# Patient Record
Sex: MALE | Race: WHITE | NOT HISPANIC OR LATINO
[De-identification: names, ages, dates, MRNs, and addresses within clinical notes are randomized per-mention and may not be internally consistent; named-entity substitution may affect disease eponyms.]

---

## 2017-03-16 ENCOUNTER — APPOINTMENT (OUTPATIENT)
Dept: CARDIOLOGY | Facility: CLINIC | Age: 62
End: 2017-03-16

## 2017-06-08 ENCOUNTER — APPOINTMENT (OUTPATIENT)
Dept: CARDIOLOGY | Facility: CLINIC | Age: 62
End: 2017-06-08

## 2017-06-13 ENCOUNTER — APPOINTMENT (OUTPATIENT)
Dept: CARDIOLOGY | Facility: CLINIC | Age: 62
End: 2017-06-13

## 2017-06-13 VITALS
BODY MASS INDEX: 32.49 KG/M2 | HEIGHT: 67 IN | SYSTOLIC BLOOD PRESSURE: 148 MMHG | WEIGHT: 207 LBS | DIASTOLIC BLOOD PRESSURE: 101 MMHG | HEART RATE: 90 BPM | OXYGEN SATURATION: 92 %

## 2017-09-30 ENCOUNTER — OUTPATIENT (OUTPATIENT)
Dept: OUTPATIENT SERVICES | Facility: HOSPITAL | Age: 62
LOS: 1 days | Discharge: HOME | End: 2017-09-30

## 2017-09-30 DIAGNOSIS — I48.91 UNSPECIFIED ATRIAL FIBRILLATION: ICD-10-CM
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
## 2017-09-30 DIAGNOSIS — E05.90 THYROTOXICOSIS, UNSPECIFIED WITHOUT THYROTOXIC CRISIS OR STORM: ICD-10-CM

## 2017-09-30 DIAGNOSIS — R97.20 ELEVATED PROSTATE SPECIFIC ANTIGEN [PSA]: ICD-10-CM

## 2017-09-30 DIAGNOSIS — E55.9 VITAMIN D DEFICIENCY, UNSPECIFIED: ICD-10-CM

## 2017-09-30 DIAGNOSIS — I49.01 VENTRICULAR FIBRILLATION: ICD-10-CM

## 2017-09-30 DIAGNOSIS — E78.5 HYPERLIPIDEMIA, UNSPECIFIED: ICD-10-CM

## 2017-09-30 DIAGNOSIS — D64.9 ANEMIA, UNSPECIFIED: ICD-10-CM

## 2017-09-30 DIAGNOSIS — E11.9 TYPE 2 DIABETES MELLITUS WITHOUT COMPLICATIONS: ICD-10-CM

## 2017-09-30 DIAGNOSIS — R55 SYNCOPE AND COLLAPSE: ICD-10-CM

## 2017-12-09 ENCOUNTER — OUTPATIENT (OUTPATIENT)
Dept: OUTPATIENT SERVICES | Facility: HOSPITAL | Age: 62
LOS: 1 days | Discharge: HOME | End: 2017-12-09

## 2017-12-09 DIAGNOSIS — E78.5 HYPERLIPIDEMIA, UNSPECIFIED: ICD-10-CM

## 2017-12-09 DIAGNOSIS — I49.01 VENTRICULAR FIBRILLATION: ICD-10-CM

## 2017-12-09 DIAGNOSIS — R55 SYNCOPE AND COLLAPSE: ICD-10-CM

## 2017-12-09 DIAGNOSIS — I48.91 UNSPECIFIED ATRIAL FIBRILLATION: ICD-10-CM
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
## 2017-12-14 ENCOUNTER — APPOINTMENT (OUTPATIENT)
Dept: CARDIOLOGY | Facility: CLINIC | Age: 62
End: 2017-12-14

## 2017-12-14 VITALS — DIASTOLIC BLOOD PRESSURE: 100 MMHG | BODY MASS INDEX: 32.26 KG/M2 | SYSTOLIC BLOOD PRESSURE: 138 MMHG | WEIGHT: 206 LBS

## 2018-04-19 ENCOUNTER — APPOINTMENT (OUTPATIENT)
Dept: CARDIOLOGY | Facility: CLINIC | Age: 63
End: 2018-04-19

## 2018-04-19 VITALS — BODY MASS INDEX: 33.2 KG/M2 | WEIGHT: 212 LBS

## 2018-04-19 VITALS — DIASTOLIC BLOOD PRESSURE: 88 MMHG | SYSTOLIC BLOOD PRESSURE: 130 MMHG

## 2018-06-02 ENCOUNTER — OUTPATIENT (OUTPATIENT)
Dept: OUTPATIENT SERVICES | Facility: HOSPITAL | Age: 63
LOS: 1 days | Discharge: HOME | End: 2018-06-02

## 2018-06-02 DIAGNOSIS — N39.0 URINARY TRACT INFECTION, SITE NOT SPECIFIED: ICD-10-CM

## 2018-06-02 DIAGNOSIS — E11.9 TYPE 2 DIABETES MELLITUS WITHOUT COMPLICATIONS: ICD-10-CM

## 2018-06-02 DIAGNOSIS — Z00.00 ENCOUNTER FOR GENERAL ADULT MEDICAL EXAMINATION WITHOUT ABNORMAL FINDINGS: ICD-10-CM

## 2018-06-02 DIAGNOSIS — E78.5 HYPERLIPIDEMIA, UNSPECIFIED: ICD-10-CM

## 2018-06-02 DIAGNOSIS — D64.9 ANEMIA, UNSPECIFIED: ICD-10-CM

## 2018-06-02 DIAGNOSIS — E53.8 DEFICIENCY OF OTHER SPECIFIED B GROUP VITAMINS: ICD-10-CM

## 2018-06-02 DIAGNOSIS — R97.20 ELEVATED PROSTATE SPECIFIC ANTIGEN [PSA]: ICD-10-CM

## 2018-06-02 DIAGNOSIS — E55.9 VITAMIN D DEFICIENCY, UNSPECIFIED: ICD-10-CM

## 2018-06-02 DIAGNOSIS — D50.9 IRON DEFICIENCY ANEMIA, UNSPECIFIED: ICD-10-CM

## 2018-06-28 ENCOUNTER — APPOINTMENT (OUTPATIENT)
Dept: CARDIOLOGY | Facility: CLINIC | Age: 63
End: 2018-06-28

## 2018-06-28 VITALS — DIASTOLIC BLOOD PRESSURE: 80 MMHG | SYSTOLIC BLOOD PRESSURE: 130 MMHG

## 2018-07-27 ENCOUNTER — INPATIENT (INPATIENT)
Facility: HOSPITAL | Age: 63
LOS: 1 days | Discharge: HOME | End: 2018-07-29
Attending: INTERNAL MEDICINE | Admitting: INTERNAL MEDICINE

## 2018-07-27 VITALS
HEART RATE: 85 BPM | OXYGEN SATURATION: 98 % | SYSTOLIC BLOOD PRESSURE: 147 MMHG | RESPIRATION RATE: 18 BRPM | TEMPERATURE: 97 F | DIASTOLIC BLOOD PRESSURE: 101 MMHG

## 2018-07-27 LAB
ALBUMIN SERPL ELPH-MCNC: 4.1 G/DL — SIGNIFICANT CHANGE UP (ref 3.5–5.2)
ALP SERPL-CCNC: 100 U/L — SIGNIFICANT CHANGE UP (ref 30–115)
ALT FLD-CCNC: 26 U/L — SIGNIFICANT CHANGE UP (ref 0–41)
ANION GAP SERPL CALC-SCNC: 16 MMOL/L — HIGH (ref 7–14)
APTT BLD: 31.4 SEC — SIGNIFICANT CHANGE UP (ref 27–39.2)
AST SERPL-CCNC: 21 U/L — SIGNIFICANT CHANGE UP (ref 0–41)
BASOPHILS # BLD AUTO: 0.07 K/UL — SIGNIFICANT CHANGE UP (ref 0–0.2)
BASOPHILS NFR BLD AUTO: 0.9 % — SIGNIFICANT CHANGE UP (ref 0–1)
BILIRUB SERPL-MCNC: 0.3 MG/DL — SIGNIFICANT CHANGE UP (ref 0.2–1.2)
BUN SERPL-MCNC: 19 MG/DL — SIGNIFICANT CHANGE UP (ref 10–20)
CALCIUM SERPL-MCNC: 9 MG/DL — SIGNIFICANT CHANGE UP (ref 8.5–10.1)
CHLORIDE SERPL-SCNC: 103 MMOL/L — SIGNIFICANT CHANGE UP (ref 98–110)
CK MB CFR SERPL CALC: 1.4 NG/ML — SIGNIFICANT CHANGE UP (ref 0.6–6.3)
CK SERPL-CCNC: 64 U/L — SIGNIFICANT CHANGE UP (ref 0–225)
CO2 SERPL-SCNC: 23 MMOL/L — SIGNIFICANT CHANGE UP (ref 17–32)
CREAT SERPL-MCNC: 1 MG/DL — SIGNIFICANT CHANGE UP (ref 0.7–1.5)
EOSINOPHIL # BLD AUTO: 0.22 K/UL — SIGNIFICANT CHANGE UP (ref 0–0.7)
EOSINOPHIL NFR BLD AUTO: 2.7 % — SIGNIFICANT CHANGE UP (ref 0–8)
GLUCOSE SERPL-MCNC: 151 MG/DL — HIGH (ref 70–99)
HCT VFR BLD CALC: 39.7 % — LOW (ref 42–52)
HGB BLD-MCNC: 13.5 G/DL — LOW (ref 14–18)
IMM GRANULOCYTES NFR BLD AUTO: 0.6 % — HIGH (ref 0.1–0.3)
INR BLD: 1.15 RATIO — SIGNIFICANT CHANGE UP (ref 0.65–1.3)
LYMPHOCYTES # BLD AUTO: 1.63 K/UL — SIGNIFICANT CHANGE UP (ref 1.2–3.4)
LYMPHOCYTES # BLD AUTO: 20.1 % — LOW (ref 20.5–51.1)
MCHC RBC-ENTMCNC: 29 PG — SIGNIFICANT CHANGE UP (ref 27–31)
MCHC RBC-ENTMCNC: 34 G/DL — SIGNIFICANT CHANGE UP (ref 32–37)
MCV RBC AUTO: 85.2 FL — SIGNIFICANT CHANGE UP (ref 80–94)
MONOCYTES # BLD AUTO: 0.88 K/UL — HIGH (ref 0.1–0.6)
MONOCYTES NFR BLD AUTO: 10.8 % — HIGH (ref 1.7–9.3)
NEUTROPHILS # BLD AUTO: 5.27 K/UL — SIGNIFICANT CHANGE UP (ref 1.4–6.5)
NEUTROPHILS NFR BLD AUTO: 64.9 % — SIGNIFICANT CHANGE UP (ref 42.2–75.2)
NRBC # BLD: 0 /100 WBCS — SIGNIFICANT CHANGE UP (ref 0–0)
PLATELET # BLD AUTO: 188 K/UL — SIGNIFICANT CHANGE UP (ref 130–400)
POTASSIUM SERPL-MCNC: 3.9 MMOL/L — SIGNIFICANT CHANGE UP (ref 3.5–5)
POTASSIUM SERPL-SCNC: 3.9 MMOL/L — SIGNIFICANT CHANGE UP (ref 3.5–5)
PROT SERPL-MCNC: 6.9 G/DL — SIGNIFICANT CHANGE UP (ref 6–8)
PROTHROM AB SERPL-ACNC: 12.4 SEC — SIGNIFICANT CHANGE UP (ref 9.95–12.87)
RBC # BLD: 4.66 M/UL — LOW (ref 4.7–6.1)
RBC # FLD: 14.4 % — SIGNIFICANT CHANGE UP (ref 11.5–14.5)
SODIUM SERPL-SCNC: 142 MMOL/L — SIGNIFICANT CHANGE UP (ref 135–146)
TROPONIN T SERPL-MCNC: <0.01 NG/ML — SIGNIFICANT CHANGE UP
WBC # BLD: 8.12 K/UL — SIGNIFICANT CHANGE UP (ref 4.8–10.8)
WBC # FLD AUTO: 8.12 K/UL — SIGNIFICANT CHANGE UP (ref 4.8–10.8)

## 2018-07-28 DIAGNOSIS — Z95.810 PRESENCE OF AUTOMATIC (IMPLANTABLE) CARDIAC DEFIBRILLATOR: Chronic | ICD-10-CM

## 2018-07-28 DIAGNOSIS — Z98.890 OTHER SPECIFIED POSTPROCEDURAL STATES: Chronic | ICD-10-CM

## 2018-07-28 DIAGNOSIS — Z87.828 PERSONAL HISTORY OF OTHER (HEALED) PHYSICAL INJURY AND TRAUMA: Chronic | ICD-10-CM

## 2018-07-28 LAB
ANION GAP SERPL CALC-SCNC: 14 MMOL/L — SIGNIFICANT CHANGE UP (ref 7–14)
BASOPHILS # BLD AUTO: 0.05 K/UL — SIGNIFICANT CHANGE UP (ref 0–0.2)
BASOPHILS NFR BLD AUTO: 0.7 % — SIGNIFICANT CHANGE UP (ref 0–1)
BUN SERPL-MCNC: 18 MG/DL — SIGNIFICANT CHANGE UP (ref 10–20)
CALCIUM SERPL-MCNC: 9 MG/DL — SIGNIFICANT CHANGE UP (ref 8.5–10.1)
CHLORIDE SERPL-SCNC: 104 MMOL/L — SIGNIFICANT CHANGE UP (ref 98–110)
CO2 SERPL-SCNC: 25 MMOL/L — SIGNIFICANT CHANGE UP (ref 17–32)
CREAT SERPL-MCNC: 0.8 MG/DL — SIGNIFICANT CHANGE UP (ref 0.7–1.5)
EOSINOPHIL # BLD AUTO: 0.18 K/UL — SIGNIFICANT CHANGE UP (ref 0–0.7)
EOSINOPHIL NFR BLD AUTO: 2.5 % — SIGNIFICANT CHANGE UP (ref 0–8)
GLUCOSE SERPL-MCNC: 102 MG/DL — HIGH (ref 70–99)
HCT VFR BLD CALC: 40.3 % — LOW (ref 42–52)
HGB BLD-MCNC: 13.4 G/DL — LOW (ref 14–18)
IMM GRANULOCYTES NFR BLD AUTO: 0.4 % — HIGH (ref 0.1–0.3)
LYMPHOCYTES # BLD AUTO: 1.24 K/UL — SIGNIFICANT CHANGE UP (ref 1.2–3.4)
LYMPHOCYTES # BLD AUTO: 17 % — LOW (ref 20.5–51.1)
MAGNESIUM SERPL-MCNC: 2.1 MG/DL — SIGNIFICANT CHANGE UP (ref 1.8–2.4)
MCHC RBC-ENTMCNC: 28.3 PG — SIGNIFICANT CHANGE UP (ref 27–31)
MCHC RBC-ENTMCNC: 33.3 G/DL — SIGNIFICANT CHANGE UP (ref 32–37)
MCV RBC AUTO: 85 FL — SIGNIFICANT CHANGE UP (ref 80–94)
MONOCYTES # BLD AUTO: 0.66 K/UL — HIGH (ref 0.1–0.6)
MONOCYTES NFR BLD AUTO: 9 % — SIGNIFICANT CHANGE UP (ref 1.7–9.3)
NEUTROPHILS # BLD AUTO: 5.14 K/UL — SIGNIFICANT CHANGE UP (ref 1.4–6.5)
NEUTROPHILS NFR BLD AUTO: 70.4 % — SIGNIFICANT CHANGE UP (ref 42.2–75.2)
PLATELET # BLD AUTO: 183 K/UL — SIGNIFICANT CHANGE UP (ref 130–400)
POTASSIUM SERPL-MCNC: 4.4 MMOL/L — SIGNIFICANT CHANGE UP (ref 3.5–5)
POTASSIUM SERPL-SCNC: 4.4 MMOL/L — SIGNIFICANT CHANGE UP (ref 3.5–5)
RBC # BLD: 4.74 M/UL — SIGNIFICANT CHANGE UP (ref 4.7–6.1)
RBC # FLD: 14.1 % — SIGNIFICANT CHANGE UP (ref 11.5–14.5)
SODIUM SERPL-SCNC: 143 MMOL/L — SIGNIFICANT CHANGE UP (ref 135–146)
WBC # BLD: 7.3 K/UL — SIGNIFICANT CHANGE UP (ref 4.8–10.8)
WBC # FLD AUTO: 7.3 K/UL — SIGNIFICANT CHANGE UP (ref 4.8–10.8)

## 2018-07-28 RX ORDER — ZOLPIDEM TARTRATE 10 MG/1
5 TABLET ORAL AT BEDTIME
Qty: 0 | Refills: 0 | Status: DISCONTINUED | OUTPATIENT
Start: 2018-07-28 | End: 2018-07-29

## 2018-07-28 RX ORDER — SIMVASTATIN 20 MG/1
20 TABLET, FILM COATED ORAL AT BEDTIME
Qty: 0 | Refills: 0 | Status: DISCONTINUED | OUTPATIENT
Start: 2018-07-28 | End: 2018-07-29

## 2018-07-28 RX ORDER — ENOXAPARIN SODIUM 100 MG/ML
40 INJECTION SUBCUTANEOUS EVERY 24 HOURS
Qty: 0 | Refills: 0 | Status: DISCONTINUED | OUTPATIENT
Start: 2018-07-28 | End: 2018-07-29

## 2018-07-28 RX ORDER — LOSARTAN POTASSIUM 100 MG/1
50 TABLET, FILM COATED ORAL DAILY
Qty: 0 | Refills: 0 | Status: DISCONTINUED | OUTPATIENT
Start: 2018-07-28 | End: 2018-07-29

## 2018-07-28 RX ORDER — ZOLPIDEM TARTRATE 10 MG/1
5 TABLET ORAL AT BEDTIME
Qty: 0 | Refills: 0 | Status: DISCONTINUED | OUTPATIENT
Start: 2018-07-28 | End: 2018-07-28

## 2018-07-28 RX ORDER — ONDANSETRON 8 MG/1
4 TABLET, FILM COATED ORAL EVERY 6 HOURS
Qty: 0 | Refills: 0 | Status: DISCONTINUED | OUTPATIENT
Start: 2018-07-28 | End: 2018-07-29

## 2018-07-28 RX ADMIN — SIMVASTATIN 20 MILLIGRAM(S): 20 TABLET, FILM COATED ORAL at 21:21

## 2018-07-28 RX ADMIN — ZOLPIDEM TARTRATE 5 MILLIGRAM(S): 10 TABLET ORAL at 22:35

## 2018-07-28 RX ADMIN — LOSARTAN POTASSIUM 50 MILLIGRAM(S): 100 TABLET, FILM COATED ORAL at 06:40

## 2018-07-28 NOTE — ED PROVIDER NOTE - ATTENDING CONTRIBUTION TO CARE
63 year old male, pmhx of htn, dld, previous vfib, has an aicd, comes in with complaint of pacemaker beeping, his aicd never fired, no cp/sob, no n/v/d, no loc, no recent illness. Patient was concerned and came for eval. ED team discussed case with [employer], it appears that the device needs a batterychange    CONSTITUTIONAL: Well-developed; well-nourished; in no acute distress. Sitting up and providing appropriate history and physical examination  SKIN: skin exam is warm and dry, no acute rash.  HEAD: Normocephalic; atraumatic.  EYES: PERRL, 3 mm bilateral, no nystagmus, EOM intact; conjunctiva and sclera clear.  ENT: No nasal discharge; airway clear.  NECK: Supple; non tender. + full passive ROM in all directions. No JVD  CARD: S1, S2 normal; no murmurs, gallops, or rubs. Regular rate and rhythm. + Symmetric Strong Pulses  RESP: No wheezes, rales or rhonchi. Good air movement bilaterally  ABD: soft; non-distended; non-tender. No Rebound, No Guarding, No signs of peritonitis, No CVA tenderness. No pulsatile abdominal mass. + Strong and Symmetric Pulses  EXT: Normal ROM. No clubbing, cyanosis or edema. Dp and Pt Pulses intact. Cap refill less than 3 seconds  NEURO: CN 2-12 intact, normal finger to nose, normal romberg, stable gait, no sensory or motor deficits, Alert, oriented, grossly unremarkable. No Focal deficits. GCS 15. NIH 0  PSYCH: Cooperative, appropriate.

## 2018-07-28 NOTE — ED PROVIDER NOTE - MEDICAL DECISION MAKING DETAILS
I personally evaluated the patient. I reviewed the Resident’s or Physician Assistant’s note (as assigned above), and agree with the findings and plan except as documented in my note. Patient seen and evaluated by Dr. Cardona of cardiology, patient's device never fired, discussed with metronic, patient needs battery change. Will admit to telemetry

## 2018-07-28 NOTE — H&P ADULT - HISTORY OF PRESENT ILLNESS
63 Y O M with pmh of HTN , DLD , Mitral and tricuspid valve repair, and AICD presented to ER with c/o beeping noise from AICD this evening. Pt reports that he was told that AICD battery needs to be replaced and pt was scheduled for replacement on 8/29/18 . Pt was instructed from Dr Bryan to present to ER if his AICD fires before that. Pt reports  this evening he heard a loud beeping sound from AICD , that lasted for 30 seconds , no chest pain , palpitations , the device did not shock him. Pt was concerned so he presented to ER .   Pt denies any fever , chills , cough , sob or any other symptoms.
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
## 2018-07-28 NOTE — H&P ADULT - ATTENDING COMMENTS
Pt seen and examined independently of resident, agree with above history, physical exam, assessment and plan    Addendum    1- Pacemaker Malfunction  likely Battery problem  Admit to Tele  EP eval for possible Battery change     2- HTN  continue home meds    3- DVT/GI prophylaxis  s/c heparin/PPIs

## 2018-07-28 NOTE — ED ADULT NURSE NOTE - OBJECTIVE STATEMENT
Patient reports that his AICD pacemaker is beeping, due for battery replacement on August 29. Patient denies any symptoms, denies CP, SOB, dizziness, nausea, weakness. Patient reports his cardiologist advised him to come to ED.

## 2018-07-28 NOTE — H&P ADULT - NSHPLABSRESULTS_GEN_ALL_CORE
13.5   8.12  )-----------( 188      ( 27 Jul 2018 21:41 )             39.7       07-27    142  |  103  |  19  ----------------------------<  151<H>  3.9   |  23  |  1.0    Ca    9.0      27 Jul 2018 21:41    TPro  6.9  /  Alb  4.1  /  TBili  0.3  /  DBili  x   /  AST  21  /  ALT  26  /  AlkPhos  100  07-27                  PT/INR - ( 27 Jul 2018 21:41 )   PT: 12.40 sec;   INR: 1.15 ratio         PTT - ( 27 Jul 2018 21:41 )  PTT:31.4 sec    Lactate Trend      CARDIAC MARKERS ( 27 Jul 2018 21:41 )  x     / <0.01 ng/mL / 64 U/L / x     / 1.4 ng/mL        CAPILLARY BLOOD GLUCOSE

## 2018-07-28 NOTE — H&P ADULT - NSHPPHYSICALEXAM_GEN_ALL_CORE
T(F): 98.6  HR: 83  BP: 129/87  RR: 18  SpO2: 96%      PHYSICAL EXAM:  GENERAL: NAD, well-developed  HEAD:  Atraumatic, Normocephalic  EYES: EOMI, PERRLA, conjunctiva and sclera clear  NECK: Supple, No JVD  CHEST/LUNG: Clear to auscultation bilaterally; No wheeze  HEART: Regular rate and rhythm  ABDOMEN: Soft, Nontender, Nondistended; Bowel sounds present  EXTREMITIES:  2+ Peripheral Pulses, No clubbing, cyanosis, or edema  PSYCH: AAOx3  NEUROLOGY: non-focal  SKIN: No rashes or lesions

## 2018-07-28 NOTE — ED PROVIDER NOTE - NS ED ROS FT
Eyes:  No visual changes, eye pain or discharge.  ENMT:  No hearing changes,  Cardiac:  No chest pain, SOB or edema. No chest pain with exertion.  Respiratory:  No cough or respiratory distress.   GI:  No nausea, vomiting, diarrhea or abdominal pain.  :  No dysuria, frequency or burning.  MS:  No myalgia, muscle weakness, joint pain or back pain.  Neuro:  No headache or weakness.  No LOC.  Skin:  No skin rash.   Endocrine: No history of thyroid disease or diabetes.

## 2018-07-28 NOTE — H&P ADULT - ASSESSMENT
63 Y O M with pmh of HTN , DLD , Mitral and tricuspid valve repair, and AICD presented to ER with c/o beeping noise from AICD this evening.    # PACEMAKER MALFUNCTION:    - admit to tele.  - EPS evaluation for possible battery change on this admission.    # HTN :    - stable c/w htn.    # DLD:    - c/w simvastatin.    # DVT:    -PPX with lovenox.    # DISPO:  -  full code from home.

## 2018-07-29 ENCOUNTER — TRANSCRIPTION ENCOUNTER (OUTPATIENT)
Age: 63
End: 2018-07-29

## 2018-07-29 VITALS
SYSTOLIC BLOOD PRESSURE: 127 MMHG | RESPIRATION RATE: 18 BRPM | DIASTOLIC BLOOD PRESSURE: 86 MMHG | TEMPERATURE: 98 F | HEART RATE: 79 BPM

## 2018-07-29 RX ORDER — SIMVASTATIN 20 MG/1
1 TABLET, FILM COATED ORAL
Qty: 0 | Refills: 0 | DISCHARGE
Start: 2018-07-29

## 2018-07-29 RX ORDER — SIMVASTATIN 20 MG/1
1 TABLET, FILM COATED ORAL
Qty: 0 | Refills: 0 | COMMUNITY

## 2018-07-29 RX ORDER — LOSARTAN POTASSIUM 100 MG/1
1 TABLET, FILM COATED ORAL
Qty: 0 | Refills: 0 | COMMUNITY

## 2018-07-29 RX ORDER — LOSARTAN POTASSIUM 100 MG/1
1 TABLET, FILM COATED ORAL
Qty: 0 | Refills: 0 | DISCHARGE
Start: 2018-07-29

## 2018-07-29 RX ADMIN — LOSARTAN POTASSIUM 50 MILLIGRAM(S): 100 TABLET, FILM COATED ORAL at 05:52

## 2018-07-29 NOTE — PROGRESS NOTE ADULT - ASSESSMENT
63 Y O M with pmh of HTN , DLD , Mitral and tricuspid valve repair, and AICD presented to ER with c/o beeping noise from AICD this evening.    PACEMAKER MALFUNCTION  - c/w tele.  - f/u EPS evaluation for possible battery change     HTN   - stable   -c/w losartan    DLD  - c/w simvastatin.    DVT:  -c/w lovenox    -PPX with lovenox.    DISPO:  -from home    FULL CODE 63 Y O M with pmh of HTN , DLD , Mitral and tricuspid valve repair, and AICD presented to ER with c/o beeping noise from AICD this evening.    PACEMAKER MALFUNCTION  - c/w tele.  - EPS: aware and was scheduled for battery change 8/2018. Can d/c    HTN   - stable   -c/w losartan    DLD  - c/w simvastatin.    DVT:  -c/w lovenox    -PPX with lovenox.    DISPO:  -from home  -d/c home    FULL CODE

## 2018-07-29 NOTE — DISCHARGE NOTE ADULT - PATIENT PORTAL LINK FT
You can access the [facility] Patient Portal, offered by [facility], by registering with the following website: [email or web address]

## 2018-07-29 NOTE — CONSULT NOTE ADULT - ASSESSMENT
ICD - Patient's device reached CELIA, report int he chart  - I turned off the alert for batter change  - Patient is scheduled for generator change on 8/29  VT - no events on device  continue current meds

## 2018-07-29 NOTE — DISCHARGE NOTE ADULT - CARE PROVIDERS DIRECT ADDRESSES
,DirectAddress_Unknown,[email or web address]
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
## 2018-07-29 NOTE — DISCHARGE NOTE ADULT - CARE PLAN
Principal Discharge DX:	AICD battery failure  Goal:	manage and prevent recurrence and complications  Assessment and plan of treatment:	-follow up with EP  -EP scheduled battery replacement  Secondary Diagnosis:	HTN (hypertension)  Goal:	manage and prevent complications  Assessment and plan of treatment:	-take all medications as directed  Secondary Diagnosis:	High cholesterol  Goal:	manage and prevent complications  Assessment and plan of treatment:	-take all medications as directed  -adhere to a low fat, low cholesterol diet

## 2018-07-29 NOTE — PROGRESS NOTE ADULT - ASSESSMENT
A/P  1- Pacemaker Malfunction  seen by EP  no event on monitor  recommended to continue same meds   Pt scheduled for battery change on 8/29    2- HTN  continue home meds      Cleared by EP for discharge  Discussed with Pt and family

## 2018-07-29 NOTE — DISCHARGE NOTE ADULT - CARE PROVIDER_API CALL
Darline Cabrera (DO), Internal Medicine  [address] [place] [id number]  Phone: [phone]  Fax: [phone]    Celestine Bryan; MICHAEL), Cardiac Electrophysiology; Cardiovascular Disease  [address] [place] [address]  Phone: [phone]  Fax: [phone]

## 2018-07-29 NOTE — DISCHARGE NOTE ADULT - PLAN OF CARE
manage and prevent recurrence and complications -follow up with EP  -EP scheduled battery replacement manage and prevent complications -take all medications as directed -take all medications as directed  -adhere to a low fat, low cholesterol diet
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
## 2018-07-29 NOTE — DISCHARGE NOTE ADULT - HOSPITAL COURSE
62 y/o M with PMHx of HTN , DLD , Mitral and tricuspid valve repair, and AICD presented to ER with c/o beeping noise from AICD this evening. Pt reports that he was told that AICD battery needs to be replaced and pt was scheduled for replacement on 8/29/18 . Pt was instructed from Dr Bryan to present to ER if his AICD fires before that. Pt reports this evening he heard a loud beeping sound from AICD , that lasted for 30 seconds. Denied chest pain, palpitations, the device did not shock him. Pt was concerned so he presented to ER. Pt denied any fever, chills ,cough ,sob or any other symptoms. Patient was seen by EP and apparently was evaluated in office on 7/27/18 and was scheduled to have battery replacement in august 2018. Will discharge home with follow up to EP and his primary doctor.

## 2018-07-29 NOTE — DISCHARGE NOTE ADULT - MEDICATION SUMMARY - MEDICATIONS TO TAKE
I will START or STAY ON the medications listed below when I get home from the hospital:    losartan 50 mg oral tablet  -- 1 tab(s) by mouth once a day  -- Indication: For HTN (hypertension)    simvastatin 20 mg oral tablet  -- 1 tab(s) by mouth once a day (at bedtime)  -- Indication: For DLD

## 2018-08-01 PROBLEM — E78.00 PURE HYPERCHOLESTEROLEMIA, UNSPECIFIED: Chronic | Status: ACTIVE | Noted: 2018-07-28

## 2018-08-01 PROBLEM — I10 ESSENTIAL (PRIMARY) HYPERTENSION: Chronic | Status: ACTIVE | Noted: 2018-07-28

## 2018-08-01 PROBLEM — I48.91 UNSPECIFIED ATRIAL FIBRILLATION: Chronic | Status: ACTIVE | Noted: 2018-07-28

## 2018-08-02 DIAGNOSIS — E78.00 PURE HYPERCHOLESTEROLEMIA, UNSPECIFIED: ICD-10-CM

## 2018-08-02 DIAGNOSIS — Z87.828 PERSONAL HISTORY OF OTHER (HEALED) PHYSICAL INJURY AND TRAUMA: ICD-10-CM

## 2018-08-02 DIAGNOSIS — Z98.890 OTHER SPECIFIED POSTPROCEDURAL STATES: ICD-10-CM
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
## 2018-08-02 DIAGNOSIS — T82.191A OTHER MECHANICAL COMPLICATION OF CARDIAC PULSE GENERATOR (BATTERY), INITIAL ENCOUNTER: ICD-10-CM

## 2018-08-02 DIAGNOSIS — I10 ESSENTIAL (PRIMARY) HYPERTENSION: ICD-10-CM

## 2018-08-02 DIAGNOSIS — Z95.810 PRESENCE OF AUTOMATIC (IMPLANTABLE) CARDIAC DEFIBRILLATOR: ICD-10-CM

## 2018-08-02 DIAGNOSIS — I48.91 UNSPECIFIED ATRIAL FIBRILLATION: ICD-10-CM

## 2018-08-06 ENCOUNTER — OUTPATIENT (OUTPATIENT)
Dept: OUTPATIENT SERVICES | Facility: HOSPITAL | Age: 63
LOS: 1 days | Discharge: HOME | End: 2018-08-06

## 2018-08-06 VITALS
SYSTOLIC BLOOD PRESSURE: 124 MMHG | RESPIRATION RATE: 16 BRPM | WEIGHT: 205.03 LBS | HEIGHT: 67 IN | DIASTOLIC BLOOD PRESSURE: 90 MMHG | OXYGEN SATURATION: 100 % | TEMPERATURE: 97 F | HEART RATE: 80 BPM

## 2018-08-06 DIAGNOSIS — Z98.890 OTHER SPECIFIED POSTPROCEDURAL STATES: Chronic | ICD-10-CM

## 2018-08-06 DIAGNOSIS — Z01.818 ENCOUNTER FOR OTHER PREPROCEDURAL EXAMINATION: ICD-10-CM

## 2018-08-06 DIAGNOSIS — T82.111A BREAKDOWN (MECHANICAL) OF CARDIAC PULSE GENERATOR (BATTERY), INITIAL ENCOUNTER: ICD-10-CM

## 2018-08-06 DIAGNOSIS — Z87.828 PERSONAL HISTORY OF OTHER (HEALED) PHYSICAL INJURY AND TRAUMA: Chronic | ICD-10-CM

## 2018-08-06 DIAGNOSIS — Z95.810 PRESENCE OF AUTOMATIC (IMPLANTABLE) CARDIAC DEFIBRILLATOR: Chronic | ICD-10-CM

## 2018-08-06 NOTE — H&P PST ADULT - PMH
Atrial fibrillation  resolved with SAAVEDRA repair  High cholesterol    HTN (hypertension) Atrial fibrillation  resolved with SAAVEDRA repair  High cholesterol    HTN (hypertension)    Other acute pulmonary embolism without acute cor pulmonale  2014 with ivc filter

## 2018-08-07 LAB
ALBUMIN SERPL ELPH-MCNC: 4.6 G/DL — SIGNIFICANT CHANGE UP (ref 3.5–5.2)
ALP SERPL-CCNC: 82 U/L — SIGNIFICANT CHANGE UP (ref 30–115)
ALT FLD-CCNC: 20 U/L — SIGNIFICANT CHANGE UP (ref 0–41)
ANION GAP SERPL CALC-SCNC: 17 MMOL/L — HIGH (ref 7–14)
APPEARANCE UR: CLEAR — SIGNIFICANT CHANGE UP
APTT BLD: 35.5 SEC — SIGNIFICANT CHANGE UP (ref 27–39.2)
AST SERPL-CCNC: 19 U/L — SIGNIFICANT CHANGE UP (ref 0–41)
BASOPHILS # BLD AUTO: 0.04 K/UL — SIGNIFICANT CHANGE UP (ref 0–0.2)
BASOPHILS NFR BLD AUTO: 0.5 % — SIGNIFICANT CHANGE UP (ref 0–1)
BILIRUB SERPL-MCNC: 0.7 MG/DL — SIGNIFICANT CHANGE UP (ref 0.2–1.2)
BILIRUB UR-MCNC: ABNORMAL
BUN SERPL-MCNC: 21 MG/DL — HIGH (ref 10–20)
CALCIUM SERPL-MCNC: 9.3 MG/DL — SIGNIFICANT CHANGE UP (ref 8.5–10.1)
CHLORIDE SERPL-SCNC: 101 MMOL/L — SIGNIFICANT CHANGE UP (ref 98–110)
CO2 SERPL-SCNC: 25 MMOL/L — SIGNIFICANT CHANGE UP (ref 17–32)
COLOR SPEC: SIGNIFICANT CHANGE UP
CREAT SERPL-MCNC: 1.1 MG/DL — SIGNIFICANT CHANGE UP (ref 0.7–1.5)
DIFF PNL FLD: NEGATIVE — SIGNIFICANT CHANGE UP
EOSINOPHIL # BLD AUTO: 0.15 K/UL — SIGNIFICANT CHANGE UP (ref 0–0.7)
EOSINOPHIL NFR BLD AUTO: 1.7 % — SIGNIFICANT CHANGE UP (ref 0–8)
GLUCOSE SERPL-MCNC: 60 MG/DL — LOW (ref 70–99)
GLUCOSE UR QL: NEGATIVE — SIGNIFICANT CHANGE UP
HCT VFR BLD CALC: 41.7 % — LOW (ref 42–52)
HGB BLD-MCNC: 13.7 G/DL — LOW (ref 14–18)
IMM GRANULOCYTES NFR BLD AUTO: 0.3 % — SIGNIFICANT CHANGE UP (ref 0.1–0.3)
INR BLD: 1.2 RATIO — SIGNIFICANT CHANGE UP (ref 0.65–1.3)
KETONES UR-MCNC: ABNORMAL
LEUKOCYTE ESTERASE UR-ACNC: NEGATIVE — SIGNIFICANT CHANGE UP
LYMPHOCYTES # BLD AUTO: 1.65 K/UL — SIGNIFICANT CHANGE UP (ref 1.2–3.4)
LYMPHOCYTES # BLD AUTO: 19 % — LOW (ref 20.5–51.1)
MCHC RBC-ENTMCNC: 28.5 PG — SIGNIFICANT CHANGE UP (ref 27–31)
MCHC RBC-ENTMCNC: 32.9 G/DL — SIGNIFICANT CHANGE UP (ref 32–37)
MCV RBC AUTO: 86.7 FL — SIGNIFICANT CHANGE UP (ref 80–94)
MONOCYTES # BLD AUTO: 0.94 K/UL — HIGH (ref 0.1–0.6)
MONOCYTES NFR BLD AUTO: 10.8 % — HIGH (ref 1.7–9.3)
MRSA PCR RESULT.: NEGATIVE — SIGNIFICANT CHANGE UP
NEUTROPHILS # BLD AUTO: 5.88 K/UL — SIGNIFICANT CHANGE UP (ref 1.4–6.5)
NEUTROPHILS NFR BLD AUTO: 67.7 % — SIGNIFICANT CHANGE UP (ref 42.2–75.2)
NITRITE UR-MCNC: NEGATIVE — SIGNIFICANT CHANGE UP
NRBC # BLD: 0 /100 WBCS — SIGNIFICANT CHANGE UP (ref 0–0)
PH UR: 5.5 — SIGNIFICANT CHANGE UP (ref 5–8)
PLATELET # BLD AUTO: 200 K/UL — SIGNIFICANT CHANGE UP (ref 130–400)
POTASSIUM SERPL-MCNC: 4.1 MMOL/L — SIGNIFICANT CHANGE UP (ref 3.5–5)
POTASSIUM SERPL-SCNC: 4.1 MMOL/L — SIGNIFICANT CHANGE UP (ref 3.5–5)
PROT SERPL-MCNC: 7.3 G/DL — SIGNIFICANT CHANGE UP (ref 6–8)
PROT UR-MCNC: ABNORMAL
PROTHROM AB SERPL-ACNC: 12.9 SEC — HIGH (ref 9.95–12.87)
RBC # BLD: 4.81 M/UL — SIGNIFICANT CHANGE UP (ref 4.7–6.1)
RBC # FLD: 14.5 % — SIGNIFICANT CHANGE UP (ref 11.5–14.5)
SODIUM SERPL-SCNC: 143 MMOL/L — SIGNIFICANT CHANGE UP (ref 135–146)
SP GR SPEC: >=1.03 — SIGNIFICANT CHANGE UP (ref 1.01–1.03)
UROBILINOGEN FLD QL: 1 (ref 0.2–0.2)
WBC # BLD: 8.69 K/UL — SIGNIFICANT CHANGE UP (ref 4.8–10.8)
WBC # FLD AUTO: 8.69 K/UL — SIGNIFICANT CHANGE UP (ref 4.8–10.8)
WBC UR QL: SIGNIFICANT CHANGE UP /HPF

## 2018-08-15 ENCOUNTER — OUTPATIENT (OUTPATIENT)
Dept: OUTPATIENT SERVICES | Facility: HOSPITAL | Age: 63
LOS: 1 days | Discharge: HOME | End: 2018-08-15

## 2018-08-15 VITALS
DIASTOLIC BLOOD PRESSURE: 103 MMHG | HEIGHT: 66 IN | RESPIRATION RATE: 18 BRPM | WEIGHT: 205.03 LBS | SYSTOLIC BLOOD PRESSURE: 144 MMHG | HEART RATE: 80 BPM

## 2018-08-15 DIAGNOSIS — T82.111A BREAKDOWN (MECHANICAL) OF CARDIAC PULSE GENERATOR (BATTERY), INITIAL ENCOUNTER: ICD-10-CM

## 2018-08-15 DIAGNOSIS — Z98.890 OTHER SPECIFIED POSTPROCEDURAL STATES: Chronic | ICD-10-CM

## 2018-08-15 DIAGNOSIS — Z87.828 PERSONAL HISTORY OF OTHER (HEALED) PHYSICAL INJURY AND TRAUMA: Chronic | ICD-10-CM

## 2018-08-15 DIAGNOSIS — Z95.810 PRESENCE OF AUTOMATIC (IMPLANTABLE) CARDIAC DEFIBRILLATOR: Chronic | ICD-10-CM

## 2018-08-15 RX ORDER — CEPHALEXIN 500 MG
1 CAPSULE ORAL
Qty: 10 | Refills: 0
Start: 2018-08-15 | End: 2018-08-19

## 2018-08-15 NOTE — CHART NOTE - NSCHARTNOTEFT_GEN_A_CORE
Cardiac Electrophysiology Procedure Note      Procedure:   Implantable BiV- Cardioverter-Defibrillator (ICD) generator change       Indication:  62 yo with history of CHB, VT, heart failure referred for generator change as it reached [facility].    A thorough discussion was had with the patient and his family concerning all aspects of ICD therapy. We reviewed the data supporting ICD therapy and how it applies individually.  We discussed the procedures, risks and outcomes of ICD implantation an living with an ICD. We discussed management of ICD therapy throughout life, including deactivation of the ICD. After all questions were answered, it was a shared decision to proceed with ICD therapy.    :  Attending: Celestine Bryan MD    Equipment Implanted   Pulse Generator: [employer]  Model Number:  [id number]  Serial Number: [id number]    Equipment Explanted  Pulse Generator:   Model Number: [id number]  Serial Number:  [id number]         Intraprocedure Lead Parameters    Right Ventricular Lead:  Model Number:  [id number]  Serial Number: [id number]  Implanted:     11  Threshold:  0.5 V @  0.5ms  Sensing:        paced  Impedance:        456Ohms    Left Ventricular Lead:  Model Number: [id number]     Serial Number: [id number]  Implanted:       11  Threshold:   V  1.25 @ 0.5  ms  Impedance:       683 Ohms      Right Atrial Lead:  Model Number:  1999  Serial Number: [id number]  Implanted:      11  Threshold:      0.5 V @ 0.5 ms  Sensin.9 mV  Impedance:     266 Ohms    DESCRIPTION OF PROCEDURE  The patient was brought to the Procedure Room in a nonsedated and fasting state, having received preoperative antibiotics. Informed, written consent was obtained prior to the procedure. Intermittent boluses of Versed  was used to maintain comfort and analgesia throughout the procedure. Blood pressure, oxygenation and level of comfort were stable throughout. The left shoulder region was cleaned and prepped with serial applications of Chlorhexidine Scrub. Patient was then covered from head to toe with sterile drapes in the usual manner. The fluoroscopic anatomy of the left shoulder region was inspected, along with generator and lead position and orientation.    Twenty cc of lidocaine solution were infiltrated into the skin overlying the generator just over the previous incision scar. A 2.5 cm. incision was then made overlying and parallel to the previous incision scar. This incision was carried down to the pre-pectoral fascia using electrocautery and blunt dissection. The pacemaker capsule was identified and opened. The old device was freed from fibrous tissue and retrieved from the pocket. The leads were unscrewed and pulled from the device. The leads were connected to a  and tested. Optimal lead parameters were obtained for the ventricular lead and the atrial lead as shown above Next the pocket was inspected for bleeding, and electrocautery applied where appropriate. The wound was irrigated with copious amounts of vancomycin solution.    The leads were wiped clean and then connected to the new pulse generator. The   leads and pulse generator were coiled into the pocket. T    The wound margins approximated well, without any tension or overlap. The wound was closed using an interrupted layer of 2-0 absorbable Dacron suture for the deep fascial layer. This was followed by a continuous layer of 3-0 absorbable sutures. The skin was then closed using 4-0 absorbable Dacron sutures. Steri-strips were placed over the wound.  A dry, sterile dressing was placed over this.  Needle count were performed and found to be consistent at the end of the procedure        COMPLICATIONS:  The patient tolerated the procedure well. There were no immediate complications.    CONCLUSIONS:  Successful BiV- ICD generator change         _______________________________  Celestine Bryan MD  Cardiac Electrophysiology

## 2018-08-15 NOTE — PRE-ANESTHESIA EVALUATION ADULT - NSANTHOSAYNRD_GEN_A_CORE
see jayson sheet/No. JAYSON screening performed.  STOP BANG Legend: 0-2 = LOW Risk; 3-4 = INTERMEDIATE Risk; 5-8 = HIGH Risk

## 2018-08-15 NOTE — PROGRESS NOTE ADULT - SUBJECTIVE AND OBJECTIVE BOX
Electrophysiology Brief Post-Op Note    I have personally seen and examined the patient.  I agree with the history and physical which I have reviewed and noted any changes below.  08-15-18 @ 8:00    PRE-OP DIAGNOSIS: CHB, CHF    POST-OP DIAGNOSIS: CHB, CHF    PROCEDURE: generator change    Physician: Irvin  Assistant: None    ESTIMATED BLOOD LOSS:    20   mL    ANESTHESIA TYPE:  [  ]General Anesthesia  [ X ] Sedation  [  ] Local/Regional    CONDITION  [  ] Critical  [  ] Serious  [  ]Fair  [ X ]Good      SPECIMENS REMOVED (IF APPLICABLE):  generator    IMPLANTS (IF APPLICABLE)  generator     FINDINGS  PLAN OF CARE  - Keflex 500 mg q12 x 5 days  - FU 3-4 weeks

## 2018-08-21 DIAGNOSIS — Z45.02 ENCOUNTER FOR ADJUSTMENT AND MANAGEMENT OF AUTOMATIC IMPLANTABLE CARDIAC DEFIBRILLATOR: ICD-10-CM

## 2018-08-21 DIAGNOSIS — I10 ESSENTIAL (PRIMARY) HYPERTENSION: ICD-10-CM

## 2018-08-21 DIAGNOSIS — I50.9 HEART FAILURE, UNSPECIFIED: ICD-10-CM

## 2018-08-21 DIAGNOSIS — F10.10 ALCOHOL ABUSE, UNCOMPLICATED: ICD-10-CM

## 2018-08-21 DIAGNOSIS — I48.91 UNSPECIFIED ATRIAL FIBRILLATION: ICD-10-CM

## 2018-08-21 DIAGNOSIS — E78.00 PURE HYPERCHOLESTEROLEMIA, UNSPECIFIED: ICD-10-CM

## 2018-09-13 ENCOUNTER — APPOINTMENT (OUTPATIENT)
Dept: CARDIOLOGY | Facility: CLINIC | Age: 63
End: 2018-09-13

## 2018-09-13 VITALS — SYSTOLIC BLOOD PRESSURE: 120 MMHG | DIASTOLIC BLOOD PRESSURE: 80 MMHG

## 2018-12-01 ENCOUNTER — OUTPATIENT (OUTPATIENT)
Dept: OUTPATIENT SERVICES | Facility: HOSPITAL | Age: 63
LOS: 1 days | Discharge: HOME | End: 2018-12-01

## 2018-12-01 DIAGNOSIS — E11.9 TYPE 2 DIABETES MELLITUS WITHOUT COMPLICATIONS: ICD-10-CM

## 2018-12-01 DIAGNOSIS — E55.9 VITAMIN D DEFICIENCY, UNSPECIFIED: ICD-10-CM

## 2018-12-01 DIAGNOSIS — Z87.828 PERSONAL HISTORY OF OTHER (HEALED) PHYSICAL INJURY AND TRAUMA: Chronic | ICD-10-CM

## 2018-12-01 DIAGNOSIS — D64.9 ANEMIA, UNSPECIFIED: ICD-10-CM

## 2018-12-01 DIAGNOSIS — Z95.810 PRESENCE OF AUTOMATIC (IMPLANTABLE) CARDIAC DEFIBRILLATOR: Chronic | ICD-10-CM

## 2018-12-01 DIAGNOSIS — D50.9 IRON DEFICIENCY ANEMIA, UNSPECIFIED: ICD-10-CM

## 2018-12-01 DIAGNOSIS — N39.0 URINARY TRACT INFECTION, SITE NOT SPECIFIED: ICD-10-CM

## 2018-12-01 DIAGNOSIS — E53.8 DEFICIENCY OF OTHER SPECIFIED B GROUP VITAMINS: ICD-10-CM

## 2018-12-01 DIAGNOSIS — E05.90 THYROTOXICOSIS, UNSPECIFIED WITHOUT THYROTOXIC CRISIS OR STORM: ICD-10-CM

## 2018-12-01 DIAGNOSIS — E78.5 HYPERLIPIDEMIA, UNSPECIFIED: ICD-10-CM

## 2018-12-01 DIAGNOSIS — Z98.890 OTHER SPECIFIED POSTPROCEDURAL STATES: Chronic | ICD-10-CM

## 2019-05-16 ENCOUNTER — APPOINTMENT (OUTPATIENT)
Dept: CARDIOLOGY | Facility: CLINIC | Age: 64
End: 2019-05-16
Payer: COMMERCIAL

## 2019-05-16 VITALS — SYSTOLIC BLOOD PRESSURE: 128 MMHG | DIASTOLIC BLOOD PRESSURE: 88 MMHG | HEART RATE: 80 BPM

## 2019-05-16 PROCEDURE — 93284 PRGRMG EVAL IMPLANTABLE DFB: CPT

## 2019-05-16 PROCEDURE — 93297 REM INTERROG DEV EVAL ICPMS: CPT

## 2019-05-16 PROCEDURE — 99213 OFFICE O/P EST LOW 20 MIN: CPT

## 2019-05-16 NOTE — PHYSICAL EXAM
[Normal Appearance] : normal appearance [General Appearance - Well Developed] : well developed [Well Groomed] : well groomed [General Appearance - Well Nourished] : well nourished [No Deformities] : no deformities [Heart Rate And Rhythm] : heart rate and rhythm were normal [General Appearance - In No Acute Distress] : no acute distress [Murmurs] : no murmurs present [Heart Sounds] : normal S1 and S2 [Respiration, Rhythm And Depth] : normal respiratory rhythm and effort [Exaggerated Use Of Accessory Muscles For Inspiration] : no accessory muscle use [Auscultation Breath Sounds / Voice Sounds] : lungs were clear to auscultation bilaterally [Left Infraclavicular] : left infraclavicular area [Clean] : clean [Well-Healed] : well-healed [Dry] : dry [Bowel Sounds] : normal bowel sounds [Abdomen Soft] : soft [Abdomen Tenderness] : non-tender [Nail Clubbing] : no clubbing of the fingernails [Abdomen Mass (___ Cm)] : no abdominal mass palpated [Cyanosis, Localized] : no localized cyanosis [] : no ischemic changes [Petechial Hemorrhages (___cm)] : no petechial hemorrhages

## 2019-07-05 NOTE — ASSESSMENT
[FreeTextEntry1] : AICD interrogation : Battery : 4.9 years to CELIA . All parameters stable. No events . 1 episode of NSV T >200 bpm. \par 21 episodes of NSVT. No pulmonary congestion . \par \par patient reports c/w meds \par \par \par plan \par -restart Metoprolol 12.5 mg at bedtime and titrate up if tolerates \par -continue Losartan 25 mg daily \par -f/u with cardiologist as scheduled \par -continue remote monitoring \par -return in 6 months \par \par

## 2019-07-05 NOTE — HISTORY OF PRESENT ILLNESS
[Palpitations] : no palpitations [SOB] : no dyspnea [Syncope] : no syncope [Chest Pain] : no chest pain or discomfort [ICD Shocks] : no recent ICD shocks [Erythema at Site] : no erythema at device site [Swelling at Site] : no swelling at device site [de-identified] : 64  years old male with pmh of HTN, S/P MV repair , NICM , AV node ablation S/P CRT/D , VT S/P shock , Afib CHADS-VASc 1,S/P GIANFRANCO closure, recurrent AFib S/P DCCV 10/2016 , S/P CRT-D generator change 8/15/2018 . \par \par Pt presents for a routine AICD interrogation \par Denies CP/ SOB / palpitations. No dizziness, no syncope . \par Stable activity tolerance \par ECG ( 5/16/2019 ) - SR at 81 bpm, BiV paced

## 2019-07-05 NOTE — PROCEDURE
[Complete Heart Block] : complete heart block [CRT-D] : Cardiac resynchronization therapy defibrillator [Voltage: ___ volts] : Voltage was [unfilled] volts [DDDR] : DDDR [Charge Time: ___ sec] : charge time was [unfilled] seconds [Threshold Testing Performed] : Threshold testing was performed [Longevity: ___ months] : The estimated remaining battery life is [unfilled] months [Sensing Amplitude ___mv] : sensing amplitude was [unfilled] mv [Lead Imp:  ___ohms] : lead impedance was [unfilled] ohms [___V @] : [unfilled] V [Programmed for Longevity] : output reprogrammed for improved battery longevity [___ ms] : [unfilled] ms [Asense-Vsense ___ %] : Asense-Vsense [unfilled]% [Asense-Vpace ___ %] : Asense-Vpace [unfilled]% [Apace-Vsense ___ %] : Apace-Vsense [unfilled]% [Apace-Vpace ___ %] : Apace-Vpace [unfilled]% [See Scanned Paceart Report] : See scanned paceart report [See Device Printout] : See device printout [de-identified] : Janel LERNER [de-identified] : Medtronic [de-identified] : 8/15/18 [de-identified] : [id number] [de-identified] : 80 [de-identified] : 1 VT/VF episode on 1/9/19 lasting 7 seconds. No therapy . \par No pulmonary congestion.

## 2019-07-09 ENCOUNTER — APPOINTMENT (OUTPATIENT)
Dept: CARDIOLOGY | Facility: CLINIC | Age: 64
End: 2019-07-09
Payer: COMMERCIAL

## 2019-07-09 PROCEDURE — 93295 DEV INTERROG REMOTE 1/2/MLT: CPT

## 2019-07-09 PROCEDURE — 93296 REM INTERROG EVL PM/IDS: CPT

## 2019-10-10 ENCOUNTER — APPOINTMENT (OUTPATIENT)
Dept: CARDIOLOGY | Facility: CLINIC | Age: 64
End: 2019-10-10
Payer: COMMERCIAL

## 2019-10-10 PROCEDURE — 93295 DEV INTERROG REMOTE 1/2/MLT: CPT

## 2019-10-10 PROCEDURE — 93296 REM INTERROG EVL PM/IDS: CPT

## 2019-11-14 ENCOUNTER — OUTPATIENT (OUTPATIENT)
Dept: OUTPATIENT SERVICES | Facility: HOSPITAL | Age: 64
LOS: 1 days | Discharge: HOME | End: 2019-11-14

## 2019-11-14 ENCOUNTER — APPOINTMENT (OUTPATIENT)
Dept: CARDIOLOGY | Facility: CLINIC | Age: 64
End: 2019-11-14
Payer: COMMERCIAL

## 2019-11-14 VITALS
BODY MASS INDEX: 32.18 KG/M2 | SYSTOLIC BLOOD PRESSURE: 120 MMHG | HEART RATE: 82 BPM | DIASTOLIC BLOOD PRESSURE: 80 MMHG | HEIGHT: 67 IN | WEIGHT: 205 LBS

## 2019-11-14 DIAGNOSIS — Z98.890 OTHER SPECIFIED POSTPROCEDURAL STATES: Chronic | ICD-10-CM

## 2019-11-14 DIAGNOSIS — Z95.810 PRESENCE OF AUTOMATIC (IMPLANTABLE) CARDIAC DEFIBRILLATOR: Chronic | ICD-10-CM

## 2019-11-14 DIAGNOSIS — E11.9 TYPE 2 DIABETES MELLITUS WITHOUT COMPLICATIONS: ICD-10-CM

## 2019-11-14 DIAGNOSIS — N39.0 URINARY TRACT INFECTION, SITE NOT SPECIFIED: ICD-10-CM

## 2019-11-14 DIAGNOSIS — E05.90 THYROTOXICOSIS, UNSPECIFIED WITHOUT THYROTOXIC CRISIS OR STORM: ICD-10-CM

## 2019-11-14 DIAGNOSIS — D50.9 IRON DEFICIENCY ANEMIA, UNSPECIFIED: ICD-10-CM

## 2019-11-14 DIAGNOSIS — E55.9 VITAMIN D DEFICIENCY, UNSPECIFIED: ICD-10-CM

## 2019-11-14 DIAGNOSIS — D64.9 ANEMIA, UNSPECIFIED: ICD-10-CM

## 2019-11-14 DIAGNOSIS — Z87.828 PERSONAL HISTORY OF OTHER (HEALED) PHYSICAL INJURY AND TRAUMA: Chronic | ICD-10-CM

## 2019-11-14 DIAGNOSIS — R97.20 ELEVATED PROSTATE SPECIFIC ANTIGEN [PSA]: ICD-10-CM

## 2019-11-14 DIAGNOSIS — E78.5 HYPERLIPIDEMIA, UNSPECIFIED: ICD-10-CM

## 2019-11-14 PROCEDURE — 99213 OFFICE O/P EST LOW 20 MIN: CPT

## 2019-11-14 PROCEDURE — 93284 PRGRMG EVAL IMPLANTABLE DFB: CPT

## 2019-11-14 PROCEDURE — 93290 INTERROG DEV EVAL ICPMS IP: CPT

## 2019-11-14 NOTE — ASSESSMENT
[FreeTextEntry1] : \par \par plan \par contt Metoprolol 12.5 mg at bedtime and titrate up if tolerates \par -continue Losartan 25 mg daily \par -f/u with cardiologist as scheduled \par -continue remote monitoring \par -return in 6 months \par \par

## 2019-11-14 NOTE — HISTORY OF PRESENT ILLNESS
[SOB] : no dyspnea [Palpitations] : no palpitations [ICD Shocks] : no recent ICD shocks [Chest Pain] : no chest pain or discomfort [Syncope] : no syncope [Erythema at Site] : no erythema at device site [Swelling at Site] : no swelling at device site [de-identified] : 64  years old male with pmh of HTN, S/P MV repair , NICM , AV node ablation S/P CRT/D , VT S/P shock , Afib CHADS-VASc 1,S/P GIANFRANCO closure, recurrent AFib S/P DCCV 10/2016 , S/P CRT-D generator change 8/15/2018 . \par \par Pt presents for a routine AICD interrogation \par Denies CP/ SOB / palpitations. No dizziness, no syncope . \par Stable activity tolerance \par ECG ( 5/16/2019 ) - SR at 81 bpm, BiV paced

## 2019-11-14 NOTE — PROCEDURE
[Complete Heart Block] : complete heart block [CRT-D] : Cardiac resynchronization therapy defibrillator [DDDR] : DDDR [Voltage: ___ volts] : Voltage was [unfilled] volts [Longevity: ___ months] : The estimated remaining battery life is [unfilled] months [Threshold Testing Performed] : Threshold testing was performed [Atrial] : Atrial [Ventricular] : Ventricular [Sensing Amplitude ___mv] : sensing amplitude was [unfilled] mv [Lead Imp:  ___ohms] : lead impedance was [unfilled] ohms [___V @] : [unfilled] V [___ ms] : [unfilled] ms [Counters Reset] : the counters were reset [Programmed for Longevity] : output reprogrammed for improved battery longevity [Asense-Vpace ___ %] : Asense-Vpace [unfilled]% [Asense-Vsense ___ %] : Asense-Vsense [unfilled]% [Apace-Vsense ___ %] : Apace-Vsense [unfilled]% [Apace-Vpace ___ %] : Apace-Vpace [unfilled]% [de-identified] : Janel MRI CRT-D [de-identified] : Medtronic [de-identified] : 8/15/2018 [de-identified] : [id number] [de-identified] :  [de-identified] : 12 NSVT, longest episode 16 beats @

## 2019-11-14 NOTE — PHYSICAL EXAM
[General Appearance - Well Developed] : well developed [Normal Appearance] : normal appearance [No Deformities] : no deformities [General Appearance - Well Nourished] : well nourished [Well Groomed] : well groomed [General Appearance - In No Acute Distress] : no acute distress [Heart Rate And Rhythm] : heart rate and rhythm were normal [Heart Sounds] : normal S1 and S2 [Murmurs] : no murmurs present [Auscultation Breath Sounds / Voice Sounds] : lungs were clear to auscultation bilaterally [Respiration, Rhythm And Depth] : normal respiratory rhythm and effort [Exaggerated Use Of Accessory Muscles For Inspiration] : no accessory muscle use [Clean] : clean [Left Infraclavicular] : left infraclavicular area [Well-Healed] : well-healed [Bowel Sounds] : normal bowel sounds [Dry] : dry [Abdomen Soft] : soft [Abdomen Tenderness] : non-tender [Abdomen Mass (___ Cm)] : no abdominal mass palpated [Nail Clubbing] : no clubbing of the fingernails [Petechial Hemorrhages (___cm)] : no petechial hemorrhages [] : no ischemic changes [Cyanosis, Localized] : no localized cyanosis

## 2019-11-19 ENCOUNTER — APPOINTMENT (OUTPATIENT)
Dept: CARDIOLOGY | Facility: CLINIC | Age: 64
End: 2019-11-19
Payer: COMMERCIAL

## 2019-11-19 VITALS
BODY MASS INDEX: 32.49 KG/M2 | HEART RATE: 80 BPM | SYSTOLIC BLOOD PRESSURE: 122 MMHG | DIASTOLIC BLOOD PRESSURE: 84 MMHG | WEIGHT: 207 LBS | HEIGHT: 67 IN

## 2019-11-19 PROCEDURE — 99214 OFFICE O/P EST MOD 30 MIN: CPT | Mod: 25

## 2019-11-19 PROCEDURE — 93284 PRGRMG EVAL IMPLANTABLE DFB: CPT

## 2019-11-19 RX ORDER — METOPROLOL SUCCINATE 25 MG/1
25 TABLET, EXTENDED RELEASE ORAL DAILY
Qty: 90 | Refills: 3 | Status: DISCONTINUED | COMMUNITY
Start: 2019-05-16 | End: 2019-11-19

## 2019-11-19 NOTE — PHYSICAL EXAM
[General Appearance - Well Developed] : well developed [Normal Appearance] : normal appearance [Well Groomed] : well groomed [General Appearance - Well Nourished] : well nourished [No Deformities] : no deformities [General Appearance - In No Acute Distress] : no acute distress [Heart Rate And Rhythm] : heart rate and rhythm were normal [Heart Sounds] : normal S1 and S2 [Murmurs] : no murmurs present [Respiration, Rhythm And Depth] : normal respiratory rhythm and effort [Auscultation Breath Sounds / Voice Sounds] : lungs were clear to auscultation bilaterally [Exaggerated Use Of Accessory Muscles For Inspiration] : no accessory muscle use [Left Infraclavicular] : left infraclavicular area [Clean] : clean [Well-Healed] : well-healed [Dry] : dry [Abdomen Soft] : soft [Abdomen Tenderness] : non-tender [Bowel Sounds] : normal bowel sounds [Abdomen Mass (___ Cm)] : no abdominal mass palpated [Nail Clubbing] : no clubbing of the fingernails [Cyanosis, Localized] : no localized cyanosis [Petechial Hemorrhages (___cm)] : no petechial hemorrhages [] : no ischemic changes

## 2019-11-19 NOTE — HISTORY OF PRESENT ILLNESS
[Palpitations] : no palpitations [SOB] : no dyspnea [ICD Shocks] : no recent ICD shocks [Syncope] : no syncope [Chest Pain] : no chest pain or discomfort [Erythema at Site] : no erythema at device site [Swelling at Site] : no swelling at device site [de-identified] : 64  years old male with pmh of HTN, S/P MV repair , NICM , AV node ablation S/P CRT/D , VT S/P shock , Afib CHADS-VASc 1,S/P GIANFRANCO closure, recurrent AFib S/P DCCV 10/2016 , S/P CRT-D generator change 8/15/2018 . \par \par Pt came in for one epside of shock lastn night. Pt feels well. No CP, SOB, BENJAMIN, no syncope from the shock or otherwise normal\par \par \par ECG ( 5/16/2019 ) - SR at 81 bpm, BiV paced

## 2019-11-19 NOTE — PROCEDURE
[CRT-D] : Cardiac resynchronization therapy defibrillator [DDD] : DDD [Complete Heart Block] : complete heart block [Voltage: ___ volts] : Voltage was [unfilled] volts [Charge Time: ___ sec] : charge time was [unfilled] seconds [Longevity: ___ months] : The estimated remaining battery life is [unfilled] months [Atrial] : Atrial [Ventricular] : Ventricular [Threshold Testing Performed] : Threshold testing was performed [Sensing Amplitude ___mv] : sensing amplitude was [unfilled] mv [Lead Imp:  ___ohms] : lead impedance was [unfilled] ohms [___V @] : [unfilled] V [___ ms] : [unfilled] ms [None] : none [Programmed for Longevity] : output reprogrammed for improved battery longevity [Asense-Vpace ___ %] : Asense-Vpace [unfilled]% [Asense-Vsense ___ %] : Asense-Vsense [unfilled]% [Apace-Vsense ___ %] : Apace-Vsense [unfilled]% [Apace-Vpace ___ %] : Apace-Vpace [unfilled]% [de-identified] : 50 BPM [de-identified] : CATHERINE [de-identified] : [id number] [de-identified] : Janel ELLISON-D [de-identified] : 8/15/2018 [de-identified] :  [de-identified] : 1 episode of FVT  286 BPM terminated with 35 J shock\par Optivol below threshold\par No AF

## 2019-12-09 ENCOUNTER — FORM ENCOUNTER (OUTPATIENT)
Age: 64
End: 2019-12-09

## 2019-12-10 ENCOUNTER — OUTPATIENT (OUTPATIENT)
Dept: OUTPATIENT SERVICES | Facility: HOSPITAL | Age: 64
LOS: 1 days | Discharge: HOME | End: 2019-12-10
Payer: COMMERCIAL

## 2019-12-10 DIAGNOSIS — Z95.810 PRESENCE OF AUTOMATIC (IMPLANTABLE) CARDIAC DEFIBRILLATOR: Chronic | ICD-10-CM

## 2019-12-10 DIAGNOSIS — I47.2 VENTRICULAR TACHYCARDIA: ICD-10-CM

## 2019-12-10 DIAGNOSIS — Z98.890 OTHER SPECIFIED POSTPROCEDURAL STATES: Chronic | ICD-10-CM

## 2019-12-10 DIAGNOSIS — Z87.828 PERSONAL HISTORY OF OTHER (HEALED) PHYSICAL INJURY AND TRAUMA: Chronic | ICD-10-CM

## 2019-12-10 DIAGNOSIS — I50.22 CHRONIC SYSTOLIC (CONGESTIVE) HEART FAILURE: ICD-10-CM

## 2019-12-10 PROCEDURE — 78452 HT MUSCLE IMAGE SPECT MULT: CPT | Mod: 26

## 2020-01-10 ENCOUNTER — APPOINTMENT (OUTPATIENT)
Dept: CARDIOLOGY | Facility: CLINIC | Age: 65
End: 2020-01-10
Payer: COMMERCIAL

## 2020-01-10 PROCEDURE — 93296 REM INTERROG EVL PM/IDS: CPT

## 2020-01-10 PROCEDURE — 93295 DEV INTERROG REMOTE 1/2/MLT: CPT

## 2020-02-19 ENCOUNTER — APPOINTMENT (OUTPATIENT)
Dept: CARDIOLOGY | Facility: CLINIC | Age: 65
End: 2020-02-19
Payer: COMMERCIAL

## 2020-02-19 VITALS
WEIGHT: 205 LBS | DIASTOLIC BLOOD PRESSURE: 103 MMHG | HEIGHT: 67 IN | SYSTOLIC BLOOD PRESSURE: 139 MMHG | HEART RATE: 83 BPM | BODY MASS INDEX: 32.18 KG/M2

## 2020-02-19 PROCEDURE — 93290 INTERROG DEV EVAL ICPMS IP: CPT

## 2020-02-19 PROCEDURE — 93284 PRGRMG EVAL IMPLANTABLE DFB: CPT

## 2020-02-19 PROCEDURE — 99213 OFFICE O/P EST LOW 20 MIN: CPT

## 2020-02-19 RX ORDER — METOPROLOL SUCCINATE 50 MG/1
50 TABLET, EXTENDED RELEASE ORAL
Refills: 0 | Status: COMPLETED | COMMUNITY
Start: 2019-11-19 | End: 2020-02-19

## 2020-02-19 NOTE — HISTORY OF PRESENT ILLNESS
[None] : The patient complains of no symptoms [Palpitations] : no palpitations [SOB] : no dyspnea [Syncope] : no syncope [Chest Pain] : no chest pain or discomfort [ICD Shocks] : no recent ICD shocks [Erythema at Site] : no erythema at device site [Swelling at Site] : no swelling at device site [de-identified] : 64  years old male with pmh of HTN, S/P MV repair , NICM , AV node ablation S/P CRT/D , VT S/P shock , Afib CHADS-VASc 1,S/P GIANFRANCO closure, recurrent AFib S/P DCCV 10/2016 , S/P CRT-D generator change 8/15/2018 . \par \par \par \par

## 2020-02-19 NOTE — PHYSICAL EXAM
[Normal Appearance] : normal appearance [General Appearance - Well Developed] : well developed [General Appearance - Well Nourished] : well nourished [No Deformities] : no deformities [Well Groomed] : well groomed [Heart Sounds] : normal S1 and S2 [Heart Rate And Rhythm] : heart rate and rhythm were normal [General Appearance - In No Acute Distress] : no acute distress [Respiration, Rhythm And Depth] : normal respiratory rhythm and effort [Murmurs] : no murmurs present [Exaggerated Use Of Accessory Muscles For Inspiration] : no accessory muscle use [Auscultation Breath Sounds / Voice Sounds] : lungs were clear to auscultation bilaterally [Left Infraclavicular] : left infraclavicular area [Dry] : dry [Clean] : clean [Bowel Sounds] : normal bowel sounds [Well-Healed] : well-healed [Abdomen Tenderness] : non-tender [Abdomen Soft] : soft [Cyanosis, Localized] : no localized cyanosis [Abdomen Mass (___ Cm)] : no abdominal mass palpated [Nail Clubbing] : no clubbing of the fingernails [] : no ischemic changes [Petechial Hemorrhages (___cm)] : no petechial hemorrhages

## 2020-02-19 NOTE — ASSESSMENT
[FreeTextEntry1] : VT - no further epsides \par - increase BB\par - Nuc - no ischemia EF 25%\par - conslt to advance HF\par - Discussed ablation vs meds if epsides more frequent \par \par HF- evolemic\par - add entresto if insurance approved it\par - if enteresto approved then D/C ACEI\par \par

## 2020-02-19 NOTE — PROCEDURE
[CRT-D] : Cardiac resynchronization therapy defibrillator [Complete Heart Block] : complete heart block [DDD] : DDD [Voltage: ___ volts] : Voltage was [unfilled] volts [Charge Time: ___ sec] : charge time was [unfilled] seconds [Longevity: ___ months] : The estimated remaining battery life is [unfilled] months [Ventricular] : Ventricular [Threshold Testing Performed] : Threshold testing was performed [Atrial] : Atrial [Sensing Amplitude ___mv] : sensing amplitude was [unfilled] mv [___V @] : [unfilled] V [___ ms] : [unfilled] ms [Programmed for Longevity] : output reprogrammed for improved battery longevity [Lead Imp:  ___ohms] : lead impedance was [unfilled] ohms [None] : none [Asense-Vsense ___ %] : Asense-Vsense [unfilled]% [Asense-Vpace ___ %] : Asense-Vpace [unfilled]% [Apace-Vsense ___ %] : Apace-Vsense [unfilled]% [Apace-Vpace ___ %] : Apace-Vpace [unfilled]% [de-identified] : 40 BPM [de-identified] : Janel ELLISON-D [de-identified] : CATHERINE [de-identified] : [id number] [de-identified] : 8/15/2018 [de-identified] : 1 episode of FVT  286 BPM spontaneous terminated on 1/10/2020\par Optivol below threshold\par No AF [de-identified] :

## 2020-03-27 ENCOUNTER — APPOINTMENT (OUTPATIENT)
Dept: CARDIOLOGY | Facility: CLINIC | Age: 65
End: 2020-03-27

## 2020-04-10 ENCOUNTER — APPOINTMENT (OUTPATIENT)
Dept: CARDIOLOGY | Facility: CLINIC | Age: 65
End: 2020-04-10
Payer: MEDICARE

## 2020-04-10 PROCEDURE — 93296 REM INTERROG EVL PM/IDS: CPT

## 2020-04-10 PROCEDURE — 93295 DEV INTERROG REMOTE 1/2/MLT: CPT

## 2020-07-10 ENCOUNTER — APPOINTMENT (OUTPATIENT)
Dept: CARDIOLOGY | Facility: CLINIC | Age: 65
End: 2020-07-10

## 2020-07-23 ENCOUNTER — APPOINTMENT (OUTPATIENT)
Dept: CARDIOLOGY | Facility: CLINIC | Age: 65
End: 2020-07-23
Payer: MEDICARE

## 2020-07-23 PROCEDURE — 93296 REM INTERROG EVL PM/IDS: CPT

## 2020-07-23 PROCEDURE — 93295 DEV INTERROG REMOTE 1/2/MLT: CPT

## 2020-08-20 ENCOUNTER — APPOINTMENT (OUTPATIENT)
Dept: CARDIOLOGY | Facility: CLINIC | Age: 65
End: 2020-08-20
Payer: MEDICARE

## 2020-08-20 VITALS
DIASTOLIC BLOOD PRESSURE: 70 MMHG | HEIGHT: 67 IN | BODY MASS INDEX: 33.27 KG/M2 | SYSTOLIC BLOOD PRESSURE: 121 MMHG | WEIGHT: 212 LBS | TEMPERATURE: 97.5 F

## 2020-08-20 PROCEDURE — 93290 INTERROG DEV EVAL ICPMS IP: CPT

## 2020-08-20 PROCEDURE — 99213 OFFICE O/P EST LOW 20 MIN: CPT

## 2020-08-20 PROCEDURE — 93284 PRGRMG EVAL IMPLANTABLE DFB: CPT

## 2020-08-20 NOTE — PHYSICAL EXAM
[General Appearance - Well Developed] : well developed [Normal Appearance] : normal appearance [Well Groomed] : well groomed [General Appearance - Well Nourished] : well nourished [No Deformities] : no deformities [General Appearance - In No Acute Distress] : no acute distress [Heart Sounds] : normal S1 and S2 [Heart Rate And Rhythm] : heart rate and rhythm were normal [Murmurs] : no murmurs present [Respiration, Rhythm And Depth] : normal respiratory rhythm and effort [Exaggerated Use Of Accessory Muscles For Inspiration] : no accessory muscle use [Auscultation Breath Sounds / Voice Sounds] : lungs were clear to auscultation bilaterally [Left Infraclavicular] : left infraclavicular area [Clean] : clean [Dry] : dry [Well-Healed] : well-healed [Bowel Sounds] : normal bowel sounds [Abdomen Tenderness] : non-tender [Abdomen Soft] : soft [Abdomen Mass (___ Cm)] : no abdominal mass palpated [Nail Clubbing] : no clubbing of the fingernails [Petechial Hemorrhages (___cm)] : no petechial hemorrhages [Cyanosis, Localized] : no localized cyanosis [] : no ischemic changes

## 2020-09-11 ENCOUNTER — APPOINTMENT (OUTPATIENT)
Dept: CARDIOLOGY | Facility: CLINIC | Age: 65
End: 2020-09-11
Payer: MEDICARE

## 2020-09-11 VITALS
TEMPERATURE: 98 F | BODY MASS INDEX: 34.06 KG/M2 | RESPIRATION RATE: 98 BRPM | SYSTOLIC BLOOD PRESSURE: 105 MMHG | WEIGHT: 217 LBS | HEIGHT: 67 IN | DIASTOLIC BLOOD PRESSURE: 80 MMHG | HEART RATE: 83 BPM

## 2020-09-11 DIAGNOSIS — Z00.00 ENCOUNTER FOR GENERAL ADULT MEDICAL EXAMINATION W/OUT ABNORMAL FINDINGS: ICD-10-CM

## 2020-09-11 DIAGNOSIS — Z82.49 FAMILY HISTORY OF ISCHEMIC HEART DISEASE AND OTHER DISEASES OF THE CIRCULATORY SYSTEM: ICD-10-CM

## 2020-09-11 PROCEDURE — 99215 OFFICE O/P EST HI 40 MIN: CPT

## 2020-09-11 PROCEDURE — 93000 ELECTROCARDIOGRAM COMPLETE: CPT

## 2020-09-12 NOTE — PHYSICAL EXAM
[Normal Appearance] : normal appearance [General Appearance - Well Developed] : well developed [Well Groomed] : well groomed [General Appearance - Well Nourished] : well nourished [No Deformities] : no deformities [General Appearance - In No Acute Distress] : no acute distress [Normal Conjunctiva] : the conjunctiva exhibited no abnormalities [Eyelids - No Xanthelasma] : the eyelids demonstrated no xanthelasmas [Normal Oral Mucosa] : normal oral mucosa [No Oral Pallor] : no oral pallor [Normal Jugular Venous A Waves Present] : normal jugular venous A waves present [No Oral Cyanosis] : no oral cyanosis [Normal Jugular Venous V Waves Present] : normal jugular venous V waves present [No Jugular Venous Corrales A Waves] : no jugular venous corrales A waves [Heart Rate And Rhythm] : heart rate and rhythm were normal [Heart Sounds] : normal S1 and S2 [Murmurs] : no murmurs present [Respiration, Rhythm And Depth] : normal respiratory rhythm and effort [Exaggerated Use Of Accessory Muscles For Inspiration] : no accessory muscle use [Auscultation Breath Sounds / Voice Sounds] : lungs were clear to auscultation bilaterally [Abdomen Soft] : soft [Abdomen Tenderness] : non-tender [Abdomen Mass (___ Cm)] : no abdominal mass palpated [Abnormal Walk] : normal gait [Gait - Sufficient For Exercise Testing] : the gait was sufficient for exercise testing [Petechial Hemorrhages (___cm)] : no petechial hemorrhages [Cyanosis, Localized] : no localized cyanosis [Nail Clubbing] : no clubbing of the fingernails [Skin Color & Pigmentation] : normal skin color and pigmentation [] : no rash [No Venous Stasis] : no venous stasis [Skin Lesions] : no skin lesions [No Skin Ulcers] : no skin ulcer [No Xanthoma] : no  xanthoma was observed [Oriented To Time, Place, And Person] : oriented to person, place, and time [Affect] : the affect was normal [Mood] : the mood was normal [No Anxiety] : not feeling anxious

## 2020-09-16 NOTE — REVIEW OF SYSTEMS
[Shortness Of Breath] : shortness of breath [Dyspnea on exertion] : dyspnea during exertion [Negative] : Heme/Lymph [Lower Ext Edema] : no extremity edema [Cough] : no cough

## 2020-09-16 NOTE — ASSESSMENT
[FreeTextEntry1] : Mr. Huerta is a 66 y/o M. With PMH of chronic systolic heart failure (EF 20%, LVEDD 7cm, LA 6.5 cm), s/p MV and TV repair 2012, s/p CRT/D, A-Fib s/p Maze, HTN, S/P skin graft.\par \par Patient is fluid overloaded on exam, will escalate GDMT as tolerated.\par \par

## 2020-09-16 NOTE — REASON FOR VISIT
[Initial Evaluation] : an initial evaluation of [Heart Failure] : congestive heart failure [Spouse] : spouse [FreeTextEntry2] : Heart Failure

## 2020-09-16 NOTE — DISCUSSION/SUMMARY
[Chronic Systolic Heart Failure] : chronic systolic congestive heart failure [___ Week(s)] : [unfilled] week(s) [With Me] : with me [FreeTextEntry1] : \par \par Chronic Systolic Heart Failure NYHA class III\par \par - Switch Furosemide to Torsemide 20 mg daily\par - Continue Metoprolol  ER 12.5 mg daily\par - Continue Entresto 24/26 mg BID daily\par - Will obtain iron studies, cbc, cmp\par - Will review most recent echo \par - Patient instructed to monitor his daily weight and blood pressure\par \par \par \par Atif Palmer MD \par Advanced Heart Failure/ Mechanical Circulatory Support\par Pulmonary Hypertension and Cardiac Amyloidosis \par [facility]\par [facility]  \par \par \par

## 2020-09-16 NOTE — HISTORY OF PRESENT ILLNESS
[Shortness of Breath] : shortness of breath [Dyspnea on Exertion] : dyspnea on exertion [Fatigue] : fatigue [Weakness] : weakness [Beta Blockers] : beta blockers [Diuretics] : diuretics [Adding Medication ___] : adding [unfilled] [Stopping Medication ___] : stopping [unfilled] [FreeTextEntry1] : Mr. Huerta is a 64 y/o M. With PMH of chronic systolic heart failure (EF 20%, LVEDD 7cm, LA 6.5 cm), s/p MV and TV repair 2012, s/p CRT/D, A-Fib s/p Maze, HTN, S/P skin graft.  Patient referred by Dr. Bryan, here for an initial evaluation for heart failure syndrome. \par \par Patient states he was very active about a year ago but his stamina has been gradually declining. He feels tired easily now when walking about 100 feet. \par Patient denies dizziness, LOC, chest pain, palpitations, bleeding, PND, orthopnea, N/V/D [FreeTextEntry3] : CASSIDY

## 2020-09-30 LAB
ALBUMIN SERPL ELPH-MCNC: 4.8 G/DL
ALP BLD-CCNC: 78 U/L
ALT SERPL-CCNC: 25 U/L
ANION GAP SERPL CALC-SCNC: 11 MMOL/L
AST SERPL-CCNC: 18 U/L
BASOPHILS # BLD AUTO: 0.04 K/UL
BASOPHILS NFR BLD AUTO: 0.7 %
BILIRUB SERPL-MCNC: 0.6 MG/DL
BUN SERPL-MCNC: 24 MG/DL
CALCIUM SERPL-MCNC: 9.5 MG/DL
CHLORIDE SERPL-SCNC: 103 MMOL/L
CO2 SERPL-SCNC: 28 MMOL/L
CREAT SERPL-MCNC: 1.2 MG/DL
EOSINOPHIL # BLD AUTO: 0.13 K/UL
EOSINOPHIL NFR BLD AUTO: 2.2 %
FERRITIN SERPL-MCNC: 122 NG/ML
GLUCOSE SERPL-MCNC: 118 MG/DL
HCT VFR BLD CALC: 46 %
HGB BLD-MCNC: 15.2 G/DL
IMM GRANULOCYTES NFR BLD AUTO: 0.5 %
IRON SATN MFR SERPL: 28 %
IRON SERPL-MCNC: 99 UG/DL
LYMPHOCYTES # BLD AUTO: 1.33 K/UL
LYMPHOCYTES NFR BLD AUTO: 22.2 %
MAN DIFF?: NORMAL
MCHC RBC-ENTMCNC: 29.1 PG
MCHC RBC-ENTMCNC: 33 G/DL
MCV RBC AUTO: 88 FL
MONOCYTES # BLD AUTO: 0.6 K/UL
MONOCYTES NFR BLD AUTO: 10 %
NEUTROPHILS # BLD AUTO: 3.86 K/UL
NEUTROPHILS NFR BLD AUTO: 64.4 %
PLATELET # BLD AUTO: 177 K/UL
POTASSIUM SERPL-SCNC: 4.5 MMOL/L
PROT SERPL-MCNC: 7.7 G/DL
RBC # BLD: 5.23 M/UL
RBC # FLD: 14.2 %
SODIUM SERPL-SCNC: 142 MMOL/L
TIBC SERPL-MCNC: 352 UG/DL
TRANSFERRIN SERPL-MCNC: 278 MG/DL
UIBC SERPL-MCNC: 253 UG/DL
WBC # FLD AUTO: 5.99 K/UL

## 2020-10-02 ENCOUNTER — APPOINTMENT (OUTPATIENT)
Dept: CARDIOLOGY | Facility: CLINIC | Age: 65
End: 2020-10-02
Payer: MEDICARE

## 2020-10-02 VITALS
BODY MASS INDEX: 33.59 KG/M2 | TEMPERATURE: 98.5 F | SYSTOLIC BLOOD PRESSURE: 120 MMHG | DIASTOLIC BLOOD PRESSURE: 100 MMHG | WEIGHT: 214 LBS | HEART RATE: 80 BPM | HEIGHT: 67 IN | OXYGEN SATURATION: 97 %

## 2020-10-02 DIAGNOSIS — I82.409 ACUTE EMBOLISM AND THROMBOSIS OF UNSPECIFIED DEEP VEINS OF UNSPECIFIED LOWER EXTREMITY: ICD-10-CM

## 2020-10-02 PROCEDURE — 93000 ELECTROCARDIOGRAM COMPLETE: CPT

## 2020-10-02 PROCEDURE — 99214 OFFICE O/P EST MOD 30 MIN: CPT

## 2020-10-07 NOTE — REASON FOR VISIT
[Follow-Up - Clinic] : a clinic follow-up of [Heart Failure] : congestive heart failure [FreeTextEntry2] : Heart Failure [Spouse] : spouse

## 2020-10-07 NOTE — HISTORY OF PRESENT ILLNESS
[FreeTextEntry1] : 66 y/o M. With PMH of chronic systolic heart failure (EF 20%, LVEDD 7cm, LA 6.5 cm), s/p MV and TV repair 2012, s/p CRT/D, A-Fib s/p Maze, HTN, S/P skin graft coming for f/u of heart failure syndrome. He was referred by Dr. Bryan. \par \par Patient reports being able to walk ~ 1 mile at a slow pace. He gets SOB after climbing one flight of stairs. No c/o nausea, vomiting, LOC, chest pain. He is compliant with meds and low salt diet. \par \par \par
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
## 2020-10-07 NOTE — DISCUSSION/SUMMARY
[Chronic Systolic Heart Failure] : chronic systolic congestive heart failure [___ Week(s)] : [unfilled] week(s) [With Me] : with me

## 2020-10-25 ENCOUNTER — OUTPATIENT (OUTPATIENT)
Dept: OUTPATIENT SERVICES | Facility: HOSPITAL | Age: 65
LOS: 1 days | Discharge: HOME | End: 2020-10-25

## 2020-10-25 DIAGNOSIS — Z98.890 OTHER SPECIFIED POSTPROCEDURAL STATES: Chronic | ICD-10-CM

## 2020-10-25 DIAGNOSIS — Z95.810 PRESENCE OF AUTOMATIC (IMPLANTABLE) CARDIAC DEFIBRILLATOR: Chronic | ICD-10-CM

## 2020-10-25 DIAGNOSIS — Z87.828 PERSONAL HISTORY OF OTHER (HEALED) PHYSICAL INJURY AND TRAUMA: Chronic | ICD-10-CM

## 2020-10-25 DIAGNOSIS — Z11.59 ENCOUNTER FOR SCREENING FOR OTHER VIRAL DISEASES: ICD-10-CM

## 2020-10-26 ENCOUNTER — APPOINTMENT (OUTPATIENT)
Dept: CARDIOLOGY | Facility: CLINIC | Age: 65
End: 2020-10-26
Payer: MEDICARE

## 2020-10-26 PROCEDURE — 93295 DEV INTERROG REMOTE 1/2/MLT: CPT

## 2020-10-26 PROCEDURE — 93296 REM INTERROG EVL PM/IDS: CPT

## 2020-10-28 ENCOUNTER — OUTPATIENT (OUTPATIENT)
Dept: OUTPATIENT SERVICES | Facility: HOSPITAL | Age: 65
LOS: 1 days | Discharge: HOME | End: 2020-10-28

## 2020-10-28 DIAGNOSIS — Z87.828 PERSONAL HISTORY OF OTHER (HEALED) PHYSICAL INJURY AND TRAUMA: Chronic | ICD-10-CM

## 2020-10-28 DIAGNOSIS — Z98.890 OTHER SPECIFIED POSTPROCEDURAL STATES: Chronic | ICD-10-CM

## 2020-10-28 DIAGNOSIS — I25.10 ATHEROSCLEROTIC HEART DISEASE OF NATIVE CORONARY ARTERY WITHOUT ANGINA PECTORIS: ICD-10-CM

## 2020-10-28 DIAGNOSIS — Z95.810 PRESENCE OF AUTOMATIC (IMPLANTABLE) CARDIAC DEFIBRILLATOR: Chronic | ICD-10-CM

## 2020-10-28 LAB
ANION GAP SERPL CALC-SCNC: 8 MMOL/L — SIGNIFICANT CHANGE UP (ref 7–14)
BUN SERPL-MCNC: 21 MG/DL — HIGH (ref 10–20)
CALCIUM SERPL-MCNC: 9.3 MG/DL — SIGNIFICANT CHANGE UP (ref 8.5–10.1)
CHLORIDE SERPL-SCNC: 102 MMOL/L — SIGNIFICANT CHANGE UP (ref 98–110)
CO2 SERPL-SCNC: 29 MMOL/L — SIGNIFICANT CHANGE UP (ref 17–32)
CREAT SERPL-MCNC: 1.1 MG/DL — SIGNIFICANT CHANGE UP (ref 0.7–1.5)
GLUCOSE SERPL-MCNC: 109 MG/DL — HIGH (ref 70–99)
HCT VFR BLD CALC: 46.4 % — SIGNIFICANT CHANGE UP (ref 42–52)
HGB BLD-MCNC: 15.3 G/DL — SIGNIFICANT CHANGE UP (ref 14–18)
MCHC RBC-ENTMCNC: 28.9 PG — SIGNIFICANT CHANGE UP (ref 27–31)
MCHC RBC-ENTMCNC: 33 G/DL — SIGNIFICANT CHANGE UP (ref 32–37)
MCV RBC AUTO: 87.5 FL — SIGNIFICANT CHANGE UP (ref 80–94)
NRBC # BLD: 0 /100 WBCS — SIGNIFICANT CHANGE UP (ref 0–0)
PLATELET # BLD AUTO: 201 K/UL — SIGNIFICANT CHANGE UP (ref 130–400)
POTASSIUM SERPL-MCNC: 3.9 MMOL/L — SIGNIFICANT CHANGE UP (ref 3.5–5)
POTASSIUM SERPL-SCNC: 3.9 MMOL/L — SIGNIFICANT CHANGE UP (ref 3.5–5)
RBC # BLD: 5.3 M/UL — SIGNIFICANT CHANGE UP (ref 4.7–6.1)
RBC # FLD: 14 % — SIGNIFICANT CHANGE UP (ref 11.5–14.5)
SODIUM SERPL-SCNC: 139 MMOL/L — SIGNIFICANT CHANGE UP (ref 135–146)
WBC # BLD: 7.82 K/UL — SIGNIFICANT CHANGE UP (ref 4.8–10.8)
WBC # FLD AUTO: 7.82 K/UL — SIGNIFICANT CHANGE UP (ref 4.8–10.8)

## 2020-10-28 NOTE — CHART NOTE - NSCHARTNOTESELECT_GEN_ALL_CORE
Back Pain   This is a chronic problem. The current episode started more than 1 year ago. The problem occurs constantly. The problem has been gradually worsening since onset. The pain is present in the lumbar spine. The quality of the pain is described as aching. The pain radiates to the right knee, right thigh and right foot. The pain is at a severity of 8/10. The pain is moderate. The pain is the same all the time. The symptoms are aggravated by bending and position. Stiffness is present in the morning. Associated symptoms include leg pain, numbness, paresthesias, tingling and weakness. Pertinent negatives include no abdominal pain, bladder incontinence, bowel incontinence, chest pain, dysuria, fever, headaches, pelvic pain, perianal numbness or weight loss. She has tried NSAIDs, analgesics, heat and ice for the symptoms. The treatment provided mild relief.         Review of Systems   Constitutional: Negative. Negative for fever and weight loss. HENT: Negative. Eyes: Negative. Respiratory: Negative. Cardiovascular: Negative. Negative for chest pain. Gastrointestinal: Negative. Negative for abdominal pain and bowel incontinence. Endocrine: Negative. Genitourinary: Negative. Negative for bladder incontinence, dysuria and pelvic pain. Musculoskeletal: Positive for back pain. Skin: Negative. Allergic/Immunologic: Negative. Neurological: Positive for tingling, weakness, numbness and paresthesias. Negative for headaches. Hematological: Negative. Psychiatric/Behavioral: Negative.          Objective:   Physical Exam  Vitals signs reviewed. Constitutional:       General: She is not in acute distress. Appearance: Normal appearance. She is normal weight. She is not ill-appearing, toxic-appearing or diaphoretic. HENT:      Head: Normocephalic and atraumatic. Nose: Nose normal.   Eyes:      General: No visual field deficit or scleral icterus. Right eye: No discharge. Event Note/Cath Left eye: No discharge. Extraocular Movements: Extraocular movements intact. Conjunctiva/sclera: Conjunctivae normal.      Pupils: Pupils are equal, round, and reactive to light. Pulmonary:      Effort: Pulmonary effort is normal. No respiratory distress. Abdominal:      General: Abdomen is flat. Musculoskeletal:         General: No swelling, tenderness, deformity or signs of injury. Right lower leg: No edema. Left lower leg: No edema. Skin:     Capillary Refill: Capillary refill takes less than 2 seconds. Coloration: Skin is not jaundiced or pale. Findings: No bruising, erythema, lesion or rash. Neurological:      General: No focal deficit present. Mental Status: She is alert and oriented to person, place, and time. Mental status is at baseline. GCS: GCS eye subscore is 4. GCS verbal subscore is 5. GCS motor subscore is 6. Cranial Nerves: Cranial nerves are intact. No cranial nerve deficit, dysarthria or facial asymmetry. Sensory: Sensation is intact. No sensory deficit. Motor: Motor function is intact. No weakness, tremor, atrophy, abnormal muscle tone, seizure activity or pronator drift. Coordination: Coordination is intact. Romberg sign negative. Coordination normal. Finger-Nose-Finger Test and Heel to [facility] Test normal. Rapid alternating movements normal.      Gait: Gait normal.      Deep Tendon Reflexes: Reflexes normal. Babinski sign absent on the right side. Babinski sign absent on the left side. Reflex Scores:       Tricep reflexes are 2+ on the right side and 2+ on the left side. Bicep reflexes are 2+ on the right side and 2+ on the left side. Brachioradialis reflexes are 2+ on the right side and 2+ on the left side. Patellar reflexes are 2+ on the right side and 2+ on the left side. Achilles reflexes are 2+ on the right side and 2+ on the left side.   Psychiatric:         Mood and Affect: Mood normal. Behavior: Behavior normal.         Thought Content: Thought content normal.         Judgment: Judgment normal.            Assessment:      77year old lady who presents with back and right leg pain. Her MRI shows an L4-L5 spondylolisthesis and severe stenosis at L4-L5. Plan:      I will send her for PT and epidurals and if she fails this, she will need an L4-L5 posterior lumbar interbody fusion.  R/B/A of surgery have been discussed and she wishes to proceed with surgery

## 2020-10-28 NOTE — CHART NOTE - NSCHARTNOTEFT_GEN_A_CORE
PRE-OP DIAGNOSIS: Cardiomyopathy    PROCEDURE: [facility] with coronary angiography    Physician: FABIAN Olivarez  Assistant: ALINE Erwin    ANESTHESIA TYPE:  [ x ] Sedation  [ x ] Local/Regional    ESTIMATED BLOOD LOSS:    10   mL    CONDITION  [ x ]Good    SPECIMENS REMOVED (IF APPLICABLE): None    IV CONTRAST:   70   mL    FINDINGS    Left Heart Catheterization:  LVEDP: 20  Non-obstructive CAD  Right radial 6 Fr - radial D stat    There was no angiographic evidence for occlusive coronary artery disease. Left main: Normal. The vessel was medium sized. LAD: The vessel was medium sized. Proximal LAD: Normal. Mid LAD: Normal. Distal LAD: Normal. 1st diagonal: Normal. 2nd diagonal: Normal. Circumflex: The vessel was medium sized. Proximal circumflex: Angiography showed minor luminal irregularities with no flow limiting lesions. Distal circumflex: The vessel was small sized. Angiography showed minor luminal irregularities with no flow limiting lesions. 1st obtuse marginal: The vessel was small to medium sized. Angiography showed minor luminal irregularities with no flow limiting lesions. RCA: The vessel was medium to large sized. Proximal RCA: Angiography showed minor luminal irregularities with no flow limiting lesions. Mid RCA: Normal. Distal RCA: Angiography showed mild atherosclerosis with no flow limiting lesions. Right PDA: Angiography showed minor luminal irregularities with no flow limiting lesions. Right posterolateral segment: The vessel was small sized. Angiography showed minor luminal irregularities with no flow limiting lesions.     POST-OP DIAGNOSIS  Non-obstructive CAD    PLAN OF CARE  [x ] D/C Home today  C/w medical Rx  NS 50 cc/hr for 2 hrs

## 2020-10-28 NOTE — ASU PATIENT PROFILE, ADULT - PMH
Atrial fibrillation  resolved with SAAVEDRA repair  High cholesterol    HTN (hypertension)    Other acute pulmonary embolism without acute cor pulmonale  2014 with ivc filter

## 2020-10-29 ENCOUNTER — TRANSCRIPTION ENCOUNTER (OUTPATIENT)
Age: 65
End: 2020-10-29

## 2020-11-04 DIAGNOSIS — Z86.718 PERSONAL HISTORY OF OTHER VENOUS THROMBOSIS AND EMBOLISM: ICD-10-CM

## 2020-11-04 DIAGNOSIS — I48.91 UNSPECIFIED ATRIAL FIBRILLATION: ICD-10-CM

## 2020-11-04 DIAGNOSIS — E78.00 PURE HYPERCHOLESTEROLEMIA, UNSPECIFIED: ICD-10-CM

## 2020-11-04 DIAGNOSIS — I10 ESSENTIAL (PRIMARY) HYPERTENSION: ICD-10-CM

## 2020-11-04 DIAGNOSIS — I50.9 HEART FAILURE, UNSPECIFIED: ICD-10-CM

## 2020-11-04 DIAGNOSIS — Z87.891 PERSONAL HISTORY OF NICOTINE DEPENDENCE: ICD-10-CM

## 2020-11-04 DIAGNOSIS — Z95.810 PRESENCE OF AUTOMATIC (IMPLANTABLE) CARDIAC DEFIBRILLATOR: ICD-10-CM

## 2020-11-20 ENCOUNTER — APPOINTMENT (OUTPATIENT)
Dept: CARDIOLOGY | Facility: CLINIC | Age: 65
End: 2020-11-20
Payer: MEDICARE

## 2020-11-20 VITALS
SYSTOLIC BLOOD PRESSURE: 132 MMHG | HEIGHT: 67 IN | DIASTOLIC BLOOD PRESSURE: 78 MMHG | WEIGHT: 208 LBS | BODY MASS INDEX: 32.65 KG/M2 | TEMPERATURE: 97.7 F

## 2020-11-20 PROCEDURE — 93284 PRGRMG EVAL IMPLANTABLE DFB: CPT

## 2020-11-20 PROCEDURE — 99213 OFFICE O/P EST LOW 20 MIN: CPT

## 2020-11-20 RX ORDER — TORSEMIDE 20 MG/1
20 TABLET ORAL
Qty: 60 | Refills: 2 | Status: COMPLETED | COMMUNITY
Start: 2020-09-11 | End: 2020-11-20

## 2020-11-20 RX ORDER — DAPAGLIFLOZIN 10 MG/1
10 TABLET, FILM COATED ORAL DAILY
Qty: 30 | Refills: 2 | Status: COMPLETED | COMMUNITY
Start: 2020-10-02 | End: 2020-11-20
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
## 2020-11-20 NOTE — DISCUSSION/SUMMARY
[AICD Function Normal] : normal AICD function [FreeTextEntry1] : Two episodes NSVT longest episode 2 seconds

## 2020-11-20 NOTE — PROCEDURE
[See Scanned Paceart Report] : See scanned paceart report [See Device Printout] : See device printout [Complete Heart Block] : complete heart block [CRT-D] : Cardiac resynchronization therapy defibrillator [DDD] : DDD [Voltage: ___ volts] : Voltage was [unfilled] volts [Charge Time: ___ sec] : charge time was [unfilled] seconds [Longevity: ___ months] : The estimated remaining battery life is [unfilled] months [Threshold Testing Performed] : Threshold testing was performed [Atrial] : Atrial [Ventricular] : Ventricular [Sensing Amplitude ___mv] : sensing amplitude was [unfilled] mv [Lead Imp:  ___ohms] : lead impedance was [unfilled] ohms [___V @] : [unfilled] V [___ ms] : [unfilled] ms [None] : none [Programmed for Longevity] : output reprogrammed for improved battery longevity [Asense-Vsense ___ %] : Asense-Vsense [unfilled]% [Asense-Vpace ___ %] : Asense-Vpace [unfilled]% [Apace-Vsense ___ %] : Apace-Vsense [unfilled]% [Apace-Vpace ___ %] : Apace-Vpace [unfilled]% [de-identified] : 40 BPM [de-identified] : CATHERINE [de-identified] : Janel ELLISON-D [de-identified] : [id number] [de-identified] : 8/15/2018 [de-identified] :  [de-identified] : 1 episode of FVT  286 BPM spontaneous terminated on 1/10/2020\par Optivol below threshold\par No AF

## 2020-11-20 NOTE — PHYSICAL EXAM
[General Appearance - Well Developed] : well developed [Normal Appearance] : normal appearance [Well Groomed] : well groomed [General Appearance - Well Nourished] : well nourished [No Deformities] : no deformities [General Appearance - In No Acute Distress] : no acute distress [Heart Rate And Rhythm] : heart rate and rhythm were normal [Heart Sounds] : normal S1 and S2 [Murmurs] : no murmurs present [Respiration, Rhythm And Depth] : normal respiratory rhythm and effort [Exaggerated Use Of Accessory Muscles For Inspiration] : no accessory muscle use [Auscultation Breath Sounds / Voice Sounds] : lungs were clear to auscultation bilaterally [Left Infraclavicular] : left infraclavicular area [Clean] : clean [Dry] : dry [Well-Healed] : well-healed [Bowel Sounds] : normal bowel sounds [Abdomen Soft] : soft [Abdomen Tenderness] : non-tender [Nail Clubbing] : no clubbing of the fingernails [Cyanosis, Localized] : no localized cyanosis [Petechial Hemorrhages (___cm)] : no petechial hemorrhages [] : no ischemic changes

## 2020-11-24 ENCOUNTER — APPOINTMENT (OUTPATIENT)
Dept: CARDIOLOGY | Facility: CLINIC | Age: 65
End: 2020-11-24

## 2020-12-02 NOTE — PROCEDURE
[Complete Heart Block] : complete heart block [CRT-D] : Cardiac resynchronization therapy defibrillator [DDD] : DDD [Voltage: ___ volts] : Voltage was [unfilled] volts [Charge Time: ___ sec] : charge time was [unfilled] seconds [Longevity: ___ months] : The estimated remaining battery life is [unfilled] months [Threshold Testing Performed] : Threshold testing was performed [Atrial] : Atrial [Ventricular] : Ventricular [Sensing Amplitude ___mv] : sensing amplitude was [unfilled] mv [Lead Imp:  ___ohms] : lead impedance was [unfilled] ohms [___ ms] : [unfilled] ms [___V @] : [unfilled] V [None] : none [Programmed for Longevity] : output reprogrammed for improved battery longevity [Asense-Vsense ___ %] : Asense-Vsense [unfilled]% [Asense-Vpace ___ %] : Asense-Vpace [unfilled]% [Apace-Vsense ___ %] : Apace-Vsense [unfilled]% [Apace-Vpace ___ %] : Apace-Vpace [unfilled]% [de-identified] : 40 BPM [de-identified] : CATHERINE [de-identified] : Janel ELLISON-D [de-identified] : [id number] [de-identified] : 8/15/2018 [de-identified] :  [de-identified] : 1 episode of FVT  286 BPM spontaneous terminated on 1/10/2020\par Optivol below threshold\par No AF
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
## 2020-12-04 ENCOUNTER — APPOINTMENT (OUTPATIENT)
Dept: CARDIOLOGY | Facility: CLINIC | Age: 65
End: 2020-12-04

## 2021-02-25 ENCOUNTER — APPOINTMENT (OUTPATIENT)
Dept: CARDIOLOGY | Facility: CLINIC | Age: 66
End: 2021-02-25

## 2021-05-26 ENCOUNTER — APPOINTMENT (OUTPATIENT)
Dept: CARDIOLOGY | Facility: CLINIC | Age: 66
End: 2021-05-26

## 2021-06-15 ENCOUNTER — FORM ENCOUNTER (OUTPATIENT)
Age: 66
End: 2021-06-15

## 2021-07-09 ENCOUNTER — RESULT REVIEW (OUTPATIENT)
Age: 66
End: 2021-07-09

## 2021-07-09 ENCOUNTER — OUTPATIENT (OUTPATIENT)
Dept: OUTPATIENT SERVICES | Facility: HOSPITAL | Age: 66
LOS: 1 days | Discharge: HOME | End: 2021-07-09
Payer: MEDICARE

## 2021-07-09 DIAGNOSIS — Z98.890 OTHER SPECIFIED POSTPROCEDURAL STATES: Chronic | ICD-10-CM

## 2021-07-09 DIAGNOSIS — I48.91 UNSPECIFIED ATRIAL FIBRILLATION: ICD-10-CM

## 2021-07-09 DIAGNOSIS — Z95.810 PRESENCE OF AUTOMATIC (IMPLANTABLE) CARDIAC DEFIBRILLATOR: Chronic | ICD-10-CM

## 2021-07-09 DIAGNOSIS — Z87.828 PERSONAL HISTORY OF OTHER (HEALED) PHYSICAL INJURY AND TRAUMA: Chronic | ICD-10-CM

## 2021-07-09 PROCEDURE — 75574 CT ANGIO HRT W/3D IMAGE: CPT | Mod: 26,MH
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
## 2021-09-22 ENCOUNTER — APPOINTMENT (OUTPATIENT)
Dept: CARDIOLOGY | Facility: CLINIC | Age: 66
End: 2021-09-22
Payer: MEDICARE

## 2021-09-22 ENCOUNTER — NON-APPOINTMENT (OUTPATIENT)
Age: 66
End: 2021-09-22

## 2021-09-22 PROCEDURE — 93296 REM INTERROG EVL PM/IDS: CPT

## 2021-09-22 PROCEDURE — 93295 DEV INTERROG REMOTE 1/2/MLT: CPT

## 2021-10-15 ENCOUNTER — APPOINTMENT (OUTPATIENT)
Dept: CARDIOLOGY | Facility: CLINIC | Age: 66
End: 2021-10-15
Payer: MEDICARE

## 2021-10-15 VITALS
HEIGHT: 67 IN | TEMPERATURE: 97.8 F | BODY MASS INDEX: 34.21 KG/M2 | HEART RATE: 83 BPM | WEIGHT: 218 LBS | DIASTOLIC BLOOD PRESSURE: 94 MMHG | SYSTOLIC BLOOD PRESSURE: 138 MMHG

## 2021-10-15 PROCEDURE — 93284 PRGRMG EVAL IMPLANTABLE DFB: CPT

## 2021-10-15 RX ORDER — SACUBITRIL AND VALSARTAN 24; 26 MG/1; MG/1
24-26 TABLET, FILM COATED ORAL TWICE DAILY
Qty: 180 | Refills: 3 | Status: COMPLETED | COMMUNITY
Start: 2020-02-19 | End: 2021-10-15
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
## 2021-10-15 NOTE — PROCEDURE
[Complete Heart Block] : complete heart block [See Scanned Paceart Report] : See scanned paceart report [See Device Printout] : See device printout [CRT-D] : Cardiac resynchronization therapy defibrillator [DDD] : DDD [Charge Time: ___ sec] : charge time was [unfilled] seconds [Threshold Testing Performed] : Threshold testing was performed [Atrial] : Atrial [Ventricular] : Ventricular [Sensing Amplitude ___mv] : sensing amplitude was [unfilled] mv [Lead Imp:  ___ohms] : lead impedance was [unfilled] ohms [___V @] : [unfilled] V [___ ms] : [unfilled] ms [None] : none [Programmed for Longevity] : output reprogrammed for improved battery longevity [Asense-Vsense ___ %] : Asense-Vsense [unfilled]% [Asense-Vpace ___ %] : Asense-Vpace [unfilled]% [Apace-Vsense ___ %] : Apace-Vsense [unfilled]% [Apace-Vpace ___ %] : Apace-Vpace [unfilled]% [Voltage: ___ volts] : Voltage was [unfilled] volts [Longevity: ___ months] : The estimated remaining battery life is [unfilled] months [de-identified] : 40 BPM [de-identified] : CATHERINE [de-identified] : Janel ELLISON-D [de-identified] : [id number] [de-identified] : 8/15/2018 [de-identified] :  [de-identified] : 2 episodes of VT treated by 1 burst of ATP on 2/6/21 and 5/2/21 (avg. v-rate 200-214 bpm). \par Several NSVT episodes detected lasting up to 3 seconds (avg. v-rate 194-231 bpm).\par 3 AT/AF episodes detected likely consistent with frequent atrial ectopy rather than PAF.\par 2 noise episodes on 2/14/21 and 2/15/21 - noise on both RA and RV channels. 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 no other indications of lead integrity issues.

## 2021-12-22 ENCOUNTER — APPOINTMENT (OUTPATIENT)
Dept: CARDIOLOGY | Facility: CLINIC | Age: 66
End: 2021-12-22
Payer: MEDICARE

## 2021-12-22 ENCOUNTER — NON-APPOINTMENT (OUTPATIENT)
Age: 66
End: 2021-12-22

## 2021-12-22 PROCEDURE — 93296 REM INTERROG EVL PM/IDS: CPT | Mod: NC

## 2021-12-22 PROCEDURE — 93295 DEV INTERROG REMOTE 1/2/MLT: CPT | Mod: NC

## 2022-03-25 ENCOUNTER — APPOINTMENT (OUTPATIENT)
Dept: CARDIOLOGY | Facility: CLINIC | Age: 67
End: 2022-03-25
Payer: MEDICARE

## 2022-03-25 ENCOUNTER — NON-APPOINTMENT (OUTPATIENT)
Age: 67
End: 2022-03-25

## 2022-03-25 PROCEDURE — 93296 REM INTERROG EVL PM/IDS: CPT

## 2022-03-25 PROCEDURE — 93295 DEV INTERROG REMOTE 1/2/MLT: CPT

## 2022-04-29 ENCOUNTER — NON-APPOINTMENT (OUTPATIENT)
Age: 67
End: 2022-04-29

## 2022-05-02 NOTE — ASSESSMENT
This is a historical note converted from Adia. Formatting and pictures may have been removed.  Please reference Adia for original formatting and attached multimedia. Chief Complaint  f/u HIV management  History of Present Illness  38 yo BM presents for HIV f/u visit.? Reports >97% adherent to Descovy & Tivicay, pratik well. Taking vit d weekly for past several weeks now.? Never received trazodone or wellbutrin, states was too expensive at [facility] [address]. Would like to try filling through [facility].? Still having trouble sleeping & depressed mood.? Denies SI/HI.? Declines anal swabs today, requests to defer to next visit.? No new sexual partners.? Enjoying time with daughters & granddaughter who is now 2 yr old.? C/o left foot pain x 1.5 months between 4th & 5th digit, not responsive to topical otc spray, soaking with epsom salt or alcohol.? Painful to walk.? Wearing work boots daily with clean socks.  ?  6/2/17  38 yo BM presents for HIV f/u visit. Reports 100% adherent to Descovy & Tivicay, pratik well but has only a couple of days left of meds.? Insurance lapsed & applied for Medicaid 1 week ago, no coverage for meds currently.? Stopped Paxil apprx 1 month ago, states was not working well any longer.??Mood down, no SI/HI.? Ambien not effective for sleep, has tried Trazodone 50mg which worked very well. Awaiting medicaid coverage to see eye doc for new eyeglasses.? Taking Vit D sporadically, has at home.? Refuses oral & anal swabs today.? Contemplating move/visit to see brother in [place], near [place].? [facility]  at [facility]. [1]  Review of Systems  ?  ?  Constitutional: negative except as stated in HPI  Eye: negative except as stated in HPI  ENMT: negative except as stated in HPI  Respiratory: negative except as stated in HPI  Cardiovascular: negative except as stated in HPI  Gastrointestinal: negative except as stated in HPI  Genitourinary: negative except as stated in HPI  Hema/Lymph: negative  [FreeTextEntry1] : VT - appropriate shock \par - increase BB\par - nuiclear stress tesy\par - Discussed ablation vs meds if epsides more frequent \par \par  except as stated in HPI  Endocrine: negative except as stated in HPI  Immunologic: negative except as stated in HPI  Musculoskeletal: negative except as stated in HPI  Integumentary: negative except as stated in HPI  Neurologic: negative except as stated in HPI  ?   All Other ROS_ ?negative except as stated in HPI  Physical Exam  Vitals & Measurements  T:?36.9? ?C ?(Oral)? HR:?67?(Peripheral)? RR:?18? BP:?116/76?  HT:?188?cm? HT:?188?cm? WT:?93.6?kg? WT:?93.6?kg? BMI:?26.48?  ?  ?  General: AAO X 4, afebrile  Eye: no icterus  HENT: oropharynx clear  Neck: supple  Respiratory: BBS CTA, non-labored, symmetrical  Cardiovascular: S1S2, RRR  Gastrointestinal BS + 4 quadrants, NTND, soft, no organomegaly  Genitourinary: non-tender  Lymphatics: no lymphadenopathy  Musculoskeletal: MAEW, steady gait  Integumentary: left foot?interdigit 4th/5th toe hyperpigmentation with dry, cracked skin noted; no s/s secondary infection noted  Neurologic: CN II-XII intact  Assessment/Plan  1.?HIV (human immunodeficiency virus infection)(  Confirmed  )  ? adherence/sexual health counseling done  cont Descovy & Tivicay daily  labs today  rtc 3 mos w?renetta?  anal pap next visit  Ordered:  dolutegravir, 50 mg = 1 tab(s), Oral, Daily, # 30 tab(s), 3 Refill(s), Pharmacy: [facility]  emtricitabine-tenofovir, 1 tab(s), Oral, Daily, # 30 tab(s), 3 Refill(s), Pharmacy: [facility]  CBC w/ Auto Diff, Routine collect, 09/22/17 12:06:00 CDT, Blood, Order for future visit, Stop date 09/22/17 12:06:00 CDT, Lab Collect, HIV (human immunodeficiency virus infection)(  Confirmed  ), 09/22/17 12:06:00 CDT  CD4 Lymphoctye Count, Routine collect, 09/22/17 12:06:00 CDT, Blood, Order for future visit, Stop date 09/22/17 12:06:00 CDT, Lab Collect, HIV (human immunodeficiency virus infection)(  Confirmed  ), 09/22/17 12:06:00 CDT  Clinic Follow up, *Est. 12/22/17 3:00:00 CST, Order for future visit, HIV (human immunodeficiency virus  infection)(  Confirmed  ), [facility]  Comprehensive Metabolic Panel, Routine collect, 09/22/17 12:06:00 CDT, Blood, Order for future visit, Stop date 09/22/17 12:06:00 CDT, Lab Collect, HIV (human immunodeficiency virus infection)(  Confirmed  ), 09/22/17 12:06:00 CDT  Office/Outpatient Visit Level 5 Established 40691 PC, HIV (human immunodeficiency virus infection)(  Confirmed  )  Insomnia(  Confirmed  )  Depression  Tinea pedis  Vitamin D deficiency(  Confirmed  ), [facility], 09/22/17 12:06:00 CDT  RNA, PCR(NonGraph)rfx/GenoPRIme(R)-[facility] [id number], Routine collect, 09/22/17 12:06:00 CDT, Blood, Order for future visit, Stop date 09/22/17 12:06:00 CDT, Lab Collect, HIV (human immunodeficiency virus infection)(  Confirmed  ), 09/22/17 12:06:00 CDT  ?  2.?Insomnia(  Confirmed  )  Ordered:  traZODone, 50 mg = 1 tab(s), Oral, Once a day (at bedtime), # 30 tab(s), 3 Refill(s), Pharmacy: [facility]  Office/Outpatient Visit Level 5 Established 74469 PC, HIV (human immunodeficiency virus infection)(  Confirmed  )  Insomnia(  Confirmed  )  Depression  Tinea pedis  Vitamin D deficiency(  Confirmed  ), [facility], 09/22/17 12:06:00 CDT  ?  3.?Depression  Ordered:  buPROPion, 150 mg = 1 tab(s), Oral, q24hr, # 30 tab(s), 3 Refill(s), Pharmacy: [facility]  Office/Outpatient Visit Level 5 Established 11482 PC, HIV (human immunodeficiency virus infection)(  Confirmed  )  Insomnia(  Confirmed  )  Depression  Tinea pedis  Vitamin D deficiency(  Confirmed  ), [facility], 09/22/17 12:06:00 CDT  ?  4.?Tinea pedis  ? notify me if does not resolve  change socks at least once per day  keep?clean & dry  Ordered:  terbinafine, 250 mg = 1 tab(s), Oral, Daily, X 14 day(s), # 14 tab(s), 0 Refill(s), Pharmacy: [facility]  Office/Outpatient Visit Level 5 Established 96568 PC, HIV (human immunodeficiency virus infection)(  Confirmed  )  Insomnia(  Confirmed  )   Depression  Tinea pedis  Vitamin D deficiency(  Confirmed  ), [facility], 09/22/17 12:06:00 CDT  ?  5.?Vitamin D deficiency(  Confirmed  )  Ordered:  ergocalciferol, 50,000 IntUnit = 1 cap(s), Oral, qWeek, # 5 cap(s), 5 Refill(s), Pharmacy: [facility]  Office/Outpatient Visit Level 5 Established 76068 , HIV (human immunodeficiency virus infection)(  Confirmed  )  Insomnia(  Confirmed  )  Depression  Tinea pedis  Vitamin D deficiency(  Confirmed  ), [facility], 09/22/17 12:06:00 CDT  ?  Orders:  acyclovir, 400 mg = 1 tab(s), Oral, 5x/Day, PRN PRN other (see comment), as needed for outbreaks, # 35 tab(s), 2 Refill(s), Pharmacy: [facility]   Problem List/Past Medical History  Ongoing  HIV (human immunodeficiency virus infection)(  Confirmed  )  Insomnia(  Confirmed  )  Vitamin D deficiency(  Confirmed  )  Historical  Depressive disorder(  Confirmed  )  HIV  Procedure/Surgical History  Hernia Repair Ventral (None) (07/29/2015)  Other open umbilical herniorrhaphy (07/29/2015)  Repair umbilical hernia, age 5 years or older; reducible (07/29/2015)  No  Medications  acyclovir 400 mg oral tablet, 400 mg= 1 tab(s), Oral, 5x/Day, PRN, 2 refills  buPROPion 150 mg/24 hours (XL) oral tablet, extended release, 150 mg= 1 tab(s), Oral, q24hr, 3 refills  Descovy 200 mg-25 mg oral tablet, 1 tab(s), Oral, Daily, 3 refills  ergocalciferol 50,000 intUnit oral capsule, 83543 IntUnit= 1 cap(s), Oral, qWeek, 5 refills  terbinafine 250 mg oral tablet, 250 mg= 1 tab(s), Oral, Daily  Tivicay 50 mg oral tablet, 50 mg= 1 tab(s), Oral, Daily, 3 refills  traZODONE 50 mg oral tablet ( Desyrel ), 50 mg= 1 tab(s), Oral, Once a day (at bedtime), 3 refills  Allergies  No Known Medication Allergies  Social History  Alcohol - 09/22/2017  Current  Employment/School - 09/22/2017  Employed  Exercise - 09/22/2017  Home/Environment - 09/22/2017  Lives with Alone.  Nutrition/Health - 09/22/2017  Regular  Sexual -  09/22/2017  Sexually active: Yes.  Substance Abuse - 09/22/2017  Never  Tobacco - 09/22/2017  Former smoker  Former smoker  Family History  Hypertension.: Mother.  Tobacco use.: Mother.  Immunizations  UTD [2]  Health Maintenance  anal ct/gc 3/17/16 NIL  oral ct/gc 3/17/16 neg  urine ct/gc 3/17/16 neg  ophth? 7/2017  Lab Results  Test Name Test Result Date/Time Comments   Creatinine 1.01 mg/dL 06/02/2017 11:15 CDT    AST 22 unit/L 06/02/2017 11:15 CDT    ALT 26 unit/L 06/02/2017 11:15 CDT    Vit D 25 OH 12.84 ng/mL (Low) 10/18/2016 15:11 CDT Vit D 25 Hydroxy Reference Range:<br/>0 - 17 years - &nbsp; &nbsp; Deficiency: &nbsp; &nbsp; &nbsp; &nbsp; less than 20 ng/ml<br/> &nbsp; &nbsp; &nbsp; &nbsp; Optimum level: &nbsp; &gt; or = 20 ng/ml<br/>18 yrs or older: &nbsp;Deficiency: &nbsp; &nbsp; &nbsp; &nbsp; less than 20 ng/ml<br/> &nbsp; &nbsp; &nbsp; &nbsp; &nbsp; &nbsp; &nbsp; &nbsp; &nbsp; &nbsp; &nbsp; &nbsp; &nbsp;Insufficiency: &nbsp; &nbsp; 20 - -29 ng/ml<br/> &nbsp; &nbsp; &nbsp; &nbsp; &nbsp; &nbsp; &nbsp; &nbsp; &nbsp; &nbsp; &nbsp; &nbsp; &nbsp;Optimum level: &nbsp;30 - 80 ng/ml<br/> &nbsp; &nbsp; &nbsp; &nbsp; &nbsp; &nbsp; &nbsp; &nbsp; &nbsp; &nbsp; &nbsp; &nbsp; &nbsp;Possible toxicity: &gt; or = 150 ng/ml   HLA -IE Negative 10/18/2016 15:11 CDT The HLA-B*57:01 allele, associated with abacavir hyper-<br/>sensitivity, was not detected in this individual. A<br/>negative result for HLA-B*57:01 does not preclude the<br/>development of an allergic response to abacavir. The<br/>HLA-B*57:01 genotype result should not substitute for<br/>appropriate clinical vigilance and patient management in<br/>abacavir containing regimens (see Ziagen(R) or Epzicom(TM)<br/>or Trizivir(R) Prescribing Information). Administration of<br/>abacavir therapy requires close observation including<br/>immediate discontinuation of therapy should any signs or<br/>symptoms of hypersensitivity develop.<br/>References:<br/>1. Ziagen (R),  Epsicom(TM), or Trizivir(R) Prescribing<br/> &nbsp; information [facility] [place] [place], [place].<br/>2. Daina MOORE et al. HLA-B*5701 screening for hyper-<br/> &nbsp; sensitivity to abacavir. N Eng J Med 2008; 358: 568-579.<br/>3. Zachary TAYLOR et al. High sensitivity of HLA-B*5701 as a<br/> &nbsp; marker for immunologically confirmed abacavir hyper-<br/> &nbsp; sensitivity in white and black patients. Clin Infect<br/> &nbsp; Dis 2008 46:1111-8.<br/>HLA allele interpretation for all loci based on IMGT/HLA<br/>database version 3.21<br/>HLA Lab CLIA ID Number [id number]<br/> <br/>This test was performed using PCR (Polymerase Chain<br/>Reaction)/SSOP (Sequence Specific Oligonucleotide Probes)<br/>technique. &nbsp;SBT (Sequence Based Typing) and/or SSP<br/>(Sequence Specific Primers) may be used as supplemental<br/>methods when necessary. &nbsp;Please contact HLA Customer<br/>Service at [phone] if you have any questions.<br/> <br/> Director of HLA Laboratory<br/> Dr Adin Gipson, PhD<br/>Performed At: 2[facility] &nbsp;DNA<br/>[address] [place], [place] [id number]<br/>Brandan KERN PhD Ph:[phone]   Chol 153 mg/dL 10/18/2016 15:11 CDT    HDL 33 mg/dL (Low) 10/18/2016 15:11 CDT    Trig 158 mg/dL (High) 10/18/2016 15:11 CDT    LDL 88 mg/dL 10/18/2016 15:11 CDT    Chol/HDL 4.6 10/18/2016 15:11 CDT    T4 Free 0.65 ng/mL 10/18/2016 15:11 CDT    TSH 0.48 mIU/mL (Low) 10/18/2016 15:11 CDT    WBC 5.9 x10(3)/mcL 06/02/2017 11:15 CDT    Hgb 16.0 gm/dL 06/02/2017 11:15 CDT    Hct 47.3 % 06/02/2017 11:15 CDT    Platelet 179 x10(3)/mcL 06/02/2017 11:15 CDT    CD4 Pct 43.7 % 06/02/2017 11:15 CDT    CD4 Absolute 1180 unit/L 06/02/2017 11:15 CDT    Hep C Ab Nonreactive 10/18/2016 15:11 CDT    RPR Non-Reactive 10/18/2016 15:11 CDT    Chlamydia trach-LC Negative 10/18/2016 15:11 CDT    N gonorrhoeae JUNI-LC Negative 10/18/2016 15:11 CDT Performed At: [facility]<br/>[address] [place]  [place] [id number]<br/>Francis TAYLOR MD Ph:[phone]   HIV-1 RNA by PCR-LC <20 cpy/mL 06/02/2017 11:15 CDT HIV-1 RNA not detected<br/> <br/>The reportable range for this assay is 20 to 10,000,000<br/>copies HIV-1 RNA/mL.   QuantiFERON Gold-LC Negative 10/18/2016 15:11 CDT <br/>The specimen received for QuantiFERON testing was incubated<br/>by the ordering institution. &nbsp;Specific procedures outlined<br/>in our Directory of Services and in the package insert for<br/>the QuantiFERON Gold (In Tube) test must be followed to<br/>enable for proper stimulation of cells for the production<br/>of interferon gamma.      [1]?Office Visit Note; Cindy Maddox 06/02/2017 10:31 CDT  [2]?Office Visit Note; Cindy Maddox 06/02/2017 10:31 CDT  [3]?Office Visit Note; Cindy Maddox 06/02/2017 10:31 CDT

## 2022-06-24 ENCOUNTER — APPOINTMENT (OUTPATIENT)
Dept: CARDIOLOGY | Facility: CLINIC | Age: 67
End: 2022-06-24

## 2022-06-26 ENCOUNTER — FORM ENCOUNTER (OUTPATIENT)
Age: 67
End: 2022-06-26

## 2022-07-08 ENCOUNTER — APPOINTMENT (OUTPATIENT)
Dept: CARDIOLOGY | Facility: CLINIC | Age: 67
End: 2022-07-08

## 2022-07-08 VITALS
BODY MASS INDEX: 32.18 KG/M2 | HEART RATE: 70 BPM | SYSTOLIC BLOOD PRESSURE: 130 MMHG | DIASTOLIC BLOOD PRESSURE: 80 MMHG | TEMPERATURE: 98 F | WEIGHT: 205 LBS | RESPIRATION RATE: 16 BRPM | HEIGHT: 67 IN

## 2022-07-08 PROCEDURE — 99214 OFFICE O/P EST MOD 30 MIN: CPT

## 2022-07-08 PROCEDURE — 93290 INTERROG DEV EVAL ICPMS IP: CPT

## 2022-07-08 PROCEDURE — 93284 PRGRMG EVAL IMPLANTABLE DFB: CPT

## 2022-07-08 NOTE — PROCEDURE
[Complete Heart Block] : complete heart block [See Scanned Paceart Report] : See scanned paceart report [See Device Printout] : See device printout [CRT-D] : Cardiac resynchronization therapy defibrillator [DDDR] : DDDR [Longevity: ___ months] : The estimated remaining battery life is [unfilled] months [Threshold Testing Performed] : Threshold testing was performed [Lead Imp:  ___ohms] : lead impedance was [unfilled] ohms [Sensing Amplitude ___mv] : sensing amplitude was [unfilled] mv [___V @] : [unfilled] V [___ ms] : [unfilled] ms [None] : none [Asense-Vsense ___ %] : Asense-Vsense [unfilled]% [Asense-Vpace ___ %] : Asense-Vpace [unfilled]% [Apace-Vsense ___ %] : Apace-Vsense [unfilled]% [Apace-Vpace ___ %] : Apace-Vpace [unfilled]% [de-identified] : Medtronic [de-identified] : [id number] [de-identified] : [id number] [de-identified] : 80

## 2022-07-08 NOTE — PHYSICAL EXAM
[General Appearance - Well Developed] : well developed [Normal Appearance] : normal appearance [Well Groomed] : well groomed [General Appearance - Well Nourished] : well nourished [No Deformities] : no deformities [General Appearance - In No Acute Distress] : no acute distress [Heart Rate And Rhythm] : heart rate and rhythm were normal [Heart Sounds] : normal S1 and S2 [Murmurs] : no murmurs present [Respiration, Rhythm And Depth] : normal respiratory rhythm and effort [Exaggerated Use Of Accessory Muscles For Inspiration] : no accessory muscle use [Auscultation Breath Sounds / Voice Sounds] : lungs were clear to auscultation bilaterally [Clean] : clean [Dry] : dry [Well-Healed] : well-healed [Abdomen Soft] : soft [Abdomen Tenderness] : non-tender [Abdomen Mass (___ Cm)] : no abdominal mass palpated [Cyanosis, Localized] : no localized cyanosis [Nail Clubbing] : no clubbing of the fingernails [Petechial Hemorrhages (___cm)] : no petechial hemorrhages [] : no ischemic changes

## 2022-07-08 NOTE — HISTORY OF PRESENT ILLNESS
[None] : The patient complains of no symptoms [Palpitations] : no palpitations [SOB] : no dyspnea [Syncope] : no syncope [Chest Pain] : no chest pain or discomfort [ICD Shocks] : no recent ICD shocks [Erythema at Site] : no erythema at device site [Swelling at Site] : no swelling at device site [de-identified] : 67  years old male with pmh of HTN, S/P MV repair , NICM , AV node ablation S/P CRT/D , VT S/P shock , Afib CHADS-VASc 1,S/P GIANFRANCO closure, recurrent AFib S/P DCCV 10/2016 , S/P CRT-D generator change 8/15/2018 . \par \par \par Patient feels well\par \par \par

## 2022-07-08 NOTE — ASSESSMENT
[FreeTextEntry1] : AF\par - s/p GIANFRANCO closure\par \par VT - no new significant eventS\par - cont metoprolol ER 25 mg qd\par \par CHF\par - cont entresto 49/51 mg q12\par \par ·	I have spent 35 minutes of time on the encounter excluding separately reported services.\par \par

## 2022-10-07 ENCOUNTER — NON-APPOINTMENT (OUTPATIENT)
Age: 67
End: 2022-10-07

## 2022-10-07 ENCOUNTER — APPOINTMENT (OUTPATIENT)
Dept: CARDIOLOGY | Facility: CLINIC | Age: 67
End: 2022-10-07

## 2022-10-07 PROCEDURE — 93295 DEV INTERROG REMOTE 1/2/MLT: CPT

## 2022-10-07 PROCEDURE — 93296 REM INTERROG EVL PM/IDS: CPT

## 2023-01-06 ENCOUNTER — NON-APPOINTMENT (OUTPATIENT)
Age: 68
End: 2023-01-06

## 2023-01-06 ENCOUNTER — APPOINTMENT (OUTPATIENT)
Dept: CARDIOLOGY | Facility: CLINIC | Age: 68
End: 2023-01-06
Payer: MEDICARE

## 2023-01-06 PROCEDURE — 93296 REM INTERROG EVL PM/IDS: CPT

## 2023-01-06 PROCEDURE — 93295 DEV INTERROG REMOTE 1/2/MLT: CPT

## 2023-01-17 ENCOUNTER — APPOINTMENT (OUTPATIENT)
Dept: ELECTROPHYSIOLOGY | Facility: CLINIC | Age: 68
End: 2023-01-17
Payer: MEDICARE

## 2023-01-17 VITALS
SYSTOLIC BLOOD PRESSURE: 110 MMHG | WEIGHT: 190 LBS | HEIGHT: 67 IN | HEART RATE: 84 BPM | TEMPERATURE: 97.1 F | BODY MASS INDEX: 29.82 KG/M2 | RESPIRATION RATE: 16 BRPM | DIASTOLIC BLOOD PRESSURE: 82 MMHG

## 2023-01-17 DIAGNOSIS — Z98.890 OTHER SPECIFIED POSTPROCEDURAL STATES: ICD-10-CM

## 2023-01-17 PROCEDURE — 93284 PRGRMG EVAL IMPLANTABLE DFB: CPT

## 2023-01-17 PROCEDURE — 99214 OFFICE O/P EST MOD 30 MIN: CPT

## 2023-01-17 PROCEDURE — 93290 INTERROG DEV EVAL ICPMS IP: CPT

## 2023-01-17 NOTE — OBJECTIVE
No [History reviewed] : History reviewed. [Medications and Allergies reviewed] : Medications and allergies reviewed.

## 2023-01-17 NOTE — PROCEDURE
[Complete Heart Block] : complete heart block [See Scanned Paceart Report] : See scanned paceart report [See Device Printout] : See device printout [CRT-D] : Cardiac resynchronization therapy defibrillator [DDDR] : DDDR [Longevity: ___ months] : The estimated remaining battery life is [unfilled] months [Threshold Testing Performed] : Threshold testing was performed [Sensing Amplitude ___mv] : sensing amplitude was [unfilled] mv [Programmed for Longevity] : output reprogrammed for improved battery longevity [Asense-Vsense ___ %] : Asense-Vsense [unfilled]% [Asense-Vpace ___ %] : Asense-Vpace [unfilled]% [Apace-Vsense ___ %] : Apace-Vsense [unfilled]% [Apace-Vpace ___ %] : Apace-Vpace [unfilled]% [de-identified] : Medtronic [de-identified] : [id number] [de-identified] : [id number] [de-identified] : 80

## 2023-01-17 NOTE — HISTORY OF PRESENT ILLNESS
[None] : The patient complains of no symptoms [Palpitations] : no palpitations [SOB] : no dyspnea [Syncope] : no syncope [Chest Pain] : no chest pain or discomfort [ICD Shocks] : no recent ICD shocks [Erythema at Site] : no erythema at device site [Swelling at Site] : no swelling at device site [de-identified] : 67 Y.O. male with a PMHx of HTN, S/P MV repair , NICM , AV node ablation S/P CRT/D , VT S/P shock , Afib CHADS-VASc 1,S/P GIANFRANCO closure, recurrent AFib S/P DCCV 10/2016 , S/P CRT-D generator change 8/15/2018. \par \par \par Patient feels generally well today, with no acute complaints.\par \par \par

## 2023-04-19 ENCOUNTER — NON-APPOINTMENT (OUTPATIENT)
Age: 68
End: 2023-04-19

## 2023-04-19 ENCOUNTER — APPOINTMENT (OUTPATIENT)
Dept: CARDIOLOGY | Facility: CLINIC | Age: 68
End: 2023-04-19
Payer: MEDICARE

## 2023-04-19 PROCEDURE — 93296 REM INTERROG EVL PM/IDS: CPT

## 2023-04-19 PROCEDURE — 93295 DEV INTERROG REMOTE 1/2/MLT: CPT

## 2023-05-10 ENCOUNTER — OUTPATIENT (OUTPATIENT)
Dept: OUTPATIENT SERVICES | Facility: HOSPITAL | Age: 68
LOS: 1 days | End: 2023-05-10
Payer: MEDICARE

## 2023-05-10 VITALS
RESPIRATION RATE: 16 BRPM | HEIGHT: 67 IN | DIASTOLIC BLOOD PRESSURE: 83 MMHG | OXYGEN SATURATION: 99 % | HEART RATE: 86 BPM | TEMPERATURE: 98 F | SYSTOLIC BLOOD PRESSURE: 120 MMHG | WEIGHT: 182.98 LBS

## 2023-05-10 DIAGNOSIS — T82.111A BREAKDOWN (MECHANICAL) OF CARDIAC PULSE GENERATOR (BATTERY), INITIAL ENCOUNTER: ICD-10-CM

## 2023-05-10 DIAGNOSIS — Z95.810 PRESENCE OF AUTOMATIC (IMPLANTABLE) CARDIAC DEFIBRILLATOR: Chronic | ICD-10-CM

## 2023-05-10 DIAGNOSIS — T82.111D BREAKDOWN (MECHANICAL) OF CARDIAC PULSE GENERATOR (BATTERY), SUBSEQUENT ENCOUNTER: ICD-10-CM

## 2023-05-10 DIAGNOSIS — Z87.828 PERSONAL HISTORY OF OTHER (HEALED) PHYSICAL INJURY AND TRAUMA: Chronic | ICD-10-CM

## 2023-05-10 DIAGNOSIS — Z98.890 OTHER SPECIFIED POSTPROCEDURAL STATES: Chronic | ICD-10-CM

## 2023-05-10 DIAGNOSIS — Z01.818 ENCOUNTER FOR OTHER PREPROCEDURAL EXAMINATION: ICD-10-CM

## 2023-05-10 DIAGNOSIS — Z95.828 PRESENCE OF OTHER VASCULAR IMPLANTS AND GRAFTS: Chronic | ICD-10-CM

## 2023-05-10 LAB
ALBUMIN SERPL ELPH-MCNC: 4.5 G/DL — SIGNIFICANT CHANGE UP (ref 3.5–5.2)
ALP SERPL-CCNC: 84 U/L — SIGNIFICANT CHANGE UP (ref 30–115)
ALT FLD-CCNC: 24 U/L — SIGNIFICANT CHANGE UP (ref 0–41)
ANION GAP SERPL CALC-SCNC: 12 MMOL/L — SIGNIFICANT CHANGE UP (ref 7–14)
APPEARANCE UR: CLEAR — SIGNIFICANT CHANGE UP
APTT BLD: 35.3 SEC — SIGNIFICANT CHANGE UP (ref 27–39.2)
AST SERPL-CCNC: 19 U/L — SIGNIFICANT CHANGE UP (ref 0–41)
BACTERIA # UR AUTO: NEGATIVE — SIGNIFICANT CHANGE UP
BASOPHILS # BLD AUTO: 0.05 K/UL — SIGNIFICANT CHANGE UP (ref 0–0.2)
BASOPHILS NFR BLD AUTO: 0.5 % — SIGNIFICANT CHANGE UP (ref 0–1)
BILIRUB SERPL-MCNC: 1.3 MG/DL — HIGH (ref 0.2–1.2)
BILIRUB UR-MCNC: NEGATIVE — SIGNIFICANT CHANGE UP
BUN SERPL-MCNC: 14 MG/DL — SIGNIFICANT CHANGE UP (ref 10–20)
CALCIUM SERPL-MCNC: 8.8 MG/DL — SIGNIFICANT CHANGE UP (ref 8.4–10.5)
CHLORIDE SERPL-SCNC: 103 MMOL/L — SIGNIFICANT CHANGE UP (ref 98–110)
CO2 SERPL-SCNC: 22 MMOL/L — SIGNIFICANT CHANGE UP (ref 17–32)
COLOR SPEC: YELLOW — SIGNIFICANT CHANGE UP
CREAT SERPL-MCNC: 0.9 MG/DL — SIGNIFICANT CHANGE UP (ref 0.7–1.5)
DIFF PNL FLD: NEGATIVE — SIGNIFICANT CHANGE UP
EGFR: 93 ML/MIN/1.73M2 — SIGNIFICANT CHANGE UP
EOSINOPHIL # BLD AUTO: 0.1 K/UL — SIGNIFICANT CHANGE UP (ref 0–0.7)
EOSINOPHIL NFR BLD AUTO: 1.1 % — SIGNIFICANT CHANGE UP (ref 0–8)
EPI CELLS # UR: 1 /HPF — SIGNIFICANT CHANGE UP (ref 0–5)
GLUCOSE SERPL-MCNC: 135 MG/DL — HIGH (ref 70–99)
GLUCOSE UR QL: ABNORMAL
HCT VFR BLD CALC: 44.1 % — SIGNIFICANT CHANGE UP (ref 42–52)
HGB BLD-MCNC: 14.4 G/DL — SIGNIFICANT CHANGE UP (ref 14–18)
HYALINE CASTS # UR AUTO: 1 /LPF — SIGNIFICANT CHANGE UP (ref 0–7)
IMM GRANULOCYTES NFR BLD AUTO: 0.3 % — SIGNIFICANT CHANGE UP (ref 0.1–0.3)
INR BLD: 1.17 RATIO — SIGNIFICANT CHANGE UP (ref 0.65–1.3)
KETONES UR-MCNC: SIGNIFICANT CHANGE UP
LEUKOCYTE ESTERASE UR-ACNC: NEGATIVE — SIGNIFICANT CHANGE UP
LYMPHOCYTES # BLD AUTO: 0.99 K/UL — LOW (ref 1.2–3.4)
LYMPHOCYTES # BLD AUTO: 10.5 % — LOW (ref 20.5–51.1)
MCHC RBC-ENTMCNC: 29 PG — SIGNIFICANT CHANGE UP (ref 27–31)
MCHC RBC-ENTMCNC: 32.7 G/DL — SIGNIFICANT CHANGE UP (ref 32–37)
MCV RBC AUTO: 88.9 FL — SIGNIFICANT CHANGE UP (ref 80–94)
MONOCYTES # BLD AUTO: 0.65 K/UL — HIGH (ref 0.1–0.6)
MONOCYTES NFR BLD AUTO: 6.9 % — SIGNIFICANT CHANGE UP (ref 1.7–9.3)
MRSA PCR RESULT.: NEGATIVE — SIGNIFICANT CHANGE UP
NEUTROPHILS # BLD AUTO: 7.59 K/UL — HIGH (ref 1.4–6.5)
NEUTROPHILS NFR BLD AUTO: 80.7 % — HIGH (ref 42.2–75.2)
NITRITE UR-MCNC: NEGATIVE — SIGNIFICANT CHANGE UP
NRBC # BLD: 0 /100 WBCS — SIGNIFICANT CHANGE UP (ref 0–0)
PH UR: 6.5 — SIGNIFICANT CHANGE UP (ref 5–8)
PLATELET # BLD AUTO: 173 K/UL — SIGNIFICANT CHANGE UP (ref 130–400)
PMV BLD: 11.8 FL — HIGH (ref 7.4–10.4)
POTASSIUM SERPL-MCNC: 3.8 MMOL/L — SIGNIFICANT CHANGE UP (ref 3.5–5)
POTASSIUM SERPL-SCNC: 3.8 MMOL/L — SIGNIFICANT CHANGE UP (ref 3.5–5)
PROT SERPL-MCNC: 6.8 G/DL — SIGNIFICANT CHANGE UP (ref 6–8)
PROT UR-MCNC: ABNORMAL
PROTHROM AB SERPL-ACNC: 13.4 SEC — HIGH (ref 9.95–12.87)
RBC # BLD: 4.96 M/UL — SIGNIFICANT CHANGE UP (ref 4.7–6.1)
RBC # FLD: 14.4 % — SIGNIFICANT CHANGE UP (ref 11.5–14.5)
RBC CASTS # UR COMP ASSIST: 4 /HPF — SIGNIFICANT CHANGE UP (ref 0–4)
SODIUM SERPL-SCNC: 137 MMOL/L — SIGNIFICANT CHANGE UP (ref 135–146)
SP GR SPEC: 1.03 — SIGNIFICANT CHANGE UP (ref 1.01–1.03)
UROBILINOGEN FLD QL: SIGNIFICANT CHANGE UP
WBC # BLD: 9.41 K/UL — SIGNIFICANT CHANGE UP (ref 4.8–10.8)
WBC # FLD AUTO: 9.41 K/UL — SIGNIFICANT CHANGE UP (ref 4.8–10.8)
WBC UR QL: 1 /HPF — SIGNIFICANT CHANGE UP (ref 0–5)

## 2023-05-10 PROCEDURE — 87641 MR-STAPH DNA AMP PROBE: CPT

## 2023-05-10 PROCEDURE — 80053 COMPREHEN METABOLIC PANEL: CPT

## 2023-05-10 PROCEDURE — 93010 ELECTROCARDIOGRAM REPORT: CPT

## 2023-05-10 PROCEDURE — 36415 COLL VENOUS BLD VENIPUNCTURE: CPT

## 2023-05-10 PROCEDURE — 85025 COMPLETE CBC W/AUTO DIFF WBC: CPT

## 2023-05-10 PROCEDURE — 85730 THROMBOPLASTIN TIME PARTIAL: CPT

## 2023-05-10 PROCEDURE — 81001 URINALYSIS AUTO W/SCOPE: CPT

## 2023-05-10 PROCEDURE — 87086 URINE CULTURE/COLONY COUNT: CPT

## 2023-05-10 PROCEDURE — 87640 STAPH A DNA AMP PROBE: CPT

## 2023-05-10 PROCEDURE — 93005 ELECTROCARDIOGRAM TRACING: CPT

## 2023-05-10 PROCEDURE — 99214 OFFICE O/P EST MOD 30 MIN: CPT | Mod: 25

## 2023-05-10 PROCEDURE — 85610 PROTHROMBIN TIME: CPT
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
## 2023-05-10 NOTE — H&P PST ADULT - NSICDXPASTMEDICALHX_GEN_ALL_CORE_FT
PAST MEDICAL HISTORY:  Atrial fibrillation resolved with SAAVEDRA repair    High cholesterol     HTN (hypertension)     Pulmonary embolism

## 2023-05-10 NOTE — H&P PST ADULT - HISTORY OF PRESENT ILLNESS
Pt states he has had device for 5 years and due for upgrade. States "I am dependent on the pacemaker aspect of it so it needs to be changed more frequently." Denies any symptoms. Proceeding with above.    Denies any chest pain, difficulty breathing, SOB, palpitations, dysuria, URI, or any other infections in the last 2 weeks. Denies any recent travel, contact, or exposure to any persons with known or suspected COVID-19. Pt also denies COVID testing within the last 2 weeks. Pt admits to receiving all doses of COVID vaccine. Denies any suicidal or homicidal ideations. Pt advised to self quarantine until day of procedure. Exercise tolerance of 2 flights of stairs without dyspnea. EDNA reviewed with patient. Pt verbalized understanding of all pre-operative instructions.    Anesthesia Alert  NO--Difficult Airway  NO--History of neck surgery or radiation  NO--Limited ROM of neck  NO--History of Malignant hyperthermia  NO--No personal or family history of Pseudocholinesterase deficiency.  NO--Prior Anesthesia Complication  NO--Latex Allergy  NO--Loose teeth  NO--History of Rheumatoid Arthritis  NO--EDNA  NO--Bleeding Risk  NO--Other_____

## 2023-05-10 NOTE — H&P PST ADULT - REASON FOR ADMISSION
67 yo male presents for PAST in preparation for ICD battery change on 5/24/2023 under sedation by Dr. Bryan (EPS).

## 2023-05-11 DIAGNOSIS — Z01.818 ENCOUNTER FOR OTHER PREPROCEDURAL EXAMINATION: ICD-10-CM

## 2023-05-11 DIAGNOSIS — T82.111D BREAKDOWN (MECHANICAL) OF CARDIAC PULSE GENERATOR (BATTERY), SUBSEQUENT ENCOUNTER: ICD-10-CM

## 2023-05-11 LAB
CULTURE RESULTS: SIGNIFICANT CHANGE UP
SPECIMEN SOURCE: SIGNIFICANT CHANGE UP

## 2023-05-24 ENCOUNTER — OUTPATIENT (OUTPATIENT)
Dept: OUTPATIENT SERVICES | Facility: HOSPITAL | Age: 68
LOS: 1 days | Discharge: HOME | End: 2023-05-24
Payer: MEDICARE

## 2023-05-24 ENCOUNTER — APPOINTMENT (OUTPATIENT)
Dept: ELECTROPHYSIOLOGY | Facility: HOSPITAL | Age: 68
End: 2023-05-24

## 2023-05-24 DIAGNOSIS — Z98.890 OTHER SPECIFIED POSTPROCEDURAL STATES: Chronic | ICD-10-CM

## 2023-05-24 DIAGNOSIS — T82.111D BREAKDOWN (MECHANICAL) OF CARDIAC PULSE GENERATOR (BATTERY), SUBSEQUENT ENCOUNTER: ICD-10-CM

## 2023-05-24 DIAGNOSIS — Z95.828 PRESENCE OF OTHER VASCULAR IMPLANTS AND GRAFTS: Chronic | ICD-10-CM

## 2023-05-24 DIAGNOSIS — Z87.828 PERSONAL HISTORY OF OTHER (HEALED) PHYSICAL INJURY AND TRAUMA: Chronic | ICD-10-CM

## 2023-05-24 DIAGNOSIS — Z95.810 PRESENCE OF AUTOMATIC (IMPLANTABLE) CARDIAC DEFIBRILLATOR: Chronic | ICD-10-CM

## 2023-05-24 DIAGNOSIS — T82.111A BREAKDOWN (MECHANICAL) OF CARDIAC PULSE GENERATOR (BATTERY), INITIAL ENCOUNTER: ICD-10-CM
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
## 2023-05-24 PROCEDURE — C1882: CPT

## 2023-05-24 PROCEDURE — C1889: CPT

## 2023-05-24 PROCEDURE — 33264 RMVL & RPLCMT DFB GEN MLT LD: CPT

## 2023-05-24 RX ORDER — SACUBITRIL AND VALSARTAN 24; 26 MG/1; MG/1
1 TABLET, FILM COATED ORAL
Qty: 0 | Refills: 0 | DISCHARGE

## 2023-05-24 RX ORDER — CEPHALEXIN 500 MG
1 CAPSULE ORAL
Qty: 10 | Refills: 0
Start: 2023-05-24 | End: 2023-05-28

## 2023-05-24 RX ORDER — CEFAZOLIN SODIUM 1 G
2000 VIAL (EA) INJECTION ONCE
Refills: 0 | Status: DISCONTINUED | OUTPATIENT
Start: 2023-05-24 | End: 2023-05-24

## 2023-05-24 RX ORDER — CEFAZOLIN SODIUM 1 G
1000 VIAL (EA) INJECTION ONCE
Refills: 0 | Status: DISCONTINUED | OUTPATIENT
Start: 2023-05-24 | End: 2023-05-24

## 2023-05-24 NOTE — PROGRESS NOTE ADULT - SUBJECTIVE AND OBJECTIVE BOX
Electrophysiology Brief Post-Op Note    I have personally seen and examined the patient.  I agree with the history and physical which I have reviewed and noted any changes below.  05-24-23 @ 07:27    PRE-OP DIAGNOSIS: HF    POST-OP DIAGNOSIS: HF    PROCEDURE: generator change     Vendor Representative was present for clinical support.    Physician: Irvin  Assistant: None    ESTIMATED BLOOD LOSS:    5   mL    ANESTHESIA TYPE:  [  ]General Anesthesia  [ X ] Sedation  [  X] Local/Regional    CONDITION  [  ] Critical  [  ] Serious  [  ]Fair  [X  ]Good      SPECIMENS REMOVED (IF APPLICABLE):  Biv-ICD    IMPLANTS (IF APPLICABLE)      FINDINGS: none    COMPLICATIONS: none     PLAN OF CARE  - keflex  - FU 3-4 weeks  - DC all heparin produces and lovenox  - No anticoagulation unless recomended by EP attending

## 2023-05-25 DIAGNOSIS — T82.111A BREAKDOWN (MECHANICAL) OF CARDIAC PULSE GENERATOR (BATTERY), INITIAL ENCOUNTER: ICD-10-CM
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
## 2023-06-14 ENCOUNTER — NON-APPOINTMENT (OUTPATIENT)
Age: 68
End: 2023-06-14

## 2023-06-14 ENCOUNTER — INPATIENT (INPATIENT)
Facility: HOSPITAL | Age: 68
LOS: 1 days | Discharge: HOME | DRG: 287 | End: 2023-06-16
Attending: INTERNAL MEDICINE | Admitting: INTERNAL MEDICINE
Payer: MEDICARE

## 2023-06-14 VITALS
OXYGEN SATURATION: 98 % | RESPIRATION RATE: 18 BRPM | TEMPERATURE: 100 F | SYSTOLIC BLOOD PRESSURE: 149 MMHG | DIASTOLIC BLOOD PRESSURE: 96 MMHG | HEIGHT: 67 IN | HEART RATE: 83 BPM

## 2023-06-14 DIAGNOSIS — Z87.828 PERSONAL HISTORY OF OTHER (HEALED) PHYSICAL INJURY AND TRAUMA: Chronic | ICD-10-CM

## 2023-06-14 DIAGNOSIS — Z98.890 OTHER SPECIFIED POSTPROCEDURAL STATES: Chronic | ICD-10-CM

## 2023-06-14 DIAGNOSIS — Z95.828 PRESENCE OF OTHER VASCULAR IMPLANTS AND GRAFTS: Chronic | ICD-10-CM

## 2023-06-14 DIAGNOSIS — Z95.810 PRESENCE OF AUTOMATIC (IMPLANTABLE) CARDIAC DEFIBRILLATOR: Chronic | ICD-10-CM

## 2023-06-14 DIAGNOSIS — I47.20 VENTRICULAR TACHYCARDIA, UNSPECIFIED: ICD-10-CM

## 2023-06-14 LAB
ALBUMIN SERPL ELPH-MCNC: 4.7 G/DL — SIGNIFICANT CHANGE UP (ref 3.5–5.2)
ALP SERPL-CCNC: 116 U/L — HIGH (ref 30–115)
ALT FLD-CCNC: 29 U/L — SIGNIFICANT CHANGE UP (ref 0–41)
ANION GAP SERPL CALC-SCNC: 12 MMOL/L — SIGNIFICANT CHANGE UP (ref 7–14)
APTT BLD: 35.3 SEC — SIGNIFICANT CHANGE UP (ref 27–39.2)
AST SERPL-CCNC: 22 U/L — SIGNIFICANT CHANGE UP (ref 0–41)
BASOPHILS # BLD AUTO: 0.04 K/UL — SIGNIFICANT CHANGE UP (ref 0–0.2)
BASOPHILS NFR BLD AUTO: 0.6 % — SIGNIFICANT CHANGE UP (ref 0–1)
BILIRUB SERPL-MCNC: 0.5 MG/DL — SIGNIFICANT CHANGE UP (ref 0.2–1.2)
BUN SERPL-MCNC: 27 MG/DL — HIGH (ref 10–20)
CALCIUM SERPL-MCNC: 9.1 MG/DL — SIGNIFICANT CHANGE UP (ref 8.4–10.5)
CHLORIDE SERPL-SCNC: 109 MMOL/L — SIGNIFICANT CHANGE UP (ref 98–110)
CO2 SERPL-SCNC: 23 MMOL/L — SIGNIFICANT CHANGE UP (ref 17–32)
CREAT SERPL-MCNC: 0.9 MG/DL — SIGNIFICANT CHANGE UP (ref 0.7–1.5)
EGFR: 93 ML/MIN/1.73M2 — SIGNIFICANT CHANGE UP
EOSINOPHIL # BLD AUTO: 0.14 K/UL — SIGNIFICANT CHANGE UP (ref 0–0.7)
EOSINOPHIL NFR BLD AUTO: 2 % — SIGNIFICANT CHANGE UP (ref 0–8)
GLUCOSE BLDC GLUCOMTR-MCNC: 103 MG/DL — HIGH (ref 70–99)
GLUCOSE SERPL-MCNC: 104 MG/DL — HIGH (ref 70–99)
HCT VFR BLD CALC: 43.6 % — SIGNIFICANT CHANGE UP (ref 42–52)
HGB BLD-MCNC: 14.4 G/DL — SIGNIFICANT CHANGE UP (ref 14–18)
IMM GRANULOCYTES NFR BLD AUTO: 0.3 % — SIGNIFICANT CHANGE UP (ref 0.1–0.3)
INR BLD: 1.08 RATIO — SIGNIFICANT CHANGE UP (ref 0.65–1.3)
LYMPHOCYTES # BLD AUTO: 1.39 K/UL — SIGNIFICANT CHANGE UP (ref 1.2–3.4)
LYMPHOCYTES # BLD AUTO: 20.2 % — LOW (ref 20.5–51.1)
MCHC RBC-ENTMCNC: 29.5 PG — SIGNIFICANT CHANGE UP (ref 27–31)
MCHC RBC-ENTMCNC: 33 G/DL — SIGNIFICANT CHANGE UP (ref 32–37)
MCV RBC AUTO: 89.3 FL — SIGNIFICANT CHANGE UP (ref 80–94)
MONOCYTES # BLD AUTO: 0.84 K/UL — HIGH (ref 0.1–0.6)
MONOCYTES NFR BLD AUTO: 12.2 % — HIGH (ref 1.7–9.3)
NEUTROPHILS # BLD AUTO: 4.44 K/UL — SIGNIFICANT CHANGE UP (ref 1.4–6.5)
NEUTROPHILS NFR BLD AUTO: 64.7 % — SIGNIFICANT CHANGE UP (ref 42.2–75.2)
NRBC # BLD: 0 /100 WBCS — SIGNIFICANT CHANGE UP (ref 0–0)
NT-PROBNP SERPL-SCNC: 967 PG/ML — HIGH (ref 0–300)
PLATELET # BLD AUTO: 168 K/UL — SIGNIFICANT CHANGE UP (ref 130–400)
PMV BLD: 12.7 FL — HIGH (ref 7.4–10.4)
POTASSIUM SERPL-MCNC: 4.4 MMOL/L — SIGNIFICANT CHANGE UP (ref 3.5–5)
POTASSIUM SERPL-SCNC: 4.4 MMOL/L — SIGNIFICANT CHANGE UP (ref 3.5–5)
PROT SERPL-MCNC: 7.4 G/DL — SIGNIFICANT CHANGE UP (ref 6–8)
PROTHROM AB SERPL-ACNC: 12.4 SEC — SIGNIFICANT CHANGE UP (ref 9.95–12.87)
RBC # BLD: 4.88 M/UL — SIGNIFICANT CHANGE UP (ref 4.7–6.1)
RBC # FLD: 14.2 % — SIGNIFICANT CHANGE UP (ref 11.5–14.5)
SODIUM SERPL-SCNC: 144 MMOL/L — SIGNIFICANT CHANGE UP (ref 135–146)
TROPONIN T SERPL-MCNC: <0.01 NG/ML — SIGNIFICANT CHANGE UP
WBC # BLD: 6.87 K/UL — SIGNIFICANT CHANGE UP (ref 4.8–10.8)
WBC # FLD AUTO: 6.87 K/UL — SIGNIFICANT CHANGE UP (ref 4.8–10.8)

## 2023-06-14 PROCEDURE — 93306 TTE W/DOPPLER COMPLETE: CPT

## 2023-06-14 PROCEDURE — 82962 GLUCOSE BLOOD TEST: CPT

## 2023-06-14 PROCEDURE — 36415 COLL VENOUS BLD VENIPUNCTURE: CPT

## 2023-06-14 PROCEDURE — C1769: CPT

## 2023-06-14 PROCEDURE — 99285 EMERGENCY DEPT VISIT HI MDM: CPT

## 2023-06-14 PROCEDURE — 93284 PRGRMG EVAL IMPLANTABLE DFB: CPT

## 2023-06-14 PROCEDURE — 85027 COMPLETE CBC AUTOMATED: CPT

## 2023-06-14 PROCEDURE — 99291 CRITICAL CARE FIRST HOUR: CPT

## 2023-06-14 PROCEDURE — 83735 ASSAY OF MAGNESIUM: CPT

## 2023-06-14 PROCEDURE — 93005 ELECTROCARDIOGRAM TRACING: CPT

## 2023-06-14 PROCEDURE — 86803 HEPATITIS C AB TEST: CPT

## 2023-06-14 PROCEDURE — 80053 COMPREHEN METABOLIC PANEL: CPT

## 2023-06-14 PROCEDURE — 93458 L HRT ARTERY/VENTRICLE ANGIO: CPT

## 2023-06-14 PROCEDURE — 71045 X-RAY EXAM CHEST 1 VIEW: CPT

## 2023-06-14 PROCEDURE — C1894: CPT

## 2023-06-14 PROCEDURE — 83036 HEMOGLOBIN GLYCOSYLATED A1C: CPT

## 2023-06-14 PROCEDURE — 85025 COMPLETE CBC W/AUTO DIFF WBC: CPT

## 2023-06-14 PROCEDURE — 84443 ASSAY THYROID STIM HORMONE: CPT

## 2023-06-14 PROCEDURE — 84484 ASSAY OF TROPONIN QUANT: CPT

## 2023-06-14 PROCEDURE — 80061 LIPID PANEL: CPT

## 2023-06-14 PROCEDURE — C1887: CPT

## 2023-06-14 PROCEDURE — 71045 X-RAY EXAM CHEST 1 VIEW: CPT | Mod: 26

## 2023-06-14 RX ORDER — AMIODARONE HYDROCHLORIDE 400 MG/1
0.5 TABLET ORAL
Qty: 450 | Refills: 0 | Status: DISCONTINUED | OUTPATIENT
Start: 2023-06-14 | End: 2023-06-15

## 2023-06-14 RX ORDER — SACUBITRIL AND VALSARTAN 24; 26 MG/1; MG/1
1 TABLET, FILM COATED ORAL
Refills: 0 | Status: DISCONTINUED | OUTPATIENT
Start: 2023-06-14 | End: 2023-06-16

## 2023-06-14 RX ORDER — METOPROLOL TARTRATE 50 MG
12.5 TABLET ORAL AT BEDTIME
Refills: 0 | Status: DISCONTINUED | OUTPATIENT
Start: 2023-06-14 | End: 2023-06-16

## 2023-06-14 RX ORDER — SIMVASTATIN 20 MG/1
20 TABLET, FILM COATED ORAL AT BEDTIME
Refills: 0 | Status: DISCONTINUED | OUTPATIENT
Start: 2023-06-14 | End: 2023-06-16

## 2023-06-14 RX ORDER — AMIODARONE HYDROCHLORIDE 400 MG/1
1 TABLET ORAL
Qty: 450 | Refills: 0 | Status: DISCONTINUED | OUTPATIENT
Start: 2023-06-14 | End: 2023-06-15

## 2023-06-14 RX ORDER — AMIODARONE HYDROCHLORIDE 400 MG/1
150 TABLET ORAL ONCE
Refills: 0 | Status: COMPLETED | OUTPATIENT
Start: 2023-06-14 | End: 2023-06-14

## 2023-06-14 RX ORDER — CLONAZEPAM 1 MG
0.5 TABLET ORAL THREE TIMES A DAY
Refills: 0 | Status: DISCONTINUED | OUTPATIENT
Start: 2023-06-14 | End: 2023-06-16

## 2023-06-14 RX ORDER — ZOLPIDEM TARTRATE 10 MG/1
5 TABLET ORAL AT BEDTIME
Refills: 0 | Status: DISCONTINUED | OUTPATIENT
Start: 2023-06-14 | End: 2023-06-16

## 2023-06-14 RX ORDER — FUROSEMIDE 40 MG
0 TABLET ORAL
Qty: 0 | Refills: 0 | DISCHARGE

## 2023-06-14 NOTE — ED ADULT NURSE NOTE - OBJECTIVE STATEMENT
pt states last night he had a syncopal episode, also while he was unconscious his AICD fired, pt states today he felt fine, went to work, and spoke with his cardiologist who told him to come to ED, pt states he still feels fine, no active chest pain

## 2023-06-14 NOTE — H&P ADULT - ASSESSMENT
69 yo M with PMHx of NICM s/p ICD, hx of VT ablation, AFib s/p MAZE repair, Complete Heart Block, Hx of mitral valve repair, HTN presents after syncopal episode.    #Syncope in setting of VTach/VFib s/p shock x1 into NSR  #Hx of NICM s/p ICD  #Hx of AFib s/p MAZE procedure  #Hx of Complete Heart Block  #Hx of Mitral Valve Repair  #HTN  - Per EP outpatient notes, pt had episode of VTach/VFib  - s/p shock x1 into NSR night prior, no further events since syncopal episode  - Now asymptomatic  - EKG AV dual paced rhythm  - Troponin within normal limits, continue to trend  - Will give Amio push and start drip  - Add on for cath tomorrow, keep NPO  - EP eval   - C/w Entresto, Metoprolol, Simvastatin  - TTE pending    DVT ppx: Initiate after cath  Diet: NPO  Activity: IAT  FUll COde  Dispo: Admit to CCU

## 2023-06-14 NOTE — ED PROVIDER NOTE - PROGRESS NOTE DETAILS
JR: discussed with cardiac fellow; pt may have gone into VT>VF and may go to CCU. Will evaluate in the ED and dispo either CCU or cardiac tele.

## 2023-06-14 NOTE — H&P ADULT - HISTORY OF PRESENT ILLNESS
67 yo M with PMHx of NICM s/p ICD, hx of VT ablation, AFib s/p MAZE repair, Complete Heart Block, Hx of mitral valve repair, HTN presents after syncopal episode. Pt was sitting at the bar night prior, had sudden onset of dizziness and had syncopal episode, was told he was down for a few seconds. Pt then woke up, was slightly disoriented for a moment but otherwise had no symptoms. Did not have anything to drink prior to event. Afterwards pt felt well and went to work at [employer] this morning, did not have any additional events.  Was told by EP office that he had episode of VTach, then developed VFib and was shocked x1 by device into NSR and instructed to come to ED.  Prior to event, pt was feeling well, denies experiencing any chest pain, dizziness, syncopal episodes, dyspnea, LE swelling.  Of note, pt recently admitted one month ago for generator change by Dr. Bryan, procedure was uneventful.  In the ED, T 99, /96, HR 83, RR 18, SpO2 98% on RA. dual paced rhythm on EKG. Started on Amio drip.

## 2023-06-14 NOTE — H&P ADULT - NSHPPHYSICALEXAM_GEN_ALL_CORE
VITALS:   T(C): 37.6 (06-14-23 @ 20:56), Max: 37.6 (06-14-23 @ 20:56)  HR: 83 (06-14-23 @ 20:56) (83 - 83)  BP: 149/96 (06-14-23 @ 20:56) (149/96 - 149/96)  RR: 18 (06-14-23 @ 20:56) (18 - 18)  SpO2: 98% (06-14-23 @ 20:56) (98% - 98%)    GENERAL: NAD, lying in bed comfortably  HEAD:  Atraumatic, normocephalic  EYES: EOMI, PERRLA, conjunctiva and sclera clear  ENT: Moist mucous membranes  NECK: Supple, no JVD  HEART: Regular rate and rhythm, no murmurs, rubs, or gallops  LUNGS: Unlabored respirations.  Clear to auscultation bilaterally, no crackles, wheezing, or rhonchi  ABDOMEN: Soft, nontender, nondistended, +BS  EXTREMITIES: 2+ peripheral pulses bilaterally. No clubbing, cyanosis, or edema  NERVOUS SYSTEM:  A&Ox3, no focal deficits   SKIN: No rashes or lesions

## 2023-06-14 NOTE — ED ADULT TRIAGE NOTE - CHIEF COMPLAINT QUOTE
sent in by MD cha for admission and cardiac cath tomorrow. reports he was dizzy and passed out last night, AICD fired.

## 2023-06-14 NOTE — ED PROVIDER NOTE - PHYSICAL EXAMINATION
CONSTITUTIONAL: Well-developed; well-nourished; in no acute distress.   SKIN: Warm, dry  HEAD: Normocephalic; atraumatic  EYES: PERRL, EOMI, normal sclera and conjunctiva   ENT: No nasal discharge; airway clear.  NECK: Supple; non tender.  CARD:  Tachycardic . Normal S1, S2. 2+ radial pulses.  RESP: No increased WOB. CTA b/l without wheezes, crackles, rhonchi  ABD: Normoactive BS. Soft, nontender, nondistended.  EXT: Normal ROM.   NEURO: Alert, oriented, grossly unremarkable  PSYCH: Cooperative, appropriate.

## 2023-06-14 NOTE — H&P ADULT - ATTENDING COMMENTS
Admit to CCU for VT.  Amio gtt. Continue BB.  NPO after MN for LHC.  TTE to assess biventricular function.  EP to interrogate ICD.

## 2023-06-14 NOTE — H&P ADULT - NSHPLABSRESULTS_GEN_ALL_CORE
LABS:                        14.4   6.87  )-----------( 168      ( 14 Jun 2023 22:44 )             43.6     06-14    144  |  109  |  27<H>  ----------------------------<  104<H>  4.4   |  23  |  0.9    Ca    9.1      14 Jun 2023 22:44    TPro  7.4  /  Alb  4.7  /  TBili  0.5  /  DBili  x   /  AST  22  /  ALT  29  /  AlkPhos  116<H>  06-14    PT/INR - ( 14 Jun 2023 22:44 )   PT: 12.40 sec;   INR: 1.08 ratio    PTT - ( 14 Jun 2023 22:44 )  PTT:35.3 sec    Troponin T, Serum: <0.01 ng/mL (06.14.23 @ 22:44)   Pro-Brain Natriuretic Peptide: 967 pg/mL (06.14.23 @ 22:44)     12 Lead ECG (06.14.23 @ 20:57)       Diagnosis Line AV dual-paced rhythm with occasional Premature ventricular complexes  Abnormal ECG

## 2023-06-14 NOTE — ED PROVIDER NOTE - CLINICAL SUMMARY MEDICAL DECISION MAKING FREE TEXT BOX
68-year-old male with history of hypertension, hyperlipidemia, on Lasix, nonobstructive CAD, atrial fibrillation status post MA YS repair, status post 5 ventricular Medtronic pacemaker placed May 24 with Dr. Bryan who presents for syncopal event and shock fired by his device.  Was advised by Dr. Boucher to be admitted for cardiac catheterization. EKG, labs and imaging obtained and personally reviewed.  Appropriate medications for patient's presenting complaints were ordered and effects were reassessed.  Patient's records (prior hospital, ED visit) were reviewed.  Additional history was obtained from family.  Escalation to admission/observation was considered. Patient requires inpatient hospitalization - monitored setting. Patient updated at the bedside.

## 2023-06-14 NOTE — ED PROVIDER NOTE - ATTENDING CONTRIBUTION TO CARE
68-year-old male with history of hypertension, hyperlipidemia, on Lasix, nonobstructive CAD, atrial fibrillation status post MA YS repair, status post 5 ventricular Medtronic pacemaker placed May 24 with Dr. Bryan who presents for syncopal event and shock fired by his device.  Was advised by Dr. Boucher to be admitted for cardiac catheterization.

## 2023-06-14 NOTE — ED PROVIDER NOTE - OBJECTIVE STATEMENT
67 y/o M with PMH HF s/p AICD, HTN, HLD, hx mitral and tricuspid repair, hx PE s/p IVC filter sent to ED by his cardiologist (Dr. Olivarez) after AICD shock last night. Pt reports sudden dizziness last night followed by syncope (witnessed by wife); pt was unconscious very briefly; states he felt a shock and woke up. Since then has felt fine; no chest pain, SOB, palpitation, dizziness, lightheadedness. Discussed with Dr. Olivarez this morning; remote interrogation showed V tach; Sher sent in for scheduled cath tomorrow.

## 2023-06-15 PROBLEM — I26.99 OTHER PULMONARY EMBOLISM WITHOUT ACUTE COR PULMONALE: Chronic | Status: ACTIVE | Noted: 2023-05-10

## 2023-06-15 LAB
A1C WITH ESTIMATED AVERAGE GLUCOSE RESULT: 5.8 % — HIGH (ref 4–5.6)
ALBUMIN SERPL ELPH-MCNC: 3.9 G/DL — SIGNIFICANT CHANGE UP (ref 3.5–5.2)
ALP SERPL-CCNC: 80 U/L — SIGNIFICANT CHANGE UP (ref 30–115)
ALT FLD-CCNC: 23 U/L — SIGNIFICANT CHANGE UP (ref 0–41)
ANION GAP SERPL CALC-SCNC: 8 MMOL/L — SIGNIFICANT CHANGE UP (ref 7–14)
AST SERPL-CCNC: 18 U/L — SIGNIFICANT CHANGE UP (ref 0–41)
BASOPHILS # BLD AUTO: 0.04 K/UL — SIGNIFICANT CHANGE UP (ref 0–0.2)
BASOPHILS NFR BLD AUTO: 0.7 % — SIGNIFICANT CHANGE UP (ref 0–1)
BILIRUB SERPL-MCNC: 0.6 MG/DL — SIGNIFICANT CHANGE UP (ref 0.2–1.2)
BUN SERPL-MCNC: 23 MG/DL — HIGH (ref 10–20)
CALCIUM SERPL-MCNC: 8.7 MG/DL — SIGNIFICANT CHANGE UP (ref 8.4–10.4)
CHLORIDE SERPL-SCNC: 109 MMOL/L — SIGNIFICANT CHANGE UP (ref 98–110)
CHOLEST SERPL-MCNC: 135 MG/DL — SIGNIFICANT CHANGE UP
CO2 SERPL-SCNC: 23 MMOL/L — SIGNIFICANT CHANGE UP (ref 17–32)
CREAT SERPL-MCNC: 0.8 MG/DL — SIGNIFICANT CHANGE UP (ref 0.7–1.5)
EGFR: 96 ML/MIN/1.73M2 — SIGNIFICANT CHANGE UP
EOSINOPHIL # BLD AUTO: 0.15 K/UL — SIGNIFICANT CHANGE UP (ref 0–0.7)
EOSINOPHIL NFR BLD AUTO: 2.6 % — SIGNIFICANT CHANGE UP (ref 0–8)
ESTIMATED AVERAGE GLUCOSE: 120 MG/DL — HIGH (ref 68–114)
GLUCOSE SERPL-MCNC: 114 MG/DL — HIGH (ref 70–99)
HCT VFR BLD CALC: 39 % — LOW (ref 42–52)
HCV AB S/CO SERPL IA: 0.06 COI — SIGNIFICANT CHANGE UP
HCV AB SERPL-IMP: SIGNIFICANT CHANGE UP
HDLC SERPL-MCNC: 67 MG/DL — SIGNIFICANT CHANGE UP
HGB BLD-MCNC: 13 G/DL — LOW (ref 14–18)
IMM GRANULOCYTES NFR BLD AUTO: 0.3 % — SIGNIFICANT CHANGE UP (ref 0.1–0.3)
LIPID PNL WITH DIRECT LDL SERPL: 60 MG/DL — SIGNIFICANT CHANGE UP
LYMPHOCYTES # BLD AUTO: 1.04 K/UL — LOW (ref 1.2–3.4)
LYMPHOCYTES # BLD AUTO: 18.2 % — LOW (ref 20.5–51.1)
MAGNESIUM SERPL-MCNC: 2.1 MG/DL — SIGNIFICANT CHANGE UP (ref 1.8–2.4)
MCHC RBC-ENTMCNC: 29.7 PG — SIGNIFICANT CHANGE UP (ref 27–31)
MCHC RBC-ENTMCNC: 33.3 G/DL — SIGNIFICANT CHANGE UP (ref 32–37)
MCV RBC AUTO: 89.2 FL — SIGNIFICANT CHANGE UP (ref 80–94)
MONOCYTES # BLD AUTO: 0.74 K/UL — HIGH (ref 0.1–0.6)
MONOCYTES NFR BLD AUTO: 12.9 % — HIGH (ref 1.7–9.3)
NEUTROPHILS # BLD AUTO: 3.74 K/UL — SIGNIFICANT CHANGE UP (ref 1.4–6.5)
NEUTROPHILS NFR BLD AUTO: 65.3 % — SIGNIFICANT CHANGE UP (ref 42.2–75.2)
NON HDL CHOLESTEROL: 68 MG/DL — SIGNIFICANT CHANGE UP
NRBC # BLD: 0 /100 WBCS — SIGNIFICANT CHANGE UP (ref 0–0)
PLATELET # BLD AUTO: 142 K/UL — SIGNIFICANT CHANGE UP (ref 130–400)
PMV BLD: 11.8 FL — HIGH (ref 7.4–10.4)
POTASSIUM SERPL-MCNC: 3.9 MMOL/L — SIGNIFICANT CHANGE UP (ref 3.5–5)
POTASSIUM SERPL-SCNC: 3.9 MMOL/L — SIGNIFICANT CHANGE UP (ref 3.5–5)
PROT SERPL-MCNC: 6.3 G/DL — SIGNIFICANT CHANGE UP (ref 6–8)
RBC # BLD: 4.37 M/UL — LOW (ref 4.7–6.1)
RBC # FLD: 14.3 % — SIGNIFICANT CHANGE UP (ref 11.5–14.5)
SODIUM SERPL-SCNC: 140 MMOL/L — SIGNIFICANT CHANGE UP (ref 135–146)
TRIGL SERPL-MCNC: 41 MG/DL — SIGNIFICANT CHANGE UP
TROPONIN T SERPL-MCNC: <0.01 NG/ML — SIGNIFICANT CHANGE UP
TSH SERPL-MCNC: 1.79 UIU/ML — SIGNIFICANT CHANGE UP (ref 0.27–4.2)
WBC # BLD: 5.73 K/UL — SIGNIFICANT CHANGE UP (ref 4.8–10.8)
WBC # FLD AUTO: 5.73 K/UL — SIGNIFICANT CHANGE UP (ref 4.8–10.8)

## 2023-06-15 PROCEDURE — 93010 ELECTROCARDIOGRAM REPORT: CPT

## 2023-06-15 PROCEDURE — 99291 CRITICAL CARE FIRST HOUR: CPT

## 2023-06-15 PROCEDURE — 93306 TTE W/DOPPLER COMPLETE: CPT | Mod: 26

## 2023-06-15 RX ORDER — AMIODARONE HYDROCHLORIDE 400 MG/1
200 TABLET ORAL
Refills: 0 | Status: DISCONTINUED | OUTPATIENT
Start: 2023-06-15 | End: 2023-06-16

## 2023-06-15 RX ORDER — ASPIRIN/CALCIUM CARB/MAGNESIUM 324 MG
324 TABLET ORAL ONCE
Refills: 0 | Status: DISCONTINUED | OUTPATIENT
Start: 2023-06-15 | End: 2023-06-16

## 2023-06-15 RX ORDER — CHLORHEXIDINE GLUCONATE 213 G/1000ML
1 SOLUTION TOPICAL
Refills: 0 | Status: DISCONTINUED | OUTPATIENT
Start: 2023-06-15 | End: 2023-06-16

## 2023-06-15 RX ADMIN — SACUBITRIL AND VALSARTAN 1 TABLET(S): 24; 26 TABLET, FILM COATED ORAL at 17:09

## 2023-06-15 RX ADMIN — Medication 12.5 MILLIGRAM(S): at 22:10

## 2023-06-15 RX ADMIN — CHLORHEXIDINE GLUCONATE 1 APPLICATION(S): 213 SOLUTION TOPICAL at 05:47

## 2023-06-15 RX ADMIN — AMIODARONE HYDROCHLORIDE 16.7 MG/MIN: 400 TABLET ORAL at 05:24

## 2023-06-15 RX ADMIN — SACUBITRIL AND VALSARTAN 1 TABLET(S): 24; 26 TABLET, FILM COATED ORAL at 05:47

## 2023-06-15 RX ADMIN — AMIODARONE HYDROCHLORIDE 33.3 MG/MIN: 400 TABLET ORAL at 00:53

## 2023-06-15 RX ADMIN — AMIODARONE HYDROCHLORIDE 600 MILLIGRAM(S): 400 TABLET ORAL at 00:43

## 2023-06-15 RX ADMIN — SIMVASTATIN 20 MILLIGRAM(S): 20 TABLET, FILM COATED ORAL at 22:10
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
## 2023-06-15 NOTE — PATIENT PROFILE ADULT - FALL HARM RISK - HARM RISK INTERVENTIONS

## 2023-06-15 NOTE — PATIENT PROFILE ADULT - DO YOU LACK THE NECESSARY SUPPORT TO HELP YOU COPE WITH LIFE CHALLENGES?
Dr. Vernon has discussed and recommended the ordered surgical procedure(s):    left extracorporeal shock wave lithotripsy and cystoscopy      Surgery scheduling requirements include:  Facility: [facility]  Admission Type: outpatient  Time Needed: 60 min   Anesthesia: General  Surgical Assist: no  Co-Surgeon:  No  Pre-Op H&P: With PCP  Special Equipment: Yes, ESWL machine   Additional Comment:   - No ASA for 7-10 days before surgery  - No Coumadin for 5 days before surgery.  - No Plavix for 5 days before surgery.  - Magnesium citrate at 4 pm the day before surgery  - please schedule post-op appointment in 3-4 weeks with Maureen Dean NP     PREOPERATIVE   - pre-op labs and imaging to be determined by PCP  - COVID test     Day of surgery:  1. Consent  2. Knee high franco hose and scds (sequential compression devices) octor (on call to operating room)  3. Ancef 2 gm ivpb (intravenous piggyback) octor (on call to operating room) unless PCN (Penicillin) allergic, then given cipro 400 mg ivpb (intravenous piggyback) octor (on call to operating room). Ampicillin 2g - mitral valve 2004  5. KUB on arrival to day surgery if ESWL  7. check INR if on warfarin   8. Ensure UA clear/within 30 days, check for abx resistance - neg 9/22  9. Blood thinners/heart valve/joint replacement? Warfarin- ok to come off without bridging per PCP          no

## 2023-06-15 NOTE — PROGRESS NOTE ADULT - ASSESSMENT
69 yo M with PMHx of NICM s/p ICD, hx of VT ablation, AFib s/p MAZE repair, Complete Heart Block, Hx of mitral valve repair, HTN presents after syncopal episode.    #Syncope in setting of VTach/VFib s/p shock x1 into NSR  #Hx of NICM s/p ICD  #Hx of AFib s/p MAZE procedure  #Hx of Complete Heart Block  #Hx of Mitral Valve Repair  #HTN  - Per EP outpatient notes, pt had episode of VTach/VFib  - s/p shock x1 into NSR night prior, no further events since syncopal episode  - Now asymptomatic  - EKG AV dual paced rhythm  - Troponin within normal limits, continue to trend  - Will give Amio push and start drip  - Add on for cath today keep NPO until procedure  - EP eval   - C/w Entresto, Metoprolol, Simvastatin  - TTE pending    DVT ppx: Initiate after cath  Diet: NPO  Activity: IAT  FUll COde  Dispo: Admit to CCU   67 yo M with PMHx of NICM s/p ICD, hx of VT ablation, AFib s/p MAZE repair, Complete Heart Block, Hx of mitral valve repair, HTN presents after syncopal episode.    #Syncope in setting of VTach/VFib s/p shock x1 into NSR  #Hx of NICM s/p ICD  #Hx of AFib s/p MAZE procedure  #Hx of Complete Heart Block  #Hx of Mitral Valve Repair  #HTN  - Per EP outpatient notes, pt had episode of VTach/VFib  - s/p shock x1 into NSR night prior, no further events since syncopal episode  - Now asymptomatic  - EKG AV dual paced rhythm  - Troponin within normal limits, continue to trend  - Will give Amio push and start drip  - Add on for cath today keep NPO until procedure. Will need RHC if LVEDP elevated.  - EP eval   - C/w Entresto, Metoprolol, Simvastatin  - TTE pending    DVT ppx: Initiate after cath  Diet: NPO  Activity: IAT  FUll COde  Dispo: Admit to CCU   67 yo M with PMHx of NICM s/p ICD, hx of VT ablation, AFib s/p MAZE repair, Complete Heart Block, Hx of mitral valve repair, HTN presents after syncopal episode.    #Syncope in setting of VTach/VFib s/p shock x1 into NSR  #Hx of NICM s/p ICD  #Hx of AFib s/p MAZE procedure  #Hx of Complete Heart Block  #Hx of Mitral Valve Repair  #HTN  - Per EP outpatient notes, pt had episode of VTach/VFib  - s/p shock x1 into NSR night prior, no further events since syncopal episode  - Now asymptomatic  - EKG AV dual paced rhythm  - Troponin within normal limits, continue to trend  - Will give Amio push and start drip  - Add on for cath today keep NPO until procedure. Will need RHC if LVEDP elevated.  - EP eval   - C/w Entresto, Metoprolol, Simvastatin  - TTE pending    DVT ppx: Initiate after cath  Diet: NPO  Activity: IAT  FUll code  Dispo: CCU

## 2023-06-15 NOTE — CHART NOTE - NSCHARTNOTEFT_GEN_A_CORE
INTERVENTIONAL CARDIOLOGY NP:                                               PREOPERATIVE DAY OF PROCEDURE EVALUATION:  I have personally seen and examined the patient.  I agree with the history and physical which I have reviewed and noted any changes below.       69 yo M with PMHx of NICM s/p ICD, hx of VT ablation, AFib s/p MAZE repair, Complete Heart Block, Hx of mitral valve repair, HTN presents after syncopal episode. Pt was sitting at the bar night prior, had sudden onset of dizziness and had syncopal episode, was told he was down for a few seconds. Pt then woke up, was slightly disoriented for a moment but otherwise had no symptoms. Did not have anything to drink prior to event. Afterwards pt felt well and went to work at [employer] this morning, did not have any additional events.  Was told by EP office that he had episode of VTach, then developed VFib and was shocked x1 by device into NSR and instructed to come to ED.  Prior to event, pt was feeling well, denies experiencing any chest pain, dizziness, syncopal episodes, dyspnea, LE swelling.          R Filiberto Test: Positive  Bleeding Risk Score:  0.8%  No precath hydration d/t HF/depressed EF  Pt loaded with Asa 324 mg x1      INTERVENTIONAL CARDIOLOGY NP:         (Signed electronically by __________)  06-15-23 @ 14:31
PRE-OP DIAGNOSIS:    VF/VT, syncope    PROCEDURE:     [x] Coronary Angiogram     [x] LHC     [] LVG     [] RHC     [] Intervention (see below)         PHYSICIAN:  Dr Bermudez    ASSISTANT: Dr VALARIE Avila     PROCEDURE DESCRIPTION:     Consent:      [x] Patient     [] Family Member     []  Used        Anesthesia:     [] General     [x] Sedation     [x] Local        Access & Closure:     [x] 6 Fr Right Radial Artery (D stat closure)     [] Fr Femoral Artery     [] Fr Femoral Vein     [] Fr Brachial Vein       IV Contrast: 30 mL        Intervention: None      Implants:        FINDINGS:     Coronary Dominance: Right      LM: No disease    LAD:  minor luminal irregularities  D1 minor luminal irregularities    CX: minor luminal irregularities  OM1 minor luminal irregularities    RCA: minor luminal irregularities  RPDA minor luminal irregularities       LVEDP: 28 mmHg      ESTIMATED BLOOD LOSS: < 10 mL        CONDITION:     [x] Good     [] Fair     [] Critical        SPECIMEN REMOVED: N/A       POST-OP DIAGNOSIS:      [x] NICM, minor luminal irregularities noted in the coronary arteries.        PLAN OF CARE:     [x] Return to In-patient bed     [x] Medications: As per CCU, c/w antiarrhythmic meds and GDMT for HFrEF. EP f/u    [x] IV Fluids: No I/v Fluids due CHF and elevated LVEDP

## 2023-06-15 NOTE — CONSULT NOTE ADULT - ASSESSMENT
Impression   # VT / V fib with syncope s/p defibrillation   # NICM s/p CRTD  # History of CHB and A fib s/p MAZE     Battery changed in 5/2023    Recommendations   - Continue beta blocker and uptitrate as BP and HR permits   - Start amiodarone bolus then gtt then transition to PO   - Plan for cath tomorrow with cardiology   - Further recommendations following cath     Discussed with attending.   Signature: Esteban RIBEIRO, 1st year Cardiology Fellow

## 2023-06-16 ENCOUNTER — TRANSCRIPTION ENCOUNTER (OUTPATIENT)
Age: 68
End: 2023-06-16

## 2023-06-16 VITALS — RESPIRATION RATE: 17 BRPM | SYSTOLIC BLOOD PRESSURE: 95 MMHG | DIASTOLIC BLOOD PRESSURE: 64 MMHG | HEART RATE: 80 BPM

## 2023-06-16 LAB
ALBUMIN SERPL ELPH-MCNC: 3.8 G/DL — SIGNIFICANT CHANGE UP (ref 3.5–5.2)
ALP SERPL-CCNC: 78 U/L — SIGNIFICANT CHANGE UP (ref 30–115)
ALT FLD-CCNC: 20 U/L — SIGNIFICANT CHANGE UP (ref 0–41)
ANION GAP SERPL CALC-SCNC: 10 MMOL/L — SIGNIFICANT CHANGE UP (ref 7–14)
AST SERPL-CCNC: 15 U/L — SIGNIFICANT CHANGE UP (ref 0–41)
BILIRUB SERPL-MCNC: 0.9 MG/DL — SIGNIFICANT CHANGE UP (ref 0.2–1.2)
BUN SERPL-MCNC: 20 MG/DL — SIGNIFICANT CHANGE UP (ref 10–20)
CALCIUM SERPL-MCNC: 8.7 MG/DL — SIGNIFICANT CHANGE UP (ref 8.4–10.4)
CHLORIDE SERPL-SCNC: 107 MMOL/L — SIGNIFICANT CHANGE UP (ref 98–110)
CO2 SERPL-SCNC: 23 MMOL/L — SIGNIFICANT CHANGE UP (ref 17–32)
CREAT SERPL-MCNC: 0.9 MG/DL — SIGNIFICANT CHANGE UP (ref 0.7–1.5)
EGFR: 93 ML/MIN/1.73M2 — SIGNIFICANT CHANGE UP
GLUCOSE SERPL-MCNC: 111 MG/DL — HIGH (ref 70–99)
HCT VFR BLD CALC: 41.2 % — LOW (ref 42–52)
HGB BLD-MCNC: 13.5 G/DL — LOW (ref 14–18)
MCHC RBC-ENTMCNC: 29.4 PG — SIGNIFICANT CHANGE UP (ref 27–31)
MCHC RBC-ENTMCNC: 32.8 G/DL — SIGNIFICANT CHANGE UP (ref 32–37)
MCV RBC AUTO: 89.8 FL — SIGNIFICANT CHANGE UP (ref 80–94)
NRBC # BLD: 0 /100 WBCS — SIGNIFICANT CHANGE UP (ref 0–0)
PLATELET # BLD AUTO: 151 K/UL — SIGNIFICANT CHANGE UP (ref 130–400)
PMV BLD: 12.6 FL — HIGH (ref 7.4–10.4)
POTASSIUM SERPL-MCNC: 4.4 MMOL/L — SIGNIFICANT CHANGE UP (ref 3.5–5)
POTASSIUM SERPL-SCNC: 4.4 MMOL/L — SIGNIFICANT CHANGE UP (ref 3.5–5)
PROT SERPL-MCNC: 6.1 G/DL — SIGNIFICANT CHANGE UP (ref 6–8)
RBC # BLD: 4.59 M/UL — LOW (ref 4.7–6.1)
RBC # FLD: 14.2 % — SIGNIFICANT CHANGE UP (ref 11.5–14.5)
SODIUM SERPL-SCNC: 140 MMOL/L — SIGNIFICANT CHANGE UP (ref 135–146)
TROPONIN T SERPL-MCNC: <0.01 NG/ML — SIGNIFICANT CHANGE UP
WBC # BLD: 8.85 K/UL — SIGNIFICANT CHANGE UP (ref 4.8–10.8)
WBC # FLD AUTO: 8.85 K/UL — SIGNIFICANT CHANGE UP (ref 4.8–10.8)

## 2023-06-16 PROCEDURE — 93010 ELECTROCARDIOGRAM REPORT: CPT

## 2023-06-16 PROCEDURE — 99223 1ST HOSP IP/OBS HIGH 75: CPT | Mod: 24

## 2023-06-16 PROCEDURE — 99238 HOSP IP/OBS DSCHRG MGMT 30/<: CPT

## 2023-06-16 PROCEDURE — 71045 X-RAY EXAM CHEST 1 VIEW: CPT | Mod: 26

## 2023-06-16 RX ORDER — AMIODARONE HYDROCHLORIDE 400 MG/1
1 TABLET ORAL
Qty: 0 | Refills: 0 | DISCHARGE
Start: 2023-06-16

## 2023-06-16 RX ORDER — FUROSEMIDE 40 MG
1 TABLET ORAL
Qty: 30 | Refills: 2
Start: 2023-06-16 | End: 2023-09-13

## 2023-06-16 RX ORDER — AMIODARONE HYDROCHLORIDE 400 MG/1
1 TABLET ORAL
Qty: 24 | Refills: 0
Start: 2023-06-16 | End: 2023-06-27

## 2023-06-16 RX ORDER — ASPIRIN/CALCIUM CARB/MAGNESIUM 324 MG
1 TABLET ORAL
Qty: 0 | Refills: 0 | DISCHARGE
Start: 2023-06-16

## 2023-06-16 RX ADMIN — SACUBITRIL AND VALSARTAN 1 TABLET(S): 24; 26 TABLET, FILM COATED ORAL at 05:12

## 2023-06-16 RX ADMIN — CHLORHEXIDINE GLUCONATE 1 APPLICATION(S): 213 SOLUTION TOPICAL at 05:12

## 2023-06-16 RX ADMIN — AMIODARONE HYDROCHLORIDE 200 MILLIGRAM(S): 400 TABLET ORAL at 05:12

## 2023-06-16 NOTE — DISCHARGE NOTE PROVIDER - NSDCMRMEDTOKEN_GEN_ALL_CORE_FT
Ambien 10 mg oral tablet: 1 tab(s) orally once a day (at bedtime), As Needed  Entresto 49 mg-51 mg oral tablet: 1 orally 2 times a day  Farxiga 10 mg oral tablet: 1 tab(s) orally once a day (at bedtime)  KlonoPIN 0.5 mg oral tablet: 1 tab(s) orally 3 times a day, As Needed  Metoprolol Succinate ER: 12.5 milligram(s) orally once a day (at bedtime)  simvastatin 20 mg oral tablet: 1 tab(s) orally once a day (at bedtime)   Ambien 10 mg oral tablet: 1 tab(s) orally once a day (at bedtime), As Needed  amiodarone 200 mg oral tablet: 1 tab(s) orally 2 times a day  amiodarone 200 mg oral tablet: 1 tab(s) orally 2 times a day  amiodarone 200 mg oral tablet: 1 tab(s) orally once a day  aspirin 81 mg oral tablet, chewable: 1 tab(s) orally once  Entresto 49 mg-51 mg oral tablet: 1 orally 2 times a day  Farxiga 10 mg oral tablet: 1 tab(s) orally once a day (at bedtime)  KlonoPIN 0.5 mg oral tablet: 1 tab(s) orally 3 times a day, As Needed  Lasix 20 mg oral tablet: 1 tab(s) orally once a day  Metoprolol Succinate ER: 12.5 milligram(s) orally once a day (at bedtime)  simvastatin 20 mg oral tablet: 1 tab(s) orally once a day (at bedtime)

## 2023-06-16 NOTE — DISCHARGE NOTE NURSING/CASE MANAGEMENT/SOCIAL WORK - WAS YOUR LAST COVID-19 VACCINE GREATER THAN OR EQUAL TO TWO MONTHS AGO?
Take your medicines every day, as directed    Changes made today:  o CHANGE Bumex to 4 mg in the am and 2 mg in the pm  o Take one tablet of metolazone 2.5 mg today  o Start Potassium 20 mEq daily     Monitor Your Weight and Symptoms    Contact us if you:      Gain 2 pounds in one day or 5 pounds in one week    Feel more short of breath    Notice more leg swelling    Feel lightheadeded   Change your lifestyle    Limit Salt or Sodium:    2000 mg  Limit Fluids:    2000 mL or approximately 64 ounces  Eat a Heart Healthy Diet    Low in saturated fats  Stay Active:    Aim to move at least 150 minutes every  week         To Contact us    During Business Hours:  [phone], option # 1 (University)  Then option # 4 (medical questions)     After hours, weekends or holidays:   [phone], Option #4  Ask to speak to the On-Call Cardiologist. Inform them you are a CORE/heart failure patient at the [facility].     Use [employer] allows you to communicate directly with your heart team through secure messaging.    [employer] can be accessed any time on your phone, computer, or tablet.    If you need assistance, we'd be happy to help!         Keep your Heart Appointments:    Labs on Monday and CORE in 2 weeks         Yes

## 2023-06-16 NOTE — PROGRESS NOTE ADULT - ASSESSMENT
· Assessment	  67 yo M with PMHx of NICM s/p ICD, hx of VT ablation, AFib s/p MAZE repair, Complete Heart Block, Hx of mitral valve repair, HTN presents after syncopal episode.    #Syncope in setting of VTach/VFib s/p shock x1 into NSR  #Hx of NICM s/p ICD  #Hx of AFib s/p MAZE procedure  #Hx of Complete Heart Block  #Hx of Mitral Valve Repair  #HTN  - Per EP outpatient notes, pt had episode of VTach/VFib  - s/p shock x1 into NSR night prior, no further events since syncopal episode  - Now asymptomatic  - EKG AV dual paced rhythm  - Troponin x 2: Negative  - EP recommended starting on amio and transitioning to PO as well as starting metoprolol  - C/w Entresto, Metoprolol, Simvastatin  - TTE - HFrEF, EF 25-30%  - Cath (06/15)- showed non-obstructive CAD    DVT ppx: Initiate after cath  Diet: NPO  Activity: IAT  FUll code  Dispo: CCU

## 2023-06-16 NOTE — DISCHARGE NOTE PROVIDER - NSDCCPCAREPLAN_GEN_ALL_CORE_FT
PRINCIPAL DISCHARGE DIAGNOSIS  Diagnosis: CAD (coronary artery disease)  Assessment and Plan of Treatment:       SECONDARY DISCHARGE DIAGNOSES  Diagnosis: HF (heart failure)  Assessment and Plan of Treatment:     Diagnosis: Ventricular tachycardia  Assessment and Plan of Treatment:

## 2023-06-16 NOTE — DISCHARGE NOTE PROVIDER - CARE PROVIDER_API CALL
Johnathon Olivarez  Interventional Cardiology  [address]  [place] [address]  Phone: [phone]  Fax: [phone]  Established Patient  Follow Up Time: 2 weeks

## 2023-06-16 NOTE — DISCHARGE NOTE PROVIDER - NSDCFUSCHEDAPPT_GEN_ALL_CORE_FT
Celestine Bryan  [facility]  [facility] [address]  Scheduled Appointment: 06/23/2023

## 2023-06-16 NOTE — PROGRESS NOTE ADULT - ATTENDING COMMENTS
Plan as outlined above.
Plan as outlined above.  LHC showed no obstructive CAD.  Continue Amio - transition to PO. Continue Metoprolol.  Clarify with EP if any other device adjustment, ablation or AAD modification needed.

## 2023-06-16 NOTE — DISCHARGE NOTE NURSING/CASE MANAGEMENT/SOCIAL WORK - NSTRANSFERBELONGINGSDISPO_GEN_A_NUR
with patient Render In Strict Bullet Format?: No Continue Regimen: Finacea qd to face Detail Level: Zone

## 2023-06-16 NOTE — DISCHARGE NOTE NURSING/CASE MANAGEMENT/SOCIAL WORK - PATIENT PORTAL LINK FT
You can access the FollowMyHealth Patient Portal offered by [facility] by registering at the following website: [email or web address]. By joining [employer]’s FollowMyHealth portal, you will also be able to view your health information using other applications (apps) compatible with our system.

## 2023-06-16 NOTE — DISCHARGE NOTE PROVIDER - HOSPITAL COURSE
69 yo M with PMHx of NICM s/p ICD, hx of VT ablation, AFib s/p MAZE repair, Complete Heart Block, Hx of mitral valve repair, HTN presents after syncopal episode. Pt was sitting at the bar night prior, had sudden onset of dizziness and had syncopal episode, was told he was down for a few seconds. Pt then woke up, was slightly disoriented for a moment but otherwise had no symptoms. Did not have anything to drink prior to event. Afterwards pt felt well and went to work at [employer] this morning, did not have any additional events.  Was told by EP office that he had episode of VTach, then developed VFib and was shocked x1 by device into NSR and instructed to come to ED.  Prior to event, pt was feeling well, denies experiencing any chest pain, dizziness, syncopal episodes, dyspnea, LE swelling.  Of note, pt recently admitted one month ago for generator change by Dr. Bryan, procedure was uneventful.  In the ED, T 99, /96, HR 83, RR 18, SpO2 98% on RA. dual paced rhythm on EKG. Started on Amio drip. (14 Jun 2023 23:26)   69 yo M with PMHx of NICM s/p ICD, hx of VT ablation, AFib s/p MAZE repair, Complete Heart Block, Hx of mitral valve repair, HTN presents after syncopal episode. Pt was sitting at the bar night prior, had sudden onset of dizziness and had syncopal episode, was told he was down for a few seconds. Pt then woke up, was slightly disoriented for a moment but otherwise had no symptoms. Did not have anything to drink prior to event. Afterwards pt felt well and went to work at [employer] this morning, did not have any additional events.  Was told by EP office that he had episode of VTach, then developed VFib and was shocked x1 by device into NSR and instructed to come to ED.  Prior to event, pt was feeling well, denies experiencing any chest pain, dizziness, syncopal episodes, dyspnea, LE swelling.  Of note, pt recently admitted one month ago for generator change by Dr. Bryan, procedure was uneventful.  In the ED, T 99, /96, HR 83, RR 18, SpO2 98% on RA. dual paced rhythm on EKG. Started on Amio drip.    In the CCU, patient was seen by EP who interrogated the ICD and have no further recommendations. Patient went to cardiac cath 06/15/23 and found to have non-obstructive CAD, no further intervention. Patient was transitioned to PO amiodarone. He has remained asymptomatic and hemodynamically stable. Labs have been WNL. Plan for discharge home with follow-up with EP Dr. Bryan and cardiologist Dr. Olivarez.

## 2023-06-16 NOTE — PROGRESS NOTE ADULT - SUBJECTIVE AND OBJECTIVE BOX
Patient is a 68y old  Male who presents with a chief complaint of VTach/VFib (15 Cali 2023 09:20)    HPI:  67 yo M with PMHx of NICM s/p ICD, hx of VT ablation, AFib s/p MAZE repair, Complete Heart Block, Hx of mitral valve repair, HTN presents after syncopal episode. Pt was sitting at the bar night prior, had sudden onset of dizziness and had syncopal episode, was told he was down for a few seconds. Pt then woke up, was slightly disoriented for a moment but otherwise had no symptoms. Did not have anything to drink prior to event. Afterwards pt felt well and went to work at [employer] this morning, did not have any additional events.  Was told by EP office that he had episode of VTach, then developed VFib and was shocked x1 by device into NSR and instructed to come to ED.  Prior to event, pt was feeling well, denies experiencing any chest pain, dizziness, syncopal episodes, dyspnea, LE swelling.  Of note, pt recently admitted one month ago for generator change by Dr. Bryan, procedure was uneventful.  In the ED, T 99, /96, HR 83, RR 18, SpO2 98% on RA. dual paced rhythm on EKG. Started on Amio drip. (14 Jun 2023 23:26)       INTERVAL HPI/OVERNIGHT EVENTS:   No overnight events   Afebrile, hemodynamically stable     Subjective:    ICU Vital Signs Last 24 Hrs  T(C): 36.6 (16 Jun 2023 00:00), Max: 36.6 (15 Cali 2023 08:00)  T(F): 97.8 (16 Jun 2023 00:00), Max: 97.9 (15 Cali 2023 08:00)  HR: 80 (16 Jun 2023 06:10) (80 - 80)  BP: 113/79 (16 Jun 2023 06:10) (101/69 - 138/89)  BP(mean): 91 (16 Jun 2023 06:10) (80 - 119)  ABP: --  ABP(mean): --  RR: 51 (16 Jun 2023 06:10) (15 - 51)  SpO2: 94% (16 Jun 2023 06:10) (92% - 97%)    O2 Parameters below as of 16 Jun 2023 00:00  Patient On (Oxygen Delivery Method): room air          I&O's Summary    15 Cali 2023 07:01  -  16 Jun 2023 07:00  --------------------------------------------------------  IN: 623.5 mL / OUT: 203 mL / NET: 420.5 mL          Daily     Daily     Adult Advanced Hemodynamics Last 24 Hrs  CVP(mm Hg): --  CVP(cm H2O): --  CO: --  CI: --  PA: --  PA(mean): --  PCWP: --  SVR: --  SVRI: --  PVR: --  PVRI: --    EKG/Telemetry Events:    MEDICATIONS  (STANDING):  aMIOdarone    Tablet 200 milliGRAM(s) Oral two times a day  aspirin  chewable 324 milliGRAM(s) Oral once  chlorhexidine 2% Cloths 1 Application(s) Topical <User Schedule>  metoprolol succinate ER 12.5 milliGRAM(s) Oral at bedtime  sacubitril 49 mG/valsartan 51 mG 1 Tablet(s) Oral two times a day  simvastatin 20 milliGRAM(s) Oral at bedtime    MEDICATIONS  (PRN):  clonazePAM  Tablet 0.5 milliGRAM(s) Oral three times a day PRN anxiety  zolpidem 5 milliGRAM(s) Oral at bedtime PRN Insomnia  zolpidem 5 milliGRAM(s) Oral at bedtime PRN Insomnia      PHYSICAL EXAM:  GENERAL:   HEAD:  Atraumatic, Normocephalic  EYES: EOMI, PERRLA, conjunctiva and sclera clear  NECK: Supple, No JVD, Normal thyroid, no enlarged nodes  NERVOUS SYSTEM:  Alert & Awake.   CHEST/LUNG: B/L good air entry; No rales, rhonchi, or wheezing  HEART: S1S2 normal, no S3, Regular rate and rhythm; No murmurs  ABDOMEN: Soft, Nontender, Nondistended; Bowel sounds present  EXTREMITIES:  2+ Peripheral Pulses, No clubbing, cyanosis, or edema  LYMPH: No lymphadenopathy noted  SKIN: No rashes or lesions    LABS:                        13.5   8.85  )-----------( 151      ( 16 Jun 2023 05:33 )             41.2     06-15    140  |  109  |  23<H>  ----------------------------<  114<H>  3.9   |  23  |  0.8    Ca    8.7      15 Cali 2023 05:31  Mg     2.1     06-15    TPro  6.3  /  Alb  3.9  /  TBili  0.6  /  DBili  x   /  AST  18  /  ALT  23  /  AlkPhos  80  06-15    LIVER FUNCTIONS - ( 15 Cali 2023 05:31 )  Alb: 3.9 g/dL / Pro: 6.3 g/dL / ALK PHOS: 80 U/L / ALT: 23 U/L / AST: 18 U/L / GGT: x           PT/INR - ( 14 Jun 2023 22:44 )   PT: 12.40 sec;   INR: 1.08 ratio         PTT - ( 14 Jun 2023 22:44 )  PTT:35.3 sec  CAPILLARY BLOOD GLUCOSE            CARDIAC MARKERS ( 15 Clai 2023 05:31 )  x     / <0.01 ng/mL / x     / x     / x      CARDIAC MARKERS ( 14 Jun 2023 22:44 )  x     / <0.01 ng/mL / x     / x     / x                RADIOLOGY & ADDITIONAL TESTS:  CXR:        Care Discussed with Consultants/Other Providers [ x] YES  [ ] NO        
  Patient is a 68y old  Male who presents with a chief complaint of VTach/VFib (15 Cail 2023 04:26)    HPI:  67 yo M with PMHx of NICM s/p ICD, hx of VT ablation, AFib s/p MAZE repair, Complete Heart Block, Hx of mitral valve repair, HTN presents after syncopal episode. Pt was sitting at the bar night prior, had sudden onset of dizziness and had syncopal episode, was told he was down for a few seconds. Pt then woke up, was slightly disoriented for a moment but otherwise had no symptoms. Did not have anything to drink prior to event. Afterwards pt felt well and went to work at [employer] this morning, did not have any additional events.  Was told by EP office that he had episode of VTach, then developed VFib and was shocked x1 by device into NSR and instructed to come to ED.  Prior to event, pt was feeling well, denies experiencing any chest pain, dizziness, syncopal episodes, dyspnea, LE swelling.  Of note, pt recently admitted one month ago for generator change by Dr. Bryan, procedure was uneventful.  In the ED, T 99, /96, HR 83, RR 18, SpO2 98% on RA. dual paced rhythm on EKG. Started on Amio drip. (2023 23:26)       INTERVAL HPI/OVERNIGHT EVENTS:   No overnight events   Afebrile, hemodynamically stable     Subjective:    ICU Vital Signs Last 24 Hrs  T(C): 36.6 (15 Cali 2023 08:00), Max: 37.6 (2023 20:56)  T(F): 97.9 (15 Cali 2023 08:00), Max: 99.6 (2023 20:56)  HR: 80 (15 Cali 2023 09:00) (80 - 83)  BP: 126/86 (15 Cali 2023 09:00) (110/80 - 149/96)  BP(mean): 107 (15 Cali 2023 09:00) (92 - 107)  ABP: --  ABP(mean): --  RR: 15 (15 Cali 2023 09:00) (6 - 20)  SpO2: 94% (15 Cali 2023 09:00) (92% - 98%)    O2 Parameters below as of 15 Cali 2023 09:00  Patient On (Oxygen Delivery Method): room air          I&O's Summary    2023 07:01  -  15 Cali 2023 07:00  --------------------------------------------------------  IN: 331.4 mL / OUT: 800 mL / NET: -468.6 mL    15 Cali 2023 07:01  -  15 Cali 2023 09:21  --------------------------------------------------------  IN: 50.1 mL / OUT: 100 mL / NET: -49.9 mL          Daily Height in cm: 170.18 (15 Cali 2023 00:05)    Daily Weight in k (15 Cali 2023 00:05)    Adult Advanced Hemodynamics Last 24 Hrs  CVP(mm Hg): --  CVP(cm H2O): --  CO: --  CI: --  PA: --  PA(mean): --  PCWP: --  SVR: --  SVRI: --  PVR: --  PVRI: --    EKG/Telemetry Events:    MEDICATIONS  (STANDING):  aMIOdarone Infusion 1 mG/Min (33.3 mL/Hr) IV Continuous <Continuous>  aMIOdarone Infusion 0.5 mG/Min (16.7 mL/Hr) IV Continuous <Continuous>  chlorhexidine 2% Cloths 1 Application(s) Topical <User Schedule>  metoprolol succinate ER 12.5 milliGRAM(s) Oral at bedtime  sacubitril 49 mG/valsartan 51 mG 1 Tablet(s) Oral two times a day  simvastatin 20 milliGRAM(s) Oral at bedtime    MEDICATIONS  (PRN):  clonazePAM  Tablet 0.5 milliGRAM(s) Oral three times a day PRN anxiety  zolpidem 5 milliGRAM(s) Oral at bedtime PRN Insomnia  zolpidem 5 milliGRAM(s) Oral at bedtime PRN Insomnia      PHYSICAL EXAM:  GENERAL:   HEAD:  Atraumatic, Normocephalic  EYES: EOMI, PERRLA, conjunctiva and sclera clear  NECK: Supple, No JVD, Normal thyroid, no enlarged nodes  NERVOUS SYSTEM:  Alert & Awake.   CHEST/LUNG: B/L good air entry; No rales, rhonchi, or wheezing  HEART: S1S2 normal, no S3, Regular rate and rhythm; No murmurs  ABDOMEN: Soft, Nontender, Nondistended; Bowel sounds present  EXTREMITIES:  2+ Peripheral Pulses, No clubbing, cyanosis, or edema  LYMPH: No lymphadenopathy noted  SKIN: No rashes or lesions    LABS:                        13.0   5.73  )-----------( 142      ( 15 Cali 2023 05:31 )             39.0     -15    140  |  109  |  23<H>  ----------------------------<  114<H>  3.9   |  23  |  0.8    Ca    8.7      15 Cali 2023 05:31  Mg     2.1     -15    TPro  6.3  /  Alb  3.9  /  TBili  0.6  /  DBili  x   /  AST  18  /  ALT  23  /  AlkPhos  80  06-15    LIVER FUNCTIONS - ( 15 Cali 2023 05:31 )  Alb: 3.9 g/dL / Pro: 6.3 g/dL / ALK PHOS: 80 U/L / ALT: 23 U/L / AST: 18 U/L / GGT: x           PT/INR - ( 2023 22:44 )   PT: 12.40 sec;   INR: 1.08 ratio         PTT - ( 2023 22:44 )  PTT:35.3 sec  CAPILLARY BLOOD GLUCOSE      POCT Blood Glucose.: 103 mg/dL (2023 23:58)      Troponin T, Serum: <0.01 ng/mL (06-15 @ 05:31)  Troponin T, Serum: <0.01 ng/mL ( @ 22:44)    CARDIAC MARKERS ( 15 Cali 2023 05:31 )  x     / <0.01 ng/mL / x     / x     / x      CARDIAC MARKERS ( 2023 22:44 )  x     / <0.01 ng/mL / x     / x     / x                RADIOLOGY & ADDITIONAL TESTS:  CXR:        Care Discussed with Consultants/Other Providers [ x] YES  [ ] NO

## 2023-06-22 DIAGNOSIS — Z95.810 PRESENCE OF AUTOMATIC (IMPLANTABLE) CARDIAC DEFIBRILLATOR: ICD-10-CM

## 2023-06-22 DIAGNOSIS — R55 SYNCOPE AND COLLAPSE: ICD-10-CM

## 2023-06-22 DIAGNOSIS — I44.2 ATRIOVENTRICULAR BLOCK, COMPLETE: ICD-10-CM

## 2023-06-22 DIAGNOSIS — I49.01 VENTRICULAR FIBRILLATION: ICD-10-CM

## 2023-06-22 DIAGNOSIS — Z79.82 LONG TERM (CURRENT) USE OF ASPIRIN: ICD-10-CM

## 2023-06-22 DIAGNOSIS — Z82.49 FAMILY HISTORY OF ISCHEMIC HEART DISEASE AND OTHER DISEASES OF THE CIRCULATORY SYSTEM: ICD-10-CM

## 2023-06-22 DIAGNOSIS — I25.10 ATHEROSCLEROTIC HEART DISEASE OF NATIVE CORONARY ARTERY WITHOUT ANGINA PECTORIS: ICD-10-CM

## 2023-06-22 DIAGNOSIS — E78.00 PURE HYPERCHOLESTEROLEMIA, UNSPECIFIED: ICD-10-CM

## 2023-06-22 DIAGNOSIS — I50.22 CHRONIC SYSTOLIC (CONGESTIVE) HEART FAILURE: ICD-10-CM

## 2023-06-22 DIAGNOSIS — I47.20 VENTRICULAR TACHYCARDIA, UNSPECIFIED: ICD-10-CM

## 2023-06-22 DIAGNOSIS — Z86.79 PERSONAL HISTORY OF OTHER DISEASES OF THE CIRCULATORY SYSTEM: ICD-10-CM

## 2023-06-22 DIAGNOSIS — Z86.711 PERSONAL HISTORY OF PULMONARY EMBOLISM: ICD-10-CM

## 2023-06-22 DIAGNOSIS — I42.8 OTHER CARDIOMYOPATHIES: ICD-10-CM

## 2023-06-22 DIAGNOSIS — I11.0 HYPERTENSIVE HEART DISEASE WITH HEART FAILURE: ICD-10-CM

## 2023-06-23 ENCOUNTER — APPOINTMENT (OUTPATIENT)
Dept: ELECTROPHYSIOLOGY | Facility: CLINIC | Age: 68
End: 2023-06-23
Payer: MEDICARE

## 2023-06-23 VITALS
TEMPERATURE: 98 F | HEART RATE: 82 BPM | DIASTOLIC BLOOD PRESSURE: 70 MMHG | WEIGHT: 183 LBS | SYSTOLIC BLOOD PRESSURE: 121 MMHG | BODY MASS INDEX: 28.72 KG/M2 | HEIGHT: 67 IN

## 2023-06-23 PROCEDURE — 93290 INTERROG DEV EVAL ICPMS IP: CPT

## 2023-06-23 PROCEDURE — 99024 POSTOP FOLLOW-UP VISIT: CPT

## 2023-06-23 PROCEDURE — 93284 PRGRMG EVAL IMPLANTABLE DFB: CPT

## 2023-06-23 RX ORDER — TORSEMIDE 20 MG/1
20 TABLET ORAL
Refills: 0 | Status: COMPLETED | COMMUNITY
End: 2023-06-23

## 2023-06-28 RX ORDER — AMIODARONE HYDROCHLORIDE 400 MG/1
1 TABLET ORAL
Qty: 30 | Refills: 2
Start: 2023-06-28 | End: 2023-09-25

## 2023-07-02 NOTE — ADDENDUM
[FreeTextEntry1] : ITheresa assisted in documentation on 06/26/2023   acting as a scribe for Dr. Celestine Bryan.\par

## 2023-07-02 NOTE — HISTORY OF PRESENT ILLNESS
[None] : The patient complains of no symptoms [Palpitations] : no palpitations [SOB] : no dyspnea [Syncope] : no syncope [Chest Pain] : no chest pain or discomfort [ICD Shocks] : no recent ICD shocks [Erythema at Site] : no erythema at device site [Swelling at Site] : no swelling at device site [de-identified] : 68 Y.O. male with a PMHx of HTN, S/P MV repair , NICM , AV node ablation S/P CRT/D , VT S/P shock , Afib CHADS-VASc 1,S/P GIANFRANCO closure, recurrent AFib S/P DCCV 10/2016 , S/P CRT-D generator change 8/15/2018. \par \par Patient was recently hospitalized for appropriate shock for VF episode. Cardiac catheterization showed no new coronary artery disease. \par \par I reviewed the episode and episode is consistent with long-short initiation of ventricular fibrillation. Patient has approximately 9 PVCs in 1 hour. However, they do occur at a specific coupling interval. \par

## 2023-07-02 NOTE — ASSESSMENT
[FreeTextEntry1] : AF\par - s/p GIANFRANCO closure\par \par CHF\par - Continue Entresto 49/51 mg Q12\par - VF episode initiated by long-short episode.\par - Stop Amiodarone. \par - Patient has infrequent PVCs. However, they did initiate VF. Therefore, I would like to treat patient with medical therapy and not ablation due to the infrequent PVC count. \par - Start Mexiletine 150 mg Q12. \par - Will continue to monitor the device for PVC burden. \par \par \par

## 2023-07-02 NOTE — HISTORY OF PRESENT ILLNESS
[None] : The patient complains of no symptoms [Palpitations] : no palpitations [SOB] : no dyspnea [Syncope] : no syncope [Chest Pain] : no chest pain or discomfort [ICD Shocks] : no recent ICD shocks [Erythema at Site] : no erythema at device site [Swelling at Site] : no swelling at device site [de-identified] : 68 Y.O. male with a PMHx of HTN, S/P MV repair , NICM , AV node ablation S/P CRT/D , VT S/P shock , Afib CHADS-VASc 1,S/P GIANFRANCO closure, recurrent AFib S/P DCCV 10/2016 , S/P CRT-D generator change 8/15/2018. \par \par Patient was recently hospitalized for appropriate shock for VF episode. Cardiac catheterization showed no new coronary artery disease. \par \par I reviewed the episode and episode is consistent with long-short initiation of ventricular fibrillation. Patient has approximately 9 PVCs in 1 hour. However, they do occur at a specific coupling interval. \par

## 2023-07-02 NOTE — PROCEDURE
[Asense-Vsense ___ %] : Asense-Vsense [unfilled]% [Asense-Vpace ___ %] : Asense-Vpace [unfilled]% [Apace-Vsense ___ %] : Apace-Vsense [unfilled]% [Apace-Vpace ___ %] : Apace-Vpace [unfilled]% [Complete Heart Block] : complete heart block [See Scanned Paceart Report] : See scanned paceart report [See Device Printout] : See device printout [CRT-D] : Cardiac resynchronization therapy defibrillator [DDDR] : DDDR [Longevity: ___ months] : The estimated remaining battery life is [unfilled] months [Threshold Testing Performed] : Threshold testing was performed [Sensing Amplitude ___mv] : sensing amplitude was [unfilled] mv [Programmed for Longevity] : output reprogrammed for improved battery longevity [de-identified] : Medtronic [de-identified] : [id number] [de-identified] : [id number] [de-identified] : VF shock [de-identified] : 80

## 2023-07-28 NOTE — ED ADULT TRIAGE NOTE - BMI (KG/M2)
Last drink yesterday (8 drinks per day)  No hx of withdrawals  Recently started naltrexone... 50mg presumed dose (patient will update)    PLAN:  Continue Naltrexone 50mg   CIWA q4hr  2mg ativan for seizures     32.1

## 2023-09-08 ENCOUNTER — APPOINTMENT (OUTPATIENT)
Dept: ELECTROPHYSIOLOGY | Facility: CLINIC | Age: 68
End: 2023-09-08
Payer: MEDICARE

## 2023-09-08 ENCOUNTER — RESULT REVIEW (OUTPATIENT)
Age: 68
End: 2023-09-08

## 2023-09-08 VITALS
RESPIRATION RATE: 18 BRPM | DIASTOLIC BLOOD PRESSURE: 90 MMHG | BODY MASS INDEX: 27.94 KG/M2 | HEART RATE: 82 BPM | WEIGHT: 178 LBS | TEMPERATURE: 97.1 F | HEIGHT: 67 IN | SYSTOLIC BLOOD PRESSURE: 128 MMHG

## 2023-09-08 DIAGNOSIS — I10 ESSENTIAL (PRIMARY) HYPERTENSION: ICD-10-CM

## 2023-09-08 PROCEDURE — 93290 INTERROG DEV EVAL ICPMS IP: CPT

## 2023-09-08 PROCEDURE — 93281 PM DEVICE PROGR EVAL MULTI: CPT

## 2023-09-08 PROCEDURE — 99214 OFFICE O/P EST MOD 30 MIN: CPT

## 2023-09-08 NOTE — HISTORY OF PRESENT ILLNESS
[None] : The patient complains of no symptoms [Palpitations] : no palpitations [SOB] : no dyspnea [Syncope] : no syncope [Chest Pain] : no chest pain or discomfort [ICD Shocks] : no recent ICD shocks [Erythema at Site] : no erythema at device site [Swelling at Site] : no swelling at device site [de-identified] : 68 Y.O. male with a PMHx of HTN, S/P MV repair , NICM , AV node ablation S/P CRT/D , VT S/P shock , Afib CHADS-VASc 1,S/P GIANFRANCO closure, recurrent AFib S/P DCCV 10/2016 , S/P CRT-D generator change 8/15/2018.   Patient was recently hospitalized for appropriate shock for VF episode. Cardiac catheterization showed no new coronary artery disease.   I reviewed the episode and episode is consistent with long-short initiation of ventricular fibrillation. Patient has approximately 9 PVCs in 1 hour. However, they do occur at a specific coupling interval.

## 2023-09-08 NOTE — PROCEDURE
[Complete Heart Block] : complete heart block [See Scanned Paceart Report] : See scanned paceart report [See Device Printout] : See device printout [CRT-D] : Cardiac resynchronization therapy defibrillator [DDDR] : DDDR [Longevity: ___ months] : The estimated remaining battery life is [unfilled] months [Threshold Testing Performed] : Threshold testing was performed [Sensing Amplitude ___mv] : sensing amplitude was [unfilled] mv [Programmed for Longevity] : output reprogrammed for improved battery longevity [de-identified] : Medtronic [de-identified] : [id number] [de-identified] : [id number] [de-identified] : 80 [de-identified] : VF shock

## 2023-09-18 ENCOUNTER — NON-APPOINTMENT (OUTPATIENT)
Age: 68
End: 2023-09-18

## 2023-09-19 ENCOUNTER — NON-APPOINTMENT (OUTPATIENT)
Age: 68
End: 2023-09-19

## 2023-10-24 NOTE — DISCHARGE NOTE PROVIDER - ATTENDING ATTESTATION STATEMENT
.Pt arrived to floor for skilled services with admitting diagnosis of Cerebral infarction due to thrombo* via stretcher. Pt required max assistance from stretcher to bed. Pt is AAOx 4, pt. Is  continent  of B/B. Pt is on a Renal diet. Skin asessment done:skin is intact with redness to sacral area. Family informed of arrival per pt.POC reviewed with patient. Pt denies pain at this time and is educated to use call light and not get  OOB without assistance .                       I have personally seen and examined the patient. I have collaborated with and supervised the

## 2023-10-27 ENCOUNTER — APPOINTMENT (OUTPATIENT)
Dept: ELECTROPHYSIOLOGY | Facility: CLINIC | Age: 68
End: 2023-10-27

## 2023-12-03 ENCOUNTER — NON-APPOINTMENT (OUTPATIENT)
Age: 68
End: 2023-12-03

## 2023-12-05 ENCOUNTER — OUTPATIENT (OUTPATIENT)
Dept: OUTPATIENT SERVICES | Facility: HOSPITAL | Age: 68
LOS: 1 days | End: 2023-12-05
Payer: MEDICARE

## 2023-12-05 DIAGNOSIS — Z00.8 ENCOUNTER FOR OTHER GENERAL EXAMINATION: ICD-10-CM

## 2023-12-05 DIAGNOSIS — Z98.890 OTHER SPECIFIED POSTPROCEDURAL STATES: Chronic | ICD-10-CM

## 2023-12-05 DIAGNOSIS — Z95.810 PRESENCE OF AUTOMATIC (IMPLANTABLE) CARDIAC DEFIBRILLATOR: Chronic | ICD-10-CM

## 2023-12-05 DIAGNOSIS — Z95.828 PRESENCE OF OTHER VASCULAR IMPLANTS AND GRAFTS: Chronic | ICD-10-CM

## 2023-12-05 DIAGNOSIS — I47.20 VENTRICULAR TACHYCARDIA, UNSPECIFIED: ICD-10-CM

## 2023-12-05 DIAGNOSIS — Z87.828 PERSONAL HISTORY OF OTHER (HEALED) PHYSICAL INJURY AND TRAUMA: Chronic | ICD-10-CM

## 2023-12-05 PROCEDURE — 93287 PERI-PX DEVICE EVAL & PRGR: CPT | Mod: 26,59

## 2023-12-05 PROCEDURE — 93287 PERI-PX DEVICE EVAL & PRGR: CPT

## 2023-12-05 PROCEDURE — 75557 CARDIAC MRI FOR MORPH: CPT

## 2023-12-05 PROCEDURE — 93284 PRGRMG EVAL IMPLANTABLE DFB: CPT | Mod: 26

## 2023-12-06 DIAGNOSIS — I47.20 VENTRICULAR TACHYCARDIA, UNSPECIFIED: ICD-10-CM

## 2023-12-20 ENCOUNTER — NON-APPOINTMENT (OUTPATIENT)
Age: 68
End: 2023-12-20

## 2023-12-20 ENCOUNTER — APPOINTMENT (OUTPATIENT)
Dept: CARDIOLOGY | Facility: CLINIC | Age: 68
End: 2023-12-20
Payer: MEDICARE

## 2023-12-21 PROCEDURE — 93296 REM INTERROG EVL PM/IDS: CPT

## 2023-12-21 PROCEDURE — 93295 DEV INTERROG REMOTE 1/2/MLT: CPT

## 2024-01-05 ENCOUNTER — APPOINTMENT (OUTPATIENT)
Dept: ELECTROPHYSIOLOGY | Facility: CLINIC | Age: 69
End: 2024-01-05
Payer: MEDICARE

## 2024-01-05 VITALS
HEIGHT: 67 IN | TEMPERATURE: 98.2 F | OXYGEN SATURATION: 97 % | BODY MASS INDEX: 31.39 KG/M2 | WEIGHT: 200 LBS | DIASTOLIC BLOOD PRESSURE: 77 MMHG | SYSTOLIC BLOOD PRESSURE: 111 MMHG | HEART RATE: 78 BPM

## 2024-01-05 DIAGNOSIS — I44.2 ATRIOVENTRICULAR BLOCK, COMPLETE: ICD-10-CM

## 2024-01-05 PROCEDURE — 99215 OFFICE O/P EST HI 40 MIN: CPT

## 2024-01-05 PROCEDURE — 93284 PRGRMG EVAL IMPLANTABLE DFB: CPT

## 2024-01-05 PROCEDURE — 93290 INTERROG DEV EVAL ICPMS IP: CPT

## 2024-01-05 PROCEDURE — 93000 ELECTROCARDIOGRAM COMPLETE: CPT | Mod: 59

## 2024-01-05 RX ORDER — MEXILETINE HYDROCHLORIDE 150 MG/1
150 CAPSULE ORAL
Qty: 180 | Refills: 3 | Status: DISCONTINUED | COMMUNITY
Start: 2023-06-23 | End: 2024-01-05

## 2024-01-09 PROBLEM — I44.2 COMPLETE AV BLOCK: Status: ACTIVE | Noted: 2020-02-19

## 2024-01-16 ENCOUNTER — LABORATORY RESULT (OUTPATIENT)
Age: 69
End: 2024-01-16

## 2024-03-11 ENCOUNTER — TRANSCRIPTION ENCOUNTER (OUTPATIENT)
Age: 69
End: 2024-03-11

## 2024-03-11 ENCOUNTER — INPATIENT (INPATIENT)
Facility: HOSPITAL | Age: 69
LOS: 0 days | Discharge: HOME | DRG: 274 | End: 2024-03-12
Attending: STUDENT IN AN ORGANIZED HEALTH CARE EDUCATION/TRAINING PROGRAM | Admitting: STUDENT IN AN ORGANIZED HEALTH CARE EDUCATION/TRAINING PROGRAM
Payer: MEDICARE

## 2024-03-11 ENCOUNTER — APPOINTMENT (OUTPATIENT)
Dept: ELECTROPHYSIOLOGY | Facility: HOSPITAL | Age: 69
End: 2024-03-11

## 2024-03-11 VITALS
TEMPERATURE: 98 F | SYSTOLIC BLOOD PRESSURE: 116 MMHG | DIASTOLIC BLOOD PRESSURE: 84 MMHG | RESPIRATION RATE: 12 BRPM | HEART RATE: 86 BPM

## 2024-03-11 DIAGNOSIS — Z98.890 OTHER SPECIFIED POSTPROCEDURAL STATES: Chronic | ICD-10-CM

## 2024-03-11 DIAGNOSIS — Z87.828 PERSONAL HISTORY OF OTHER (HEALED) PHYSICAL INJURY AND TRAUMA: Chronic | ICD-10-CM

## 2024-03-11 DIAGNOSIS — I49.3 VENTRICULAR PREMATURE DEPOLARIZATION: ICD-10-CM

## 2024-03-11 DIAGNOSIS — Z95.810 PRESENCE OF AUTOMATIC (IMPLANTABLE) CARDIAC DEFIBRILLATOR: Chronic | ICD-10-CM

## 2024-03-11 DIAGNOSIS — Z95.828 PRESENCE OF OTHER VASCULAR IMPLANTS AND GRAFTS: Chronic | ICD-10-CM

## 2024-03-11 LAB
ALBUMIN SERPL ELPH-MCNC: 4.1 G/DL — SIGNIFICANT CHANGE UP (ref 3.5–5.2)
ALP SERPL-CCNC: 93 U/L — SIGNIFICANT CHANGE UP (ref 30–115)
ALT FLD-CCNC: 21 U/L — SIGNIFICANT CHANGE UP (ref 0–41)
ANION GAP SERPL CALC-SCNC: 10 MMOL/L — SIGNIFICANT CHANGE UP (ref 7–14)
AST SERPL-CCNC: 14 U/L — SIGNIFICANT CHANGE UP (ref 0–41)
BILIRUB SERPL-MCNC: 1.3 MG/DL — HIGH (ref 0.2–1.2)
BUN SERPL-MCNC: 18 MG/DL — SIGNIFICANT CHANGE UP (ref 10–20)
CALCIUM SERPL-MCNC: 9.2 MG/DL — SIGNIFICANT CHANGE UP (ref 8.4–10.5)
CHLORIDE SERPL-SCNC: 105 MMOL/L — SIGNIFICANT CHANGE UP (ref 98–110)
CO2 SERPL-SCNC: 25 MMOL/L — SIGNIFICANT CHANGE UP (ref 17–32)
CREAT SERPL-MCNC: 0.8 MG/DL — SIGNIFICANT CHANGE UP (ref 0.7–1.5)
EGFR: 96 ML/MIN/1.73M2 — SIGNIFICANT CHANGE UP
GLUCOSE SERPL-MCNC: 96 MG/DL — SIGNIFICANT CHANGE UP (ref 70–99)
HCT VFR BLD CALC: 43.2 % — SIGNIFICANT CHANGE UP (ref 42–52)
HGB BLD-MCNC: 14.3 G/DL — SIGNIFICANT CHANGE UP (ref 14–18)
MCHC RBC-ENTMCNC: 28.9 PG — SIGNIFICANT CHANGE UP (ref 27–31)
MCHC RBC-ENTMCNC: 33.1 G/DL — SIGNIFICANT CHANGE UP (ref 32–37)
MCV RBC AUTO: 87.3 FL — SIGNIFICANT CHANGE UP (ref 80–94)
NRBC # BLD: 0 /100 WBCS — SIGNIFICANT CHANGE UP (ref 0–0)
PLATELET # BLD AUTO: 163 K/UL — SIGNIFICANT CHANGE UP (ref 130–400)
PMV BLD: 11.9 FL — HIGH (ref 7.4–10.4)
POTASSIUM SERPL-MCNC: 3.9 MMOL/L — SIGNIFICANT CHANGE UP (ref 3.5–5)
POTASSIUM SERPL-SCNC: 3.9 MMOL/L — SIGNIFICANT CHANGE UP (ref 3.5–5)
PROT SERPL-MCNC: 7 G/DL — SIGNIFICANT CHANGE UP (ref 6–8)
RBC # BLD: 4.95 M/UL — SIGNIFICANT CHANGE UP (ref 4.7–6.1)
RBC # FLD: 14.9 % — HIGH (ref 11.5–14.5)
SODIUM SERPL-SCNC: 140 MMOL/L — SIGNIFICANT CHANGE UP (ref 135–146)
WBC # BLD: 6.23 K/UL — SIGNIFICANT CHANGE UP (ref 4.8–10.8)
WBC # FLD AUTO: 6.23 K/UL — SIGNIFICANT CHANGE UP (ref 4.8–10.8)

## 2024-03-11 PROCEDURE — 86900 BLOOD TYPING SEROLOGIC ABO: CPT

## 2024-03-11 PROCEDURE — 85027 COMPLETE CBC AUTOMATED: CPT

## 2024-03-11 PROCEDURE — C1759: CPT

## 2024-03-11 PROCEDURE — 80053 COMPREHEN METABOLIC PANEL: CPT

## 2024-03-11 PROCEDURE — C1769: CPT

## 2024-03-11 PROCEDURE — C1760: CPT

## 2024-03-11 PROCEDURE — 86850 RBC ANTIBODY SCREEN: CPT

## 2024-03-11 PROCEDURE — C1732: CPT

## 2024-03-11 PROCEDURE — 93654 COMPRE EP EVAL TX VT: CPT

## 2024-03-11 PROCEDURE — C1894: CPT

## 2024-03-11 PROCEDURE — C1887: CPT

## 2024-03-11 PROCEDURE — C1730: CPT

## 2024-03-11 PROCEDURE — 36415 COLL VENOUS BLD VENIPUNCTURE: CPT

## 2024-03-11 PROCEDURE — 93662 INTRACARDIAC ECG (ICE): CPT

## 2024-03-11 PROCEDURE — 93662 INTRACARDIAC ECG (ICE): CPT | Mod: 26

## 2024-03-11 PROCEDURE — 86901 BLOOD TYPING SEROLOGIC RH(D): CPT

## 2024-03-11 RX ORDER — SACUBITRIL AND VALSARTAN 24; 26 MG/1; MG/1
1 TABLET, FILM COATED ORAL
Refills: 0 | Status: DISCONTINUED | OUTPATIENT
Start: 2024-03-11 | End: 2024-03-12

## 2024-03-11 RX ORDER — FUROSEMIDE 40 MG
20 TABLET ORAL DAILY
Refills: 0 | Status: DISCONTINUED | OUTPATIENT
Start: 2024-03-11 | End: 2024-03-12

## 2024-03-11 RX ORDER — CLONAZEPAM 1 MG
0.5 TABLET ORAL THREE TIMES A DAY
Refills: 0 | Status: DISCONTINUED | OUTPATIENT
Start: 2024-03-11 | End: 2024-03-12

## 2024-03-11 RX ORDER — METOPROLOL TARTRATE 50 MG
12.5 TABLET ORAL DAILY
Refills: 0 | Status: DISCONTINUED | OUTPATIENT
Start: 2024-03-11 | End: 2024-03-12

## 2024-03-11 RX ORDER — APIXABAN 2.5 MG/1
2.5 TABLET, FILM COATED ORAL
Refills: 0 | Status: DISCONTINUED | OUTPATIENT
Start: 2024-03-11 | End: 2024-03-12

## 2024-03-11 RX ORDER — CEFAZOLIN SODIUM 1 G
2000 VIAL (EA) INJECTION ONCE
Refills: 0 | Status: COMPLETED | OUTPATIENT
Start: 2024-03-11 | End: 2024-03-11

## 2024-03-11 RX ORDER — ZOLPIDEM TARTRATE 10 MG/1
1 TABLET ORAL
Refills: 0 | DISCHARGE

## 2024-03-11 RX ORDER — ZOLPIDEM TARTRATE 10 MG/1
1 TABLET ORAL
Qty: 0 | Refills: 0 | DISCHARGE

## 2024-03-11 RX ORDER — METOPROLOL TARTRATE 50 MG
12.5 TABLET ORAL
Qty: 0 | Refills: 0 | DISCHARGE

## 2024-03-11 RX ORDER — AMIODARONE HYDROCHLORIDE 400 MG/1
200 TABLET ORAL DAILY
Refills: 0 | Status: DISCONTINUED | OUTPATIENT
Start: 2024-03-11 | End: 2024-03-11

## 2024-03-11 RX ORDER — SIMVASTATIN 20 MG/1
20 TABLET, FILM COATED ORAL AT BEDTIME
Refills: 0 | Status: DISCONTINUED | OUTPATIENT
Start: 2024-03-11 | End: 2024-03-12

## 2024-03-11 RX ORDER — TAMSULOSIN HYDROCHLORIDE 0.4 MG/1
1 CAPSULE ORAL
Refills: 0 | DISCHARGE

## 2024-03-11 RX ORDER — DAPAGLIFLOZIN 10 MG/1
1 TABLET, FILM COATED ORAL
Refills: 0 | DISCHARGE

## 2024-03-11 RX ORDER — ASPIRIN/CALCIUM CARB/MAGNESIUM 324 MG
81 TABLET ORAL DAILY
Refills: 0 | Status: DISCONTINUED | OUTPATIENT
Start: 2024-03-11 | End: 2024-03-12

## 2024-03-11 RX ORDER — METOPROLOL TARTRATE 50 MG
0.5 TABLET ORAL
Refills: 0 | DISCHARGE

## 2024-03-11 RX ADMIN — SIMVASTATIN 20 MILLIGRAM(S): 20 TABLET, FILM COATED ORAL at 21:24

## 2024-03-11 RX ADMIN — Medication 100 MILLIGRAM(S): at 13:45

## 2024-03-11 RX ADMIN — APIXABAN 2.5 MILLIGRAM(S): 2.5 TABLET, FILM COATED ORAL at 21:30

## 2024-03-11 RX ADMIN — SACUBITRIL AND VALSARTAN 1 TABLET(S): 24; 26 TABLET, FILM COATED ORAL at 21:24

## 2024-03-11 NOTE — DISCHARGE NOTE PROVIDER - CARE PROVIDERS DIRECT ADDRESSES
[email or web address]

## 2024-03-11 NOTE — DISCHARGE NOTE PROVIDER - NSDCMRMEDTOKEN_GEN_ALL_CORE_FT
Entresto 49 mg-51 mg oral tablet: 1 orally 2 times a day  Farxiga 10 mg oral tablet: 1 tab(s) orally once a day (at bedtime)  KlonoPIN 0.5 mg oral tablet: 1 tab(s) orally 3 times a day, As Needed  Lasix 20 mg oral tablet: 1 tab(s) orally once a day  Metoprolol Succinate ER 25 mg oral tablet, extended release: 0.5 tab(s) orally 2 times a day  simvastatin 20 mg oral tablet: 1 tab(s) orally once a day (at bedtime)  tamsulosin 0.4 mg oral capsule: 1 cap(s) orally once a day (at bedtime)  zolpidem 12.5 mg oral tablet, extended release: 1 tab(s) orally   apixaban 2.5 mg oral tablet: 1 tab(s) orally 2 times a day  aspirin 81 mg oral tablet, chewable: 1 tab(s) orally once a day  clonazePAM 0.5 mg oral tablet: 1 tab(s) orally 3 times a day As needed anxiety  Farxiga 10 mg oral tablet: 1 tab(s) orally once a day (at bedtime)  furosemide 20 mg oral tablet: 1 tab(s) orally once a day  Metoprolol Succinate ER 25 mg oral tablet, extended release: 0.5 tab(s) orally 2 times a day  sacubitril-valsartan 49 mg-51 mg oral tablet: 1 tab(s) orally 2 times a day  simvastatin 20 mg oral tablet: 1 tab(s) orally once a day (at bedtime)  tamsulosin 0.4 mg oral capsule: 1 cap(s) orally once a day (at bedtime)  zolpidem 12.5 mg oral tablet, extended release: 1 tab(s) orally

## 2024-03-11 NOTE — DISCHARGE NOTE PROVIDER - NSDCFUSCHEDAPPT_GEN_ALL_CORE_FT
[facility] Physician [facility]  CARDIOLOGY [address]  Scheduled Appointment: 04/09/2024     Luca Tamayo  [facility]  CARDIOLOGY [address]  Scheduled Appointment: 04/03/2024    [facility]  CARDIOLOGY [address]  Scheduled Appointment: 04/09/2024     Luca Tamayo  [facility]  CARDIOLOGY [address]  Scheduled Appointment: 04/03/2024    [facility]  CARDIOLOGY [address]  Scheduled Appointment: 04/09/2024    Celestine Bryan  [facility]  ELECTROPH [address]  Scheduled Appointment: 04/16/2024

## 2024-03-11 NOTE — DISCHARGE NOTE PROVIDER - NSDCCPTREATMENT_GEN_ALL_CORE_FT
PRINCIPAL PROCEDURE  Procedure: Ablation, arrhythmogenic focus, for idiopathic ventricular tachycardia or premature ventricular contractions  Findings and Treatment: - You should continue your Eliquis.  - You may shower today.  - Do not drive or operate heavy machinery for 3 days.  - Do not submerge in water (example: baths, swimming) for 1 week.  - No squatting, exertional activities, or lifting anything > 10 lbs for 1 week.  - Any sudden swelling, redness, fever, discharge, or severe pain, call the Electrophysiology Office at [phone].

## 2024-03-11 NOTE — PATIENT PROFILE ADULT - FALL HARM RISK - RISK INTERVENTIONS
Assistance OOB with selected safe patient handling equipment/Assistance with ambulation/Communicate Fall Risk and Risk Factors to all staff, patient, and family/Monitor gait and stability/Reinforce activity limits and safety measures with patient and family/Sit up slowly, dangle for a short time, stand at bedside before walking/Use of alarms - bed, chair and/or voice tab/Visual Cue: Yellow wristband/Bed in lowest position, wheels locked, appropriate side rails in place/Call bell, personal items and telephone in reach/Instruct patient to call for assistance before getting out of bed or chair/Non-slip footwear when patient is out of bed/[place] to call system/Physically safe environment - no spills, clutter or unnecessary equipment/Purposeful Proactive Rounding/Room/bathroom lighting operational, light cord in reach

## 2024-03-11 NOTE — DISCHARGE NOTE PROVIDER - CARE PROVIDER_API CALL
Celestine Bryan  Cardiac Electrophysiology  [address] [place] [address], [address]  [place] [address]  Phone: [phone]  Fax: [phone]  Follow Up Time: 1 month   Celestine Bryan  Cardiac Electrophysiology  [address] [place] [address], [address]  [place] [address]  Phone: [phone]  Fax: [phone]  Follow Up Time: 1 month    Atif May  Adv Heart Fail [facility] Cardio  [address] [place] [address]  Phone: [phone]  Fax: [phone]  Scheduled Appointment: 04/03/2024 02:00 PM

## 2024-03-11 NOTE — H&P ADULT - NSHPPHYSICALEXAM_GEN_ALL_CORE
Gen: 67 yo male in NAD  HEENT: NC AT  Lungs: CTA bilaterally  CV: RRR  Abd: soft, NT, ND +BS  Ext: no edema  Neuro: A&O x 3

## 2024-03-11 NOTE — DISCHARGE NOTE PROVIDER - HOSPITAL COURSE
Mr. David Huerta is a 68y Male with PMHx of HTN, s/p MV repair, NICM , AV node ablation S/P Medtronic CRT/D  VT S/P shock, VF s/p shocks (6/2023), Afib CHADS-VASc 1, s/p GIANFRANCO closure, recurrent AFib s/p DCCV 10/2016, s/p CRT-D generator change 8/15/2018, DVT s/p IVC filter, PVCs, who presented to [facility] for elective PVC Ablation. On 3/11/24 patient underwent successful PVC ablation. Patient was monitored overnight. On POD 1 patient remains HD stable with no complaints. Patient remains in SR with no arrhythmias noted on tele. Examination of B/L common femoral venous access sites reveal a clear and dry area with no hematoma or erythema. Patient should continue Eliquis for thromboembolic prophylaxis for 2 weeks given his history of DVT. Patient is being DC home in stable condition. Mr. David Huerta is a 68y Male with PMHx of HTN, s/p MV repair, NICM , AV node ablation S/P Medtronic CRT/D  VT S/P shock, VF s/p shocks (6/2023), Afib CHADS-VASc 1, s/p GIANFRANCO closure, recurrent AFib s/p DCCV 10/2016, s/p CRT-D generator change 8/15/2018, DVT s/p IVC filter, PVCs, who presented to [facility] for elective PVC Ablation. On 3/11/24 patient underwent successful PVC ablation. Patient was monitored overnight. On POD 1 patient remains HD stable with no complaints. Patient remains in SR with no arrhythmias noted on tele. Examination of B/L common femoral venous access sites reveal a clear and dry area with no hematoma or erythema. Patient will start Eliquis 2.5mg PO BID for thromboembolic prophylaxis for 2 weeks given his history of DVT. Patient is being DC home in stable condition.

## 2024-03-11 NOTE — DISCHARGE NOTE PROVIDER - ATTENDING DISCHARGE PHYSICAL EXAMINATION:
I saw and evaluated the patient the day of discharge.  S/p elective PVC ablation without untoward events.  Stable for discharge with close follow up with EP.

## 2024-03-11 NOTE — PATIENT PROFILE ADULT - FUNCTIONAL ASSESSMENT - DAILY ACTIVITY 1.
[address] Visit Note  Felicitas Mohs 59 y.o. female   MRN: [id number]    Assessment and Plan      Felicitas Mohs is a 59 y.o. female with a past medical history of HTN, HLD, CKD, and pancreatic insufficiency who presents to clinic today for follow-up after recent ED visit on 10/6 for left hip pain. 1. Lumbar radiculopathy  -Describes sharp pain that started 1 week prior to presentation to ED on 10/6  -Tenderness to palpation on in left paraspinal region, left hip, left buttock, and left thigh  -Left straight leg test positive on physical exam  -Recommend follow-up with comprehensive spine program, has appt tomorrow  -Suspect lumbar radiculopathy, possible herniated disc in setting of abrupt onset  -Further evaluation with MRI lumbar spine wo contrast; Future; Expected date: 10/18/2023  -Start methylPREDNISolone 4 MG tablet therapy pack; Use as directed on package  -Start gabapentin (Neurontin) 100 mg capsule; Take 1 capsule (100 mg total) by mouth daily at bedtime. If no improvement, increase to 200 mg  -Advised patient to return in 6 weeks for follow-up    2. Encounter for immunization  -     influenza vaccine, quadrivalent, 0.5 mL, preservative-free, for adult and pediatric patients 6 mos+ (PATTIE, [facility], [facility], [facility])    3. Encounter for screening mammogram for breast cancer  -     Mammo screening bilateral w 3d & cad; Future; Expected date: 10/18/2023    4. Left ventricular hypertrophy  -Ambulatory Referral to Cardiology; Future  -Start clopidogrel (Plavix) 75 mg tablet; Take 1 tablet (75 mg total) by mouth daily  -Discontinue Aspirin, start Plavix  -Patient endorses taking aspirin prior to stroke    5. Ulcer of aorta (HCC)  -Noted on CTA during hospitalization for stroke in Feb 2023. Could be possible source of stroke  -Ambulatory Referral to Cardiology;  Future      Follow up in our clinic in 6 week(s) for discuss results and evaluate your response to treatment. Subjective     *HPI completed with assistance from daughter providing Belarusian translation*  HISTORY OF PRESENT ILLNESS:  Candelario Kearney is a 59 y.o. female with a past medical history of HTN, HLD, CKD, and pancreatic insufficiency who presents to clinic today for follow-up following recent ED visit on 10/6. Patient presented to ED complaining of left flank, hip, leg pain with some numbness in her left foot. In ED, patient had hip/pelvis xray showing no acute osseous abnormality. She was discharged with Robaxin and referral to the comprehensive spine program. Patient has been taking Robaxin and Advil and still describes 6/10. Pain is worse when she is laying down with her leg straight. Patient states the pain started about 1 week prior to presenting to the ED. She states she was laying in bed and pain came on abruptly and persisted. She hasn't noted any improvement since discharge from ED and states Robaxin has not been beneficial. See location of pain below. Of note, patient was hospitalized with stroke like symptoms in February 2023. Patient states she was taking Aspirin prior to her stroke. CTA while admitted was significant for possible aortic ulceration. Concerns for possible source of stroke. No residual defects noted. Echo was significant for left ventricular hypertrophy. Patient does not follow with     Health Maintenance:  Labs: N/a  Cancer Screening: Mammogram  Vaccinations: Flu  Other: MRI lumbar spine    Review of Systems   Constitutional:  Negative for activity change, appetite change, chills, diaphoresis, fatigue and fever. HENT:  Negative for congestion, sinus pressure and sinus pain. Respiratory:  Negative for cough, chest tightness, shortness of breath and wheezing. Cardiovascular:  Negative for chest pain, palpitations and leg swelling. Gastrointestinal:  Negative for abdominal distention, abdominal pain, constipation, diarrhea, nausea and vomiting. Endocrine: Negative for polyuria. Genitourinary:  Negative for difficulty urinating. Musculoskeletal:  Positive for arthralgias, back pain and gait problem. Negative for joint swelling, neck pain and neck stiffness. Skin:  Negative for color change and rash. Neurological:  Positive for numbness. Negative for dizziness, tremors, seizures, weakness, light-headedness and headaches. Psychiatric/Behavioral:  Negative for agitation. All other systems reviewed and are negative. Objective     Vitals:    10/18/23 0817   BP: 144/74   BP Location: Right arm   Patient Position: Sitting   Cuff Size: Standard   Pulse: 76   Temp: 97.6 °F (36.4 °C)   TempSrc: Temporal   SpO2: 98%   Weight: 70.4 kg (155 lb 3.2 oz)   Height: 5' 3" (1.6 m)     Physical Exam  Vitals and nursing note reviewed. Constitutional:       General: She is not in acute distress. Appearance: Normal appearance. She is normal weight. She is not ill-appearing or diaphoretic. HENT:      Head: Normocephalic and atraumatic. Nose: No congestion. Mouth/Throat:      Pharynx: Oropharynx is clear. No oropharyngeal exudate. Eyes:      General: No scleral icterus. Conjunctiva/sclera: Conjunctivae normal.   Neck:      Vascular: No carotid bruit. Cardiovascular:      Rate and Rhythm: Normal rate and regular rhythm. Pulses: Normal pulses. Heart sounds: Normal heart sounds. No murmur heard. Pulmonary:      Effort: Pulmonary effort is normal. No respiratory distress. Breath sounds: Normal breath sounds. No wheezing. Abdominal:      General: Abdomen is flat. Bowel sounds are normal. There is no distension. Palpations: Abdomen is soft. Tenderness: There is no abdominal tenderness. Musculoskeletal:         General: Tenderness present. Cervical back: Normal range of motion. Lumbar back: Tenderness present. Positive left straight leg raise test.      Left hip: Tenderness present.  Decreased range of motion. Left upper leg: Tenderness present. Right lower leg: No edema. Left lower leg: No edema. Legs:    Skin:     General: Skin is warm. Capillary Refill: Capillary refill takes less than 2 seconds. Coloration: Skin is not jaundiced. Neurological:      General: No focal deficit present. Mental Status: She is alert and oriented to person, place, and time. Psychiatric:         Mood and Affect: Mood normal.         Behavior: Behavior normal.         Thought Content: Thought content normal.         Judgment: Judgment normal.          The following portions of the patient's history were reviewed and updated as appropriate: allergies, current medications, past family history, past medical history, past social history, past surgical history and problem list.    History     Current Outpatient Medications:     amLODIPine (NORVASC) 10 mg tablet, Take 1 tablet (10 mg total) by mouth daily, Disp: 90 tablet, Rfl: 3    atorvastatin (LIPITOR) 40 mg tablet, Take 1 tablet (40 mg total) by mouth every evening, Disp: 90 tablet, Rfl: 3    B-12 Microlozenge 500 MCG SUBL, PLACE 1 TABLET ON THE TONGUE AND ALLOW TO DISSOLVED DAILY, Disp: 90 tablet, Rfl: 2    cholestyramine (QUESTRAN) 4 GM/DOSE powder, Take 1 packet (4 g total) by mouth 3 (three) times a day with meals, Disp: 1074.62 g, Rfl: 1    clobetasol (TEMOVATE) 0.05 % ointment, Apply twice a day to rash on hands for 2 weeks. Then, apply twice a day from Monday-Friday for another 2 weeks.  Avoid the face and groin, Disp: 60 g, Rfl: 0    clopidogrel (Plavix) 75 mg tablet, Take 1 tablet (75 mg total) by mouth daily, Disp: 30 tablet, Rfl: 1    gabapentin (Neurontin) 100 mg capsule, Take 1 capsule (100 mg total) by mouth daily at bedtime If no improvement, increase to 200 mg, Disp: 30 capsule, Rfl: 1    hydrOXYzine HCL (ATARAX) 25 mg tablet, Take 25 mg by mouth daily at bedtime as needed, Disp: , Rfl: 0    lisinopril (ZESTRIL) 40 mg tablet, take 1 tablet by mouth once daily, Disp: 90 tablet, Rfl: 1    methocarbamol (ROBAXIN) 500 mg tablet, Take 1 tablet (500 mg total) by mouth every 12 (twelve) hours as needed for muscle spasms, Disp: 20 tablet, Rfl: 0    methylPREDNISolone 4 MG tablet therapy pack, Use as directed on package, Disp: 21 each, Rfl: 0    pancrelipase, Lip-Prot-Amyl, (CREON) 12,000 units capsule, Take 12,000 units of lipase by mouth 3 (three) times a day with meals, Disp: 500 capsule, Rfl: 3    pantoprazole (PROTONIX) 40 mg tablet, Take 1 tablet (40 mg total) by mouth daily in the early morning, Disp: 90 tablet, Rfl: 3    potassium chloride (K-DUR,KLOR-CON) 10 mEq tablet, take 2 tablets by mouth daily, Disp: 90 tablet, Rfl: 1    sertraline (ZOLOFT) 25 mg tablet, Take 1 tablet (25 mg total) by mouth daily, Disp: 90 tablet, Rfl: 1    thiamine 100 MG tablet, Take 1 tablet (100 mg total) by mouth daily Do not start before February 15, 2023., Disp: 30 tablet, Rfl: 0    traZODone (DESYREL) 50 mg tablet, take 1/2 tablet by mouth at bedtime if needed for insomnia, Disp: , Rfl:   No Known Allergies   Past Medical History:   Diagnosis Date    Back ache     Cancer ([address])     Hyperlipidemia     Hypertension     Pancreatic mass 4/12/2021     Past Surgical History:   Procedure Laterality Date    APPENDECTOMY      BILATERAL OOPHORECTOMY      BREAST SURGERY Right     partial mastectomy     COLONOSCOPY      HERNIA REPAIR      HYSTERECTOMY      LAPAROTOMY N/A 8/24/2021    Procedure: LAPAROTOMY EXPLORATORY;  Surgeon: Annie Reyes MD;  Location: [facility];  Service: Surgical Oncology    MASTECTOMY Right     partial    WHIPPLE PROCEDURE/PANCREATICO-DUODENECTOMY N/A 8/24/2021    Procedure: WHIPPLE PROCEDURE/PANCREATICO-DUODENECTOMY;  Surgeon: Annie Reyes MD;  Location: [facility];  Service: Surgical Oncology     Social History     Socioeconomic History    Marital status: /Civil Union     Spouse name: Daily MattaWilbur Lira    Number of children: Not on file    Years of education: Not on file    Highest education level: Not on file   Occupational History    Occupation: unemployed   Tobacco Use    Smoking status: Former     Packs/day: 0.65     Years: 30.00     Total pack years: 19.50     Types: Cigarettes    Smokeless tobacco: Never    Tobacco comments:     quit 15 years ago   Vaping Use    Vaping Use: Never used   Substance and Sexual Activity    Alcohol use: Not Currently    Drug use: No    Sexual activity: Not Currently   Other Topics Concern    Not on file   Social History Narrative    Not on file     Social Determinants of Health     Financial Resource Strain: Low Risk  (3/15/2023)    Overall Financial Resource Strain (CARDIA)     Difficulty of Paying Living Expenses: Not hard at all   Food Insecurity: No Food Insecurity (2/12/2023)    Hunger Vital Sign     Worried About Running Out of Food in the Last Year: Never true     Ran Out of Food in the Last Year: Never true   Transportation Needs: No Transportation Needs (2/12/2023)    PRAPARE - Transportation     Lack of Transportation (Medical): No     Lack of Transportation (Non-Medical): No   Physical Activity: Inactive (4/22/2021)    Exercise Vital Sign     Days of Exercise per Week: 0 days     Minutes of Exercise per Session: 0 min   Stress: Not on file   Social Connections: Not on file   Intimate Partner Violence: Not on file   Housing Stability: Low Risk  (2/12/2023)    Housing Stability Vital Sign     Unable to Pay for Housing in the Last Year: No     Number of Places Lived in the Last Year: 1     Unstable Housing in the Last Year: No     Family History   Problem Relation Age of Onset    Hypertension Mother     Heart disease Mother     Diabetes Mother     Cancer Father        Ja Osei MD, MPH  [facility] [address] [place] [address] E.  [address] [facility], [address] [facility]    PLEASE NOTE:  This encounter was completed utilizing the M-Modal/Fluency Direct Speech Voice Recognition Software. Grammatical errors, random word insertions, pronoun errors and incomplete sentences are occasional consequences of the system due to software limitations, ambient noise and hardware issues. These may be missed by proof reading prior to affixing electronic signature. Any questions or concerns about the content, text or information contained within the body of this dictation should be directly addressed to the physician for clarification. Please do not hesitate to call me directly if you have any any questions or concerns. 4 = No assist / stand by assistance

## 2024-03-11 NOTE — DISCHARGE NOTE PROVIDER - NSDCCPCAREPLAN_GEN_ALL_CORE_FT
PRINCIPAL DISCHARGE DIAGNOSIS  Diagnosis: PVC (premature ventricular contraction)  Assessment and Plan of Treatment:

## 2024-03-11 NOTE — DISCHARGE NOTE PROVIDER - PROVIDER TOKENS
PROVIDER:[TOKEN:[[id number]],FOLLOWUP:[1 month]] PROVIDER:[TOKEN:[[id number]],FOLLOWUP:[1 month]],PROVIDER:[TOKEN:[[id number]],SCHEDULEDAPPT:[04/03/2024],SCHEDULEDAPPTTIME:[02:00 PM]]

## 2024-03-12 ENCOUNTER — TRANSCRIPTION ENCOUNTER (OUTPATIENT)
Age: 69
End: 2024-03-12

## 2024-03-12 VITALS
SYSTOLIC BLOOD PRESSURE: 115 MMHG | RESPIRATION RATE: 13 BRPM | DIASTOLIC BLOOD PRESSURE: 74 MMHG | TEMPERATURE: 99 F | OXYGEN SATURATION: 93 % | HEART RATE: 80 BPM

## 2024-03-12 PROCEDURE — 99238 HOSP IP/OBS DSCHRG MGMT 30/<: CPT

## 2024-03-12 PROCEDURE — 99232 SBSQ HOSP IP/OBS MODERATE 35: CPT

## 2024-03-12 RX ORDER — SODIUM CHLORIDE 9 MG/ML
250 INJECTION INTRAMUSCULAR; INTRAVENOUS; SUBCUTANEOUS ONCE
Refills: 0 | Status: DISCONTINUED | OUTPATIENT
Start: 2024-03-12 | End: 2024-03-12

## 2024-03-12 RX ORDER — FUROSEMIDE 40 MG
1 TABLET ORAL
Qty: 0 | Refills: 0 | DISCHARGE
Start: 2024-03-12

## 2024-03-12 RX ORDER — SIMVASTATIN 20 MG/1
1 TABLET, FILM COATED ORAL
Qty: 0 | Refills: 0 | DISCHARGE
Start: 2024-03-12

## 2024-03-12 RX ORDER — CLONAZEPAM 1 MG
1 TABLET ORAL
Qty: 0 | Refills: 0 | DISCHARGE
Start: 2024-03-12

## 2024-03-12 RX ORDER — CLONAZEPAM 1 MG
1 TABLET ORAL
Qty: 0 | Refills: 0 | DISCHARGE

## 2024-03-12 RX ORDER — APIXABAN 2.5 MG/1
1 TABLET, FILM COATED ORAL
Qty: 28 | Refills: 0
Start: 2024-03-12 | End: 2024-03-25

## 2024-03-12 RX ORDER — ASPIRIN/CALCIUM CARB/MAGNESIUM 324 MG
1 TABLET ORAL
Qty: 0 | Refills: 0 | DISCHARGE
Start: 2024-03-12

## 2024-03-12 RX ORDER — SACUBITRIL AND VALSARTAN 24; 26 MG/1; MG/1
1 TABLET, FILM COATED ORAL
Qty: 0 | Refills: 0 | DISCHARGE
Start: 2024-03-12

## 2024-03-12 RX ORDER — SACUBITRIL AND VALSARTAN 24; 26 MG/1; MG/1
1 TABLET, FILM COATED ORAL
Refills: 0 | DISCHARGE

## 2024-03-12 RX ADMIN — Medication 12.5 MILLIGRAM(S): at 05:49

## 2024-03-12 RX ADMIN — Medication 20 MILLIGRAM(S): at 05:49

## 2024-03-12 RX ADMIN — APIXABAN 2.5 MILLIGRAM(S): 2.5 TABLET, FILM COATED ORAL at 05:49

## 2024-03-12 RX ADMIN — Medication 81 MILLIGRAM(S): at 12:12

## 2024-03-12 RX ADMIN — SACUBITRIL AND VALSARTAN 1 TABLET(S): 24; 26 TABLET, FILM COATED ORAL at 05:49

## 2024-03-12 NOTE — DISCHARGE NOTE NURSING/CASE MANAGEMENT/SOCIAL WORK - PATIENT PORTAL LINK FT
You can access the FollowMyHealth Patient Portal offered by [facility] by registering at the following website: [email or web address]. By joining [facility]’s FollowMyHealth portal, you will also be able to view your health information using other applications (apps) compatible with our system.

## 2024-03-12 NOTE — PROGRESS NOTE ADULT - ASSESSMENT
A/P  Patient s/p PVC ablation with Dr. Bryan on 3/11/2024.   Patient is doing well.  - continue with home medications as prescribed prior  - please remove b/l groin sutures prior d/c  - No heavy lifting or strenuous activities x 1 week  - FU in office in 3-4 weeks with Dr. Bryan (will be arranged)  - Follow up with HF team (Dr. Lay/ Dr. Tamayo) as outpatient A/P  Patient s/p PVC ablation with Dr. Bryan on 3/11/2024.   Patient is doing well.  - continue with home medications as prescribed prior  - please remove b/l groin sutures prior d/c  - No heavy lifting or strenuous activities x 1 week  - FU in office in 3-4 weeks with Dr. Bryan (will be arranged)  - Follow up with HF team (Dr. Lay/ Dr. Tamayo) on 4/3/2024 at 2:00 PM

## 2024-03-12 NOTE — PROGRESS NOTE ADULT - SUBJECTIVE AND OBJECTIVE BOX
Attending Attestation  I was physically present for the key portion of the evaluation and management service provide.  
  Electrophysiology Brief Post-Op Note      PRE-OP DIAGNOSIS: PVC    POST-OP DIAGNOSIS: PVC    PROCEDURE: ablation    Vendor Representative was present for clinical support.    Physician: Irvin  Assistant: None    ANESTHESIA TYPE:  [  ]General Anesthesia  [X  ] Sedation  [ X ] Local/Regional    ESTIMATED BLOOD LOSS:   60    mL    CONDITION  [  ] Critical  [  ] Serious  [  ]Fair  [ X ]Good      SPECIMENS REMOVED (IF APPLICABLE):  none    IMPLANTS (IF APPLICABLE)  none    FINDINGS: none    COMPLICATIONS: none     PLAN OF CARE  -	Start Eliquis 2.5 mg q12 tonight at 10 pm for 2 weeks hx of DVT  -           hold amio  -	Bed rest till am  -	Admit to telemetry                    
INTERVAL HPI/OVERNIGHT EVENTS:    Patient s/p PVC ablation with Dr. Bryan on 3/11/2024.   No events over night  Patient is in NSR.    MEDICATIONS  (STANDING):  apixaban 2.5 milliGRAM(s) Oral two times a day  aspirin  chewable 81 milliGRAM(s) Oral daily  furosemide    Tablet 20 milliGRAM(s) Oral daily  metoprolol tartrate 12.5 milliGRAM(s) Oral daily  sacubitril 49 mG/valsartan 51 mG 1 Tablet(s) Oral two times a day  simvastatin 20 milliGRAM(s) Oral at bedtime    MEDICATIONS  (PRN):  clonazePAM  Tablet 0.5 milliGRAM(s) Oral three times a day PRN anxiety      Allergies  No Known Allergies    Intolerances    Vital Signs Last 24 Hrs  T(C): 37 (12 Mar 2024 07:55), Max: 37 (11 Mar 2024 18:33)  T(F): 98.6 (12 Mar 2024 07:55), Max: 98.6 (11 Mar 2024 18:33)  HR: 80 (12 Mar 2024 07:55) (76 - 87)  BP: 115/74 (12 Mar 2024 07:55) (69/48 - 137/69)  BP(mean): 90 (12 Mar 2024 07:55) (54 - 99)  RR: 13 (12 Mar 2024 07:55) (11 - 20)  SpO2: 93% (12 Mar 2024 07:55) (91% - 97%)    Parameters below as of 12 Mar 2024 07:55  Patient On (Oxygen Delivery Method): room air      HEENT ELAINE EOMI  Lung CTAB  Heart RRR normal S1 S2  abd +BS soft  groins No hematoma, no bleeding; stiches to be removed by the primary team  Ext no C/C/E    LABS:                        14.3   6.23  )-----------( 163      ( 11 Mar 2024 11:35 )             43.2     03-11    140  |  105  |  18  ----------------------------<  96  3.9   |  25  |  0.8    Ca    9.2      11 Mar 2024 11:35    TPro  7.0  /  Alb  4.1  /  TBili  1.3<H>  /  DBili  x   /  AST  14  /  ALT  21  /  AlkPhos  93  03-11      Urinalysis Basic - ( 11 Mar 2024 11:35 )    Color: x / Appearance: x / SG: x / pH: x  Gluc: 96 mg/dL / Ketone: x  / Bili: x / Urobili: x   Blood: x / Protein: x / Nitrite: x   Leuk Esterase: x / RBC: x / WBC x   Sq Epi: x / Non Sq Epi: x / Bacteria: x

## 2024-03-13 ENCOUNTER — NON-APPOINTMENT (OUTPATIENT)
Age: 69
End: 2024-03-13

## 2024-03-14 ENCOUNTER — NON-APPOINTMENT (OUTPATIENT)
Age: 69
End: 2024-03-14

## 2024-03-14 NOTE — PATIENT PROFILE ADULT - FUNCTIONAL ASSESSMENT - DAILY ACTIVITY ASSESSMENT TYPE
I suspect this is residual viral infection.  I think his symptoms flared after stopping the Afrin because of rebound nasal congestion.  I recommended Flonase over-the-counter medications increase hydration and rest.  If no improvement in another week he is to let me know.   Admission

## 2024-03-15 DIAGNOSIS — Z95.810 PRESENCE OF AUTOMATIC (IMPLANTABLE) CARDIAC DEFIBRILLATOR: ICD-10-CM

## 2024-03-15 DIAGNOSIS — Z82.49 FAMILY HISTORY OF ISCHEMIC HEART DISEASE AND OTHER DISEASES OF THE CIRCULATORY SYSTEM: ICD-10-CM

## 2024-03-15 DIAGNOSIS — E78.00 PURE HYPERCHOLESTEROLEMIA, UNSPECIFIED: ICD-10-CM

## 2024-03-15 DIAGNOSIS — Z86.711 PERSONAL HISTORY OF PULMONARY EMBOLISM: ICD-10-CM

## 2024-03-15 DIAGNOSIS — I10 ESSENTIAL (PRIMARY) HYPERTENSION: ICD-10-CM

## 2024-03-15 DIAGNOSIS — I45.10 UNSPECIFIED RIGHT BUNDLE-BRANCH BLOCK: ICD-10-CM

## 2024-03-15 DIAGNOSIS — I49.3 VENTRICULAR PREMATURE DEPOLARIZATION: ICD-10-CM

## 2024-03-15 DIAGNOSIS — I42.8 OTHER CARDIOMYOPATHIES: ICD-10-CM

## 2024-04-01 RX ORDER — TAMSULOSIN HYDROCHLORIDE 0.4 MG/1
0.4 CAPSULE ORAL AT BEDTIME
Refills: 0 | Status: ACTIVE | COMMUNITY

## 2024-04-01 RX ORDER — ZOLPIDEM TARTRATE 12.5 MG/1
12.5 TABLET, EXTENDED RELEASE ORAL DAILY
Refills: 0 | Status: ACTIVE | COMMUNITY

## 2024-04-01 RX ORDER — CLONAZEPAM 0.5 MG
0.5 TABLET,DISINTEGRATING ORAL DAILY
Refills: 0 | Status: DISCONTINUED | COMMUNITY
End: 2024-04-01

## 2024-04-01 RX ORDER — SIMVASTATIN 20 MG/1
20 TABLET, FILM COATED ORAL
Refills: 0 | Status: ACTIVE | COMMUNITY

## 2024-04-01 RX ORDER — ZOLPIDEM TARTRATE 10 MG/1
10 TABLET, FILM COATED ORAL DAILY
Refills: 0 | Status: DISCONTINUED | COMMUNITY
End: 2024-04-01

## 2024-04-03 ENCOUNTER — APPOINTMENT (OUTPATIENT)
Dept: CARDIOLOGY | Facility: CLINIC | Age: 69
End: 2024-04-03
Payer: MEDICARE

## 2024-04-03 VITALS
DIASTOLIC BLOOD PRESSURE: 74 MMHG | OXYGEN SATURATION: 96 % | WEIGHT: 182 LBS | BODY MASS INDEX: 28.56 KG/M2 | HEIGHT: 67 IN | SYSTOLIC BLOOD PRESSURE: 104 MMHG | HEART RATE: 77 BPM

## 2024-04-03 DIAGNOSIS — Z98.890 OTHER SPECIFIED POSTPROCEDURAL STATES: ICD-10-CM

## 2024-04-03 DIAGNOSIS — Z86.79 OTHER SPECIFIED POSTPROCEDURAL STATES: ICD-10-CM

## 2024-04-03 DIAGNOSIS — Z45.02 ENCOUNTER FOR ADJUSTMENT AND MANAGEMENT OF AUTOMATIC IMPLANTABLE CARDIAC DEFIBRILLATOR: ICD-10-CM

## 2024-04-03 DIAGNOSIS — I47.20 VENTRICULAR TACHYCARDIA, UNSPECIFIED: ICD-10-CM

## 2024-04-03 DIAGNOSIS — I42.8 OTHER CARDIOMYOPATHIES: ICD-10-CM

## 2024-04-03 PROCEDURE — 93000 ELECTROCARDIOGRAM COMPLETE: CPT

## 2024-04-03 PROCEDURE — 99205 OFFICE O/P NEW HI 60 MIN: CPT

## 2024-04-03 RX ORDER — METOPROLOL SUCCINATE 25 MG/1
25 TABLET, EXTENDED RELEASE ORAL DAILY
Refills: 0 | Status: ACTIVE | COMMUNITY

## 2024-04-03 RX ORDER — CLONAZEPAM 0.5 MG/1
0.5 TABLET ORAL DAILY
Refills: 0 | Status: ACTIVE | COMMUNITY

## 2024-04-05 PROBLEM — Z45.02 ENCOUNTER FOR INTERROGATION OF CARDIAC DEFIBRILLATOR: Status: ACTIVE | Noted: 2019-05-16

## 2024-04-05 NOTE — ASSESSMENT
[FreeTextEntry1] : #NICM #HFrEF #VT He has had long standing NICM and had been doing well until recently after multiple ICD shocks for VT/VF. He has ACC heart failure stage C with stable NYHA class II symptoms. We discussed the progression of his cardiomyopathy and that the recurrent VT may be a sign that his heart is weakening. We are hopeful though that the PVC ablation will prevent further VT, but if he were to have more VT then we discussed the possibility of AT which he has heard before. We briefly discussed AT such as transplant and LVAD. I do not think an LVAD would be appropriate given his recurrent VT but transplant could be an option. He is 69 but relatively healthy and I noted that although he is doing well currently, his age is not on his side. At this time we will continue to maximize GDMT and work with EP in regards to his VT.  On exam he is warm, well perfused, and euvolemic with JVP<10cm, clear lungs, and no peripheral edema. He is on lasix 20 mg as needed. He supposedly has not tolerated an increase in BB due to dizziness but I would like to attempt increasing if possible in the future. He is not on an MRA and we sill start today and check labs in 1 week. BP is well controlled.  Plan -GDMT BB- toprol XL 12.5mg daily ACEi/ARB/ARNI- entresto 49-51 mg BID SGLT2i- farxiga 10 mg daily MRA- start spironolactone 25 mg Diuretic- lasix 20 mg PRN Device- CRT-D -Labs in 1 week -RTC in 3 months  Luca Tamayo MD Interventional Cardiology/Advanced Heart Failure Transplant [facility]

## 2024-04-05 NOTE — CARDIOLOGY SUMMARY
[de-identified] : AV paced [de-identified] : TTE 6/2023 LVEF 25-30% LVEDD 5.9cm, moderately reduced RV function, mild-mod MR, mod-severe MS, PASP 44 mmHg [de-identified] : unable to be performed due to CRT artifact [de-identified] : [facility] 6/2023 Nonobstructive CAD

## 2024-04-05 NOTE — HISTORY OF PRESENT ILLNESS
[FreeTextEntry1] : 68 y/o female PMH NICM, HFrEF LVEF 25-30% s/p CRT-D, VT/VF, AF s/p GIANFRANCO closure, MV/TV repair, PVC ablation 3/2024 who presents for evaluation of HF syndrome.  Patient of Dr. Olivarez. He has long standing NICM with history of VT/VF requiring ICD shock. Last admission June 2023 for VT s/p ICD shock. 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   Presents today to establish HF care. He reports that he was first diagnosed with his cardiomyopathy in 2011 after requiring a PPM for sick sinus? He further underwent MVR/TVR and MAZE in 2012. Overall he had been well managed for many years without any HF hospitalizations until recently. He has genetic testing that was unrevealing. In the last year he reports ~6-7 ICD shocks. He currently reports no issues since his PVC ablation. Other than his cardiomyopathy he is relatively healthy without any other significant medical issues. He remains active and exercises regularly. He is now retired. He is a never smoker. He is compliant with his medications. Denies fatigue, dizziness, lightheadedness, syncope, or presyncope. Denies chest discomfort, dyspnea, palpitations. Denies weight gain, edema, PND, orthopnea. Denies abdominal pain, n/v/d/c. Denies fevers/chills, lymph nodes, or skin changes.

## 2024-04-16 ENCOUNTER — APPOINTMENT (OUTPATIENT)
Dept: ELECTROPHYSIOLOGY | Facility: CLINIC | Age: 69
End: 2024-04-16

## 2024-04-16 VITALS
BODY MASS INDEX: 28.25 KG/M2 | DIASTOLIC BLOOD PRESSURE: 70 MMHG | WEIGHT: 180 LBS | TEMPERATURE: 97.1 F | HEIGHT: 67 IN | HEART RATE: 72 BPM | RESPIRATION RATE: 18 BRPM | SYSTOLIC BLOOD PRESSURE: 100 MMHG

## 2024-04-16 DIAGNOSIS — I48.91 UNSPECIFIED ATRIAL FIBRILLATION: ICD-10-CM

## 2024-04-16 DIAGNOSIS — I49.01 VENTRICULAR FIBRILLATION: ICD-10-CM

## 2024-04-16 DIAGNOSIS — I50.22 CHRONIC SYSTOLIC (CONGESTIVE) HEART FAILURE: ICD-10-CM

## 2024-04-16 PROCEDURE — G2211 COMPLEX E/M VISIT ADD ON: CPT

## 2024-04-16 PROCEDURE — 93290 INTERROG DEV EVAL ICPMS IP: CPT

## 2024-04-16 PROCEDURE — 99214 OFFICE O/P EST MOD 30 MIN: CPT

## 2024-04-16 PROCEDURE — 93284 PRGRMG EVAL IMPLANTABLE DFB: CPT

## 2024-04-16 RX ORDER — DAPAGLIFLOZIN 10 MG/1
10 TABLET, FILM COATED ORAL
Refills: 0 | Status: ACTIVE | COMMUNITY

## 2024-04-16 RX ORDER — FUROSEMIDE 20 MG/1
20 TABLET ORAL
Refills: 0 | Status: ACTIVE | COMMUNITY

## 2024-04-16 RX ORDER — SACUBITRIL AND VALSARTAN 49; 51 MG/1; MG/1
49-51 TABLET, FILM COATED ORAL TWICE DAILY
Refills: 0 | Status: ACTIVE | COMMUNITY

## 2024-04-16 RX ORDER — SPIRONOLACTONE 25 MG/1
25 TABLET ORAL DAILY
Qty: 30 | Refills: 4 | Status: ACTIVE | COMMUNITY
Start: 2024-04-03

## 2024-04-16 NOTE — PHYSICAL EXAM
[General Appearance - Well Developed] : well developed [Normal Appearance] : normal appearance [Well Groomed] : well groomed [General Appearance - Well Nourished] : well nourished [No Deformities] : no deformities [General Appearance - In No Acute Distress] : no acute distress [Heart Rate And Rhythm] : heart rate and rhythm were normal [Heart Sounds] : normal S1 and S2 [Murmurs] : no murmurs present [Respiration, Rhythm And Depth] : normal respiratory rhythm and effort [Exaggerated Use Of Accessory Muscles For Inspiration] : no accessory muscle use [Auscultation Breath Sounds / Voice Sounds] : lungs were clear to auscultation bilaterally [Clean] : clean [Dry] : dry [Well-Healed] : well-healed [Abdomen Soft] : soft [Abdomen Tenderness] : non-tender [Abdomen Mass (___ Cm)] : no abdominal mass palpated [Nail Clubbing] : no clubbing of the fingernails [Petechial Hemorrhages (___cm)] : no petechial hemorrhages [Cyanosis, Localized] : no localized cyanosis [] : no ischemic changes

## 2024-04-16 NOTE — HISTORY OF PRESENT ILLNESS
[None] : The patient complains of no symptoms [Palpitations] : no palpitations [SOB] : no dyspnea [Syncope] : no syncope [Chest Pain] : no chest pain or discomfort [ICD Shocks] : no recent ICD shocks [Erythema at Site] : no erythema at device site [Swelling at Site] : no swelling at device site [de-identified] : 68 Y.O. male with a PMHx of HTN, S/P MV repair , NICM , AV node ablation S/P CRT/D , VT S/P shock , Afib CHADS-VASc 1,S/P GIANFRANCO closure, recurrent AFib S/P DCCV 10/2016 , S/P CRT-D generator change 8/15/2018.   Patient was recently hospitalized for appropriate shock for VF episode. Cardiac catheterization showed no new coronary artery disease.   I reviewed the episode and episode is consistent with long-short initiation of ventricular fibrillation. Patient has approximately 9 PVCs in 1 hour. However, they do occur at a specific coupling interval.   Patient s/p VT ablation. During the ablation, patient had the same morphology PVC-initiated VF after long-short episode similar to what I found on patient's device. After ablation, patient had one long-short episode, which consists of APCs in the blanking period, not refractory, followed by V-paced by the device and PVC initiating VT episode. However, this time, VT episode was terminated by ATP. Patient did not feel the episode.    Cardiac catheterization (06/2023): No significant CAD.

## 2024-04-16 NOTE — ADDENDUM
[FreeTextEntry1] : Theresa ABDI assisted in documentation on 04/16/2024   acting as a scribe for Dr. Celestine Bryan.

## 2024-04-16 NOTE — PROCEDURE
[Complete Heart Block] : complete heart block [CRT-D] : Cardiac resynchronization therapy defibrillator [DDDR] : DDDR [Voltage: ___ volts] : Voltage was [unfilled] volts [Charge Time: ___ sec] : charge time was [unfilled] seconds [Longevity: ___ months] : The estimated remaining battery life is [unfilled] months [Normal] : The battery status is normal. [Threshold Testing Performed] : Threshold testing was performed [Sensing Amplitude ___mv] : sensing amplitude was [unfilled] mv [Lead Imp:  ___ohms] : lead impedance was [unfilled] ohms [___V @] : [unfilled] V [___ ms] : [unfilled] ms [Asense-Vsense ___ %] : Asense-Vsense [unfilled]% [Asense-Vpace ___ %] : Asense-Vpace [unfilled]% [Apace-Vsense ___ %] : Apace-Vsense [unfilled]% [Apace-Vpace ___ %] : Apace-Vpace [unfilled]% [de-identified] : Medtronic [de-identified] : [id number] [de-identified] : [id number] [de-identified] : 05/24/2023 [de-identified] : 80 bpm [de-identified] : 1 VF episode on 04/10/2024 at 10:28 pm lasting 12 seconds terminated by atp. 3 NSVT episodes 0n 04//09-04/10 longest lasting 2 seconds

## 2024-04-16 NOTE — ASSESSMENT
[FreeTextEntry1] : AF - s/p GIANFRANCO closure  CHF - Continue Entresto 49/51 mg Q12 - VF episode initiated by long-short episode. - Heart failure note appreciated.   - Increase Metoprolol to 25 mg BID.    VF  - Increase Metoprolol to 25 mg BID.  - Genetic testing results reviewed with patient. Will consult Dr. Chu for genetic counseling.   - Will continue to monitor patient at this time.     	I have spent 35  minutes of time on the encounter excluding separately reported services.

## 2024-04-16 NOTE — PROCEDURE
[Complete Heart Block] : complete heart block [CRT-D] : Cardiac resynchronization therapy defibrillator [DDDR] : DDDR [Voltage: ___ volts] : Voltage was [unfilled] volts [Charge Time: ___ sec] : charge time was [unfilled] seconds [Longevity: ___ months] : The estimated remaining battery life is [unfilled] months [Normal] : The battery status is normal. [Threshold Testing Performed] : Threshold testing was performed [Sensing Amplitude ___mv] : sensing amplitude was [unfilled] mv [Lead Imp:  ___ohms] : lead impedance was [unfilled] ohms [___V @] : [unfilled] V [___ ms] : [unfilled] ms [Asense-Vsense ___ %] : Asense-Vsense [unfilled]% [Asense-Vpace ___ %] : Asense-Vpace [unfilled]% [Apace-Vsense ___ %] : Apace-Vsense [unfilled]% [Apace-Vpace ___ %] : Apace-Vpace [unfilled]% [de-identified] : Medtronic [de-identified] : [id number] [de-identified] : [id number] [de-identified] : 05/24/2023 [de-identified] : 80 bpm [de-identified] : 1 VF episode on 04/10/2024 at 10:28 pm lasting 12 seconds terminated by atp. 3 NSVT episodes 0n 04//09-04/10 longest lasting 2 seconds

## 2024-07-15 ENCOUNTER — NON-APPOINTMENT (OUTPATIENT)
Age: 69
End: 2024-07-15

## 2024-07-15 ENCOUNTER — APPOINTMENT (OUTPATIENT)
Dept: HEART FAILURE | Facility: CLINIC | Age: 69
End: 2024-07-15
Payer: MEDICARE

## 2024-07-15 VITALS
OXYGEN SATURATION: 97 % | HEART RATE: 84 BPM | BODY MASS INDEX: 29.35 KG/M2 | DIASTOLIC BLOOD PRESSURE: 74 MMHG | HEIGHT: 67 IN | WEIGHT: 187 LBS | SYSTOLIC BLOOD PRESSURE: 98 MMHG

## 2024-07-15 DIAGNOSIS — I50.22 CHRONIC SYSTOLIC (CONGESTIVE) HEART FAILURE: ICD-10-CM

## 2024-07-15 DIAGNOSIS — I42.8 OTHER CARDIOMYOPATHIES: ICD-10-CM

## 2024-07-15 DIAGNOSIS — I48.91 UNSPECIFIED ATRIAL FIBRILLATION: ICD-10-CM

## 2024-07-15 DIAGNOSIS — Z98.890 OTHER SPECIFIED POSTPROCEDURAL STATES: ICD-10-CM

## 2024-07-15 DIAGNOSIS — I47.20 VENTRICULAR TACHYCARDIA, UNSPECIFIED: ICD-10-CM

## 2024-07-15 PROCEDURE — 99215 OFFICE O/P EST HI 40 MIN: CPT

## 2024-07-15 PROCEDURE — 93000 ELECTROCARDIOGRAM COMPLETE: CPT

## 2024-07-16 ENCOUNTER — APPOINTMENT (OUTPATIENT)
Dept: CARDIOLOGY | Facility: CLINIC | Age: 69
End: 2024-07-16
Payer: MEDICARE

## 2024-07-16 PROCEDURE — 93295 DEV INTERROG REMOTE 1/2/MLT: CPT

## 2024-07-16 PROCEDURE — 93296 REM INTERROG EVL PM/IDS: CPT

## 2024-07-16 RX ORDER — SOTALOL HYDROCHLORIDE 80 MG/1
80 TABLET ORAL
Qty: 180 | Refills: 0 | Status: ACTIVE | COMMUNITY
Start: 2024-07-16 | End: 1900-01-01

## 2024-07-19 RX ORDER — SACUBITRIL AND VALSARTAN 24; 26 MG/1; MG/1
24-26 TABLET, FILM COATED ORAL TWICE DAILY
Qty: 180 | Refills: 0 | Status: ACTIVE | COMMUNITY
Start: 2024-07-19 | End: 1900-01-01

## 2024-07-22 ENCOUNTER — RX RENEWAL (OUTPATIENT)
Age: 69
End: 2024-07-22

## 2024-07-22 RX ORDER — SOTALOL HYDROCHLORIDE 80 MG/1
80 TABLET ORAL TWICE DAILY
Qty: 90 | Refills: 0 | Status: ACTIVE | COMMUNITY
Start: 2024-07-12 | End: 1900-01-01

## 2024-08-08 ENCOUNTER — NON-APPOINTMENT (OUTPATIENT)
Age: 69
End: 2024-08-08

## 2024-08-08 ENCOUNTER — APPOINTMENT (OUTPATIENT)
Dept: HEPATOLOGY | Facility: CLINIC | Age: 69
End: 2024-08-08

## 2024-08-08 ENCOUNTER — LABORATORY RESULT (OUTPATIENT)
Age: 69
End: 2024-08-08

## 2024-08-08 PROBLEM — K76.0 FATTY LIVER: Status: ACTIVE | Noted: 2024-08-08

## 2024-08-08 PROBLEM — Z01.818 PRE-TRANSPLANT EVALUATION FOR HEART TRANSPLANT: Status: ACTIVE | Noted: 2024-08-06

## 2024-08-08 PROCEDURE — 99204 OFFICE O/P NEW MOD 45 MIN: CPT

## 2024-08-08 NOTE — HISTORY OF PRESENT ILLNESS
[Tattoo] : tattoo(s) [Needlestick Exposure] : no needlestick exposure [IV Drug Use] : no IV drug use [Body Piercing] : no body piercing [Transfusion before 1992] : no transfusion before 1992 [Transplant before 1992] : no transplant before 1992 [Alcohol Abuse] : no alcohol abuse [Autoimmune Disorder] : no autoimmune disorder [Cocaine Use] : no cocaine use [FreeTextEntry1] : Mr Huerta is a 69yr old M with history of HTN, prediabetic, long standing NICM with history of VT/VF requiring ICD shock. Last admission 2023 for VT s/p ICD shock. 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 He reports that he was first diagnosed with his cardiomyopathy in  after requiring a PPM for sick sinus? He further underwent MVR/TVR and MAZE in . Overall, he had been well managed for many years without any HF hospitalizations until in 2024, he was hospitalized for Vtach and had an ablation done. He has genetic testing that was unrevealing. In the last year (6271-8256) he reports ~6-7 ICD shocks. He currently reports no issues since his PVC ablation.   He presents to hepatology clinic for liver evaluation and risk assessment for heart transplant.  He denies abdominal or leg swelling, melena, hematemesis.  He denies history of hepatitis or travel to endemic countries. Denies personal history of autoimmune dx or family. Mom  of brain cancer. Denies family history of liver cancer or colon cancer.  He endorses social etoh use - drinks about 2 shots of Tequilla/week, sometimes 2 cans of beer.  He has never had colonoscopy done in the past but is scheduled to get one later this month.  He has never had EGD in the past.  Labs in 2024 Wbc 6.1, hb 14.3, Plt 154, Cr 1.01, Na 139, K 4.6, AST/ALT/Alk phos //88, Tbili 0.9  Home medications - Entresto, Farxiga, metoprolol, Zolpidem, clonazepam

## 2024-08-08 NOTE — PHYSICAL EXAM
[General Appearance - Alert] : alert [General Appearance - Well Nourished] : well nourished [General Appearance - Well-Appearing] : healthy appearing [Neck Appearance] : the appearance of the neck was normal [Exaggerated Use Of Accessory Muscles For Inspiration] : no accessory muscle use [Respiration, Rhythm And Depth] : normal respiratory rhythm and effort [Chest Palpation] : palpation of the chest revealed no abnormalities [Heart Rate And Rhythm] : heart rate was normal and rhythm regular [Heart Sounds] : normal S1 and S2 [Bowel Sounds] : normal bowel sounds [Abdomen Soft] : soft [Abdomen Tenderness] : non-tender [Abdomen Mass (___ Cm)] : no abdominal mass palpated [] : no hepato-splenomegaly

## 2024-08-08 NOTE — END OF VISIT
[] : Fellow [FreeTextEntry3] : 69M with cardiac eval for OHT vs LVAD though appears euvolemic at this time Has metabolic risk factors for hepatic steatosis in addition to ETOH use Counseled on natural hx of metabolic associated steatotic liver disease (MASLD) Counseled on diet - limiting carbs and increasing protein and vegetable intake 30min exercise daily Goal 7-10% weight loss Discussed concern of disease progression to MASH and more advanced fibrosis as well  Will schedule patient for fibroscan and baseline US Liver tests otherwise normal Notable thrombocytopenia but no other evidence of portal HTN discussed role of possible liver biopsy and TJ pressure measurements if there is concern for advanced fibrosis Will call with results

## 2024-08-08 NOTE — ASSESSMENT
[FreeTextEntry1] : Mr Bradley Hernandez is a 69yr old M with history of NICM and multiple episodes of VT/VF s/p multiple cardioversions and ablation, recurrent hospitalization. He presented to today to get liver evaluation in preparation for a heart transplant.  Assessment  Liver evaluation for heart transplant.   Asides from social alcohol use, history of heart dx, prediabetes/htn which can contribute to fatty liver. there is no other significant risk of liver dx in the past.   He had CT Angio heart in 2021 that reported hepatic steatosis.   Plan  -We will get abdominal ultrasound to further evaluate CT finding of fatty liver. -Get hepatitis viral panel. -Patient has borderline thrombocytopenia. Will get a fibro scan to r/o fibrosis. If fibrosis seen, may consider getting a Phillips jugular liver biopsy.  -Patient has been counselled to abstain from alcohol. -Patient counselled againts use of herbs or OTC medications that could cause harm to the liver.   Discussed with Dr. Andrews

## 2024-08-08 NOTE — REVIEW OF SYSTEMS
[Fever] : no fever [Chills] : no chills [Feeling Poorly] : not feeling poorly [Feeling Tired] : not feeling tired [Chest Pain] : no chest pain [Lower Ext Edema] : no extremity edema [Abdominal Pain] : no abdominal pain [Vomiting] : no vomiting [Constipation] : no constipation [Heartburn] : no heartburn [Easy Bleeding] : no tendency for easy bleeding

## 2024-08-08 NOTE — HISTORY OF PRESENT ILLNESS
[Tattoo] : tattoo(s) [Needlestick Exposure] : no needlestick exposure [IV Drug Use] : no IV drug use [Body Piercing] : no body piercing [Transfusion before 1992] : no transfusion before 1992 [Transplant before 1992] : no transplant before 1992 [Alcohol Abuse] : no alcohol abuse [Autoimmune Disorder] : no autoimmune disorder [Cocaine Use] : no cocaine use [FreeTextEntry1] : Mr Huerta is a 69yr old M with history of HTN, prediabetic, long standing NICM with history of VT/VF requiring ICD shock. Last admission 2023 for VT s/p ICD shock. 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 He reports that he was first diagnosed with his cardiomyopathy in  after requiring a PPM for sick sinus? He further underwent MVR/TVR and MAZE in . Overall, he had been well managed for many years without any HF hospitalizations until in 2024, he was hospitalized for Vtach and had an ablation done. He has genetic testing that was unrevealing. In the last year (1539-0179) he reports ~6-7 ICD shocks. He currently reports no issues since his PVC ablation.   He presents to hepatology clinic for liver evaluation and risk assessment for heart transplant.  He denies abdominal or leg swelling, melena, hematemesis.  He denies history of hepatitis or travel to endemic countries. Denies personal history of autoimmune dx or family. Mom  of brain cancer. Denies family history of liver cancer or colon cancer.  He endorses social etoh use - drinks about 2 shots of Tequilla/week, sometimes 2 cans of beer.  He has never had colonoscopy done in the past but is scheduled to get one later this month.  He has never had EGD in the past.  Labs in 2024 Wbc 6.1, hb 14.3, Plt 154, Cr 1.01, Na 139, K 4.6, AST/ALT/Alk phos //88, Tbili 0.9  Home medications - Entresto, Farxiga, metoprolol, Zolpidem, clonazepam

## 2024-08-12 ENCOUNTER — NON-APPOINTMENT (OUTPATIENT)
Age: 69
End: 2024-08-12

## 2024-08-14 ENCOUNTER — RESULT REVIEW (OUTPATIENT)
Age: 69
End: 2024-08-14

## 2024-08-15 ENCOUNTER — APPOINTMENT (OUTPATIENT)
Dept: CV DIAGNOSITCS | Facility: HOSPITAL | Age: 69
End: 2024-08-15

## 2024-08-20 ENCOUNTER — APPOINTMENT (OUTPATIENT)
Dept: PULMONOLOGY | Facility: CLINIC | Age: 69
End: 2024-08-20
Payer: MEDICARE

## 2024-08-20 PROCEDURE — 94726 PLETHYSMOGRAPHY LUNG VOLUMES: CPT

## 2024-08-20 PROCEDURE — 94729 DIFFUSING CAPACITY: CPT

## 2024-08-20 PROCEDURE — 94010 BREATHING CAPACITY TEST: CPT

## 2024-08-28 ENCOUNTER — RESULT REVIEW (OUTPATIENT)
Age: 69
End: 2024-08-28

## 2024-08-28 ENCOUNTER — OUTPATIENT (OUTPATIENT)
Dept: OUTPATIENT SERVICES | Facility: HOSPITAL | Age: 69
LOS: 1 days | End: 2024-08-28
Payer: COMMERCIAL

## 2024-08-28 DIAGNOSIS — Z98.890 OTHER SPECIFIED POSTPROCEDURAL STATES: Chronic | ICD-10-CM

## 2024-08-28 DIAGNOSIS — Z87.828 PERSONAL HISTORY OF OTHER (HEALED) PHYSICAL INJURY AND TRAUMA: Chronic | ICD-10-CM

## 2024-08-28 DIAGNOSIS — Z00.8 ENCOUNTER FOR OTHER GENERAL EXAMINATION: ICD-10-CM

## 2024-08-28 DIAGNOSIS — Z95.828 PRESENCE OF OTHER VASCULAR IMPLANTS AND GRAFTS: Chronic | ICD-10-CM

## 2024-08-28 DIAGNOSIS — Z95.810 PRESENCE OF AUTOMATIC (IMPLANTABLE) CARDIAC DEFIBRILLATOR: Chronic | ICD-10-CM

## 2024-08-28 DIAGNOSIS — Z01.818 ENCOUNTER FOR OTHER PREPROCEDURAL EXAMINATION: ICD-10-CM

## 2024-08-28 PROCEDURE — 71250 CT THORAX DX C-: CPT

## 2024-08-28 PROCEDURE — 74176 CT ABD & PELVIS W/O CONTRAST: CPT | Mod: 26

## 2024-08-28 PROCEDURE — 71250 CT THORAX DX C-: CPT | Mod: 26

## 2024-08-28 PROCEDURE — 70450 CT HEAD/BRAIN W/O DYE: CPT | Mod: 26

## 2024-08-28 PROCEDURE — 74176 CT ABD & PELVIS W/O CONTRAST: CPT

## 2024-08-28 PROCEDURE — 70450 CT HEAD/BRAIN W/O DYE: CPT

## 2024-08-29 ENCOUNTER — APPOINTMENT (OUTPATIENT)
Dept: PULMONOLOGY | Facility: CLINIC | Age: 69
End: 2024-08-29

## 2024-08-29 DIAGNOSIS — Z01.818 ENCOUNTER FOR OTHER PREPROCEDURAL EXAMINATION: ICD-10-CM

## 2024-09-10 ENCOUNTER — APPOINTMENT (OUTPATIENT)
Dept: ULTRASOUND IMAGING | Facility: HOSPITAL | Age: 69
End: 2024-09-10

## 2024-09-10 ENCOUNTER — APPOINTMENT (OUTPATIENT)
Dept: PSYCHIATRY | Facility: HOSPITAL | Age: 69
End: 2024-09-10

## 2024-09-10 ENCOUNTER — OUTPATIENT (OUTPATIENT)
Dept: OUTPATIENT SERVICES | Facility: HOSPITAL | Age: 69
LOS: 1 days | End: 2024-09-10
Payer: COMMERCIAL

## 2024-09-10 ENCOUNTER — OUTPATIENT (OUTPATIENT)
Dept: OUTPATIENT SERVICES | Facility: HOSPITAL | Age: 69
LOS: 1 days | End: 2024-09-10

## 2024-09-10 ENCOUNTER — RESULT REVIEW (OUTPATIENT)
Age: 69
End: 2024-09-10

## 2024-09-10 DIAGNOSIS — Z87.828 PERSONAL HISTORY OF OTHER (HEALED) PHYSICAL INJURY AND TRAUMA: Chronic | ICD-10-CM

## 2024-09-10 DIAGNOSIS — Z95.810 PRESENCE OF AUTOMATIC (IMPLANTABLE) CARDIAC DEFIBRILLATOR: Chronic | ICD-10-CM

## 2024-09-10 DIAGNOSIS — Z01.818 ENCOUNTER FOR OTHER PREPROCEDURAL EXAMINATION: ICD-10-CM

## 2024-09-10 DIAGNOSIS — Z95.828 PRESENCE OF OTHER VASCULAR IMPLANTS AND GRAFTS: Chronic | ICD-10-CM

## 2024-09-10 DIAGNOSIS — Z98.890 OTHER SPECIFIED POSTPROCEDURAL STATES: Chronic | ICD-10-CM

## 2024-09-10 DIAGNOSIS — F41.9 ANXIETY DISORDER, UNSPECIFIED: ICD-10-CM

## 2024-09-10 PROCEDURE — 93923 UPR/LXTR ART STDY 3+ LVLS: CPT | Mod: 26

## 2024-09-10 PROCEDURE — 90791 PSYCH DIAGNOSTIC EVALUATION: CPT

## 2024-09-10 PROCEDURE — 93923 UPR/LXTR ART STDY 3+ LVLS: CPT

## 2024-09-10 NOTE — PSYCHOSOCIAL ASSESSMENT
[All substances negative except as specified below] : All substances negative except as specified below [_____] : Route: [unfilled]

## 2024-09-10 NOTE — RISK ASSESSMENT
[Clinical Records] : Clinical Records [Clinical Interview] : Clinical Interview [No] : No [PTSD] : PTSD [Identifies reasons for living] : identifies reasons for living [Supportive social network of family or friends] : supportive social network of family or friends [Cultural, spiritual and/or moral attitudes against suicide] : cultural, spiritual and/or moral attitudes against suicide [Ability to cope with stress] : ability to cope with stress [Positive therapeutic relationships] : positive therapeutic relationships

## 2024-09-10 NOTE — PHYSICAL EXAM
[None] : none [Well groomed] : well groomed [Accelerated] : accelerated [Cooperative] : cooperative [Euthymic] : euthymic [Full] : full [Clear] : clear [Linear/Goal Directed] : linear/goal directed [Average] : average [WNL] : within normal limits [Not applicable] : not applicable

## 2024-09-11 ENCOUNTER — OUTPATIENT (OUTPATIENT)
Dept: OUTPATIENT SERVICES | Facility: HOSPITAL | Age: 69
LOS: 1 days | Discharge: HOME | End: 2024-09-11
Payer: MEDICARE

## 2024-09-11 ENCOUNTER — TRANSCRIPTION ENCOUNTER (OUTPATIENT)
Age: 69
End: 2024-09-11

## 2024-09-11 VITALS
RESPIRATION RATE: 16 BRPM | HEIGHT: 67 IN | WEIGHT: 186.95 LBS | HEART RATE: 80 BPM | SYSTOLIC BLOOD PRESSURE: 125 MMHG | DIASTOLIC BLOOD PRESSURE: 88 MMHG | OXYGEN SATURATION: 98 % | TEMPERATURE: 97 F

## 2024-09-11 VITALS
RESPIRATION RATE: 19 BRPM | DIASTOLIC BLOOD PRESSURE: 99 MMHG | HEART RATE: 86 BPM | SYSTOLIC BLOOD PRESSURE: 119 MMHG | OXYGEN SATURATION: 96 %

## 2024-09-11 DIAGNOSIS — Z98.890 OTHER SPECIFIED POSTPROCEDURAL STATES: Chronic | ICD-10-CM

## 2024-09-11 DIAGNOSIS — I50.22 CHRONIC SYSTOLIC (CONGESTIVE) HEART FAILURE: ICD-10-CM

## 2024-09-11 DIAGNOSIS — Z95.828 PRESENCE OF OTHER VASCULAR IMPLANTS AND GRAFTS: Chronic | ICD-10-CM

## 2024-09-11 DIAGNOSIS — Z95.810 PRESENCE OF AUTOMATIC (IMPLANTABLE) CARDIAC DEFIBRILLATOR: Chronic | ICD-10-CM

## 2024-09-11 DIAGNOSIS — Z87.828 PERSONAL HISTORY OF OTHER (HEALED) PHYSICAL INJURY AND TRAUMA: Chronic | ICD-10-CM

## 2024-09-11 LAB
ANION GAP SERPL CALC-SCNC: 10 MMOL/L — SIGNIFICANT CHANGE UP (ref 7–14)
BUN SERPL-MCNC: 21 MG/DL — HIGH (ref 10–20)
CALCIUM SERPL-MCNC: 9 MG/DL — SIGNIFICANT CHANGE UP (ref 8.4–10.4)
CHLORIDE SERPL-SCNC: 108 MMOL/L — SIGNIFICANT CHANGE UP (ref 98–110)
CO2 SERPL-SCNC: 26 MMOL/L — SIGNIFICANT CHANGE UP (ref 17–32)
CREAT SERPL-MCNC: 1 MG/DL — SIGNIFICANT CHANGE UP (ref 0.7–1.5)
EGFR: 81 ML/MIN/1.73M2 — SIGNIFICANT CHANGE UP
GLUCOSE SERPL-MCNC: 112 MG/DL — HIGH (ref 70–99)
HCT VFR BLD CALC: 45.1 % — SIGNIFICANT CHANGE UP (ref 42–52)
HGB BLD-MCNC: 14.5 G/DL — SIGNIFICANT CHANGE UP (ref 14–18)
MCHC RBC-ENTMCNC: 29.9 PG — SIGNIFICANT CHANGE UP (ref 27–31)
MCHC RBC-ENTMCNC: 32.2 G/DL — SIGNIFICANT CHANGE UP (ref 32–37)
MCV RBC AUTO: 93 FL — SIGNIFICANT CHANGE UP (ref 80–94)
NRBC # BLD: 0 /100 WBCS — SIGNIFICANT CHANGE UP (ref 0–0)
PLATELET # BLD AUTO: 154 K/UL — SIGNIFICANT CHANGE UP (ref 130–400)
PMV BLD: 12 FL — HIGH (ref 7.4–10.4)
POTASSIUM SERPL-MCNC: 4.4 MMOL/L — SIGNIFICANT CHANGE UP (ref 3.5–5)
POTASSIUM SERPL-SCNC: 4.4 MMOL/L — SIGNIFICANT CHANGE UP (ref 3.5–5)
RBC # BLD: 4.85 M/UL — SIGNIFICANT CHANGE UP (ref 4.7–6.1)
RBC # FLD: 14.3 % — SIGNIFICANT CHANGE UP (ref 11.5–14.5)
SODIUM SERPL-SCNC: 144 MMOL/L — SIGNIFICANT CHANGE UP (ref 135–146)
WBC # BLD: 6.14 K/UL — SIGNIFICANT CHANGE UP (ref 4.8–10.8)
WBC # FLD AUTO: 6.14 K/UL — SIGNIFICANT CHANGE UP (ref 4.8–10.8)

## 2024-09-11 PROCEDURE — 36415 COLL VENOUS BLD VENIPUNCTURE: CPT

## 2024-09-11 PROCEDURE — C1894: CPT

## 2024-09-11 PROCEDURE — 93451 RIGHT HEART CATH: CPT

## 2024-09-11 PROCEDURE — 80048 BASIC METABOLIC PNL TOTAL CA: CPT

## 2024-09-11 PROCEDURE — C1889: CPT

## 2024-09-11 PROCEDURE — C1887: CPT

## 2024-09-11 PROCEDURE — 85027 COMPLETE CBC AUTOMATED: CPT

## 2024-09-11 PROCEDURE — 93451 RIGHT HEART CATH: CPT | Mod: 26

## 2024-09-11 RX ORDER — TORSEMIDE 20 MG/1
1 TABLET ORAL
Qty: 30 | Refills: 3
Start: 2024-09-11 | End: 2025-01-08
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
## 2024-09-11 NOTE — SOCIAL HISTORY
[FreeTextEntry1] : Patient is domiciled with his wife, him and his wife work part time currently after retiring. 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

## 2024-09-11 NOTE — ASU DISCHARGE PLAN (ADULT/PEDIATRIC) - PROVIDER TOKENS
PROVIDER:[TOKEN:[[id number]],FOLLOWUP:[1 month]],PROVIDER:[TOKEN:[[id number]],FOLLOWUP:[2 weeks]]

## 2024-09-11 NOTE — DISCUSSION/SUMMARY
[FreeTextEntry1] : 70 y/o domiciled, employed, , , male with PPHx of PTSD, unspecified anxiety d/o, with no past psychiatric admissions, with PMH NICM, HFrEF LVEF 25-30% s/p CRT-D, VT/VF, AF s/p GIANFRANCO closure, MV/TV repair, PVC ablation 3/2024, In the last year ([id number]) he reports ~6-7 ICD shocks. Patient seen by transplant psychiatry for psychosocial clearance for advanced therapy.  Patient on exam endorsed hx of PTSD from episodes of VT/VF with symptoms of avoidance, dissociation, being easily startled, and flashbacks. 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 He noted past symptoms of low mood, that he would cope with both low mood and anxiety by doing physical activity. Noted being educated regarding the transplant, with risk of rejection, infection, dietary restrictions, as well as need for immunosuppression medications. He noted being shown the LVAD, and educated regarding about avoiding a contact sport, avoid submerging device, risk of driveline infection, and need to charge the device. Patient noted that he is not amenable to LVAD d/t feeling it would impact his quality of life, as he enjoys swimming and water activities.  Patient identified his wife as his support post-transplant.  Dx PTSD unspecified anxiety   Plan -SIPAT score of 13, patient deemed good candidate with no absolute contraindications to transplant  -C/W Klonopin 0.5mg PO PRN for acute anxiety alleviation -C/W Ambien 10mg PO HS for sleep architecture  -Discussed and educated patient regarding trial of of sertraline to address hx of anxiety and PTSD  -Discussed use of psychotherapy, if patient is amenable to enrolling in therapy  -Crisis resources reviewed on exam, patient instructed to call 911 or visit nearest ER for any safety concerns

## 2024-09-11 NOTE — ASU DISCHARGE PLAN (ADULT/PEDIATRIC) - NS MD DC FALL RISK RISK
For information on Fall & Injury Prevention, visit: [email or web address] OR  [email or web address] OR  https://www.cdc.gov/steadi/patient.html

## 2024-09-11 NOTE — REASON FOR VISIT
[Patient] : Patient [FreeTextEntry1] : Patient seen for psychosocial clearance for heart/ LVAD evaluation

## 2024-09-11 NOTE — H&P CARDIOLOGY - HISTORY OF PRESENT ILLNESS
Patient 68yo with PMH  NICM, HFrEF LVEF 25-30% s/p CRT-D, VT/VF, AF s/p GIANFRANCO closure, MV/TV repair, PVC ablation 3/2024 who presents for evaluation of HF syndrome.    Patient of Dr. Olivarez. He has long standing NICM with history of VT/VF requiring ICD shock. Last admission June 2023 for VT s/p ICD shock. 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 He reports that he was first diagnosed with his cardiomyopathy in 2011 after requiring a PPM for sick sinus? He further underwent MVR/TVR and MAZE in 2012. Overall he had been well managed for many years without any HF hospitalizations until recently. He has genetic testing that was unrevealing. In the last year ([id number]) he reports ~6-7 ICD shocks. He currently reports no issues since his PVC ablation.    Initial clinic visit 4/3/24. Other than his cardiomyopathy he is relatively healthy without any other significant medical issues. He remains active and exercises regularly. He is now retired. He is a never smoker. He is compliant with his medications. Spironolactone 25 mg started.    Clinic visit 7/15/24. VF on 7/2 with successful ATP and 7/9 w/ successful DCCV. Patient was standing and talking with his friends when the first VF episode occurred. He did not report passing out after this episode. Patient was sitting and sorting bills when VF occurred. Patient reports passing out and hitting his head after the second episode. He was instructed to start sotalol per EP but has not done so yet as he has not received medications here. He did start taking spironolactone after the last visit but reports significant dizziness/lightheadedness and stopped taking it. Since these last 2 episodes of VF he reports a decline in his energy level and functional status. He also reports that he is mentally drained from the VF episodes he has had over the past year. He is normally on Lasix as needed but lately has felt the need to be taking it daily.       Patient presents to the cardiology department for [facility] in prep for heart transplant     Bleeding Risk:           EF: < from: TTE Echo Complete w/o Contrast w/ Doppler (06.15.23 @ 12:19) >      Summary:   1. Left ventricular ejection fraction, by visual estimation, is 25 to   30%.   2. Mildly increased LV wall thickness.   3. The left ventricular diastolic function could not be assessed in this   study.   4. Moderately enlarged right ventricle.   5. Moderately reduced RV systolic function.   6. Moderately enlarged left atrium.   7. Moderately enlarged right atrium.   8. Mild to moderate mitral valve regurgitation.   9. Moderate to severe mitral stenosis.  10. Mild tricuspid regurgitation.  11. Estimated pulmonaryartery systolic pressure is 44.0 mmHg assuming a   right atrial pressure of 3 mmHg, which is consistent with mild pulmonary   hypertension.  12. Endocardial visualization was enhanced with intravenous echo contrast.        EKG:     Fluids:             ( )  ml bolus x 1 hr prior to cardiac cath followed by                           ( ) 75cc/hr until procedure                           ( ) 50cc/hr until procedure                          (x ) no fluids d/t patient is [facility] in prep for heart transplant

## 2024-09-11 NOTE — DISCUSSION/SUMMARY
[FreeTextEntry1] : 70 y/o domiciled, employed, , , male with PPHx of PTSD, unspecified anxiety d/o, with no past psychiatric admissions, with PMH NICM, HFrEF LVEF 25-30% s/p CRT-D, VT/VF, AF s/p GIANFRANCO closure, MV/TV repair, PVC ablation 3/2024, In the last year (5227-7200) he reports ~6-7 ICD shocks. Patient seen by transplant psychiatry for psychosocial clearance for advanced therapy.  Patient on exam endorsed hx of PTSD from episodes of VT/VF with symptoms of avoidance, dissociation, being easily startled, and flashbacks. 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 He noted past symptoms of low mood, that he would cope with both low mood and anxiety by doing physical activity. Noted being educated regarding the transplant, with risk of rejection, infection, dietary restrictions, as well as need for immunosuppression medications. He noted being shown the LVAD, and educated regarding about avoiding a contact sport, avoid submerging device, risk of driveline infection, and need to charge the device. Patient noted that he is not amenable to LVAD d/t feeling it would impact his quality of life, as he enjoys swimming and water activities.  Patient identified his wife as his support post-transplant.  Dx PTSD unspecified anxiety   Plan -SIPAT score of 13, patient deemed good candidate with no absolute contraindications to transplant  -C/W Klonopin 0.5mg PO PRN for acute anxiety alleviation -C/W Ambien 10mg PO HS for sleep architecture  -Discussed and educated patient regarding trial of of sertraline to address hx of anxiety and PTSD  -Discussed use of psychotherapy, if patient is amenable to enrolling in therapy  -Crisis resources reviewed on exam, patient instructed to call 911 or visit nearest ER for any safety concerns
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
## 2024-09-11 NOTE — ASU DISCHARGE PLAN (ADULT/PEDIATRIC) - CARE PROVIDER_API CALL
Celestine Bryan  Cardiac Electrophysiology  [address] [place] [address], [address]  [place] [address]  Phone: [phone]  Fax: [phone]  Follow Up Time: 1 month    Johnathon Olivarez  Interventional Cardiology  [address] [place] [address], [address]  [place] [address]  Phone: [phone]  Fax: [phone]  Follow Up Time: 2 weeks

## 2024-09-11 NOTE — FAMILY HISTORY
[FreeTextEntry1] : Patient attested to family hx of AUD, in his father and his paternal uncle, he denied any other family hx on exam

## 2024-09-11 NOTE — ASU DISCHARGE PLAN (ADULT/PEDIATRIC) - ASU DC SPECIAL INSTRUCTIONSFT
STOP TAKING FUROSEMIDE(LASIX)  You are to start taking Torsemide 20mg once a day, you can start medication tonight.     Activity:  - Do not drive or operate heavy machinery for 24 hours.    Hygiene:  - After 24 hours, you may shower and remove the dressing from the site. Do not tub bathe for one week. Do not rub or apply lotion, cream, powder to the affected site. Leave it open to air.   Diet:   - You may resume your diet. Low Sodium. Low Fat, Low Cholesterol.  If Diabetic - Carbohydrate Consistent Diet.        Special Instructions:  - Bruising or black and blue at the puncture site is possible.  - If there is bleeding from the puncture site (groin or wrist) apply direct firm pressure on the site and call 911.  - Any sudden swelling, redness, fever, discharge or severe pain, call your physician or call the cath lab.   - If you notice any scab formation in the area avoid touching the site and allow it to heal.  - Numbness or "pins and needle" sensation in the affected arm, hand, leg or if the affected site become cool to touch or pale that persist for extended period of time call your physician immediately to be checked.  - If you developed chest pain, not relieved by your usual routine medication, fainting, lethargy, weakness, report to the nearest emergency room.   - Inform your Dentist or Surgeon if you are taking Aspirin or any antiplatelet medications. Report any bleeding in your urine or stool.     Medications:  - Soreness or tenderness at the site is possible it will diminish over time. You may take Tylenol every 4-6 hours as needed. Nothing stronger is needed.  - If you are diabetic and taking medication containing Metformin, do not take them for 48 hours after the procedure.     Any questions call Cardiac Cath Lab at [phone] or [phone], Monday - Friday from 7 - 9 pm.

## 2024-09-12 DIAGNOSIS — I50.33 ACUTE ON CHRONIC DIASTOLIC (CONGESTIVE) HEART FAILURE: ICD-10-CM

## 2024-09-12 DIAGNOSIS — I42.9 CARDIOMYOPATHY, UNSPECIFIED: ICD-10-CM

## 2024-09-18 ENCOUNTER — APPOINTMENT (OUTPATIENT)
Dept: INFECTIOUS DISEASE | Facility: CLINIC | Age: 69
End: 2024-09-18
Payer: MEDICARE

## 2024-09-18 ENCOUNTER — APPOINTMENT (OUTPATIENT)
Dept: HEPATOLOGY | Facility: CLINIC | Age: 69
End: 2024-09-18

## 2024-09-18 VITALS
HEART RATE: 91 BPM | BODY MASS INDEX: 29.03 KG/M2 | OXYGEN SATURATION: 96 % | DIASTOLIC BLOOD PRESSURE: 78 MMHG | TEMPERATURE: 97.6 F | WEIGHT: 185 LBS | SYSTOLIC BLOOD PRESSURE: 109 MMHG | HEIGHT: 67 IN

## 2024-09-18 DIAGNOSIS — K76.0 FATTY (CHANGE OF) LIVER, NOT ELSEWHERE CLASSIFIED: ICD-10-CM

## 2024-09-18 DIAGNOSIS — Z23 ENCOUNTER FOR IMMUNIZATION: ICD-10-CM

## 2024-09-18 PROBLEM — B78.9 STRONGYLOIDIASIS: Status: ACTIVE | Noted: 2024-09-18

## 2024-09-18 PROBLEM — J84.10 CALCIFIED GRANULOMA OF LUNG: Status: ACTIVE | Noted: 2024-09-18

## 2024-09-18 PROCEDURE — 76981 USE PARENCHYMA: CPT

## 2024-09-18 PROCEDURE — 99205 OFFICE O/P NEW HI 60 MIN: CPT

## 2024-09-18 RX ORDER — IVERMECTIN 3 MG/1
3 TABLET ORAL DAILY
Qty: 12 | Refills: 0 | Status: ACTIVE | COMMUNITY
Start: 2024-09-18 | End: 1900-01-01

## 2024-09-18 NOTE — HISTORY OF PRESENT ILLNESS
[FreeTextEntry1] : Sep 18 2024  1:30PM   Underlying Disease(s) Requiring Transplant:  --------------------------------------------------------------------------------- Mr. XENA DENSON  is a 69 year  year-old M   undergoing evaluation for transplantation. Infectious diseases (ID) has been consulted for assessment of infection risks and to render an opinion as to whether or not they are a suitable candidate for transplantation from the infectious diseases standpoint.   68 y/o female PMH NICM, HFrEF LVEF 25-30% s/p CRT-D, VT/VF, AF s/p GIANFRANCO closure, MV/TV repair, PVC ablation 3/2024 who presents for evaluation of HF syndrome.   history of hospitalization due to infection: NO History of bacteremia, sepsis: NO History of endocarditis:  NO History of MDRO:  NO History of pneumonia: NO History of Flu/COVID 19:  2 y ago had covid 19, not treated  History of recurrent UTIs: NO History of infectious diarrhea, Cdiff:  NO History of dental infection: several none recently   History of meningitis: NO History of sinusitis: Chronic sinusitis - occasionally has been on antibiotics  History of STD:  NO History of MC herpes: NO History of Shingles:  NO History of FUO:  NO History of OM, skin soft tissue infections:  NO -- History of tonsillectomy/appendectomy/splenectomy/CCY: no History of metal hardware (e.g:PPM/defibrillator, prosthesis, Pins/needles):ICD - Healed ok, no issues  ---------------------------------------------------------------------------------------------------------------------------------- Childhood illnesses Chickenpox:   does not recall  Measle and mumps yes , Rubella, Scarlet fever, RH fever: NO  --------------------------------------------------------------------------------------------------------------------------------- Exposure/Travel History:    Current Residence (Born and raised): [place] Lived in other states no Travel within  (including [facility] or [facility]): : travelled [facility] to [facility] , [place] travels to the [place] or [place]  Foreign travel [place]:  [place], [place], [place], [place] Work: retired from working with homeless for 20 y ago  Hobbies: Gardening, Woodworking ------------------------------------------------------------------------------------------------------------------------- Tuberculosis exposure history:     History of screening, if yes, treatment: yes, 2019 last time tested with PPD prior to custodial  exposure: no history of contacts, has not lived/stayed/volunteered in any facility such as shelter, [facility], correctional,  base. Never been homeless.   work in Healthcare setting: Worked with homeless  ---------------------------------------------------------------------------------------------------------------------------- Animal Exposure history: 	Domestic : No 	Wildlife : No 	Livestock animals: No 	Birds : No 	Fishes : No 	other:  ----------------------------------------------------------------------------------------------------------------------------- Dietary Habits:  Reviewed risks of consuming and advised to avoid: 	Raw animal or dairy products -unpasteurized milk /cheese. 	Raw seafood -sushi 	Raw eggs - 	Raw or undercooked meat/poultry -   	Unpasteurized milk products  -   	Home made sausage - no      Unwashed vegetables -  Reviewed food safety as it relates to infection risks in the setting of transplantation in the immunocompromised host.   Reviewed CDC guidelines for the prevention of Listeria in the immunocompromised host.      Environment: 	Residence water supply: city/island 	Reviewed of occupational and recreational exposure risks as they relate to infection in the setting of transplantation.  --------------------------------------------------------------------- Prior or current immunosuppressive therapies:  ---------------------------------------------------------------------- Immunization History: ----- Childhood immunizations:    ----- Vaccine:                     Y/N;  Date vaccine administered   	Tdap/Td            	Prevnar		received prevnar 	Pneumovax 	 	Hepatitis A	 	Hepatitis B	 	Influenza	 	Shingles            RECEIVED 2 DOSES 	HPV                    	COVID 19 last booster was august 2023 	Other	  Vaccines: live vaccines are generally avoided in the transplant recipient following transplantation.  Live vaccines are permitted up to one month prior to transplant.    -------------------------------------------------------------------------------------------------------------------------------------------------- ROS GENERAL: denies chills, , night sweats, weight loss.  PSYCH: denies depression, anxiety, suicidal ideation, hallucination, and delusions SKIN: no rash or lesions; no color changes, no abnormal nevi,no  dryness, and no jaundice   EYES: denies visual changes, floaters, pain, inflammation, blurred vision, and discharge ENT: denies tinnitus, vertigo, epistaxis, oral lesion, and decreased acuity PULM: denies, hemoptysis, pleurisy CVS: denies angina, palpitations,+ orthopnea, no syncope, or heart murmur GI: denies constipation, diarrhea, melena, abdominal pain, nausea.  : denies dysuria, frequency, discharge, incontinence, stones or macroscopic hematuria MS: no arthralgias, no erythema or swelling, no myalgias, no edema, or lower back pain.  CNS: denies numbness, dizziness, seizure, or tremor ENDO: denies heat/cold intolerance, polyuria, polydipsia, malaise.   HEME: denies bruising, bleeding, lymphadenopathy, anemia, and calf pain --------------------------------------------------------------------------------------------------------------------------------------------------------- Physical Exam:  General: AOx3, NAD HEENT: NCAT, normal conjunctiva with no icterus or erythema, no oropharyngeal abnormalities, no abscess and good oral hygiene  Neck: ROM normal, supple, no meningismus, no lymphadenopathy  Cardiovascular: regular rhythm;  no murmurs, rubs, no S3, S4; palpable pedal pulses Respiratory:  no wheezes, rales or rhonchi,, normal airway entry bilaterally Abdominal:  non distended,  abdomen soft, non-tender,  with no fluid shift. No Hepatosplenomegaly.  Genitourinary: no CVA, bladder tenderness Neurological: AOx3, NAD, interactive and appropriate, spontaneously moves all extremities, CN grossly intact, no focal deficits appreciated Musculoskeletal/extremities: Strength symmetric,  no lower extremity edema  Skin: Warm, dry, no rashes, nodules, wounds or other lesions scar tissue over left gemellus muscle

## 2024-09-18 NOTE — ASSESSMENT
[FreeTextEntry1] : 68 y/o female PMH NICM, HFrEF LVEF 25-30% s/p CRT-D, VT/VF, AF s/p GIANFRANCO closure, MV/TV repair, PVC ablation 3/2024 who presents for evaluation for heart transplant   A. ACTIVE INFECTIOUS DISEASES ISSUES 1.Calcified granulomas needs quantiferon - worked with homeless small calcified granulomata- will add cryptococcus antigen, Coccidiodies and Histoplasma antigen testing-  2. Strongyloidiasis-  ivermectin x2 days prescribed. reviewed singificance of infection and treatment with patient  ----------------- B. PRE-TRANSPLANT EVALUTION AND PREVENTIVE CARE ------- - No current contraindication from the Infectious Diseases standpoint to proceed with listing - Prior to activation, recommend : ------- 1. Routine and additional ID screening Please send, if not done yet and/or follow: - HIV Ab/Ag NEG - HSV 1/2 IgG POS - VZV IgG POS - EBV Serology panel (or VCA IgG) POS - CMV IgGPOS - Hepatitis A IgG NEG - Hepatitis B surface Ig NEG - Hepatitis B core IgG NEG - Hepatitis B surface Antigen NEG - Hepatitis C Antibody NEG - Measles ([place]) IgG - Rubella IgG (Turkmen Measles) POS - Mumps IgG POS - Syphilis screening (antitreponemal antibody with reflex to RPR) NEG - Quantiferon Gold  not on file- ordered -Toxoplasma IgG POS - Strongyloides IgG POS - hepatitis E Ab pos  ----- 2. Immunizations  -Overall, vaccinations are recommended ideally in the pre-transplant period, at least 14 days prior to transplantation.  -If transplantation is felt imminent, then immunizations should be deferred to the post-transplant period, at least 4-6 months post-transplant with the exception of COVID-19 vaccination and the flu vaccine, which can be started as early as 1 month post-transplant. Similarly, all incomplete schedules prior to transplantation should be deferred to 4-6 months post-transplant.  - No live vaccinations should be administered unless transplantation is not anticipated or planned within the 4 weeks following vaccine administration.   Recommend administration of: - Flu shot, high dose.  - COVID [id number]  Will get these 2 with wife at local pharmacy   - Abrysvo for RSV= today - Heplislav 2 doses 1 months apart.- first dose today- can come here for second but also provided written instructions for local pharmacy or PCP - Havrix  2 doses 6 months apart as above - Tdap  (tetanus, diphteria, pertussis): once, with follow up Td every 10 years needed= today previously received prevnar 20 adn shingrix series    3. PERIOPERATIVE AND POST-TRANSPLANT PROPHYLAXIS  Perioperative ANTIBACTERIAL prophylaxis: - No history of MDRO: prophylaxis per protocol   VIRAL Prophylaxis; --- CMV PROPHYLAXIS: based on donor/recipient IgG status   CMV R+: intermediate risk of reactivation.  Valcyte for 3-6 months , ideallyat full dose of 900 mg daily to avoid resistances. Alternatively, a preemptive approach may be elected of weekly plasma PCR checks and early treatment with reactivation.  Once antivirals are discontinued, recommend serial CMV PCR weekly for 1 months, then every 2 weeks for 1 months, then monthly until 6 months have passed to detect early reactivations.Patients should be  advised to contact transplant center for evaluation of fever, myalgias, malaise, headache, diarrhea and other new complaints  --- HSV/VZV prophylaxis: Patients who do not recive valcyte during the first month post-transplant should receive acyclovir 400 mg bid to prevent early HSV reactivation or in rare instances, donor derived HSV/VZV. longer duration may be considered in patients with history of recurrent HSV/VZV.  PCP/Toxoplasmosis:  --- PCP prophylaxis:  PO TMP-SMX 1 SS or DS daily (if at high risk of toxoplasmosis) for at least 6 months per protocol Alternatives for TMP-SMX allergic/intolerant patients  	Atovaquone 1500 PO once daily (also prevents toxoplasma) if NOT at high risk for Toxoplasmosis 	Dapsone 100 mg PO once daily (after excluding G6PD deficiency), sulfone derivative - low risk of cross-reactivity with mild sulfa allergy .       pentamidine monthly: least preferred option;   ---Toxoplasma prophylaxis:  If Toxoplasma D+/R-  this is high risk of primary toxoplasma infection status post transplant (or toxoplasma reactivation in seropositive heart transplant patients), recommend PO TMP-SMX DS daily or atovaquone 750 mg BID for at 6 months to a year.  Toxoplasmosis R+ patients are at moderate risk of reactivation and thus still would recommend bactrim or atovaquone prophylaxis for 6 months.    4. REFERRALS  - Dental : for dental clearance- a yearly checkup, ideally once pre-transplant is recommended.

## 2024-09-23 ENCOUNTER — MED ADMIN CHARGE (OUTPATIENT)
Age: 69
End: 2024-09-23

## 2024-09-23 PROBLEM — I42.8 NICM (NONISCHEMIC CARDIOMYOPATHY): Status: RESOLVED | Noted: 2024-09-23 | Resolved: 2024-09-23

## 2024-09-26 ENCOUNTER — APPOINTMENT (OUTPATIENT)
Dept: CARDIOTHORACIC SURGERY | Facility: CLINIC | Age: 69
End: 2024-09-26
Payer: MEDICARE

## 2024-09-26 VITALS
WEIGHT: 185 LBS | RESPIRATION RATE: 16 BRPM | HEIGHT: 67 IN | HEART RATE: 83 BPM | BODY MASS INDEX: 29.03 KG/M2 | DIASTOLIC BLOOD PRESSURE: 84 MMHG | SYSTOLIC BLOOD PRESSURE: 119 MMHG | OXYGEN SATURATION: 96 % | TEMPERATURE: 98.2 F

## 2024-09-26 DIAGNOSIS — I10 ESSENTIAL (PRIMARY) HYPERTENSION: ICD-10-CM

## 2024-09-26 DIAGNOSIS — I42.8 OTHER CARDIOMYOPATHIES: ICD-10-CM

## 2024-09-26 DIAGNOSIS — I49.01 VENTRICULAR FIBRILLATION: ICD-10-CM

## 2024-09-26 DIAGNOSIS — Z01.818 ENCOUNTER FOR OTHER PREPROCEDURAL EXAMINATION: ICD-10-CM

## 2024-09-26 DIAGNOSIS — B78.9 STRONGYLOIDIASIS, UNSPECIFIED: ICD-10-CM

## 2024-09-26 DIAGNOSIS — Z78.9 OTHER SPECIFIED HEALTH STATUS: ICD-10-CM

## 2024-09-26 DIAGNOSIS — Z98.890 OTHER SPECIFIED POSTPROCEDURAL STATES: ICD-10-CM

## 2024-09-26 DIAGNOSIS — J84.10 PULMONARY FIBROSIS, UNSPECIFIED: ICD-10-CM

## 2024-09-26 DIAGNOSIS — Z86.79 OTHER SPECIFIED POSTPROCEDURAL STATES: ICD-10-CM

## 2024-09-26 LAB — CRYPTOC AG SER QL: NEGATIVE

## 2024-09-26 PROCEDURE — 99204 OFFICE O/P NEW MOD 45 MIN: CPT

## 2024-09-26 RX ORDER — ZOLPIDEM TARTRATE 10 MG/1
10 TABLET ORAL
Qty: 30 | Refills: 2 | Status: ACTIVE | COMMUNITY
Start: 2024-09-26

## 2024-09-26 NOTE — HISTORY OF PRESENT ILLNESS
[FreeTextEntry1] :  68 y/o female PMH NICM, HFrEF LVEF 25-30% s/p CRT-D, VT/VF, AF s/p GIANFRANCO closure, MV/TV repair with Dr. Perez in 5/20212 in [place], PVC ablation 3/2024, S/P PPM in 5/2011 who presents for LVAD/ Heart transplant evaluation.    He has long standing NICM with history of VT/VF requiring ICD shock. Last admission June 2023 for VT s/p ICD shock. 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 He reports that he was first diagnosed with his cardiomyopathy in 2011 after requiring a PPM for sick sinus? He further underwent MVR/TVR and MAZE in 2012. Overall he had been well managed for many years without any HF hospitalizations until recently. He has genetic testing that was unrevealing. In the last year (3558-8403) he reports ~6-7 ICD shocks. He currently reports no issues since his PVC ablation.   VF on 7/2 with successful ATP and 7/9 w/ successful DCCV. Patient was standing and talking with his friends when the first VF episode occurred. He did not report passing out after this episode. Patient was sitting and sorting bills when VF occurred. Patient reports passing out and hitting his head after the second episode. He was instructed to start sotalol per EP but had to stop due to dizziness. He did start taking spironolactone but reports significant dizziness/lightheadedness and stopped taking it. Since these last 2 episodes of VF he reports a decline in his energy level and functional status. He also reports that he is mentally drained from the VF episodes he has had over the past year. He is normally on Lasix as needed but lately has felt the need to be taking it daily.   Today he reports occasional dyspnea on exertion,  dizziness. He can walk up to 1 block slowly, 1 FOS without shortness of breath. He uses 2 pillows to sleep. 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 Denies any pedal edema or PND.   ABO : O positive CR: 0.97 mg/dl CPRA : 3 % A1C: 6.1 BMI: 28.98 Kg/sq m Heart failure cardiologist: Dr. Luca Tamayo  Transplant work up  For LVAD/ Transplant work up  "Bone Density-reschedule Hepatology - completed Fibroscan  09/18 Colonoscopy-reschedule to october  Dental- completed PVR- 2.98 Transplant committee Presentation 10/3"

## 2024-09-26 NOTE — DATA REVIEWED
[FreeTextEntry1] :  8/28/24 Non cont CT Chest revealed No suspicious mediastinal lymph nodes. Multiple subcentimeter likely reactive lymph nodes. Dilated main pulmonary artery segment 3.9 cm (301/101) consistent with pulmonary hypertension. Bilateral basilar reticular opacities, most pronounced left lower lobe without significant change.  8/28/24 Non cont CT ABD/PELVIS revealed No acute abnormality in the abdomen and pelvis. Scattered atherosclerotic calcifications are seen in the infrarenal abdominal aorta and bilateral iliac arteries. No aneurysm. 1.7 cm left adrenal adenoma. Enlarged prostate gland. Trabeculation of the bladder wall with a few bladder dome diverticula identified. No suspicious pulmonary masses or nodules. Multiple small calcified granulomata.  6/2023 LVEF 25-30% LVEDD 5.9cm, moderately reduced RV function, mild MR/TR   Cardiac Cath/PCI: [facility] 6/2023 Nonobstructive CAD

## 2024-09-26 NOTE — ASSESSMENT
[FreeTextEntry1] :  70 y/o female PMH NICM, HFrEF LVEF 25-30% s/p CRT-D, VT/VF, AF s/p GIANFRANCO closure, MV/TV repair with Dr. Perez in 5/20212 in [place], PVC ablation 3/2024, S/P PPM in 5/2011 who presents for LVAD/ Heart transplant evaluation.    He has long standing NICM with history of VT/VF requiring ICD shock. Last admission June 2023 for VT s/p ICD shock. 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 He reports that he was first diagnosed with his cardiomyopathy in 2011 after requiring a PPM for sick sinus? He further underwent MVR/TVR and MAZE in 2012. Overall he had been well managed for many years without any HF hospitalizations until recently. He has genetic testing that was unrevealing. In the last year (4246-7679) he reports ~6-7 ICD shocks. He currently reports no issues since his PVC ablation.  VF on 7/2 with successful ATP and 7/9 w/ successful DCCV. Patient was standing and talking with his friends when the first VF episode occurred. He did not report passing out after this episode. Patient was sitting and sorting bills when VF occurred. Patient reports passing out and hitting his head after the second episode. He was instructed to start sotalol per EP but had to stop due to dizziness. He did start taking spironolactone but reports significant dizziness/lightheadedness and stopped taking it. Since these last 2 episodes of VF he reports a decline in his energy level and functional status. He also reports that he is mentally drained from the VF episodes he has had over the past year. He is normally on Lasix as needed but lately has felt the need to be taking it daily.   Today he reports occasional dyspnea on exertion, dizziness. He can walk up to 1 block slowly, 1 FOS without shortness of breath. He uses 2 pillows to sleep. 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    Dr. Good Barrientos reviewed the patients imaging, medical records, reports and discussed the case.  Dr. Good Barrientos discussed the risks, benefits and alternatives to both LVAD and transplant surgery. Pros, cons and risks associated with both LVAD and transplant also discussed in detail. Discussed medications and specifics associated with both LVAD and transplant as well as transplant eligibility/listing. Hepatitis C, NRP-DCD and high risk donors discussed in detail with patient as well.   Dr. Good Barrientos discussed the risks, benefits and alternatives to transplant surgery. Risks included but not limited to bleeding, stroke, re-operation, arrhythmia, myocardial Infarction, primary graft dysfunction, kidney problems, liver problems, lungs problems, blood transfusion, permanent  pacemaker implantation, infections, mechanical support and death. Dr. Good Barrientos also discussed the various approaches in detail. Dr. Good Barrientos feel that the patient will benefit and is a candidate for heart transplant. All questions and concerns were addressed and patient wishes to proceed with LVAD/ Heart transplant evaluation at this time.

## 2024-09-26 NOTE — DATA REVIEWED
[FreeTextEntry1] :  8/28/24 Non cont CT Chest revealed No suspicious mediastinal lymph nodes. Multiple subcentimeter likely reactive lymph nodes. Dilated main pulmonary artery segment 3.9 cm (301/101) consistent with pulmonary hypertension. Bilateral basilar reticular opacities, most pronounced left lower lobe without significant change.  8/28/24 Non cont CT ABD/PELVIS revealed No acute abnormality in the abdomen and pelvis. Scattered atherosclerotic calcifications are seen in the infrarenal abdominal aorta and bilateral iliac arteries. No aneurysm. 1.7 cm left adrenal adenoma. Enlarged prostate gland. Trabeculation of the bladder wall with a few bladder dome diverticula identified. No suspicious pulmonary masses or nodules. Multiple small calcified granulomata.  6/2023 LVEF 25-30% LVEDD 5.9cm, moderately reduced RV function, mild MR/TR   Cardiac Cath/PCI: [facility] 6/2023 Nonobstructive CAD

## 2024-09-26 NOTE — HISTORY OF PRESENT ILLNESS
[FreeTextEntry1] :  68 y/o female PMH NICM, HFrEF LVEF 25-30% s/p CRT-D, VT/VF, AF s/p GIANFRANCO closure, MV/TV repair with Dr. Perez in 5/20212 in [place], PVC ablation 3/2024, S/P PPM in 5/2011 who presents for LVAD/ Heart transplant evaluation.    He has long standing NICM with history of VT/VF requiring ICD shock. Last admission June 2023 for VT s/p ICD shock. 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 He reports that he was first diagnosed with his cardiomyopathy in 2011 after requiring a PPM for sick sinus? He further underwent MVR/TVR and MAZE in 2012. Overall he had been well managed for many years without any HF hospitalizations until recently. He has genetic testing that was unrevealing. In the last year (6951-3799) he reports ~6-7 ICD shocks. He currently reports no issues since his PVC ablation.   VF on 7/2 with successful ATP and 7/9 w/ successful DCCV. Patient was standing and talking with his friends when the first VF episode occurred. He did not report passing out after this episode. Patient was sitting and sorting bills when VF occurred. Patient reports passing out and hitting his head after the second episode. He was instructed to start sotalol per EP but had to stop due to dizziness. He did start taking spironolactone but reports significant dizziness/lightheadedness and stopped taking it. Since these last 2 episodes of VF he reports a decline in his energy level and functional status. He also reports that he is mentally drained from the VF episodes he has had over the past year. He is normally on Lasix as needed but lately has felt the need to be taking it daily.   Today he reports occasional dyspnea on exertion,  dizziness. He can walk up to 1 block slowly, 1 FOS without shortness of breath. He uses 2 pillows to sleep. 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 Denies any pedal edema or PND.   ABO : O positive CR: 0.97 mg/dl CPRA : 3 % A1C: 6.1 BMI: 28.98 Kg/sq m Heart failure cardiologist: Dr. Luca Tamayo  Transplant work up  For LVAD/ Transplant work up  "Bone Density-reschedule Hepatology - completed Fibroscan  09/18 Colonoscopy-reschedule to october  Dental- completed PVR- 2.98 Transplant committee Presentation 10/3"

## 2024-09-26 NOTE — PHYSICAL EXAM
[General Appearance - Alert] : alert [General Appearance - In No Acute Distress] : in no acute distress [General Appearance - Well Nourished] : well nourished [General Appearance - Well Developed] : well developed [Sclera] : the sclera and conjunctiva were normal [PERRL With Normal Accommodation] : pupils were equal in size, round, and reactive to light [Outer Ear] : the ears and nose were normal in appearance [Hearing Threshold Finger Rub Not [place]] : hearing was normal [Both Tympanic Membranes Were Examined] : both tympanic membranes were normal [Neck Appearance] : the appearance of the neck was normal [] : no respiratory distress [Respiration, Rhythm And Depth] : normal respiratory rhythm and effort [Auscultation Breath Sounds / Voice Sounds] : lungs were clear to auscultation bilaterally [Apical Impulse] : the apical impulse was normal [Heart Rate And Rhythm] : heart rate was normal and rhythm regular [Heart Sounds] : normal S1 and S2 [Murmurs] : no murmurs [Examination Of The Chest] : the chest was normal in appearance [2+] : left 2+ [Breast Appearance] : normal in appearance [Bowel Sounds] : normal bowel sounds [Abdomen Soft] : soft [No CVA Tenderness] : no ~M costovertebral angle tenderness [Abnormal Walk] : normal gait [Involuntary Movements] : no involuntary movements were seen [Skin Color & Pigmentation] : normal skin color and pigmentation [Skin Turgor] : normal skin turgor [No Focal Deficits] : no focal deficits [Oriented To Time, Place, And Person] : oriented to person, place, and time [Impaired Insight] : insight and judgment were intact [Affect] : the affect was normal [Mood] : the mood was normal [Memory Recent] : recent memory was not impaired [Memory Remote] : remote memory was not impaired [FreeTextEntry1] : Deferred

## 2024-09-26 NOTE — END OF VISIT
[FreeTextEntry3] :  I, Dr. SHAH, JOHANNA BARRON , personally performed the evaluation and management (E/M) services for this new patient. That E/M includes conducting the initial examination, assessing all conditions, and establishing the plan of care. Today, LAVINIA DEMARCO  was here to observe my evaluation and management services for this patient.

## 2024-09-26 NOTE — HISTORY OF PRESENT ILLNESS
[FreeTextEntry1] :  68 y/o female PMH NICM, HFrEF LVEF 25-30% s/p CRT-D, VT/VF, AF s/p GIANFRANCO closure, MV/TV repair with Dr. Perez in 5/20212 in [place], PVC ablation 3/2024, S/P PPM in 5/2011 who presents for LVAD/ Heart transplant evaluation.    He has long standing NICM with history of VT/VF requiring ICD shock. Last admission June 2023 for VT s/p ICD shock. 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 He reports that he was first diagnosed with his cardiomyopathy in 2011 after requiring a PPM for sick sinus? He further underwent MVR/TVR and MAZE in 2012. Overall he had been well managed for many years without any HF hospitalizations until recently. He has genetic testing that was unrevealing. In the last year (0724-5422) he reports ~6-7 ICD shocks. He currently reports no issues since his PVC ablation.   VF on 7/2 with successful ATP and 7/9 w/ successful DCCV. Patient was standing and talking with his friends when the first VF episode occurred. He did not report passing out after this episode. Patient was sitting and sorting bills when VF occurred. Patient reports passing out and hitting his head after the second episode. He was instructed to start sotalol per EP but had to stop due to dizziness. He did start taking spironolactone but reports significant dizziness/lightheadedness and stopped taking it. Since these last 2 episodes of VF he reports a decline in his energy level and functional status. He also reports that he is mentally drained from the VF episodes he has had over the past year. He is normally on Lasix as needed but lately has felt the need to be taking it daily.   Today he reports occasional dyspnea on exertion,  dizziness. He can walk up to 1 block slowly, 1 FOS without shortness of breath. He uses 2 pillows to sleep. 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 Denies any pedal edema or PND.   ABO : O positive CR: 0.97 mg/dl CPRA : 3 % A1C: 6.1 BMI: 28.98 Kg/sq m Heart failure cardiologist: Dr. Luca Tamayo  Transplant work up  For LVAD/ Transplant work up  "Bone Density-reschedule Hepatology - completed Fibroscan  09/18 Colonoscopy-reschedule to october  Dental- completed PVR- 2.98 Transplant committee Presentation 10/3"

## 2024-09-26 NOTE — DATA REVIEWED
[FreeTextEntry1] :  8/28/24 Non cont CT Chest revealed No suspicious mediastinal lymph nodes. Multiple subcentimeter likely reactive lymph nodes. Dilated main pulmonary artery segment 3.9 cm (301/101) consistent with pulmonary hypertension. Bilateral basilar reticular opacities, most pronounced left lower lobe without significant change.  8/28/24 Non cont CT ABD/PELVIS revealed No acute abnormality in the abdomen and pelvis. Scattered atherosclerotic calcifications are seen in the infrarenal abdominal aorta and bilateral iliac arteries. No aneurysm. 1.7 cm left adrenal adenoma. Enlarged prostate gland. Trabeculation of the bladder wall with a few bladder dome diverticula identified. No suspicious pulmonary masses or nodules. Multiple small calcified granulomata.  6/2023 LVEF 25-30% LVEDD 5.9cm, moderately reduced RV function, mild MR/TR   Cardiac Cath/PCI: [facility] 6/2023 Nonobstructive CAD

## 2024-09-26 NOTE — ASSESSMENT
[FreeTextEntry1] :  70 y/o female PMH NICM, HFrEF LVEF 25-30% s/p CRT-D, VT/VF, AF s/p GIANFRANCO closure, MV/TV repair with Dr. Perez in 5/20212 in [place], PVC ablation 3/2024, S/P PPM in 5/2011 who presents for LVAD/ Heart transplant evaluation.    He has long standing NICM with history of VT/VF requiring ICD shock. Last admission June 2023 for VT s/p ICD shock. 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 He reports that he was first diagnosed with his cardiomyopathy in 2011 after requiring a PPM for sick sinus? He further underwent MVR/TVR and MAZE in 2012. Overall he had been well managed for many years without any HF hospitalizations until recently. He has genetic testing that was unrevealing. In the last year (1074-1167) he reports ~6-7 ICD shocks. He currently reports no issues since his PVC ablation.  VF on 7/2 with successful ATP and 7/9 w/ successful DCCV. Patient was standing and talking with his friends when the first VF episode occurred. He did not report passing out after this episode. Patient was sitting and sorting bills when VF occurred. Patient reports passing out and hitting his head after the second episode. He was instructed to start sotalol per EP but had to stop due to dizziness. He did start taking spironolactone but reports significant dizziness/lightheadedness and stopped taking it. Since these last 2 episodes of VF he reports a decline in his energy level and functional status. He also reports that he is mentally drained from the VF episodes he has had over the past year. He is normally on Lasix as needed but lately has felt the need to be taking it daily.   Today he reports occasional dyspnea on exertion, dizziness. He can walk up to 1 block slowly, 1 FOS without shortness of breath. He uses 2 pillows to sleep. 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    Dr. Good Barrientos reviewed the patients imaging, medical records, reports and discussed the case.  Dr. Good Barrientos discussed the risks, benefits and alternatives to both LVAD and transplant surgery. Pros, cons and risks associated with both LVAD and transplant also discussed in detail. Discussed medications and specifics associated with both LVAD and transplant as well as transplant eligibility/listing. Hepatitis C, NRP-DCD and high risk donors discussed in detail with patient as well.   Dr. Good Barrientos discussed the risks, benefits and alternatives to transplant surgery. Risks included but not limited to bleeding, stroke, re-operation, arrhythmia, myocardial Infarction, primary graft dysfunction, kidney problems, liver problems, lungs problems, blood transfusion, permanent  pacemaker implantation, infections, mechanical support and death. Dr. Good Barrientos also discussed the various approaches in detail. Dr. Good Barrientos feel that the patient will benefit and is a candidate for heart transplant. All questions and concerns were addressed and patient wishes to proceed with LVAD/ Heart transplant evaluation at this time.

## 2024-09-26 NOTE — ASSESSMENT
[FreeTextEntry1] :  70 y/o female PMH NICM, HFrEF LVEF 25-30% s/p CRT-D, VT/VF, AF s/p GIANFRANCO closure, MV/TV repair with Dr. Perez in 5/20212 in [place], PVC ablation 3/2024, S/P PPM in 5/2011 who presents for LVAD/ Heart transplant evaluation.    He has long standing NICM with history of VT/VF requiring ICD shock. Last admission June 2023 for VT s/p ICD shock. 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 He reports that he was first diagnosed with his cardiomyopathy in 2011 after requiring a PPM for sick sinus? He further underwent MVR/TVR and MAZE in 2012. Overall he had been well managed for many years without any HF hospitalizations until recently. He has genetic testing that was unrevealing. In the last year (2207-8907) he reports ~6-7 ICD shocks. He currently reports no issues since his PVC ablation.  VF on 7/2 with successful ATP and 7/9 w/ successful DCCV. Patient was standing and talking with his friends when the first VF episode occurred. He did not report passing out after this episode. Patient was sitting and sorting bills when VF occurred. Patient reports passing out and hitting his head after the second episode. He was instructed to start sotalol per EP but had to stop due to dizziness. He did start taking spironolactone but reports significant dizziness/lightheadedness and stopped taking it. Since these last 2 episodes of VF he reports a decline in his energy level and functional status. He also reports that he is mentally drained from the VF episodes he has had over the past year. He is normally on Lasix as needed but lately has felt the need to be taking it daily.   Today he reports occasional dyspnea on exertion, dizziness. He can walk up to 1 block slowly, 1 FOS without shortness of breath. He uses 2 pillows to sleep. 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    Dr. Good Barrientos reviewed the patients imaging, medical records, reports and discussed the case.  Dr. Good Barrientos discussed the risks, benefits and alternatives to both LVAD and transplant surgery. Pros, cons and risks associated with both LVAD and transplant also discussed in detail. Discussed medications and specifics associated with both LVAD and transplant as well as transplant eligibility/listing. Hepatitis C, NRP-DCD and high risk donors discussed in detail with patient as well.   Dr. Good Barrientos discussed the risks, benefits and alternatives to transplant surgery. Risks included but not limited to bleeding, stroke, re-operation, arrhythmia, myocardial Infarction, primary graft dysfunction, kidney problems, liver problems, lungs problems, blood transfusion, permanent  pacemaker implantation, infections, mechanical support and death. Dr. Good Barrientos also discussed the various approaches in detail. Dr. Good Barrientos feel that the patient will benefit and is a candidate for heart transplant. All questions and concerns were addressed and patient wishes to proceed with LVAD/ Heart transplant evaluation at this time.

## 2024-09-26 NOTE — PHYSICAL EXAM
[General Appearance - Alert] : alert [General Appearance - In No Acute Distress] : in no acute distress [General Appearance - Well Nourished] : well nourished [General Appearance - Well Developed] : well developed [Sclera] : the sclera and conjunctiva were normal [PERRL With Normal Accommodation] : pupils were equal in size, round, and reactive to light [Outer Ear] : the ears and nose were normal in appearance [Hearing Threshold Finger Rub Not [place]] : hearing was normal [Both Tympanic Membranes Were Examined] : both tympanic membranes were normal [Neck Appearance] : the appearance of the neck was normal [] : no respiratory distress [Respiration, Rhythm And Depth] : normal respiratory rhythm and effort [Auscultation Breath Sounds / Voice Sounds] : lungs were clear to auscultation bilaterally [Apical Impulse] : the apical impulse was normal [Heart Rate And Rhythm] : heart rate was normal and rhythm regular [Heart Sounds] : normal S1 and S2 [Murmurs] : no murmurs [Examination Of The Chest] : the chest was normal in appearance [2+] : left 2+ [Breast Appearance] : normal in appearance [Bowel Sounds] : normal bowel sounds [Abdomen Soft] : soft [No CVA Tenderness] : no ~M costovertebral angle tenderness [Abnormal Walk] : normal gait [Involuntary Movements] : no involuntary movements were seen [Skin Color & Pigmentation] : normal skin color and pigmentation [Skin Turgor] : normal skin turgor [No Focal Deficits] : no focal deficits [Oriented To Time, Place, And Person] : oriented to person, place, and time [Impaired Insight] : insight and judgment were intact [Affect] : the affect was normal [Mood] : the mood was normal [Memory Recent] : recent memory was not impaired [Memory Remote] : remote memory was not impaired [FreeTextEntry1] : Deferred

## 2024-09-26 NOTE — PHYSICAL EXAM
[General Appearance - Alert] : alert [General Appearance - In No Acute Distress] : in no acute distress [General Appearance - Well Nourished] : well nourished [General Appearance - Well Developed] : well developed [Sclera] : the sclera and conjunctiva were normal [PERRL With Normal Accommodation] : pupils were equal in size, round, and reactive to light [Outer Ear] : the ears and nose were normal in appearance [Hearing Threshold Finger Rub Not [place]] : hearing was normal [Both Tympanic Membranes Were Examined] : both tympanic membranes were normal [Neck Appearance] : the appearance of the neck was normal [] : no respiratory distress [Respiration, Rhythm And Depth] : normal respiratory rhythm and effort [Auscultation Breath Sounds / Voice Sounds] : lungs were clear to auscultation bilaterally [Apical Impulse] : the apical impulse was normal [Heart Rate And Rhythm] : heart rate was normal and rhythm regular [Heart Sounds] : normal S1 and S2 [Murmurs] : no murmurs [Examination Of The Chest] : the chest was normal in appearance [2+] : left 2+ [Breast Appearance] : normal in appearance [Bowel Sounds] : normal bowel sounds [Abdomen Soft] : soft [No CVA Tenderness] : no ~M costovertebral angle tenderness [Abnormal Walk] : normal gait [Involuntary Movements] : no involuntary movements were seen [Skin Color & Pigmentation] : normal skin color and pigmentation [Skin Turgor] : normal skin turgor [No Focal Deficits] : no focal deficits [Oriented To Time, Place, And Person] : oriented to person, place, and time [Impaired Insight] : insight and judgment were intact [Affect] : the affect was normal [Mood] : the mood was normal [Memory Recent] : recent memory was not impaired [Memory Remote] : remote memory was not impaired [FreeTextEntry1] : Deferred

## 2024-10-01 ENCOUNTER — APPOINTMENT (OUTPATIENT)
Dept: GASTROENTEROLOGY | Facility: CLINIC | Age: 69
End: 2024-10-01
Payer: MEDICARE

## 2024-10-01 ENCOUNTER — APPOINTMENT (OUTPATIENT)
Dept: RADIOLOGY | Facility: CLINIC | Age: 69
End: 2024-10-01
Payer: COMMERCIAL

## 2024-10-01 VITALS
TEMPERATURE: 97.8 F | HEIGHT: 67 IN | WEIGHT: 188 LBS | SYSTOLIC BLOOD PRESSURE: 100 MMHG | OXYGEN SATURATION: 97 % | DIASTOLIC BLOOD PRESSURE: 60 MMHG | BODY MASS INDEX: 29.51 KG/M2 | HEART RATE: 89 BPM

## 2024-10-01 DIAGNOSIS — Z12.11 ENCOUNTER FOR SCREENING FOR MALIGNANT NEOPLASM OF COLON: ICD-10-CM

## 2024-10-01 PROCEDURE — 99213 OFFICE O/P EST LOW 20 MIN: CPT

## 2024-10-01 PROCEDURE — 77080 DXA BONE DENSITY AXIAL: CPT

## 2024-10-01 RX ORDER — POLYETHYLENE GLYCOL 3350 AND ELECTROLYTES WITH LEMON FLAVOR 236; 22.74; 6.74; 5.86; 2.97 G/4L; G/4L; G/4L; G/4L; G/4L
236 POWDER, FOR SOLUTION ORAL
Qty: 1 | Refills: 0 | Status: ACTIVE | COMMUNITY
Start: 2024-10-01 | End: 1900-01-01

## 2024-10-01 NOTE — ASSESSMENT
[FreeTextEntry1] : Patient is a 69-year-old male seen in the gastroenterology office today prior to scheduling screening colonoscopy.  Patient denied having a prior colonoscopy.  Patient denied family history of colon cancer or rectal cancer.  Will schedule patient for colonoscopy for colon cancer screening.  Discussed indication, benefits/risks and alternatives to colonoscopy with the patient.  He reported understanding and is in agreement with proceeding with colonoscopy.  All of his questions were answered.  The colonoscopy will be scheduled at the hospital.  Patient will need cardiac clearance from his cardiologist prior to proceeding with colonoscopy.      -Schedule colonoscopy.

## 2024-10-01 NOTE — PHYSICAL EXAM
[Alert] : alert [No Acute Distress] : no acute distress [Sclera] : the sclera and conjunctiva were normal [Oropharynx] : the oropharynx was normal [Normal Appearance] : the appearance of the neck was normal [No Respiratory Distress] : no respiratory distress [Auscultation Breath Sounds / Voice Sounds] : lungs were clear to auscultation bilaterally [Normal S1, S2] : normal S1 and S2 [None] : no edema [Bowel Sounds] : normal bowel sounds [Abdomen Tenderness] : non-tender [Abdomen Soft] : soft [Rebound Tenderness] : no rebound tenderness [No CVA Tenderness] : no CVA  tenderness [Abnormal Walk] : normal gait [Normal Color / Pigmentation] : normal skin color and pigmentation [Oriented To Time, Place, And Person] : oriented to person, place, and time [Normal Affect] : the affect was normal [Normal Mood] : the mood was normal

## 2024-10-01 NOTE — HISTORY OF PRESENT ILLNESS
[FreeTextEntry1] : Patient is a 69-year-old male who presents to the gastroenterology office for evaluation prior to scheduling screening colonoscopy.  Patient reports his cardiologist Dr. Luca Tamayo recommended a colonoscopy for colon cancer screening as part of heart transplant evaluation/work-up.  Patient denies having a prior colonoscopy.  Patient denies experiencing abdominal pain, chest pain, nausea or vomiting.  Patient denies any acute changes in bowel habits.  Patient denies diarrhea or constipation.  Patient denies red blood in stools and denies black stools.  Patient denies unintentional weight loss.  Patient denies family history of colon cancer or rectal cancer.

## 2024-10-02 ENCOUNTER — NON-APPOINTMENT (OUTPATIENT)
Age: 69
End: 2024-10-02

## 2024-10-04 ENCOUNTER — APPOINTMENT (OUTPATIENT)
Dept: CARDIOLOGY | Facility: CLINIC | Age: 69
End: 2024-10-04
Payer: MEDICARE

## 2024-10-04 ENCOUNTER — APPOINTMENT (OUTPATIENT)
Dept: ELECTROPHYSIOLOGY | Facility: CLINIC | Age: 69
End: 2024-10-04

## 2024-10-04 VITALS
SYSTOLIC BLOOD PRESSURE: 110 MMHG | HEIGHT: 67 IN | HEART RATE: 84 BPM | BODY MASS INDEX: 29.51 KG/M2 | OXYGEN SATURATION: 98 % | WEIGHT: 188 LBS | DIASTOLIC BLOOD PRESSURE: 80 MMHG

## 2024-10-04 DIAGNOSIS — I47.20 VENTRICULAR TACHYCARDIA, UNSPECIFIED: ICD-10-CM

## 2024-10-04 DIAGNOSIS — I48.91 UNSPECIFIED ATRIAL FIBRILLATION: ICD-10-CM

## 2024-10-04 DIAGNOSIS — I50.22 CHRONIC SYSTOLIC (CONGESTIVE) HEART FAILURE: ICD-10-CM

## 2024-10-04 DIAGNOSIS — I42.8 OTHER CARDIOMYOPATHIES: ICD-10-CM

## 2024-10-04 DIAGNOSIS — I44.2 ATRIOVENTRICULAR BLOCK, COMPLETE: ICD-10-CM

## 2024-10-04 PROCEDURE — 93284 PRGRMG EVAL IMPLANTABLE DFB: CPT

## 2024-10-04 PROCEDURE — G2211 COMPLEX E/M VISIT ADD ON: CPT

## 2024-10-04 PROCEDURE — 99214 OFFICE O/P EST MOD 30 MIN: CPT

## 2024-10-04 PROCEDURE — 93306 TTE W/DOPPLER COMPLETE: CPT

## 2024-10-04 PROCEDURE — 93290 INTERROG DEV EVAL ICPMS IP: CPT

## 2024-10-04 NOTE — PROCEDURE
[Complete Heart Block] : complete heart block [CRT-D] : Cardiac resynchronization therapy defibrillator [DDDR] : DDDR [Voltage: ___ volts] : Voltage was [unfilled] volts [Charge Time: ___ sec] : charge time was [unfilled] seconds [Longevity: ___ months] : The estimated remaining battery life is [unfilled] months [Normal] : The battery status is normal. [Threshold Testing Performed] : Threshold testing was performed [Sensing Amplitude ___mv] : sensing amplitude was [unfilled] mv [Lead Imp:  ___ohms] : lead impedance was [unfilled] ohms [___V @] : [unfilled] V [___ ms] : [unfilled] ms [Asense-Vsense ___ %] : Asense-Vsense [unfilled]% [Asense-Vpace ___ %] : Asense-Vpace [unfilled]% [Apace-Vsense ___ %] : Apace-Vsense [unfilled]% [Apace-Vpace ___ %] : Apace-Vpace [unfilled]% [de-identified] : Medtronic [de-identified] : [id number] [de-identified] : [id number] [de-identified] : 05/24/2023 [de-identified] : 80 bpm [de-identified] : 22 monitored NSVT episodes longest lasting 4 seconds  1 episode of VF successfully terminated with ATP on 07/02/24 1 episode of VF successfully terminated with 40J shock on 07/09/24

## 2024-10-04 NOTE — ADDENDUM
[FreeTextEntry1] : ITheresa assisted in documentation on 04/16/2024   acting as a scribe for Dr. Celestine Bryan.   IShelley (scribe) assisted in filling out this chart under the dictation of Celestine Bryan on 10/04/2024

## 2024-10-04 NOTE — PHYSICAL EXAM
[Normal Appearance] : normal appearance [General Appearance - Well Developed] : well developed [Well Groomed] : well groomed [No Deformities] : no deformities [General Appearance - Well Nourished] : well nourished [Heart Rate And Rhythm] : heart rate and rhythm were normal [General Appearance - In No Acute Distress] : no acute distress [Heart Sounds] : normal S1 and S2 [Murmurs] : no murmurs present [Respiration, Rhythm And Depth] : normal respiratory rhythm and effort [Exaggerated Use Of Accessory Muscles For Inspiration] : no accessory muscle use [Auscultation Breath Sounds / Voice Sounds] : lungs were clear to auscultation bilaterally [Clean] : clean [Dry] : dry [Well-Healed] : well-healed [Abdomen Soft] : soft [Abdomen Tenderness] : non-tender [Abdomen Mass (___ Cm)] : no abdominal mass palpated [Nail Clubbing] : no clubbing of the fingernails [Cyanosis, Localized] : no localized cyanosis [Petechial Hemorrhages (___cm)] : no petechial hemorrhages [] : no ischemic changes

## 2024-10-04 NOTE — ASSESSMENT
[FreeTextEntry1] : AF - s/p GIANFRANCO closure  CHF - Continue Entresto 49/51 mg Q12 - VF episode initiated by long-short episode. - Heart failure note appreciated.   - Patients being evaluated for transplant. - Continue to follow up.  - Continue Metoprolol 25 mg BID   VF  - continue Metoprolol to 25 mg BID.  - Genetic testing results reviewed with patient. Will consult Dr. Chu for genetic counseling.  Patient will follow  - Will continue to monitor patient at this time.     	I have spent 35  minutes of time on the encounter excluding separately reported services.

## 2024-10-04 NOTE — HISTORY OF PRESENT ILLNESS
[None] : The patient complains of no symptoms [Palpitations] : no palpitations [SOB] : no dyspnea [Syncope] : no syncope [Chest Pain] : no chest pain or discomfort [ICD Shocks] : no recent ICD shocks [Erythema at Site] : no erythema at device site [Swelling at Site] : no swelling at device site [de-identified] : 68 Y.O. male with a PMHx of HTN, S/P MV repair , NICM , AV node ablation S/P CRT/D , VT S/P shock , Afib CHADS-VASc 1,S/P GIANFRANCO closure, recurrent AFib S/P DCCV 10/2016 , S/P CRT-D generator change 8/15/2018.   Patient was recently hospitalized for appropriate shock for VF episode. Cardiac catheterization showed no new coronary artery disease.   I reviewed the episode and episode is consistent with long-short initiation of ventricular fibrillation. Patient has approximately 9 PVCs in 1 hour. However, they do occur at a specific coupling interval.   Patient s/p VT ablation. During the ablation, patient had the same morphology PVC-initiated VF after long-short episode similar to what I found on patient's device. After ablation, patient had one long-short episode, which consists of APCs in the blanking period, not refractory, followed by V-paced by the device and PVC initiating VT episode. However, this time, VT episode was terminated by ATP. Patient did not feel the episode.   (10/04/2024): Patient comes to my office for a routine follow-up. He has followed up with our advanced heart failure team for a possible transplant and going through all the testing required. No further shocks since the last visit. However, patient had some short, not sustained VTs recorded by the device.    Cardiac catheterization (06/2023): No significant CAD.

## 2024-10-09 RX ORDER — CLONAZEPAM 0.5 MG/1
0.5 TABLET ORAL 3 TIMES DAILY
Refills: 0 | Status: ACTIVE | COMMUNITY

## 2024-10-09 RX ORDER — ZOLPIDEM TARTRATE 10 MG/1
10 TABLET, FILM COATED ORAL
Refills: 0 | Status: ACTIVE | COMMUNITY

## 2024-10-16 ENCOUNTER — APPOINTMENT (OUTPATIENT)
Dept: CARDIOLOGY | Facility: CLINIC | Age: 69
End: 2024-10-16
Payer: MEDICARE

## 2024-10-16 ENCOUNTER — APPOINTMENT (OUTPATIENT)
Dept: HEART FAILURE | Facility: CLINIC | Age: 69
End: 2024-10-16
Payer: MEDICARE

## 2024-10-16 VITALS
WEIGHT: 187 LBS | HEIGHT: 67 IN | SYSTOLIC BLOOD PRESSURE: 96 MMHG | OXYGEN SATURATION: 97 % | BODY MASS INDEX: 29.35 KG/M2 | HEART RATE: 85 BPM | DIASTOLIC BLOOD PRESSURE: 60 MMHG

## 2024-10-16 DIAGNOSIS — I50.22 CHRONIC SYSTOLIC (CONGESTIVE) HEART FAILURE: ICD-10-CM

## 2024-10-16 DIAGNOSIS — I48.91 UNSPECIFIED ATRIAL FIBRILLATION: ICD-10-CM

## 2024-10-16 DIAGNOSIS — I42.8 OTHER CARDIOMYOPATHIES: ICD-10-CM

## 2024-10-16 DIAGNOSIS — I44.2 ATRIOVENTRICULAR BLOCK, COMPLETE: ICD-10-CM

## 2024-10-16 DIAGNOSIS — I49.01 VENTRICULAR FIBRILLATION: ICD-10-CM

## 2024-10-16 DIAGNOSIS — I47.20 VENTRICULAR TACHYCARDIA, UNSPECIFIED: ICD-10-CM

## 2024-10-16 PROCEDURE — 94200 LUNG FUNCTION TEST (MBC/MVV): CPT | Mod: 59

## 2024-10-16 PROCEDURE — 93000 ELECTROCARDIOGRAM COMPLETE: CPT

## 2024-10-16 PROCEDURE — 94375 RESPIRATORY FLOW VOLUME LOOP: CPT

## 2024-10-16 PROCEDURE — 93000 ELECTROCARDIOGRAM COMPLETE: CPT | Mod: 59

## 2024-10-16 PROCEDURE — 99215 OFFICE O/P EST HI 40 MIN: CPT

## 2024-10-16 PROCEDURE — 94621 CARDIOPULM EXERCISE TESTING: CPT

## 2024-10-16 PROCEDURE — 93018 CV STRESS TEST I&R ONLY: CPT | Mod: 59

## 2024-10-16 RX ORDER — TORSEMIDE 20 MG/1
20 TABLET ORAL DAILY
Refills: 0 | Status: ACTIVE | COMMUNITY

## 2024-10-29 ENCOUNTER — APPOINTMENT (OUTPATIENT)
Dept: GASTROENTEROLOGY | Facility: CLINIC | Age: 69
End: 2024-10-29

## 2024-11-05 ENCOUNTER — APPOINTMENT (OUTPATIENT)
Dept: GASTROENTEROLOGY | Facility: CLINIC | Age: 69
End: 2024-11-05
Payer: MEDICARE

## 2024-11-05 VITALS
OXYGEN SATURATION: 97 % | BODY MASS INDEX: 30.2 KG/M2 | HEIGHT: 67 IN | DIASTOLIC BLOOD PRESSURE: 73 MMHG | SYSTOLIC BLOOD PRESSURE: 95 MMHG | HEART RATE: 82 BPM | WEIGHT: 192.4 LBS

## 2024-11-05 DIAGNOSIS — Z78.9 OTHER SPECIFIED HEALTH STATUS: ICD-10-CM

## 2024-11-05 DIAGNOSIS — Z86.59 PERSONAL HISTORY OF OTHER MENTAL AND BEHAVIORAL DISORDERS: ICD-10-CM

## 2024-11-05 DIAGNOSIS — Z12.11 ENCOUNTER FOR SCREENING FOR MALIGNANT NEOPLASM OF COLON: ICD-10-CM

## 2024-11-05 DIAGNOSIS — Z86.79 PERSONAL HISTORY OF OTHER DISEASES OF THE CIRCULATORY SYSTEM: ICD-10-CM

## 2024-11-05 DIAGNOSIS — Z86.39 PERSONAL HISTORY OF OTHER ENDOCRINE, NUTRITIONAL AND METABOLIC DISEASE: ICD-10-CM

## 2024-11-05 DIAGNOSIS — Z87.898 PERSONAL HISTORY OF OTHER SPECIFIED CONDITIONS: ICD-10-CM

## 2024-11-05 DIAGNOSIS — Z86.711 PERSONAL HISTORY OF PULMONARY EMBOLISM: ICD-10-CM

## 2024-11-05 PROCEDURE — 99203 OFFICE O/P NEW LOW 30 MIN: CPT

## 2024-11-05 RX ORDER — FUROSEMIDE 20 MG/1
20 TABLET ORAL
Refills: 0 | Status: ACTIVE | COMMUNITY

## 2024-11-08 ENCOUNTER — TRANSCRIPTION ENCOUNTER (OUTPATIENT)
Age: 69
End: 2024-11-08

## 2024-11-08 ENCOUNTER — OUTPATIENT (OUTPATIENT)
Dept: OUTPATIENT SERVICES | Facility: HOSPITAL | Age: 69
LOS: 1 days | Discharge: HOME | End: 2024-11-08
Payer: MEDICARE

## 2024-11-08 ENCOUNTER — RESULT REVIEW (OUTPATIENT)
Age: 69
End: 2024-11-08

## 2024-11-08 VITALS
RESPIRATION RATE: 18 BRPM | SYSTOLIC BLOOD PRESSURE: 92 MMHG | HEART RATE: 74 BPM | DIASTOLIC BLOOD PRESSURE: 56 MMHG | OXYGEN SATURATION: 95 %

## 2024-11-08 VITALS
RESPIRATION RATE: 18 BRPM | TEMPERATURE: 98 F | OXYGEN SATURATION: 98 % | HEIGHT: 67 IN | SYSTOLIC BLOOD PRESSURE: 126 MMHG | WEIGHT: 190.04 LBS | HEART RATE: 82 BPM | DIASTOLIC BLOOD PRESSURE: 89 MMHG

## 2024-11-08 DIAGNOSIS — Z98.890 OTHER SPECIFIED POSTPROCEDURAL STATES: Chronic | ICD-10-CM

## 2024-11-08 DIAGNOSIS — Z12.11 ENCOUNTER FOR SCREENING FOR MALIGNANT NEOPLASM OF COLON: ICD-10-CM

## 2024-11-08 DIAGNOSIS — Z87.828 PERSONAL HISTORY OF OTHER (HEALED) PHYSICAL INJURY AND TRAUMA: Chronic | ICD-10-CM

## 2024-11-08 DIAGNOSIS — Z95.828 PRESENCE OF OTHER VASCULAR IMPLANTS AND GRAFTS: Chronic | ICD-10-CM

## 2024-11-08 DIAGNOSIS — Z95.810 PRESENCE OF AUTOMATIC (IMPLANTABLE) CARDIAC DEFIBRILLATOR: Chronic | ICD-10-CM

## 2024-11-08 PROCEDURE — 45385 COLONOSCOPY W/LESION REMOVAL: CPT | Mod: XU

## 2024-11-08 PROCEDURE — 88305 TISSUE EXAM BY PATHOLOGIST: CPT | Mod: 26

## 2024-11-08 PROCEDURE — 88305 TISSUE EXAM BY PATHOLOGIST: CPT

## 2024-11-08 PROCEDURE — 45380 COLONOSCOPY AND BIOPSY: CPT | Mod: XU

## 2024-11-08 NOTE — ASU DISCHARGE PLAN (ADULT/PEDIATRIC) - NS MD DC FALL RISK RISK
For information on Fall & Injury Prevention, visit: [email or web address] OR  [email or web address] OR  https://www.cdc.gov/steadi/patient.html

## 2024-11-08 NOTE — H&P PST ADULT - ASSESSMENT
68 y/o patient PMH NICM, HFrEF LVEF 25-30% s/p CRT-D, VT/VF, AF s/p GIANFRANCO closure, MV/TV repair, PVC ablation   3/2024 is referred for screening colonoscopy prior to heart transplant. Patient was seen by cardiology and noted to be  well optimized from a heart failure standpoint and is a moderate risk patient for a low risk procedure. No other testing   from a cardiac standpoint required prior to colonoscopy. Patient denies any GI complaints  Scheduled for colonoscopy

## 2024-11-08 NOTE — ASU PATIENT PROFILE, ADULT - PAIN SCALE PREFERRED, PROFILE
Physical Therapy  Visit Type: treatment  Precautions:  Medical precautions: ; standard precautions. S/p L4 decompressive laminectomy, L4-5 fusion, and L5-S1 fusion on 6/23 hx CVA in 2020 with residual R side impairments  MRI 6/26: Acute versus early subacute ischemic foci involving the left superior  striatocapsular region and additional linear focus within the left parietal  centrum semiovale    Lines:     Basic: capped IV and telemetry      Lines in chart and on patient reviewed, cautions maintained throughout session.  Spine:     Lumbar: lumbar brace on when out of bed, lumbar brace optional for bathroom, no hip flexion past 90 degree, no lifting greater than 10 #, no twisting and no bending    SUBJECTIVE  Patient agreed to participate in therapy this date.  \"What if I don't get my walker before I leave?\"  Patient / Family Goal: return home and return to previous functional status     OBJECTIVE     Oriented to person, place, time and situation     Arousal alertness: appropriate responses to stimuli    Affect/Behavior: alert, pleasant, appropriate and cooperative  Patient activity tolerance: 2 to 1 activity to rest  Functional Communication/Cognition    Overall status:  Within functional limits    Bed Mobility:        Supine to sit: modified independent  Training completed:    Tasks: supine to sit    Education details: body mechanics and patient demonstrates understanding    Cueing for log roll; increased time, however able to perform independently   Transfers:    Assistive devices: 2-wheeled walker    Sit to stand: modified independent    Stand to sit: modified independent    Stand pivot: modified independent  Training completed:    Tasks: stand to sit, stand pivot and sit to stand    Education details: patient safety and patient demonstrates understanding    No instability or safety concerns; cueing for hand placement      Gait/Ambulation:     Assistance: supervision and modified independent   Assistive device:  2-wheeled walker    Distance (ft): 350; 150    Type: decreased luisito    Surface: even  Gait/Ambulation, Trial 2:    Assistance: supervision    Assistive device: none    Distance (ft): 20;   Training Completed:    Tasks: gait training on level surfaces    Education details: patient safety and patient requires additional training    Supervision-modified independence with 2ww; cueing to increase foot clearance and for proximity to walker; supervision for safety when ambulating without assistive device   Stair Mobility:    Number of steps: 10;     Assistance: supervision    Rails: left rail only    Pattern: step to and forward    Devices used: gait belt  Training completed:    Tasks: stair training    Education details: patient requires additional training and patient safety    1 step x 10 reps with railing; no instability or loss of balance; supervision for safety and lines      Interventions     Training provided: bed mobility training, functional ambulation, transfer training, gait training, positioning, safety training and body mechanics    Skilled input: Verbal instruction/cues  Verbal Consent: Writer verbally educated and received verbal consent for hand placement, positioning of patient, and techniques to be performed today from patient        ASSESSMENT    Impairments: pain and strength  Functional Limitations: ambulation, stair climbing and bed mobility  Patient seen on 3rd floor nursing unit. Patient presents below baseline  which was independent  With ADLs and mobility without assistive device. Lives with son in multi-level house with 10 steps to enter; bedroom on main floor. For safe return to prior living situation the patient needs to be modified independent  for overall mobility and modified independent  for ADL.   14 day re-assessment date : 7/7/21.   14 day re-assessment required this date: No     POD: 6  PROCEDURE: L4 decompressive laminectomy, L4-5 fusion, and L5-S1 fusion on 6/23  PRECAUTIONS:  lumbar; back brace when out of bed; optional for bathroom     MRI 6/26: Acute versus early subacute ischemic foci involving the left superior  striatocapsular region and additional linear focus within the left parietal  centrum semiovale    PT treatment session today focused on progression of bed mobility, transfer reps, gait training, and stair training. Performed all mobility with supervision to modified independence. Ambulated 350' and 150' with 2ww and 20' without assistive device; better gait mechanics when ambulating with walker. Rehab aide made aware to issue walker prior to discharge home today. Recommend pt use 2ww for longer distances for safety and pain relief. Negotiated 1 step x 10 reps with railing; no significant instability or loss of balance.       Anticipate safe discharge home with home therapy.    Discharge Recommendations  Recommendation for Discharge: PT WI: Home, Home therapy             PT/OT Mobility Equipment for Discharge: rehab aide made aware to issue 2ww today   PT/OT ADL Equipment for Discharge: order placed for toilet riser with arms 6/25  PT Identified Barriers to Discharge: limited mobility post op     Skilled therapy is required to address these limitations in attempt to maximize the patient's independence.  Progress: improving as expected and progressing toward goals    End of Session:   Location: in chair  Safety measures: call light within reach  Handoff to: nurse    PLAN    Suggestions for next session as indicated: Progress gait with 2ww and without; continue stair training        Frequency Comments: 1x, ORTHO, (H), AE SF     Agreement to plan and goals: patient agrees with goals and treatment plan        GOALS  Review Date: 7/7/2021  Long Term Goals: (to be met by time of discharge from hospital)  State precautions: Patient able to state precautions independent.  Maintain precautions: Patient able to maintain precautions modified independent.  Sit to supine: Patient will  complete sit to supine modified independent.  Status: met   Supine to sit: Patient will complete supine to sit modified independent.  Sit to stand: Patient will complete sit to stand transfer with 2-wheeled walker, modified independent.   Status: met   Stand to sit: Patient will complete stand to sit transfer with 2-wheeled walker, modified independent.   Status: met   Stand pivot: Patient will complete stand pivot transfer with 2-wheeled walker, modified independent.   Status: met   Ambulation (even): Patient will ambulate on even surface for 150 feet with 2-wheeled walker, modified independent.   Stairs: Patient will ambulate 10 steps with using one rail, modified independent.   Home program: patient independent with home program as instructed.     Documented in the chart in the following areas: Pain. Assessment.      Therapy procedure time and total treatment time can be found documented on the Time Entry flowsheet   none

## 2024-11-08 NOTE — H&P PST ADULT - HISTORY OF PRESENT ILLNESS
70 y/o patient PMH NICM, HFrEF LVEF 25-30% s/p CRT-D, VT/VF, AF s/p GIANFRANCO closure, MV/TV repair, PVC ablation   3/2024 is referred for screening colonoscopy prior to heart transplant. Patient was seen by cardiology and noted to be  well optimized from a heart failure standpoint and is a moderate risk patient for a low risk procedure. No other testing   from a cardiac standpoint required prior to colonoscopy. Patient denies any GI complaints  Scheduled for colonoscopy

## 2024-11-08 NOTE — ASU DISCHARGE PLAN (ADULT/PEDIATRIC) - FINANCIAL ASSISTANCE
[facility] provides services at a reduced cost to those who are determined to be eligible through [facility]’s financial assistance program. Information regarding [facility]’s financial assistance program can be found by going to [email or web address] or by calling [phone].

## 2024-11-08 NOTE — ASU DISCHARGE PLAN (ADULT/PEDIATRIC) - CARE PROVIDER_API CALL
Deisi Mojica  Gastroenterology  [address] [facility] [address] [place] [address]  Phone: [phone]  Fax: [phone]  Follow Up Time: 1 month

## 2024-11-08 NOTE — ASU PATIENT PROFILE, ADULT - FALL HARM RISK - HARM RISK INTERVENTIONS

## 2024-11-12 ENCOUNTER — NON-APPOINTMENT (OUTPATIENT)
Age: 69
End: 2024-11-12

## 2024-11-14 ENCOUNTER — NON-APPOINTMENT (OUTPATIENT)
Age: 69
End: 2024-11-14

## 2024-11-15 DIAGNOSIS — Z79.84 LONG TERM (CURRENT) USE OF ORAL HYPOGLYCEMIC DRUGS: ICD-10-CM

## 2024-11-15 DIAGNOSIS — I50.20 UNSPECIFIED SYSTOLIC (CONGESTIVE) HEART FAILURE: ICD-10-CM

## 2024-11-15 DIAGNOSIS — I11.0 HYPERTENSIVE HEART DISEASE WITH HEART FAILURE: ICD-10-CM

## 2024-11-15 DIAGNOSIS — E78.00 PURE HYPERCHOLESTEROLEMIA, UNSPECIFIED: ICD-10-CM

## 2024-11-15 DIAGNOSIS — K64.4 RESIDUAL HEMORRHOIDAL SKIN TAGS: ICD-10-CM

## 2024-11-15 DIAGNOSIS — Z12.11 ENCOUNTER FOR SCREENING FOR MALIGNANT NEOPLASM OF COLON: ICD-10-CM

## 2024-11-15 DIAGNOSIS — D12.2 BENIGN NEOPLASM OF ASCENDING COLON: ICD-10-CM

## 2024-11-15 DIAGNOSIS — I48.91 UNSPECIFIED ATRIAL FIBRILLATION: ICD-10-CM

## 2024-11-15 DIAGNOSIS — Z95.810 PRESENCE OF AUTOMATIC (IMPLANTABLE) CARDIAC DEFIBRILLATOR: ICD-10-CM

## 2024-11-15 DIAGNOSIS — K64.8 OTHER HEMORRHOIDS: ICD-10-CM

## 2024-11-18 DIAGNOSIS — Z01.818 ENCOUNTER FOR OTHER PREPROCEDURAL EXAMINATION: ICD-10-CM

## 2024-11-19 LAB
ALBUMIN SERPL ELPH-MCNC: 4.6 G/DL
ALP BLD-CCNC: 84 U/L
ALT SERPL-CCNC: 19 U/L
ANION GAP SERPL CALC-SCNC: 12 MMOL/L
AST SERPL-CCNC: 17 U/L
BASOPHILS # BLD AUTO: 0.05 K/UL
BASOPHILS NFR BLD AUTO: 0.7 %
BILIRUB SERPL-MCNC: 1 MG/DL
BUN SERPL-MCNC: 19 MG/DL
CALCIUM SERPL-MCNC: 9.4 MG/DL
CHLORIDE SERPL-SCNC: 105 MMOL/L
CO2 SERPL-SCNC: 25 MMOL/L
CREAT SERPL-MCNC: 0.92 MG/DL
EGFR: 90 ML/MIN/1.73M2
EOSINOPHIL # BLD AUTO: 0.08 K/UL
EOSINOPHIL NFR BLD AUTO: 1.1 %
GLUCOSE SERPL-MCNC: 102 MG/DL
HCT VFR BLD CALC: 47.4 %
HGB BLD-MCNC: 15.6 G/DL
IMM GRANULOCYTES NFR BLD AUTO: 0.3 %
INR PPP: 1.02 RATIO
LYMPHOCYTES # BLD AUTO: 1.36 K/UL
LYMPHOCYTES NFR BLD AUTO: 19 %
MAN DIFF?: NORMAL
MCHC RBC-ENTMCNC: 29.3 PG
MCHC RBC-ENTMCNC: 32.9 G/DL
MCV RBC AUTO: 88.9 FL
MONOCYTES # BLD AUTO: 0.57 K/UL
MONOCYTES NFR BLD AUTO: 8 %
NEUTROPHILS # BLD AUTO: 5.06 K/UL
NEUTROPHILS NFR BLD AUTO: 70.9 %
NT-PROBNP SERPL-MCNC: 1108 PG/ML
PLATELET # BLD AUTO: 202 K/UL
POTASSIUM SERPL-SCNC: 4.8 MMOL/L
PROT SERPL-MCNC: 7.2 G/DL
PT BLD: 12 SEC
RBC # BLD: 5.33 M/UL
RBC # FLD: 14.4 %
SODIUM SERPL-SCNC: 141 MMOL/L
WBC # FLD AUTO: 7.14 K/UL

## 2024-12-17 ENCOUNTER — APPOINTMENT (OUTPATIENT)
Dept: GASTROENTEROLOGY | Facility: CLINIC | Age: 69
End: 2024-12-17

## 2024-12-18 NOTE — H&P CARDIOLOGY - NSICDXPASTMEDICALHX_GEN_ALL_CORE_FT
PAST MEDICAL HISTORY:  Atrial fibrillation resolved with SAAVEDRA repair    High cholesterol     HTN (hypertension)     Pulmonary embolism     
no

## 2025-01-03 ENCOUNTER — APPOINTMENT (OUTPATIENT)
Dept: CARDIOLOGY | Facility: CLINIC | Age: 70
End: 2025-01-03

## 2025-01-03 ENCOUNTER — NON-APPOINTMENT (OUTPATIENT)
Age: 70
End: 2025-01-03

## 2025-01-03 PROCEDURE — 93296 REM INTERROG EVL PM/IDS: CPT

## 2025-01-03 PROCEDURE — 93295 DEV INTERROG REMOTE 1/2/MLT: CPT

## 2025-01-06 ENCOUNTER — APPOINTMENT (OUTPATIENT)
Dept: GASTROENTEROLOGY | Facility: HOSPITAL | Age: 70
End: 2025-01-06

## 2025-01-15 ENCOUNTER — NON-APPOINTMENT (OUTPATIENT)
Age: 70
End: 2025-01-15

## 2025-01-15 ENCOUNTER — APPOINTMENT (OUTPATIENT)
Dept: HEART FAILURE | Facility: CLINIC | Age: 70
End: 2025-01-15
Payer: MEDICARE

## 2025-01-15 VITALS
HEIGHT: 67 IN | HEART RATE: 83 BPM | BODY MASS INDEX: 29.82 KG/M2 | WEIGHT: 190 LBS | DIASTOLIC BLOOD PRESSURE: 68 MMHG | OXYGEN SATURATION: 94 % | SYSTOLIC BLOOD PRESSURE: 94 MMHG

## 2025-01-15 DIAGNOSIS — I42.8 OTHER CARDIOMYOPATHIES: ICD-10-CM

## 2025-01-15 DIAGNOSIS — I47.20 VENTRICULAR TACHYCARDIA, UNSPECIFIED: ICD-10-CM

## 2025-01-15 PROCEDURE — 99215 OFFICE O/P EST HI 40 MIN: CPT

## 2025-01-15 RX ORDER — LOSARTAN POTASSIUM 25 MG/1
25 TABLET, FILM COATED ORAL
Qty: 90 | Refills: 3 | Status: ACTIVE | COMMUNITY
Start: 2025-01-15 | End: 1900-01-01

## 2025-01-15 RX ORDER — TRAZODONE HYDROCHLORIDE 100 MG/1
100 TABLET ORAL DAILY
Refills: 0 | Status: ACTIVE | COMMUNITY

## 2025-01-15 RX ORDER — TORSEMIDE 20 MG/1
20 TABLET ORAL DAILY
Refills: 0 | Status: ACTIVE | COMMUNITY

## 2025-02-20 ENCOUNTER — APPOINTMENT (OUTPATIENT)
Dept: ELECTROPHYSIOLOGY | Facility: CLINIC | Age: 70
End: 2025-02-20

## 2025-02-20 ENCOUNTER — NON-APPOINTMENT (OUTPATIENT)
Age: 70
End: 2025-02-20

## 2025-02-20 VITALS
BODY MASS INDEX: 29.82 KG/M2 | HEIGHT: 67 IN | DIASTOLIC BLOOD PRESSURE: 76 MMHG | HEART RATE: 84 BPM | WEIGHT: 190 LBS | SYSTOLIC BLOOD PRESSURE: 120 MMHG

## 2025-02-20 DIAGNOSIS — I50.22 CHRONIC SYSTOLIC (CONGESTIVE) HEART FAILURE: ICD-10-CM

## 2025-02-20 DIAGNOSIS — I48.91 UNSPECIFIED ATRIAL FIBRILLATION: ICD-10-CM

## 2025-02-20 DIAGNOSIS — I49.01 VENTRICULAR FIBRILLATION: ICD-10-CM

## 2025-02-20 PROCEDURE — 93290 INTERROG DEV EVAL ICPMS IP: CPT

## 2025-02-20 PROCEDURE — 93284 PRGRMG EVAL IMPLANTABLE DFB: CPT

## 2025-02-20 PROCEDURE — 99213 OFFICE O/P EST LOW 20 MIN: CPT

## 2025-02-20 PROCEDURE — G2211 COMPLEX E/M VISIT ADD ON: CPT

## 2025-03-19 ENCOUNTER — INPATIENT (INPATIENT)
Facility: HOSPITAL | Age: 70
LOS: 0 days | Discharge: HOME | DRG: 316 | End: 2025-03-20
Attending: INTERNAL MEDICINE | Admitting: INTERNAL MEDICINE
Payer: MEDICARE

## 2025-03-19 VITALS
WEIGHT: 184.97 LBS | OXYGEN SATURATION: 99 % | DIASTOLIC BLOOD PRESSURE: 81 MMHG | HEIGHT: 67 IN | TEMPERATURE: 98 F | HEART RATE: 84 BPM | RESPIRATION RATE: 20 BRPM | SYSTOLIC BLOOD PRESSURE: 111 MMHG

## 2025-03-19 DIAGNOSIS — Z98.890 OTHER SPECIFIED POSTPROCEDURAL STATES: Chronic | ICD-10-CM

## 2025-03-19 DIAGNOSIS — Z95.828 PRESENCE OF OTHER VASCULAR IMPLANTS AND GRAFTS: Chronic | ICD-10-CM

## 2025-03-19 DIAGNOSIS — Z86.79 PERSONAL HISTORY OF OTHER DISEASES OF THE CIRCULATORY SYSTEM: ICD-10-CM

## 2025-03-19 DIAGNOSIS — Z45.02 ENCOUNTER FOR ADJUSTMENT AND MANAGEMENT OF AUTOMATIC IMPLANTABLE CARDIAC DEFIBRILLATOR: ICD-10-CM

## 2025-03-19 DIAGNOSIS — Z95.810 PRESENCE OF AUTOMATIC (IMPLANTABLE) CARDIAC DEFIBRILLATOR: Chronic | ICD-10-CM

## 2025-03-19 DIAGNOSIS — Z87.828 PERSONAL HISTORY OF OTHER (HEALED) PHYSICAL INJURY AND TRAUMA: Chronic | ICD-10-CM

## 2025-03-19 DIAGNOSIS — I50.23 ACUTE ON CHRONIC SYSTOLIC (CONGESTIVE) HEART FAILURE: ICD-10-CM

## 2025-03-19 DIAGNOSIS — I47.20 VENTRICULAR TACHYCARDIA, UNSPECIFIED: ICD-10-CM

## 2025-03-19 LAB
ADD ON TEST-SPECIMEN IN LAB: SIGNIFICANT CHANGE UP
ALBUMIN SERPL ELPH-MCNC: 4.3 G/DL — SIGNIFICANT CHANGE UP (ref 3.3–5)
ALP SERPL-CCNC: 92 U/L — SIGNIFICANT CHANGE UP (ref 40–120)
ALT FLD-CCNC: 24 U/L — SIGNIFICANT CHANGE UP (ref 10–45)
ANION GAP SERPL CALC-SCNC: 11 MMOL/L — SIGNIFICANT CHANGE UP (ref 5–17)
APTT BLD: 36.8 SEC — HIGH (ref 24.5–35.6)
AST SERPL-CCNC: 20 U/L — SIGNIFICANT CHANGE UP (ref 10–40)
BASOPHILS # BLD AUTO: 0.03 K/UL — SIGNIFICANT CHANGE UP (ref 0–0.2)
BASOPHILS NFR BLD AUTO: 0.4 % — SIGNIFICANT CHANGE UP (ref 0–2)
BILIRUB SERPL-MCNC: 0.6 MG/DL — SIGNIFICANT CHANGE UP (ref 0.2–1.2)
BUN SERPL-MCNC: 18 MG/DL — SIGNIFICANT CHANGE UP (ref 7–23)
CHLORIDE SERPL-SCNC: 106 MMOL/L — SIGNIFICANT CHANGE UP (ref 96–108)
CO2 SERPL-SCNC: 25 MMOL/L — SIGNIFICANT CHANGE UP (ref 22–31)
CREAT SERPL-MCNC: 1.04 MG/DL — SIGNIFICANT CHANGE UP (ref 0.5–1.3)
EGFR: 78 ML/MIN/1.73M2 — SIGNIFICANT CHANGE UP
EGFR: 78 ML/MIN/1.73M2 — SIGNIFICANT CHANGE UP
EOSINOPHIL # BLD AUTO: 0.11 K/UL — SIGNIFICANT CHANGE UP (ref 0–0.5)
GLUCOSE SERPL-MCNC: 111 MG/DL — HIGH (ref 70–99)
HCT VFR BLD CALC: 42.9 % — SIGNIFICANT CHANGE UP (ref 39–50)
HGB BLD-MCNC: 14 G/DL — SIGNIFICANT CHANGE UP (ref 13–17)
HGB FLD-MCNC: 13.1 G/DL — SIGNIFICANT CHANGE UP (ref 12.6–17.4)
IMM GRANULOCYTES NFR BLD AUTO: 0.3 % — SIGNIFICANT CHANGE UP (ref 0–0.9)
INR BLD: 1.12 RATIO — SIGNIFICANT CHANGE UP (ref 0.85–1.16)
LYMPHOCYTES # BLD AUTO: 1.28 K/UL — SIGNIFICANT CHANGE UP (ref 1–3.3)
LYMPHOCYTES # BLD AUTO: 17.8 % — SIGNIFICANT CHANGE UP (ref 13–44)
MCHC RBC-ENTMCNC: 29.4 PG — SIGNIFICANT CHANGE UP (ref 27–34)
MCHC RBC-ENTMCNC: 32.6 G/DL — SIGNIFICANT CHANGE UP (ref 32–36)
MCV RBC AUTO: 90.1 FL — SIGNIFICANT CHANGE UP (ref 80–100)
MONOCYTES NFR BLD AUTO: 9 % — SIGNIFICANT CHANGE UP (ref 2–14)
NEUTROPHILS # BLD AUTO: 5.11 K/UL — SIGNIFICANT CHANGE UP (ref 1.8–7.4)
NEUTROPHILS NFR BLD AUTO: 71 % — SIGNIFICANT CHANGE UP (ref 43–77)
NRBC BLD AUTO-RTO: 0 /100 WBCS — SIGNIFICANT CHANGE UP (ref 0–0)
NT-PROBNP SERPL-SCNC: 1232 PG/ML — HIGH (ref 0–300)
OXYHGB MFR BLDMV: 63.4 % — SIGNIFICANT CHANGE UP
PLATELET # BLD AUTO: 191 K/UL — SIGNIFICANT CHANGE UP (ref 150–400)
POTASSIUM SERPL-MCNC: 3.9 MMOL/L — SIGNIFICANT CHANGE UP (ref 3.5–5.3)
POTASSIUM SERPL-SCNC: 3.9 MMOL/L — SIGNIFICANT CHANGE UP (ref 3.5–5.3)
PROT SERPL-MCNC: 7.4 G/DL — SIGNIFICANT CHANGE UP (ref 6–8.3)
PROTHROM AB SERPL-ACNC: 12.9 SEC — SIGNIFICANT CHANGE UP (ref 9.9–13.4)
RBC # BLD: 4.76 M/UL — SIGNIFICANT CHANGE UP (ref 4.2–5.8)
RBC # FLD: 14.6 % — HIGH (ref 10.3–14.5)
SAO2 % BLD: 65 % — SIGNIFICANT CHANGE UP (ref 60–90)
SODIUM SERPL-SCNC: 142 MMOL/L — SIGNIFICANT CHANGE UP (ref 135–145)
TROPONIN T, HIGH SENSITIVITY RESULT: 20 NG/L — SIGNIFICANT CHANGE UP (ref 0–51)
WBC # BLD: 7.2 K/UL — SIGNIFICANT CHANGE UP (ref 3.8–10.5)
WBC # FLD AUTO: 7.2 K/UL — SIGNIFICANT CHANGE UP (ref 3.8–10.5)

## 2025-03-19 PROCEDURE — 99222 1ST HOSP IP/OBS MODERATE 55: CPT

## 2025-03-19 PROCEDURE — 99152 MOD SED SAME PHYS/QHP 5/>YRS: CPT

## 2025-03-19 PROCEDURE — 99223 1ST HOSP IP/OBS HIGH 75: CPT | Mod: GC

## 2025-03-19 PROCEDURE — 93010 ELECTROCARDIOGRAM REPORT: CPT

## 2025-03-19 PROCEDURE — 71045 X-RAY EXAM CHEST 1 VIEW: CPT | Mod: 26

## 2025-03-19 PROCEDURE — 99285 EMERGENCY DEPT VISIT HI MDM: CPT | Mod: GC

## 2025-03-19 PROCEDURE — 93284 PRGRMG EVAL IMPLANTABLE DFB: CPT | Mod: 26

## 2025-03-19 PROCEDURE — 93451 RIGHT HEART CATH: CPT | Mod: 26

## 2025-03-19 RX ORDER — DAPAGLIFLOZIN 5 MG/1
10 TABLET, FILM COATED ORAL DAILY
Refills: 0 | Status: DISCONTINUED | OUTPATIENT
Start: 2025-03-19 | End: 2025-03-20

## 2025-03-19 RX ORDER — METOPROLOL SUCCINATE 50 MG/1
25 TABLET, EXTENDED RELEASE ORAL DAILY
Refills: 0 | Status: DISCONTINUED | OUTPATIENT
Start: 2025-03-19 | End: 2025-03-20

## 2025-03-19 RX ORDER — ENOXAPARIN SODIUM 100 MG/ML
40 INJECTION SUBCUTANEOUS EVERY 24 HOURS
Refills: 0 | Status: DISCONTINUED | OUTPATIENT
Start: 2025-03-19 | End: 2025-03-20

## 2025-03-19 RX ORDER — TRAZODONE HCL 100 MG
100 TABLET ORAL AT BEDTIME
Refills: 0 | Status: DISCONTINUED | OUTPATIENT
Start: 2025-03-19 | End: 2025-03-20

## 2025-03-19 RX ORDER — CLONAZEPAM 0.5 MG/1
0.5 TABLET ORAL DAILY
Refills: 0 | Status: DISCONTINUED | OUTPATIENT
Start: 2025-03-19 | End: 2025-03-20

## 2025-03-19 RX ORDER — TAMSULOSIN HYDROCHLORIDE 0.4 MG/1
0.4 CAPSULE ORAL AT BEDTIME
Refills: 0 | Status: DISCONTINUED | OUTPATIENT
Start: 2025-03-19 | End: 2025-03-20

## 2025-03-19 RX ORDER — FUROSEMIDE 10 MG/ML
40 INJECTION INTRAMUSCULAR; INTRAVENOUS
Refills: 0 | Status: COMPLETED | OUTPATIENT
Start: 2025-03-19 | End: 2025-03-20

## 2025-03-19 RX ORDER — LOSARTAN POTASSIUM 100 MG/1
25 TABLET, FILM COATED ORAL DAILY
Refills: 0 | Status: DISCONTINUED | OUTPATIENT
Start: 2025-03-19 | End: 2025-03-20

## 2025-03-19 RX ORDER — ATORVASTATIN CALCIUM 80 MG/1
20 TABLET, FILM COATED ORAL AT BEDTIME
Refills: 0 | Status: DISCONTINUED | OUTPATIENT
Start: 2025-03-19 | End: 2025-03-20

## 2025-03-19 RX ADMIN — FUROSEMIDE 40 MILLIGRAM(S): 10 INJECTION INTRAMUSCULAR; INTRAVENOUS at 17:44

## 2025-03-19 RX ADMIN — METOPROLOL SUCCINATE 25 MILLIGRAM(S): 50 TABLET, EXTENDED RELEASE ORAL at 22:17

## 2025-03-19 RX ADMIN — Medication 100 MILLIGRAM(S): at 22:16

## 2025-03-19 RX ADMIN — ATORVASTATIN CALCIUM 20 MILLIGRAM(S): 80 TABLET, FILM COATED ORAL at 22:16

## 2025-03-19 RX ADMIN — TAMSULOSIN HYDROCHLORIDE 0.4 MILLIGRAM(S): 0.4 CAPSULE ORAL at 22:17

## 2025-03-19 NOTE — CONSULT NOTE ADULT - ATTENDING COMMENTS
Mr. Huerta is a 69 year old  male with history of long standing historyo of NICM since 2011 with severe LV dysfunction s/p ICD, history of VT/VF receiving shock (last one in Feb 2025), PVC ablation in 3/2024, s/p CRT-D, CHB, VT/VF, AF s/p GIANFRANCO closure, MV/TV repair who is currently listed as status 6 for cardiac transplant presents after 2 ICD shocks on the evening of 3/17/2025. He did not lose consciousness and did not feel that it was painful like the previous time. He presents today to [facility] ER for further evaluation. RHC performed on 3/19/2025 showed elevated filling pressures.     Cardiac studies:     TTE 10/2024: LVEF 25-30%, LVEDD 5.9cm, moderately reduced RV function, annuloplasty ring of mitral and tricuspid position, PASP 47 mmHg    RHC 9/2024: RA 6, PA 60/25/40, PAWP 24/35/25, CO/CI 5.12/2.61  RHC 3/2025: RA 11, PA 58/26, PCWP 32, PA sat - 65%, KEVIN/skilled nursing - 5.43/2.78?    C 6/2023 Nonobstructive CAD  ?    Assessment:  ICD discharge likely for ventricular arrhythmias  Non-ischemic cardiomyopathy  Chronic severe LV systolic dysfunction s/p ICD  CHB - AV pacing  Status post mitral and tricuspid ring repair   s/p GIANFRANCO closure  Status 6 for heart transplant      Plan:   RHC showed elevated filling pressures and preserved index.   Start lasix 40 mg IV BID.   Continue home dose metoprolol and losartan  Continue dapagliflozin  EP consult for device interrogation and VT management.   Intolerant to anti-arrhythmics in the past.  Not on anticoagulation.

## 2025-03-19 NOTE — ED PROVIDER NOTE - PHYSICAL EXAMINATION
GENERAL: Awake, alert, NAD  HEENT: NC/AT, moist mucous membranes, EOMI  LUNGS: CTAB, no wheezes or crackles   CARDIAC: RRR, no m/r/g  ABDOMEN: Soft non tender, non distended, no rebound, no guarding  EXT: No edema, no calf tenderness, 2+ radial pulses bilaterally, no deformities.  NEURO: A&Ox3. Moving all extremities.  SKIN: Warm and dry. No rash.  PSYCH: Normal affect.

## 2025-03-19 NOTE — CONSULT NOTE ADULT - PROBLEM SELECTOR RECOMMENDATION 2
Hx VT s/p MDT CRT-D, s/p PVC ablation 3/2024. Intolerant to mexiletine, sotalol, and amiodarone in the past.  - EP evaluation for medication recommendation vs ablation evaluation

## 2025-03-19 NOTE — ED ADULT NURSE NOTE - NSFALLHARMRISKINTERV_ED_ALL_ED
Assistance OOB with selected safe patient handling equipment if applicable/Assistance with ambulation/Communicate risk of Fall with Harm to all staff, patient, and family/Encourage patient to sit up slowly, dangle for a short time, stand at bedside before walking/Monitor gait and stability/Orthostatic vital signs/Provide visual cue: red socks, yellow wristband, yellow gown, etc/Reinforce activity limits and safety measures with patient and family/Bed in lowest position, wheels locked, appropriate side rails in place/Call bell, personal items and telephone in reach/Instruct patient to call for assistance before getting out of bed/chair/stretcher/Non-slip footwear applied when patient is off stretcher/[place] to call system/Physically safe environment - no spills, clutter or unnecessary equipment/Purposeful Proactive Rounding/Room/bathroom lighting operational, light cord in reach

## 2025-03-19 NOTE — ED ADULT NURSE NOTE - OBJECTIVE STATEMENT
Pt is a 69y M PMH HTN, HLD, vtach/vfib w/ AICD c/o AICD firing x2 on Monday night. Pt needs heart transplant,  follows transplant team was told by team to come to ED for potential cath. Pt states AICD firing has been an ongoing issue, prior to Monday, last firing was 2/14. Pt states on Monday he was doing regular activity, sitting down, when AICD fired twice, 30 minutes apart. Pt endorses extreme dizziness during/prior to AICD firing. Pt denies fever, chills, cough, sob, dizziness, lightheadedness, cp, n/v/d, abd pain. Pt is A&Ox4, breathing unlabored and spontaneous, moves all extremities. Pt undressed, placed in gown, placed on cardiac monitor, IV access obtained. Pt resting in stretcher, bed in lowest position, call bell in reach, family member at bedside, aware of plan of care. Comfort and safety measures maintained.
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
## 2025-03-19 NOTE — CONSULT NOTE ADULT - PROBLEM SELECTOR RECOMMENDATION 9
Moderately overloaded based on RHC  - diuretics: give lasix 40mg IV x1  -- BID Lytes  -- goal K > 4, Mg > 2  -- STRICT I/Os  -- goal neg 1-2L  - GDMT  -- cont home losartan  -- cont home metoprolol  -- cont home dapagliflozin Moderately overloaded based on RHC  - diuretics: give lasix 40mg IV BID  -- BID Lytes  -- goal K > 4, Mg > 2  -- STRICT I/Os  -- goal neg 1-2L  - GDMT  -- cont home losartan  -- cont home metoprolol  -- cont home dapagliflozin Moderately overloaded based on RHC  - please obtain repeat TTE  - diuretics: give lasix 40mg IV BID  -- BID Lytes  -- goal K > 4, Mg > 2  -- STRICT I/Os  -- goal neg 1-2L  - GDMT  -- cont home losartan  -- cont home metoprolol  -- cont home dapagliflozin
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
## 2025-03-19 NOTE — PATIENT PROFILE ADULT - FALL HARM RISK - RISK INTERVENTIONS
Assistance OOB with selected safe patient handling equipment/Assistance with ambulation/Communicate Fall Risk and Risk Factors to all staff, patient, and family/Monitor gait and stability/Reinforce activity limits and safety measures with patient and family/Sit up slowly, dangle for a short time, stand at bedside before walking/Use of alarms - bed, chair and/or voice tab/Visual Cue: Yellow wristband/Bed in lowest position, wheels locked, appropriate side rails in place/Call bell, personal items and telephone in reach/Instruct patient to call for assistance before getting out of bed or chair/Non-slip footwear when patient is out of bed/[place] to call system/Physically safe environment - no spills, clutter or unnecessary equipment/Purposeful Proactive Rounding/Room/bathroom lighting operational, light cord in reach

## 2025-03-19 NOTE — H&P ADULT - ASSESSMENT
69-year-old male patient past medical history of NICM, HFrEF LVEF 25-30% s/p CRT-D, VT/VF, AF s/p GIANFRANCO closure, MV/TV repair, PVC ablation 3/2024 who presents to the emergency department by endorsement of the heart transplant team after AICD firing 2 x 2 days ago.  Patient was at rest when he felt dizziness followed by AICD shock.  Since, he has not experienced any dizziness, chest pain, shortness of breath, leg edema, nausea or any other concerns such as syncope.  He endorses he has been feeling the AICD firing more often since beginning of this year.  And has been waiting for a heart transplant for around 2 years.  On exam, found AAOx3 and hemodynamically stable.  EKG found AV dual paced rhythm at a rate of 81.  Plan to consult the heart failure team in regards to a possible intervention.  The patient was encouraged to visit the emergency department after increased frequency of AICD firing.    Acute on chronic systolic CHF exacerbation  - cards fu  - telemonitor  - diuresis as per cards   - monitor electrolytes  - cath pending     VT, AICD firing  - telemonitor  - EP fu   - management as per EP   - RHC    BPH  - cw flomax    HLD  - cw statin

## 2025-03-19 NOTE — ED PROVIDER NOTE - CLINICAL SUMMARY MEDICAL DECISION MAKING FREE TEXT BOX
69-year-old male patient past medical history of NICM, HFrEF LVEF 25-30% s/p CRT-D, VT/VF, AF s/p GIANFRANCO closure, MV/TV repair, PVC ablation 3/2024 who presents to the emergency department by endorsement of the heart transplant team after AICD firing 2 x 2 days ago.  Patient was at rest when he felt dizziness followed by AICD shock.  Since, he has not experienced any dizziness, chest pain, shortness of breath, leg edema, nausea or any other concerns such as syncope.  He endorses he has been feeling the AICD firing more often since beginning of this year.  And has been waiting for a heart transplant for around 2 years.  On exam, found AAOx3 and hemodynamically stable.  EKG found AV dual paced rhythm at a rate of 81.  Plan to consult the heart failure team in regards to a possible intervention.  The patient was encouraged to visit the emergency department after increased frequency of AICD firing.

## 2025-03-19 NOTE — CONSULT NOTE ADULT - NS ATTEND AMEND GEN_ALL_CORE FT
Pt seen at bedside. NICM, fast VT treated appropriately. This occurred in the setting of abnormal filling pressures. The patient has not been able to tolerate Amio, Sotalol and Dorita (even with a LRL on his Medtronic CRT-D at 80). Options are limited. Treat HF. If no plan for advanced therapies in the near future may need to consider another trial of an AAD. EP will follow along.

## 2025-03-19 NOTE — PATIENT PROFILE ADULT - SURGICAL SITE INCISION
Patient calling to schedule new patient appointment for visual changes  No testing done  Triage intake sent  no

## 2025-03-19 NOTE — ED PROVIDER NOTE - ATTENDING CONTRIBUTION TO CARE
Pt here for eval s/p AICD fire twice within 30 minutes, preceded by dizziness for few seconds, resolved after shock, last one 36 hours ago.  Called transplant (heart) coordinator who told him to come to [place] for eval (pt lives in [facility]).    No symptoms since 36hrs ago.  Compliant with meds -- metoprolol 25mg nightly, no changes.    Has had trouble tolerating other meds in past including Entresto, antiarrythmics.  ROS neg.    PE: well appearing, nontoxic, no respiratory distress.  Neuro nonfocal.  Skin intact. Psych normal mood.    MDM: AICD fire, check cbc r/o anemia or leukocytosis, check bmp to r/o metabolic derangement and lyte imbalance, call EP for eval and optimization of medical therapy    I independently reviewed the ekg which showed normal paced rhythm, NSSTWC

## 2025-03-19 NOTE — ED PROVIDER NOTE - CARE PLAN
Principal Discharge DX:	AICD discharge  Secondary Diagnosis:	Congestive heart failure due to cardiomyopathy   1

## 2025-03-19 NOTE — PROCEDURE NOTE - ADDITIONAL PROCEDURE DETAILS
Indication for interrogation: Recurrent VT with ICD shock   Presenting rhythm: AP/BiV pace at 80bpm  Measured data WNL, normal ICD function, Pt is pacemaker dependent  Since 2/20/2025: AP 87.7%, BiV pace 100%, AT/AF burden 0.4%  Stored data revealed two episodes of ventricular tachycardia with CL (260ms and 210ms) on 3/17/25, one occurred at 6:52pm successfully treated and terminated with one round of ATP, the 2nd episode occurred at 7:30pm with one round of ATP and ICD shock x 1 at 40J. Also noted PAF with controlled ventricular rate on 2/24/25 lasting 2 hours and atrial lead noise noted on 2/28/25.   See EP consult note    S. Laz, NP-C  [id number]

## 2025-03-19 NOTE — H&P ADULT - NSHPLABSRESULTS_GEN_ALL_CORE
Lab Results:  CBC  CBC Full  -  ( 19 Mar 2025 10:32 )  WBC Count : 7.20 K/uL  RBC Count : 4.76 M/uL  Hemoglobin : 14.0 g/dL  Hematocrit : 42.9 %  Platelet Count - Automated : 191 K/uL  Mean Cell Volume : 90.1 fl  Mean Cell Hemoglobin : 29.4 pg  Mean Cell Hemoglobin Concentration : 32.6 g/dL  Auto Neutrophil # : x  Auto Lymphocyte # : x  Auto Monocyte # : x  Auto Eosinophil # : x  Auto Basophil # : x  Auto Neutrophil % : x  Auto Lymphocyte % : x  Auto Monocyte % : x  Auto Eosinophil % : x  Auto Basophil % : x    .		Differential:	[] Automated		[] Manual  Chemistry                        14.0   7.20  )-----------( 191      ( 19 Mar 2025 10:32 )             42.9     03-19    142  |  106  |  18  ----------------------------<  111[H]  3.9   |  25  |  1.04    Ca    9.4      19 Mar 2025 10:32  Phos  3.3     03-19  Mg     2.3     03-19    TPro  7.4  /  Alb  4.3  /  TBili  0.6  /  DBili  x   /  AST  20  /  ALT  24  /  AlkPhos  92  03-19    LIVER FUNCTIONS - ( 19 Mar 2025 10:32 )  Alb: 4.3 g/dL / Pro: 7.4 g/dL / ALK PHOS: 92 U/L / ALT: 24 U/L / AST: 20 U/L / GGT: x           PT/INR - ( 19 Mar 2025 10:32 )   PT: 12.9 sec;   INR: 1.12 ratio         PTT - ( 19 Mar 2025 10:32 )  PTT:36.8 sec  Urinalysis Basic - ( 19 Mar 2025 10:32 )    Color: x / Appearance: x / SG: x / pH: x  Gluc: 111 mg/dL / Ketone: x  / Bili: x / Urobili: x   Blood: x / Protein: x / Nitrite: x   Leuk Esterase: x / RBC: x / WBC x   Sq Epi: x / Non Sq Epi: x / Bacteria: x            MICROBIOLOGY/CULTURES:      RADIOLOGY RESULTS: reviewed

## 2025-03-20 ENCOUNTER — RESULT REVIEW (OUTPATIENT)
Age: 70
End: 2025-03-20

## 2025-03-20 ENCOUNTER — TRANSCRIPTION ENCOUNTER (OUTPATIENT)
Age: 70
End: 2025-03-20

## 2025-03-20 VITALS
RESPIRATION RATE: 18 BRPM | TEMPERATURE: 98 F | OXYGEN SATURATION: 95 % | DIASTOLIC BLOOD PRESSURE: 64 MMHG | SYSTOLIC BLOOD PRESSURE: 94 MMHG | HEART RATE: 78 BPM

## 2025-03-20 LAB
ANION GAP SERPL CALC-SCNC: 12 MMOL/L — SIGNIFICANT CHANGE UP (ref 5–17)
ANION GAP SERPL CALC-SCNC: 13 MMOL/L — SIGNIFICANT CHANGE UP (ref 5–17)
BUN SERPL-MCNC: 23 MG/DL — SIGNIFICANT CHANGE UP (ref 7–23)
BUN SERPL-MCNC: 25 MG/DL — HIGH (ref 7–23)
CALCIUM SERPL-MCNC: 8.9 MG/DL — SIGNIFICANT CHANGE UP (ref 8.4–10.5)
CALCIUM SERPL-MCNC: 9.2 MG/DL — SIGNIFICANT CHANGE UP (ref 8.4–10.5)
CHLORIDE SERPL-SCNC: 105 MMOL/L — SIGNIFICANT CHANGE UP (ref 96–108)
CHLORIDE SERPL-SCNC: 106 MMOL/L — SIGNIFICANT CHANGE UP (ref 96–108)
CO2 SERPL-SCNC: 22 MMOL/L — SIGNIFICANT CHANGE UP (ref 22–31)
CO2 SERPL-SCNC: 23 MMOL/L — SIGNIFICANT CHANGE UP (ref 22–31)
CREAT SERPL-MCNC: 1.06 MG/DL — SIGNIFICANT CHANGE UP (ref 0.5–1.3)
CREAT SERPL-MCNC: 1.17 MG/DL — SIGNIFICANT CHANGE UP (ref 0.5–1.3)
EGFR: 67 ML/MIN/1.73M2 — SIGNIFICANT CHANGE UP
EGFR: 67 ML/MIN/1.73M2 — SIGNIFICANT CHANGE UP
EGFR: 76 ML/MIN/1.73M2 — SIGNIFICANT CHANGE UP
EGFR: 76 ML/MIN/1.73M2 — SIGNIFICANT CHANGE UP
GLUCOSE SERPL-MCNC: 104 MG/DL — HIGH (ref 70–99)
GLUCOSE SERPL-MCNC: 111 MG/DL — HIGH (ref 70–99)
HCT VFR BLD CALC: 39.7 % — SIGNIFICANT CHANGE UP (ref 39–50)
MCHC RBC-ENTMCNC: 28.9 PG — SIGNIFICANT CHANGE UP (ref 27–34)
MCHC RBC-ENTMCNC: 33 G/DL — SIGNIFICANT CHANGE UP (ref 32–36)
MCV RBC AUTO: 87.6 FL — SIGNIFICANT CHANGE UP (ref 80–100)
NRBC BLD AUTO-RTO: 0 /100 WBCS — SIGNIFICANT CHANGE UP (ref 0–0)
PLATELET # BLD AUTO: 176 K/UL — SIGNIFICANT CHANGE UP (ref 150–400)
POTASSIUM SERPL-MCNC: 3.1 MMOL/L — LOW (ref 3.5–5.3)
POTASSIUM SERPL-MCNC: 3.6 MMOL/L — SIGNIFICANT CHANGE UP (ref 3.5–5.3)
POTASSIUM SERPL-SCNC: 3.1 MMOL/L — LOW (ref 3.5–5.3)
POTASSIUM SERPL-SCNC: 3.6 MMOL/L — SIGNIFICANT CHANGE UP (ref 3.5–5.3)
RBC # BLD: 4.53 M/UL — SIGNIFICANT CHANGE UP (ref 4.2–5.8)
RBC # FLD: 14.7 % — HIGH (ref 10.3–14.5)
SODIUM SERPL-SCNC: 139 MMOL/L — SIGNIFICANT CHANGE UP (ref 135–145)
SODIUM SERPL-SCNC: 142 MMOL/L — SIGNIFICANT CHANGE UP (ref 135–145)
WBC # BLD: 7.27 K/UL — SIGNIFICANT CHANGE UP (ref 3.8–10.5)
WBC # FLD AUTO: 7.27 K/UL — SIGNIFICANT CHANGE UP (ref 3.8–10.5)

## 2025-03-20 PROCEDURE — C1769: CPT

## 2025-03-20 PROCEDURE — 85610 PROTHROMBIN TIME: CPT

## 2025-03-20 PROCEDURE — 99233 SBSQ HOSP IP/OBS HIGH 50: CPT | Mod: GC

## 2025-03-20 PROCEDURE — 80053 COMPREHEN METABOLIC PANEL: CPT

## 2025-03-20 PROCEDURE — 82803 BLOOD GASES ANY COMBINATION: CPT

## 2025-03-20 PROCEDURE — 99285 EMERGENCY DEPT VISIT HI MDM: CPT

## 2025-03-20 PROCEDURE — 93456 R HRT CORONARY ARTERY ANGIO: CPT

## 2025-03-20 PROCEDURE — 80048 BASIC METABOLIC PNL TOTAL CA: CPT

## 2025-03-20 PROCEDURE — 85027 COMPLETE CBC AUTOMATED: CPT

## 2025-03-20 PROCEDURE — C1889: CPT

## 2025-03-20 PROCEDURE — C1894: CPT

## 2025-03-20 PROCEDURE — 85025 COMPLETE CBC W/AUTO DIFF WBC: CPT

## 2025-03-20 PROCEDURE — 93306 TTE W/DOPPLER COMPLETE: CPT | Mod: 26

## 2025-03-20 PROCEDURE — 99233 SBSQ HOSP IP/OBS HIGH 50: CPT

## 2025-03-20 PROCEDURE — 84484 ASSAY OF TROPONIN QUANT: CPT

## 2025-03-20 PROCEDURE — 71045 X-RAY EXAM CHEST 1 VIEW: CPT

## 2025-03-20 PROCEDURE — 85730 THROMBOPLASTIN TIME PARTIAL: CPT

## 2025-03-20 PROCEDURE — 83735 ASSAY OF MAGNESIUM: CPT

## 2025-03-20 PROCEDURE — C8929: CPT

## 2025-03-20 PROCEDURE — 83880 ASSAY OF NATRIURETIC PEPTIDE: CPT

## 2025-03-20 PROCEDURE — 93289 INTERROG DEVICE EVAL HEART: CPT

## 2025-03-20 PROCEDURE — 84100 ASSAY OF PHOSPHORUS: CPT

## 2025-03-20 PROCEDURE — 93005 ELECTROCARDIOGRAM TRACING: CPT

## 2025-03-20 PROCEDURE — 93451 RIGHT HEART CATH: CPT

## 2025-03-20 RX ORDER — FUROSEMIDE 10 MG/ML
20 INJECTION INTRAMUSCULAR; INTRAVENOUS
Refills: 0 | Status: DISCONTINUED | OUTPATIENT
Start: 2025-03-20 | End: 2025-03-20

## 2025-03-20 RX ORDER — FUROSEMIDE 10 MG/ML
40 INJECTION INTRAMUSCULAR; INTRAVENOUS
Refills: 0 | Status: DISCONTINUED | OUTPATIENT
Start: 2025-03-20 | End: 2025-03-20

## 2025-03-20 RX ORDER — METOPROLOL SUCCINATE 50 MG/1
1 TABLET, EXTENDED RELEASE ORAL
Qty: 30 | Refills: 0
Start: 2025-03-20 | End: 2025-04-18

## 2025-03-20 RX ORDER — METOPROLOL SUCCINATE 50 MG/1
50 TABLET, EXTENDED RELEASE ORAL DAILY
Refills: 0 | Status: DISCONTINUED | OUTPATIENT
Start: 2025-03-20 | End: 2025-03-20

## 2025-03-20 RX ORDER — TORSEMIDE 10 MG
1 TABLET ORAL
Qty: 60 | Refills: 3
Start: 2025-03-20 | End: 2025-07-17

## 2025-03-20 RX ORDER — FUROSEMIDE 10 MG/ML
40 INJECTION INTRAMUSCULAR; INTRAVENOUS ONCE
Refills: 0 | Status: COMPLETED | OUTPATIENT
Start: 2025-03-20 | End: 2025-03-20

## 2025-03-20 RX ADMIN — FUROSEMIDE 40 MILLIGRAM(S): 10 INJECTION INTRAMUSCULAR; INTRAVENOUS at 06:52

## 2025-03-20 RX ADMIN — Medication 40 MILLIEQUIVALENT(S): at 15:31

## 2025-03-20 RX ADMIN — DAPAGLIFLOZIN 10 MILLIGRAM(S): 5 TABLET, FILM COATED ORAL at 12:49

## 2025-03-20 RX ADMIN — FUROSEMIDE 40 MILLIGRAM(S): 10 INJECTION INTRAMUSCULAR; INTRAVENOUS at 15:31

## 2025-03-20 RX ADMIN — LOSARTAN POTASSIUM 25 MILLIGRAM(S): 100 TABLET, FILM COATED ORAL at 07:13

## 2025-03-20 NOTE — DISCHARGE NOTE PROVIDER - HOSPITAL COURSE
HPI:  69-year-old male patient past medical history of NICM, HFrEF LVEF 25-30% s/p CRT-D, VT/VF, AF s/p GIANFRANCO closure, MV/TV repair, PVC ablation 3/2024 who presents to the emergency department by endorsement of the heart transplant team after AICD firing 2 x 2 days ago.  Patient was at rest when he felt dizziness followed by AICD shock.  Since, he has not experienced any dizziness, chest pain, shortness of breath, leg edema, nausea or any other concerns such as syncope.  He endorses he has been feeling the AICD firing more often since beginning of this year.  And has been waiting for a heart transplant for around 2 years.  On exam, found AAOx3 and hemodynamically stable.  EKG found AV dual paced rhythm at a rate of 81.  Plan to consult the heart failure team in regards to a possible intervention.  The patient was encouraged to visit the emergency department after increased frequency of AICD firing. (19 Mar 2025 18:20)      Hospital Course:    The patient was admitted for management of his CHF exacerbation and recurrent VT. ICD interrogation revealed two episodes of VT successfully treated, the second requiring a single 40J shock. The RHC confirmed elevated filling pressures. He was treated with increased diuresis (IV lasix), potassium supplementation, and an increased dose of metoprolol. His heart rhythm was monitored via telemetry, showing a high PVC burden but no further sustained VT episodes. He remained hemodynamically stable.  medically cleared for discharge Home  Weight - Discharge/Trend:  Weight in k.6 (25 @ 05:07)      Documented EF: 25-30%    Guideline Directed Medical Therapy Prescribed/Reason why not prescribed:  B-Blocker: yes  ARNI/ACE-I/ARB: yes  MRA:  SGLT2 inhibitor: yes    Appointment scheduled within 7 days?  [x] YES [ ] NO    Active or Pending Issues Requiring Follow-up:  Dr. Luca Tamayo: heart failure follow up   Advanced Directives:   [ ] Full code  [ ] DNR  [ ] Hospice    Discharge Diagnoses:  (Please specify acuity, type of heart failure, and if there was renal involvement)  Non-ischemic cardiomyopathy (NICM) with severe LV systolic dysfunction (EF 25-30%)  Congestive heart failure (CHF) exacerbation; Acute on chronic systolic  Recurrent ventricular tachycardia (VT) with appropriate ICD shocks  Chronic heart block (CHB)  Status post-mitral and tricuspid valve repair  Status post-left atrial appendage (GIANFRANCO) closure  Status 6 for cardiac transplant

## 2025-03-20 NOTE — DISCHARGE NOTE PROVIDER - CARE PROVIDERS DIRECT ADDRESSES
[email or web address]

## 2025-03-20 NOTE — PROGRESS NOTE ADULT - SUBJECTIVE AND OBJECTIVE BOX
Overnight Events:     Review Of Systems: No chest pain, shortness of breath, or palpitations            Current Meds:  atorvastatin 20 milliGRAM(s) Oral at bedtime  clonazePAM  Tablet 0.5 milliGRAM(s) Oral daily PRN  dapagliflozin 10 milliGRAM(s) Oral daily  enoxaparin Injectable 40 milliGRAM(s) SubCutaneous every 24 hours  furosemide   Injectable 40 milliGRAM(s) IV Push two times a day  losartan 25 milliGRAM(s) Oral daily  metoprolol succinate ER 25 milliGRAM(s) Oral daily  tamsulosin 0.4 milliGRAM(s) Oral at bedtime  traZODone 100 milliGRAM(s) Oral at bedtime      Vitals:  T(F): 98.3 (03-20), Max: 98.3 (03-20)  HR: 77 (03-20) (66 - 85)  BP: 100/66 (03-20) (100/66 - 127/87)  RR: 18 (03-20)  SpO2: 92% (03-20)  I&O's Summary    19 Mar 2025 07:01  -  20 Mar 2025 07:00  --------------------------------------------------------  IN: 220 mL / OUT: 0 mL / NET: 220 mL        Physical Exam:  GEN: comfortable appearing, lying in bed in NAD  HEENT: NCAT, MMM  CV: Regular S1, S2, no m/r/g  RESP: CTAB  ABD: Soft, NTND, +BS  EXT: No LE edema, WWP, pulses palpable throughout  NEURO: No focal deficits, AOx3  SKIN:  No rashes                          13.1   7.27  )-----------( 176      ( 20 Mar 2025 07:16 )             39.7     03-20    139  |  105  |  25[H]  ----------------------------<  111[H]  3.6   |  22  |  1.06    Ca    9.2      20 Mar 2025 07:16  Phos  3.3     03-19  Mg     2.3     03-19    TPro  7.4  /  Alb  4.3  /  TBili  0.6  /  DBili  x   /  AST  20  /  ALT  24  /  AlkPhos  92  03-19    PT/INR - ( 19 Mar 2025 10:32 )   PT: 12.9 sec;   INR: 1.12 ratio         PTT - ( 19 Mar 2025 10:32 )  PTT:36.8 sec  CARDIAC MARKERS ( 19 Mar 2025 10:32 )  20 ng/L / x     / x     / x     / x     / x                   Overnight Events: NAEO    Review Of Systems: No chest pain, shortness of breath, or palpitations            Current Meds:  atorvastatin 20 milliGRAM(s) Oral at bedtime  clonazePAM  Tablet 0.5 milliGRAM(s) Oral daily PRN  dapagliflozin 10 milliGRAM(s) Oral daily  enoxaparin Injectable 40 milliGRAM(s) SubCutaneous every 24 hours  furosemide   Injectable 40 milliGRAM(s) IV Push two times a day  losartan 25 milliGRAM(s) Oral daily  metoprolol succinate ER 25 milliGRAM(s) Oral daily  tamsulosin 0.4 milliGRAM(s) Oral at bedtime  traZODone 100 milliGRAM(s) Oral at bedtime      Vitals:  T(F): 98.3 (03-20), Max: 98.3 (03-20)  HR: 77 (03-20) (66 - 85)  BP: 100/66 (03-20) (100/66 - 127/87)  RR: 18 (03-20)  SpO2: 92% (03-20)  I&O's Summary    19 Mar 2025 07:01  -  20 Mar 2025 07:00  --------------------------------------------------------  IN: 220 mL / OUT: 0 mL / NET: 220 mL        Physical Exam:  GEN: comfortable appearing, lying in bed in NAD  HEENT: NCAT, MMM  CV: Regular S1, S2, no m/r/g  RESP: CTAB  ABD: Soft, NTND, +BS  EXT: No LE edema, WWP, pulses palpable throughout  NEURO: No focal deficits, AOx3  SKIN:  No rashes                          13.1   7.27  )-----------( 176      ( 20 Mar 2025 07:16 )             39.7     03-20    139  |  105  |  25[H]  ----------------------------<  111[H]  3.6   |  22  |  1.06    Ca    9.2      20 Mar 2025 07:16  Phos  3.3     03-19  Mg     2.3     03-19    TPro  7.4  /  Alb  4.3  /  TBili  0.6  /  DBili  x   /  AST  20  /  ALT  24  /  AlkPhos  92  03-19    PT/INR - ( 19 Mar 2025 10:32 )   PT: 12.9 sec;   INR: 1.12 ratio         PTT - ( 19 Mar 2025 10:32 )  PTT:36.8 sec  CARDIAC MARKERS ( 19 Mar 2025 10:32 )  20 ng/L / x     / x     / x     / x     / x

## 2025-03-20 NOTE — DISCHARGE NOTE PROVIDER - NSDCCPCAREPLAN_GEN_ALL_CORE_FT
PRINCIPAL DISCHARGE DIAGNOSIS  Diagnosis: AICD discharge  Assessment and Plan of Treatment: you had Vtach in the setting of heart failure  you were evaluated by EPS      SECONDARY DISCHARGE DIAGNOSES  Diagnosis: Acute on chronic systolic congestive heart failure  Assessment and Plan of Treatment: Weigh yourself daily.  If you gain 3lbs in 3 days, or 5lbs in a week call your Health Care Provider.  Do not eat or drink foods containing more than 2000mg of salt (sodium) in your diet every day.  Call your Health Care Provider if you have any swelling or increased swelling in your feet, ankles, and/or stomach.  Take all of your medication as directed.  If you become dizzy call your Health Care Provider.  follow up with Dr. Luca Tamayo next week. He is instructed to check BMP and magnesium levels on Monday   return to the ED for any recurrence of symptoms, such as palpitations, dizziness, shortness of breath, or chest pain. CardioMEMS placement will be arranged as an outpatient

## 2025-03-20 NOTE — PROGRESS NOTE ADULT - PROBLEM SELECTOR PLAN 2
Hx VT s/p MDT CRT-D, s/p PVC ablation 3/2024. Intolerant to mexiletine, sotalol, and amiodarone in the past.  - EP recommendations appreciated  -- VT likely exacerbated in setting of volume overload, intolerant to AADs in the past, agree w/ diuresis

## 2025-03-20 NOTE — DISCHARGE NOTE PROVIDER - NSRESEARCHGRANT_PROPHYLAXISRECOMFT_GEN_A_CORE
MOTHER IS ON HER WAY TO   SPECIMEN CUP,  ASKING IF SHE CAN BE SEEN BY PROVIDER AS SHE IS NOT ACTING HER SELF AT ALL?   NO OPENINGS  PLEASE ADVISE
Mom here- Dr Alpa Pimentel will see child now
IMPROVE-DD Application Not Available

## 2025-03-20 NOTE — DISCHARGE NOTE PROVIDER - CARE PROVIDER_API CALL
Luca Tamayo  Interventional Cardiology  [address], [address]  [place] [address]  Phone: [phone]  Fax: [phone]  Follow Up Time: 1 week    Celestine Bryan  Cardiac Electrophysiology  [address] [place] [address], [address]  [place] [address]  Phone: [phone]  Fax: [phone]  Follow Up Time: 2 weeks    Darline Cabrera  Internal Medicine  [address] [place] [id number]  Phone: [phone]  Fax: [phone]  Follow Up Time: 1 week

## 2025-03-20 NOTE — DISCHARGE NOTE NURSING/CASE MANAGEMENT/SOCIAL WORK - PATIENT PORTAL LINK FT
You can access the FollowMyHealth Patient Portal offered by [facility] by registering at the following website: [email or web address]. By joining [email or web address]’s FollowMyHealth portal, you will also be able to view your health information using other applications (apps) compatible with our system.

## 2025-03-20 NOTE — PROGRESS NOTE ADULT - SUBJECTIVE AND OBJECTIVE BOX
Cardiology Progress Note  ------------------------------------------------------------------------------------------  SUBJECTIVE:   - Tele: A-V paced with PVCs  - No events overnight. Denies CP, SOB or Palpitations.     -------------------------------------------------------------------------------------------  VS:  T(F): 98.3 (03-20), Max: 98.3 (03-20)  HR: 77 (03-20) (66 - 85)  BP: 100/66 (03-20) (100/66 - 127/87)  RR: 18 (03-20)  SpO2: 92% (03-20)  I&O's Summary    19 Mar 2025 07:01  -  20 Mar 2025 07:00  --------------------------------------------------------  IN: 220 mL / OUT: 0 mL / NET: 220 mL      PHYSICAL EXAM:  GENERAL: NAD  HEAD: Atraumatic, Normocephalic.  ENT: Moist mucous membranes.  NECK: Supple, No JVD.  CHEST/LUNG: Clear to auscultation bilaterally; No rales, rhonchi, wheezing, or rubs. Unlabored respirations.  HEART: Regular rate and rhythm; No murmurs, rubs, or gallops.  ABDOMEN: Bowel sounds present; Soft, Nontender, Nondistended.   EXTREMITIES: No edema. 2+ Peripheral Pulses, brisk capillary refill. No clubbing or cyanosis.     -------------------------------------------------------------------------------------------  LABS:                          13.1   7.27  )-----------( 176      ( 20 Mar 2025 07:16 )             39.7     03-20    142  |  106  |  23  ----------------------------<  104[H]  3.1[L]   |  23  |  1.17    Ca    8.9      20 Mar 2025 00:33  Phos  3.3     03-19  Mg     2.3     03-19    TPro  7.4  /  Alb  4.3  /  TBili  0.6  /  DBili  x   /  AST  20  /  ALT  24  /  AlkPhos  92  03-19    PT/INR - ( 19 Mar 2025 10:32 )   PT: 12.9 sec;   INR: 1.12 ratio         PTT - ( 19 Mar 2025 10:32 )  PTT:36.8 sec  CARDIAC MARKERS ( 19 Mar 2025 10:32 )  20 ng/L / x     / x     / x     / x     / x                -------------------------------------------------------------------------------------------  Meds:  atorvastatin 20 milliGRAM(s) Oral at bedtime  clonazePAM  Tablet 0.5 milliGRAM(s) Oral daily PRN  dapagliflozin 10 milliGRAM(s) Oral daily  enoxaparin Injectable 40 milliGRAM(s) SubCutaneous every 24 hours  losartan 25 milliGRAM(s) Oral daily  metoprolol succinate ER 25 milliGRAM(s) Oral daily  tamsulosin 0.4 milliGRAM(s) Oral at bedtime  traZODone 100 milliGRAM(s) Oral at bedtime    -------------------------------------------------------------------------------------------  Cardiovascular Diagnostic Testing:    ECG:     Echo:     Stress Testing:    Cath:    -------------------------------------------------------------------------------------------   Cardiology Progress Note  ------------------------------------------------------------------------------------------  SUBJECTIVE:   - Tele: A-V paced with PVCs  - No events overnight. Denies CP, SOB or Palpitations. Overall feeling at baseline currently.     -------------------------------------------------------------------------------------------  VS:  T(F): 98.3 (03-20), Max: 98.3 (03-20)  HR: 77 (03-20) (66 - 85)  BP: 100/66 (03-20) (100/66 - 127/87)  RR: 18 (03-20)  SpO2: 92% (03-20)  I&O's Summary    19 Mar 2025 07:01  -  20 Mar 2025 07:00  --------------------------------------------------------  IN: 220 mL / OUT: 0 mL / NET: 220 mL      PHYSICAL EXAM:  GENERAL: NAD  HEAD: Atraumatic, Normocephalic.  ENT: Moist mucous membranes.  NECK: Supple, No JVD.  CHEST/LUNG: Clear to auscultation bilaterally; No rales, rhonchi, wheezing, or rubs. Unlabored respirations.  HEART: Regular rate and rhythm; No murmurs, rubs, or gallops.  ABDOMEN: Bowel sounds present; Soft, Nontender, Nondistended.   EXTREMITIES: No edema. 2+ Peripheral Pulses, brisk capillary refill. No clubbing or cyanosis.     -------------------------------------------------------------------------------------------  LABS:                          13.1   7.27  )-----------( 176      ( 20 Mar 2025 07:16 )             39.7     03-20    142  |  106  |  23  ----------------------------<  104[H]  3.1[L]   |  23  |  1.17    Ca    8.9      20 Mar 2025 00:33  Phos  3.3     03-19  Mg     2.3     03-19    TPro  7.4  /  Alb  4.3  /  TBili  0.6  /  DBili  x   /  AST  20  /  ALT  24  /  AlkPhos  92  03-19    PT/INR - ( 19 Mar 2025 10:32 )   PT: 12.9 sec;   INR: 1.12 ratio         PTT - ( 19 Mar 2025 10:32 )  PTT:36.8 sec  CARDIAC MARKERS ( 19 Mar 2025 10:32 )  20 ng/L / x     / x     / x     / x     / x                -------------------------------------------------------------------------------------------  Meds:  atorvastatin 20 milliGRAM(s) Oral at bedtime  clonazePAM  Tablet 0.5 milliGRAM(s) Oral daily PRN  dapagliflozin 10 milliGRAM(s) Oral daily  enoxaparin Injectable 40 milliGRAM(s) SubCutaneous every 24 hours  losartan 25 milliGRAM(s) Oral daily  metoprolol succinate ER 25 milliGRAM(s) Oral daily  tamsulosin 0.4 milliGRAM(s) Oral at bedtime  traZODone 100 milliGRAM(s) Oral at bedtime    -------------------------------------------------------------------------------------------  Cardiovascular Diagnostic Testing:    ECG:     Echo:     Stress Testing:    Cath:    -------------------------------------------------------------------------------------------

## 2025-03-20 NOTE — DISCHARGE NOTE PROVIDER - PROVIDER TOKENS
PROVIDER:[TOKEN:[[id number]],FOLLOWUP:[1 week]],PROVIDER:[TOKEN:[[id number]],FOLLOWUP:[2 weeks]],PROVIDER:[TOKEN:[[id number]],FOLLOWUP:[1 week]]

## 2025-03-20 NOTE — PROGRESS NOTE ADULT - PROBLEM SELECTOR PLAN 1
Moderately overloaded based on RHC  - diuretics: transition to torsemide 20mg PO BID  - GDMT  -- cont home losartan  -- increase home metoprolol to XL 50mg PO QDay   -- cont home dapagliflozin.  - will follow up with Dr. Luca Tamayo, will plan for outpatient MEMs placement

## 2025-03-20 NOTE — DISCHARGE NOTE PROVIDER - NSDCMRMEDTOKEN_GEN_ALL_CORE_FT
clonazePAM 0.5 mg oral tablet: 1 tab(s) orally once a day as needed for anxiety  Farxiga 10 mg oral tablet: 1 tab(s) orally once a day  K-Tab 20 mEq oral tablet, extended release: 2 tab(s) orally once a day  losartan 25 mg oral tablet: 1 tab(s) orally once a day  metoprolol succinate 50 mg oral tablet, extended release: 1 tab(s) orally once a day  simvastatin 20 mg oral tablet: 1 tab(s) orally once a day  Supplements/Vitamins: Vitamin D3 oral tablet; Potassium oral tablet: 1 tablet orally once a day  tamsulosin 0.4 mg oral capsule: 1 cap(s) orally once a day (at bedtime)  torsemide 20 mg oral tablet: 1 tab(s) orally 2 times a day MDD: 1  traZODone 100 mg oral tablet: 1 tab(s) orally once a day (at bedtime)

## 2025-03-20 NOTE — PROGRESS NOTE ADULT - ATTENDING COMMENTS
Mr. Huerta is a 69 year old  male with history of long standing historyo of NICM since 2011 with severe LV dysfunction s/p ICD, history of VT/VF receiving shock (last one in Feb 2025), PVC ablation in 3/2024, s/p CRT-D, CHB, VT/VF, AF s/p GIANFRANCO closure, MV/TV repair who is currently listed as status 6 for cardiac transplant presents after 2 ICD shocks on the evening of 3/17/2025. He did not lose consciousness and did not feel that it was painful like the previous time. He presents today to [facility] ER for further evaluation. RHC performed on 3/19/2025 showed elevated filling pressures.     Cardiac studies:     TTE 10/2024: LVEF 25-30%, LVEDD 5.9cm, moderately reduced RV function, annuloplasty ring of mitral and tricuspid position, PASP 47 mmHg    RHC 9/2024: RA 6, PA 60/25/40, PAWP 24/35/25, CO/CI 5.12/2.61  C 3/2025: RA 11, PA 58/26, PCWP 32, PA sat - 65%, KEVIN/[facility] - 5.43/2.78?    C 6/2023 Nonobstructive CAD  ?  ICD interrogation 3/19/2025: AP 87.7%, BiV pace 100%, AT/AF burden 0.4%  Stored data revealed two episodes of ventricular tachycardia with CL (260ms and 210ms) on 3/17/25, one occurred at 6:52pm successfully treated and terminated with one round of ATP, the 2nd episode occurred at 7:30pm with one round of ATP and ICD shock x 1 at 40J. Also noted PAF with controlled ventricular rate on 2/24/25 lasting 2 hours and atrial lead noise noted on 2/28/25.       Assessment:  ICD discharge likely for ventricular arrhythmias  Non-ischemic cardiomyopathy  Chronic severe LV systolic dysfunction s/p ICD  CHB - AV pacing  Status post mitral and tricuspid ring repair   s/p GIANFRANCO closure  Status 6 for heart transplant      Plan:   RHC showed elevated filling pressures and preserved index.   Switch to oral torsemide 20 mg BID. Start potassium supplements 40 mEQ daily.   Increase metoprolol to 50 mg daily.   Continue losartan 25 mg daily.   Continue dapagliflozin.  Lightheadedness with aldactone in the past even with 12.5 mg.   Appreciate EP recommendations.   Intolerant to anti-arrhythmics in the past.  We will plan for cardiomems as outpatient since VT was thought to be secondary to high filling pressures.    If continues to have VT in the setting of normal filling pressures, we will trial AAD again.     Patient would like to go home today. He can be discharged with outpatient follow up with Dr. Luca Tamayo next week.   Check BMP and magnesium on Monday.
No further ventricular arrhythmias. Treat HF. If recurrent arrhythmias can consider Quinidine (the patient has previously been intolerant to Amiodarone, Dorita and Sotalol). Per my conversation with Dr. Bryan his prior episodes were PVC-induced. No further workup from an EP perspective as an inpatient. No driving for at least 6 months.

## 2025-03-20 NOTE — DISCHARGE NOTE PROVIDER - NSDCFUSCHEDAPPT_GEN_ALL_CORE_FT
Luca Tamayo  [facility]  HEARTFAIL [address]  Scheduled Appointment: 04/30/2025    [facility]  CARDIOLOGY [address]  Scheduled Appointment: 06/06/2025

## 2025-03-20 NOTE — DISCHARGE NOTE NURSING/CASE MANAGEMENT/SOCIAL WORK - FINANCIAL ASSISTANCE
[facility] provides services at a reduced cost to those who are determined to be eligible through [facility]’s financial assistance program. Information regarding [facility]’s financial assistance program can be found by going to [email or web address] or by calling [phone].

## 2025-03-20 NOTE — PROGRESS NOTE ADULT - ASSESSMENT
69 year old male with FMH of CM (brother), PMH of NICM, ACC heart failure stage D, HFrEF LVEF 25-30%, HTN, S/P MV repair/TV ring/GIANFRANCO closure/MAZE procedure 2012 ([facility]), AV node ablation, s/p [employer] CRT-D 2011 (subsequent generator change 2018, 2023), hx of ICD shock for VT/VF, s/p ablation of two different PVC morphology originating from mitral annular area on 3/11/2024 (Dr. Bryan), atrial fibrillation with prior DCCV 10/2016 (not on AC 2/2 hx GIANFRANCO closure/MAZE procedure), Listed UNOS status 6 11/2024 who presented to the hospital for RHC due to recurrent VT with ICD shock occurred on 3/17/25. This was in the setting of CHF exacerbation with elevated filling pressures as seen on RHC 3/19/25. No further events here although pt with high pvc burden on tele.     Dx: Recurrent ICD shock for VF i/s/o NICM (EF 25-30%)  -Interrogation of his CRT-D revealed two episodes of ventricular tachycardia with CL (260ms and 210ms) on 3/17/25, one occurred at 6:52pm successfully treated and terminated with one round of ATP, the 2nd episode occurred at 7:30pm with one round of ATP and ICD shock x 1 at 40J. Also noted PAF with controlled ventricular rate on 2/24/25 lasting 2 hours and atrial lead noise noted on 2/28/25.   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  -Peviously intolerance to amiodarone, sotalol, or mexiletine due to severe dizziness.   -RHC 3/19 confirming elevated filling pressures with preserved CO/CI (RA 11, PA 58/26, PCWP 32, PA sat - 65%, KEVIN/detention - 5.43/2.78)  -Continue telemetry monitor  -Supplement to maintain K>4.0, Mg >2.0  -Diuresis as per heart failure team  -Further EP rec pending clinical course. May need to consider repeat trial of AAD    Jose Lovett, PGY-5  Cardiology Fellow    ***Note not finalized until signed by attending***  
69M PMH NICM, ACC heart failure stage D, HFrEF LVEF 25-30% s/p CRT-D, CHB, VT/VF, AF s/p GIANFRANCO closure, MV/TV repair, PVC ablation 3/2024. Listed UNOS status 6 11/2024. Admitted following VT requiring shock x2 as well as progressive CHF symptoms.     EKG:  AV paced, HR 81  Echo:  10/2024: LVEF 25-30%, LVEDD 5.9cm, moderately reduced RV function, annuloplasty ring of mitral and tricuspid position, PASP 47 mmHg  Cath:    RHC 3/19/25- RA 11 PA 58/26 PCWP 32 CO/CI 5.43/2.77  C 6/2023 Nonobstructive CAD  Imaging: Personally Reviewed

## 2025-03-21 ENCOUNTER — TRANSCRIPTION ENCOUNTER (OUTPATIENT)
Age: 70
End: 2025-03-21

## 2025-03-21 ENCOUNTER — INPATIENT (INPATIENT)
Facility: HOSPITAL | Age: 70
LOS: 53 days | Discharge: HOME HEALTH | DRG: 293 | End: 2025-05-14
Attending: THORACIC SURGERY (CARDIOTHORACIC VASCULAR SURGERY) | Admitting: INTERNAL MEDICINE
Payer: MEDICARE

## 2025-03-21 ENCOUNTER — INPATIENT (INPATIENT)
Facility: HOSPITAL | Age: 70
LOS: 0 days | Discharge: HOME | DRG: 316 | End: 2025-03-21
Attending: STUDENT IN AN ORGANIZED HEALTH CARE EDUCATION/TRAINING PROGRAM | Admitting: STUDENT IN AN ORGANIZED HEALTH CARE EDUCATION/TRAINING PROGRAM
Payer: MEDICARE

## 2025-03-21 VITALS
HEART RATE: 69 BPM | RESPIRATION RATE: 18 BRPM | OXYGEN SATURATION: 97 % | SYSTOLIC BLOOD PRESSURE: 111 MMHG | DIASTOLIC BLOOD PRESSURE: 79 MMHG | WEIGHT: 184.97 LBS | TEMPERATURE: 97 F | HEIGHT: 67 IN

## 2025-03-21 VITALS
TEMPERATURE: 98 F | HEART RATE: 80 BPM | DIASTOLIC BLOOD PRESSURE: 82 MMHG | HEIGHT: 67 IN | SYSTOLIC BLOOD PRESSURE: 107 MMHG | WEIGHT: 180.12 LBS

## 2025-03-21 VITALS
RESPIRATION RATE: 18 BRPM | OXYGEN SATURATION: 95 % | SYSTOLIC BLOOD PRESSURE: 104 MMHG | DIASTOLIC BLOOD PRESSURE: 76 MMHG | HEART RATE: 80 BPM

## 2025-03-21 DIAGNOSIS — I42.8 OTHER CARDIOMYOPATHIES: ICD-10-CM

## 2025-03-21 DIAGNOSIS — Z95.828 PRESENCE OF OTHER VASCULAR IMPLANTS AND GRAFTS: Chronic | ICD-10-CM

## 2025-03-21 DIAGNOSIS — I50.22 CHRONIC SYSTOLIC (CONGESTIVE) HEART FAILURE: ICD-10-CM

## 2025-03-21 DIAGNOSIS — Z98.890 OTHER SPECIFIED POSTPROCEDURAL STATES: Chronic | ICD-10-CM

## 2025-03-21 DIAGNOSIS — Z95.0 PRESENCE OF CARDIAC PACEMAKER: ICD-10-CM

## 2025-03-21 DIAGNOSIS — Z87.828 PERSONAL HISTORY OF OTHER (HEALED) PHYSICAL INJURY AND TRAUMA: Chronic | ICD-10-CM

## 2025-03-21 DIAGNOSIS — I11.0 HYPERTENSIVE HEART DISEASE WITH HEART FAILURE: ICD-10-CM

## 2025-03-21 DIAGNOSIS — E78.00 PURE HYPERCHOLESTEROLEMIA, UNSPECIFIED: ICD-10-CM

## 2025-03-21 DIAGNOSIS — T82.198A OTHER MECHANICAL COMPLICATION OF OTHER CARDIAC ELECTRONIC DEVICE, INITIAL ENCOUNTER: ICD-10-CM

## 2025-03-21 DIAGNOSIS — Z95.811 PRESENCE OF HEART ASSIST DEVICE: ICD-10-CM

## 2025-03-21 DIAGNOSIS — Z95.810 PRESENCE OF AUTOMATIC (IMPLANTABLE) CARDIAC DEFIBRILLATOR: Chronic | ICD-10-CM

## 2025-03-21 DIAGNOSIS — I50.84 END STAGE HEART FAILURE: ICD-10-CM
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
## 2025-03-21 DIAGNOSIS — I25.10 ATHEROSCLEROTIC HEART DISEASE OF NATIVE CORONARY ARTERY WITHOUT ANGINA PECTORIS: ICD-10-CM

## 2025-03-21 DIAGNOSIS — R57.0 CARDIOGENIC SHOCK: ICD-10-CM

## 2025-03-21 DIAGNOSIS — T82.897A OTHER SPECIFIED COMPLICATION OF CARDIAC PROSTHETIC DEVICES, IMPLANTS AND GRAFTS, INITIAL ENCOUNTER: ICD-10-CM

## 2025-03-21 DIAGNOSIS — I49.01 VENTRICULAR FIBRILLATION: ICD-10-CM

## 2025-03-21 DIAGNOSIS — I47.20 VENTRICULAR TACHYCARDIA, UNSPECIFIED: ICD-10-CM

## 2025-03-21 DIAGNOSIS — T82.9XXA UNSPECIFIED COMPLICATION OF CARDIAC AND VASCULAR PROSTHETIC DEVICE, IMPLANT AND GRAFT, INITIAL ENCOUNTER: ICD-10-CM
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
## 2025-03-21 LAB
ALBUMIN SERPL ELPH-MCNC: 4.4 G/DL — SIGNIFICANT CHANGE UP (ref 3.5–5.2)
ALP SERPL-CCNC: 82 U/L — SIGNIFICANT CHANGE UP (ref 30–115)
ALT FLD-CCNC: 20 U/L — SIGNIFICANT CHANGE UP (ref 0–41)
ANION GAP SERPL CALC-SCNC: 11 MMOL/L — SIGNIFICANT CHANGE UP (ref 7–14)
AST SERPL-CCNC: 30 U/L — SIGNIFICANT CHANGE UP (ref 0–41)
BASOPHILS # BLD AUTO: 0.03 K/UL — SIGNIFICANT CHANGE UP (ref 0–0.2)
BILIRUB SERPL-MCNC: 1 MG/DL — SIGNIFICANT CHANGE UP (ref 0.2–1.2)
BUN SERPL-MCNC: 21 MG/DL — HIGH (ref 10–20)
CALCIUM SERPL-MCNC: 9.2 MG/DL — SIGNIFICANT CHANGE UP (ref 8.4–10.5)
CHLORIDE SERPL-SCNC: 103 MMOL/L — SIGNIFICANT CHANGE UP (ref 98–110)
CO2 SERPL-SCNC: 27 MMOL/L — SIGNIFICANT CHANGE UP (ref 17–32)
EGFR: 81 ML/MIN/1.73M2 — SIGNIFICANT CHANGE UP
EGFR: 81 ML/MIN/1.73M2 — SIGNIFICANT CHANGE UP
EOSINOPHIL # BLD AUTO: 0.06 K/UL — SIGNIFICANT CHANGE UP (ref 0–0.7)
EOSINOPHIL NFR BLD AUTO: 0.7 % — SIGNIFICANT CHANGE UP (ref 0–8)
GAS PNL BLDA: SIGNIFICANT CHANGE UP
GLUCOSE SERPL-MCNC: 101 MG/DL — HIGH (ref 70–99)
HCT VFR BLD CALC: 45 % — SIGNIFICANT CHANGE UP (ref 42–52)
HGB BLD-MCNC: 14.7 G/DL — SIGNIFICANT CHANGE UP (ref 14–18)
IMM GRANULOCYTES NFR BLD AUTO: 0.3 % — SIGNIFICANT CHANGE UP (ref 0.1–0.3)
LACTATE SERPL-SCNC: 1.1 MMOL/L — SIGNIFICANT CHANGE UP (ref 0.7–2)
LYMPHOCYTES # BLD AUTO: 1.14 K/UL — LOW (ref 1.2–3.4)
LYMPHOCYTES # BLD AUTO: 13.2 % — LOW (ref 20.5–51.1)
MAGNESIUM SERPL-MCNC: 2.1 MG/DL — SIGNIFICANT CHANGE UP (ref 1.8–2.4)
MCHC RBC-ENTMCNC: 29.4 PG — SIGNIFICANT CHANGE UP (ref 27–31)
MCHC RBC-ENTMCNC: 32.7 G/DL — SIGNIFICANT CHANGE UP (ref 32–37)
MCV RBC AUTO: 90 FL — SIGNIFICANT CHANGE UP (ref 80–94)
MONOCYTES # BLD AUTO: 0.79 K/UL — HIGH (ref 0.1–0.6)
MONOCYTES NFR BLD AUTO: 9.2 % — SIGNIFICANT CHANGE UP (ref 1.7–9.3)
NEUTROPHILS NFR BLD AUTO: 76.3 % — HIGH (ref 42.2–75.2)
NRBC BLD AUTO-RTO: 0 /100 WBCS — SIGNIFICANT CHANGE UP (ref 0–0)
PMV BLD: 11.4 FL — HIGH (ref 7.4–10.4)
POTASSIUM SERPL-MCNC: 4.8 MMOL/L — SIGNIFICANT CHANGE UP (ref 3.5–5)
POTASSIUM SERPL-SCNC: 4.8 MMOL/L — SIGNIFICANT CHANGE UP (ref 3.5–5)
PROT SERPL-MCNC: 7.7 G/DL — SIGNIFICANT CHANGE UP (ref 6–8)
RBC # BLD: 5 M/UL — SIGNIFICANT CHANGE UP (ref 4.7–6.1)
RBC # FLD: 14.6 % — HIGH (ref 11.5–14.5)
SODIUM SERPL-SCNC: 141 MMOL/L — SIGNIFICANT CHANGE UP (ref 135–146)
TROPONIN T, HIGH SENSITIVITY RESULT: 19 NG/L — SIGNIFICANT CHANGE UP (ref 6–21)
WBC # BLD: 8.62 K/UL — SIGNIFICANT CHANGE UP (ref 4.8–10.8)
WBC # FLD AUTO: 8.62 K/UL — SIGNIFICANT CHANGE UP (ref 4.8–10.8)

## 2025-03-21 PROCEDURE — 99285 EMERGENCY DEPT VISIT HI MDM: CPT | Mod: GC

## 2025-03-21 PROCEDURE — 71045 X-RAY EXAM CHEST 1 VIEW: CPT | Mod: 26,77

## 2025-03-21 PROCEDURE — 36556 INSERT NON-TUNNEL CV CATH: CPT

## 2025-03-21 PROCEDURE — 93284 PRGRMG EVAL IMPLANTABLE DFB: CPT | Mod: 26

## 2025-03-21 PROCEDURE — 71045 X-RAY EXAM CHEST 1 VIEW: CPT | Mod: 26

## 2025-03-21 PROCEDURE — 99291 CRITICAL CARE FIRST HOUR: CPT | Mod: FS

## 2025-03-21 PROCEDURE — 36415 COLL VENOUS BLD VENIPUNCTURE: CPT

## 2025-03-21 PROCEDURE — 99291 CRITICAL CARE FIRST HOUR: CPT | Mod: 25

## 2025-03-21 PROCEDURE — 71045 X-RAY EXAM CHEST 1 VIEW: CPT

## 2025-03-21 PROCEDURE — 99291 CRITICAL CARE FIRST HOUR: CPT

## 2025-03-21 PROCEDURE — 93010 ELECTROCARDIOGRAM REPORT: CPT

## 2025-03-21 PROCEDURE — 93010 ELECTROCARDIOGRAM REPORT: CPT | Mod: 77

## 2025-03-21 PROCEDURE — 93503 INSERT/PLACE HEART CATHETER: CPT

## 2025-03-21 PROCEDURE — 83605 ASSAY OF LACTIC ACID: CPT

## 2025-03-21 RX ORDER — LIDOCAINE HYDROCHLORIDE 20 MG/ML
1 JELLY TOPICAL
Qty: 0 | Refills: 0 | DISCHARGE
Start: 2025-03-21

## 2025-03-21 RX ORDER — FUROSEMIDE 10 MG/ML
80 INJECTION INTRAMUSCULAR; INTRAVENOUS
Qty: 0 | Refills: 0 | DISCHARGE
Start: 2025-03-21

## 2025-03-21 RX ORDER — LIDOCAINE HCL/PF 10 MG/ML
1 VIAL (ML) INJECTION
Qty: 2 | Refills: 0 | Status: DISCONTINUED | OUTPATIENT
Start: 2025-03-21 | End: 2025-03-21

## 2025-03-21 RX ORDER — TRAZODONE HCL 100 MG
1 TABLET ORAL
Refills: 0 | DISCHARGE

## 2025-03-21 RX ORDER — FUROSEMIDE 10 MG/ML
80 INJECTION INTRAMUSCULAR; INTRAVENOUS
Refills: 0 | Status: DISCONTINUED | OUTPATIENT
Start: 2025-03-21 | End: 2025-03-21

## 2025-03-21 RX ORDER — LOSARTAN POTASSIUM 100 MG/1
25 TABLET, FILM COATED ORAL DAILY
Refills: 0 | Status: DISCONTINUED | OUTPATIENT
Start: 2025-03-21 | End: 2025-03-21

## 2025-03-21 RX ORDER — DAPAGLIFLOZIN 5 MG/1
1 TABLET, FILM COATED ORAL
Qty: 0 | Refills: 0 | DISCHARGE
Start: 2025-03-21

## 2025-03-21 RX ORDER — LOSARTAN POTASSIUM 100 MG/1
1 TABLET, FILM COATED ORAL
Qty: 0 | Refills: 0 | DISCHARGE
Start: 2025-03-21

## 2025-03-21 RX ORDER — TAMSULOSIN HYDROCHLORIDE 0.4 MG/1
1 CAPSULE ORAL
Qty: 0 | Refills: 0 | DISCHARGE
Start: 2025-03-21

## 2025-03-21 RX ORDER — LIDOCAINE HCL/PF 10 MG/ML
100 VIAL (ML) INJECTION ONCE
Refills: 0 | Status: COMPLETED | OUTPATIENT
Start: 2025-03-21 | End: 2025-03-21

## 2025-03-21 RX ORDER — TAMSULOSIN HYDROCHLORIDE 0.4 MG/1
0.4 CAPSULE ORAL AT BEDTIME
Refills: 0 | Status: DISCONTINUED | OUTPATIENT
Start: 2025-03-22 | End: 2025-04-29

## 2025-03-21 RX ORDER — ATORVASTATIN CALCIUM 80 MG/1
20 TABLET, FILM COATED ORAL AT BEDTIME
Refills: 0 | Status: DISCONTINUED | OUTPATIENT
Start: 2025-03-21 | End: 2025-03-21

## 2025-03-21 RX ORDER — LIDOCAINE HCL/PF 10 MG/ML
0.5 VIAL (ML) INJECTION
Qty: 2 | Refills: 0 | Status: DISCONTINUED | OUTPATIENT
Start: 2025-03-21 | End: 2025-03-25

## 2025-03-21 RX ORDER — LOSARTAN POTASSIUM 100 MG/1
25 TABLET, FILM COATED ORAL DAILY
Refills: 0 | Status: DISCONTINUED | OUTPATIENT
Start: 2025-03-22 | End: 2025-03-22

## 2025-03-21 RX ORDER — TRAZODONE HCL 100 MG
100 TABLET ORAL AT BEDTIME
Refills: 0 | Status: DISCONTINUED | OUTPATIENT
Start: 2025-03-21 | End: 2025-03-21

## 2025-03-21 RX ORDER — TAMSULOSIN HYDROCHLORIDE 0.4 MG/1
0.4 CAPSULE ORAL AT BEDTIME
Refills: 0 | Status: DISCONTINUED | OUTPATIENT
Start: 2025-03-21 | End: 2025-03-21

## 2025-03-21 RX ORDER — ATORVASTATIN CALCIUM 80 MG/1
10 TABLET, FILM COATED ORAL AT BEDTIME
Refills: 0 | Status: DISCONTINUED | OUTPATIENT
Start: 2025-03-22 | End: 2025-04-29

## 2025-03-21 RX ORDER — CLONAZEPAM 0.5 MG/1
0.5 TABLET ORAL
Refills: 0 | Status: DISCONTINUED | OUTPATIENT
Start: 2025-03-21 | End: 2025-03-21

## 2025-03-21 RX ORDER — METOPROLOL SUCCINATE 50 MG/1
50 TABLET, EXTENDED RELEASE ORAL DAILY
Refills: 0 | Status: DISCONTINUED | OUTPATIENT
Start: 2025-03-22 | End: 2025-03-22

## 2025-03-21 RX ORDER — TRAZODONE HCL 100 MG
100 TABLET ORAL AT BEDTIME
Refills: 0 | Status: DISCONTINUED | OUTPATIENT
Start: 2025-03-22 | End: 2025-04-29

## 2025-03-21 RX ORDER — TORSEMIDE 10 MG
1 TABLET ORAL
Qty: 0 | Refills: 0 | DISCHARGE
Start: 2025-03-21

## 2025-03-21 RX ORDER — CLONAZEPAM 0.5 MG/1
0.5 TABLET ORAL DAILY
Refills: 0 | Status: DISCONTINUED | OUTPATIENT
Start: 2025-03-21 | End: 2025-03-21

## 2025-03-21 RX ORDER — CLONAZEPAM 0.5 MG/1
1 TABLET ORAL
Qty: 0 | Refills: 0 | DISCHARGE
Start: 2025-03-21

## 2025-03-21 RX ORDER — TRAZODONE HCL 100 MG
0 TABLET ORAL
Refills: 0 | DISCHARGE

## 2025-03-21 RX ORDER — TRAZODONE HCL 100 MG
1 TABLET ORAL
Qty: 0 | Refills: 0 | DISCHARGE
Start: 2025-03-21

## 2025-03-21 RX ORDER — TORSEMIDE 10 MG
20 TABLET ORAL
Refills: 0 | Status: DISCONTINUED | OUTPATIENT
Start: 2025-03-21 | End: 2025-03-21

## 2025-03-21 RX ORDER — METOPROLOL SUCCINATE 50 MG/1
50 TABLET, EXTENDED RELEASE ORAL DAILY
Refills: 0 | Status: DISCONTINUED | OUTPATIENT
Start: 2025-03-21 | End: 2025-03-21

## 2025-03-21 RX ORDER — ATORVASTATIN CALCIUM 80 MG/1
1 TABLET, FILM COATED ORAL
Qty: 0 | Refills: 0 | DISCHARGE
Start: 2025-03-21

## 2025-03-21 RX ORDER — METOPROLOL SUCCINATE 50 MG/1
1 TABLET, EXTENDED RELEASE ORAL
Qty: 0 | Refills: 0 | DISCHARGE
Start: 2025-03-21

## 2025-03-21 RX ORDER — DAPAGLIFLOZIN 5 MG/1
10 TABLET, FILM COATED ORAL DAILY
Refills: 0 | Status: DISCONTINUED | OUTPATIENT
Start: 2025-03-21 | End: 2025-03-21

## 2025-03-21 RX ORDER — LOSARTAN POTASSIUM 100 MG/1
1 TABLET, FILM COATED ORAL
Refills: 0 | DISCHARGE

## 2025-03-21 RX ADMIN — TAMSULOSIN HYDROCHLORIDE 0.4 MILLIGRAM(S): 0.4 CAPSULE ORAL at 21:31

## 2025-03-21 RX ADMIN — Medication 20 MILLIGRAM(S): at 13:09

## 2025-03-21 RX ADMIN — METOPROLOL SUCCINATE 50 MILLIGRAM(S): 50 TABLET, EXTENDED RELEASE ORAL at 16:14

## 2025-03-21 RX ADMIN — Medication 7.5 MG/MIN: at 18:46

## 2025-03-21 RX ADMIN — Medication 7.5 MG/MIN: at 13:07

## 2025-03-21 RX ADMIN — Medication 100 MILLIGRAM(S): at 21:31

## 2025-03-21 RX ADMIN — FUROSEMIDE 80 MILLIGRAM(S): 10 INJECTION INTRAMUSCULAR; INTRAVENOUS at 16:14

## 2025-03-21 RX ADMIN — Medication 100 MILLIGRAM(S): at 18:46

## 2025-03-21 RX ADMIN — DAPAGLIFLOZIN 10 MILLIGRAM(S): 5 TABLET, FILM COATED ORAL at 13:09

## 2025-03-21 RX ADMIN — ATORVASTATIN CALCIUM 20 MILLIGRAM(S): 80 TABLET, FILM COATED ORAL at 21:31

## 2025-03-21 RX ADMIN — LOSARTAN POTASSIUM 25 MILLIGRAM(S): 100 TABLET, FILM COATED ORAL at 16:14

## 2025-03-21 NOTE — H&P ADULT - HISTORY OF PRESENT ILLNESS
[address] [facility]  [phone]     Discharge Summary     Gema Back  MRN: [id number]     : 1969     Age: 52 y.o. Admit date: 2017     Discharge date:  17  Attending Physician: Dr. Taylor Chen MD, [facility]  Primary Discharge Diagnosis:   Principal Problem:    Incarcerated incisional hernia (2017)    Active Problems:    Acute respiratory failure with hypoxia ([address] (2010)      Pain, chronic (2010)      Seizure disorder ([address] (2010)      Depression (2010)      Tobacco use disorder- 2-3 ppd (2010)      COPD (chronic obstructive pulmonary disease) [address] (2010)      Bipolar disorder ([facility] (10/23/2010)      CAP (community acquired pneumonia) (2017)      Primary Operations or Procedures:  Procedure(s):  REPAIR WITH MESH INCARCERATED INCISIONAL HERNIA/ BILATERAL MYOFASCIAL ADVANCEMENT FLAPS OF ABDOMINAL WALLL/INSERTION OF ON Q PAINBUSTER       Brief History: Gema Back was admitted with the following history of present illness. History of Present Illness: 53 yo female with history significant for hysterectomy and colon repair 3911 as a complication from [facility]  2 months prior who presented to ER last night with worsening of ongoing upper abdominal pain. Yesterday, this was accompanied by nausea and vomiting. She reports having an intermittently painful mass at the upper aspect of the midline incision, as well as pain along the incision, for quite some time but this seemed to worsen recently. She has not noted fever, chills, hematemesis, melena, hematochezia. By her report, the colonic injury from [facility]  was identified 2 months later and was primarily repaired at the time of hysterectomy- she did not have colectomy.       Hospital Course:  She was taken to the OR by Dr. Jacquelyn Galvan on 17 for REPAIR WITH MESH INCARCERATED INCISIONAL HERNIA/ BILATERAL MYOFASCIAL ADVANCEMENT FLAPS OF ABDOMINAL WALLL/INSERTION OF ON Q PAINBUSTER. She was then admitted to the floor for routine post-operative management. She has progressed well. Pain has been controlled with PO medications. Bowel function has returned and she has tolerated diet advancement. She will be discharged with a AMINA drain and has been taught how to manage this at home. She is felt stable to discharge home today with office follow-up. Discharge Medications:   Current Discharge Medication List      START taking these medications    Details   albuterol-ipratropium (DUO-NEB) 2.5 mg-0.5 mg/3 ml nebu 3 mL by Nebulization route every four (4) hours as needed. Qty: 180 Nebule, Refills: 1      nicotine (NICODERM CQ) 21 mg/24 hr 1 Patch by TransDERmal route daily for 7 days. Qty: 7 Patch, Refills: 0      oxyCODONE-acetaminophen (PERCOCET 10)  mg per tablet Take 1 Tab by mouth every four (4) hours as needed. Max Daily Amount: 6 Tabs. Qty: 30 Tab, Refills: 0      levoFLOXacin (LEVAQUIN) 750 mg tablet Take 1 Tab by mouth daily for 3 days. Qty: 3 Tab, Refills: 0         CONTINUE these medications which have NOT CHANGED    Details   carvedilol (COREG) 12.5 mg tablet Take 12.5 mg by mouth daily. divalproex ER (DEPAKOTE ER) 250 mg ER tablet Take 250 mg by mouth nightly. baclofen (LIORESAL) 10 mg tablet Take 10 mg by mouth three (3) times daily. zonisamide (ZONEGRAN) 100 mg capsule Take 100 mg by mouth daily. levothyroxine (SYNTHROID) 50 mcg tablet Take 50 mcg by mouth Daily (before breakfast). escitalopram oxalate (LEXAPRO) 10 mg tablet Take 10 mg by mouth daily. amlodipine (NORVASC) 5 mg tablet Take 5 mg by mouth daily. traZODone (DESYREL) 100 mg tablet Take 100 mg by mouth nightly. clonazepam (KLONOPIN) 1 mg tablet Take  by mouth every four (4) hours. phenytoin ER (DILANTIN EXTENDED) 100 mg ER capsule Take 300 mg by mouth two (2) times a day.          STOP taking these medications HYDROcodone-acetaminophen (NORCO) 5-325 mg per tablet Comments:   Reason for Stopping:                 Discharge Instructions/Follow-up Plans:      MD Instructions: Follow-up with Dr. Juliette Arvizu in 1 week. Keep incision/staples clean and dry, keep covered to prevent binder from rubbing. Do not apply lotions, creams or ointments to incisions/yonny. Annice Mortimer will be removed at follow-up appointment. Must wear abdominal binder at all times. AMINA management: Empty drain at least daily or if full. Record amount. Bring this information to your appointment. Diet - as tolerated - GI soft diet. Activity - ambulate - as tolerated - no heavy lifting >10lb. May shower - no tub baths or soaking. No driving while taking narcotics. Do not drink alcohol while taking narcotics. Resume other home medications. Complete antibiotics as directed. If problems or questions arise, please call our office at [phone].     Greater than 30 minutes were spent discharging the patient      Signed:  NICOLÁS Quezada   2/13/2017  12:24 PM 69-year-old male patient past medical history of NICM since 2011, HFrEF LVEF 25-30% s/p MDT CRT-D with baseline CHB, VT/VF, a.fib s/p GIANFRANCO ligation/MAZE, MV/TV repair, PVC ablation 3/2024 intolerant to AADs in the past who initially presented to the [facility] emergency department on 3/24/2025, by sent by HF specialist for another ACID VT/VF shock. Device reported successful ATP for VT 3/17 at 6:52pm followed by one ATP and 40J shock. He reported feeling dizzy and SOB during the events. He follows with Dr paula abreu for HF, currently undergoing transplant workup, Dr John for CRTD and VT/VF and  for genetic counseling. While admitted to [facility], he was started on a lidocaine gtt. On 3/22 when lidocaine weaned off patient had another two episodes of VT requiring AICD shocks. He was transferred to [facility] for further management.     On admission to CICU, he is A&O x3, saturating well on room air, v paced @ 80 with /87 on lidocaine gtt @ 1mg/min. 69-year-old male patient past medical history of NICM since 2011, HFrEF LVEF 25-30% s/p MDT CRT-D with baseline CHB, VT/VF, a.fib s/p GIANFRANCO ligation/MAZE, MV/TV repair, PVC ablation 3/2024 intolerant to AADs in the past, recent admission 3/19 - 3/20/25 for AICD shocks initially presented to the [facility] emergency department on 3/24/2025, sent by heart failure specialist for ACID VT/VF shock. Device reported successful ATP for VT 3/17 at 6:52pm followed by one ATP and 40J shock. He reported feeling dizzy and SOB during the events. He follows with Dr paula abreu for HF, currently undergoing transplant workup, Dr John for CRTD and VT/VF and  for genetic counseling. While admitted to [facility], he was started on a lidocaine gtt. On 3/22 when lidocaine weaned off patient had another two episodes of VT requiring AICD shocks. He was transferred to [facility] for further management.     On admission to CICU, he is A&O x3, saturating well on room air, av paced @ 80 with /87 on lidocaine gtt @ 1mg/min. 69-year-old male patient past medical history of NICM since 2011, HFrEF LVEF 25-30% s/p MDT CRT-D with baseline CHB, VT/VF, a.fib s/p GIANFRANCO ligation/MAZE, MV/TV repair, PVC ablation 3/2024 intolerant to AADs in the past, recent admission 3/19 - 3/20/25 for AICD shocks initially presented to the [facility] emergency department on 3/21/2025, sent by heart failure specialist for ACID shock. Device reported successful ATP for VT 3/17 at 6:52pm followed by one ATP and 40J shock. He reported feeling dizzy and SOB during the events. He follows with Dr paula abreu for HF, currently undergoing transplant workup, Dr John for CRTD and VT/VF and  for genetic counseling. While admitted to [facility], he was started on a lidocaine gtt. On 3/21 when lidocaine weaned off patient had another two episodes of VT requiring AICD shocks. He was transferred to [facility] for further management.     On admission to CICU, he is A&O x3, saturating well on room air, av paced @ 80 with /87 on lidocaine gtt @ 1mg/min.

## 2025-03-21 NOTE — DISCHARGE NOTE PROVIDER - NSDCFUSCHEDAPPT_GEN_ALL_CORE_FT
Luca Tamayo  [facility]  HEARTFAIL [address]  Scheduled Appointment: 04/30/2025    [facility]  CARDIOLOGY [address]  Scheduled Appointment: 06/06/2025

## 2025-03-21 NOTE — ED PROVIDER NOTE - OBJECTIVE STATEMENT
69-year-old male PMH NICM, HFrEF (EF 25 to 30%) status post CRT-D, VT/VF, AF status post GIANFRANCO closure, MV/TV repair, PVC ablation 3/2024 presents to the ED for evaluation of AICD discharge.  Patient states around 10 PM last night his AICD discharge, endorses severe dizziness just prior to discharge.  Of note patient was recently admitted at [facility] for similar complaint.  Patient states he was recommended to come in for evaluation by his EP Dr. Bryan.  Presently patient denies dizziness, syncope/near syncope, chest pain, shortness of breath, nausea, diaphoresis or any numbness/tingling/weakness in the extremities.

## 2025-03-21 NOTE — ED ADULT TRIAGE NOTE - CHIEF COMPLAINT QUOTE
Pt sent in by cardiologist for multiple episodes of Vtach. pt states he has one last night and 2 on monday. pt has defibrillator and states he felt the shocks. denies cp and sob. Pt sent in by cardiologist for multiple episodes of Vtach. pt states he had one last night and 2 on monday. pt has defibrillator and states he felt the shocks. denies cp and sob. Pt sent in by cardiologist for multiple episodes of Vtach. pt states he had one last night and 2 on monday. pt has defibrillator/pacemaker and states he felt the shocks. denies cp and sob.

## 2025-03-21 NOTE — H&P ADULT - NSHPPHYSICALEXAM_GEN_ALL_CORE
Physical Exam:   Constitutional: NAD, well-groomed, well-developed  HEENT: PERRLA, EOMI, no drainage or redness  Respiratory: Breath Sounds equal & clear bilaterally to auscultation, no rales/rhonchi/wheezing, no accessory muscle use noted  Cardiovascular: Regular rate, regular rhythm, normal S1, S2  Gastrointestinal: Soft, non-tender, non distended, + bowel sounds  Extremities: [place] x 4, no peripheral edema, no cyanosis, no clubbing. +peripheral pulses.   Neurological: A+O x 3; speech clear and intact; no sensory, motor  deficits, normal reflexes. Nonfocal.   Skin: warm, dry, well perfused  Lines: R IJ pac c/d/i
No

## 2025-03-21 NOTE — ED PROVIDER NOTE - ATTENDING APP SHARED VISIT CONTRIBUTION OF CARE
68 y/o M PMhx NICM since 2011, HFrEF s/p MDT CRT-D, AF, MV/TV repair, presents to ED for eval of AICD firing. Pt reports he was recently admitted in [place] discharged yesterday for firing of AICD in VT/VF, pt reports he went home and it happened again at 10pm last night. He felt dizzy and SOB during event. Currently denies any sx.     CONSTITUTIONAL: NAD.   SKIN: warm, dry  HEAD: Normocephalic; atraumatic.  ENT: MMM.   NECK: Supple.  CARD: RRR.   RESP: No wheezes, rales or rhonchi.  ABD: soft ntnd.   EXT: Normal ROM.  No lower extremity edema.   NEURO: Alert, oriented, grossly unremarkable.

## 2025-03-21 NOTE — PATIENT PROFILE ADULT - FALL HARM RISK - HARM RISK INTERVENTIONS

## 2025-03-21 NOTE — DISCHARGE NOTE PROVIDER - CARE PROVIDER_API CALL
Darline Cabrera  Internal Medicine  [address] [place] [id number]  Phone: [phone]  Fax: [phone]  Follow Up Time: Routine

## 2025-03-21 NOTE — H&P ADULT - NSHPPHYSICALEXAM_GEN_ALL_CORE
T(C): 36.3 (03-21-25 @ 09:13), Max: 36.3 (03-21-25 @ 09:13)  HR: 69 (03-21-25 @ 09:13) (69 - 69)  BP: 111/79 (03-21-25 @ 09:13) (111/79 - 111/79)  RR: 18 (03-21-25 @ 09:13) (18 - 18)  SpO2: 97% (03-21-25 @ 09:13) (97% - 97%)    CONSTITUTIONAL: Well groomed, no apparent distress  EYES: PERRLA and symmetric, EOMI, No conjunctival or scleral injection, non-icteric  ENMT: Oral mucosa with moist membranes. Normal dentition; no pharyngeal injection or exudates             NECK: Supple, symmetric and without tracheal deviation   RESP: No respiratory distress, no use of accessory muscles; CTA b/l, no WRR  CV: RRR, +S1S2, no MRG; no JVD; no peripheral edema  GI: Soft, NT, ND, no rebound, no guarding; no palpable masses; no hepatosplenomegaly; no hernia palpated  LYMPH: No cervical LAD or tenderness; no axillary LAD or tenderness; no inguinal LAD or tenderness  MSK: Normal gait; No digital clubbing or cyanosis; examination of the (head/neck/spine/ribs/pelvis, RUE, LUE, RLE, LLE) without misalignment,            Normal ROM without pain, no spinal tenderness, normal muscle strength/tone  SKIN: No rashes or ulcers noted; no subcutaneous nodules or induration palpable  NEURO: CN II-XII intact; normal reflexes in upper and lower extremities, sensation intact in upper and lower extremities b/l to light touch   PSYCH: Appropriate insight/judgment; A+O x 3, mood and affect appropriate, recent/remote memory intact T(C): 36.3 (03-21-25 @ 09:13), Max: 36.3 (03-21-25 @ 09:13)  HR: 69 (03-21-25 @ 09:13) (69 - 69)  BP: 111/79 (03-21-25 @ 09:13) (111/79 - 111/79)  RR: 18 (03-21-25 @ 09:13) (18 - 18)  SpO2: 97% (03-21-25 @ 09:13) (97% - 97%)    CONSTITUTIONAL: Well groomed, no apparent distress   RESP: No respiratory distress, no use of accessory muscles; CTA b/l, no WRR  CV: RRR, +S1S2, no MRG; no JVD; no peripheral edema  GI: Soft, NT, ND, no rebound, no guarding; no palpable masses; no hepatosplenomegaly; no hernia palpated  NEURO: CN II-XII intact; normal reflexes in upper and lower extremities, sensation intact in upper and lower extremities b/l to light touch   PSYCH: Appropriate insight/judgment; A+O x 3, mood and affect appropriate, recent/remote memory intact

## 2025-03-21 NOTE — ED PROVIDER NOTE - CLINICAL SUMMARY MEDICAL DECISION MAKING FREE TEXT BOX
70 yo M presented to ED with firing of AICD. Labs and EKG were ordered and reviewed.  EKG paced with PVCs. Imaging was ordered and reviewed by me.  XR chest showing stable cardiomegaly. Appropriate medications for patient's presenting complaints were ordered and effects were reassessed.  Patient's records (prior hospital, ED visit, and/or nursing home notes if available) were reviewed.  Additional history was obtained from EMS, family, and/or PCP (where available).  Escalation to admission/observation was considered.  Patient requires inpatient hospitalization - monitored setting to CCU for further care.

## 2025-03-21 NOTE — CONSULT NOTE ADULT - ATTENDING COMMENTS
Agree with plan as above    Rec  - Monitor on tele  - Lidocaine  - [place]  - HF consult for transplant eval

## 2025-03-21 NOTE — H&P ADULT - NSHPLABSRESULTS_GEN_ALL_CORE
14.7   8.62  )-----------( 195      ( 21 Mar 2025 10:25 )             45.0   03-21    141  |  103  |  21[H]  ----------------------------<  101[H]  4.8   |  27  |  1.0    Ca    9.2      21 Mar 2025 10:25  Mg     2.1     03-21    TPro  7.7  /  Alb  4.4  /  TBili  1.0  /  DBili  x   /  AST  30  /  ALT  20  /  AlkPhos  82  03-21  < from: TTE W or WO Ultrasound Enhancing Agent (03.20.25 @ 09:16) >    CONCLUSIONS:      1. Left ventricular cavity is normal in size. Left ventricular wall thickness is normal. Left ventricular systolic function is severely decreased with an ejection fraction of 30 % by Mendoza's method of disks. Global left ventricular hypokinesis.   2. Multiple segmental abnormalities exist. See findings.   3. Elevated left ventricular filling pressure. Analysis of left ventricular diastolic function and filling pressure is made challenging by the presence of mitral annuloplasty ring.   4. Moderately enlarged right ventricular cavity size and mildly reduced right ventricular systolic function.   5. The right atrium is severely dilated.   6. Device lead is visualized in the right heart.   7. No pericardial effusion seen.   8. STACEY could be considered to further evaluated mitral annuloplasty.   9. An annuloplasty ring is noted in the mitral position. Transvalvular mitral gradients are elevated. Severe prosthetic mitral stenosis. There is trace intravalvular mitral regurgitation.  10. An annuloplasty ring is noted in the tricuspid position.  11. Estimated pulmonary artery systolic pressure is 36 mmHg.  12. Technically difficult image quality.  13. There is no evidence of a left ventricular thrombus.    < end of copied text >

## 2025-03-21 NOTE — H&P ADULT - ASSESSMENT
69-year-old male patient past medical history of NICM since 2011, HFrEF LVEF 25-30% s/p MDT CRT-D with baseline CHB, VT/VF, a.fib s/p GIANFRANCO ligation/MAZE, MV/TV repair, PVC ablation 3/2024 intolerant to AADs in the past who initially presented to the [facility] for AICD shocks/VT, transferred for further management.    Plan:    Neuro  #hx anxiety  - A&O x3  - clonazepam PRN  - continue to monitor mental status per protocol    Respiratory  - saturating well on room air  - continue to monitor sp02    Cardiovascular  #hx NICM, HFrEF s/p MDT CRT-D  - TTE 3/20 EF 30%, RVe and reduced RVsf, severe prosthetic MS  - appears euvolemic  - requiring IV lasix 80 BID at outside hospital  - f/u CVP, mv02, perfusion indices and lactate  - GDMT: transferred on losartan 25 daily  - holding farxiga while inpatient    #hx VT/VF   - s/p AICD shocks 3/21 off lido  - continue lidocaine gtt at 1mg/min  - continue toprol 50  - continue to monitor tele and electrolytes    #hx a.fib     - s/p GIANFRANCO ligation/MAZE  - hx ani mitral PVC ablation 3/11/2024  - rate control as above      Renal  - sCr within normal limits  - strict I&O, trend renal function daily    GI  - DASH diet  - monitor for BM    Endo  - glucose controlled on cmp, continue to trend daily  - f/u a1c, tsh, lipid panel    Heme  - H/H and platelets stable  - trend daily  #DVT ppx: AC at home?    ID  - afebrile, no leukocytosis  - continue to trend wbc and fever curve    #full code  Lines: R IJ PAC 3/21 -     ======================= DISPOSITION  =====================  [ x] Critical   [ ] Guarded    [ ] Stable    [x ] Maintain in CICU  [ ] Downgrade to Telemetry  [ ] Discharge Home    Patient requires continuous monitoring with bedside rhythm monitoring, pulse ox monitoring, and intermittent blood gas analysis. Care plan discussed with ICU care team. Patient remained critical and at risk for life threatening decompensation.  Patient seen, examined and plan discussed with CCU team during rounds.     I have personally provided 75 minutes of critical care time excluding time spent on separate procedures, in addition to initial critical care time provided by the CICU Attending, Dr. Weber.    Krissy Clark [facility]-BC   69-year-old male patient past medical history of NICM since 2011, HFrEF LVEF 25-30% s/p MDT CRT-D with baseline CHB, VT/VF, a.fib s/p GIANFRANCO ligation/MAZE, MV/TV repair, PVC ablation 3/2024 intolerant to AADs in the past who initially presented to the [facility] for AICD shocks/VT, transferred for further management.    Plan:    Neuro  #hx anxiety  - A&O x3  - clonazepam PRN  - continue to monitor mental status per protocol    Respiratory  - saturating well on room air  - continue to monitor sp02    Cardiovascular  #hx NICM, HFrEF s/p MDT CRT-D  - TTE 3/20 EF 30%, RVe and reduced RVsf, severe prosthetic MS  - recent admit 3/19/2025 w/ [facility] found to have elevated filling pressures treated with IV   - appears euvolemic  - requiring IV lasix 80 BID at outside hospital  - f/u CVP, mv02, perfusion indices and lactate  - GDMT: transferred on losartan 25 daily  - holding farxiga while inpatient    #hx VT/VF   - hx PVC ablation 3/2024, intolerant to mexiletine, sotalol and amio in the past as per HF documentation  - s/p AICD shocks 3/21 off lido  - continue lidocaine gtt at 1mg/min  - continue toprol 50  - continue to monitor tele and electrolytes    #hx a.fib     - s/p GIANFRANCO ligation/MAZE  - rate control as above  - not currently on AC    Renal  - sCr within normal limits  - strict I&O, trend renal function daily    GI  - DASH diet  - monitor for BM    Endo  - glucose controlled on cmp, continue to trend daily  - f/u a1c, tsh, lipid panel    Heme  - H/H and platelets stable  - trend daily  #DVT ppx: heparin subq    ID  - afebrile, no leukocytosis  - continue to trend wbc and fever curve    #full code  Lines: R IJ PAC 3/21 -     ======================= DISPOSITION  =====================  [ x] Critical   [ ] Guarded    [ ] Stable    [x ] Maintain in CICU  [ ] Downgrade to Telemetry  [ ] Discharge Home    Patient requires continuous monitoring with bedside rhythm monitoring, pulse ox monitoring, and intermittent blood gas analysis. Care plan discussed with ICU care team. Patient remained critical and at risk for life threatening decompensation.  Patient seen, examined and plan discussed with CCU team during rounds.     I have personally provided 75 minutes of critical care time excluding time spent on separate procedures, in addition to initial critical care time provided by the CICU Attending, Dr. Weber.    Krissy Clark, [facility]-BC   69-year-old male patient past medical history of NICM since 2011, HFrEF LVEF 25-30% s/p MDT CRT-D with baseline CHB, VT/VF, a.fib s/p GIANFRANCO ligation/MAZE, MV/TV repair, PVC ablation 3/2024 intolerant to AADs in the past who initially presented to the [facility] for AICD shocks/VT, transferred for further management.    Plan:    Neuro  #hx anxiety  - A&O x3  - home dose clonazepam 0.5 PRN  - continue to monitor mental status per protocol    Respiratory  - saturating well on room air  - continue to monitor sp02    Cardiovascular  #hx NICM, HFrEF s/p MDT CRT-D  - TTE 3/20 EF 30%, RVe and reduced RVsf, severe prosthetic MS  - recent admit 3/19/2025 w/ [facility] found to have elevated filling pressures treated with IV   - appears euvolemic  - requiring IV lasix 80 BID at outside hospital, on standing torsemide 20 BID  - f/u CVP, mv02, perfusion indices and lactate  - GDMT: transferred on losartan 25 daily  - holding farxiga while inpatient    #hx VT/VF   - hx PVC ablation 3/2024, intolerant to mexiletine, sotalol and amio in the past as per HF documentation  - s/p AICD shocks 3/21 off lido  - continue lidocaine gtt at 1mg/min  - continue toprol 50  - f/u EP recommendations in am  - continue to monitor tele and electrolytes    #hx a.fib     - s/p GIANFRANCO ligation/MAZE  - rate control as above  - not currently on AC    Renal  - sCr within normal limits  - strict I&O, trend renal function daily    GI  - DASH diet  - monitor for BM    Endo  - glucose controlled on cmp, continue to trend daily  - f/u a1c, tsh, lipid panel    Heme  - H/H and platelets stable  - trend daily  #DVT ppx: heparin subq    ID  - afebrile, no leukocytosis  - continue to trend wbc and fever curve    #full code  Lines: R IJ PAC 3/21 -     ======================= DISPOSITION  =====================  [ x] Critical   [ ] Guarded    [ ] Stable    [x ] Maintain in CICU  [ ] Downgrade to Telemetry  [ ] Discharge Home    Patient requires continuous monitoring with bedside rhythm monitoring, pulse ox monitoring, and intermittent blood gas analysis. Care plan discussed with ICU care team. Patient remained critical and at risk for life threatening decompensation.  Patient seen, examined and plan discussed with CCU team during rounds.     I have personally provided 75 minutes of critical care time excluding time spent on separate procedures, in addition to initial critical care time provided by the CICU Attending, Dr. Weber.    Krissy Clark, ALIECNP-BC   69-year-old male patient past medical history of NICM since 2011, HFrEF LVEF 25-30% s/p MDT CRT-D with baseline CHB, VT/VF, a.fib s/p GIANFRANCO ligation/MAZE, MV/TV repair, PVC ablation 3/2024 intolerant to AADs in the past who initially presented to the [facility] for AICD shocks/VT, transferred for further management.    Plan:    Neuro  #hx anxiety  - A&O x3  - home dose clonazepam 0.5 PRN  - continue to monitor mental status per protocol    Respiratory  - saturating well on room air  - continue to monitor sp02    Cardiovascular  #hx NICM, HFrEF s/p MDT CRT-D  - TTE 3/20 EF 30%, RVe and reduced RVsf, severe prosthetic MS  - recent admit 3/19/2025 w/ [facility] found to have elevated filling pressures treated with IV diuretics  - appears euvolemic  - requiring IV lasix 80 BID at outside hospital, on standing torsemide 20 BID  - f/u CVP, mv02, perfusion indices and lactate  - GDMT: transferred on losartan 25 daily  - holding farxiga while inpatient    #hx VT/VF   - hx PVC ablation 3/2024, intolerant to mexiletine, sotalol and amio in the past as per HF documentation  - s/p AICD shocks 3/21 off lido  - continue lidocaine gtt at 1mg/min  - continue toprol 50  - f/u EP recommendations in am  - continue to monitor tele and electrolytes    #hx a.fib     - s/p GIANFRANCO ligation/MAZE  - rate control as above  - not currently on AC    Renal  - sCr within normal limits  - strict I&O, trend renal function daily    GI  - DASH diet  - monitor for BM    Endo  - glucose controlled on cmp, continue to trend daily  - f/u a1c, tsh, lipid panel    Heme  - H/H and platelets stable  - trend daily  #DVT ppx: heparin subq    ID  - afebrile, no leukocytosis  - continue to trend wbc and fever curve    #full code  Lines: R IJ PAC 3/21 -     ======================= DISPOSITION  =====================  [ x] Critical   [ ] Guarded    [ ] Stable    [x ] Maintain in CICU  [ ] Downgrade to Telemetry  [ ] Discharge Home    Patient requires continuous monitoring with bedside rhythm monitoring, pulse ox monitoring, and intermittent blood gas analysis. Care plan discussed with ICU care team. Patient remained critical and at risk for life threatening decompensation.  Patient seen, examined and plan discussed with CCU team during rounds.     I have personally provided 75 minutes of critical care time excluding time spent on separate procedures, in addition to initial critical care time provided by the CICU Attending, Dr. Weber.    Krissy Clark, ALIECNP-BC

## 2025-03-21 NOTE — ED PROVIDER NOTE - CARE PLAN
Principal Discharge DX:	AICD problem   1 Principal Discharge DX:	AICD problem  Assessment and plan of treatment:	Plan- ekg, labs, xr, EP consult

## 2025-03-21 NOTE — DISCHARGE NOTE PROVIDER - HOSPITAL COURSE
69-year-old male patient past medical history of NICM since 2011, HFrEF LVEF 25-30% s/p MDT CRT-D with baseline CHB, VT/VF, AF S/p GIANFRANCO ligation/MAZE, MV/TV repair, PVC ablation 3/2024  intolerant to AADs in the past. He presents to the emergency department by sent by HF specialist for another VT/VF shock: Successful ATP for VT 3/17 at 6:52pm followed by one ATP and 40J shock at 7:30pm, patient who reports feeling dizzy and SOB during events. He was getting up to go to the couch right before. Pt is c/w all medications. Pt states he feels ok at this time just tired.  Follows with Dr paula abreu for HF, Dr John for CRTD and VT/VF,  for genetic counseling.  Was admitted to [facility] for similar x2 symptomatic ICD firing, he also has a high PVC burden contrubiting to his worsening HFrEF     Status 6 for cardiac transplant  was planned for OP MEMs placement.    GDMT: Losartan 25mg, metoprolol XL 50mg and Farxiga 10mg    TTE: EF 30% global left ventricular hypokinesis. severe prosthetic mitral stenosis mild RV failure (TAPSE) is 1.7 cm biatrial severe enlargement  Estimated pulmonary artery systolic pressure is 36 mmHg.  Mitral Valve:An annuloplasty ring is noted in the mitral position. Transvalvular mitral gradients are elevated. Trace intravalvular mitral regurgitation. The transmitral peak gradient is 15.7 mmHg and mean gradient is 8.00 mmHg at a heart rate of 86 bpm.    CO: 5.43 l/min, CI: 2.78 l/min/m2    PVR: 147.41 dyn*s/cm5      .10 dyn*s/cm5  PVRI: 288.11 dyn*s*m2/cm5      TPRI: 1210.05 dyn*s*m2/cm5  PVR: 1.84 STEPHENSON    PVRI: 3.60 STEPHENSON*m2                69-year-old male patient past medical history of NICM since 2011, HFrEF LVEF 25-30% s/p MDT CRT-D with baseline CHB, VT/VF, AF S/p GIANFRANCO ligation/MAZE, MV/TV repair, PVC ablation 3/2024  intolerant to AADs in the past. He presents to the emergency department by sent by HF specialist for another VT/VF shock.    Pt was started on a lidocaine infusion at 1PM today. It was held at 4 PM. After that the pt had two episodes of VT, got shocked back into normal sinus rhythm each time by his device.   Device interrogated at bedside, 2 episodes of VT in fib zone, received ATP x1 each time and then shock. He feels anxious now. Hemodynamically stable. Will resume lidocaine. Discussed with EP and CCU attending on call. Plan for transfer to [facility].      IMPRESSION:    #NICM, HFrEF 25-30% Stage D NYHA II-III currently stable  # Recurrent VTACHs x3 shocks last 30 days s/p MDT CRT-D, high PVC burden, VC ablation 3/2024  intolerant to AADs   #CHB with CRT-D  #AF S/p GIANFRANCO ligation/MAZE, MV/TV repair      PLAN:    CNS: calm    HEENT: Oral care    PULMONARY:  HOB @ 45 degrees.  Aspiration precautions     CARDIOVASCULAR:  EP: CRT-D, recurrent shock, VT induced cardiomyopathy, need Cardiac transplant, intolerant to AAD, prolonged QTc more than 550  preload: on home torsemide 20mg daily, pending MEMs, Dapagliflozin 10mg  afterload: On losartan 25mg for GDMT, Entresto?  Contractility: 3/4 GDMT: Losartan 25mg metoprolol succ 50mg and dapagliflozin 10mg. LVEF 25% TAPSE 1.7 global hypokinesa  Valves: SP MV and TV annuloplasty, MS: mitral stenosis. Trace intravalvular mitral regurgitation. The transmitral peak gradient is 15.7 mmHg and mean gradient is 8.00 mmHg    Coronaries: [facility] 6/2023 Nonobstructive CAD  RH 3/19/25- RA 11 PA 58/26 PCWP 32 CO/CI 5.43/2.77    Plan: PA cath bedside insertion, STACEY?, lidocaine drip per EP    GI: GI prophylaxis.  Feeding.  Bowel regimen     RENAL:  Follow up lytes.  Correct as needed Cr 1    INFECTIOUS DISEASE: NAD    HEMATOLOGICAL:  DVT prophylaxis.    ENDOCRINE:  Follow up FS.  Insulin protocol if needed.    MUSCULOSKELETAL: Bed rest

## 2025-03-21 NOTE — CHART NOTE - NSCHARTNOTEFT_GEN_A_CORE
Pt had two episodes of VT, got shocked back into normal sinus rhythm each time by his device.   Device interrogated at bedside, 2 episodes of VT in fib zone, received ATP x1 each time and then shock. He feels anxious now. Hemodynamically stable. Will resume lidocaine. Discussed with EP and CCU attending on call. Plan for transfer to [facility]. Pt was started on a lidocaine infusion at 1PM today. It was held at 4 PM. After that the pt had two episodes of VT, got shocked back into normal sinus rhythm each time by his device.   Device interrogated at bedside, 2 episodes of VT in fib zone, received ATP x1 each time and then shock. He feels anxious now. Hemodynamically stable. Will resume lidocaine. Discussed with EP and CCU attending on call. Plan for transfer to [facility].

## 2025-03-21 NOTE — ED PROVIDER NOTE - ATTENDING CONTRIBUTION TO CARE
70 y/o M PMhx NICM since 2011, HFrEF s/p MDT CRT-D, AF, MV/TV repair, presents to ED for eval of AICD firing. Pt reports he was recently admitted in [place] discharged yesterday for firing of AICD in VT/VF, pt reports he went home and it happened again at 10pm last night. He felt dizzy and SOB during event. Currently denies any sx.     CONSTITUTIONAL: NAD.   SKIN: warm, dry  HEAD: Normocephalic; atraumatic.  ENT: MMM.   NECK: Supple.  CARD: RRR.   RESP: No wheezes, rales or rhonchi.  ABD: soft ntnd.   EXT: Normal ROM.  No lower extremity edema.   NEURO: Alert, oriented, grossly unremarkable.

## 2025-03-21 NOTE — CONSULT NOTE ADULT - ASSESSMENT
69-year-old male PMH NICM, HFrEF (EF 25 to 30%) status post CRT-D, VT/VF, AF status post GIANFRANCO closure, MV/TV repair, PVC ablation 3/2024 presents to the ED for evaluation of AICD discharge.  Patient states around 10 PM last night his AICD discharge, endorses severe dizziness just prior to discharge.  Of note patient was recently admitted at [facility] for similar complaint. he got 2 shocks on March 17,  30 min apart. then another shock on April 20 at 10 Pm. was evaluated by NS team. RHC done there and showed normal filling pressure and normal CI / CO    Presently patient denies dizziness, syncope/near syncope, chest pain, shortness of breath, nausea, diaphoresis or any numbness/tingling/weakness in the extremities.    EP consulted for Vtach and CRTD shock. patient is well known to Dr Bryan, did not tolerate amio and sotalol in the past. currently on GDMT therapy   s/p perimitral PVC ablation 3/11/2024. CMR ordered but was not performed due to motion artifact from leads   is currently being evaluated for heart transplant at [facility]     # Impression:  - hx of ani mitral PVC ablation 3/11/2024 ( 2 PVC morphology)  - Stage D heart failure, NYHA class 2  - CRTD discharge due to Vtach  - Non-ischemic cardiomyopathy (EF 25%, LVEDD 5.9 cm + decrease RV function)   - Status post mitral and tricuspid ring repair   - Afib s/p GIANFRANCO closure    # Recs:  - admit to CCU  - start lidocaine drip at 1 mg / min   - did not tolerate sotalol and amiodarone in the past due to dizziness  - cw toprol 50 mg po od, was recently titrated up  - cw GDMT as per CCU team   - consider repeating CMR   - will follow  69-year-old male PMH NICM, HFrEF (EF 25 to 30%) status post CRT-D, VT/VF, AF status post GIANFRANCO closure, MV/TV repair, PVC ablation 3/2024 presents to the ED for evaluation of AICD discharge.  Patient states around 10 PM last night his AICD discharge, endorses severe dizziness just prior to discharge.  Of note patient was recently admitted at [facility] for similar complaint. he got 2 shocks on March 17,  30 min apart. then another shock on April 20 at 10 Pm. was evaluated by NS team. RHC done there and showed normal filling pressure and normal CI / CO    Presently patient denies dizziness, syncope/near syncope, chest pain, shortness of breath, nausea, diaphoresis or any numbness/tingling/weakness in the extremities.    EP consulted for Vtach and CRTD shock. patient is well known to Dr Bryan, did not tolerate amio and sotalol in the past. currently on GDMT therapy   s/p perimitral PVC ablation 3/11/2024. CMR ordered but was not performed due to motion artifact from leads   is currently being evaluated for heart transplant at [facility]     # Impression:  - hx of ani mitral PVC ablation 3/11/2024 ( 2 PVC morphology)  - Stage D heart failure, NYHA class 2  - CRTD discharge due to Vtach  - Non-ischemic cardiomyopathy (EF 25%, LVEDD 5.9 cm + decrease RV function)   - Status post mitral and tricuspid ring repair   - Afib s/p GIANFRANCO closure    # Recs:  - admit to CCU  - start lidocaine drip at 1 mg / min   - did not tolerate sotalol and amiodarone in the past due to dizziness  - cw toprol 50 mg po od, was recently titrated up  - cw GDMT as per CCU team. did not tolerate spironolactone and entresto due to hypotension  - consider repeating CMR   - will follow

## 2025-03-21 NOTE — ED PROVIDER NOTE - PHYSICAL EXAMINATION
VITAL SIGNS: I have reviewed nursing notes and confirm.  CONSTITUTIONAL: well-appearing, non-toxic, NAD  SKIN: Warm dry  HEAD: NCAT  EYES: EOMI, PERRL  ENT: Moist mucous membranes, normal pharynx with no erythema or exudates  NECK: Supple  CARD: RRR  RESP: clear to ausculation b/l.  No rales, rhonchi, or wheezing.  ABD: soft, non-tender, non-distended  EXT: Full ROM, no pedal edema  NEURO: A&O x4. No FND.   PSYCH: Cooperative, appropriate.

## 2025-03-21 NOTE — ED ADULT NURSE NOTE - CHIEF COMPLAINT QUOTE
Pt sent in by cardiologist for multiple episodes of Vtach. pt states he had one last night and 2 on monday. pt has defibrillator/pacemaker and states he felt the shocks. denies cp and sob.

## 2025-03-21 NOTE — H&P ADULT - ASSESSMENT
69-year-old male patient past medical history of NICM since 2011, HFrEF LVEF 25-30% s/p MDT CRT-D with baseline CHB, VT/VF, AF S/p GIANFRANCO ligation/MAZE, MV/TV repair, PVC ablation 3/2024  intolerant to AADs in the past. 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  Aspiration precautions     CARDIOVASCULAR:  EP: CRT-D, recurrent shock, VT induced cardiomyopathy, need Cardiac transplant, intolerant to AAD, prolonged QTc more than 550  preload: on home torsemide 20mg daily, pending MEMs, Dapagliflozin 10mg  afterload: On losartan 25mg for GDMT, Entresto?  Contractility: 3/4 GDMT: Losartan 25mg metoprolol succ 50mg and dapagliflozin 10mg. LVEF 25% TAPSE 1.7 global hypokinesa  Valves: SP MV and TV annuloplasty, MS: mitral stenosis. Trace intravalvular mitral regurgitation. The transmitral peak gradient is 15.7 mmHg and mean gradient is 8.00 mmHg    Coronaries: [facility] 6/2023 Nonobstructive CAD  [facility] 3/19/25- RA 11 PA 58/26 PCWP 32 CO/CI 5.43/2.77    Plan: Get PA cath, STACEY?    GI: GI prophylaxis.  Feeding.  Bowel regimen     RENAL:  Follow up lytes.  Correct as needed Cr 1    INFECTIOUS DISEASE: NAD    HEMATOLOGICAL:  DVT prophylaxis.    ENDOCRINE:  Follow up FS.  Insulin protocol if needed.    MUSCULOSKELETAL: Bed rest         69-year-old male patient past medical history of NICM since 2011, HFrEF LVEF 25-30% s/p MDT CRT-D with baseline CHB, VT/VF, AF S/p GIANFRANCO ligation/MAZE, MV/TV repair, PVC ablation 3/2024  intolerant to AADs in the past. He presents to the emergency department by sent by HF specialist for another VT/VF shock.    IMPRESSION:    #NICM, HFrEF 25-30% Stage D NYHA II-III currently stable  # Recurrent VTACHs x3 shocks last 30 days s/p MDT CRT-D, high PVC burden, VC ablation 3/2024  intolerant to AADs   #CHB with CRT-D  #AF S/p GIANFRANCO ligation/MAZE, MV/TV repair      PLAN:    CNS: calm    HEENT: Oral care    PULMONARY:  HOB @ 45 degrees.  Aspiration precautions     CARDIOVASCULAR:  EP: CRT-D, recurrent shock, VT induced cardiomyopathy, need Cardiac transplant, intolerant to AAD, prolonged QTc more than 550  preload: on home torsemide 20mg daily, pending MEMs, Dapagliflozin 10mg  afterload: On losartan 25mg for GDMT, Entresto?  Contractility: 3/4 GDMT: Losartan 25mg metoprolol succ 50mg and dapagliflozin 10mg. LVEF 25% TAPSE 1.7 global hypokinesa  Valves: SP MV and TV annuloplasty, MS: mitral stenosis. Trace intravalvular mitral regurgitation. The transmitral peak gradient is 15.7 mmHg and mean gradient is 8.00 mmHg    Coronaries: [facility] 6/2023 Nonobstructive CAD  [facility] 3/19/25- RA 11 PA 58/26 PCWP 32 CO/CI 5.43/2.77    Plan: PA cath bedside insertion, STACEY?, lidocaine drip per EP    GI: GI prophylaxis.  Feeding.  Bowel regimen     RENAL:  Follow up lytes.  Correct as needed Cr 1    INFECTIOUS DISEASE: NAD    HEMATOLOGICAL:  DVT prophylaxis.    ENDOCRINE:  Follow up FS.  Insulin protocol if needed.    MUSCULOSKELETAL: Bed rest

## 2025-03-21 NOTE — DISCHARGE NOTE PROVIDER - NSDCCPCAREPLAN_GEN_ALL_CORE_FT
PRINCIPAL DISCHARGE DIAGNOSIS  Diagnosis: AICD problem  Assessment and Plan of Treatment: You were admitted to the hospital after experiencing another episode of ventricular tachycardia/ventricular fibrillation (VT/VF) which required intervention from your implantable cardioverter-defibrillator (ICD). Upon presentation at the emergency department, as advised by your heart failure (HF) specialist, successful anti-tachycardia pacing (ATP) was performed followed by a necessary shock to manage the VT/VF. These events occurred while you were feeling dizzy and short of breath, adding to the complexity of your long-standing heart conditions including non-ischemic cardiomyopathy (NICM) and heart failure with reduced ejection fraction (HFrEF) with a left ventricular ejection fraction (LVEF) of 25-30%. Throughout your stay, you were compliant with all the prescribed medications including Losartan, Metoprolol XL, and Farxiga to manage your HF symptoms.  Your treatment team, including your HF doctor Dr. Luca Tamayo, your cardiologist Dr. John who oversees your CRT-D (Cardiac Resynchronization Therapy Defibrillator) and management of VT/VF, and Dr. Chu for genetic counseling, remain actively involved in your care. During your hospital stay, you underwent a thorough examination and management plan for the recurrent VT/VF episodes. This included the use of a Lidocaine infusion and continuous monitoring and adjustments by the electrophysiology (EP) team. Due to continued VT episodes, a decision was made for you to transfer to [facility] ([facility]) for further advanced care and management, taking into account your current status 6 for cardiac transplant listing and plans for outpatient MEMs device placement. It is crucial to continue close follow-up with your medical team to manage your complex medical needs effectively.

## 2025-03-21 NOTE — DISCHARGE NOTE PROVIDER - NSDCMRMEDTOKEN_GEN_ALL_CORE_FT
atorvastatin 20 mg oral tablet: 1 tab(s) orally once a day (at bedtime)  clonazePAM 0.5 mg oral tablet: 1 tab(s) orally once a day As needed anxiety  dapagliflozin 10 mg oral tablet: 1 tab(s) orally once a day  furosemide 100 mg/100 mL-0.9% intravenous solution: 80 milliliter(s) intravenous 2 times a day  lidocaine: 1 milligram(s) by continuous intravenous infusion every minute 2 grams in dextrose 5% 250mL, infuse at rate of 7.5mL/hr for dose rate of 1mg/min  losartan 25 mg oral tablet: 1 tab(s) orally once a day  metoprolol succinate 50 mg oral tablet, extended release: 1 tab(s) orally once a day  tamsulosin 0.4 mg oral capsule: 1 cap(s) orally once a day (at bedtime)  torsemide 20 mg oral tablet: 1 tab(s) orally 2 times a day  traZODone 100 mg oral tablet: 1 tab(s) orally once a day (at bedtime)

## 2025-03-21 NOTE — H&P ADULT - NSHPLABSRESULTS_GEN_ALL_CORE
CBC Full  -  ( 21 Mar 2025 10:25 )  WBC Count : 8.62 K/uL  RBC Count : 5.00 M/uL  Hemoglobin : 14.7 g/dL  Hematocrit : 45.0 %  Platelet Count - Automated : 195 K/uL  Mean Cell Volume : 90.0 fL  Mean Cell Hemoglobin : 29.4 pg  Mean Cell Hemoglobin Concentration : 32.7 g/dL  Auto Neutrophil # : x  Auto Lymphocyte # : x  Auto Monocyte # : x  Auto Eosinophil # : x  Auto Basophil # : x  Auto Neutrophil % : x  Auto Lymphocyte % : x  Auto Monocyte % : x  Auto Eosinophil % : x  Auto Basophil % : x    03-21    141  |  103  |  21[H]  ----------------------------<  101[H]  4.8   |  27  |  1.0    Ca    9.2      21 Mar 2025 10:25  Mg     2.1     03-21    TPro  7.7  /  Alb  4.4  /  TBili  1.0  /  DBili  x   /  AST  30  /  ALT  20  /  AlkPhos  82  03-21

## 2025-03-21 NOTE — H&P ADULT - NSHPPOAPULMEMBOLUS_GEN_A_CORE
Patient back from MRI, family at bedside. Aware we are awaiting inpatient room assignment. Denies any needs at this time. Patient attached back to monitor.    no
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
## 2025-03-21 NOTE — ED ADULT TRIAGE NOTE - GLASGOW COMA SCALE: SCORE, MLM
----- Message from Sosa Elvin sent at 3/1/2023 11:20 AM CST -----  Regarding: Sooner Appt Request  .Type:  Sooner Appointment Request    Patient is requesting a sooner appointment.  Patient declined first available appointment listed as well as another facility and provider .  Patient will not accept being placed on the waitlist and is requesting a message be sent to doctor.    Name of Caller:  self     When is the first available appointment?  3/10     Symptoms: feet pain/ surg consult     Would the patient rather a call back or a response via [facility]? Call     Best Call Back Number:  [phone]      Additional Information:  requesting today or tomorrow     
Staff contacted and spoke with patient informing him that he is scheduled for March 10, 2023 at 9:15 am., with Dr. Mina at the [facility] location.      Patient verbalized understanding.  
15

## 2025-03-21 NOTE — ED ADULT NURSE NOTE - OBJECTIVE STATEMENT
Brought to Ed after having multiple episodes of Vtach starting Monday. Pt states he had 3 episodes total and his defibrillator/pacemaker went off for each event. Denies chest pain/SOB, fever, abd pain, n/v/d

## 2025-03-21 NOTE — ED PROVIDER NOTE - PROGRESS NOTE DETAILS
EP consulted, will evaluate patient, also recommend heart failure consult.  Patient evaluated by cardiology fellow, recommend admission to CCU under Dr. Tamayo for further management.

## 2025-03-21 NOTE — ED PROVIDER NOTE - DIFFERENTIAL DIAGNOSIS
Differential Diagnosis The differential diagnosis for patients clinical presentation includes but is not limited to: arrythmia, acs, chf

## 2025-03-22 DIAGNOSIS — I47.20 VENTRICULAR TACHYCARDIA, UNSPECIFIED: ICD-10-CM

## 2025-03-22 DIAGNOSIS — I50.20 UNSPECIFIED SYSTOLIC (CONGESTIVE) HEART FAILURE: ICD-10-CM

## 2025-03-22 DIAGNOSIS — T82.857A STENOSIS OF OTHER CARDIAC PROSTHETIC DEVICES, IMPLANTS AND GRAFTS, INITIAL ENCOUNTER: ICD-10-CM

## 2025-03-22 DIAGNOSIS — I05.0 RHEUMATIC MITRAL STENOSIS: ICD-10-CM

## 2025-03-22 LAB
A1C WITH ESTIMATED AVERAGE GLUCOSE RESULT: 6.1 % — HIGH (ref 4–5.6)
ALBUMIN SERPL ELPH-MCNC: 4 G/DL — SIGNIFICANT CHANGE UP (ref 3.3–5)
ALBUMIN SERPL ELPH-MCNC: 4.1 G/DL — SIGNIFICANT CHANGE UP (ref 3.3–5)
ALBUMIN SERPL ELPH-MCNC: 4.5 G/DL — SIGNIFICANT CHANGE UP (ref 3.3–5)
ALP SERPL-CCNC: 89 U/L — SIGNIFICANT CHANGE UP (ref 40–120)
ALP SERPL-CCNC: 94 U/L — SIGNIFICANT CHANGE UP (ref 40–120)
ALP SERPL-CCNC: 94 U/L — SIGNIFICANT CHANGE UP (ref 40–120)
ALT FLD-CCNC: 17 U/L — SIGNIFICANT CHANGE UP (ref 10–45)
ALT FLD-CCNC: 20 U/L — SIGNIFICANT CHANGE UP (ref 10–45)
ALT FLD-CCNC: 20 U/L — SIGNIFICANT CHANGE UP (ref 10–45)
ANION GAP SERPL CALC-SCNC: 13 MMOL/L — SIGNIFICANT CHANGE UP (ref 5–17)
ANION GAP SERPL CALC-SCNC: 14 MMOL/L — SIGNIFICANT CHANGE UP (ref 5–17)
ANION GAP SERPL CALC-SCNC: 15 MMOL/L — SIGNIFICANT CHANGE UP (ref 5–17)
AST SERPL-CCNC: 17 U/L — SIGNIFICANT CHANGE UP (ref 10–40)
AST SERPL-CCNC: 17 U/L — SIGNIFICANT CHANGE UP (ref 10–40)
AST SERPL-CCNC: 19 U/L — SIGNIFICANT CHANGE UP (ref 10–40)
BASE EXCESS BLDMV CALC-SCNC: 6.9 MMOL/L — HIGH (ref -3–3)
BASOPHILS # BLD AUTO: 0.03 K/UL — SIGNIFICANT CHANGE UP (ref 0–0.2)
BASOPHILS NFR BLD AUTO: 0.4 % — SIGNIFICANT CHANGE UP (ref 0–2)
BILIRUB SERPL-MCNC: 0.7 MG/DL — SIGNIFICANT CHANGE UP (ref 0.2–1.2)
BILIRUB SERPL-MCNC: 0.7 MG/DL — SIGNIFICANT CHANGE UP (ref 0.2–1.2)
BILIRUB SERPL-MCNC: 0.9 MG/DL — SIGNIFICANT CHANGE UP (ref 0.2–1.2)
BLD GP AB SCN SERPL QL: NEGATIVE — SIGNIFICANT CHANGE UP
BUN SERPL-MCNC: 23 MG/DL — SIGNIFICANT CHANGE UP (ref 7–23)
BUN SERPL-MCNC: 23 MG/DL — SIGNIFICANT CHANGE UP (ref 7–23)
BUN SERPL-MCNC: 25 MG/DL — HIGH (ref 7–23)
CALCIUM SERPL-MCNC: 8.6 MG/DL — SIGNIFICANT CHANGE UP (ref 8.4–10.5)
CALCIUM SERPL-MCNC: 8.7 MG/DL — SIGNIFICANT CHANGE UP (ref 8.4–10.5)
CALCIUM SERPL-MCNC: 9.2 MG/DL — SIGNIFICANT CHANGE UP (ref 8.4–10.5)
CHLORIDE SERPL-SCNC: 100 MMOL/L — SIGNIFICANT CHANGE UP (ref 96–108)
CHLORIDE SERPL-SCNC: 100 MMOL/L — SIGNIFICANT CHANGE UP (ref 96–108)
CHLORIDE SERPL-SCNC: 102 MMOL/L — SIGNIFICANT CHANGE UP (ref 96–108)
CHOLEST SERPL-MCNC: 147 MG/DL — SIGNIFICANT CHANGE UP
CO2 BLDMV-SCNC: 35 MMOL/L — HIGH (ref 21–29)
CO2 SERPL-SCNC: 23 MMOL/L — SIGNIFICANT CHANGE UP (ref 22–31)
CO2 SERPL-SCNC: 24 MMOL/L — SIGNIFICANT CHANGE UP (ref 22–31)
CO2 SERPL-SCNC: 26 MMOL/L — SIGNIFICANT CHANGE UP (ref 22–31)
CREAT SERPL-MCNC: 1.11 MG/DL — SIGNIFICANT CHANGE UP (ref 0.5–1.3)
CREAT SERPL-MCNC: 1.16 MG/DL — SIGNIFICANT CHANGE UP (ref 0.5–1.3)
CREAT SERPL-MCNC: 1.22 MG/DL — SIGNIFICANT CHANGE UP (ref 0.5–1.3)
EGFR: 64 ML/MIN/1.73M2 — SIGNIFICANT CHANGE UP
EGFR: 64 ML/MIN/1.73M2 — SIGNIFICANT CHANGE UP
EGFR: 68 ML/MIN/1.73M2 — SIGNIFICANT CHANGE UP
EGFR: 68 ML/MIN/1.73M2 — SIGNIFICANT CHANGE UP
EGFR: 72 ML/MIN/1.73M2 — SIGNIFICANT CHANGE UP
EGFR: 72 ML/MIN/1.73M2 — SIGNIFICANT CHANGE UP
EOSINOPHIL # BLD AUTO: 0.09 K/UL — SIGNIFICANT CHANGE UP (ref 0–0.5)
EOSINOPHIL NFR BLD AUTO: 1.1 % — SIGNIFICANT CHANGE UP (ref 0–6)
ESTIMATED AVERAGE GLUCOSE: 128 MG/DL — HIGH (ref 68–114)
GAS PNL BLDMV: SIGNIFICANT CHANGE UP
GLUCOSE SERPL-MCNC: 117 MG/DL — HIGH (ref 70–99)
GLUCOSE SERPL-MCNC: 162 MG/DL — HIGH (ref 70–99)
GLUCOSE SERPL-MCNC: 208 MG/DL — HIGH (ref 70–99)
HCO3 BLDMV-SCNC: 33 MMOL/L — HIGH (ref 20–28)
HCT VFR BLD CALC: 41.9 % — SIGNIFICANT CHANGE UP (ref 39–50)
HDLC SERPL-MCNC: 62 MG/DL — SIGNIFICANT CHANGE UP
HGB BLD-MCNC: 13.8 G/DL — SIGNIFICANT CHANGE UP (ref 13–17)
IMM GRANULOCYTES NFR BLD AUTO: 0.5 % — SIGNIFICANT CHANGE UP (ref 0–0.9)
LACTATE BLDV-MCNC: 0.7 MMOL/L — SIGNIFICANT CHANGE UP (ref 0.5–2)
LDLC SERPL-MCNC: 72 MG/DL — SIGNIFICANT CHANGE UP
LIPID PNL WITH DIRECT LDL SERPL: 72 MG/DL — SIGNIFICANT CHANGE UP
LYMPHOCYTES # BLD AUTO: 1.17 K/UL — SIGNIFICANT CHANGE UP (ref 1–3.3)
LYMPHOCYTES # BLD AUTO: 13.7 % — SIGNIFICANT CHANGE UP (ref 13–44)
MAGNESIUM SERPL-MCNC: 1.9 MG/DL — SIGNIFICANT CHANGE UP (ref 1.6–2.6)
MAGNESIUM SERPL-MCNC: 2 MG/DL — SIGNIFICANT CHANGE UP (ref 1.6–2.6)
MAGNESIUM SERPL-MCNC: 2.1 MG/DL — SIGNIFICANT CHANGE UP (ref 1.6–2.6)
MCHC RBC-ENTMCNC: 29.2 PG — SIGNIFICANT CHANGE UP (ref 27–34)
MCHC RBC-ENTMCNC: 32.9 G/DL — SIGNIFICANT CHANGE UP (ref 32–36)
MCV RBC AUTO: 88.8 FL — SIGNIFICANT CHANGE UP (ref 80–100)
MONOCYTES # BLD AUTO: 0.95 K/UL — HIGH (ref 0–0.9)
MONOCYTES NFR BLD AUTO: 11.2 % — SIGNIFICANT CHANGE UP (ref 2–14)
NEUTROPHILS # BLD AUTO: 6.24 K/UL — SIGNIFICANT CHANGE UP (ref 1.8–7.4)
NEUTROPHILS NFR BLD AUTO: 73.1 % — SIGNIFICANT CHANGE UP (ref 43–77)
NONHDLC SERPL-MCNC: 86 MG/DL — SIGNIFICANT CHANGE UP
NRBC BLD AUTO-RTO: 0 /100 WBCS — SIGNIFICANT CHANGE UP (ref 0–0)
O2 CT VFR BLD CALC: 39 MMHG — SIGNIFICANT CHANGE UP (ref 30–65)
PCO2 BLDMV: 52 MMHG — SIGNIFICANT CHANGE UP (ref 30–65)
PH BLDMV: 7.41 — SIGNIFICANT CHANGE UP (ref 7.32–7.45)
PHOSPHATE SERPL-MCNC: 2.5 MG/DL — SIGNIFICANT CHANGE UP (ref 2.5–4.5)
PHOSPHATE SERPL-MCNC: 3.3 MG/DL — SIGNIFICANT CHANGE UP (ref 2.5–4.5)
PHOSPHATE SERPL-MCNC: 4.2 MG/DL — SIGNIFICANT CHANGE UP (ref 2.5–4.5)
PLATELET # BLD AUTO: 183 K/UL — SIGNIFICANT CHANGE UP (ref 150–400)
POTASSIUM SERPL-MCNC: 3.3 MMOL/L — LOW (ref 3.5–5.3)
POTASSIUM SERPL-MCNC: 3.7 MMOL/L — SIGNIFICANT CHANGE UP (ref 3.5–5.3)
POTASSIUM SERPL-MCNC: 4 MMOL/L — SIGNIFICANT CHANGE UP (ref 3.5–5.3)
POTASSIUM SERPL-SCNC: 3.3 MMOL/L — LOW (ref 3.5–5.3)
POTASSIUM SERPL-SCNC: 3.7 MMOL/L — SIGNIFICANT CHANGE UP (ref 3.5–5.3)
POTASSIUM SERPL-SCNC: 4 MMOL/L — SIGNIFICANT CHANGE UP (ref 3.5–5.3)
PROT SERPL-MCNC: 6.9 G/DL — SIGNIFICANT CHANGE UP (ref 6–8.3)
PROT SERPL-MCNC: 7.2 G/DL — SIGNIFICANT CHANGE UP (ref 6–8.3)
PROT SERPL-MCNC: 7.9 G/DL — SIGNIFICANT CHANGE UP (ref 6–8.3)
RBC # BLD: 4.72 M/UL — SIGNIFICANT CHANGE UP (ref 4.2–5.8)
RBC # FLD: 14.3 % — SIGNIFICANT CHANGE UP (ref 10.3–14.5)
RH IG SCN BLD-IMP: POSITIVE — SIGNIFICANT CHANGE UP
SAO2 % BLDMV: 65.1 — SIGNIFICANT CHANGE UP (ref 60–90)
SODIUM SERPL-SCNC: 138 MMOL/L — SIGNIFICANT CHANGE UP (ref 135–145)
SODIUM SERPL-SCNC: 139 MMOL/L — SIGNIFICANT CHANGE UP (ref 135–145)
SODIUM SERPL-SCNC: 140 MMOL/L — SIGNIFICANT CHANGE UP (ref 135–145)
TRIGL SERPL-MCNC: 71 MG/DL — SIGNIFICANT CHANGE UP
TSH SERPL-MCNC: 1.26 UIU/ML — SIGNIFICANT CHANGE UP (ref 0.27–4.2)
WBC # BLD: 8.52 K/UL — SIGNIFICANT CHANGE UP (ref 3.8–10.5)
WBC # FLD AUTO: 8.52 K/UL — SIGNIFICANT CHANGE UP (ref 3.8–10.5)

## 2025-03-22 PROCEDURE — 99233 SBSQ HOSP IP/OBS HIGH 50: CPT | Mod: FS

## 2025-03-22 PROCEDURE — 93010 ELECTROCARDIOGRAM REPORT: CPT

## 2025-03-22 PROCEDURE — 71045 X-RAY EXAM CHEST 1 VIEW: CPT | Mod: 26

## 2025-03-22 PROCEDURE — 99291 CRITICAL CARE FIRST HOUR: CPT | Mod: FS

## 2025-03-22 PROCEDURE — 99233 SBSQ HOSP IP/OBS HIGH 50: CPT

## 2025-03-22 RX ORDER — METOPROLOL SUCCINATE 50 MG/1
50 TABLET, EXTENDED RELEASE ORAL DAILY
Refills: 0 | Status: DISCONTINUED | OUTPATIENT
Start: 2025-03-23 | End: 2025-04-29

## 2025-03-22 RX ORDER — METOPROLOL SUCCINATE 50 MG/1
50 TABLET, EXTENDED RELEASE ORAL ONCE
Refills: 0 | Status: COMPLETED | OUTPATIENT
Start: 2025-03-22 | End: 2025-03-22

## 2025-03-22 RX ORDER — TORSEMIDE 10 MG
20 TABLET ORAL
Refills: 0 | Status: DISCONTINUED | OUTPATIENT
Start: 2025-03-22 | End: 2025-03-26

## 2025-03-22 RX ORDER — LOSARTAN POTASSIUM 100 MG/1
25 TABLET, FILM COATED ORAL DAILY
Refills: 0 | Status: DISCONTINUED | OUTPATIENT
Start: 2025-03-23 | End: 2025-03-24

## 2025-03-22 RX ORDER — LIDOCAINE HYDROCHLORIDE 20 MG/ML
1 JELLY TOPICAL ONCE
Refills: 0 | Status: COMPLETED | OUTPATIENT
Start: 2025-03-22 | End: 2025-03-22

## 2025-03-22 RX ORDER — MAGNESIUM SULFATE 500 MG/ML
2 SYRINGE (ML) INJECTION ONCE
Refills: 0 | Status: COMPLETED | OUTPATIENT
Start: 2025-03-22 | End: 2025-03-22

## 2025-03-22 RX ORDER — HEPARIN SODIUM 1000 [USP'U]/ML
5000 INJECTION INTRAVENOUS; SUBCUTANEOUS EVERY 8 HOURS
Refills: 0 | Status: DISCONTINUED | OUTPATIENT
Start: 2025-03-22 | End: 2025-04-17

## 2025-03-22 RX ORDER — ACETAMINOPHEN 500 MG/5ML
1000 LIQUID (ML) ORAL ONCE
Refills: 0 | Status: COMPLETED | OUTPATIENT
Start: 2025-03-22 | End: 2025-03-22

## 2025-03-22 RX ADMIN — METOPROLOL SUCCINATE 50 MILLIGRAM(S): 50 TABLET, EXTENDED RELEASE ORAL at 21:26

## 2025-03-22 RX ADMIN — Medication 1000 MILLIGRAM(S): at 21:45

## 2025-03-22 RX ADMIN — Medication 25 GRAM(S): at 17:35

## 2025-03-22 RX ADMIN — Medication 40 MILLIEQUIVALENT(S): at 10:26

## 2025-03-22 RX ADMIN — Medication 20 MILLIGRAM(S): at 05:52

## 2025-03-22 RX ADMIN — TAMSULOSIN HYDROCHLORIDE 0.4 MILLIGRAM(S): 0.4 CAPSULE ORAL at 21:26

## 2025-03-22 RX ADMIN — Medication 100 MILLIEQUIVALENT(S): at 03:02

## 2025-03-22 RX ADMIN — Medication 7.5 MG/MIN: at 05:41

## 2025-03-22 RX ADMIN — Medication 7.5 MG/MIN: at 08:05

## 2025-03-22 RX ADMIN — Medication 1000 MILLIGRAM(S): at 09:40

## 2025-03-22 RX ADMIN — Medication 11.3 MG/MIN: at 17:35

## 2025-03-22 RX ADMIN — Medication 1 APPLICATION(S): at 05:42

## 2025-03-22 RX ADMIN — LIDOCAINE HYDROCHLORIDE 1 PATCH: 20 JELLY TOPICAL at 09:23

## 2025-03-22 RX ADMIN — Medication 400 MILLIGRAM(S): at 21:25

## 2025-03-22 RX ADMIN — Medication 40 MILLIEQUIVALENT(S): at 05:52

## 2025-03-22 RX ADMIN — Medication 1 APPLICATION(S): at 05:41

## 2025-03-22 RX ADMIN — LIDOCAINE HYDROCHLORIDE 1 PATCH: 20 JELLY TOPICAL at 21:00

## 2025-03-22 RX ADMIN — HEPARIN SODIUM 5000 UNIT(S): 1000 INJECTION INTRAVENOUS; SUBCUTANEOUS at 21:26

## 2025-03-22 RX ADMIN — HEPARIN SODIUM 5000 UNIT(S): 1000 INJECTION INTRAVENOUS; SUBCUTANEOUS at 14:48

## 2025-03-22 RX ADMIN — Medication 100 MILLIGRAM(S): at 21:26

## 2025-03-22 RX ADMIN — Medication 400 MILLIGRAM(S): at 09:23

## 2025-03-22 RX ADMIN — LIDOCAINE HYDROCHLORIDE 1 PATCH: 20 JELLY TOPICAL at 19:00

## 2025-03-22 RX ADMIN — Medication 20 MILLIGRAM(S): at 14:48

## 2025-03-22 RX ADMIN — Medication 11.3 MG/MIN: at 19:51

## 2025-03-22 RX ADMIN — ATORVASTATIN CALCIUM 10 MILLIGRAM(S): 80 TABLET, FILM COATED ORAL at 21:26

## 2025-03-22 NOTE — CONSULT NOTE ADULT - PROBLEM SELECTOR RECOMMENDATION 2
- continue lidocaine gtt  - electrolyte repletion to maintain K > 4.5 and Mg > 2  - appreciate EP input  - s/p CRT-D

## 2025-03-22 NOTE — PROGRESS NOTE ADULT - ASSESSMENT
69-year-old male patient past medical history of NICM since 2011, HFrEF LVEF 25-30% s/p MDT CRT-D with baseline CHB, VT/VF, a.fib s/p GIANFRANCO ligation/MAZE, MV/TV repair, PVC ablation 3/2024 intolerant to AADs in the past who initially presented to the [facility] for AICD shocks/VT, transferred for further management.    Plan:  ====NEURO====  #hx anxiety  - A&O x3  - home dose clonazepam 0.5 PRN  - continue to monitor mental status per protocol    ====RESPIRATORY====  - saturating well on room air  - continue to monitor sp02    ====CARDIOVASCULAR====  #hx NICM, HFrEF s/p MDT CRT-D  - TTE 3/20 EF 30%, RVe and reduced RVsf, severe prosthetic MS  - recent admit 3/19/2025 w/ RHC found to have elevated filling pressures treated with IV   - appears euvolemic  - requiring IV lasix 80 BID at outside hospital, on standing torsemide 20 BID  - f/u CVP, mv02, perfusion indices and lactate  - GDMT: transferred on losartan 25 daily  - holding farxiga while inpatient    #hx VT/VF   - hx PVC ablation 3/2024, intolerant to mexiletine, sotalol and amio in the past as per HF documentation  - s/p AICD shocks 3/21 off lido  - continue lidocaine gtt at 1mg/min  - continue toprol 50  - f/u EP recommendations in am  - continue to monitor tele and electrolytes    #hx AFib  - s/p GIANFRANCO ligation/MAZE  - rate control as above  - not currently on AC    ====RENAL====  - sCr within normal limits  - strict I&O, trend renal function daily    ====GI====  - DASH diet  - monitor for BM    ====ENDO====  - glucose controlled on cmp, continue to trend daily  - f/u a1c, tsh, lipid panel    ====HEME-ONC====  - H/H and platelets stable  - trend daily  #DVT ppx: heparin subq    ====ID====  - afebrile, no leukocytosis  - continue to trend wbc and fever curve    #full code  Lines: R IJ PAC 3/21 -     ======================= DISPOSITION  =====================  [x] Critical   [ ] Guarded    [ ] Stable    [x] Maintain in CICU  [ ] Downgrade to Telemetry  [ ] Discharge Home    Patient requires continuous monitoring with bedside rhythm monitoring, pulse ox monitoring, and intermittent blood gas analysis. Care plan discussed with ICU care team. Patient remained critical and at risk for life threatening decompensation.  Patient seen, examined and plan discussed with CCU team during rounds.     Ritu Caro PA-C 69-year-old male patient past medical history of NICM since 2011, HFrEF LVEF 25-30% s/p MDT CRT-D with baseline CHB, VT/VF, a.fib s/p GIANFRANCO ligation/MAZE, MV/TV repair, PVC ablation 3/2024 intolerant to AADs in the past who initially presented to the [facility] for AICD shocks/VT, transferred for further management.    Plan:  ====NEURO====  #hx anxiety  - A&O x3  - home dose clonazepam 0.5 PRN  - continue to monitor mental status per protocol    ====RESPIRATORY====  - saturating well on room air  - continue to monitor sp02    ====CARDIOVASCULAR====  #hx NICM, HFrEF s/p MDT CRT-D  - TTE 3/20 EF 30%, RVe and reduced RVsf, severe prosthetic MS  - recent admit 3/19/2025 w/ RHC found to have elevated filling pressures treated with IV   - appears euvolemic  - requiring IV lasix 80 BID at outside hospital, on standing torsemide 20 BID  - f/u CVP, mv02, perfusion indices and lactate  - GDMT: transferred on losartan 25 daily  - holding farxiga while inpatient  - 3/22 RHC: CVP 2, PA 51/19/32, PCWP 16  - No indication for inotropic support at this time    #hx VT/VF   - hx PVC ablation 3/2024, intolerant to mexiletine, sotalol and amio in the past as per HF documentation  - s/p AICD shocks 3/21 off lido  - continue lidocaine gtt at 1mg/min  - continue toprol 50  - f/u EP recommendations in am  - continue to monitor tele and electrolytes    #hx AFib  - s/p GIANFRANCO ligation/MAZE  - rate control as above  - not currently on AC    ====RENAL====  - sCr within normal limits  - strict I&O, trend renal function daily    ====GI====  - DASH diet  - monitor for BM    ====ENDO====  - glucose controlled on cmp, continue to trend daily  - f/u a1c, tsh, lipid panel    ====HEME-ONC====  - H/H and platelets stable  - trend daily  #DVT ppx: heparin subq    ====ID====  - afebrile, no leukocytosis  - continue to trend wbc and fever curve    #full code  Lines: R IJ PAC 3/21 -     ======================= DISPOSITION  =====================  [x] Critical   [ ] Guarded    [ ] Stable    [x] Maintain in CICU  [ ] Downgrade to Telemetry  [ ] Discharge Home    Patient requires continuous monitoring with bedside rhythm monitoring, pulse ox monitoring, and intermittent blood gas analysis. Care plan discussed with ICU care team. Patient remained critical and at risk for life threatening decompensation.  Patient seen, examined and plan discussed with CCU team during rounds.     Ritu Caro PA-C

## 2025-03-22 NOTE — PROGRESS NOTE ADULT - ASSESSMENT
69-year-old male patient past medical history of NICM since 2011, HFrEF LVEF 25-30% s/p MDT CRT-D with baseline CHB, VT/VF, a.fib s/p GIANFRANCO ligation/MAZE, MV/TV repair, PVC ablation 3/2024 intolerant to AADs in the past who initially presented to the [facility] for AICD shocks/VT, transferred for further management.    Plan:    Neuro  #hx anxiety  - A&O x3  - home dose clonazepam 0.5 PRN  - continue to monitor mental status per protocol    Respiratory  - saturating well on room air  - continue to monitor sp02    Cardiovascular  #hx NICM, HFrEF s/p MDT CRT-D  - TTE 3/20 EF 30%, RVe and reduced RVsf, severe prosthetic MS  - recent admit 3/19/2025 w/ [facility] found to have elevated filling pressures treated with IV diuretics  - on standing torsemide  - f/u CVP, mv02, perfusion indices and lactate  - GDMT: losartan 25 daily, toprol 50  - holding home farxiga while inpatient    #hx VT/VF   - hx PVC ablation 3/2024, intolerant to mexiletine, sotalol and amio in the past as per HF documentation  - s/p AICD shocks 3/21 off lido  - lidocaine gtt increased to 1.5 mg/min for ectopy  - continue toprol 50  - f/u EP recommendations, plan for possible mexiletine 3/23  - continue to monitor tele and electrolytes    #hx a.fib     - s/p GIANFRANCO ligation/MAZE  - rate control as above  - not currently on AC    Renal  - sCr within normal limits  - strict I&O, trend renal function daily    GI  - DASH diet  - monitor for BM    Endo  - glucose controlled on cmp, continue to trend daily  - a1c, tsh, lipid panel within normal limits    Heme  - H/H and platelets stable  - trend daily  #DVT ppx: heparin subq    ID  - afebrile, no leukocytosis  - continue to trend wbc and fever curve    #full code  Lines: R IJ PAC 3/21 -     ======================= DISPOSITION  =====================  [ x] Critical   [ ] Guarded    [ ] Stable    [x ] Maintain in CICU  [ ] Downgrade to Telemetry  [ ] Discharge Home    Patient requires continuous monitoring with bedside rhythm monitoring, pulse ox monitoring, and intermittent blood gas analysis. Care plan discussed with ICU care team. Patient remained critical and at risk for life threatening decompensation.  Patient seen, examined and plan discussed with CCU team during rounds.     I have personally provided 35 minutes of critical care time excluding time spent on separate procedures, in addition to initial critical care time provided by the CICU Attending, Dr. Weber.    Krissy Clark, ALIECNP-BC

## 2025-03-22 NOTE — PROGRESS NOTE ADULT - CRITICAL CARE ATTENDING COMMENT
69 yo man with NICM, transferred from OSH with VT     A+Ox3  Hemodynamics acceptable, no pressors or inotropes, CI 2.6 this am   cont lido for VT, as d/w EP likely mexiletine tmr   Cont BB   O2 sats mid to high 90s on nasal cannula - wean to room air  DASH diet  Normal renal function, CVP 4 this am on rounds, decrease torsemide   H/H low but acceptable  HSQ for DVT prophylaxis  Afebrile, no antibiotics  Sugars controlled  RICLARK lopez 3/21 from OSH     d/w pt and family at the bedside

## 2025-03-22 NOTE — CONSULT NOTE ADULT - ASSESSMENT
69-year-old male PMH NICM, HFrEF (EF 25 to 30%) status post CRT-D, VT/VF, AF status post GIANFRANCO closure, MV/TV repair, PVC ablation 3/2024 presents to the ED for evaluation of AICD discharge.  Patient states around 10 PM last night his AICD discharge, endorses severe dizziness just prior to discharge.  Of note patient was recently admitted at [facility] for similar complaint. he got 2 shocks on March 17,  30 min apart.  was evaluated by NS team. RHC done there and showed elevated filling pressure and normal CI / CO. Discharged after diuresis    Presently patient denies dizziness, syncope/near syncope, chest pain, shortness of breath, nausea, diaphoresis or any numbness/tingling/weakness in the extremities.    EP consulted for Vtach and CRTD shock. Did not tolerate amio, mexilitine and sotalol in the past. currently on GDMT therapy   s/p perimitral PVC ablation 3/11/2024. CMR ordered but was not performed due to motion artifact from leads   is currently being evaluated for heart transplant at [facility]     # Impression:  - hx of ani mitral PVC ablation 3/11/2024 ( 2 PVC morphology)  - Stage D heart failure, NYHA class 2  - CRTD discharge due to Vtach  - Non-ischemic cardiomyopathy (EF 25%, LVEDD 5.9 cm + decrease RV function)   - Status post mitral and tricuspid ring repair   - Afib s/p GIANFRANCO closure    # Recs:  - admit to CCU and CTM on tele  - C/Wt lidocaine drip at 1 mg / min   - did not tolerate sotalol, mexiletine and amiodarone in the past due to dizziness  - cw toprol 50 mg po od, was recently titrated up  - cw GDMT as per CCU/HF team. did not tolerate spironolactone and entresto in the past due to hypotension  - consider repeating CMR   - Can consider starting Quinidine if pt with continued ventricular arrhythmia here.    Jose Lovett, PGY-5   Cardiology Fellow    ***Note not finalized until signed by attending*** 69-year-old male PMH NICM, HFrEF (EF 25 to 30%) status post CRT-D, VT/VF, AF status post GIANFRANCO closure, MV/TV repair, PVC ablation 3/2024 presents to the ED for evaluation of AICD discharge.  Patient states around 10 PM last night his AICD discharged, endorses severe dizziness just prior to discharge.  Of note patient was recently admitted at [facility] for similar complaint. he got 2 shocks on March 17,  30 min apart.  was evaluated by NS team. RHC done there and showed elevated filling pressure and normal CI / CO. Discharged after diuresis    Presently patient denies dizziness, syncope/near syncope, chest pain, shortness of breath, nausea, diaphoresis or any numbness/tingling/weakness in the extremities.    EP consulted for Vtach and CRTD shock. Did not tolerate amio, mexilitine and sotalol in the past. currently on GDMT therapy   s/p perimitral PVC ablation 3/11/2024. CMR ordered but was not performed due to motion artifact from leads   is currently being evaluated for heart transplant at [facility]     # Impression:  - hx of ani mitral PVC ablation 3/11/2024 ( 2 PVC morphology)  - Stage D heart failure, NYHA class 2  - CRTD discharge due to Vtach  - Non-ischemic cardiomyopathy (EF 25%, LVEDD 5.9 cm + decrease RV function)   - Status post mitral and tricuspid ring repair   - Afib s/p GIANFRANCO closure    # Recs:  - admit to CCU and CTM on tele  - C/Wt lidocaine drip at 1 mg / min   - did not tolerate sotalol, mexiletine and amiodarone in the past due to dizziness  - cw toprol 50 mg po od, was recently titrated up  - cw GDMT as per CCU/HF team. did not tolerate spironolactone and entresto in the past due to hypotension  - consider repeating CMR   - Can consider starting Quinidine if pt with continued ventricular arrhythmia here.    Jose Lovett, PGY-5   Cardiology Fellow    ***Note not finalized until signed by attending***

## 2025-03-22 NOTE — CONSULT NOTE ADULT - PROBLEM SELECTOR RECOMMENDATION 9
- listed UNOS status 6 for heart transplant, ABO O  - continue hemodynamic monitoring with swan in place, trend perfusion markers  - decrease torsemide to 20mg daily, goal CVP 8-10  - continue losartan 25mg daily, goal PCWP 18-20  - continue Toprol XL 50mg daily

## 2025-03-22 NOTE — PROGRESS NOTE ADULT - SUBJECTIVE AND OBJECTIVE BOX
XENA DENSON  MRN-[id number]  Patient is a 69y old  Male who presents with a chief complaint of VT (22 Mar 2025 02:41)    HPI:  69-year-old male patient past medical history of NICM since 2011, HFrEF LVEF 25-30% s/p MDT CRT-D with baseline CHB, VT/VF, a.fib s/p GIANFRANCO ligation/MAZE, MV/TV repair, PVC ablation 3/2024 intolerant to AADs in the past, recent admission 3/19 - 3/20/25 for AICD shocks initially presented to the [facility] emergency department on 3/21/2025, sent by heart failure specialist for ACID shock. Device reported successful ATP for VT 3/17 at 6:52pm followed by one ATP and 40J shock. He reported feeling dizzy and SOB during the events. He follows with Dr paula abreu for HF, currently undergoing transplant workup, Dr John for CRTD and VT/VF and  for genetic counseling. While admitted to [facility], he was started on a lidocaine gtt. On 3/21 when lidocaine weaned off patient had another two episodes of VT requiring AICD shocks. He was transferred to [facility] for further management.     On admission to CICU, he is A&O x3, saturating well on room air, av paced @ 80 with /87 on lidocaine gtt @ 1mg/min. (21 Mar 2025 22:55)      24 HOUR EVENTS:  - Transferred to [facility] for AT eval iso incessant VT    REVIEW OF SYSTEMS:  CONSTITUTIONAL: No weakness, fevers or chills  EYES/ENT: No visual changes;  No vertigo or throat pain   NECK: No pain or stiffness  RESPIRATORY: No cough, wheezing, hemoptysis; No shortness of breath  CARDIOVASCULAR: No chest pain or palpitations  GASTROINTESTINAL: No abdominal or epigastric pain. No nausea, vomiting, or hematemesis; No diarrhea or constipation. No melena or hematochezia.  GENITOURINARY: No dysuria, frequency or hematuria  NEUROLOGICAL: No numbness or weakness  SKIN: No itching, rashes      ICU Vital Signs Last 24 Hrs  T(C): 36.6 (22 Mar 2025 03:00), Max: 37.3 (21 Mar 2025 20:00)  T(F): 97.9 (22 Mar 2025 03:00), Max: 99.2 (21 Mar 2025 20:00)  HR: 80 (22 Mar 2025 06:00) (69 - 167)  BP: 97/60 (22 Mar 2025 06:00) (91/62 - 135/68)  BP(mean): 73 (22 Mar 2025 06:00) (71 - 96)  RR: 23 (22 Mar 2025 06:00) (15 - 37)  SpO2: 92% (22 Mar 2025 06:00) (91% - 99%)    O2 Parameters below as of 22 Mar 2025 06:00  Patient On (Oxygen Delivery Method): room air      CVP(mm Hg): 7 (03-22-25 @ 06:00) (-5 - 23)  CO: 5.4 (03-21-25 @ 16:00) (5.4 - 5.4)  CI: 2.7 (03-21-25 @ 16:00) (2.7 - 2.7)  PA: 50/24 (03-22-25 @ 06:00) (14/9 - 75/47)  PA(mean): 35 (03-22-25 @ 06:00) (11 - 61)  SVR: 1203 (03-21-25 @ 16:00) (1203 - 1203)  SVRI: 2342 (03-21-25 @ 16:00) (2342 - 2342)    I&O's Summary    21 Mar 2025 07:01  -  22 Mar 2025 06:46  --------------------------------------------------------  IN: 287.5 mL / OUT: 500 mL / NET: -212.5 mL    PHYSICAL EXAM:  GENERAL: No acute distress, well-developed  HEAD:  Atraumatic, Normocephalic  EYES: EOMI, PERRLA, conjunctiva and sclera clear  NECK: Supple, no lymphadenopathy, no JVD  CHEST/LUNG: CTAB; No wheezes, rales, or rhonchi  HEART: Regular rate and rhythm. Normal S1/S2. No murmurs, rubs, or gallops  ABDOMEN: Soft, non-tender, non-distended; normal bowel sounds, no organomegaly  EXTREMITIES:  2+ peripheral pulses b/l, No clubbing, cyanosis, or edema  NEUROLOGY: A&O x 3, no focal deficits  SKIN: No rashes or lesions    ============================I/O===========================   I&O's Detail    21 Mar 2025 07:01  -  22 Mar 2025 06:46  --------------------------------------------------------  IN:    IV PiggyBack: 100 mL    Lidocaine: 67.5 mL    Oral Fluid: 120 mL  Total IN: 287.5 mL    OUT:    Voided (mL): 500 mL  Total OUT: 500 mL    Total NET: -212.5 mL      ============================ LABS =========================                        13.8   8.52  )-----------( 183      ( 22 Mar 2025 00:58 )             41.9     03-22    140  |  100  |  25[H]  ----------------------------<  117[H]  3.3[L]   |  26  |  1.22    Ca    8.7      22 Mar 2025 00:58  Phos  4.2     03-22  Mg     2.1     03-22    TPro  7.2  /  Alb  4.1  /  TBili  0.7  /  DBili  x   /  AST  17  /  ALT  20  /  AlkPhos  89  03-22    Troponin T, High Sensitivity Result: 19 ng/L (03-21-25 @ 10:25)  Troponin T, High Sensitivity Result: 20 ng/L (03-19-25 @ 10:32)    LIVER FUNCTIONS - ( 22 Mar 2025 00:58 )  Alb: 4.1 g/dL / Pro: 7.2 g/dL / ALK PHOS: 89 U/L / ALT: 20 U/L / AST: 17 U/L / GGT: x             Blood Gas Venous - Lactate: 0.7 mmol/L (03-22-25 @ 00:28)  Lactate, Blood: 1.1 mmol/L (03-21-25 @ 11:13)    Urinalysis Basic - ( 22 Mar 2025 00:58 )    Color: x / Appearance: x / SG: x / pH: x  Gluc: 117 mg/dL / Ketone: x  / Bili: x / Urobili: x   Blood: x / Protein: x / Nitrite: x   Leuk Esterase: x / RBC: x / WBC x   Sq Epi: x / Non Sq Epi: x / Bacteria: x XENA DENSON  MRN-[id number]  Patient is a 69y old  Male who presents with a chief complaint of VT (22 Mar 2025 02:41)    HPI:  69-year-old male patient past medical history of NICM since 2011, HFrEF LVEF 25-30% s/p MDT CRT-D with baseline CHB, VT/VF, a.fib s/p GIANFRANCO ligation/MAZE, MV/TV repair, PVC ablation 3/2024 intolerant to AADs in the past, recent admission 3/19 - 3/20/25 for AICD shocks initially presented to the [facility] emergency department on 3/21/2025, sent by heart failure specialist for ACID shock. Device reported successful ATP for VT 3/17 at 6:52pm followed by one ATP and 40J shock. He reported feeling dizzy and SOB during the events. He follows with Dr paula abreu for HF, currently undergoing transplant workup, Dr John for CRTD and VT/VF and  for genetic counseling. While admitted to [facility], he was started on a lidocaine gtt. On 3/21 when lidocaine weaned off patient had another two episodes of VT requiring AICD shocks. He was transferred to [facility] for further management.     On admission to CICU, he is A&O x3, saturating well on room air, av paced @ 80 with /87 on lidocaine gtt @ 1mg/min. (21 Mar 2025 22:55)      24 HOUR EVENTS:  - Transferred to [facility] for AT eval iso incessant VT  - L[facility] 1 for VT, quiet on tele  - Diuresed overnight    REVIEW OF SYSTEMS:  CONSTITUTIONAL: No weakness, fevers or chills  EYES/ENT: No visual changes;  No vertigo or throat pain   NECK: No pain or stiffness  RESPIRATORY: No cough, wheezing, hemoptysis; No shortness of breath  CARDIOVASCULAR: No chest pain or palpitations  GASTROINTESTINAL: No abdominal or epigastric pain. No nausea, vomiting, or hematemesis; No diarrhea or constipation. No melena or hematochezia.  GENITOURINARY: No dysuria, frequency or hematuria  NEUROLOGICAL: No numbness or weakness  SKIN: No itching, rashes      ICU Vital Signs Last 24 Hrs  T(C): 36.6 (22 Mar 2025 03:00), Max: 37.3 (21 Mar 2025 20:00)  T(F): 97.9 (22 Mar 2025 03:00), Max: 99.2 (21 Mar 2025 20:00)  HR: 80 (22 Mar 2025 06:00) (69 - 167)  BP: 97/60 (22 Mar 2025 06:00) (91/62 - 135/68)  BP(mean): 73 (22 Mar 2025 06:00) (71 - 96)  RR: 23 (22 Mar 2025 06:00) (15 - 37)  SpO2: 92% (22 Mar 2025 06:00) (91% - 99%)    O2 Parameters below as of 22 Mar 2025 06:00  Patient On (Oxygen Delivery Method): room air      CVP(mm Hg): 7 (03-22-25 @ 06:00) (-5 - 23)  CO: 5.4 (03-21-25 @ 16:00) (5.4 - 5.4)  CI: 2.7 (03-21-25 @ 16:00) (2.7 - 2.7)  PA: 50/24 (03-22-25 @ 06:00) (14/9 - 75/47)  PA(mean): 35 (03-22-25 @ 06:00) (11 - 61)  SVR: 1203 (03-21-25 @ 16:00) (1203 - 1203)  SVRI: 2342 (03-21-25 @ 16:00) (2342 - 2342)    I&O's Summary    21 Mar 2025 07:01  -  22 Mar 2025 06:46  --------------------------------------------------------  IN: 287.5 mL / OUT: 500 mL / NET: -212.5 mL    PHYSICAL EXAM:  GENERAL: No acute distress, well-developed  HEAD:  Atraumatic, Normocephalic  EYES: EOMI, PERRLA, conjunctiva and sclera clear  NECK: Supple, no lymphadenopathy, no JVD  CHEST/LUNG: CTAB; No wheezes, rales, or rhonchi  HEART: Regular rate and rhythm. Normal S1/S2. No murmurs, rubs, or gallops  ABDOMEN: Soft, non-tender, non-distended; normal bowel sounds, no organomegaly  EXTREMITIES:  2+ peripheral pulses b/l, No clubbing, cyanosis, or edema  NEUROLOGY: A&O x 3, no focal deficits  SKIN: No rashes or lesions    ============================I/O===========================   I&O's Detail    21 Mar 2025 07:01  -  22 Mar 2025 06:46  --------------------------------------------------------  IN:    IV PiggyBack: 100 mL    Lidocaine: 67.5 mL    Oral Fluid: 120 mL  Total IN: 287.5 mL    OUT:    Voided (mL): 500 mL  Total OUT: 500 mL    Total NET: -212.5 mL      ============================ LABS =========================                        13.8   8.52  )-----------( 183      ( 22 Mar 2025 00:58 )             41.9     03-22    140  |  100  |  25[H]  ----------------------------<  117[H]  3.3[L]   |  26  |  1.22    Ca    8.7      22 Mar 2025 00:58  Phos  4.2     03-22  Mg     2.1     03-22    TPro  7.2  /  Alb  4.1  /  TBili  0.7  /  DBili  x   /  AST  17  /  ALT  20  /  AlkPhos  89  03-22    Troponin T, High Sensitivity Result: 19 ng/L (03-21-25 @ 10:25)  Troponin T, High Sensitivity Result: 20 ng/L (03-19-25 @ 10:32)    LIVER FUNCTIONS - ( 22 Mar 2025 00:58 )  Alb: 4.1 g/dL / Pro: 7.2 g/dL / ALK PHOS: 89 U/L / ALT: 20 U/L / AST: 17 U/L / GGT: x             Blood Gas Venous - Lactate: 0.7 mmol/L (03-22-25 @ 00:28)  Lactate, Blood: 1.1 mmol/L (03-21-25 @ 11:13)    Urinalysis Basic - ( 22 Mar 2025 00:58 )    Color: x / Appearance: x / SG: x / pH: x  Gluc: 117 mg/dL / Ketone: x  / Bili: x / Urobili: x   Blood: x / Protein: x / Nitrite: x   Leuk Esterase: x / RBC: x / WBC x   Sq Epi: x / Non Sq Epi: x / Bacteria: x XENA DENSON  MRN-[id number]  Patient is a 69y old  Male who presents with a chief complaint of VT (22 Mar 2025 02:41)    HPI:  69-year-old male patient past medical history of NICM since 2011, HFrEF LVEF 25-30% s/p MDT CRT-D with baseline CHB, VT/VF, a.fib s/p GIANFRANCO ligation/MAZE, MV/TV repair, PVC ablation 3/2024 intolerant to AADs in the past, recent admission 3/19 - 3/20/25 for AICD shocks initially presented to the [facility] emergency department on 3/21/2025, sent by heart failure specialist for ACID shock. Device reported successful ATP for VT 3/17 at 6:52pm followed by one ATP and 40J shock. He reported feeling dizzy and SOB during the events. He follows with Dr paula abreu for HF, currently undergoing transplant workup, Dr John for CRTD and VT/VF and  for genetic counseling. While admitted to [facility], he was started on a lidocaine gtt. On 3/21 when lidocaine weaned off patient had another two episodes of VT requiring AICD shocks. He was transferred to [facility] for further management.     On admission to CICU, he is A&O x3, saturating well on room air, av paced @ 80 with /87 on lidocaine gtt @ 1mg/min. (21 Mar 2025 22:55)      24 HOUR EVENTS:  - Transferred to [facility] for AT eval iso incessant VT  - L[facility] 1 for VT, quiet on tele  - Diuresed overnight    REVIEW OF SYSTEMS:  CONSTITUTIONAL: No weakness, fevers or chills  EYES/ENT: No visual changes;  No vertigo or throat pain   NECK: No pain or stiffness  RESPIRATORY: No cough, wheezing, hemoptysis; No shortness of breath  CARDIOVASCULAR: No chest pain or palpitations  GASTROINTESTINAL: No abdominal or epigastric pain. No nausea, vomiting, or hematemesis; No diarrhea or constipation. No melena or hematochezia.  GENITOURINARY: No dysuria, frequency or hematuria  NEUROLOGICAL: No numbness or weakness  SKIN: No itching, rashes      ICU Vital Signs Last 24 Hrs  T(C): 36.6 (22 Mar 2025 03:00), Max: 37.3 (21 Mar 2025 20:00)  T(F): 97.9 (22 Mar 2025 03:00), Max: 99.2 (21 Mar 2025 20:00)  HR: 80 (22 Mar 2025 06:00) (69 - 167)  BP: 97/60 (22 Mar 2025 06:00) (91/62 - 135/68)  BP(mean): 73 (22 Mar 2025 06:00) (71 - 96)  RR: 23 (22 Mar 2025 06:00) (15 - 37)  SpO2: 92% (22 Mar 2025 06:00) (91% - 99%)    O2 Parameters below as of 22 Mar 2025 06:00  Patient On (Oxygen Delivery Method): room air      CVP(mm Hg): 7 (03-22-25 @ 06:00) (-5 - 23)  CO: 5.4 (03-21-25 @ 16:00) (5.4 - 5.4)  CI: 2.7 (03-21-25 @ 16:00) (2.7 - 2.7)  PA: 50/24 (03-22-25 @ 06:00) (14/9 - 75/47)  PA(mean): 35 (03-22-25 @ 06:00) (11 - 61)  SVR: 1203 (03-21-25 @ 16:00) (1203 - 1203)  SVRI: 2342 (03-21-25 @ 16:00) (2342 - 2342)    I&O's Summary    21 Mar 2025 07:01  -  22 Mar 2025 06:46  --------------------------------------------------------  IN: 287.5 mL / OUT: 500 mL / NET: -212.5 mL    PHYSICAL EXAM:  GENERAL: No acute distress, well-developed  HEAD:  Atraumatic, Normocephalic  EYES: EOMI, PERRLA  NECK: Supple  CHEST/LUNG: CTAB; No wheezes, rales, or rhonchi  HEART: Regular rate and rhythm. Normal S1/S2. No murmurs, rubs, or gallops  ABDOMEN: Soft, non-tender, non-distended; normal bowel sounds  EXTREMITIES:  2+ peripheral pulses b/l, No clubbing, cyanosis, or edema  NEUROLOGY: A&O x 3, no focal deficits    ============================I/O===========================   I&O's Detail    21 Mar 2025 07:01  -  22 Mar 2025 06:46  --------------------------------------------------------  IN:    IV PiggyBack: 100 mL    Lidocaine: 67.5 mL    Oral Fluid: 120 mL  Total IN: 287.5 mL    OUT:    Voided (mL): 500 mL  Total OUT: 500 mL    Total NET: -212.5 mL      ============================ LABS =========================                        13.8   8.52  )-----------( 183      ( 22 Mar 2025 00:58 )             41.9     03-22    140  |  100  |  25[H]  ----------------------------<  117[H]  3.3[L]   |  26  |  1.22    Ca    8.7      22 Mar 2025 00:58  Phos  4.2     03-22  Mg     2.1     03-22    TPro  7.2  /  Alb  4.1  /  TBili  0.7  /  DBili  x   /  AST  17  /  ALT  20  /  AlkPhos  89  03-22    Troponin T, High Sensitivity Result: 19 ng/L (03-21-25 @ 10:25)  Troponin T, High Sensitivity Result: 20 ng/L (03-19-25 @ 10:32)    LIVER FUNCTIONS - ( 22 Mar 2025 00:58 )  Alb: 4.1 g/dL / Pro: 7.2 g/dL / ALK PHOS: 89 U/L / ALT: 20 U/L / AST: 17 U/L / GGT: x             Blood Gas Venous - Lactate: 0.7 mmol/L (03-22-25 @ 00:28)  Lactate, Blood: 1.1 mmol/L (03-21-25 @ 11:13)    Urinalysis Basic - ( 22 Mar 2025 00:58 )    Color: x / Appearance: x / SG: x / pH: x  Gluc: 117 mg/dL / Ketone: x  / Bili: x / Urobili: x   Blood: x / Protein: x / Nitrite: x   Leuk Esterase: x / RBC: x / WBC x   Sq Epi: x / Non Sq Epi: x / Bacteria: x

## 2025-03-22 NOTE — CONSULT NOTE ADULT - ASSESSMENT
69-year-old male ABO O past medical history of NICM since 2011, HFrEF LVEF 25-30% s/p MDT CRT-D with baseline CHB, VT/VF, a.fib s/p GIANFRANCO ligation/MAZE, MV/TV repair, PVC ablation 3/2024 intolerant to AADs in the past, recent admission 3/19 - 3/20/25 for AICD shocks initially presented to the [facility] emergency department on 3/21/2025, sent by heart failure specialist for ACID shock. Device reported successful ATP for VT 3/17 at 6:52pm followed by one ATP and 40J shock. He reported feeling dizzy and SOB during the events. He follows with Dr Luca Tamayo for HF, currently undergoing transplant workup, Dr John for CRTD and VT/VF and  for genetic counseling. While admitted to [facility], he was started on a lidocaine gtt. On 3/21 when lidocaine weaned off patient had another two episodes of VT requiring AICD shocks. He was transferred to [facility] for further management.     He's currently euvolemic with CVP 6 with elevated, but marginally acceptable left sided filling pressures, as noted below. He's not had recurrent VT since being resumed on lidocaine gtt thus far. He has normal end organ function with borderline BP on low dose GDMT. He's currently listed UNOS status 6 for heart transplant.     Bedside hemodynamics  3/22/25 BP 97/76/83, HR 80, CVP 6, PA 58/23/38, PCWP 20, SVR 1100, [employer] CO/CI 5.1/2.6, TD 4/2.08    Echo:    3/20/25 TTE: LVEF 30%, segmental, LVIDd 5.3, walls normal, elevated LV filling pressures, mod RVE with mildly reduced RV function, TAPSE 1.7, severely dilated RA, annuloplasty in MV position, transvalvular gradients are elevated with severe prosthetic MS (peak gradient 15.7, mean gradient 8), trace intravalvular MR, TV annuloplasty ring noted, mild TR,  est PASP 36, no evidence of LV thrombus    10/2024: LVEF 25-30%, LVEDD 5.9cm, moderately reduced RV function, annuloplasty ring of mitral and tricuspid position, PASP 47 mmHg  Cath:    RHC 3/19/25- RA 11 PA 58/26 PCWP 32 CO/CI 5.43/2.77  [facility] 6/2023 Nonobstructive CAD

## 2025-03-22 NOTE — PROGRESS NOTE ADULT - SUBJECTIVE AND OBJECTIVE BOX
PATIENT:  XENA DENSON  [id number]    CHIEF COMPLAINT:  Patient is a 69y old  Male who presents with a chief complaint of VT (22 Mar 2025 16:58)      INTERVAL HISTORY: lidocaine increased to 1.5 for PVC burden and NSVT    REVIEW OF SYSTEMS:  Constitutional:     [x] negative [ ] fevers [ ] chills [ ] weight loss [ ] weight gain  HEENT:                  [x ] negative [ ] dry eyes [ ] eye irritation [ ] postnasal drip [ ] nasal congestion  CV:                         [ x] negative  [ ] chest pain [ ] orthopnea [ ] palpitations [ ] murmur  Resp:                     [x ] negative [ ] cough [ ] shortness of breath [ ] dyspnea [ ] wheezing [ ] sputum [ ] hemoptysis  GI:                          [x ] negative [ ] nausea [ ] vomiting [ ] diarrhea [ ] constipation [ ] abd pain [ ] dysphagia   :                        [x ] negative [ ] dysuria [ ] nocturia [ ] hematuria [ ] increased urinary frequency  Musculoskeletal: [x ] negative [ ] back pain [ ] myalgias [ ] arthralgias [ ] fracture  Skin:                       [ x] negative [ ] rash [ ] itch  Neurological:        [x ] negative [ ] headache [ ] dizziness [ ] syncope [ ] weakness [ ] numbness    MEDICATIONS:  MEDICATIONS  (STANDING):  atorvastatin 10 milliGRAM(s) Oral at bedtime  chlorhexidine 2% Cloths 1 Application(s) Topical <User Schedule>  chlorhexidine 4% Liquid 1 Application(s) Topical <User Schedule>  heparin   Injectable 5000 Unit(s) SubCutaneous every 8 hours  lidocaine   Infusion 1.5 mG/Min (11.3 mL/Hr) IV Continuous <Continuous>  losartan 25 milliGRAM(s) Oral daily  metoprolol succinate ER 50 milliGRAM(s) Oral daily  tamsulosin 0.4 milliGRAM(s) Oral at bedtime  torsemide 20 milliGRAM(s) Oral two times a day  traZODone 100 milliGRAM(s) Oral at bedtime    MEDICATIONS  (PRN):      ALLERGIES:  Allergies    No Known Allergies    Intolerances        OBJECTIVE:  ICU Vital Signs Last 24 Hrs  T(C): 37.2 (22 Mar 2025 19:00), Max: 37.2 (22 Mar 2025 19:00)  T(F): 99 (22 Mar 2025 19:00), Max: 99 (22 Mar 2025 19:00)  HR: 80 (22 Mar 2025 19:00) (80 - 86)  BP: 110/79 (22 Mar 2025 19:00) (91/62 - 121/79)  BP(mean): 90 (22 Mar 2025 19:00) (71 - 96)  ABP: --  ABP(mean): --  RR: 19 (22 Mar 2025 19:00) (15 - 30)  SpO2: 93% (22 Mar 2025 19:00) (91% - 96%)    O2 Parameters below as of 22 Mar 2025 19:00  Patient On (Oxygen Delivery Method): room air            Adult Advanced Hemodynamics Last 24 Hrs  CVP(mm Hg): 10 (22 Mar 2025 19:00) (-5 - 14)  CVP(cm H2O): --  CO: --  CI: --  PA: 45/21 (22 Mar 2025 19:00) (34/15 - 62/32)  PA(mean): 30 (22 Mar 2025 19:00) (22 - 46)  PCWP: --  SVR: --  SVRI: --  PVR: --  PVRI: --  CAPILLARY BLOOD GLUCOSE        CAPILLARY BLOOD GLUCOSE        I&O's Summary    21 Mar 2025 07:01  -  22 Mar 2025 07:00  --------------------------------------------------------  IN: 287.5 mL / OUT: 880 mL / NET: -592.5 mL    22 Mar 2025 07:01  -  22 Mar 2025 20:08  --------------------------------------------------------  IN: 716.3 mL / OUT: 950 mL / NET: -233.7 mL      Daily Height in cm: 170.18 (21 Mar 2025 22:24)    Daily     PHYSICAL EXAMINATION:  General: WN/WD NAD  HEENT: PERRLA, EOMI, moist mucous membranes  Neurology: A&Ox3, nonfocal, [place] x 4  Respiratory: CTA B/L, normal respiratory effort, no wheezes, crackles, rales  CV: RRR, S1S2, no murmurs, rubs or gallops  Abdominal: Soft, NT, ND +BS, Last BM  Extremities: No edema, + peripheral pulses  Incisions:   Tubes:    LABS:                          13.8   8.52  )-----------( 183      ( 22 Mar 2025 00:58 )             41.9     03-22    138  |  102  |  23  ----------------------------<  162[H]  4.0   |  23  |  1.16    Ca    8.6      22 Mar 2025 17:00  Phos  3.3     03-22  Mg     1.9     03-22    TPro  6.9  /  Alb  4.0  /  TBili  0.7  /  DBili  x   /  AST  17  /  ALT  17  /  AlkPhos  94  03-22    LIVER FUNCTIONS - ( 22 Mar 2025 17:00 )  Alb: 4.0 g/dL / Pro: 6.9 g/dL / ALK PHOS: 94 U/L / ALT: 17 U/L / AST: 17 U/L / GGT: x                   Urinalysis Basic - ( 22 Mar 2025 17:00 )    Color: x / Appearance: x / SG: x / pH: x  Gluc: 162 mg/dL / Ketone: x  / Bili: x / Urobili: x   Blood: x / Protein: x / Nitrite: x   Leuk Esterase: x / RBC: x / WBC x   Sq Epi: x / Non Sq Epi: x / Bacteria: x        TELEMETRY:     EKG:     IMAGING:       PATIENT:  XENA DENSON  [id number]    CHIEF COMPLAINT:  Patient is a 69y old  Male who presents with a chief complaint of VT (22 Mar 2025 16:58)    INTERVAL HISTORY: lidocaine increased to 1.5 for PVC burden and NSVT    REVIEW OF SYSTEMS:  Constitutional:     [x] negative [ ] fevers [ ] chills [ ] weight loss [ ] weight gain  HEENT:                  [x ] negative [ ] dry eyes [ ] eye irritation [ ] postnasal drip [ ] nasal congestion  CV:                         [ x] negative  [ ] chest pain [ ] orthopnea [ ] palpitations [ ] murmur  Resp:                     [x ] negative [ ] cough [ ] shortness of breath [ ] dyspnea [ ] wheezing [ ] sputum [ ] hemoptysis  GI:                          [x ] negative [ ] nausea [ ] vomiting [ ] diarrhea [ ] constipation [ ] abd pain [ ] dysphagia   :                        [x ] negative [ ] dysuria [ ] nocturia [ ] hematuria [ ] increased urinary frequency  Musculoskeletal: [x ] negative [ ] back pain [ ] myalgias [ ] arthralgias [ ] fracture  Skin:                       [ x] negative [ ] rash [ ] itch  Neurological:        [x ] negative [ ] headache [ ] dizziness [ ] syncope [ ] weakness [ ] numbness    MEDICATIONS:  MEDICATIONS  (STANDING):  atorvastatin 10 milliGRAM(s) Oral at bedtime  chlorhexidine 2% Cloths 1 Application(s) Topical <User Schedule>  chlorhexidine 4% Liquid 1 Application(s) Topical <User Schedule>  heparin   Injectable 5000 Unit(s) SubCutaneous every 8 hours  lidocaine   Infusion 1.5 mG/Min (11.3 mL/Hr) IV Continuous <Continuous>  losartan 25 milliGRAM(s) Oral daily  metoprolol succinate ER 50 milliGRAM(s) Oral daily  tamsulosin 0.4 milliGRAM(s) Oral at bedtime  torsemide 20 milliGRAM(s) Oral two times a day  traZODone 100 milliGRAM(s) Oral at bedtime    MEDICATIONS  (PRN):    ALLERGIES:  Allergies    No Known Allergies    Intolerances    OBJECTIVE:  ICU Vital Signs Last 24 Hrs  T(C): 37.2 (22 Mar 2025 19:00), Max: 37.2 (22 Mar 2025 19:00)  T(F): 99 (22 Mar 2025 19:00), Max: 99 (22 Mar 2025 19:00)  HR: 80 (22 Mar 2025 19:00) (80 - 86)  BP: 110/79 (22 Mar 2025 19:00) (91/62 - 121/79)  BP(mean): 90 (22 Mar 2025 19:00) (71 - 96)  ABP: --  ABP(mean): --  RR: 19 (22 Mar 2025 19:00) (15 - 30)  SpO2: 93% (22 Mar 2025 19:00) (91% - 96%)    O2 Parameters below as of 22 Mar 2025 19:00  Patient On (Oxygen Delivery Method): room air    Adult Advanced Hemodynamics Last 24 Hrs  CVP(mm Hg): 10 (22 Mar 2025 19:00) (-5 - 14)  CVP(cm H2O): --  CO: --  CI: --  PA: 45/21 (22 Mar 2025 19:00) (34/15 - 62/32)  PA(mean): 30 (22 Mar 2025 19:00) (22 - 46)  PCWP: --  SVR: --  SVRI: --  PVR: --  PVRI: --  CAPILLARY BLOOD GLUCOSE    CAPILLARY BLOOD GLUCOSE    I&O's Summary    21 Mar 2025 07:01  -  22 Mar 2025 07:00  --------------------------------------------------------  IN: 287.5 mL / OUT: 880 mL / NET: -592.5 mL    22 Mar 2025 07:01  -  22 Mar 2025 20:08  --------------------------------------------------------  IN: 716.3 mL / OUT: 950 mL / NET: -233.7 mL    Daily Height in cm: 170.18 (21 Mar 2025 22:24)      PHYSICAL EXAMINATION:  General: WN/WD NAD  HEENT: PERRLA, EOMI, moist mucous membranes  Neurology: A&Ox3, nonfocal, [place] x 4  Respiratory: CTA B/L, normal respiratory effort, no wheezes, crackles, rales  CV: RRR, S1S2  Abdominal: Soft, NT, ND +BS  Extremities: No edema, + peripheral pulses  skin: warm, dry. R IJ PAC c/d/i    LABS:                    13.8   8.52  )-----------( 183      ( 22 Mar 2025 00:58 )             41.9     03-22    138  |  102  |  23  ----------------------------<  162[H]  4.0   |  23  |  1.16    Ca    8.6      22 Mar 2025 17:00  Phos  3.3     03-22  Mg     1.9     03-22    TPro  6.9  /  Alb  4.0  /  TBili  0.7  /  DBili  x   /  AST  17  /  ALT  17  /  AlkPhos  94  03-22    LIVER FUNCTIONS - ( 22 Mar 2025 17:00 )  Alb: 4.0 g/dL / Pro: 6.9 g/dL / ALK PHOS: 94 U/L / ALT: 17 U/L / AST: 17 U/L / GGT: x           Urinalysis Basic - ( 22 Mar 2025 17:00 )    Color: x / Appearance: x / SG: x / pH: x  Gluc: 162 mg/dL / Ketone: x  / Bili: x / Urobili: x   Blood: x / Protein: x / Nitrite: x   Leuk Esterase: x / RBC: x / WBC x   Sq Epi: x / Non Sq Epi: x / Bacteria: x    TELEMETRY:     EKG:     IMAGING:

## 2025-03-23 LAB
ALBUMIN SERPL ELPH-MCNC: 4 G/DL — SIGNIFICANT CHANGE UP (ref 3.3–5)
ALP SERPL-CCNC: 87 U/L — SIGNIFICANT CHANGE UP (ref 40–120)
ALT FLD-CCNC: 15 U/L — SIGNIFICANT CHANGE UP (ref 10–45)
ANION GAP SERPL CALC-SCNC: 13 MMOL/L — SIGNIFICANT CHANGE UP (ref 5–17)
AST SERPL-CCNC: 14 U/L — SIGNIFICANT CHANGE UP (ref 10–40)
BASE EXCESS BLDMV CALC-SCNC: 4 MMOL/L — HIGH (ref -3–3)
BASOPHILS # BLD AUTO: 0.05 K/UL — SIGNIFICANT CHANGE UP (ref 0–0.2)
BASOPHILS NFR BLD AUTO: 0.5 % — SIGNIFICANT CHANGE UP (ref 0–2)
BILIRUB SERPL-MCNC: 0.9 MG/DL — SIGNIFICANT CHANGE UP (ref 0.2–1.2)
BUN SERPL-MCNC: 20 MG/DL — SIGNIFICANT CHANGE UP (ref 7–23)
CALCIUM SERPL-MCNC: 8.6 MG/DL — SIGNIFICANT CHANGE UP (ref 8.4–10.5)
CHLORIDE SERPL-SCNC: 102 MMOL/L — SIGNIFICANT CHANGE UP (ref 96–108)
CO2 BLDMV-SCNC: 32 MMOL/L — HIGH (ref 21–29)
CO2 SERPL-SCNC: 25 MMOL/L — SIGNIFICANT CHANGE UP (ref 22–31)
CREAT SERPL-MCNC: 1.24 MG/DL — SIGNIFICANT CHANGE UP (ref 0.5–1.3)
EGFR: 63 ML/MIN/1.73M2 — SIGNIFICANT CHANGE UP
EGFR: 63 ML/MIN/1.73M2 — SIGNIFICANT CHANGE UP
EOSINOPHIL # BLD AUTO: 0.15 K/UL — SIGNIFICANT CHANGE UP (ref 0–0.5)
EOSINOPHIL NFR BLD AUTO: 1.6 % — SIGNIFICANT CHANGE UP (ref 0–6)
GAS PNL BLDMV: SIGNIFICANT CHANGE UP
GLUCOSE SERPL-MCNC: 128 MG/DL — HIGH (ref 70–99)
HCO3 BLDMV-SCNC: 31 MMOL/L — HIGH (ref 20–28)
HCT VFR BLD CALC: 42.6 % — SIGNIFICANT CHANGE UP (ref 39–50)
HGB BLD-MCNC: 14 G/DL — SIGNIFICANT CHANGE UP (ref 13–17)
HOROWITZ INDEX BLDMV+IHG-RTO: 21 — SIGNIFICANT CHANGE UP
IMM GRANULOCYTES NFR BLD AUTO: 0.4 % — SIGNIFICANT CHANGE UP (ref 0–0.9)
LACTATE BLDV-MCNC: 0.8 MMOL/L — SIGNIFICANT CHANGE UP (ref 0.5–2)
LYMPHOCYTES # BLD AUTO: 1.39 K/UL — SIGNIFICANT CHANGE UP (ref 1–3.3)
LYMPHOCYTES # BLD AUTO: 15 % — SIGNIFICANT CHANGE UP (ref 13–44)
MAGNESIUM SERPL-MCNC: 2.5 MG/DL — SIGNIFICANT CHANGE UP (ref 1.6–2.6)
MCHC RBC-ENTMCNC: 29.4 PG — SIGNIFICANT CHANGE UP (ref 27–34)
MCHC RBC-ENTMCNC: 32.9 G/DL — SIGNIFICANT CHANGE UP (ref 32–36)
MCV RBC AUTO: 89.5 FL — SIGNIFICANT CHANGE UP (ref 80–100)
MONOCYTES # BLD AUTO: 1.12 K/UL — HIGH (ref 0–0.9)
MONOCYTES NFR BLD AUTO: 12.1 % — SIGNIFICANT CHANGE UP (ref 2–14)
NEUTROPHILS # BLD AUTO: 6.53 K/UL — SIGNIFICANT CHANGE UP (ref 1.8–7.4)
NEUTROPHILS NFR BLD AUTO: 70.4 % — SIGNIFICANT CHANGE UP (ref 43–77)
NRBC BLD AUTO-RTO: 0 /100 WBCS — SIGNIFICANT CHANGE UP (ref 0–0)
O2 CT VFR BLD CALC: 41 MMHG — SIGNIFICANT CHANGE UP (ref 30–65)
PCO2 BLDMV: 53 MMHG — SIGNIFICANT CHANGE UP (ref 30–65)
PH BLDMV: 7.37 — SIGNIFICANT CHANGE UP (ref 7.32–7.45)
PHOSPHATE SERPL-MCNC: 3.3 MG/DL — SIGNIFICANT CHANGE UP (ref 2.5–4.5)
PLATELET # BLD AUTO: 161 K/UL — SIGNIFICANT CHANGE UP (ref 150–400)
POTASSIUM SERPL-MCNC: 3.6 MMOL/L — SIGNIFICANT CHANGE UP (ref 3.5–5.3)
POTASSIUM SERPL-SCNC: 3.6 MMOL/L — SIGNIFICANT CHANGE UP (ref 3.5–5.3)
PROT SERPL-MCNC: 6.9 G/DL — SIGNIFICANT CHANGE UP (ref 6–8.3)
RBC # BLD: 4.76 M/UL — SIGNIFICANT CHANGE UP (ref 4.2–5.8)
RBC # FLD: 14.3 % — SIGNIFICANT CHANGE UP (ref 10.3–14.5)
SAO2 % BLDMV: 70.5 — SIGNIFICANT CHANGE UP (ref 60–90)
SODIUM SERPL-SCNC: 140 MMOL/L — SIGNIFICANT CHANGE UP (ref 135–145)
WBC # BLD: 9.28 K/UL — SIGNIFICANT CHANGE UP (ref 3.8–10.5)
WBC # FLD AUTO: 9.28 K/UL — SIGNIFICANT CHANGE UP (ref 3.8–10.5)

## 2025-03-23 PROCEDURE — 71045 X-RAY EXAM CHEST 1 VIEW: CPT | Mod: 26

## 2025-03-23 PROCEDURE — 71045 X-RAY EXAM CHEST 1 VIEW: CPT | Mod: 26,77

## 2025-03-23 PROCEDURE — 93010 ELECTROCARDIOGRAM REPORT: CPT

## 2025-03-23 PROCEDURE — 99231 SBSQ HOSP IP/OBS SF/LOW 25: CPT

## 2025-03-23 PROCEDURE — 99233 SBSQ HOSP IP/OBS HIGH 50: CPT | Mod: FS

## 2025-03-23 PROCEDURE — 99291 CRITICAL CARE FIRST HOUR: CPT | Mod: FS

## 2025-03-23 RX ORDER — POLYETHYLENE GLYCOL 3350 17 G/17G
17 POWDER, FOR SOLUTION ORAL DAILY
Refills: 0 | Status: DISCONTINUED | OUTPATIENT
Start: 2025-03-23 | End: 2025-03-25

## 2025-03-23 RX ORDER — MEXILETINE HYDROCHLORIDE 250 MG/1
150 CAPSULE ORAL EVERY 8 HOURS
Refills: 0 | Status: DISCONTINUED | OUTPATIENT
Start: 2025-03-23 | End: 2025-03-24

## 2025-03-23 RX ORDER — SPIRONOLACTONE 25 MG
25 TABLET ORAL DAILY
Refills: 0 | Status: DISCONTINUED | OUTPATIENT
Start: 2025-03-23 | End: 2025-03-29

## 2025-03-23 RX ORDER — ACETAMINOPHEN 500 MG/5ML
650 LIQUID (ML) ORAL ONCE
Refills: 0 | Status: COMPLETED | OUTPATIENT
Start: 2025-03-23 | End: 2025-03-23

## 2025-03-23 RX ADMIN — Medication 11.3 MG/MIN: at 05:23

## 2025-03-23 RX ADMIN — HEPARIN SODIUM 5000 UNIT(S): 1000 INJECTION INTRAVENOUS; SUBCUTANEOUS at 05:25

## 2025-03-23 RX ADMIN — LOSARTAN POTASSIUM 25 MILLIGRAM(S): 100 TABLET, FILM COATED ORAL at 05:25

## 2025-03-23 RX ADMIN — POLYETHYLENE GLYCOL 3350 17 GRAM(S): 17 POWDER, FOR SOLUTION ORAL at 12:41

## 2025-03-23 RX ADMIN — HEPARIN SODIUM 5000 UNIT(S): 1000 INJECTION INTRAVENOUS; SUBCUTANEOUS at 21:18

## 2025-03-23 RX ADMIN — TAMSULOSIN HYDROCHLORIDE 0.4 MILLIGRAM(S): 0.4 CAPSULE ORAL at 21:18

## 2025-03-23 RX ADMIN — METOPROLOL SUCCINATE 50 MILLIGRAM(S): 50 TABLET, EXTENDED RELEASE ORAL at 05:24

## 2025-03-23 RX ADMIN — Medication 20 MILLIGRAM(S): at 14:22

## 2025-03-23 RX ADMIN — Medication 650 MILLIGRAM(S): at 22:30

## 2025-03-23 RX ADMIN — Medication 25 MILLIGRAM(S): at 12:41

## 2025-03-23 RX ADMIN — MEXILETINE HYDROCHLORIDE 150 MILLIGRAM(S): 250 CAPSULE ORAL at 14:22

## 2025-03-23 RX ADMIN — ATORVASTATIN CALCIUM 10 MILLIGRAM(S): 80 TABLET, FILM COATED ORAL at 21:19

## 2025-03-23 RX ADMIN — Medication 650 MILLIGRAM(S): at 21:41

## 2025-03-23 RX ADMIN — Medication 1 APPLICATION(S): at 05:25

## 2025-03-23 RX ADMIN — Medication 11.3 MG/MIN: at 21:30

## 2025-03-23 RX ADMIN — Medication 100 MILLIEQUIVALENT(S): at 04:32

## 2025-03-23 RX ADMIN — Medication 100 MILLIEQUIVALENT(S): at 01:42

## 2025-03-23 RX ADMIN — Medication 20 MILLIGRAM(S): at 05:25

## 2025-03-23 RX ADMIN — HEPARIN SODIUM 5000 UNIT(S): 1000 INJECTION INTRAVENOUS; SUBCUTANEOUS at 14:22

## 2025-03-23 RX ADMIN — Medication 1 APPLICATION(S): at 05:24

## 2025-03-23 RX ADMIN — Medication 11.3 MG/MIN: at 14:22

## 2025-03-23 RX ADMIN — Medication 100 MILLIGRAM(S): at 21:18

## 2025-03-23 NOTE — PROGRESS NOTE ADULT - SUBJECTIVE AND OBJECTIVE BOX
Subjective:  - Currently on Lidocaine 1.5mg; increased from 1mg for VT on 3/22. No further VT since  - States he feels ok today    Medications:  atorvastatin 10 milliGRAM(s) Oral at bedtime  chlorhexidine 2% Cloths 1 Application(s) Topical <User Schedule>  chlorhexidine 4% Liquid 1 Application(s) Topical <User Schedule>  heparin   Injectable 5000 Unit(s) SubCutaneous every 8 hours  lidocaine   Infusion 1.5 mG/Min IV Continuous <Continuous>  losartan 25 milliGRAM(s) Oral daily  metoprolol succinate ER 50 milliGRAM(s) Oral daily  mexiletine 150 milliGRAM(s) Oral every 8 hours  polyethylene glycol 3350 17 Gram(s) Oral daily  spironolactone 25 milliGRAM(s) Oral daily  tamsulosin 0.4 milliGRAM(s) Oral at bedtime  torsemide 20 milliGRAM(s) Oral two times a day  traZODone 100 milliGRAM(s) Oral at bedtime      Physical Exam:    Vitals:  Vital Signs Last 24 Hours  T(C): 36.6 (25 @ 08:00), Max: 37.2 (25 @ 19:00)  HR: 80 (25 @ 12:00) (80 - 80)  BP: 103/74 (25 @ 12:00) (89/64 - 125/81)  RR: 21 (25 @ 12:00) (12 - 38)  SpO2: 92% (25 @ 12:00) (89% - 96%)    Weight in k.7 ( @ 00:00)    I&O's Summary    22 Mar 2025 07:  -  23 Mar 2025 07:00  --------------------------------------------------------  IN: 1283.2 mL / OUT: 1900 mL / NET: -616.8 mL    23 Mar 2025 07:01  -  23 Mar 2025 12:50  --------------------------------------------------------  IN: 296.5 mL / OUT: 250 mL / NET: 46.5 mL    Tele: SR 80's    General: No distress. Comfortable.  HEENT: EOM intact.  Neck: Neck supple. JVP not elevated. No masses  Chest: Clear to auscultation bilaterally  CV: Normal S1 and S2. No murmurs, rub, or gallops. Radial pulses normal, warm peipherally  Abdomen: Soft, non-distended, non-tender  Skin: No rashes or skin breakdown  Extremities: No LE edema  Neurology: Alert and oriented times three. Sensation intact  Psych: Affect normal    Labs:                        14.0   9.28  )-----------( 161      ( 23 Mar 2025 00:49 )             42.6         140  |  102  |  20  ----------------------------<  128[H]  3.6   |  25  |  1.24    Ca    8.6      23 Mar 2025 00:49  Phos  3.3       Mg     2.5         TPro  6.9  /  Alb  4.0  /  TBili  0.9  /  DBili  x   /  AST  14  /  ALT  15  /  AlkPhos  87    Oxygen Saturation, Mixed: 70.5 ( @ 00:30)  Oxygen Saturation, Mixed: 65.1 ( @ 00:28)    Lactate, Blood: 1.1 mmol/L ( @ 11:13)
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
## 2025-03-23 NOTE — PROGRESS NOTE ADULT - PROBLEM SELECTOR PLAN 1
- listed UNOS status 2e for heart transplant, ABO O  - continue hemodynamic monitoring with swan in place, trend perfusion markers  - Continue torsemide 20mg BID, goal CVP 8-10  - continue losartan 25mg daily, goal PCWP 18-20  - continue Toprol XL 50mg daily.  - Please start Spironolactone 25mg QD today

## 2025-03-23 NOTE — PROGRESS NOTE ADULT - NS ATTEND AMEND GEN_ALL_CORE FT
Mr. Huerta is a 69 year old  male with history of long standing history of NICM (LVEF 30%, LVEDD 5.3 cm) since 2011, history of VT/VF receiving shock multiple times over the last year (intolerant to amiodarone, mexilitine and sotalol because of lightheadedness and hypotension), PVC ablation in 3/2024, s/p CRT-D, CHB, VT/VF, AF s/p GIANFRANCO closure, MV/TV repair with moderate to severe mitral stenosis who is currently listed as status 6 (cPRA 0) for cardiac transplant presented to our transplant center ([facility]) initially after 2 ICD shocks on the evening of 3/17/2025. His electrolytes were stable and a right heart catheterization showed RA 11, PA 58/26, PCWP 32, PA sat - 65%, KEVIN/care home - 5.43/2.78?. Electrophysiology consulted who wanted to escalate oral diuretics as filling pressures were increased as he was intolerant to AAD in the past. He was discharged home with GDMT titration. He presented again on 3/20/2025 evening with ICD shock and was admitted to [facility]. Electrolytes were stable and he was started on lidocaine. Lidocaine was briefly held which was followed by an ICD shock on 3/21/2025. He had a second shock despite the lidocaine being resumed. He was transferred to our transplant center at that time for further management. He remains on the lidocaine drip. Unfortunately, weaning IV anti-arrhythmic is leading to more VT and he is intolerant to oral anti-arrhythmics. His advanced age presents a higher risk if he were to need MCS with [facility] ECMO. Hence we have listed him as a status 2e on UNOS for a heart transplant.       Bedside hemodynamics  3/22/25 BP 97/76/83, HR 80, CVP 6, PA 58/23/38, PCWP 20, SVR 1100, Gucci CO/CI 5.1/2.6, TD 4/2.08    Echo:    3/20/25 TTE: LVEF 30%, segmental, LVIDd 5.3, walls normal, elevated LV filling pressures, mod RVE with mildly reduced RV function, TAPSE 1.7, severely dilated RA, annuloplasty in MV position, transvalvular gradients are elevated with severe prosthetic MS (peak gradient 15.7, mean gradient 8), trace intravalvular MR, TV annuloplasty ring noted, mild TR,  est PASP 36, no evidence of LV thrombus    [facility] 9/2024: RA 6, PA 60/25/40, PAWP 24/35/25, CO/CI 5.12/2.61  [facility] 3/2025: RA 11, PA 58/26, PCWP 32, PA sat - 65%, KEVIN/care home - 5.43/2.78?    [facility] 6/2023 Nonobstructive CAD    ICD interrogation 3/19/2025: AP 87.7%, BiV pace 100%, AT/AF burden 0.4%  Stored data revealed two episodes of ventricular tachycardia with CL (260ms and 210ms) on 3/17/25, one occurred at 6:52pm successfully treated and terminated with one round of ATP, the 2nd episode occurred at 7:30pm with one round of ATP and ICD shock x 1 at 40J. Also noted PAF with controlled ventricular rate on 2/24/25 lasting 2 hours and atrial lead noise noted on 2/28/25.       24 hours: He had a 9 beat run of NSVT on the lidocaine drip. Lidocaine drip was increased to 1.5 mg/hr. Patient reports feeling slightly lightheaded on the drip.    Assessment:  Incessant VT s/p multiple ICD shocks  NSVT on IV lidocaine  Intolerance to oral anti-arrhythmics 2/2 hypotension and lightheadedness  Non-ischemic cardiomyopathy  Chronic severe LV systolic dysfunction s/p ICD  CHB - AV pacing  Status post mitral and tricuspid ring repair with residual severe mitral stenosis  s/p GIANFRANCO closure  Status 2e for heart transplant      Plan:  Continue lidocaine drip at 1.5. Check levels in the am.   Can consider adding amiodarone if he doesn't tolerate IV lidocaine.   Continue metoprolol 50 mg daily, losartan 25 mg daily. BP soft.  End organ function stable.   Torsemide for diuresis. CVP normal.   Can consider removing swan during the week and ambulate the patient if there are no runs of  NSVT.   Appreciate EP recommendations.   Replete electrolytes for K>4 and Mg >2. Mr. Huerta is a 69 year old  male with history of long standing history of NICM (LVEF 30%, LVEDD 5.3 cm) since 2011, history of VT/VF receiving shock multiple times over the last year (intolerant to amiodarone, mexilitine and sotalol because of lightheadedness and hypotension), PVC ablation in 3/2024, s/p CRT-D, CHB, VT/VF, AF s/p GIANFRANCO closure, MV/TV repair with moderate to severe mitral stenosis who is currently listed as status 6 (cPRA 0) for cardiac transplant presented to our transplant center ([facility]) initially after 2 ICD shocks on the evening of 3/17/2025. His electrolytes were stable and a right heart catheterization showed RA 11, PA 58/26, PCWP 32, PA sat - 65%, KEVIN/penitentiary - 5.43/2.78?. Electrophysiology consulted who wanted to escalate oral diuretics as filling pressures were increased as he was intolerant to AAD in the past. He was discharged home with GDMT titration. He presented again on 3/20/2025 evening with ICD shock and was admitted to [facility]. Electrolytes were stable and he was started on lidocaine. Lidocaine was briefly held which was followed by an ICD shock on 3/21/2025. He had a second shock despite the lidocaine being resumed. He was transferred to our transplant center at that time for further management. He remains on the lidocaine drip. Unfortunately, weaning IV anti-arrhythmic is leading to more VT and he is intolerant to oral anti-arrhythmics. His advanced age presents a higher risk if he were to need MCS with [facility] ECMO. Hence we have listed him as a status 2e on UNOS for a heart transplant.       Bedside hemodynamics  3/22/25 BP 97/76/83, HR 80, CVP 6, PA 58/23/38, PCWP 20, SVR 1100, Gucci CO/CI 5.1/2.6, TD 4/2.08    Echo:    3/20/25 TTE: LVEF 30%, segmental, LVIDd 5.3, walls normal, elevated LV filling pressures, mod RVE with mildly reduced RV function, TAPSE 1.7, severely dilated RA, annuloplasty in MV position, transvalvular gradients are elevated with severe prosthetic MS (peak gradient 15.7, mean gradient 8), trace intravalvular MR, TV annuloplasty ring noted, mild TR,  est PASP 36, no evidence of LV thrombus    [facility] 9/2024: RA 6, PA 60/25/40, PAWP 24/35/25, CO/CI 5.12/2.61  [facility] 3/2025: RA 11, PA 58/26, PCWP 32, PA sat - 65%, KEVIN/penitentiary - 5.43/2.78?    [facility] 6/2023 Nonobstructive CAD    ICD interrogation 3/19/2025: AP 87.7%, BiV pace 100%, AT/AF burden 0.4%  Stored data revealed two episodes of ventricular tachycardia with CL (260ms and 210ms) on 3/17/25, one occurred at 6:52pm successfully treated and terminated with one round of ATP, the 2nd episode occurred at 7:30pm with one round of ATP and ICD shock x 1 at 40J. Also noted PAF with controlled ventricular rate on 2/24/25 lasting 2 hours and atrial lead noise noted on 2/28/25.       24 hours: He had a 9 beat run of NSVT on the lidocaine drip. Lidocaine drip was increased to 1.5 mg/hr. Patient reports feeling slightly lightheaded on the drip.    Assessment:  Incessant VT s/p multiple ICD shocks  NSVT on IV lidocaine  Intolerance to oral anti-arrhythmics 2/2 hypotension and lightheadedness  Non-ischemic cardiomyopathy  Chronic severe LV systolic dysfunction s/p ICD  CHB - AV pacing  Status post mitral and tricuspid ring repair with residual severe mitral stenosis  s/p GIANFRANCO closure  Status 2e for heart transplant (cPRA 3%)      Plan:  Continue lidocaine drip at 1.5. Check levels in the am.   Can consider adding amiodarone if he doesn't tolerate IV lidocaine.   Continue metoprolol 50 mg daily, losartan 25 mg daily. BP soft.  End organ function stable.   Torsemide for diuresis. CVP normal.   Can consider removing swan during the week and ambulate the patient if there are no runs of  NSVT.   Appreciate EP recommendations.   Replete electrolytes for K>4 and Mg >2.

## 2025-03-23 NOTE — PROGRESS NOTE ADULT - ASSESSMENT
69-year-old male ABO O past medical history of NICM since 2011, HFrEF LVEF 25-30% s/p MDT CRT-D with baseline CHB, VT/VF, a.fib s/p GIANFRANCO ligation/MAZE, MV/TV repair, PVC ablation 3/2024 intolerant to AADs in the past, recent admission 3/19 - 3/20/25 for AICD shocks initially presented to the [facility] emergency department on 3/21/2025, sent by heart failure specialist for ACID shock. Device reported successful ATP for VT 3/17 at 6:52pm followed by one ATP and 40J shock. He reported feeling dizzy and SOB during the events. He follows with Dr Luca Tamayo for HF, currently undergoing transplant workup, Dr John for CRTD and VT/VF and  for genetic counseling. While admitted to [facility], he was started on a lidocaine gtt. On 3/21 when lidocaine weaned off patient had another two episodes of VT requiring AICD shocks. He was transferred to [facility] for further management.     He's remains on PO diuretics and has good UOP.  He had recurrent VT with resumption on lidocaine gtt prompting increase dose on 3/22 to Lidocaine 1.5mg, and has been quiescent thus far. He has normal end organ function with borderline BP on low dose GDMT. His listing status now upgraded to UNOS status 2e for heart transplant, AB O.    Bedside hemodynamics  3/22/25 BP 97/76/83, HR 80, CVP 6, PA 58/23/38, PCWP 20, SVR 1100, Gucci CO/CI 5.1/2.6, TD 4/2.08    Echo:    3/20/25 TTE: LVEF 30%, segmental, LVIDd 5.3, walls normal, elevated LV filling pressures, mod RVE with mildly reduced RV function, TAPSE 1.7, severely dilated RA, annuloplasty in MV position, transvalvular gradients are elevated with severe prosthetic MS (peak gradient 15.7, mean gradient 8), trace intravalvular MR, TV annuloplasty ring noted, mild TR,  est PASP 36, no evidence of LV thrombus    10/2024: LVEF 25-30%, LVEDD 5.9cm, moderately reduced RV function, annuloplasty ring of mitral and tricuspid position, PASP 47 mmHg  Cath:    RHC 3/19/25- RA 11 PA 58/26 PCWP 32 CO/CI 5.43/2.77  [facility] 6/2023 Nonobstructive CAD

## 2025-03-23 NOTE — PROGRESS NOTE ADULT - CRITICAL CARE ATTENDING COMMENT
69 yo man with NICM, transferred from OSH with VT     A+Ox3  Hemodynamics acceptable, no pressors or inotropes, CI 2.7 this am   cont lido for VT, dose was increased yesterday due to another episode of NSVT, start Mexiletine today   Cont BB   O2 sats mid to high 90s on room air  DASH diet  Normal renal function, CVP 3-4 this am on rounds, cont torsemide    H/H low but acceptable  HSQ for DVT prophylaxis  Afebrile, no antibiotics  Sugars controlled  LINETTE lopez 3/21 from OSH     d/w pt and family at the bedside.

## 2025-03-23 NOTE — PROGRESS NOTE ADULT - ASSESSMENT
69-year-old male PMH NICM, HFrEF (EF 25 to 30%) status post CRT-D, VT/VF, AF status post GIANFRANCO closure, MV/TV repair, PVC ablation 3/2024 presents to the ED for evaluation of AICD discharge.  Patient states around 10 PM last night his AICD discharged, endorses severe dizziness just prior to discharge.  Of note patient was recently admitted at [facility] for similar complaint. he got 2 shocks on March 17,  30 min apart.  was evaluated by NS team. RHC done there and showed elevated filling pressure and normal CI / CO. Discharged after diuresis    Presently patient denies dizziness, syncope/near syncope, chest pain, shortness of breath, nausea, diaphoresis or any numbness/tingling/weakness in the extremities.    EP consulted for Vtach and CRTD shock. Did not tolerate amio, mexilitine and sotalol in the past. currently on GDMT therapy   s/p perimitral PVC ablation 3/11/2024. CMR ordered but was not performed due to motion artifact from leads   is currently being evaluated for heart transplant at [facility]     # Impression:  - hx of ani mitral PVC ablation 3/11/2024 ( 2 PVC morphology)  - Stage D heart failure, NYHA class 2  - CRTD discharge due to Vtach  - Non-ischemic cardiomyopathy (EF 25%, LVEDD 5.9 cm + decrease RV function)   - Status post mitral and tricuspid ring repair   - Afib s/p GIANFRANCO closure    # Recs:  - admit to CCU and CTM on tele  - C/Wt lidocaine drip at 1.5  mg / min   - start coby 150 bid  - cw toprol 50 mg po od, was recently titrated up  - cw GDMT as per CCU/HF team. did not tolerate spironolactone and entresto in the past due to hypotension  - consider repeating CMR   - Can consider starting Quinidine if pt with continued ventricular arrhythmia here.

## 2025-03-23 NOTE — PROGRESS NOTE ADULT - SUBJECTIVE AND OBJECTIVE BOX
PATIENT:  XENA DENSON  [id number]    CHIEF COMPLAINT:  Patient is a 69y old  Male who presents with a chief complaint of VT (23 Mar 2025 12:50)      INTERVAL HISTORYOVERNIGHT EVENTS:      REVIEW OF SYSTEMS:    Constitutional:     [ ] negative [ ] fevers [ ] chills [ ] weight loss [ ] weight gain  HEENT:                  [ ] negative [ ] dry eyes [ ] eye irritation [ ] postnasal drip [ ] nasal congestion  CV:                         [ ] negative  [ ] chest pain [ ] orthopnea [ ] palpitations [ ] murmur  Resp:                     [ ] negative [ ] cough [ ] shortness of breath [ ] dyspnea [ ] wheezing [ ] sputum [ ] hemoptysis  GI:                          [ ] negative [ ] nausea [ ] vomiting [ ] diarrhea [ ] constipation [ ] abd pain [ ] dysphagia   :                        [ ] negative [ ] dysuria [ ] nocturia [ ] hematuria [ ] increased urinary frequency  Musculoskeletal: [ ] negative [ ] back pain [ ] myalgias [ ] arthralgias [ ] fracture  Skin:                       [ ] negative [ ] rash [ ] itch  Neurological:        [ ] negative [ ] headache [ ] dizziness [ ] syncope [ ] weakness [ ] numbness  Psychiatric:           [ ] negative [ ] anxiety [ ] depression  Endocrine:            [ ] negative [ ] diabetes [ ] thyroid problem  Heme/Lymph:      [ ] negative [ ] anemia [ ] bleeding problem  Allergic/Immune: [ ] negative [ ] itchy eyes [ ] nasal discharge [ ] hives [ ] angioedema    [ ] All other systems negative  [ ] Unable to assess ROS because ________.    MEDICATIONS:  MEDICATIONS  (STANDING):  atorvastatin 10 milliGRAM(s) Oral at bedtime  chlorhexidine 2% Cloths 1 Application(s) Topical <User Schedule>  chlorhexidine 4% Liquid 1 Application(s) Topical <User Schedule>  heparin   Injectable 5000 Unit(s) SubCutaneous every 8 hours  lidocaine   Infusion 1.5 mG/Min (11.3 mL/Hr) IV Continuous <Continuous>  losartan 25 milliGRAM(s) Oral daily  metoprolol succinate ER 50 milliGRAM(s) Oral daily  mexiletine 150 milliGRAM(s) Oral every 8 hours  polyethylene glycol 3350 17 Gram(s) Oral daily  spironolactone 25 milliGRAM(s) Oral daily  tamsulosin 0.4 milliGRAM(s) Oral at bedtime  torsemide 20 milliGRAM(s) Oral two times a day  traZODone 100 milliGRAM(s) Oral at bedtime    MEDICATIONS  (PRN):      ALLERGIES:  Allergies    No Known Allergies    Intolerances        OBJECTIVE:  ICU Vital Signs Last 24 Hrs  T(C): 36.8 (23 Mar 2025 19:00), Max: 37.7 (23 Mar 2025 18:00)  T(F): 98.3 (23 Mar 2025 19:00), Max: 99.9 (23 Mar 2025 18:00)  HR: 80 (23 Mar 2025 21:) (80 - 81)  BP: 96/69 (23 Mar 2025 21:00) (89/64 - 125/81)  BP(mean): 78 (23 Mar 2025 21:) (71 - 100)  ABP: --  ABP(mean): --  RR: 24 (23 Mar 2025 21:00) (12 - 38)  SpO2: 93% (23 Mar 2025 21:00) (89% - 95%)    O2 Parameters below as of 23 Mar 2025 21:00  Patient On (Oxygen Delivery Method): room air            Adult Advanced Hemodynamics Last 24 Hrs  CVP(mm Hg): 1 (23 Mar 2025 18:00) (1 - 24)  CVP(cm H2O): --  CO: --  CI: --  PA: 26/24 (23 Mar 2025 21:00) (20/16 - 57/27)  PA(mean): 25 (23 Mar 2025 21:00) (19 - 45)  PCWP: --  SVR: --  SVRI: --  PVR: --  PVRI: --  CAPILLARY BLOOD GLUCOSE        CAPILLARY BLOOD GLUCOSE        I&O's Summary    22 Mar 2025 07:01  -  23 Mar 2025 07:00  --------------------------------------------------------  IN: 1283.2 mL / OUT: 1900 mL / NET: -616.8 mL    23 Mar 2025 07:01  -  23 Mar 2025 21:51  --------------------------------------------------------  IN: 1058.2 mL / OUT: 750 mL / NET: 308.2 mL      Daily     Daily Weight in k.7 (23 Mar 2025 00:00)    PHYSICAL EXAMINATION:  General: WN/WD NAD  HEENT: PERRLA, EOMI, moist mucous membranes  Neurology: A&Ox3, nonfocal, [place] x 4  Respiratory: CTA B/L, normal respiratory effort, no wheezes, crackles, rales  CV: RRR, S1S2, no murmurs, rubs or gallops  Abdominal: Soft, NT, ND +BS, Last BM  Extremities: No edema, + peripheral pulses  Incisions:   Tubes:    LABS:                          14.0   9.28  )-----------( 161      ( 23 Mar 2025 00:49 )             42.6     03-    140  |  102  |  20  ----------------------------<  128[H]  3.6   |  25  |  1.24    Ca    8.6      23 Mar 2025 00:49  Phos  3.3     03-23  Mg     2.5     03-23    TPro  6.9  /  Alb  4.0  /  TBili  0.9  /  DBili  x   /  AST  14  /  ALT  15  /  AlkPhos  87  03-23    LIVER FUNCTIONS - ( 23 Mar 2025 00:49 )  Alb: 4.0 g/dL / Pro: 6.9 g/dL / ALK PHOS: 87 U/L / ALT: 15 U/L / AST: 14 U/L / GGT: x            PATIENT:  XENA DENSON  [id number]    CHIEF COMPLAINT:  Patient is a 69y old male who presents with a chief complaint of VT (23 Mar 2025 12:50)    INTERVAL HISTORY:  - Refusing mexiletine 2/2 dizziness, remains on lido gtt    MEDICATIONS:  MEDICATIONS  (STANDING):  atorvastatin 10 milliGRAM(s) Oral at bedtime  chlorhexidine 2% Cloths 1 Application(s) Topical <User Schedule>  chlorhexidine 4% Liquid 1 Application(s) Topical <User Schedule>  heparin   Injectable 5000 Unit(s) SubCutaneous every 8 hours  lidocaine   Infusion 1.5 mG/Min (11.3 mL/Hr) IV Continuous <Continuous>  losartan 25 milliGRAM(s) Oral daily  metoprolol succinate ER 50 milliGRAM(s) Oral daily  mexiletine 150 milliGRAM(s) Oral every 8 hours  polyethylene glycol 3350 17 Gram(s) Oral daily  spironolactone 25 milliGRAM(s) Oral daily  tamsulosin 0.4 milliGRAM(s) Oral at bedtime  torsemide 20 milliGRAM(s) Oral two times a day  traZODone 100 milliGRAM(s) Oral at bedtime    MEDICATIONS  (PRN):    ALLERGIES:  No Known Allergies    OBJECTIVE:  ICU Vital Signs Last 24 Hrs  T(C): 36.8 (23 Mar 2025 19:00), Max: 37.7 (23 Mar 2025 18:00)  T(F): 98.3 (23 Mar 2025 19:00), Max: 99.9 (23 Mar 2025 18:00)  HR: 80 (23 Mar 2025 21:00) (80 - 81)  BP: 96/69 (23 Mar 2025 21:00) (89/64 - 125/81)  BP(mean): 78 (23 Mar 2025 21:00) (71 - 100)  ABP: --  ABP(mean): --  RR: 24 (23 Mar 2025 21:00) (12 - 38)  SpO2: 93% (23 Mar 2025 21:00) (89% - 95%)    O2 Parameters below as of 23 Mar 2025 21:00  Patient On (Oxygen Delivery Method): room air    Adult Advanced Hemodynamics Last 24 Hrs  CVP(mm Hg): 1 (23 Mar 2025 18:00) (1 - 24)  CVP(cm H2O): --  CO: --  CI: --  PA: 26/24 (23 Mar 2025 21:00) (20/16 - 57/27)  PA(mean): 25 (23 Mar 2025 21:00) (19 - 45)  PCWP: --  SVR: --  SVRI: --  PVR: --  PVRI: --    I&O's Summary    22 Mar 2025 07:01  -  23 Mar 2025 07:00  --------------------------------------------------------  IN: 1283.2 mL / OUT: 1900 mL / NET: -616.8 mL    23 Mar 2025 07:01  -  23 Mar 2025 21:51  --------------------------------------------------------  IN: 1058.2 mL / OUT: 750 mL / NET: 308.2 mL    Daily     Daily Weight in k.7 (23 Mar 2025 00:00)    LABS:                          14.0   9.28  )-----------( 161      ( 23 Mar 2025 00:49 )             42.6     03-23    140  |  102  |  20  ----------------------------<  128[H]  3.6   |  25  |  1.24    Ca    8.6      23 Mar 2025 00:49  Phos  3.3     03-23  Mg     2.5     03-23    TPro  6.9  /  Alb  4.0  /  TBili  0.9  /  DBili  x   /  AST  14  /  ALT  15  /  AlkPhos  87  03-23    LIVER FUNCTIONS - ( 23 Mar 2025 00:49 )  Alb: 4.0 g/dL / Pro: 6.9 g/dL / ALK PHOS: 87 U/L / ALT: 15 U/L / AST: 14 U/L / GGT: x

## 2025-03-23 NOTE — PROGRESS NOTE ADULT - PROBLEM SELECTOR PLAN 2
- continue lidocaine gtt  - electrolyte repletion to maintain K > 4.5 and Mg > 2  - appreciate EP input  - s/p CRT-D.

## 2025-03-23 NOTE — PROGRESS NOTE ADULT - ASSESSMENT
· Assessment	  69-year-old male patient past medical history of NICM since 2011, HFrEF LVEF 25-30% s/p MDT CRT-D with baseline CHB, VT/VF, a.fib s/p GIANFRANCO ligation/MAZE, MV/TV repair, PVC ablation 3/2024 intolerant to AADs in the past who initially presented to the [facility] for AICD shocks/VT, transferred for further management.    Plan:    Neuro  #hx anxiety  - A&O x3  - home dose clonazepam 0.5 PRN  - continue to monitor mental status per protocol    Respiratory  - saturating well on room air  - continue to monitor sp02    Cardiovascular  #hx NICM, HFrEF s/p MDT CRT-D  - TTE 3/20 EF 30%, RVe and reduced RVsf, severe prosthetic MS  - recent admit 3/19/2025 w/ C found to have elevated filling pressures treated with IV diuretics  - on standing torsemide  - f/u CVP, mv02, perfusion indices and lactate  - GDMT: losartan 25 daily, toprol 50  - holding home farxiga while inpatient    #hx VT/VF   - hx PVC ablation 3/2024, intolerant to mexiletine, sotalol and amio in the past as per HF documentation  - s/p AICD shocks 3/21 off lido  - lidocaine gtt increased to 1.5 mg/min for ectopy  - continue toprol 50  - f/u EP recommendations, plan for possible mexiletine 3/23  - continue to monitor tele and electrolytes    #hx a.fib     - s/p GIANFRANCO ligation/MAZE  - rate control as above  - not currently on AC    Renal  - sCr within normal limits  - strict I&O, trend renal function daily    GI  - DASH diet  - monitor for BM    Endo  - glucose controlled on cmp, continue to trend daily  - a1c, tsh, lipid panel within normal limits    Heme  - H/H and platelets stable  - trend daily  #DVT ppx: heparin subq    ID  - afebrile, no leukocytosis  - continue to trend wbc and fever curve    #full code  Lines: R IJ PAC 3/21 -     ======================= DISPOSITION  =====================  [ x] Critical   [ ] Guarded    [ ] Stable   · Assessment	  69-year-old male patient past medical history of NICM since 2011, HFrEF LVEF 25-30% s/p MDT CRT-D with baseline CHB, VT/VF, a.fib s/p GIANFRANCO ligation/MAZE, MV/TV repair, PVC ablation 3/2024 intolerant to AADs in the past who initially presented to the [facility] for AICD shocks/VT, transferred for further management.    Plan:    Neuro  #hx anxiety  - A&O x3  - home dose clonazepam 0.5 PRN  - continue to monitor mental status per protocol    Respiratory  - saturating well on room air  - continue to monitor sp02    Cardiovascular  #hx NICM, HFrEF s/p MDT CRT-D  - TTE 3/20 EF 30%, RVe and reduced RVsf, severe prosthetic MS  - recent admit 3/19/2025 w/ RHC found to have elevated filling pressures treated with IV diuretics  - on standing torsemide, currently - 700  - CVP 8 , CI 2.7  [id number]  - GDMT: losartan 25 daily, toprol 50  - holding home farxiga while inpatient    #hx VT/VF   - hx PVC ablation 3/2024, intolerant to mexiletine, sotalol and amio in the past as per HF documentation  - s/p AICD shocks 3/21 off lido  - lidocaine gtt increased to 1.5 mg/min for ectopy  - continue toprol 50  - f/u EP recommendations, plan for possible mexiletine 3/23  - continue to monitor tele and electrolytes. No eventa in 24 hours    #hx a.fib     - s/p GIANFRANCO ligation/MAZE  - rate control as above  - not currently on AC    Renal  - sCr within normal limits  - strict I&O, trend renal function daily    GI  - DASH diet  - monitor for BM    Endo  - glucose controlled on cmp, continue to trend daily  - a1c, tsh, lipid panel within normal limits    Heme  - H/H and platelets stable  - trend daily  #DVT ppx: heparin subq    ID  - afebrile, no leukocytosis  - continue to trend wbc and fever curve    #full code  Lines: R IJ PAC 3/21 -     ======================= DISPOSITION  =====================  [ x] Critical   [ ] Guarded    [ ] Stable

## 2025-03-23 NOTE — PROGRESS NOTE ADULT - SUBJECTIVE AND OBJECTIVE BOX
Patient is a 69y old  Male who presents with a chief complaint of VT (22 Mar 2025 20:07)          Allergies    No Known Allergies    Intolerances        Medications:  atorvastatin 10 milliGRAM(s) Oral at bedtime  chlorhexidine 2% Cloths 1 Application(s) Topical <User Schedule>  chlorhexidine 4% Liquid 1 Application(s) Topical <User Schedule>  heparin   Injectable 5000 Unit(s) SubCutaneous every 8 hours  lidocaine   Infusion 1.5 mG/Min IV Continuous <Continuous>  losartan 25 milliGRAM(s) Oral daily  metoprolol succinate ER 50 milliGRAM(s) Oral daily  tamsulosin 0.4 milliGRAM(s) Oral at bedtime  torsemide 20 milliGRAM(s) Oral two times a day  traZODone 100 milliGRAM(s) Oral at bedtime      Vitals:  T(C): 36.3 (25 @ 03:00), Max: 37.2 (25 @ 19:00)  HR: 80 (25 @ 06:00) (80 - 86)  BP: 104/78 (25 @ 06:00) (89/64 - 123/84)  BP(mean): 86 (25 @ 06:00) (71 - 99)  RR: 21 (25 @ 06:00) (12 - 30)  SpO2: 94% (25 @ 06:00) (89% - 96%)  Wt(kg): --  Daily     Daily Weight in k.7 (23 Mar 2025 00:00)  I&O's Summary    22 Mar 2025 07:01  -  23 Mar 2025 07:00  --------------------------------------------------------  IN: 1271.9 mL / OUT: 1900 mL / NET: -628.1 mL          Physical Exam:  Appearance: Normal  Eyes: PERRL, EOMI  HENT: Normal oral muscosa, NC/AT  Cardiovascular: S1S2, RRR, No M/R/G, no JVD, No Lower extremity edema  Procedural Access Site: No hematoma, Non-tender to palpation, 2+ pulse, No bruit, No Ecchymosis  Respiratory: Clear to auscultation bilaterally  Gastrointestinal: Soft, Non tender, Normal Bowel Sounds  Musculoskeletal: No clubbing, No joint deformity   Neurologic: Non-focal  Lymphatic: No lymphadenopathy  Psychiatry: AAOx3, Mood & affect appropriate  Skin: No rashes, No ecchymoses, No cyanosis        140  |  102  |  20  ----------------------------<  128[H]  3.6   |  25  |  1.24    Ca    8.6      23 Mar 2025 00:49  Phos  3.3       Mg     2.5         TPro  6.9  /  Alb  4.0  /  TBili  0.9  /  DBili  x   /  AST  14  /  ALT  15  /  AlkPhos  87              Lipid panel   Hgb A1c         ECG:    Echo:    Stress Testing:     Cath:    Imaging:    Interpretation of Telemetry:   69-year-old male patient past medical history of NICM since , HFrEF LVEF 25-30% s/p MDT CRT-D with baseline CHB, VT/VF, a.fib s/p GIANFRANCO ligation/MAZE, MV/TV repair, PVC ablation 3/2024 intolerant to AADs in the past, recent admission 3/19 - 3/20/25 for AICD shocks initially presented to the [facility] emergency department on 3/21/2025, sent by heart failure specialist for ACID shock. Device reported successful ATP for VT 3/17 at 6:52pm followed by one ATP and 40J shock. He reported feeling dizzy and SOB during the events. He follows with Dr Luca Tamayo for HF, currently undergoing transplant workup, Dr John for CRTD and VT/VF and  for genetic counseling. While admitted to [facility], he was started on a lidocaine gtt. On 3/21 when lidocaine weaned off patient had another two episodes of VT requiring AICD shocks. He was transferred to [facility] for further management.     Events: No events on tele. Pt remains on 1.5 of Lido    Medications:  atorvastatin 10 milliGRAM(s) Oral at bedtime  chlorhexidine 2% Cloths 1 Application(s) Topical <User Schedule>  chlorhexidine 4% Liquid 1 Application(s) Topical <User Schedule>  heparin   Injectable 5000 Unit(s) SubCutaneous every 8 hours  lidocaine   Infusion 1.5 mG/Min IV Continuous <Continuous>  losartan 25 milliGRAM(s) Oral daily  metoprolol succinate ER 50 milliGRAM(s) Oral daily  tamsulosin 0.4 milliGRAM(s) Oral at bedtime  torsemide 20 milliGRAM(s) Oral two times a day  traZODone 100 milliGRAM(s) Oral at bedtime    Vitals:  T(C): 36.3 (25 @ 03:00), Max: 37.2 (25 @ 19:00)  HR: 80 (25 @ 06:00) (80 - 86)  BP: 104/78 (25 @ 06:00) (89/64 - 123/84)  BP(mean): 86 (25 @ 06:00) (71 - 99)  RR: 21 (25 @ 06:00) (12 - 30)  SpO2: 94% (25 @ 06:00) (89% - 96%)    Daily     Daily Weight in k.7 (23 Mar 2025 00:00)  I&O's Summary    22 Mar 2025 07:01  -  23 Mar 2025 07:00  --------------------------------------------------------  IN: 1271.9 mL / OUT: 1900 mL / NET: -628.1 mL    Physical Exam:  Appearance: Normal  Eyes: PERRL, EOMI  HENT: Normal oral muscosa, NC/AT  Cardiovascular: S1S2, RRR, No M/R/G, no JVD, No Lower extremity edema  Procedural Access Site: RIJ PAC C/D/I without bleeding, induration or hematoma  Respiratory: Clear to auscultation bilaterally  Gastrointestinal: Soft, Non tender, Normal Bowel Sounds  Musculoskeletal: No clubbing, No joint deformity   Neurologic: Non-focal  Lymphatic: No lymphadenopathy  Psychiatry: AAOx3, Mood & affect appropriate  Skin: No rashes, No ecchymoses, No cyanosis        140  |  102  |  20  ----------------------------<  128[H]  3.6   |  25  |  1.24    Ca    8.6      23 Mar 2025 00:49  Phos  3.3       Mg     2.5         TPro  6.9  /  Alb  4.0  /  TBili  0.9  /  DBili  x   /  AST  14  /  ALT  15  /  AlkPhos  87      ECG: Diagnosis Line AV dual-paced rhythm    Echo:  1. Left ventricular cavity is normal in size. Left ventricular wall thickness is normal. Left ventricular systolic function is severely decreased with an ejection fraction of 30 % by Mendoza's method of disks. Global left ventricular hypokinesis.   2. Multiple segmental abnormalities exist. See findings.   3. Elevated left ventricular filling pressure. Analysis of left ventricular diastolic function and filling pressure is made challenging by the presence of mitral annuloplasty ring.   4. Moderately enlarged right ventricular cavity size and mildly reduced right ventricular systolic function.   5. The right atrium is severely dilated.   6. Device lead is visualized in the right heart.   7. No pericardial effusion seen.   8. STACEY could be considered to further evaluated mitral annuloplasty.   9. An annuloplasty ring is noted in the mitral position. Transvalvular mitral gradients are elevated. Severe prosthetic mitral stenosis. There is trace intravalvular mitral regurgitation.  10. An annuloplasty ring is noted in the tricuspid position.  11. Estimated pulmonary artery systolic pressure is 36 mmHg.  12. Technically difficult image quality.  13. There is no evidence of a left ventricular thrombus.    Cath: < from: Cardiac Catheterization (25 @ 15:30) >  Hemodynamic Pressures:   Phase         Location          [mmHg]               [mmHg]  [mmHg]           HR  Phase 0       RV                  s      57  ed      12         86  Phase 0       PA                  s      58               d      26  m   42         80  Phase 0       PCW                 a      35                v     38  m   32      86  Phase 0       RA                  a      18                v     14  m   11         68    Oxygen Saturations   Phase          Location        Hb             Sat            pO2  Content        Group  Phase 0         AO                       13             92  16.39   SA  Phase 0         PA                       13             65  11.58   MV    Oxygen Saturation Average, Phase: Phase 0   PV Hb                PV Sat                 PV pO2  PV Content  SA Hb      13.10 g/dL SA Sat     92.00%    SA pO2  SA Content     16.39 %  PA Hb                PA Sat                 PA pO2  PA Content  MV Hb      13.10 g/dL MV Sat     65.00 %    MV pO2  MV Content     11.58 %  Strokework:   Phase:                   Phase 0   LVSW:                      LVSW (index):   RVSW:                    28.59 g*m             RVSW (index):  14.63 g*m/m2    Imaging:< from: Xray Chest 1 View- PORTABLE-Routine (Xray Chest 1 View- PORTABLE-Routine in AM.) (25 @ 04:05) >  Overall, interval retraction of right IJ [place]-Margaret catheter and catheter   with tip overlying the proximal right pulmonary artery and the final   radiograph.  Pulmonary vascular congestion, similar as compared to prior chest   radiograph 3/21/2025 at 3:35 PM.  Cardiomegaly.     Interpretation of Telemetry: Av paced 80s   69-year-old male patient past medical history of NICM since , HFrEF LVEF 25-30% s/p MDT CRT-D with baseline CHB, VT/VF, a.fib s/p GIANFRANCO ligation/MAZE, MV/TV repair, PVC ablation 3/2024 intolerant to AADs in the past, recent admission 3/19 - 3/20/25 for AICD shocks initially presented to the [facility] emergency department on 3/21/2025, sent by heart failure specialist for ACID shock. Device reported successful ATP for VT 3/17 at 6:52pm followed by one ATP and 40J shock. He reported feeling dizzy and SOB during the events. He follows with Dr Luca Tamayo for HF, currently undergoing transplant workup, Dr John for CRTD and VT/VF and  for genetic counseling. While admitted to [facility], he was started on a lidocaine gtt. On 3/21 when lidocaine weaned off patient had another two episodes of VT requiring AICD shocks. He was transferred to [facility] for further management.     Events: No events on tele. Pt remains on 1.5 of Lido    Medications:  atorvastatin 10 milliGRAM(s) Oral at bedtime  chlorhexidine 2% Cloths 1 Application(s) Topical <User Schedule>  chlorhexidine 4% Liquid 1 Application(s) Topical <User Schedule>  heparin   Injectable 5000 Unit(s) SubCutaneous every 8 hours  lidocaine   Infusion 1.5 mG/Min IV Continuous <Continuous>  losartan 25 milliGRAM(s) Oral daily  metoprolol succinate ER 50 milliGRAM(s) Oral daily  tamsulosin 0.4 milliGRAM(s) Oral at bedtime  torsemide 20 milliGRAM(s) Oral two times a day  traZODone 100 milliGRAM(s) Oral at bedtime    Vitals:  T(C): 36.3 (25 @ 03:00), Max: 37.2 (25 @ 19:00)  HR: 80 (25 @ 06:00) (80 - 86)  BP: 104/78 (25 @ 06:00) (89/64 - 123/84)  BP(mean): 86 (25 @ 06:00) (71 - 99)  RR: 21 (25 @ 06:00) (12 - 30)  SpO2: 94% (25 @ 06:00) (89% - 96%)    Daily     Daily Weight in k.7 (23 Mar 2025 00:00)  I&O's Summary    22 Mar 2025 07:01  -  23 Mar 2025 07:00  --------------------------------------------------------  IN: 1271.9 mL / OUT: 1900 mL / NET: -628.1 mL    Physical Exam:  Appearance: Normal  Eyes: PERRL, EOMI  HENT: Normal oral muscosa  Cardiovascular: S1S2, RRR,   Procedural Access Site: [facility] PAC C/D/I without bleeding, induration or hematoma  Respiratory: Clear to auscultation bilaterally  Gastrointestinal: Soft, Non tender, Normal Bowel Sounds  Musculoskeletal: No clubbing, No joint deformity   Neurologic: Non-focal  Psychiatry: AAOx3, Mood & affect appropriate        140  |  102  |  20  ----------------------------<  128[H]  3.6   |  25  |  1.24    Ca    8.6      23 Mar 2025 00:49  Phos  3.3       Mg     2.5         TPro  6.9  /  Alb  4.0  /  TBili  0.9  /  DBili  x   /  AST  14  /  ALT  15  /  AlkPhos  87      ECG: Diagnosis Line AV dual-paced rhythm    Echo:  1. Left ventricular cavity is normal in size. Left ventricular wall thickness is normal. Left ventricular systolic function is severely decreased with an ejection fraction of 30 % by Mendoza's method of disks. Global left ventricular hypokinesis.   2. Multiple segmental abnormalities exist. See findings.   3. Elevated left ventricular filling pressure. Analysis of left ventricular diastolic function and filling pressure is made challenging by the presence of mitral annuloplasty ring.   4. Moderately enlarged right ventricular cavity size and mildly reduced right ventricular systolic function.   5. The right atrium is severely dilated.   6. Device lead is visualized in the right heart.   7. No pericardial effusion seen.   8. STACEY could be considered to further evaluated mitral annuloplasty.   9. An annuloplasty ring is noted in the mitral position. Transvalvular mitral gradients are elevated. Severe prosthetic mitral stenosis. There is trace intravalvular mitral regurgitation.  10. An annuloplasty ring is noted in the tricuspid position.  11. Estimated pulmonary artery systolic pressure is 36 mmHg.  12. Technically difficult image quality.  13. There is no evidence of a left ventricular thrombus.    Cath: < from: Cardiac Catheterization (25 @ 15:30) >  Hemodynamic Pressures:   Phase         Location          [mmHg]               [mmHg]  [mmHg]           HR  Phase 0       RV                  s      57  ed      12         86  Phase 0       PA                  s      58               d      26  m   42         80  Phase 0       PCW                 a      35                v     38  m   32      86  Phase 0       RA                  a      18                v     14  m   11         68    Oxygen Saturations   Phase          Location        Hb             Sat            pO2  Content        Group  Phase 0         AO                       13             92  16.39   SA  Phase 0         PA                       13             65  11.58   MV    Oxygen Saturation Average, Phase: Phase 0   PV Hb                PV Sat                 PV pO2  PV Content  SA Hb      13.10 g/dL SA Sat     92.00%    SA pO2  SA Content     16.39 %  PA Hb                PA Sat                 PA pO2  PA Content  MV Hb      13.10 g/dL MV Sat     65.00 %    MV pO2  MV Content     11.58 %  Strokework:   Phase:                   Phase 0   LVSW:                      LVSW (index):   RVSW:                    28.59 g*m             RVSW (index):  14.63 g*m/m2    Imaging:< from: Xray Chest 1 View- PORTABLE-Routine (Xray Chest 1 View- PORTABLE-Routine in AM.) (25 @ 04:05) >  Overall, interval retraction of right IJ [place]-Margaret catheter and catheter   with tip overlying the proximal right pulmonary artery and the final   radiograph.  Pulmonary vascular congestion, similar as compared to prior chest   radiograph 3/21/2025 at 3:35 PM.  Cardiomegaly.     Interpretation of Telemetry: Av paced 80s

## 2025-03-24 LAB
ALBUMIN SERPL ELPH-MCNC: 3.9 G/DL — SIGNIFICANT CHANGE UP (ref 3.3–5)
ALP SERPL-CCNC: 87 U/L — SIGNIFICANT CHANGE UP (ref 40–120)
ALT FLD-CCNC: 13 U/L — SIGNIFICANT CHANGE UP (ref 10–45)
ANION GAP SERPL CALC-SCNC: 15 MMOL/L — SIGNIFICANT CHANGE UP (ref 5–17)
AST SERPL-CCNC: 13 U/L — SIGNIFICANT CHANGE UP (ref 10–40)
BASE EXCESS BLDMV CALC-SCNC: 5.2 MMOL/L — HIGH (ref -3–3)
BASE EXCESS BLDMV CALC-SCNC: 5.5 MMOL/L — HIGH (ref -3–3)
BASOPHILS # BLD AUTO: 0.05 K/UL — SIGNIFICANT CHANGE UP (ref 0–0.2)
BASOPHILS NFR BLD AUTO: 0.6 % — SIGNIFICANT CHANGE UP (ref 0–2)
BILIRUB SERPL-MCNC: 0.7 MG/DL — SIGNIFICANT CHANGE UP (ref 0.2–1.2)
BUN SERPL-MCNC: 23 MG/DL — SIGNIFICANT CHANGE UP (ref 7–23)
CALCIUM SERPL-MCNC: 9.1 MG/DL — SIGNIFICANT CHANGE UP (ref 8.4–10.5)
CHLORIDE SERPL-SCNC: 98 MMOL/L — SIGNIFICANT CHANGE UP (ref 96–108)
CO2 BLDMV-SCNC: 33 MMOL/L — HIGH (ref 21–29)
CO2 BLDMV-SCNC: 33 MMOL/L — HIGH (ref 21–29)
CO2 SERPL-SCNC: 24 MMOL/L — SIGNIFICANT CHANGE UP (ref 22–31)
CREAT SERPL-MCNC: 1.23 MG/DL — SIGNIFICANT CHANGE UP (ref 0.5–1.3)
EGFR: 63 ML/MIN/1.73M2 — SIGNIFICANT CHANGE UP
EGFR: 63 ML/MIN/1.73M2 — SIGNIFICANT CHANGE UP
EOSINOPHIL # BLD AUTO: 0.11 K/UL — SIGNIFICANT CHANGE UP (ref 0–0.5)
EOSINOPHIL NFR BLD AUTO: 1.2 % — SIGNIFICANT CHANGE UP (ref 0–6)
GAS PNL BLDMV: SIGNIFICANT CHANGE UP
GAS PNL BLDMV: SIGNIFICANT CHANGE UP
GLUCOSE SERPL-MCNC: 108 MG/DL — HIGH (ref 70–99)
HCO3 BLDMV-SCNC: 32 MMOL/L — HIGH (ref 20–28)
HCO3 BLDMV-SCNC: 32 MMOL/L — HIGH (ref 20–28)
HCT VFR BLD CALC: 42.8 % — SIGNIFICANT CHANGE UP (ref 39–50)
HGB BLD-MCNC: 14 G/DL — SIGNIFICANT CHANGE UP (ref 13–17)
HOROWITZ INDEX BLDMV+IHG-RTO: 21 — SIGNIFICANT CHANGE UP
HOROWITZ INDEX BLDMV+IHG-RTO: 21 — SIGNIFICANT CHANGE UP
IMM GRANULOCYTES NFR BLD AUTO: 0.5 % — SIGNIFICANT CHANGE UP (ref 0–0.9)
LACTATE BLDV-MCNC: 0.6 MMOL/L — SIGNIFICANT CHANGE UP (ref 0.5–2)
LACTATE BLDV-MCNC: 0.7 MMOL/L — SIGNIFICANT CHANGE UP (ref 0.5–2)
LYMPHOCYTES # BLD AUTO: 1.55 K/UL — SIGNIFICANT CHANGE UP (ref 1–3.3)
LYMPHOCYTES # BLD AUTO: 17.5 % — SIGNIFICANT CHANGE UP (ref 13–44)
MAGNESIUM SERPL-MCNC: 2.2 MG/DL — SIGNIFICANT CHANGE UP (ref 1.6–2.6)
MCHC RBC-ENTMCNC: 29.2 PG — SIGNIFICANT CHANGE UP (ref 27–34)
MCHC RBC-ENTMCNC: 32.7 G/DL — SIGNIFICANT CHANGE UP (ref 32–36)
MCV RBC AUTO: 89.4 FL — SIGNIFICANT CHANGE UP (ref 80–100)
MONOCYTES # BLD AUTO: 1.12 K/UL — HIGH (ref 0–0.9)
MONOCYTES NFR BLD AUTO: 12.6 % — SIGNIFICANT CHANGE UP (ref 2–14)
NEUTROPHILS # BLD AUTO: 6 K/UL — SIGNIFICANT CHANGE UP (ref 1.8–7.4)
NEUTROPHILS NFR BLD AUTO: 67.6 % — SIGNIFICANT CHANGE UP (ref 43–77)
NRBC BLD AUTO-RTO: 0 /100 WBCS — SIGNIFICANT CHANGE UP (ref 0–0)
O2 CT VFR BLD CALC: 37 MMHG — SIGNIFICANT CHANGE UP (ref 30–65)
O2 CT VFR BLD CALC: 40 MMHG — SIGNIFICANT CHANGE UP (ref 30–65)
PCO2 BLDMV: 51 MMHG — SIGNIFICANT CHANGE UP (ref 30–65)
PCO2 BLDMV: 52 MMHG — SIGNIFICANT CHANGE UP (ref 30–65)
PH BLDMV: 7.39 — SIGNIFICANT CHANGE UP (ref 7.32–7.45)
PH BLDMV: 7.4 — SIGNIFICANT CHANGE UP (ref 7.32–7.45)
PHOSPHATE SERPL-MCNC: 3.9 MG/DL — SIGNIFICANT CHANGE UP (ref 2.5–4.5)
PLATELET # BLD AUTO: 151 K/UL — SIGNIFICANT CHANGE UP (ref 150–400)
POTASSIUM SERPL-MCNC: 3.7 MMOL/L — SIGNIFICANT CHANGE UP (ref 3.5–5.3)
POTASSIUM SERPL-SCNC: 3.7 MMOL/L — SIGNIFICANT CHANGE UP (ref 3.5–5.3)
PROT SERPL-MCNC: 6.9 G/DL — SIGNIFICANT CHANGE UP (ref 6–8.3)
RBC # BLD: 4.79 M/UL — SIGNIFICANT CHANGE UP (ref 4.2–5.8)
RBC # FLD: 14.4 % — SIGNIFICANT CHANGE UP (ref 10.3–14.5)
SAO2 % BLDMV: 59.7 — LOW (ref 60–90)
SAO2 % BLDMV: 68 — SIGNIFICANT CHANGE UP (ref 60–90)
SODIUM SERPL-SCNC: 137 MMOL/L — SIGNIFICANT CHANGE UP (ref 135–145)
WBC # BLD: 8.87 K/UL — SIGNIFICANT CHANGE UP (ref 3.8–10.5)
WBC # FLD AUTO: 8.87 K/UL — SIGNIFICANT CHANGE UP (ref 3.8–10.5)

## 2025-03-24 PROCEDURE — 99291 CRITICAL CARE FIRST HOUR: CPT | Mod: FS

## 2025-03-24 PROCEDURE — 93010 ELECTROCARDIOGRAM REPORT: CPT

## 2025-03-24 PROCEDURE — 71045 X-RAY EXAM CHEST 1 VIEW: CPT | Mod: 26

## 2025-03-24 PROCEDURE — 99291 CRITICAL CARE FIRST HOUR: CPT | Mod: FS,25

## 2025-03-24 PROCEDURE — 99233 SBSQ HOSP IP/OBS HIGH 50: CPT

## 2025-03-24 PROCEDURE — 99292 CRITICAL CARE ADDL 30 MIN: CPT | Mod: FS

## 2025-03-24 RX ORDER — QUINIDINE GLUCONATE 324 MG
324 TABLET, EXTENDED RELEASE ORAL EVERY 12 HOURS
Refills: 0 | Status: DISCONTINUED | OUTPATIENT
Start: 2025-03-24 | End: 2025-04-29

## 2025-03-24 RX ORDER — HYPROMELLOSE 0.4 %
1 DROPS OPHTHALMIC (EYE) EVERY 4 HOURS
Refills: 0 | Status: DISCONTINUED | OUTPATIENT
Start: 2025-03-24 | End: 2025-04-29

## 2025-03-24 RX ORDER — ACETAMINOPHEN 500 MG/5ML
650 LIQUID (ML) ORAL EVERY 6 HOURS
Refills: 0 | Status: DISCONTINUED | OUTPATIENT
Start: 2025-03-24 | End: 2025-04-29

## 2025-03-24 RX ADMIN — Medication 650 MILLIGRAM(S): at 06:59

## 2025-03-24 RX ADMIN — Medication 100 MILLIGRAM(S): at 21:30

## 2025-03-24 RX ADMIN — Medication 40 MILLIEQUIVALENT(S): at 04:37

## 2025-03-24 RX ADMIN — Medication 324 MILLIGRAM(S): at 17:55

## 2025-03-24 RX ADMIN — Medication 1 APPLICATION(S): at 05:27

## 2025-03-24 RX ADMIN — Medication 25 MILLIGRAM(S): at 05:28

## 2025-03-24 RX ADMIN — LOSARTAN POTASSIUM 25 MILLIGRAM(S): 100 TABLET, FILM COATED ORAL at 05:29

## 2025-03-24 RX ADMIN — HEPARIN SODIUM 5000 UNIT(S): 1000 INJECTION INTRAVENOUS; SUBCUTANEOUS at 13:35

## 2025-03-24 RX ADMIN — Medication 1 DROP(S): at 04:37

## 2025-03-24 RX ADMIN — TAMSULOSIN HYDROCHLORIDE 0.4 MILLIGRAM(S): 0.4 CAPSULE ORAL at 21:29

## 2025-03-24 RX ADMIN — METOPROLOL SUCCINATE 50 MILLIGRAM(S): 50 TABLET, EXTENDED RELEASE ORAL at 05:28

## 2025-03-24 RX ADMIN — Medication 20 MILLIGRAM(S): at 13:34

## 2025-03-24 RX ADMIN — Medication 7.5 MG/MIN: at 20:19

## 2025-03-24 RX ADMIN — Medication 7.5 MG/MIN: at 13:34

## 2025-03-24 RX ADMIN — POLYETHYLENE GLYCOL 3350 17 GRAM(S): 17 POWDER, FOR SOLUTION ORAL at 10:39

## 2025-03-24 RX ADMIN — HEPARIN SODIUM 5000 UNIT(S): 1000 INJECTION INTRAVENOUS; SUBCUTANEOUS at 21:30

## 2025-03-24 RX ADMIN — ATORVASTATIN CALCIUM 10 MILLIGRAM(S): 80 TABLET, FILM COATED ORAL at 21:30

## 2025-03-24 RX ADMIN — Medication 650 MILLIGRAM(S): at 05:47

## 2025-03-24 RX ADMIN — Medication 11.3 MG/MIN: at 07:20

## 2025-03-24 RX ADMIN — HEPARIN SODIUM 5000 UNIT(S): 1000 INJECTION INTRAVENOUS; SUBCUTANEOUS at 05:28

## 2025-03-24 RX ADMIN — Medication 20 MILLIGRAM(S): at 05:28

## 2025-03-24 RX ADMIN — Medication 11.3 MG/MIN: at 05:31

## 2025-03-24 NOTE — DIETITIAN INITIAL EVALUATION ADULT - REASON INDICATOR FOR ASSESSMENT
Pt seen for length of stay assessment.   Source of information: medical record, pt. Chart reviewed, events noted.

## 2025-03-24 NOTE — PROGRESS NOTE ADULT - ASSESSMENT
69-year-old male ABO O past medical history of NICM since 2011, HFrEF LVEF 25-30% s/p MDT CRT-D with baseline CHB, VT/VF, a.fib s/p GIANFRANCO ligation/MAZE, MV/TV repair, PVC ablation 3/2024 intolerant to AADs in the past, recent admission 3/19 - 3/20/25 for AICD shocks initially presented to the [facility] emergency department on 3/21/2025, sent by heart failure specialist for ACID shock. Device reported successful ATP for VT 3/17 at 6:52pm followed by one ATP and 40J shock. He reported feeling dizzy and SOB during the events. He follows with Dr Luca Tamayo for HF, currently undergoing transplant workup, Dr John for CRTD and VT/VF and  for genetic counseling. While admitted to [facility], he was started on a lidocaine gtt. On 3/21 when lidocaine weaned off patient had another two episodes of VT requiring AICD shocks. He was transferred to [facility] for further management.     He's remains on PO diuretics and has good UOP.  He had recurrent VT with resumption on lidocaine gtt prompting increase dose on 3/22 to Lidocaine 1.5mg, and has been quiescent thus far. He has normal end organ function with borderline BP on low dose GDMT. His listing status now upgraded to UNOS status 2e for heart transplant, AB O.    Bedside hemodynamics  3/22/25 BP 97/76/83, HR 80, CVP 6, PA 58/23/38, PCWP 20, SVR 1100, Gucci CO/CI 5.1/2.6, TD 4/2.08    Echo:    3/20/25 TTE: LVEF 30%, segmental, LVIDd 5.3, walls normal, elevated LV filling pressures, mod RVE with mildly reduced RV function, TAPSE 1.7, severely dilated RA, annuloplasty in MV position, transvalvular gradients are elevated with severe prosthetic MS (peak gradient 15.7, mean gradient 8), trace intravalvular MR, TV annuloplasty ring noted, mild TR,  est PASP 36, no evidence of LV thrombus    10/2024: LVEF 25-30%, LVEDD 5.9cm, moderately reduced RV function, annuloplasty ring of mitral and tricuspid position, PASP 47 mmHg  Cath:    RHC 3/19/25- RA 11 PA 58/26 PCWP 32 CO/CI 5.43/2.77  [facility] 6/2023 Nonobstructive CAD

## 2025-03-24 NOTE — DIETITIAN INITIAL EVALUATION ADULT - PERTINENT LABORATORY DATA
03-24    137  |  98  |  23  ----------------------------<  108[H]  3.7   |  24  |  1.23    Ca    9.1      24 Mar 2025 01:06  Phos  3.9     03-24  Mg     2.2     03-24    TPro  6.9  /  Alb  3.9  /  TBili  0.7  /  DBili  x   /  AST  13  /  ALT  13  /  AlkPhos  87  03-24    A1C with Estimated Average Glucose Result: 6.1 % (03-22-25 @ 00:58)

## 2025-03-24 NOTE — DIETITIAN INITIAL EVALUATION ADULT - REASON
Nutrition-focused physical examination deferred at this time - pt with stable wt hx, reporting good po intake PTA. No overt signs of fat/muscle wasting visually observed.

## 2025-03-24 NOTE — DIETITIAN INITIAL EVALUATION ADULT - PERSON TAUGHT/METHOD
patient instructed In the setting of listing for heart transplant, RD reviewed food safety after solid organ transplant guidelines; including, storage/cooking temperatures, cross contamination, expiration dates, and avoiding buffets and avoid eating out. Discussed S&S of food borne illness.  Reviewed food and immunosuppressive drug interactions (prednisone, tacrolimus, and cellcept). Reviewed the importance of BG control while on prednisone. Reviewed importance of avoiding grapefruit, pomegranate, starfruit and sour oranges. Pt was receptive to information and was able to use teach back points to verbalize understanding. Pt accepted written materials on discussed topics./patient instructed

## 2025-03-24 NOTE — PROGRESS NOTE ADULT - ATTENDING COMMENTS
68 y/o M blood type O with end stage CM listed for OHT as UNOS status 6 admitted 3/21 with VT storm and now upgraded to UNOS status 2e.       Continue current support with IABP   Continue to monitor hemodynamics   Escalate afterload reduction   Start spironolactone 25 mg daily   Torsemide 20 mg BID   Continue BB and other antiarrhythmics per EP   PT follow up   Discussed with CCU team

## 2025-03-24 NOTE — PROGRESS NOTE ADULT - CRITICAL CARE ATTENDING COMMENT
History of cardiomyopathy  Admitted with VT   A+Ox3  Hemodynamics acceptable, no pressors or inotropes  VT, electrically quiescent on a Lidocaine drip with Mexiletine and Toprol - wean Lidocaine  TTE with severely reduced LVEF  O2 sats mid to high 90s on room air  DASH diet  Normal renal function, overloaded on exam - continue Torsemide  H/H acceptable on HSQ for DVT prophylaxis   Afebrile, no antibiotics  Sugars controlled  RIJ Cordis/Pierson 3/21 - remove Pierson

## 2025-03-24 NOTE — DIETITIAN INITIAL EVALUATION ADULT - ENERGY INTAKE
Pt reports decreased appetite/po intake at this time, due to the timing of the meals coming too early. RD to coordinate with kitchen to deliver meals at pt's preferred times to help improve po intake. Pt also amenable to trial oral nutrition supplements to help support intake.   Pt also states he is tired of eating chicken everyday.

## 2025-03-24 NOTE — PROGRESS NOTE ADULT - SUBJECTIVE AND OBJECTIVE BOX
24H hour events:     MEDICATIONS:  heparin   Injectable 5000 Unit(s) SubCutaneous every 8 hours  lidocaine   Infusion 1.5 mG/Min IV Continuous <Continuous>  losartan 25 milliGRAM(s) Oral daily  metoprolol succinate ER 50 milliGRAM(s) Oral daily  mexiletine 150 milliGRAM(s) Oral every 8 hours  spironolactone 25 milliGRAM(s) Oral daily  torsemide 20 milliGRAM(s) Oral two times a day        acetaminophen     Tablet .. 650 milliGRAM(s) Oral every 6 hours PRN  traZODone 100 milliGRAM(s) Oral at bedtime    polyethylene glycol 3350 17 Gram(s) Oral daily    atorvastatin 10 milliGRAM(s) Oral at bedtime    artificial  tears Solution 1 Drop(s) Both EYES every 4 hours PRN  chlorhexidine 2% Cloths 1 Application(s) Topical <User Schedule>  chlorhexidine 4% Liquid 1 Application(s) Topical <User Schedule>  tamsulosin 0.4 milliGRAM(s) Oral at bedtime      REVIEW OF SYSTEMS:  Complete 12 point ROS negative.    PHYSICAL EXAM:  T(C): 36.2 (03-24-25 @ 07:00), Max: 37.7 (03-23-25 @ 18:00)  HR: 80 (03-24-25 @ 11:00) (80 - 81)  BP: 93/66 (03-24-25 @ 11:00) (87/57 - 118/72)  RR: 20 (03-24-25 @ 11:00) (15 - 31)  SpO2: 95% (03-24-25 @ 11:00) (91% - 95%)  Wt(kg): --  I&O's Summary    23 Mar 2025 07:01  -  24 Mar 2025 07:00  --------------------------------------------------------  IN: 1536.2 mL / OUT: 1000 mL / NET: 536.2 mL    24 Mar 2025 07:01  -  24 Mar 2025 11:30  --------------------------------------------------------  IN: 272.6 mL / OUT: 420 mL / NET: -147.4 mL        Appearance: Normal	  HEENT: PERRL, EOMI	  Cardiovascular: Normal S1 S2, No JVD, No murmurs  Respiratory: Lungs clear to auscultation	  Psychiatry: A & O x 3, Mood & affect appropriate  Gastrointestinal:  Soft, Non-tender, + BS	  Skin: No rashes, No ecchymoses, No cyanosis	  Neurologic: Grossly intact  Extremities: No clubbing, cyanosis or edema  Vascular: Peripheral pulses palpable 2+ bilaterally        LABS:	 	    CBC Full  -  ( 24 Mar 2025 01:06 )  WBC Count : 8.87 K/uL  Hemoglobin : 14.0 g/dL  Hematocrit : 42.8 %  Platelet Count - Automated : 151 K/uL  Mean Cell Volume : 89.4 fl  Mean Cell Hemoglobin : 29.2 pg  Mean Cell Hemoglobin Concentration : 32.7 g/dL  Auto Neutrophil # : x  Auto Lymphocyte # : x  Auto Monocyte # : x  Auto Eosinophil # : x  Auto Basophil # : x  Auto Neutrophil % : x  Auto Lymphocyte % : x  Auto Monocyte % : x  Auto Eosinophil % : x  Auto Basophil % : x    03-24    137  |  98  |  23  ----------------------------<  108[H]  3.7   |  24  |  1.23  03-23    140  |  102  |  20  ----------------------------<  128[H]  3.6   |  25  |  1.24    Ca    9.1      24 Mar 2025 01:06  Ca    8.6      23 Mar 2025 00:49  Phos  3.9     03-24  Phos  3.3     03-23  Mg     2.2     03-24  Mg     2.5     03-23    TPro  6.9  /  Alb  3.9  /  TBili  0.7  /  DBili  x   /  AST  13  /  ALT  13  /  AlkPhos  87  03-24  TPro  6.9  /  Alb  4.0  /  TBili  0.9  /  DBili  x   /  AST  14  /  ALT  15  /  AlkPhos  87  03-23      proBNP:   Lipid Profile:   HgA1c:   TSH:       CARDIAC MARKERS:          TELEMETRY: 	    ECG:  	  RADIOLOGY:  OTHER: 	    PREVIOUS DIAGNOSTIC TESTING:    [ ] Echocardiogram:    [ ]  Catheterization:  [ ] Stress Test:  	  	  ASSESSMENT/PLAN: 	     24H hour events: Pt without complaint, no acute events overnight, Tele: AP/BiV pace at 80bpm    MEDICATIONS:  heparin   Injectable 5000 Unit(s) SubCutaneous every 8 hours  lidocaine   Infusion 1.5 mG/Min IV Continuous <Continuous>  losartan 25 milliGRAM(s) Oral daily  metoprolol succinate ER 50 milliGRAM(s) Oral daily  mexiletine 150 milliGRAM(s) Oral every 8 hours  spironolactone 25 milliGRAM(s) Oral daily  torsemide 20 milliGRAM(s) Oral two times a day  acetaminophen     Tablet .. 650 milliGRAM(s) Oral every 6 hours PRN  traZODone 100 milliGRAM(s) Oral at bedtime  polyethylene glycol 3350 17 Gram(s) Oral daily  atorvastatin 10 milliGRAM(s) Oral at bedtime  artificial  tears Solution 1 Drop(s) Both EYES every 4 hours PRN  chlorhexidine 2% Cloths 1 Application(s) Topical <User Schedule>  chlorhexidine 4% Liquid 1 Application(s) Topical <User Schedule>  tamsulosin 0.4 milliGRAM(s) Oral at bedtime      REVIEW OF SYSTEMS:  Complete 12 point ROS negative.    PHYSICAL EXAM:  T(C): 36.2 (03-24-25 @ 07:00), Max: 37.7 (03-23-25 @ 18:00)  HR: 80 (03-24-25 @ 11:00) (80 - 81)  BP: 93/66 (03-24-25 @ 11:00) (87/57 - 118/72)  RR: 20 (03-24-25 @ 11:00) (15 - 31)  SpO2: 95% (03-24-25 @ 11:00) (91% - 95%)  Wt(kg): --  I&O's Summary    23 Mar 2025 07:01  -  24 Mar 2025 07:00  --------------------------------------------------------  IN: 1536.2 mL / OUT: 1000 mL / NET: 536.2 mL    24 Mar 2025 07:01  -  24 Mar 2025 11:30  --------------------------------------------------------  IN: 272.6 mL / OUT: 420 mL / NET: -147.4 mL        Appearance: Normal	  HEENT: PERRL, EOMI	  Cardiovascular: Normal S1 S2, No JVD, No murmurs  Respiratory: Lungs clear to auscultation	  Psychiatry: A & O x 3, Mood & affect appropriate  Gastrointestinal:  Soft, Non-tender, + BS	  Skin: No rashes, No ecchymoses, No cyanosis	  Neurologic: Grossly intact  Extremities: No clubbing, cyanosis or edema  Vascular: Peripheral pulses palpable 2+ bilaterally        LABS:	 	    CBC Full  -  ( 24 Mar 2025 01:06 )  WBC Count : 8.87 K/uL  Hemoglobin : 14.0 g/dL  Hematocrit : 42.8 %  Platelet Count - Automated : 151 K/uL  Mean Cell Volume : 89.4 fl  Mean Cell Hemoglobin : 29.2 pg  Mean Cell Hemoglobin Concentration : 32.7 g/dL  Auto Neutrophil # : x  Auto Lymphocyte # : x  Auto Monocyte # : x  Auto Eosinophil # : x  Auto Basophil # : x  Auto Neutrophil % : x  Auto Lymphocyte % : x  Auto Monocyte % : x  Auto Eosinophil % : x  Auto Basophil % : x    03-24    137  |  98  |  23  ----------------------------<  108[H]  3.7   |  24  |  1.23  03-23    140  |  102  |  20  ----------------------------<  128[H]  3.6   |  25  |  1.24    Ca    9.1      24 Mar 2025 01:06  Ca    8.6      23 Mar 2025 00:49  Phos  3.9     03-24  Phos  3.3     03-23  Mg     2.2     03-24  Mg     2.5     03-23    TPro  6.9  /  Alb  3.9  /  TBili  0.7  /  DBili  x   /  AST  13  /  ALT  13  /  AlkPhos  87  03-24  TPro  6.9  /  Alb  4.0  /  TBili  0.9  /  DBili  x   /  AST  14  /  ALT  15  /  AlkPhos  87  03-23    Thyroid Stimulating Hormone, Serum (03.22.25 @ 00:58)    Thyroid Stimulating Hormone, Serum: 1.26 uIU/mL      TELEMETRY: AP/BiV pace at 80bpm	      < from: TTE W or WO Ultrasound Enhancing Agent (03.20.25 @ 09:16) >  CONCLUSIONS:      1. Left ventricular cavity is normal in size. Left ventricular wall thickness is normal. Left ventricular systolic function is severely decreased with an ejection fraction of 30 % by Mendoza's method of disks. Global left ventricular hypokinesis.   2. Multiple segmental abnormalities exist. See findings.   3. Elevated left ventricular filling pressure. Analysis of left ventricular diastolic function and filling pressure is made challenging by the presence of mitral annuloplasty ring.   4. Moderately enlarged right ventricular cavity size and mildly reduced right ventricular systolic function.   5. The right atrium is severely dilated.   6. Device lead is visualized in the right heart.   7. No pericardial effusion seen.   8. STACEY could be considered to further evaluated mitral annuloplasty.   9. An annuloplasty ring is noted in the mitral position. Transvalvular mitral gradients are elevated. Severe prosthetic mitral stenosis. There is trace intravalvular mitral regurgitation.  10. An annuloplasty ring is noted in the tricuspid position.  11. Estimated pulmonary artery systolic pressure is 36 mmHg.  12. Technically difficult image quality.  13. There is no evidence of a left ventricular thrombus.    ________________________________________________________________________________________  FINDINGS:     Left Ventricle:  After obtaining consent, Definity ultrasound enhancing agent was given for enhanced left ventricular opacification and improved delineation of the left ventricular endocardial borders. Complications from contrast: no adverse reaction. The left ventricular cavity is normal in size. Left ventricular wall thickness is normal. Left ventricular systolic function is severely decreased with a calculated ejection fraction of 30 % by the Mendoza's biplane method of disks. There is global left ventricular hypokinesis. There is no evidence of a left ventricular thrombus. Elevated left ventricular filling pressure. Analysis of left ventricular diastolic function and filling pressure is made challenging by the presence of mitral annuloplasty ring.  LV Wall Scoring: There is diffuse hypokinesis.          Right Ventricle:  After obtaining consent, Definity intravenous contrast was given for enhanced right heart opacification and improved delineation of the endocardial borders. The right ventricular cavity is moderately enlarged in size and right ventricular systolic function is mildly reduced. Tricuspid annular plane systolic excursion (TAPSE) is 1.7 cm (normal >=1.7 cm). Tricuspid annular tissue Doppler S' is 8.3 cm/s (normal >10 cm/s). A device lead is visualized in the right heart.     Left Atrium:  The left atrium is severely dilated with an indexed volume of 50.66 ml/m².     Right Atrium:  The right atrium is severely dilated with an indexed volume of 46.01 ml/m² and an indexed area of 9.35 cm²/m².     Interatrial Septum:  The interatrial septum appears intact.     Aortic Valve:  The aortic valve appears trileaflet with normal systolic excursion. There is no aortic valve stenosis. There is no evidence of aortic regurgitation.     Mitral Valve:  An annuloplasty ring is noted in the mitral position. Transvalvular mitral gradients are elevated. There is severe prosthetic mitral stenosis. Trace intravalvular mitral regurgitation. The transmitral peak gradient is 15.7 mmHg and mean gradient is 8.00 mmHg at a heart rate of 86 bpm.     Tricuspid Valve:  An annuloplasty ring is noted in the tricuspid position. There is mild tricuspid regurgitation. Estimated pulmonary artery systolic pressure is 36 mmHg.     Pulmonic Valve:  Structurally normal pulmonic valve with normal leaflet excursion. There is trace pulmonic regurgitation.     Aorta:  The aortic root appears normal in size.     Pericardium:  No pericardial effusion seen.     Systemic Veins:  The inferior vena cava is normal in size measuring 1.40 cm in diameter, (normal <2.1cm) with normal inspiratory collapse (normal >50%) consistent with normal right atrial pressure (~3, range 0-5mmHg).  ____________________________________________________________________  QUANTITATIVE DATA:  Left Ventricle Measurements: (Indexed to BSA)     IVSd (2D):   0.7 cm  LVPWd (2D):  0.7 cm  LVIDd (2D):  5.3 cm  LVIDs (2D):  4.5 cm  LV Mass:     127 g  64.9 g/m²  BiPlane LV EF%: 30 %     MV E Vmax:    1.81 m/s  e' lateral:   4.61 cm/s  e' medial:    5.06 cm/s  E/e' lateral: 39.26  E/e' medial:  35.77  E/e' Average: 37.44    Aorta Measurements: (Normal range) (Indexed to BSA)     Ao Root d     3.10 cm (3.1 - 3.7 cm) 1.58 cm/m²  Ao Asc d, 2D: 3.10  Ao Arch:      3.4 cm            Left Atrium Measurements: (Indexed to BSA)  LA Diam 2D:        4.30 cm  LA Vol s, MOD A4C: 112.00 ml.  LA Vol s, MOD A2C: 89.10 ml.  LA Vol s, MOD BP:  99.10 ml   50.66 ml/m²         Right Ventricle Measurements: Right Atrial Measurements:     TAPSE:           1.7 cm       RA Vol:            90.00 ml  RV S' Vmax:      8.10 cm/s    RA Vol s, MOD A4C  51.5 ml  RV Base (RVID1): 4.8 cm       RA Vol Index:      46.01 ml/m²  RV Mid (RVID2):  2.8 cm       RA Area s, MOD A4C 18.3 cm²       LVOT / RVOT/ Qp/Qs Data: (Indexed to BSA)  LVOT Diameter,s: 2.00 cm  LVOT Area:       3.14 cm²  LVOT Vmax:       0.91 m/s  LVOT Vmn:        0.651 m/s  LVOT VTI:        15.40 cm  LVOT peak grad:  3 mmHg  LVOT mean grad:  2.0 mmHg  LVOT SV:         48.4 ml   24.73 ml/m²    Mitral Valve Measurements:     MV Vmax:        1.98 m/s  MV VTI:         55.21 cm  MV VTI, ghassan     0.500 m  MVVTI/LVOT VTI  3.59  MV Mean Grad:   8.0 mmHg  MV Peak Grad:   15.7 mmHg  MV E Vmax:      1.8 m/s  MV Gradient HR: 86 bpm       Tricuspid Valve Measurements:     TV S'             8.3 cm/s  TR Vmax:          2.9 m/s  TR Peak Gradient: 33.4 mmHg  RA Pressure:      3 mmHg  PASP:             36 mmHg      < end of copied text >

## 2025-03-24 NOTE — DIETITIAN INITIAL EVALUATION ADULT - ADD RECOMMEND
1) Recommend liberalize diet to Low Sodium   2) Recommend Glucerna shake 1x/day to help support optimal protein-energy intake.  3) Monitor po intake, GI tolerance, skin integrity, labs, weight, and BM regularity.   4) Honor food preferences as feasible. Assist with meals PRN and encourage po intake.

## 2025-03-24 NOTE — PROGRESS NOTE ADULT - ASSESSMENT
69 year old male with FMH of CM (brother), PMH of NICM, ACC heart failure stage D, HFrEF LVEF 25-30%, HTN, S/P MV repair/TV ring/GIANFRANCO closure/MAZE procedure 2012 ([facility]), AV node ablation, s/p Medtronic CRT-D 2011 (subsequent generator change 2018, 2023), hx of ICD shock for VT/VF, s/p ablation of two different PVC morphology originating from mitral annular area on 3/11/2024 (Dr. Bryan), atrial fibrillation with prior DCCV 10/2016 (not on AC 2/2 hx GIANFRANCO closure/MAZE procedure), Listed UNOS status 6 11/2024 who was recently admitted at [facility] for ICD shock where he had two episodes of PVC induced VT on March 17 for which one treated with one round of ATP and another episode with ATP x 1 and ICD shock x1,  30 min apart. Pt was evaluated by NS team. RHC done there and showed elevated filling pressure and normal CI / CO. Discharged after diuresis. Now presents with recurrent ICD shock. ICD interrogation showed pt had addition 3 more shocks since discharged from the hospital.     EP consulted for Vtach and CRT-D shock. Previously did not tolerate amio, mexilitine and sotalol in the past due to severe dizziness. currently on GDMT therapy   s/p perimitral PVC ablation 3/11/2024. CMR ordered but was not performed due to motion artifact from leads   is currently being evaluated for heart transplant at [facility]     # Impression:  - hx of ani mitral PVC ablation 3/11/2024 ( 2 PVC morphology)  - Stage D heart failure, NYHA class 2  - CRTD discharge due to Vtach  - Non-ischemic cardiomyopathy (EF 25%, LVEDD 5.9 cm + decrease RV function)   - Status post mitral and tricuspid ring repair   - Afib s/p GIANFRANCO closure    # Recs:  - c/w lidocaine drip at 1.5  mg / min   - startedmex 150 bid but pt with severe dizziness  - c/w toprol 50 mg po od, was recently titrated up  - consider repeating CMR   - can consider starting Quinidine if pt with continued ventricular arrhythmia here.  - continue tele monitor  - f/u HF recs  - rest of care per KADE Gaytan-Cassie, NP-C  [phone]   69 year old male with FMH of CM (brother), PMH of NICM, ACC heart failure stage D, HFrEF LVEF 25-30%, HTN, S/P MV repair/TV ring/GIANFRANCO closure/MAZE procedure 2012 ([facility]), AV node ablation, s/p Medtronic CRT-D 2011 (subsequent generator change 2018, 2023), hx of ICD shock for VT/VF, s/p ablation of two different PVC morphology originating from mitral annular area on 3/11/2024 (Dr. Bryan), atrial fibrillation with prior DCCV 10/2016 (not on AC 2/2 hx GIANFRANCO closure/MAZE procedure), Listed UNOS status 6 11/2024 who was recently admitted at [facility] for ICD shock where he had two episodes of PVC induced VT on March 17 for which one treated with one round of ATP and another episode with ATP x 1 and ICD shock x1,  30 min apart. Pt was evaluated by NS team. RHC done there and showed elevated filling pressure and normal CI / CO. Discharged after diuresis. Now presents with recurrent ICD shock. ICD interrogation showed pt had addition 3 more shocks since discharged from the hospital.     EP consulted for Vtach and CRT-D shock. Previously did not tolerate amio, mexilitine and sotalol in the past due to severe dizziness. currently on GDMT therapy   s/p perimitral PVC ablation 3/11/2024. CMR ordered but was not performed due to motion artifact from leads   is currently being evaluated for heart transplant at [facility]     # Impression:  - hx of ani mitral PVC ablation 3/11/2024 ( 2 PVC morphology)  - Stage D heart failure, NYHA class 2  - CRTD discharge due to Vtach  - Non-ischemic cardiomyopathy (EF 25%, LVEDD 5.9 cm + decrease RV function)   - Status post mitral and tricuspid ring repair   - Afib s/p GIANFRANCO closure    # Recs:  - started coby 150 bid but pt with severe dizziness  - c/w lidocaine drip at 1.5  mg / min   - start quinidine 324mg BID and d/c lidocaine after pt received 2 doses of quinidine.   - c/w toprol 50 mg po QD, was recently titrated up  - continue tele monitor  - rest of care per KADE Gaytan-Cassie NP-C  [phone]

## 2025-03-24 NOTE — DIETITIAN INITIAL EVALUATION ADULT - OTHER CALCULATIONS
Fluid needs deferred to team. Energy and protein needs based on recent weight: 186.7 lbs/84.7 kg (03-23, standing)

## 2025-03-24 NOTE — DIETITIAN INITIAL EVALUATION ADULT - OTHER INFO
Reports  lbs. States he was 190 lbs before Lent began, but has since lost 5 lbs due to giving up cake and dessert.  Dosing wt: 180.1 lbs (03-21)  Wt history per chart: 186.7 lbs (03-23, standing), 190 lbs (02-20), 185 lbs (09/26/24), 182 lbs (04/03/24). Wt relatively stable x 1 year. RD to continue to monitor weight trends as able/available.     Additional nutrition-related concerns:  - Hx HF, listed for heart transplant status 2E.  - Ordered for torsemide and spironolactone

## 2025-03-24 NOTE — PROGRESS NOTE ADULT - SUBJECTIVE AND OBJECTIVE BOX
Patient is a 70y old  Male who presents with a chief complaint of VT (23 Mar 2025 21:50)    INTERVAL HISTORY:  - refusing mexiletine    SUBJECTIVE  - Patient seen and evaluated at bedside.     MEDICATIONS:  MEDICATIONS  (STANDING):  atorvastatin 10 milliGRAM(s) Oral at bedtime  chlorhexidine 2% Cloths 1 Application(s) Topical <User Schedule>  chlorhexidine 4% Liquid 1 Application(s) Topical <User Schedule>  heparin   Injectable 5000 Unit(s) SubCutaneous every 8 hours  lidocaine   Infusion 1.5 mG/Min (11.3 mL/Hr) IV Continuous <Continuous>  losartan 25 milliGRAM(s) Oral daily  metoprolol succinate ER 50 milliGRAM(s) Oral daily  mexiletine 150 milliGRAM(s) Oral every 8 hours  polyethylene glycol 3350 17 Gram(s) Oral daily  spironolactone 25 milliGRAM(s) Oral daily  tamsulosin 0.4 milliGRAM(s) Oral at bedtime  torsemide 20 milliGRAM(s) Oral two times a day  traZODone 100 milliGRAM(s) Oral at bedtime    MEDICATIONS  (PRN):  acetaminophen     Tablet .. 650 milliGRAM(s) Oral every 6 hours PRN Mild Pain (1 - 3), Moderate Pain (4 - 6)  artificial  tears Solution 1 Drop(s) Both EYES every 4 hours PRN Dry Eyes    OBJECTIVE:  ICU Vital Signs Last 24 Hrs  T(C): 36.2 (24 Mar 2025 07:00), Max: 37.7 (23 Mar 2025 18:00)  T(F): 97.2 (24 Mar 2025 07:00), Max: 99.9 (23 Mar 2025 18:00)  HR: 80 (24 Mar 2025 07:00) (80 - 81)  BP: 99/78 (24 Mar 2025 07:00) (87/57 - 117/83)  BP(mean): 83 (24 Mar 2025 07:00) (67 - 96)  RR: 18 (24 Mar 2025 07:00) (15 - 38)  SpO2: 93% (24 Mar 2025 07:00) (91% - 94%)    O2 Parameters below as of 24 Mar 2025 07:00  Patient On (Oxygen Delivery Method): room air    Adult Advanced Hemodynamics Last 24 Hrs  CVP(mm Hg): 1 (24 Mar 2025 07:00) (1 - 24)  CO: 3.5 (24 Mar 2025 01:00) (3.5 - 3.5)  CI: 1.8 (24 Mar 2025 01:00) (1.8 - 1.8)  PA: 44/20 (24 Mar 2025 07:00) (20/16 - 54/26)  PA(mean): 31 (24 Mar 2025 07:00) (19 - 45)  SVR: 1483 (24 Mar 2025 01:00) (1483 - 1483)  SVRI: 2885 (24 Mar 2025 01:00) (2885 - 2885)    I&O's Summary  23 Mar 2025 07:01  -  24 Mar 2025 07:00  --------------------------------------------------------  IN: 1536.2 mL / OUT: 1000 mL / NET: 536.2 mL    PHYSICAL EXAM:  General: NAD  Chest: Clear to auscultation bilaterally; no rales, rhonchi, or wheezing  Heart: Regular rate and rhythm; normal S1 and S2  Abd: Soft, nontender, nondistended  Nervous System: AAOX3  Ext: no peripheral LE edema bilaterally    LABS:                     14.0   8.87  )-----------( 151      ( 24 Mar 2025 01:06 )             42.8     03-24  137  |  98  |  23  ----------------------------<  108[H]  3.7   |  24  |  1.23    Ca    9.1      24 Mar 2025 01:06  Phos  3.9     03-24  Mg     2.2     03-24    TPro  6.9  /  Alb  3.9  /  TBili  0.7  /  DBili  x   /  AST  13  /  ALT  13  /  AlkPhos  87  03-24    LIVER FUNCTIONS - ( 24 Mar 2025 01:06 )  Alb: 3.9 g/dL / Pro: 6.9 g/dL / ALK PHOS: 87 U/L / ALT: 13 U/L / AST: 13 U/L / GGT: x         Urinalysis Basic - ( 24 Mar 2025 01:06 )    Color: x / Appearance: x / SG: x / pH: x  Gluc: 108 mg/dL / Ketone: x  / Bili: x / Urobili: x   Blood: x / Protein: x / Nitrite: x   Leuk Esterase: x / RBC: x / WBC x   Sq Epi: x / Non Sq Epi: x / Bacteria: x      Assessment: 69M PMHx NICM, HFrEF LVEF 25-30% s/p MDT CRT-D with baseline CHB, VT/VF, a.fib s/p GIANFRANCO ligation/MAZE, MV/TV repair, PVC ablation 3/2024 intolerant to AADs in the past who initially presented to the [facility] for AICD shocks/VT, transferred for further management and EP workup.    Plan:  NEURO  A&Ox3  - hx anxiety, home Klonopin .5 PRN  - continue to monitor mental status as per protocol     RESPIRATORY  Stable on room air  - continue to monitor SpO2 with goal >94%    CARDIO  #NICM, HFrEF s/p MDT CRT-D  - TTE 3/20 EF 30%, RVe and reduced RVsf, severe prosthetic MS  - recent admit 3/19/2025 w/ [facility] found to have elevated filling pressures treated with IV diuretics  - CVP 8 , CI 2.7, [id number]  - GDMT: losartan 25 daily, toprol 50, Kye 25  - holding home farxiga while inpatient    #VT/VF   - hx PVC ablation 3/2024, intolerant to mexiletine, sotalol and amio in the past as per HF documentation  - s/p AICD shocks 3/21  - lidocaine gtt increased to 1.5 mg/min for ectopy  - continue toprol 50, Losartan 25, Kye 25  - f/u EP recommendations, patient given mexiletine, however refusing  - continue to monitor tele and electrolytes. No eventa in 24 hours    #hx afib     - s/p GIANFRANCO ligation/MAZE  - rate control as above  - not currently on AC    RENAL/  Cr WNL  - Continue monitoring urine output, lytes, SCr/ BUN  - replete lytes prn with goal K >4 and Mg >2    GI  DASH diet    ENDO  Glucose controlled on CMP    HEME  - Monitor H/H and plts  - DVT PPX: HSQ    ID  Afebrile, no leukcocytosis  - monitor and trend WBC and temperature curve     Dispo: Maintain in ICU.    Patient requires continuous monitoring with bedside rhythm monitoring, arterial line, pulse oximetry, ventilator monitoring and intermittent blood gas analysis.  Care plan discussed with ICU care team.  I have spent 35 minutes providing critical care, in addition to initial critical time provided by CICU attending Dr. Goel, re-evaluated multiple times during the day.    Danni Herndon PA-C

## 2025-03-24 NOTE — PROGRESS NOTE ADULT - PROBLEM SELECTOR PLAN 2
- appreciate EP input  - c/w lidocaine drip at 1.5  mg / min   -- start quinidine 324mg BID and d/c lidocaine after pt received 2 doses of quinidine.   -- c/w toprol 50 mg po QD, was recently titrated up  - s/p CRT-D.

## 2025-03-24 NOTE — DIETITIAN INITIAL EVALUATION ADULT - [id number]_GEN_A_CORE
Endorses some nausea and constipation. Reports last BM 3/24 after not going for 2 days, takes metamucil at home. Pt currently ordered for bowel regimen (miralax).

## 2025-03-24 NOTE — DIETITIAN INITIAL EVALUATION ADULT - PERTINENT MEDS FT
MEDICATIONS  (STANDING):  atorvastatin 10 milliGRAM(s) Oral at bedtime  chlorhexidine 2% Cloths 1 Application(s) Topical <User Schedule>  chlorhexidine 4% Liquid 1 Application(s) Topical <User Schedule>  heparin   Injectable 5000 Unit(s) SubCutaneous every 8 hours  lidocaine   Infusion 1.5 mG/Min (11.3 mL/Hr) IV Continuous <Continuous>  losartan 25 milliGRAM(s) Oral daily  metoprolol succinate ER 50 milliGRAM(s) Oral daily  mexiletine 150 milliGRAM(s) Oral every 8 hours  polyethylene glycol 3350 17 Gram(s) Oral daily  spironolactone 25 milliGRAM(s) Oral daily  tamsulosin 0.4 milliGRAM(s) Oral at bedtime  torsemide 20 milliGRAM(s) Oral two times a day  traZODone 100 milliGRAM(s) Oral at bedtime    MEDICATIONS  (PRN):  acetaminophen     Tablet .. 650 milliGRAM(s) Oral every 6 hours PRN Mild Pain (1 - 3), Moderate Pain (4 - 6)  artificial  tears Solution 1 Drop(s) Both EYES every 4 hours PRN Dry Eyes

## 2025-03-24 NOTE — PROGRESS NOTE ADULT - SUBJECTIVE AND OBJECTIVE BOX
Overnight Events:     Review Of Systems: No chest pain, shortness of breath, or palpitations            Current Meds:  acetaminophen     Tablet .. 650 milliGRAM(s) Oral every 6 hours PRN  artificial  tears Solution 1 Drop(s) Both EYES every 4 hours PRN  atorvastatin 10 milliGRAM(s) Oral at bedtime  chlorhexidine 2% Cloths 1 Application(s) Topical <User Schedule>  chlorhexidine 4% Liquid 1 Application(s) Topical <User Schedule>  heparin   Injectable 5000 Unit(s) SubCutaneous every 8 hours  hydrALAZINE 25 milliGRAM(s) Oral every 8 hours  lidocaine   Infusion 1 mG/Min IV Continuous <Continuous>  metoprolol succinate ER 50 milliGRAM(s) Oral daily  polyethylene glycol 3350 17 Gram(s) Oral daily  spironolactone 25 milliGRAM(s) Oral daily  tamsulosin 0.4 milliGRAM(s) Oral at bedtime  torsemide 20 milliGRAM(s) Oral two times a day  traZODone 100 milliGRAM(s) Oral at bedtime      Vitals:  T(F): 98.4 (03-24), Max: 99.9 (03-23)  HR: 80 (03-24) (80 - 80)  BP: 94/70 (03-24) (87/57 - 118/72)  RR: 18 (03-24)  SpO2: 96% (03-24)  I&O's Summary    23 Mar 2025 07:01  -  24 Mar 2025 07:00  --------------------------------------------------------  IN: 1536.2 mL / OUT: 1000 mL / NET: 536.2 mL    24 Mar 2025 07:01  -  24 Mar 2025 13:41  --------------------------------------------------------  IN: 577.8 mL / OUT: 420 mL / NET: 157.8 mL        Physical Exam:  GEN: comfortable appearing, lying in bed in NAD  HEENT: NCAT, MMM  CV: Regular S1, S2, no m/r/g  RESP: CTAB  ABD: Soft, NTND, +BS  EXT: No LE edema, WWP, pulses palpable throughout  NEURO: No focal deficits, AOx3  SKIN:  No rashes                          14.0   8.87  )-----------( 151      ( 24 Mar 2025 01:06 )             42.8     03-24    137  |  98  |  23  ----------------------------<  108[H]  3.7   |  24  |  1.23    Ca    9.1      24 Mar 2025 01:06  Phos  3.9     03-24  Mg     2.2     03-24    TPro  6.9  /  Alb  3.9  /  TBili  0.7  /  DBili  x   /  AST  13  /  ALT  13  /  AlkPhos  87  03-24      CARDIAC MARKERS ( 21 Mar 2025 10:25 )  19 ng/L / x     / x     / x     / x     / x      CARDIAC MARKERS ( 19 Mar 2025 10:32 )  20 ng/L / x     / x     / x     / x     / x                 Overnight Events: NAEO    Review Of Systems: No chest pain, shortness of breath, or palpitations            Current Meds:  acetaminophen     Tablet .. 650 milliGRAM(s) Oral every 6 hours PRN  artificial  tears Solution 1 Drop(s) Both EYES every 4 hours PRN  atorvastatin 10 milliGRAM(s) Oral at bedtime  chlorhexidine 2% Cloths 1 Application(s) Topical <User Schedule>  chlorhexidine 4% Liquid 1 Application(s) Topical <User Schedule>  heparin   Injectable 5000 Unit(s) SubCutaneous every 8 hours  hydrALAZINE 25 milliGRAM(s) Oral every 8 hours  lidocaine   Infusion 1 mG/Min IV Continuous <Continuous>  metoprolol succinate ER 50 milliGRAM(s) Oral daily  polyethylene glycol 3350 17 Gram(s) Oral daily  spironolactone 25 milliGRAM(s) Oral daily  tamsulosin 0.4 milliGRAM(s) Oral at bedtime  torsemide 20 milliGRAM(s) Oral two times a day  traZODone 100 milliGRAM(s) Oral at bedtime      Vitals:  T(F): 98.4 (03-24), Max: 99.9 (03-23)  HR: 80 (03-24) (80 - 80)  BP: 94/70 (03-24) (87/57 - 118/72)  RR: 18 (03-24)  SpO2: 96% (03-24)  I&O's Summary    23 Mar 2025 07:01  -  24 Mar 2025 07:00  --------------------------------------------------------  IN: 1536.2 mL / OUT: 1000 mL / NET: 536.2 mL    24 Mar 2025 07:01  -  24 Mar 2025 13:41  --------------------------------------------------------  IN: 577.8 mL / OUT: 420 mL / NET: 157.8 mL        Physical Exam:  GEN: comfortable appearing, lying in bed in NAD  HEENT: NCAT, MMM  CV: Regular S1, S2, no m/r/g  RESP: CTAB  ABD: Soft, NTND, +BS  EXT: No LE edema, WWP, pulses palpable throughout  NEURO: No focal deficits, AOx3  SKIN:  No rashes                          14.0   8.87  )-----------( 151      ( 24 Mar 2025 01:06 )             42.8     03-24    137  |  98  |  23  ----------------------------<  108[H]  3.7   |  24  |  1.23    Ca    9.1      24 Mar 2025 01:06  Phos  3.9     03-24  Mg     2.2     03-24    TPro  6.9  /  Alb  3.9  /  TBili  0.7  /  DBili  x   /  AST  13  /  ALT  13  /  AlkPhos  87  03-24      CARDIAC MARKERS ( 21 Mar 2025 10:25 )  19 ng/L / x     / x     / x     / x     / x      CARDIAC MARKERS ( 19 Mar 2025 10:32 )  20 ng/L / x     / x     / x     / x     / x

## 2025-03-24 NOTE — DIETITIAN INITIAL EVALUATION ADULT - FLUID ACCUMULATION
no edema noted per flowsheets  Island Pedicle Flap-Requiring Vessel Identification Text: The defect edges were debeveled with a #15 scalpel blade.  Given the location of the defect, shape of the defect and the proximity to free margins an island pedicle advancement flap was deemed most appropriate.  Using a sterile surgical marker, an appropriate advancement flap was drawn, based on the axial vessel mentioned above, incorporating the defect, outlining the appropriate donor tissue and placing the expected incisions within the relaxed skin tension lines where possible.    The area thus outlined was incised deep to adipose tissue with a #15 scalpel blade.  The skin margins were undermined to an appropriate distance in all directions around the primary defect and laterally outward around the island pedicle utilizing iris scissors.  There was minimal undermining beneath the pedicle flap.

## 2025-03-24 NOTE — PROGRESS NOTE ADULT - PROBLEM SELECTOR PLAN 1
- listed UNOS status 2e for heart transplant, ABO O  - continue hemodynamic monitoring with swan in place, can DC if PM RHC numbers wnl  - Continue torsemide 20mg BID, goal CVP 8-10  - dc losartan   - hydralazine 25mg TID  - continue Toprol XL 50mg daily.  - Please start Spironolactone 25mg QD today

## 2025-03-24 NOTE — PROGRESS NOTE ADULT - SUBJECTIVE AND OBJECTIVE BOX
PATIENT:  XENA DENSON  [id number]    CHIEF COMPLAINT:  Patient is a 70y old male who presents with a chief complaint of VT (24 Mar 2025 13:40)    INTERVAL HISTORY:  - Started on quinidine today    MEDICATIONS:  MEDICATIONS  (STANDING):  atorvastatin 10 milliGRAM(s) Oral at bedtime  chlorhexidine 2% Cloths 1 Application(s) Topical <User Schedule>  chlorhexidine 4% Liquid 1 Application(s) Topical <User Schedule>  heparin   Injectable 5000 Unit(s) SubCutaneous every 8 hours  hydrALAZINE 25 milliGRAM(s) Oral every 8 hours  lidocaine   Infusion 1 mG/Min (7.5 mL/Hr) IV Continuous <Continuous>  metoprolol succinate ER 50 milliGRAM(s) Oral daily  polyethylene glycol 3350 17 Gram(s) Oral daily  quiNIDine gluconate  milliGRAM(s) Oral every 12 hours  spironolactone 25 milliGRAM(s) Oral daily  tamsulosin 0.4 milliGRAM(s) Oral at bedtime  torsemide 20 milliGRAM(s) Oral two times a day  traZODone 100 milliGRAM(s) Oral at bedtime    MEDICATIONS  (PRN):  acetaminophen     Tablet .. 650 milliGRAM(s) Oral every 6 hours PRN Mild Pain (1 - 3), Moderate Pain (4 - 6)  artificial  tears Solution 1 Drop(s) Both EYES every 4 hours PRN Dry Eyes    ALLERGIES:  No Known Allergies    OBJECTIVE:  ICU Vital Signs Last 24 Hrs  T(C): 37.3 (24 Mar 2025 20:00), Max: 37.4 (24 Mar 2025 15:00)  T(F): 99.1 (24 Mar 2025 20:00), Max: 99.3 (24 Mar 2025 15:00)  HR: 80 (24 Mar 2025 22:00) (80 - 81)  BP: 85/60 (24 Mar 2025 22:00) (85/60 - 118/72)  BP(mean): 69 (24 Mar 2025 22:00) (66 - 91)  ABP: --  ABP(mean): --  RR: 19 (24 Mar 2025 22:00) (15 - 24)  SpO2: 94% (24 Mar 2025 22:00) (92% - 96%)    O2 Parameters below as of 24 Mar 2025 22:00  Patient On (Oxygen Delivery Method): room air    Adult Advanced Hemodynamics Last 24 Hrs  CVP(mm Hg): 6 (24 Mar 2025 20:00) (1 - 6)  CVP(cm H2O): --  CO: 2.9 (24 Mar 2025 17:00) (2.9 - 3.5)  CI: 1.4 (24 Mar 2025 17:00) (1.4 - 1.8)  PA: 39/16 (24 Mar 2025 22:00) (28/9 - 148/15)  PA(mean): 26 (24 Mar 2025 22:00) (17 - 61)  PCWP: --  SVR: 1708 (24 Mar 2025 17:00) (1483 - 1708)  SVRI: 3538 (24 Mar 2025 17:00) (2589 - 0468)  PVR: --  PVRI: --    I&O's Summary    23 Mar 2025 07:01  -  24 Mar 2025 07:00  --------------------------------------------------------  IN: 1536.2 mL / OUT: 1000 mL / NET: 536.2 mL    24 Mar 2025 07:01  -  24 Mar 2025 22:08  --------------------------------------------------------  IN: 750.3 mL / OUT: 880 mL / NET: -129.7 mL    LABS:               14.0   8.87  )-----------( 151      ( 24 Mar 2025 01:06 )             42.8     03-24    137  |  98  |  23  ----------------------------<  108[H]  3.7   |  24  |  1.23    Ca    9.1      24 Mar 2025 01:06  Phos  3.9     03-24  Mg     2.2     03-24    TPro  6.9  /  Alb  3.9  /  TBili  0.7  /  DBili  x   /  AST  13  /  ALT  13  /  AlkPhos  87  03-24    LIVER FUNCTIONS - ( 24 Mar 2025 01:06 )  Alb: 3.9 g/dL / Pro: 6.9 g/dL / ALK PHOS: 87 U/L / ALT: 13 U/L / AST: 13 U/L / GGT: x

## 2025-03-24 NOTE — DIETITIAN INITIAL EVALUATION ADULT - ORAL INTAKE PTA/DIET HISTORY
Pt reports good appetite/po intake PTA, was not adding any salt to his foods PTA, states he eats a carbohydrate-heavy diet due to his [place] background, eating lots of pasta.  Confirms NKFA.

## 2025-03-24 NOTE — PROGRESS NOTE ADULT - NUTRITIONAL ASSESSMENT
Diet, DASH/TLC:   Sodium & Cholesterol Restricted  Supplement Feeding Modality:  Oral  Glucerna Shake Cans or Servings Per Day:  1       Frequency:  Daily (03-24-25 @ 15:36) [Active]
Diet, DASH/TLC:   Sodium & Cholesterol Restricted (03-21-25 @ 22:55) [Active]

## 2025-03-24 NOTE — PROGRESS NOTE ADULT - ASSESSMENT
Assessment: 69M PMHx NICM, HFrEF LVEF 25-30% s/p MDT CRT-D with baseline CHB, VT/VF, a.fib s/p GIANFRANCO ligation/MAZE, MV/TV repair, PVC ablation 3/2024 intolerant to AADs in the past who initially presented to the [facility] for AICD shocks/VT, transferred for further management and EP workup.    Plan:  NEURO  A&Ox3  - continue to monitor mental status as per protocol   - trazodone for sleep    RESPIRATORY  - Stable on room air  - continue to monitor SpO2 with goal >94%    CARDIO  #NICM, HFrEF s/p MDT CRT-D  - TTE 3/20 EF 30%, RVe and reduced RVsf, severe prosthetic MS  - recent admit 3/19/2025 w/ [facility] found to have elevated filling pressures treated with IV diuretics  - CVP 8 , CI 2.7, [id number]  - GDMT: losartan 25 daily, toprol 50, Kye 25  - holding home farxiga while inpatient  - c/w torsemide    #VT/VF   - hx PVC ablation 3/2024, intolerant to mexiletine, sotalol and amio in the past as per HF documentation  - s/p AICD shocks 3/21  - lidocaine gtt increased to 1.5 mg/min for ectopy  - continue toprol 50, Losartan 25, Kye 25  - f/u EP recommendations, patient given mexiletine, however refusing  - continue to monitor tele and electrolytes. No eventa in 24 hours    #hx afib     - s/p GIANFRANCO ligation/MAZE  - rate control as above  - not currently on AC    RENAL/  Cr WNL  - Continue monitoring urine output, lytes, SCr/ BUN  - replete lytes prn with goal K >4 and Mg >2    GI  DASH diet    ENDO  Glucose controlled on CMP    HEME  - Monitor H/H and plts  - DVT PPX: HSQ    ID  Afebrile, no leukcocytosis  - monitor and trend WBC and temperature curve     Dispo: Maintain in ICU.    Patient requires continuous monitoring with bedside rhythm monitoring, pulse ox monitoring, and intermittent blood gas analysis. Care plan discussed with ICU care team. Patient remained critical and at risk for life threatening decompensation.  Patient seen, examined and plan discussed with CCU team during rounds.     I have personally provided 35 minutes of critical care time excluding time spent on separate procedures, in addition to initial critical care time provided by the CICU Attending, Dr. Goel.    Sweta Rosales, [facility]-BC  Assessment: 69M PMHx NICM, HFrEF LVEF 25-30% s/p MDT CRT-D with baseline CHB, VT/VF, a.fib s/p GIANFRANCO ligation/MAZE, MV/TV repair, PVC ablation 3/2024 intolerant to AADs in the past who initially presented to the [facility] for AICD shocks/VT, transferred for further management and EP workup.    Plan:  NEURO  - A&Ox3  - continue to monitor mental status as per protocol   - trazodone for sleep    RESPIRATORY  - Stable on room air  - continue to monitor SpO2 with goal >94%    CARDIO  # NICM, HFrEF s/p MDT CRT-D  - TTE 3/20 EF 30%, RVe and reduced RVsf, severe prosthetic MS  - recent admit 3/19/2025 w/ [facility] found to have elevated filling pressures treated with IV diuretics  - PAC in place, monitor CI, lactate  - GDMT: hydral, toprol 50, [place] 25  - holding home farxiga while inpatient  - c/w torsemide  - Status 2E    # VT/VF   - hx PVC ablation 3/2024, intolerant to mexiletine, sotalol and amio in the past as per HF documentation  - s/p AICD shocks 3/21  - continue toprol 50  - f/u EP recommendations, patient given mexiletine, however refusing > switched to quinidine 3/24 per EP  - Plan to wean of lido gtt after 2nd dose of quinidine  - continue to monitor tele and electrolytes    # hx afib     - s/p GIANFRANCO ligation/MAZE  - rate control as above  - not currently on systemic AC    RENAL/  - Cr WNL  - Continue monitoring urine output, lytes, SCr/ BUN  - replete lytes prn with goal K >4 and Mg >2    GI  - DASH diet    ENDO  - Glucose controlled on CMP    HEME  - Monitor H/H and plts  - DVT PPX: HSQ    ID  - Afebrile, no leukocytosis  - Monitor and trend WBC and temperature curve     Dispo: Maintain in ICU.    Patient requires continuous monitoring with bedside rhythm monitoring, pulse ox monitoring, and intermittent blood gas analysis. Care plan discussed with ICU care team. Patient remained critical and at risk for life threatening decompensation.  Patient seen, examined and plan discussed with CCU team during rounds.     I have personally provided 35 minutes of critical care time excluding time spent on separate procedures, in addition to initial critical care time provided by the CICU Attending, Dr. Goel.    Sweta Rosales, [facility]-BC

## 2025-03-25 LAB
ADD ON TEST-SPECIMEN IN LAB: SIGNIFICANT CHANGE UP
ALBUMIN SERPL ELPH-MCNC: 4 G/DL — SIGNIFICANT CHANGE UP (ref 3.3–5)
ALP SERPL-CCNC: 94 U/L — SIGNIFICANT CHANGE UP (ref 40–120)
ALT FLD-CCNC: 13 U/L — SIGNIFICANT CHANGE UP (ref 10–45)
ANION GAP SERPL CALC-SCNC: 13 MMOL/L — SIGNIFICANT CHANGE UP (ref 5–17)
AST SERPL-CCNC: 14 U/L — SIGNIFICANT CHANGE UP (ref 10–40)
BASE EXCESS BLDMV CALC-SCNC: 4.2 MMOL/L — HIGH (ref -3–3)
BASOPHILS # BLD AUTO: 0.05 K/UL — SIGNIFICANT CHANGE UP (ref 0–0.2)
BASOPHILS NFR BLD AUTO: 0.6 % — SIGNIFICANT CHANGE UP (ref 0–2)
BILIRUB SERPL-MCNC: 0.6 MG/DL — SIGNIFICANT CHANGE UP (ref 0.2–1.2)
BUN SERPL-MCNC: 26 MG/DL — HIGH (ref 7–23)
CALCIUM SERPL-MCNC: 9.2 MG/DL — SIGNIFICANT CHANGE UP (ref 8.4–10.5)
CHLORIDE SERPL-SCNC: 100 MMOL/L — SIGNIFICANT CHANGE UP (ref 96–108)
CO2 BLDMV-SCNC: 32 MMOL/L — HIGH (ref 21–29)
CO2 SERPL-SCNC: 26 MMOL/L — SIGNIFICANT CHANGE UP (ref 22–31)
CREAT SERPL-MCNC: 1.33 MG/DL — HIGH (ref 0.5–1.3)
EGFR: 58 ML/MIN/1.73M2 — LOW
EGFR: 58 ML/MIN/1.73M2 — LOW
EOSINOPHIL # BLD AUTO: 0.15 K/UL — SIGNIFICANT CHANGE UP (ref 0–0.5)
EOSINOPHIL NFR BLD AUTO: 1.7 % — SIGNIFICANT CHANGE UP (ref 0–6)
GAS PNL BLDMV: SIGNIFICANT CHANGE UP
GAS PNL BLDV: SIGNIFICANT CHANGE UP
GLUCOSE SERPL-MCNC: 109 MG/DL — HIGH (ref 70–99)
HCO3 BLDMV-SCNC: 30 MMOL/L — HIGH (ref 20–28)
HCT VFR BLD CALC: 43.4 % — SIGNIFICANT CHANGE UP (ref 39–50)
HGB BLD-MCNC: 14.2 G/DL — SIGNIFICANT CHANGE UP (ref 13–17)
HOROWITZ INDEX BLDMV+IHG-RTO: 80 — SIGNIFICANT CHANGE UP
IMM GRANULOCYTES NFR BLD AUTO: 0.7 % — SIGNIFICANT CHANGE UP (ref 0–0.9)
LYMPHOCYTES # BLD AUTO: 1.55 K/UL — SIGNIFICANT CHANGE UP (ref 1–3.3)
LYMPHOCYTES # BLD AUTO: 17.8 % — SIGNIFICANT CHANGE UP (ref 13–44)
MAGNESIUM SERPL-MCNC: 2.1 MG/DL — SIGNIFICANT CHANGE UP (ref 1.6–2.6)
MCHC RBC-ENTMCNC: 29.2 PG — SIGNIFICANT CHANGE UP (ref 27–34)
MCHC RBC-ENTMCNC: 32.7 G/DL — SIGNIFICANT CHANGE UP (ref 32–36)
MCV RBC AUTO: 89.1 FL — SIGNIFICANT CHANGE UP (ref 80–100)
MONOCYTES # BLD AUTO: 1.11 K/UL — HIGH (ref 0–0.9)
MONOCYTES NFR BLD AUTO: 12.8 % — SIGNIFICANT CHANGE UP (ref 2–14)
NEUTROPHILS # BLD AUTO: 5.77 K/UL — SIGNIFICANT CHANGE UP (ref 1.8–7.4)
NEUTROPHILS NFR BLD AUTO: 66.4 % — SIGNIFICANT CHANGE UP (ref 43–77)
NRBC BLD AUTO-RTO: 0 /100 WBCS — SIGNIFICANT CHANGE UP (ref 0–0)
O2 CT VFR BLD CALC: 42 MMHG — SIGNIFICANT CHANGE UP (ref 30–65)
PCO2 BLDMV: 50 MMHG — SIGNIFICANT CHANGE UP (ref 30–65)
PH BLDMV: 7.39 — SIGNIFICANT CHANGE UP (ref 7.32–7.45)
PHOSPHATE SERPL-MCNC: 3.8 MG/DL — SIGNIFICANT CHANGE UP (ref 2.5–4.5)
PLATELET # BLD AUTO: 145 K/UL — LOW (ref 150–400)
POTASSIUM SERPL-MCNC: 3.8 MMOL/L — SIGNIFICANT CHANGE UP (ref 3.5–5.3)
POTASSIUM SERPL-SCNC: 3.8 MMOL/L — SIGNIFICANT CHANGE UP (ref 3.5–5.3)
PROCALCITONIN SERPL-MCNC: 0.08 NG/ML — SIGNIFICANT CHANGE UP (ref 0.02–0.1)
PROT SERPL-MCNC: 7.2 G/DL — SIGNIFICANT CHANGE UP (ref 6–8.3)
RBC # BLD: 4.87 M/UL — SIGNIFICANT CHANGE UP (ref 4.2–5.8)
RBC # FLD: 14.3 % — SIGNIFICANT CHANGE UP (ref 10.3–14.5)
SAO2 % BLDMV: 68.3 — SIGNIFICANT CHANGE UP (ref 60–90)
SODIUM SERPL-SCNC: 139 MMOL/L — SIGNIFICANT CHANGE UP (ref 135–145)
WBC # BLD: 8.69 K/UL — SIGNIFICANT CHANGE UP (ref 3.8–10.5)
WBC # FLD AUTO: 8.69 K/UL — SIGNIFICANT CHANGE UP (ref 3.8–10.5)

## 2025-03-25 PROCEDURE — 99291 CRITICAL CARE FIRST HOUR: CPT

## 2025-03-25 PROCEDURE — 71045 X-RAY EXAM CHEST 1 VIEW: CPT | Mod: 26

## 2025-03-25 PROCEDURE — 93010 ELECTROCARDIOGRAM REPORT: CPT

## 2025-03-25 PROCEDURE — 99231 SBSQ HOSP IP/OBS SF/LOW 25: CPT

## 2025-03-25 PROCEDURE — 99292 CRITICAL CARE ADDL 30 MIN: CPT | Mod: FS

## 2025-03-25 PROCEDURE — 99291 CRITICAL CARE FIRST HOUR: CPT | Mod: FS,25

## 2025-03-25 RX ORDER — PSYLLIUM SEED (WITH DEXTROSE)
1 POWDER (GRAM) ORAL DAILY
Refills: 0 | Status: DISCONTINUED | OUTPATIENT
Start: 2025-03-25 | End: 2025-04-29

## 2025-03-25 RX ADMIN — TAMSULOSIN HYDROCHLORIDE 0.4 MILLIGRAM(S): 0.4 CAPSULE ORAL at 21:58

## 2025-03-25 RX ADMIN — METOPROLOL SUCCINATE 50 MILLIGRAM(S): 50 TABLET, EXTENDED RELEASE ORAL at 05:13

## 2025-03-25 RX ADMIN — HEPARIN SODIUM 5000 UNIT(S): 1000 INJECTION INTRAVENOUS; SUBCUTANEOUS at 21:57

## 2025-03-25 RX ADMIN — HEPARIN SODIUM 5000 UNIT(S): 1000 INJECTION INTRAVENOUS; SUBCUTANEOUS at 13:30

## 2025-03-25 RX ADMIN — Medication 1 PACKET(S): at 13:31

## 2025-03-25 RX ADMIN — Medication 20 MILLIGRAM(S): at 18:10

## 2025-03-25 RX ADMIN — ATORVASTATIN CALCIUM 10 MILLIGRAM(S): 80 TABLET, FILM COATED ORAL at 21:57

## 2025-03-25 RX ADMIN — HEPARIN SODIUM 5000 UNIT(S): 1000 INJECTION INTRAVENOUS; SUBCUTANEOUS at 05:11

## 2025-03-25 RX ADMIN — Medication 100 MILLIGRAM(S): at 21:57

## 2025-03-25 RX ADMIN — Medication 3.75 MG/MIN: at 10:00

## 2025-03-25 RX ADMIN — Medication 20 MILLIEQUIVALENT(S): at 05:11

## 2025-03-25 RX ADMIN — Medication 650 MILLIGRAM(S): at 03:53

## 2025-03-25 RX ADMIN — Medication 324 MILLIGRAM(S): at 18:10

## 2025-03-25 RX ADMIN — Medication 25 MILLIGRAM(S): at 05:13

## 2025-03-25 RX ADMIN — Medication 324 MILLIGRAM(S): at 05:13

## 2025-03-25 RX ADMIN — Medication 650 MILLIGRAM(S): at 03:08

## 2025-03-25 RX ADMIN — Medication 25 MILLIGRAM(S): at 05:12

## 2025-03-25 RX ADMIN — Medication 20 MILLIGRAM(S): at 05:11

## 2025-03-25 NOTE — PROGRESS NOTE ADULT - ASSESSMENT
69 year old male with FMH of CM (brother), PMH of NICM, ACC heart failure stage D, HFrEF LVEF 25-30%, HTN, S/P MV repair/TV ring/GIANFRANCO closure/MAZE procedure 2012 ([facility]), AV node ablation, s/p [employer] CRT-D 2011 (subsequent generator change 2018, 2023), hx of ICD shock for VT/VF, s/p ablation of two different PVC morphology originating from mitral annular area on 3/11/2024 (Dr. Bryan), atrial fibrillation with prior DCCV 10/2016 (not on AC 2/2 hx GIANFRANCO closure/MAZE procedure), Listed UNOS status 6 11/2024 who was recently admitted at [facility] for ICD shock where he had two episodes of PVC induced VT on March 17 for which one treated with one round of ATP and another episode with ATP x 1 and ICD shock x1,  30 min apart. Pt was evaluated by NS team. RHC done there and showed elevated filling pressure and normal CI / CO. Discharged after diuresis. Now presents with recurrent ICD shock. ICD interrogation showed pt had addition 3 more shocks since discharged from the hospital.     EP consulted for Vtach and CRT-D shock. Previously did not tolerate amio, mexilitine and sotalol in the past due to severe dizziness. currently on GDMT therapy   s/p perimitral PVC ablation 3/11/2024. CMR ordered but was not performed due to motion artifact from leads   is currently being evaluated for heart transplant at [facility]     # Impression:  - Listed UNOS status 6 now upgraded to UNOS status 2e.  - hx of ani mitral PVC ablation 3/11/2024 ( 2 PVC morphology)  - Stage D heart failure, NYHA class 2  - CRTD discharge due to Vtach  - Non-ischemic cardiomyopathy (EF 25%, LVEDD 5.9 cm + decrease RV function)   - Status post mitral and tricuspid ring repair   - Afib s/p GIANFRANCO closure    # Recs:  - Tele: AP/BiV pace at 80bpm, electrically quiet on tele  - Mexiletine d/c'd due to pt with severe dizziness  - Continue quinidine 324mg BID (received 2 doses as of 6am today)     - D/C lidocaine today   - c/w toprol 50 mg po QD, was recently titrated up  - continue tele monitor  - rest of care per CICU  - EP will sign off, reconsult as needed.     KATIE Nesbitt, NP-C  [id number]

## 2025-03-25 NOTE — PROGRESS NOTE ADULT - NS ATTEND AMEND GEN_ALL_CORE FT
Pt received via EMS with c/o febrile seizure. Mom states she put baby to bed at 2100; gave tylenol at 2100. When pt had awaken she had a seizure. Mom checked temp auxillary and read 105.  En route, medics state pt had a febrile seizure and after seizure, pt c/w quinidine

## 2025-03-25 NOTE — PROGRESS NOTE ADULT - ASSESSMENT
69-year-old male ABO O past medical history of NICM since 2011, HFrEF LVEF 25-30% s/p MDT CRT-D with baseline CHB, VT/VF, a.fib s/p GIANFRANCO ligation/MAZE, MV/TV repair, PVC ablation 3/2024 intolerant to AADs in the past, recent admission 3/19 - 3/20/25 for AICD shocks initially presented to the [facility] emergency department on 3/21/2025, sent by heart failure specialist for ACID shock. Device reported successful ATP for VT 3/17 at 6:52pm followed by one ATP and 40J shock. He reported feeling dizzy and SOB during the events. He follows with Dr Luca Tamayo for HF, currently undergoing transplant workup, Dr John for CRTD and VT/VF and  for genetic counseling. While admitted to [facility], he was started on a lidocaine gtt. On 3/21 when lidocaine weaned off patient had another two episodes of VT requiring AICD shocks. He was transferred to [facility] for further management.     He's remains on PO diuretics and has good UOP.  He had recurrent VT with resumption on lidocaine gtt prompting increase dose on 3/22 to Lidocaine 1.5mg, and has been quiescent thus far. He has normal end organ function with borderline BP on low dose GDMT. His listing status now upgraded to UNOS status 2e for heart transplant, AB O.    Bedside hemodynamics  3/22/25 BP 97/76/83, HR 80, CVP 6, PA 58/23/38, PCWP 20, SVR 1100, [place] CO/CI 5.1/2.6, TD 4/2.08    Echo:    3/20/25 TTE: LVEF 30%, segmental, LVIDd 5.3, walls normal, elevated LV filling pressures, mod RVE with mildly reduced RV function, TAPSE 1.7, severely dilated RA, annuloplasty in MV position, transvalvular gradients are elevated with severe prosthetic MS (peak gradient 15.7, mean gradient 8), trace intravalvular MR, TV annuloplasty ring noted, mild TR,  est PASP 36, no evidence of LV thrombus    10/2024: LVEF 25-30%, LVEDD 5.9cm, moderately reduced RV function, annuloplasty ring of mitral and tricuspid position, PASP 47 mmHg  Cath:    RHC 3/19/25- RA 11 PA 58/26 PCWP 32 CO/CI 5.43/2.77  [facility] 6/2023 Nonobstructive CAD

## 2025-03-25 NOTE — PROGRESS NOTE ADULT - ATTENDING COMMENTS
Patient found in bed in NAD, no new complaints. He was started on quinidine and lidocaine gtt switched off. CI >2.4 off inotropic support. He is on afterload reduction with hydralazine/ISDN. He is euvolemic on exam. He is listed UNOS status 2e for OHT. ABO O.      Continue current support   Monitor rhythm off lidocaine   Remove PA catheter   Continue afterload reduction   Continue metoprolol   PT follow up /ambulation   Discussed with CCU team

## 2025-03-25 NOTE — PROGRESS NOTE ADULT - ASSESSMENT
69M PMHx NICM, HFrEF LVEF 25-30% s/p MDT CRT-D with baseline CHB, VT/VF, a.fib s/p GIANFRANCO ligation/MAZE, MV/TV repair, PVC ablation 3/2024 intolerant to AADs in the past who initially presented to the [facility] for AICD shocks/VT, transferred for further management and EP workup.    Plan:    NEURO  - A&Ox3  - hx anxiety, home Klonopin .5 PRN  - continue to monitor mental status as per protocol     RESPIRATORY  - Stable on room air  - continue to monitor SpO2 with goal >94%    CARDIO  #NICM, HFrEF s/p MDT CRT-D  - TTE 3/20 EF 30%, RVe and reduced RVsf, severe prosthetic MS  - recent admit 3/19/2025 w/ [facility] found to have elevated filling pressures treated with IV diuretics  - central sat stable since admission, not requiring inotropes  - continue torsemide BID, trend cvps  - GDMT: toprol 50, [place] 25  - on hydral 25 for afterload reduction, held for borderline BPs, continue to trend  - holding home farxiga while inpatient  - continue to trend vbg and perfusion indices  - hf following, status 2e for transplant    #VT/VF   - hx PVC ablation 3/2024, intolerant to mexiletine, sotalol and amio in the past as per HF documentation  - s/p AICD shocks 3/21  - trialed on mexiletine this admission and d/c for severe dizziness  - transitioned to quinidine 324 BID and lido weaned off 3/25  - continue toprol 50  - f/u EP recommendations  - continue to monitor tele and electrolytes    #hx afib     - s/p GIANFRANCO ligation/MAZE  - rate control as above  - not currently on AC    RENAL/  - sCr increased this am, continue to trend  - Continue monitoring urine output, lytes, SCr/ BUN  - replete lytes prn with goal K >4 and Mg >2    GI  - DASH diet  - diarrhea with quinidine s/p metamucil, improving    ENDO  - glucose controlled, continue to trend on cmp  - a1c and tsh within normal limits    HEME  - H/H and plts stable, trend daily  - DVT PPX: HSQ    ID  - Afebrile, no leukocytosis  - trend WBC and temperature curve     #full code  Lines: R IJ cordis 3/21 -     ======================= DISPOSITION  =====================  [x ] Critical   [ ] Guarded    [ ] Stable    [x ] Maintain in CICU  [ ] Downgrade to Telemetry  [ ] Discharge Home    Patient requires continuous monitoring with bedside rhythm monitoring, pulse ox monitoring, and intermittent blood gas analysis. Care plan discussed with ICU care team. Patient remained critical and at risk for life threatening decompensation.  Patient seen, examined and plan discussed with CCU team during rounds.     I have personally provided 75 minutes of critical care time excluding time spent on separate procedures, in addition to initial critical care time provided by the CICU Attending, Dr. Goel.     Krissy Clark, [facility]-BC       69M PMHx NICM, HFrEF LVEF 25-30% s/p MDT CRT-D with baseline CHB, VT/VF, a.fib s/p GIANFRANCO ligation/MAZE, MV/TV repair, PVC ablation 3/2024 intolerant to AADs in the past who initially presented to the [facility] for AICD shocks/VT, transferred for further management and EP workup.    Plan:    NEURO  - A&Ox3  - hx anxiety, home Klonopin .5 PRN  - continue to monitor mental status as per protocol     RESPIRATORY  - Stable on room air  - continue to monitor SpO2 with goal >94%    CARDIO  #NICM, HFrEF s/p MDT CRT-D  - TTE 3/20 EF 30%, RVe and reduced RVsf, severe prosthetic MS  - recent admit 3/19/2025 w/ [facility] found to have elevated filling pressures treated with IV diuretics  - central sat stable since admission, not requiring inotropes  - continue torsemide BID, trend cvps  - GDMT: toprol 50, [place] 25  - on hydral 25 for afterload reduction, held for borderline BPs, continue to trend  - holding home farxiga while inpatient  - continue to trend vbg and perfusion indices  - hf following, status 2e for transplant    #VT/VF   - hx PVC ablation 3/2024, intolerant to mexiletine, sotalol and amio in the past as per HF documentation  - s/p AICD shocks 3/21  - trialed on mexiletine this admission and d/c for severe dizziness  - transitioned to quinidine 324 BID and lido weaned off 3/25  - continue toprol 50  - f/u EP recommendations  - continue to monitor tele and electrolytes    #hx afib     - s/p GIANFRANCO ligation/MAZE  - rate control as above  - not currently on AC    RENAL/  - sCr increased this am, continue to trend  - Continue monitoring urine output, lytes, SCr/ BUN  - replete lytes prn with goal K >4 and Mg >2    GI  - DASH diet  - diarrhea with quinidine s/p metamucil, improving    ENDO  - glucose controlled, continue to trend on cmp  - a1c and tsh within normal limits    HEME  - H/H and plts stable, trend daily  - DVT PPX: HSQ    ID  - Afebrile, no leukocytosis  - trend WBC and temperature curve     #full code  Lines: R IJ cordis 3/21 -     ======================= DISPOSITION  =====================  [x ] Critical   [ ] Guarded    [ ] Stable    [x ] Maintain in CICU  [ ] Downgrade to Telemetry  [ ] Discharge Home    Patient requires continuous monitoring with bedside rhythm monitoring, pulse ox monitoring, and intermittent blood gas analysis. Care plan discussed with ICU care team. Patient remained critical and at risk for life threatening decompensation.  Patient seen, examined and plan discussed with CCU team during rounds.     I have personally provided 35 minutes of critical care time excluding time spent on separate procedures, in addition to initial critical care time provided by the CICU Attending, Dr. Goel.     Krissy Clark, [facility]-BC

## 2025-03-25 NOTE — PROGRESS NOTE ADULT - SUBJECTIVE AND OBJECTIVE BOX
Overnight Events:     Review Of Systems: No chest pain, shortness of breath, or palpitations            Current Meds:  acetaminophen     Tablet .. 650 milliGRAM(s) Oral every 6 hours PRN  artificial  tears Solution 1 Drop(s) Both EYES every 4 hours PRN  atorvastatin 10 milliGRAM(s) Oral at bedtime  chlorhexidine 2% Cloths 1 Application(s) Topical <User Schedule>  chlorhexidine 4% Liquid 1 Application(s) Topical <User Schedule>  heparin   Injectable 5000 Unit(s) SubCutaneous every 8 hours  hydrALAZINE 25 milliGRAM(s) Oral every 8 hours  lidocaine   Infusion 0.5 mG/Min IV Continuous <Continuous>  metoprolol succinate ER 50 milliGRAM(s) Oral daily  psyllium Powder 1 Packet(s) Oral daily  quiNIDine gluconate  milliGRAM(s) Oral every 12 hours  spironolactone 25 milliGRAM(s) Oral daily  tamsulosin 0.4 milliGRAM(s) Oral at bedtime  torsemide 20 milliGRAM(s) Oral two times a day  traZODone 100 milliGRAM(s) Oral at bedtime      Vitals:  T(F): 97.7 (03-25), Max: 99.3 (03-24)  HR: 81 (03-25) (80 - 81)  BP: 98/72 (03-25) (82/61 - 109/81)  RR: 22 (03-25)  SpO2: 95% (03-25)  I&O's Summary    24 Mar 2025 07:01  -  25 Mar 2025 07:00  --------------------------------------------------------  IN: 922.8 mL / OUT: 1200 mL / NET: -277.2 mL    25 Mar 2025 07:01  -  25 Mar 2025 13:21  --------------------------------------------------------  IN: 460.2 mL / OUT: 225 mL / NET: 235.2 mL        Physical Exam:  GEN: comfortable appearing, lying in bed in NAD  HEENT: NCAT, MMM  CV: Regular S1, S2, no m/r/g  RESP: CTAB  ABD: Soft, NTND, +BS  EXT: No LE edema, WWP, pulses palpable throughout  NEURO: No focal deficits, AOx3  SKIN:  No rashes                          14.2   8.69  )-----------( 145      ( 25 Mar 2025 01:23 )             43.4     03-25    139  |  100  |  26[H]  ----------------------------<  109[H]  3.8   |  26  |  1.33[H]    Ca    9.2      25 Mar 2025 01:23  Phos  3.8     03-25  Mg     2.1     03-25    TPro  7.2  /  Alb  4.0  /  TBili  0.6  /  DBili  x   /  AST  14  /  ALT  13  /  AlkPhos  94  03-25      CARDIAC MARKERS ( 21 Mar 2025 10:25 )  19 ng/L / x     / x     / x     / x     / x      CARDIAC MARKERS ( 19 Mar 2025 10:32 )  20 ng/L / x     / x     / x     / x     / x                     Overnight Events: NAEO    Review Of Systems: No chest pain, shortness of breath, or palpitations            Current Meds:  acetaminophen     Tablet .. 650 milliGRAM(s) Oral every 6 hours PRN  artificial  tears Solution 1 Drop(s) Both EYES every 4 hours PRN  atorvastatin 10 milliGRAM(s) Oral at bedtime  chlorhexidine 2% Cloths 1 Application(s) Topical <User Schedule>  chlorhexidine 4% Liquid 1 Application(s) Topical <User Schedule>  heparin   Injectable 5000 Unit(s) SubCutaneous every 8 hours  hydrALAZINE 25 milliGRAM(s) Oral every 8 hours  lidocaine   Infusion 0.5 mG/Min IV Continuous <Continuous>  metoprolol succinate ER 50 milliGRAM(s) Oral daily  psyllium Powder 1 Packet(s) Oral daily  quiNIDine gluconate  milliGRAM(s) Oral every 12 hours  spironolactone 25 milliGRAM(s) Oral daily  tamsulosin 0.4 milliGRAM(s) Oral at bedtime  torsemide 20 milliGRAM(s) Oral two times a day  traZODone 100 milliGRAM(s) Oral at bedtime      Vitals:  T(F): 97.7 (03-25), Max: 99.3 (03-24)  HR: 81 (03-25) (80 - 81)  BP: 98/72 (03-25) (82/61 - 109/81)  RR: 22 (03-25)  SpO2: 95% (03-25)  I&O's Summary    24 Mar 2025 07:01  -  25 Mar 2025 07:00  --------------------------------------------------------  IN: 922.8 mL / OUT: 1200 mL / NET: -277.2 mL    25 Mar 2025 07:01  -  25 Mar 2025 13:21  --------------------------------------------------------  IN: 460.2 mL / OUT: 225 mL / NET: 235.2 mL        Physical Exam:  GEN: comfortable appearing, lying in bed in NAD  HEENT: NCAT, MMM  CV: Regular S1, S2, no m/r/g  RESP: CTAB  ABD: Soft, NTND, +BS  EXT: No LE edema, WWP, pulses palpable throughout  NEURO: No focal deficits, AOx3  SKIN:  No rashes                          14.2   8.69  )-----------( 145      ( 25 Mar 2025 01:23 )             43.4     03-25    139  |  100  |  26[H]  ----------------------------<  109[H]  3.8   |  26  |  1.33[H]    Ca    9.2      25 Mar 2025 01:23  Phos  3.8     03-25  Mg     2.1     03-25    TPro  7.2  /  Alb  4.0  /  TBili  0.6  /  DBili  x   /  AST  14  /  ALT  13  /  AlkPhos  94  03-25      CARDIAC MARKERS ( 21 Mar 2025 10:25 )  19 ng/L / x     / x     / x     / x     / x      CARDIAC MARKERS ( 19 Mar 2025 10:32 )  20 ng/L / x     / x     / x     / x     / x

## 2025-03-25 NOTE — PROGRESS NOTE ADULT - CRITICAL CARE ATTENDING COMMENT
History of non-ischemic cardiomyopathy and VT  Admitted with VT   A+Ox3  Hemodynamics acceptable, no pressors or inotropes  VT, electrically quiescent on a Lidocaine drip with Mexiletine, Quinidine and Toprol - wean Lidocaine  TTE with severely reduced LVEF  O2 sats mid to high 90s on room air  DASH diet  Labile renal function, overloaded on exam - continue Torsemide  H/H acceptable on HSQ for DVT prophylaxis   Afebrile, no antibiotics  Sugars controlled  RIJ Cordis/Duncan 3/21 - remove Duncan

## 2025-03-25 NOTE — PROGRESS NOTE ADULT - PROBLEM SELECTOR PLAN 1
- listed UNOS status 2e for heart transplant, ABO O  - continue hemodynamic monitoring with swan in place, can DC if PM [facility] numbers wnl  - Continue torsemide 20mg BID, goal CVP 8-10  - continue  hydralazine 25mg TID  - continue Toprol XL 50mg daily.  - continue start Spironolactone 25mg QD today

## 2025-03-25 NOTE — PROGRESS NOTE ADULT - SUBJECTIVE AND OBJECTIVE BOX
24H hour events:     MEDICATIONS:  heparin   Injectable 5000 Unit(s) SubCutaneous every 8 hours  hydrALAZINE 25 milliGRAM(s) Oral every 8 hours  lidocaine   Infusion 0.5 mG/Min IV Continuous <Continuous>  metoprolol succinate ER 50 milliGRAM(s) Oral daily  quiNIDine gluconate  milliGRAM(s) Oral every 12 hours  spironolactone 25 milliGRAM(s) Oral daily  torsemide 20 milliGRAM(s) Oral two times a day        acetaminophen     Tablet .. 650 milliGRAM(s) Oral every 6 hours PRN  traZODone 100 milliGRAM(s) Oral at bedtime    polyethylene glycol 3350 17 Gram(s) Oral daily    atorvastatin 10 milliGRAM(s) Oral at bedtime    artificial  tears Solution 1 Drop(s) Both EYES every 4 hours PRN  chlorhexidine 2% Cloths 1 Application(s) Topical <User Schedule>  chlorhexidine 4% Liquid 1 Application(s) Topical <User Schedule>  tamsulosin 0.4 milliGRAM(s) Oral at bedtime      REVIEW OF SYSTEMS:  Complete 12 point ROS negative.    PHYSICAL EXAM:  T(C): 36.5 (03-25-25 @ 07:00), Max: 37.4 (03-24-25 @ 15:00)  HR: 80 (03-25-25 @ 09:00) (80 - 81)  BP: 93/69 (03-25-25 @ 09:00) (82/61 - 109/81)  RR: 21 (03-25-25 @ 09:00) (11 - 24)  SpO2: 95% (03-25-25 @ 09:00) (91% - 96%)  Wt(kg): --  I&O's Summary    24 Mar 2025 07:01  -  25 Mar 2025 07:00  --------------------------------------------------------  IN: 922.8 mL / OUT: 1200 mL / NET: -277.2 mL    25 Mar 2025 07:01  -  25 Mar 2025 10:16  --------------------------------------------------------  IN: 265 mL / OUT: 225 mL / NET: 40 mL        Appearance: Normal	  HEENT: PERRL, EOMI	  Cardiovascular: Normal S1 S2, No JVD, No murmurs  Respiratory: Lungs clear to auscultation	  Psychiatry: A & O x 3, Mood & affect appropriate  Gastrointestinal:  Soft, Non-tender, + BS	  Skin: No rashes, No ecchymoses, No cyanosis	  Neurologic: Grossly intact  Extremities: No clubbing, cyanosis or edema  Vascular: Peripheral pulses palpable 2+ bilaterally        LABS:	 	    CBC Full  -  ( 25 Mar 2025 01:23 )  WBC Count : 8.69 K/uL  Hemoglobin : 14.2 g/dL  Hematocrit : 43.4 %  Platelet Count - Automated : 145 K/uL  Mean Cell Volume : 89.1 fl  Mean Cell Hemoglobin : 29.2 pg  Mean Cell Hemoglobin Concentration : 32.7 g/dL  Auto Neutrophil # : x  Auto Lymphocyte # : x  Auto Monocyte # : x  Auto Eosinophil # : x  Auto Basophil # : x  Auto Neutrophil % : x  Auto Lymphocyte % : x  Auto Monocyte % : x  Auto Eosinophil % : x  Auto Basophil % : x    03-25    139  |  100  |  26[H]  ----------------------------<  109[H]  3.8   |  26  |  1.33[H]  03-24    137  |  98  |  23  ----------------------------<  108[H]  3.7   |  24  |  1.23    Ca    9.2      25 Mar 2025 01:23  Ca    9.1      24 Mar 2025 01:06  Phos  3.8     03-25  Phos  3.9     03-24  Mg     2.1     03-25  Mg     2.2     03-24    TPro  7.2  /  Alb  4.0  /  TBili  0.6  /  DBili  x   /  AST  14  /  ALT  13  /  AlkPhos  94  03-25  TPro  6.9  /  Alb  3.9  /  TBili  0.7  /  DBili  x   /  AST  13  /  ALT  13  /  AlkPhos  87  03-24      proBNP:   Lipid Profile:   HgA1c:   TSH:       CARDIAC MARKERS:          TELEMETRY: 	    ECG:  	  RADIOLOGY:  OTHER: 	    PREVIOUS DIAGNOSTIC TESTING:    [ ] Echocardiogram:    [ ]  Catheterization:  [ ] Stress Test:  	  	  ASSESSMENT/PLAN: 	     24H hour events: Pt without complaint, no acute events overnight, Tele: AP/BiV pace at 80bpm, no further VT noted     MEDICATIONS:  heparin   Injectable 5000 Unit(s) SubCutaneous every 8 hours  hydrALAZINE 25 milliGRAM(s) Oral every 8 hours  lidocaine   Infusion 0.5 mG/Min IV Continuous <Continuous>  metoprolol succinate ER 50 milliGRAM(s) Oral daily  quiNIDine gluconate  milliGRAM(s) Oral every 12 hours  spironolactone 25 milliGRAM(s) Oral daily  torsemide 20 milliGRAM(s) Oral two times a day  acetaminophen     Tablet .. 650 milliGRAM(s) Oral every 6 hours PRN  traZODone 100 milliGRAM(s) Oral at bedtime  polyethylene glycol 3350 17 Gram(s) Oral daily  atorvastatin 10 milliGRAM(s) Oral at bedtime  artificial  tears Solution 1 Drop(s) Both EYES every 4 hours PRN  chlorhexidine 2% Cloths 1 Application(s) Topical <User Schedule>  chlorhexidine 4% Liquid 1 Application(s) Topical <User Schedule>  tamsulosin 0.4 milliGRAM(s) Oral at bedtime      REVIEW OF SYSTEMS:  Complete 12 point ROS negative.    PHYSICAL EXAM:  T(C): 36.5 (03-25-25 @ 07:00), Max: 37.4 (03-24-25 @ 15:00)  HR: 80 (03-25-25 @ 09:00) (80 - 81)  BP: 93/69 (03-25-25 @ 09:00) (82/61 - 109/81)  RR: 21 (03-25-25 @ 09:00) (11 - 24)  SpO2: 95% (03-25-25 @ 09:00) (91% - 96%)  Wt(kg): --  I&O's Summary    24 Mar 2025 07:01  -  25 Mar 2025 07:00  --------------------------------------------------------  IN: 922.8 mL / OUT: 1200 mL / NET: -277.2 mL    25 Mar 2025 07:01  -  25 Mar 2025 10:16  --------------------------------------------------------  IN: 265 mL / OUT: 225 mL / NET: 40 mL        Appearance: NAD, OOB sitting up in chair 	  HEENT: PERRL, EOMI	  Cardiovascular: Normal S1 S2, RRR, No JVD, No murmurs  Respiratory: Lungs clear to auscultation	  Psychiatry: A & O x 3, Mood & affect appropriate  Gastrointestinal: Soft, Non-tender, + BS	  Skin: No rashes, No ecchymoses, No cyanosis. [facility] central line dressing site C/D/I	  Neurologic: Grossly intact  Extremities: No clubbing, cyanosis or edema  Vascular: Peripheral pulses palpable 2+ bilaterally        LABS:	 	    CBC Full  -  ( 25 Mar 2025 01:23 )  WBC Count : 8.69 K/uL  Hemoglobin : 14.2 g/dL  Hematocrit : 43.4 %  Platelet Count - Automated : 145 K/uL  Mean Cell Volume : 89.1 fl  Mean Cell Hemoglobin : 29.2 pg  Mean Cell Hemoglobin Concentration : 32.7 g/dL  Auto Neutrophil # : x  Auto Lymphocyte # : x  Auto Monocyte # : x  Auto Eosinophil # : x  Auto Basophil # : x  Auto Neutrophil % : x  Auto Lymphocyte % : x  Auto Monocyte % : x  Auto Eosinophil % : x  Auto Basophil % : x    03-25    139  |  100  |  26[H]  ----------------------------<  109[H]  3.8   |  26  |  1.33[H]  03-24    137  |  98  |  23  ----------------------------<  108[H]  3.7   |  24  |  1.23    Ca    9.2      25 Mar 2025 01:23  Ca    9.1      24 Mar 2025 01:06  Phos  3.8     03-25  Phos  3.9     03-24  Mg     2.1     03-25  Mg     2.2     03-24    TPro  7.2  /  Alb  4.0  /  TBili  0.6  /  DBili  x   /  AST  14  /  ALT  13  /  AlkPhos  94  03-25  TPro  6.9  /  Alb  3.9  /  TBili  0.7  /  DBili  x   /  AST  13  /  ALT  13  /  AlkPhos  87  03-24    Thyroid Stimulating Hormone, Serum (03.22.25 @ 00:58)    Thyroid Stimulating Hormone, Serum: 1.26 uIU/mL        TELEMETRY: AP/BiV pace at 80bpm, no further VT noted  	     < from: TTE W or WO Ultrasound Enhancing Agent (03.20.25 @ 09:16) >  CONCLUSIONS:      1. Left ventricular cavity is normal in size. Left ventricular wall thickness is normal. Left ventricular systolic function is severely decreased with an ejection fraction of 30 % by Mendoza's method of disks. Global left ventricular hypokinesis.   2. Multiple segmental abnormalities exist. See findings.   3. Elevated left ventricular filling pressure. Analysis of left ventricular diastolic function and filling pressure is made challenging by the presence of mitral annuloplasty ring.   4. Moderately enlarged right ventricular cavity size and mildly reduced right ventricular systolic function.   5. The right atrium is severely dilated.   6. Device lead is visualized in the right heart.   7. No pericardial effusion seen.   8. STACEY could be considered to further evaluated mitral annuloplasty.   9. An annuloplasty ring is noted in the mitral position. Transvalvular mitral gradients are elevated. Severe prosthetic mitral stenosis. There is trace intravalvular mitral regurgitation.  10. An annuloplasty ring is noted in the tricuspid position.  11. Estimated pulmonary artery systolic pressure is 36 mmHg.  12. Technically difficult image quality.  13. There is no evidence of a left ventricular thrombus.    ________________________________________________________________________________________  FINDINGS:     Left Ventricle:  After obtaining consent, Definity ultrasound enhancing agent was given for enhanced left ventricular opacification and improved delineation of the left ventricular endocardial borders. Complications from contrast: no adverse reaction. The left ventricular cavity is normal in size. Left ventricular wall thickness is normal. Left ventricular systolic function is severely decreased with a calculated ejection fraction of 30 % by the Mendoza's biplane method of disks. There is global left ventricular hypokinesis. There is no evidence of a left ventricular thrombus. Elevated left ventricular filling pressure. Analysis of left ventricular diastolic function and filling pressure is made challenging by the presence of mitral annuloplasty ring.  LV Wall Scoring: There is diffuse hypokinesis.          Right Ventricle:  After obtaining consent, Definity intravenous contrast was given for enhanced right heart opacification and improved delineation of the endocardial borders. The right ventricular cavity is moderately enlarged in size and right ventricular systolic function is mildly reduced. Tricuspid annular plane systolic excursion (TAPSE) is 1.7 cm (normal >=1.7 cm). Tricuspid annular tissue Doppler S' is 8.3 cm/s (normal >10 cm/s). A device lead is visualized in the right heart.     Left Atrium:  The left atrium is severely dilated with an indexed volume of 50.66 ml/m².     Right Atrium:  The right atrium is severely dilated with an indexed volume of 46.01 ml/m² and an indexed area of 9.35 cm²/m².     Interatrial Septum:  The interatrial septum appears intact.     Aortic Valve:  The aortic valve appears trileaflet with normal systolic excursion. There is no aortic valve stenosis. There is no evidence of aortic regurgitation.     Mitral Valve:  An annuloplasty ring is noted in the mitral position. Transvalvular mitral gradients are elevated. There is severe prosthetic mitral stenosis. Trace intravalvular mitral regurgitation. The transmitral peak gradient is 15.7 mmHg and mean gradient is 8.00 mmHg at a heart rate of 86 bpm.     Tricuspid Valve:  An annuloplasty ring is noted in the tricuspid position. There is mild tricuspid regurgitation. Estimated pulmonary artery systolic pressure is 36 mmHg.     Pulmonic Valve:  Structurally normal pulmonic valve with normal leaflet excursion. There is trace pulmonic regurgitation.     Aorta:  The aortic root appears normal in size.     Pericardium:  No pericardial effusion seen.     Systemic Veins:  The inferior vena cava is normal in size measuring 1.40 cm in diameter, (normal <2.1cm) with normal inspiratory collapse (normal >50%) consistent with normal right atrial pressure (~3, range 0-5mmHg).  ____________________________________________________________________  QUANTITATIVE DATA:  Left Ventricle Measurements: (Indexed to BSA)     IVSd (2D):   0.7 cm  LVPWd (2D):  0.7 cm  LVIDd (2D):  5.3 cm  LVIDs (2D):  4.5 cm  LV Mass:     127 g  64.9 g/m²  BiPlane LV EF%: 30 %     MV E Vmax:    1.81 m/s  e' lateral:   4.61 cm/s  e' medial:    5.06 cm/s  E/e' lateral: 39.26  E/e' medial:  35.77  E/e' Average: 37.44    Aorta Measurements: (Normal range) (Indexed to BSA)     Ao Root d     3.10 cm (3.1 - 3.7 cm) 1.58 cm/m²  Ao Asc d, 2D: 3.10  Ao Arch:      3.4 cm            Left Atrium Measurements: (Indexed to BSA)  LA Diam 2D:        4.30 cm  LA Vol s, MOD A4C: 112.00 ml.  LA Vol s, MOD A2C: 89.10 ml.  LA Vol s, MOD BP:  99.10 ml   50.66 ml/m²         Right Ventricle Measurements: Right Atrial Measurements:     TAPSE:           1.7 cm       RA Vol:            90.00 ml  RV S' Vmax:      8.10 cm/s    RA Vol s, MOD A4C  51.5 ml  RV Base (RVID1): 4.8 cm       RA Vol Index:      46.01 ml/m²  RV Mid (RVID2):  2.8 cm       RA Area s, MOD A4C 18.3 cm²       LVOT / RVOT/ Qp/Qs Data: (Indexed to BSA)  LVOT Diameter,s: 2.00 cm  LVOT Area:       3.14 cm²  LVOT Vmax:       0.91 m/s  LVOT Vmn:        0.651 m/s  LVOT VTI:        15.40 cm  LVOT peak grad:  3 mmHg  LVOT mean grad:  2.0 mmHg  LVOT SV:         48.4 ml   24.73 ml/m²    Mitral Valve Measurements:     MV Vmax:        1.98 m/s  MV VTI:         55.21 cm  MV VTI, ghassan     0.500 m  MVVTI/LVOT VTI  3.59  MV Mean Grad:   8.0 mmHg  MV Peak Grad:   15.7 mmHg  MV E Vmax:      1.8 m/s  MV Gradient HR: 86 bpm       Tricuspid Valve Measurements:     TV S'             8.3 cm/s  TR Vmax:          2.9 m/s  TR Peak Gradient: 33.4 mmHg  RA Pressure:      3 mmHg  PASP:             36 mmHg      < end of copied text >

## 2025-03-25 NOTE — PROGRESS NOTE ADULT - SUBJECTIVE AND OBJECTIVE BOX
Patient is a 70y old  Male who presents with a chief complaint of VT (24 Mar 2025 22:08)    INTERVAL HISTORY:  - started on quinidine yesterday per EP    SUBJECTIVE  - Patient seen and evaluated at bedside.     MEDICATIONS:  MEDICATIONS  (STANDING):  atorvastatin 10 milliGRAM(s) Oral at bedtime  chlorhexidine 2% Cloths 1 Application(s) Topical <User Schedule>  chlorhexidine 4% Liquid 1 Application(s) Topical <User Schedule>  heparin   Injectable 5000 Unit(s) SubCutaneous every 8 hours  hydrALAZINE 25 milliGRAM(s) Oral every 8 hours  lidocaine   Infusion 1 mG/Min (7.5 mL/Hr) IV Continuous <Continuous>  metoprolol succinate ER 50 milliGRAM(s) Oral daily  polyethylene glycol 3350 17 Gram(s) Oral daily  quiNIDine gluconate  milliGRAM(s) Oral every 12 hours  spironolactone 25 milliGRAM(s) Oral daily  tamsulosin 0.4 milliGRAM(s) Oral at bedtime  torsemide 20 milliGRAM(s) Oral two times a day  traZODone 100 milliGRAM(s) Oral at bedtime    MEDICATIONS  (PRN):  acetaminophen     Tablet .. 650 milliGRAM(s) Oral every 6 hours PRN Mild Pain (1 - 3), Moderate Pain (4 - 6)  artificial  tears Solution 1 Drop(s) Both EYES every 4 hours PRN Dry Eyes    OBJECTIVE:  ICU Vital Signs Last 24 Hrs  T(C): 36.5 (25 Mar 2025 04:00), Max: 37.4 (24 Mar 2025 15:00)  T(F): 97.7 (25 Mar 2025 04:00), Max: 99.3 (24 Mar 2025 15:00)  HR: 80 (25 Mar 2025 06:00) (80 - 81)  BP: 85/65 (25 Mar 2025 06:00) (82/61 - 118/72)  BP(mean): 71 (25 Mar 2025 06:00) (65 - 91)  RR: 19 (25 Mar 2025 06:00) (15 - 24)  SpO2: 96% (25 Mar 2025 06:00) (91% - 96%)    O2 Parameters below as of 25 Mar 2025 06:00  Patient On (Oxygen Delivery Method): room air    Adult Advanced Hemodynamics Last 24 Hrs  CVP(mm Hg): 10 (25 Mar 2025 06:00) (0 - 10)  CO: 5.7 (25 Mar 2025 01:00) (2.9 - 5.7)  CI: 2.9 (25 Mar 2025 01:00) (1.4 - 2.9)  PA: 52/28 (25 Mar 2025 06:00) (28/9 - 148/15)  PA(mean): 38 (25 Mar 2025 06:00) (16 - 61)  SVR: 1023 (25 Mar 2025 01:00) (1023 - 1708)  SVRI:  (25 Mar 2025 01:00) ( - 7659)    I&O's Summary    23 Mar 2025 07:01  -  24 Mar 2025 07:00  --------------------------------------------------------  IN: 1536.2 mL / OUT: 1000 mL / NET: 536.2 mL    24 Mar 2025 07:01  -  25 Mar 2025 06:51  --------------------------------------------------------  IN: 910.3 mL / OUT: 1200 mL / NET: -289.7 mL    Daily Weight in k.3 (25 Mar 2025 05:00)    PHYSICAL EXAM:  General: NAD  Chest: Clear to auscultation bilaterally; no rales, rhonchi, or wheezing  Heart: Regular rate and rhythm; normal S1 and S2  Abd: Soft, nontender, nondistended  Nervous System: AAOX3  Ext: no peripheral LE edema bilaterally    LABS:                  14.2   8.69  )-----------( 145      ( 25 Mar 2025 01:23 )             43.4     03-25  139  |  100  |  26[H]  ----------------------------<  109[H]  3.8   |  26  |  1.33[H]    Ca    9.2      25 Mar 2025 01:23  Phos  3.8     03-  Mg     2.1     -25    TPro  7.2  /  Alb  4.0  /  TBili  0.6  /  DBili  x   /  AST  14  /  ALT  13  /  AlkPhos  94  -    LIVER FUNCTIONS - ( 25 Mar 2025 01:23 )  Alb: 4.0 g/dL / Pro: 7.2 g/dL / ALK PHOS: 94 U/L / ALT: 13 U/L / AST: 14 U/L / GGT: x         Urinalysis Basic - ( 25 Mar 2025 01:23 )    Color: x / Appearance: x / SG: x / pH: x  Gluc: 109 mg/dL / Ketone: x  / Bili: x / Urobili: x   Blood: x / Protein: x / Nitrite: x   Leuk Esterase: x / RBC: x / WBC x   Sq Epi: x / Non Sq Epi: x / Bacteria: x      Assessment: 69M PMHx NICM, HFrEF LVEF 25-30% s/p MDT CRT-D with baseline CHB, VT/VF, a.fib s/p GIANFRANCO ligation/MAZE, MV/TV repair, PVC ablation 3/2024 intolerant to AADs in the past who initially presented to the [facility] for AICD shocks/VT, transferred for further management and EP workup.    Plan:  NEURO  A&Ox3  - hx anxiety, home Klonopin .5 PRN  - continue to monitor mental status as per protocol     RESPIRATORY  Stable on room air  - continue to monitor SpO2 with goal >94%    CARDIO  #NICM, HFrEF s/p MDT CRT-D  - TTE 3/20 EF 30%, RVe and reduced RVsf, severe prosthetic MS  - recent admit 3/19/2025 w/ [facility] found to have elevated filling pressures treated with IV diuretics  - CVP 8 , CI 2.7, [id number]  - GDMT: losartan 25 daily, toprol 50, Kye 25  - holding home farxiga while inpatient    #VT/VF   - hx PVC ablation 3/2024, intolerant to mexiletine, sotalol and amio in the past as per HF documentation  - s/p AICD shocks 3/21  - lidocaine gtt increased to 1 mg/min for ectopy  - continue toprol 50, Losartan 25, Kye 25  - f/u EP recommendations, patient given mexiletine, however refusing  - started on quinidine last night per EP, wean lido gtt today  - continue to monitor tele and electrolytes. No eventa in 24 hours    #hx afib     - s/p GIANFRANCO ligation/MAZE  - rate control as above  - not currently on AC    RENAL/  Cr WNL  - Continue monitoring urine output, lytes, SCr/ BUN  - replete lytes prn with goal K >4 and Mg >2    GI  DASH diet    ENDO  Glucose controlled on CMP    HEME  - Monitor H/H and plts  - DVT PPX: HSQ    ID  Afebrile, no leukcocytosis  - monitor and trend WBC and temperature curve       Dispo: Maintain in ICU.    Patient requires continuous monitoring with bedside rhythm monitoring, arterial line, pulse oximetry, ventilator monitoring and intermittent blood gas analysis.  Care plan discussed with ICU care team.  I have spent 35 minutes providing critical care, in addition to initial critical time provided by CICU attending Dr. Goel, re-evaluated multiple times during the day.    Danni Herndon PA-C

## 2025-03-25 NOTE — PROGRESS NOTE ADULT - PROBLEM SELECTOR PLAN 2
- appreciate EP input  - s/p lidocaine gtt  - c/w quinidine 324mg BID    - c/w toprol 50 mg po QD   - s/p CRT-D  -- please interrogate for possible oversensing noted on telemetry at 1:57:25

## 2025-03-25 NOTE — PROGRESS NOTE ADULT - SUBJECTIVE AND OBJECTIVE BOX
PATIENT:  XENA DENSON  [id number]    CHIEF COMPLAINT:  Patient is a 70y old  Male who presents with a chief complaint of VT (25 Mar 2025 13:21)      INTERVAL HISTORY:     REVIEW OF SYSTEMS:  Constitutional:     [ ] negative [ ] fevers [ ] chills [ ] weight loss [ ] weight gain  HEENT:                  [ ] negative [ ] dry eyes [ ] eye irritation [ ] postnasal drip [ ] nasal congestion  CV:                         [ ] negative  [ ] chest pain [ ] orthopnea [ ] palpitations [ ] murmur  Resp:                     [ ] negative [ ] cough [ ] shortness of breath [ ] dyspnea [ ] wheezing [ ] sputum [ ] hemoptysis  GI:                          [ ] negative [ ] nausea [ ] vomiting [ ] diarrhea [ ] constipation [ ] abd pain [ ] dysphagia   :                        [ ] negative [ ] dysuria [ ] nocturia [ ] hematuria [ ] increased urinary frequency  Musculoskeletal: [ ] negative [ ] back pain [ ] myalgias [ ] arthralgias [ ] fracture  Skin:                       [ ] negative [ ] rash [ ] itch  Neurological:        [ ] negative [ ] headache [ ] dizziness [ ] syncope [ ] weakness [ ] numbness    MEDICATIONS:  MEDICATIONS  (STANDING):  atorvastatin 10 milliGRAM(s) Oral at bedtime  chlorhexidine 2% Cloths 1 Application(s) Topical <User Schedule>  chlorhexidine 4% Liquid 1 Application(s) Topical <User Schedule>  heparin   Injectable 5000 Unit(s) SubCutaneous every 8 hours  hydrALAZINE 25 milliGRAM(s) Oral every 8 hours  metoprolol succinate ER 50 milliGRAM(s) Oral daily  psyllium Powder 1 Packet(s) Oral daily  quiNIDine gluconate  milliGRAM(s) Oral every 12 hours  spironolactone 25 milliGRAM(s) Oral daily  tamsulosin 0.4 milliGRAM(s) Oral at bedtime  torsemide 20 milliGRAM(s) Oral two times a day  traZODone 100 milliGRAM(s) Oral at bedtime    MEDICATIONS  (PRN):  acetaminophen     Tablet .. 650 milliGRAM(s) Oral every 6 hours PRN Mild Pain (1 - 3), Moderate Pain (4 - 6)  artificial  tears Solution 1 Drop(s) Both EYES every 4 hours PRN Dry Eyes    ALLERGIES:  Allergies    No Known Allergies    Intolerances    OBJECTIVE:  ICU Vital Signs Last 24 Hrs  T(C): 36.7 (25 Mar 2025 15:00), Max: 37.3 (24 Mar 2025 20:00)  T(F): 98 (25 Mar 2025 15:00), Max: 99.1 (24 Mar 2025 20:00)  HR: 81 (25 Mar 2025 18:00) (80 - 82)  BP: 105/75 (25 Mar 2025 18:00) (82/61 - 122/78)  BP(mean): 85 (25 Mar 2025 18:00) (65 - 92)  ABP: --  ABP(mean): --  RR: 22 (25 Mar 2025 18:00) (11 - 26)  SpO2: 96% (25 Mar 2025 18:00) (91% - 97%)    O2 Parameters below as of 25 Mar 2025 18:  Patient On (Oxygen Delivery Method): room air      Adult Advanced Hemodynamics Last 24 Hrs  CVP(mm Hg): 7 (25 Mar 2025 12:00) (0 - 10)  CVP(cm H2O): --  CO: 5.7 (25 Mar 2025 01:00) (5.7 - 5.7)  CI: 2.9 (25 Mar 2025 01:00) (2.9 - 2.9)  PA: 46/22 (25 Mar 2025 12:00) (29/7 - 148/15)  PA(mean): 30 (25 Mar 2025 12:00) (16 - 38)  PCWP: --  SVR: 1023 (25 Mar 2025 01:00) (1023 - 1023)  SVRI:  (25 Mar 2025 01:00) ( - )  PVR: --  PVRI: --  CAPILLARY BLOOD GLUCOSE    I&O's Summary    24 Mar 2025 07:01  -  25 Mar 2025 07:00  --------------------------------------------------------  IN: 922.8 mL / OUT: 1200 mL / NET: -277.2 mL    25 Mar 2025 07:01  -  25 Mar 2025 19:13  --------------------------------------------------------  IN: 704 mL / OUT: 225 mL / NET: 479 mL    Daily Weight in k.3 (25 Mar 2025 05:00)    PHYSICAL EXAMINATION:  General: WN/WD NAD  HEENT: PERRLA, EOMI, moist mucous membranes  Neurology: A&Ox3, nonfocal, [place] x 4  Respiratory: CTA B/L, normal respiratory effort, no wheezes, crackles, rales  CV: RRR, S1S2, no murmurs, rubs or gallops  Abdominal: Soft, NT, ND +BS, Last BM  Extremities: No edema, + peripheral pulses      LABS:                          14.2   8.69  )-----------( 145      ( 25 Mar 2025 01:23 )             43.4     03-    139  |  100  |  26[H]  ----------------------------<  109[H]  3.8   |  26  |  1.33[H]    Ca    9.2      25 Mar 2025 01:23  Phos  3.8     03-25  Mg     2.1     03-25    TPro  7.2  /  Alb  4.0  /  TBili  0.6  /  DBili  x   /  AST  14  /  ALT  13  /  AlkPhos  94  03-25    LIVER FUNCTIONS - ( 25 Mar 2025 01:23 )  Alb: 4.0 g/dL / Pro: 7.2 g/dL / ALK PHOS: 94 U/L / ALT: 13 U/L / AST: 14 U/L / GGT: x           Urinalysis Basic - ( 25 Mar 2025 01:23 )    Color: x / Appearance: x / SG: x / pH: x  Gluc: 109 mg/dL / Ketone: x  / Bili: x / Urobili: x   Blood: x / Protein: x / Nitrite: x   Leuk Esterase: x / RBC: x / WBC x   Sq Epi: x / Non Sq Epi: x / Bacteria: x    TELEMETRY:     EKG:     IMAGING:       PATIENT:  XENA DENSON  [id number]    CHIEF COMPLAINT:  Patient is a 70y old  Male who presents with a chief complaint of VT (25 Mar 2025 13:21)    INTERVAL HISTORY: weaned off lido    REVIEW OF SYSTEMS:  Constitutional:     [x ] negative [ ] fevers [ ] chills [ ] weight loss [ ] weight gain  HEENT:                  [x ] negative [ ] dry eyes [ ] eye irritation [ ] postnasal drip [ ] nasal congestion  CV:                         [ x] negative  [ ] chest pain [ ] orthopnea [ ] palpitations [ ] murmur  Resp:                     [ x] negative [ ] cough [ ] shortness of breath [ ] dyspnea [ ] wheezing [ ] sputum [ ] hemoptysis  GI:                          [x ] negative [ ] nausea [ ] vomiting [ ] diarrhea [ ] constipation [ ] abd pain [ ] dysphagia   :                        [x ] negative [ ] dysuria [ ] nocturia [ ] hematuria [ ] increased urinary frequency  Musculoskeletal: [ x] negative [ ] back pain [ ] myalgias [ ] arthralgias [ ] fracture  Skin:                       [ x] negative [ ] rash [ ] itch  Neurological:        [x ] negative [ ] headache [ ] dizziness [ ] syncope [ ] weakness [ ] numbness    MEDICATIONS:  MEDICATIONS  (STANDING):  atorvastatin 10 milliGRAM(s) Oral at bedtime  chlorhexidine 2% Cloths 1 Application(s) Topical <User Schedule>  chlorhexidine 4% Liquid 1 Application(s) Topical <User Schedule>  heparin   Injectable 5000 Unit(s) SubCutaneous every 8 hours  hydrALAZINE 25 milliGRAM(s) Oral every 8 hours  metoprolol succinate ER 50 milliGRAM(s) Oral daily  psyllium Powder 1 Packet(s) Oral daily  quiNIDine gluconate  milliGRAM(s) Oral every 12 hours  spironolactone 25 milliGRAM(s) Oral daily  tamsulosin 0.4 milliGRAM(s) Oral at bedtime  torsemide 20 milliGRAM(s) Oral two times a day  traZODone 100 milliGRAM(s) Oral at bedtime    MEDICATIONS  (PRN):  acetaminophen     Tablet .. 650 milliGRAM(s) Oral every 6 hours PRN Mild Pain (1 - 3), Moderate Pain (4 - 6)  artificial  tears Solution 1 Drop(s) Both EYES every 4 hours PRN Dry Eyes    ALLERGIES:  Allergies    No Known Allergies    Intolerances    OBJECTIVE:  ICU Vital Signs Last 24 Hrs  T(C): 36.7 (25 Mar 2025 15:00), Max: 37.3 (24 Mar 2025 20:00)  T(F): 98 (25 Mar 2025 15:00), Max: 99.1 (24 Mar 2025 20:00)  HR: 81 (25 Mar 2025 18:00) (80 - 82)  BP: 105/75 (25 Mar 2025 18:00) (82/61 - 122/78)  BP(mean): 85 (25 Mar 2025 18:00) (65 - 92)  ABP: --  ABP(mean): --  RR: 22 (25 Mar 2025 18:00) (11 - 26)  SpO2: 96% (25 Mar 2025 18:00) (91% - 97%)    O2 Parameters below as of 25 Mar 2025 18:00  Patient On (Oxygen Delivery Method): room air      Adult Advanced Hemodynamics Last 24 Hrs  CVP(mm Hg): 7 (25 Mar 2025 12:00) (0 - 10)  CVP(cm H2O): --  CO: 5.7 (25 Mar 2025 01:00) (5.7 - 5.7)  CI: 2.9 (25 Mar 2025 01:00) (2.9 - 2.9)  PA: 46/22 (25 Mar 2025 12:00) (29/7 - 148/15)  PA(mean): 30 (25 Mar 2025 12:00) (16 - 38)  PCWP: --  SVR: 1023 (25 Mar 2025 01:00) (1023 - 1023)  SVRI:  (25 Mar 2025 01:00) ( - )  PVR: --  PVRI: --  CAPILLARY BLOOD GLUCOSE    I&O's Summary    24 Mar 2025 07:01  -  25 Mar 2025 07:00  --------------------------------------------------------  IN: 922.8 mL / OUT: 1200 mL / NET: -277.2 mL    25 Mar 2025 07:01  -  25 Mar 2025 19:13  --------------------------------------------------------  IN: 704 mL / OUT: 225 mL / NET: 479 mL    Daily Weight in k.3 (25 Mar 2025 05:00)    PHYSICAL EXAMINATION:  General: WN/WD NAD  HEENT: PERRLA, EOMI, moist mucous membranes  Neurology: A&Ox3, nonfocal, [place] x 4  Respiratory: CTA B/L, normal respiratory effort, no wheezes, crackles, rales  CV: RRR, S1S2  Abdominal: Soft, NT, ND +BS  Extremities: No edema, + peripheral pulses  skin: warm, dry.  RIJ cordis c/d/i    LABS:                     14.2   8.69  )-----------( 145      ( 25 Mar 2025 01:23 )             43.4     03-25    139  |  100  |  26[H]  ----------------------------<  109[H]  3.8   |  26  |  1.33[H]    Ca    9.2      25 Mar 2025 01:23  Phos  3.8     03-25  Mg     2.1     03-25    TPro  7.2  /  Alb  4.0  /  TBili  0.6  /  DBili  x   /  AST  14  /  ALT  13  /  AlkPhos  94  03-25    LIVER FUNCTIONS - ( 25 Mar 2025 01:23 )  Alb: 4.0 g/dL / Pro: 7.2 g/dL / ALK PHOS: 94 U/L / ALT: 13 U/L / AST: 14 U/L / GGT: x           Urinalysis Basic - ( 25 Mar 2025 01:23 )    Color: x / Appearance: x / SG: x / pH: x  Gluc: 109 mg/dL / Ketone: x  / Bili: x / Urobili: x   Blood: x / Protein: x / Nitrite: x   Leuk Esterase: x / RBC: x / WBC x   Sq Epi: x / Non Sq Epi: x / Bacteria: x    TELEMETRY:     EKG:     IMAGING:

## 2025-03-26 LAB
ALBUMIN SERPL ELPH-MCNC: 4.2 G/DL — SIGNIFICANT CHANGE UP (ref 3.3–5)
ALP SERPL-CCNC: 93 U/L — SIGNIFICANT CHANGE UP (ref 40–120)
ALT FLD-CCNC: 15 U/L — SIGNIFICANT CHANGE UP (ref 10–45)
ANION GAP SERPL CALC-SCNC: 14 MMOL/L — SIGNIFICANT CHANGE UP (ref 5–17)
AST SERPL-CCNC: 16 U/L — SIGNIFICANT CHANGE UP (ref 10–40)
BASOPHILS # BLD AUTO: 0.05 K/UL — SIGNIFICANT CHANGE UP (ref 0–0.2)
BASOPHILS NFR BLD AUTO: 0.6 % — SIGNIFICANT CHANGE UP (ref 0–2)
BILIRUB SERPL-MCNC: 0.5 MG/DL — SIGNIFICANT CHANGE UP (ref 0.2–1.2)
BUN SERPL-MCNC: 31 MG/DL — HIGH (ref 7–23)
CALCIUM SERPL-MCNC: 9 MG/DL — SIGNIFICANT CHANGE UP (ref 8.4–10.5)
CHLORIDE SERPL-SCNC: 99 MMOL/L — SIGNIFICANT CHANGE UP (ref 96–108)
CO2 SERPL-SCNC: 26 MMOL/L — SIGNIFICANT CHANGE UP (ref 22–31)
CREAT SERPL-MCNC: 1.44 MG/DL — HIGH (ref 0.5–1.3)
EGFR: 52 ML/MIN/1.73M2 — LOW
EGFR: 52 ML/MIN/1.73M2 — LOW
EOSINOPHIL # BLD AUTO: 0.19 K/UL — SIGNIFICANT CHANGE UP (ref 0–0.5)
EOSINOPHIL NFR BLD AUTO: 2.3 % — SIGNIFICANT CHANGE UP (ref 0–6)
GAS PNL BLDV: SIGNIFICANT CHANGE UP
GLUCOSE SERPL-MCNC: 104 MG/DL — HIGH (ref 70–99)
HCT VFR BLD CALC: 41.9 % — SIGNIFICANT CHANGE UP (ref 39–50)
HGB BLD-MCNC: 13.8 G/DL — SIGNIFICANT CHANGE UP (ref 13–17)
IMM GRANULOCYTES NFR BLD AUTO: 0.7 % — SIGNIFICANT CHANGE UP (ref 0–0.9)
LYMPHOCYTES # BLD AUTO: 1.6 K/UL — SIGNIFICANT CHANGE UP (ref 1–3.3)
LYMPHOCYTES # BLD AUTO: 19.7 % — SIGNIFICANT CHANGE UP (ref 13–44)
MAGNESIUM SERPL-MCNC: 2.1 MG/DL — SIGNIFICANT CHANGE UP (ref 1.6–2.6)
MCHC RBC-ENTMCNC: 29.1 PG — SIGNIFICANT CHANGE UP (ref 27–34)
MCHC RBC-ENTMCNC: 32.9 G/DL — SIGNIFICANT CHANGE UP (ref 32–36)
MCV RBC AUTO: 88.4 FL — SIGNIFICANT CHANGE UP (ref 80–100)
MONOCYTES # BLD AUTO: 1.01 K/UL — HIGH (ref 0–0.9)
MONOCYTES NFR BLD AUTO: 12.4 % — SIGNIFICANT CHANGE UP (ref 2–14)
NEUTROPHILS # BLD AUTO: 5.21 K/UL — SIGNIFICANT CHANGE UP (ref 1.8–7.4)
NEUTROPHILS NFR BLD AUTO: 64.3 % — SIGNIFICANT CHANGE UP (ref 43–77)
NRBC BLD AUTO-RTO: 0 /100 WBCS — SIGNIFICANT CHANGE UP (ref 0–0)
PHOSPHATE SERPL-MCNC: 4.1 MG/DL — SIGNIFICANT CHANGE UP (ref 2.5–4.5)
PLATELET # BLD AUTO: 135 K/UL — LOW (ref 150–400)
POTASSIUM SERPL-MCNC: 3.7 MMOL/L — SIGNIFICANT CHANGE UP (ref 3.5–5.3)
POTASSIUM SERPL-SCNC: 3.7 MMOL/L — SIGNIFICANT CHANGE UP (ref 3.5–5.3)
PROT SERPL-MCNC: 7 G/DL — SIGNIFICANT CHANGE UP (ref 6–8.3)
RBC # BLD: 4.74 M/UL — SIGNIFICANT CHANGE UP (ref 4.2–5.8)
RBC # FLD: 14.1 % — SIGNIFICANT CHANGE UP (ref 10.3–14.5)
SODIUM SERPL-SCNC: 139 MMOL/L — SIGNIFICANT CHANGE UP (ref 135–145)
WBC # BLD: 8.12 K/UL — SIGNIFICANT CHANGE UP (ref 3.8–10.5)
WBC # FLD AUTO: 8.12 K/UL — SIGNIFICANT CHANGE UP (ref 3.8–10.5)

## 2025-03-26 PROCEDURE — 99292 CRITICAL CARE ADDL 30 MIN: CPT | Mod: FS

## 2025-03-26 PROCEDURE — 99291 CRITICAL CARE FIRST HOUR: CPT | Mod: FS

## 2025-03-26 PROCEDURE — 99291 CRITICAL CARE FIRST HOUR: CPT | Mod: FS,25

## 2025-03-26 RX ORDER — TORSEMIDE 10 MG
20 TABLET ORAL DAILY
Refills: 0 | Status: DISCONTINUED | OUTPATIENT
Start: 2025-03-27 | End: 2025-03-29

## 2025-03-26 RX ADMIN — Medication 650 MILLIGRAM(S): at 06:49

## 2025-03-26 RX ADMIN — Medication 1 PACKET(S): at 11:50

## 2025-03-26 RX ADMIN — ATORVASTATIN CALCIUM 10 MILLIGRAM(S): 80 TABLET, FILM COATED ORAL at 21:04

## 2025-03-26 RX ADMIN — Medication 50 MILLIEQUIVALENT(S): at 03:12

## 2025-03-26 RX ADMIN — Medication 324 MILLIGRAM(S): at 17:08

## 2025-03-26 RX ADMIN — Medication 1 APPLICATION(S): at 05:19

## 2025-03-26 RX ADMIN — HEPARIN SODIUM 5000 UNIT(S): 1000 INJECTION INTRAVENOUS; SUBCUTANEOUS at 21:04

## 2025-03-26 RX ADMIN — Medication 1 APPLICATION(S): at 05:20

## 2025-03-26 RX ADMIN — METOPROLOL SUCCINATE 50 MILLIGRAM(S): 50 TABLET, EXTENDED RELEASE ORAL at 05:35

## 2025-03-26 RX ADMIN — Medication 100 MILLIGRAM(S): at 21:04

## 2025-03-26 RX ADMIN — Medication 50 MILLIEQUIVALENT(S): at 05:38

## 2025-03-26 RX ADMIN — HEPARIN SODIUM 5000 UNIT(S): 1000 INJECTION INTRAVENOUS; SUBCUTANEOUS at 13:14

## 2025-03-26 RX ADMIN — Medication 324 MILLIGRAM(S): at 05:35

## 2025-03-26 RX ADMIN — TAMSULOSIN HYDROCHLORIDE 0.4 MILLIGRAM(S): 0.4 CAPSULE ORAL at 21:04

## 2025-03-26 RX ADMIN — Medication 250 MILLILITER(S): at 12:58

## 2025-03-26 RX ADMIN — Medication 650 MILLIGRAM(S): at 07:19

## 2025-03-26 RX ADMIN — HEPARIN SODIUM 5000 UNIT(S): 1000 INJECTION INTRAVENOUS; SUBCUTANEOUS at 05:34

## 2025-03-26 RX ADMIN — Medication 50 MILLIEQUIVALENT(S): at 01:09

## 2025-03-26 NOTE — PROGRESS NOTE ADULT - PROBLEM SELECTOR PLAN 1
- listed UNOS status 2e for heart transplant, ABO O  - continue hemodynamic monitoring with swan in place, can DC if PM RHC numbers wnl  - decrease torsemide to 20mg QDay   - holding hydralazine 25mg TID iso hypotension   - continue Toprol XL 50mg daily.  - continue Spironolactone 25mg QD today

## 2025-03-26 NOTE — PROGRESS NOTE ADULT - PROBLEM SELECTOR PLAN 2
- appreciate EP input  - s/p lidocaine gtt  - c/w quinidine 324mg BID    - c/w toprol 50 mg po QD   - s/p CRT-D  -- please interrogate for possible oversensing noted on telemetry on 3/25 at 1:57:25

## 2025-03-26 NOTE — PROGRESS NOTE ADULT - ASSESSMENT
69-year-old male ABO O past medical history of NICM since 2011, HFrEF LVEF 25-30% s/p MDT CRT-D with baseline CHB, VT/VF, a.fib s/p GIANFRANCO ligation/MAZE, MV/TV repair, PVC ablation 3/2024 intolerant to AADs in the past, recent admission 3/19 - 3/20/25 for AICD shocks initially presented to the [facility] emergency department on 3/21/2025, sent by heart failure specialist for ACID shock. Device reported successful ATP for VT 3/17 at 6:52pm followed by one ATP and 40J shock. He reported feeling dizzy and SOB during the events. He follows with Dr Luca Tamayo for HF, currently undergoing transplant workup, Dr Jonh for CRTD and VT/VF and  for genetic counseling. While admitted to [facility], he was started on a lidocaine gtt. On 3/21 when lidocaine weaned off patient had another two episodes of VT requiring AICD shocks. He was transferred to [facility] for further management.     He's remains on PO diuretics and has good UOP.  He had recurrent VT with resumption on lidocaine gtt prompting increase dose on 3/22 to Lidocaine 1.5mg, and has been quiescent thus far. He has normal end organ function with borderline BP on low dose GDMT. His listing status now upgraded to UNOS status 2e for heart transplant, AB O.    Bedside hemodynamics  3/22/25 BP 97/76/83, HR 80, CVP 6, PA 58/23/38, PCWP 20, SVR 1100, [place] CO/CI 5.1/2.6, TD 4/2.08    Echo:    3/20/25 TTE: LVEF 30%, segmental, LVIDd 5.3, walls normal, elevated LV filling pressures, mod RVE with mildly reduced RV function, TAPSE 1.7, severely dilated RA, annuloplasty in MV position, transvalvular gradients are elevated with severe prosthetic MS (peak gradient 15.7, mean gradient 8), trace intravalvular MR, TV annuloplasty ring noted, mild TR,  est PASP 36, no evidence of LV thrombus    10/2024: LVEF 25-30%, LVEDD 5.9cm, moderately reduced RV function, annuloplasty ring of mitral and tricuspid position, PASP 47 mmHg  Cath:    RHC 3/19/25- RA 11 PA 58/26 PCWP 32 CO/CI 5.43/2.77  [facility] 6/2023 Nonobstructive CAD

## 2025-03-26 NOTE — PROGRESS NOTE ADULT - NS MD NEURO CONDITIONS_HEART1
Chronic
Acute on Chronic
Acute on Chronic
Chronic
Acute on Chronic
Chronic

## 2025-03-26 NOTE — PROGRESS NOTE ADULT - SUBJECTIVE AND OBJECTIVE BOX
Patient is a 70y old  Male who presents with a chief complaint of VT (25 Mar 2025 19:12)    INTERVAL HISTORY:  - no acute events overnight    SUBJECTIVE  - Patient seen and evaluated at bedside.     MEDICATIONS:  MEDICATIONS  (STANDING):  atorvastatin 10 milliGRAM(s) Oral at bedtime  chlorhexidine 2% Cloths 1 Application(s) Topical <User Schedule>  chlorhexidine 4% Liquid 1 Application(s) Topical <User Schedule>  heparin   Injectable 5000 Unit(s) SubCutaneous every 8 hours  metoprolol succinate ER 50 milliGRAM(s) Oral daily  psyllium Powder 1 Packet(s) Oral daily  quiNIDine gluconate  milliGRAM(s) Oral every 12 hours  spironolactone 25 milliGRAM(s) Oral daily  tamsulosin 0.4 milliGRAM(s) Oral at bedtime  torsemide 20 milliGRAM(s) Oral two times a day  traZODone 100 milliGRAM(s) Oral at bedtime    MEDICATIONS  (PRN):  acetaminophen     Tablet .. 650 milliGRAM(s) Oral every 6 hours PRN Mild Pain (1 - 3), Moderate Pain (4 - 6)  artificial  tears Solution 1 Drop(s) Both EYES every 4 hours PRN Dry Eyes    OBJECTIVE:  ICU Vital Signs Last 24 Hrs  T(C): 36.8 (26 Mar 2025 03:00), Max: 36.8 (26 Mar 2025 03:00)  T(F): 98.2 (26 Mar 2025 03:00), Max: 98.2 (26 Mar 2025 03:00)  HR: 80 (26 Mar 2025 03:00) (80 - 83)  BP: 90/61 (26 Mar 2025 03:00) (80/54 - 157/81)  BP(mean): 71 (26 Mar 2025 03:00) (62 - 116)  RR: 19 (26 Mar 2025 03:00) (11 - 26)  SpO2: 95% (26 Mar 2025 03:00) (92% - 97%)    O2 Parameters below as of 26 Mar 2025 03:00  Patient On (Oxygen Delivery Method): room air    Adult Advanced Hemodynamics Last 24 Hrs  CVP(mm Hg): 181 (26 Mar 2025 00:00) (4 - 181)  PA: 46/22 (25 Mar 2025 12:00) (43/21 - 52/24)  PA(mean): 30 (25 Mar 2025 12:00) (30 - 35)    I&O's Summary  24 Mar 2025 07:01  -  25 Mar 2025 07:00  --------------------------------------------------------  IN: 922.8 mL / OUT: 1200 mL / NET: -277.2 mL    25 Mar 2025 07:01  -  26 Mar 2025 06:40  --------------------------------------------------------  IN: 1104 mL / OUT: 1025 mL / NET: 79 mL    PHYSICAL EXAM:  General: NAD, well-groomed, well-developed  Chest: Clear to auscultation bilaterally; no rales, rhonchi, or wheezing  Heart: Regular rate and rhythm; normal S1 and S2  Abd: Soft, nontender, nondistended  Nervous System: AAOX3  Ext: no peripheral LE edema bilaterally    LABS:                     13.8   8.12  )-----------( 135      ( 26 Mar 2025 00:51 )             41.9     03-26  139  |  99  |  31[H]  ----------------------------<  104[H]  3.7   |  26  |  1.44[H]    Ca    9.0      26 Mar 2025 00:51  Phos  4.1     03-26  Mg     2.1     03-26    TPro  7.0  /  Alb  4.2  /  TBili  0.5  /  DBili  x   /  AST  16  /  ALT  15  /  AlkPhos  93  03-26    LIVER FUNCTIONS - ( 26 Mar 2025 00:51 )  Alb: 4.2 g/dL / Pro: 7.0 g/dL / ALK PHOS: 93 U/L / ALT: 15 U/L / AST: 16 U/L / GGT: x         Urinalysis Basic - ( 26 Mar 2025 00:51 )    Color: x / Appearance: x / SG: x / pH: x  Gluc: 104 mg/dL / Ketone: x  / Bili: x / Urobili: x   Blood: x / Protein: x / Nitrite: x   Leuk Esterase: x / RBC: x / WBC x   Sq Epi: x / Non Sq Epi: x / Bacteria: x      Assessment: 69M PMHx NICM, HFrEF LVEF 25-30% s/p MDT CRT-D with baseline CHB, VT/VF, a.fib s/p GIANFRANCO ligation/MAZE, MV/TV repair, PVC ablation 3/2024 intolerant to AADs in the past who initially presented to the [facility] for AICD shocks/VT, transferred for further management and EP workup.    Plan:  NEURO  A&Ox3  - hx anxiety, home Klonopin .5 PRN  - continue to monitor mental status as per protocol     RESPIRATORY  Stable on room air  - continue to monitor SpO2 with goal >94%    CARDIO  #NICM, HFrEF s/p MDT CRT-D  - TTE 3/20 EF 30%, RVe and reduced RVsf, severe prosthetic MS  - recent admit 3/19/2025 w/ [facility] found to have elevated filling pressures treated with IV diuretics  - CVP 8 , CI 2.7, [id number]  - GDMT: losartan 25 daily, toprol 50, [place] 25  - holding home farxiga while inpatient    #VT/VF   - hx PVC ablation 3/2024, intolerant to mexiletine, sotalol and amio in the past as per HF documentation  - s/p AICD shocks 3/21  - continue toprol 50, Losartan 25, Kye 25  - continue Quinidine, off lido  - continue to monitor tele and electrolytes. No eventa in 24 hours    #hx afib     - s/p GIANFRANCO ligation/MAZE  - rate control as above  - not currently on AC    RENAL/  Cr WNL  - Continue monitoring urine output, lytes, SCr/ BUN  - replete lytes prn with goal K >4 and Mg >2    GI  DASH diet    ENDO  Glucose controlled on CMP    HEME  - Monitor H/H and plts  - DVT PPX: HSQ    ID  Afebrile, no leukcocytosis  - monitor and trend WBC and temperature curve       Dispo: Maintain in ICU.    Patient requires continuous monitoring with bedside rhythm monitoring, arterial line, pulse oximetry, ventilator monitoring and intermittent blood gas analysis.  Care plan discussed with ICU care team.  I have spent 35 minutes providing critical care, in addition to initial critical time provided by CICU attending Dr. Goel, re-evaluated multiple times during the day.    Danni Herndon PA-C

## 2025-03-26 NOTE — PROGRESS NOTE ADULT - ASSESSMENT
69M PMHx NICM, HFrEF LVEF 25-30% s/p MDT CRT-D with baseline CHB, VT/VF, a.fib s/p GIANFRANCO ligation/MAZE, MV/TV repair, PVC ablation 3/2024 intolerant to AADs in the past who initially presented to the [facility] for AICD shocks/VT, transferred for further management and EP workup.    Plan:    NEURO  - A&Ox3  - hx anxiety, home Klonopin .5 PRN  - continue to monitor mental status as per protocol     RESPIRATORY  - Stable on room air  - continue to monitor SpO2 with goal >94%    CARDIO  #NICM, HFrEF s/p MDT CRT-D  - TTE 3/20 EF 30%, RVe and reduced RVsf, severe prosthetic MS  - recent admit 3/19/2025 w/ [facility] found to have elevated filling pressures treated with IV diuretics  - central sat stable since admission, not requiring inotropes, central line d/c  - diuretics held 3/26, s/p IVF for low BP. torsemide changed to daily  - GDMT: toprol 50, [place] 25  - afterload reduction on hold w/ borderline BPs  - holding home farxiga while inpatient  - hf following, status 2e for transplant    #VT/VF   - hx PVC ablation 3/2024, intolerant to mexiletine, sotalol and amio in the past as per HF documentation  - s/p AICD shocks 3/21  - trialed on mexiletine this admission and d/c for severe dizziness  - transitioned to quinidine 324 BID and lido weaned off 3/25  - continue toprol 50  - f/u EP recommendations  - continue to monitor tele and electrolytes    #hx afib     - s/p GIANFRANCO ligation/MAZE  - rate control as above  - not currently on AC    RENAL/  - sCr increased this am, continue to trend  - Continue monitoring urine output, lytes, SCr/ BUN  - replete lytes prn with goal K >4 and Mg >2    GI  - DASH diet  - diarrhea with quinidine s/p metamucil, improving    ENDO  - glucose controlled, continue to trend on cmp  - a1c and tsh within normal limits    HEME  - H/H and plts stable, trend daily  #DVT PPX: HSQ    ID  - Afebrile, no leukocytosis  - trend WBC and temperature curve     #full code  Lines: R IJ cordis 3/21 - 3/26    ======================= DISPOSITION  =====================  [x ] Critical   [ ] Guarded    [ ] Stable    [x ] Maintain in CICU  [ ] Downgrade to Telemetry  [ ] Discharge Home    Patient requires continuous monitoring with bedside rhythm monitoring, pulse ox monitoring, and intermittent blood gas analysis. Care plan discussed with ICU care team. Patient remained critical and at risk for life threatening decompensation.  Patient seen, examined and plan discussed with CCU team during rounds.     I have personally provided 35 minutes of critical care time excluding time spent on separate procedures, in addition to initial critical care time provided by the CICU Attending, Dr. Goel.     Krissy Clark, [facility]     69M PMHx NICM, HFrEF LVEF 25-30% s/p MDT CRT-D with baseline CHB, VT/VF, a.fib s/p GIANFRANCO ligation/MAZE, MV/TV repair, PVC ablation 3/2024 intolerant to AADs in the past who initially presented to the [facility] for AICD shocks/VT, transferred for further management and EP workup.    Plan:    NEURO  - A&Ox3  - hx anxiety, home Klonopin .5 PRN  - trazodone for sleep  - continue to monitor mental status as per protocol     RESPIRATORY  - Stable on room air  - continue to monitor SpO2 with goal >94%    CARDIO  #NICM, HFrEF s/p MDT CRT-D  - TTE 3/20 EF 30%, RVe and reduced RVsf, severe prosthetic MS  - recent admit 3/19/2025 w/ [facility] found to have elevated filling pressures treated with IV diuretics  - central sat stable since admission, not requiring inotropes, central line d/c  - diuretics held 3/26, s/p IVF for low BP. torsemide changed to daily  - GDMT: toprol 50, [place] 25  - afterload reduction on hold w/ borderline BPs  - holding home farxiga while inpatient  - hf following, status 2e for transplant    #VT/VF   - hx PVC ablation 3/2024, intolerant to mexiletine, sotalol and amio in the past as per HF documentation  - s/p AICD shocks 3/21  - trialed on mexiletine this admission and d/c for severe dizziness  - transitioned to quinidine 324 BID and lido weaned off 3/25  - continue toprol 50  - f/u EP recommendations  - continue to monitor tele and electrolytes    #hx afib     - s/p GIANFRANCO ligation/MAZE  - rate control as above  - not currently on AC    RENAL/  - sCr increased this am, continue to trend  - Continue monitoring urine output, lytes, SCr/ BUN  - replete lytes prn with goal K >4 and Mg >2    GI  - DASH diet  - diarrhea with quinidine s/p metamucil, improving    ENDO  - glucose controlled, continue to trend on cmp  - a1c and tsh within normal limits    HEME  - H/H and plts stable, trend daily  #DVT PPX: HSQ    ID  - Afebrile, no leukocytosis  - trend WBC and temperature curve     #full code  Lines: R IJ cordis 3/21 - 3/26    ======================= DISPOSITION  =====================  [x ] Critical   [ ] Guarded    [ ] Stable    [x ] Maintain in CICU  [ ] Downgrade to Telemetry  [ ] Discharge Home    Patient requires continuous monitoring with bedside rhythm monitoring, pulse ox monitoring, and intermittent blood gas analysis. Care plan discussed with ICU care team. Patient remained critical and at risk for life threatening decompensation.  Patient seen, examined and plan discussed with CCU team during rounds.     I have personally provided 35 minutes of critical care time excluding time spent on separate procedures, in addition to initial critical care time provided by the CICU Attending, Dr. Goel.     Krissy Clark, [facility]

## 2025-03-26 NOTE — PROGRESS NOTE ADULT - ATTENDING COMMENTS
Patient found in chair in NAD, he reports feeling better off lidocaine gtt, now on quinidine and metoprolol XL. CI >2.2. He remains listed as status 2e for OHT. He is ABO O.     Continue GDMT   Can remove PA catheter today  Continue antiarrhythmics per EP   Monitor telemetry closely  Continue ambulation /PT/IS   Discussed with CCU

## 2025-03-26 NOTE — PROGRESS NOTE ADULT - SUBJECTIVE AND OBJECTIVE BOX
PATIENT:  XENA DENSON  [id number]    CHIEF COMPLAINT:  Patient is a 70y old  Male who presents with a chief complaint of VT (26 Mar 2025 14:44)      INTERVAL HISTORY: hypotensive requiring IVF x1    REVIEW OF SYSTEMS:  Constitutional:     [ ] negative [ ] fevers [ ] chills [ ] weight loss [ ] weight gain  HEENT:                  [ ] negative [ ] dry eyes [ ] eye irritation [ ] postnasal drip [ ] nasal congestion  CV:                         [ ] negative  [ ] chest pain [ ] orthopnea [ ] palpitations [ ] murmur  Resp:                     [ ] negative [ ] cough [ ] shortness of breath [ ] dyspnea [ ] wheezing [ ] sputum [ ] hemoptysis  GI:                          [ ] negative [ ] nausea [ ] vomiting [ ] diarrhea [ ] constipation [ ] abd pain [ ] dysphagia   :                        [ ] negative [ ] dysuria [ ] nocturia [ ] hematuria [ ] increased urinary frequency  Musculoskeletal: [ ] negative [ ] back pain [ ] myalgias [ ] arthralgias [ ] fracture  Skin:                       [ ] negative [ ] rash [ ] itch  Neurological:        [ ] negative [ ] headache [ ] dizziness [ ] syncope [ ] weakness [ ] numbness    MEDICATIONS:  MEDICATIONS  (STANDING):  atorvastatin 10 milliGRAM(s) Oral at bedtime  chlorhexidine 2% Cloths 1 Application(s) Topical <User Schedule>  chlorhexidine 4% Liquid 1 Application(s) Topical <User Schedule>  heparin   Injectable 5000 Unit(s) SubCutaneous every 8 hours  metoprolol succinate ER 50 milliGRAM(s) Oral daily  psyllium Powder 1 Packet(s) Oral daily  quiNIDine gluconate  milliGRAM(s) Oral every 12 hours  spironolactone 25 milliGRAM(s) Oral daily  tamsulosin 0.4 milliGRAM(s) Oral at bedtime  traZODone 100 milliGRAM(s) Oral at bedtime    MEDICATIONS  (PRN):  acetaminophen     Tablet .. 650 milliGRAM(s) Oral every 6 hours PRN Mild Pain (1 - 3), Moderate Pain (4 - 6)  artificial  tears Solution 1 Drop(s) Both EYES every 4 hours PRN Dry Eyes    ALLERGIES:  Allergies    No Known Allergies    Intolerances    OBJECTIVE:  ICU Vital Signs Last 24 Hrs  T(C): 37.5 (26 Mar 2025 19:00), Max: 37.5 (26 Mar 2025 19:00)  T(F): 99.5 (26 Mar 2025 19:00), Max: 99.5 (26 Mar 2025 19:00)  HR: 80 (26 Mar 2025 19:00) (80 - 83)  BP: 101/67 (26 Mar 2025 19:00) (75/52 - 157/81)  BP(mean): 79 (26 Mar 2025 19:00) (58 - 116)  ABP: --  ABP(mean): --  RR: 22 (26 Mar 2025 19:00) (16 - 28)  SpO2: 93% (26 Mar 2025 19:00) (91% - 96%)    O2 Parameters below as of 26 Mar 2025 19:00  Patient On (Oxygen Delivery Method): room air    Adult Advanced Hemodynamics Last 24 Hrs  CVP(mm Hg): 181 (26 Mar 2025 00:00) (181 - 181)  CVP(cm H2O): --  CO: --  CI: --  PA: --  PA(mean): --  PCWP: --  SVR: --  SVRI: --  PVR: --  PVRI: --  CAPILLARY BLOOD GLUCOSE    CAPILLARY BLOOD GLUCOSE    I&O's Summary    25 Mar 2025 07:01  -  26 Mar 2025 07:00  --------------------------------------------------------  IN: 1204 mL / OUT: 1525 mL / NET: -321 mL    26 Mar 2025 07:01  -  26 Mar 2025 19:49  --------------------------------------------------------  IN: 1100 mL / OUT: 350 mL / NET: 750 mL      Daily     PHYSICAL EXAMINATION:  General: WN/WD NAD  HEENT: PERRLA, EOMI, moist mucous membranes  Neurology: A&Ox3, nonfocal, [place] x 4  Respiratory: CTA B/L, normal respiratory effort, no wheezes, crackles, rales  CV: RRR, S1S2, no murmurs, rubs or gallops  Abdominal: Soft, NT, ND +BS, Last BM  Extremities: No edema, + peripheral pulses  skin:    LABS:                    13.8   8.12  )-----------( 135      ( 26 Mar 2025 00:51 )             41.9     03-26    139  |  99  |  31[H]  ----------------------------<  104[H]  3.7   |  26  |  1.44[H]    Ca    9.0      26 Mar 2025 00:51  Phos  4.1     03-26  Mg     2.1     03-26    TPro  7.0  /  Alb  4.2  /  TBili  0.5  /  DBili  x   /  AST  16  /  ALT  15  /  AlkPhos  93  03-26    LIVER FUNCTIONS - ( 26 Mar 2025 00:51 )  Alb: 4.2 g/dL / Pro: 7.0 g/dL / ALK PHOS: 93 U/L / ALT: 15 U/L / AST: 16 U/L / GGT: x           Urinalysis Basic - ( 26 Mar 2025 00:51 )    Color: x / Appearance: x / SG: x / pH: x  Gluc: 104 mg/dL / Ketone: x  / Bili: x / Urobili: x   Blood: x / Protein: x / Nitrite: x   Leuk Esterase: x / RBC: x / WBC x   Sq Epi: x / Non Sq Epi: x / Bacteria: x    TELEMETRY:     EKG:     IMAGING:       PATIENT:  XENA DENSON  [id number]    CHIEF COMPLAINT:  Patient is a 70y old  Male who presents with a chief complaint of VT (26 Mar 2025 14:44)      INTERVAL HISTORY: hypotensive requiring IVF x1    REVIEW OF SYSTEMS:  Constitutional:     [ x] negative [ ] fevers [ ] chills [ ] weight loss [ ] weight gain  HEENT:                  [x ] negative [ ] dry eyes [ ] eye irritation [ ] postnasal drip [ ] nasal congestion  CV:                         [ x] negative  [ ] chest pain [ ] orthopnea [ ] palpitations [ ] murmur  Resp:                     [x ] negative [ ] cough [ ] shortness of breath [ ] dyspnea [ ] wheezing [ ] sputum [ ] hemoptysis  GI:                          [x ] negative [ ] nausea [ ] vomiting [ ] diarrhea [ ] constipation [ ] abd pain [ ] dysphagia   :                        [ x] negative [ ] dysuria [ ] nocturia [ ] hematuria [ ] increased urinary frequency  Musculoskeletal: [x ] negative [ ] back pain [ ] myalgias [ ] arthralgias [ ] fracture  Skin:                       [x ] negative [ ] rash [ ] itch  Neurological:        [ x] negative [ ] headache [ ] dizziness [ ] syncope [ ] weakness [ ] numbness    MEDICATIONS:  MEDICATIONS  (STANDING):  atorvastatin 10 milliGRAM(s) Oral at bedtime  chlorhexidine 2% Cloths 1 Application(s) Topical <User Schedule>  chlorhexidine 4% Liquid 1 Application(s) Topical <User Schedule>  heparin   Injectable 5000 Unit(s) SubCutaneous every 8 hours  metoprolol succinate ER 50 milliGRAM(s) Oral daily  psyllium Powder 1 Packet(s) Oral daily  quiNIDine gluconate  milliGRAM(s) Oral every 12 hours  spironolactone 25 milliGRAM(s) Oral daily  tamsulosin 0.4 milliGRAM(s) Oral at bedtime  traZODone 100 milliGRAM(s) Oral at bedtime    MEDICATIONS  (PRN):  acetaminophen     Tablet .. 650 milliGRAM(s) Oral every 6 hours PRN Mild Pain (1 - 3), Moderate Pain (4 - 6)  artificial  tears Solution 1 Drop(s) Both EYES every 4 hours PRN Dry Eyes    ALLERGIES:  Allergies    No Known Allergies    Intolerances    OBJECTIVE:  ICU Vital Signs Last 24 Hrs  T(C): 37.5 (26 Mar 2025 19:00), Max: 37.5 (26 Mar 2025 19:00)  T(F): 99.5 (26 Mar 2025 19:00), Max: 99.5 (26 Mar 2025 19:00)  HR: 80 (26 Mar 2025 19:00) (80 - 83)  BP: 101/67 (26 Mar 2025 19:00) (75/52 - 157/81)  BP(mean): 79 (26 Mar 2025 19:00) (58 - 116)  ABP: --  ABP(mean): --  RR: 22 (26 Mar 2025 19:00) (16 - 28)  SpO2: 93% (26 Mar 2025 19:00) (91% - 96%)    O2 Parameters below as of 26 Mar 2025 19:00  Patient On (Oxygen Delivery Method): room air    Adult Advanced Hemodynamics Last 24 Hrs  CVP(mm Hg): 181 (26 Mar 2025 00:00) (181 - 181)  CVP(cm H2O): --  CO: --  CI: --  PA: --  PA(mean): --  PCWP: --  SVR: --  SVRI: --  PVR: --  PVRI: --  CAPILLARY BLOOD GLUCOSE    CAPILLARY BLOOD GLUCOSE    I&O's Summary    25 Mar 2025 07:01  -  26 Mar 2025 07:00  --------------------------------------------------------  IN: 1204 mL / OUT: 1525 mL / NET: -321 mL    26 Mar 2025 07:01  -  26 Mar 2025 19:49  --------------------------------------------------------  IN: 1100 mL / OUT: 350 mL / NET: 750 mL      Daily     PHYSICAL EXAMINATION:  General: WN/WD NAD  HEENT: PERRLA, EOMI, moist mucous membranes  Neurology: A&Ox3, nonfocal, [place] x 4  Respiratory: CTA B/L, normal respiratory effort, no wheezes, crackles, rales  CV: RRR, S1S2, no murmurs, rubs or gallops  Abdominal: Soft, NT, ND +BS, Last BM  Extremities: No edema, + peripheral pulses  skin: warm, dry    LABS:                    13.8   8.12  )-----------( 135      ( 26 Mar 2025 00:51 )             41.9     03-26    139  |  99  |  31[H]  ----------------------------<  104[H]  3.7   |  26  |  1.44[H]    Ca    9.0      26 Mar 2025 00:51  Phos  4.1     03-26  Mg     2.1     03-26    TPro  7.0  /  Alb  4.2  /  TBili  0.5  /  DBili  x   /  AST  16  /  ALT  15  /  AlkPhos  93  03-26    LIVER FUNCTIONS - ( 26 Mar 2025 00:51 )  Alb: 4.2 g/dL / Pro: 7.0 g/dL / ALK PHOS: 93 U/L / ALT: 15 U/L / AST: 16 U/L / GGT: x           Urinalysis Basic - ( 26 Mar 2025 00:51 )    Color: x / Appearance: x / SG: x / pH: x  Gluc: 104 mg/dL / Ketone: x  / Bili: x / Urobili: x   Blood: x / Protein: x / Nitrite: x   Leuk Esterase: x / RBC: x / WBC x   Sq Epi: x / Non Sq Epi: x / Bacteria: x    TELEMETRY: reviewed    EKG:     IMAGING:  < from: TTE W or WO Ultrasound Enhancing Agent (03.20.25 @ 09:16) >  CONCLUSIONS:      1. Left ventricular cavity is normal in size. Left ventricular wall thickness is normal. Left ventricular systolic function is severely decreased with an ejection fraction of 30 % by Mendoza's method of disks. Global left ventricular hypokinesis.   2. Multiple segmental abnormalities exist. See findings.   3. Elevated left ventricular filling pressure. Analysis of left ventricular diastolic function and filling pressure is made challenging by the presence of mitral annuloplasty ring.   4. Moderately enlarged right ventricular cavity size and mildly reduced right ventricular systolic function.   5. The right atrium is severely dilated.   6. Device lead is visualized in the right heart.   7. No pericardial effusion seen.   8. STACEY could be considered to further evaluated mitral annuloplasty.   9. An annuloplasty ring is noted in the mitral position. Transvalvular mitral gradients are elevated. Severe prosthetic mitral stenosis. There is trace intravalvular mitral regurgitation.  10. An annuloplasty ring is noted in the tricuspid position.  11. Estimated pulmonary artery systolic pressure is 36 mmHg.  12. Technically difficult image quality.  13. There is no evidence of a left ventricular thrombus.    < end of copied text >

## 2025-03-26 NOTE — PROGRESS NOTE ADULT - SUBJECTIVE AND OBJECTIVE BOX
Overnight Events: NAEO    Review Of Systems: No chest pain, shortness of breath, or palpitations            Current Meds:  acetaminophen     Tablet .. 650 milliGRAM(s) Oral every 6 hours PRN  artificial  tears Solution 1 Drop(s) Both EYES every 4 hours PRN  atorvastatin 10 milliGRAM(s) Oral at bedtime  chlorhexidine 2% Cloths 1 Application(s) Topical <User Schedule>  chlorhexidine 4% Liquid 1 Application(s) Topical <User Schedule>  heparin   Injectable 5000 Unit(s) SubCutaneous every 8 hours  metoprolol succinate ER 50 milliGRAM(s) Oral daily  psyllium Powder 1 Packet(s) Oral daily  quiNIDine gluconate  milliGRAM(s) Oral every 12 hours  spironolactone 25 milliGRAM(s) Oral daily  tamsulosin 0.4 milliGRAM(s) Oral at bedtime  traZODone 100 milliGRAM(s) Oral at bedtime      Vitals:  T(F): 97.9 (03-26), Max: 98.2 (03-26)  HR: 81 (03-26) (80 - 83)  BP: 81/57 (03-26) (75/52 - 157/81)  RR: 20 (03-26)  SpO2: 94% (03-26)  I&O's Summary    25 Mar 2025 07:01  -  26 Mar 2025 07:00  --------------------------------------------------------  IN: 1204 mL / OUT: 1525 mL / NET: -321 mL    26 Mar 2025 07:01  -  26 Mar 2025 14:44  --------------------------------------------------------  IN: 860 mL / OUT: 200 mL / NET: 660 mL        Physical Exam:  GEN: comfortable appearing, lying in bed in NAD  HEENT: NCAT, MMM  CV: Regular S1, S2, no m/r/g  RESP: CTAB  ABD: Soft, NTND, +BS  EXT: No LE edema, WWP, pulses palpable throughout  NEURO: No focal deficits, AOx3  SKIN:  No rashes                          13.8   8.12  )-----------( 135      ( 26 Mar 2025 00:51 )             41.9     03-26    139  |  99  |  31[H]  ----------------------------<  104[H]  3.7   |  26  |  1.44[H]    Ca    9.0      26 Mar 2025 00:51  Phos  4.1     03-26  Mg     2.1     03-26    TPro  7.0  /  Alb  4.2  /  TBili  0.5  /  DBili  x   /  AST  16  /  ALT  15  /  AlkPhos  93  03-26      CARDIAC MARKERS ( 21 Mar 2025 10:25 )  19 ng/L / x     / x     / x     / x     / x

## 2025-03-26 NOTE — PROGRESS NOTE ADULT - CRITICAL CARE ATTENDING COMMENT
History of non-ischemic cardiomyopathy and VT  Admitted with VT   A+Ox3  Hemodynamics acceptable, no pressors or inotropes  VT, electrically quiescent on a Lidocaine drip with Mexiletine, Quinidine and Toprol - continue to monitor  TTE with severely reduced LVEF  O2 sats mid to high 90s on room air  DASH diet  Labile renal function, dry on exam - decrease Torsemide to daily  H/H acceptable on HSQ for DVT prophylaxis   Afebrile, no antibiotics  Sugars controlled  No central access

## 2025-03-27 DIAGNOSIS — I10 ESSENTIAL (PRIMARY) HYPERTENSION: ICD-10-CM

## 2025-03-27 DIAGNOSIS — I50.22 CHRONIC SYSTOLIC (CONGESTIVE) HEART FAILURE: ICD-10-CM

## 2025-03-27 DIAGNOSIS — I48.91 UNSPECIFIED ATRIAL FIBRILLATION: ICD-10-CM

## 2025-03-27 DIAGNOSIS — E78.5 HYPERLIPIDEMIA, UNSPECIFIED: ICD-10-CM

## 2025-03-27 LAB
ALBUMIN SERPL ELPH-MCNC: 3.8 G/DL — SIGNIFICANT CHANGE UP (ref 3.3–5)
ALP SERPL-CCNC: 81 U/L — SIGNIFICANT CHANGE UP (ref 40–120)
ALT FLD-CCNC: 18 U/L — SIGNIFICANT CHANGE UP (ref 10–45)
ANION GAP SERPL CALC-SCNC: 14 MMOL/L — SIGNIFICANT CHANGE UP (ref 5–17)
AST SERPL-CCNC: 19 U/L — SIGNIFICANT CHANGE UP (ref 10–40)
BASOPHILS # BLD AUTO: 0.03 K/UL — SIGNIFICANT CHANGE UP (ref 0–0.2)
BASOPHILS NFR BLD AUTO: 0.5 % — SIGNIFICANT CHANGE UP (ref 0–2)
BILIRUB SERPL-MCNC: 0.8 MG/DL — SIGNIFICANT CHANGE UP (ref 0.2–1.2)
BLD GP AB SCN SERPL QL: NEGATIVE — SIGNIFICANT CHANGE UP
BUN SERPL-MCNC: 25 MG/DL — HIGH (ref 7–23)
CALCIUM SERPL-MCNC: 8.9 MG/DL — SIGNIFICANT CHANGE UP (ref 8.4–10.5)
CHLORIDE SERPL-SCNC: 99 MMOL/L — SIGNIFICANT CHANGE UP (ref 96–108)
CO2 SERPL-SCNC: 22 MMOL/L — SIGNIFICANT CHANGE UP (ref 22–31)
CREAT SERPL-MCNC: 1.28 MG/DL — SIGNIFICANT CHANGE UP (ref 0.5–1.3)
EGFR: 60 ML/MIN/1.73M2 — SIGNIFICANT CHANGE UP
EGFR: 60 ML/MIN/1.73M2 — SIGNIFICANT CHANGE UP
EOSINOPHIL # BLD AUTO: 0.03 K/UL — SIGNIFICANT CHANGE UP (ref 0–0.5)
EOSINOPHIL NFR BLD AUTO: 0.5 % — SIGNIFICANT CHANGE UP (ref 0–6)
GLUCOSE SERPL-MCNC: 111 MG/DL — HIGH (ref 70–99)
HCT VFR BLD CALC: 38.8 % — LOW (ref 39–50)
HGB BLD-MCNC: 12.9 G/DL — LOW (ref 13–17)
IMM GRANULOCYTES NFR BLD AUTO: 0.9 % — SIGNIFICANT CHANGE UP (ref 0–0.9)
LYMPHOCYTES # BLD AUTO: 0.54 K/UL — LOW (ref 1–3.3)
LYMPHOCYTES # BLD AUTO: 8.3 % — LOW (ref 13–44)
MAGNESIUM SERPL-MCNC: 2 MG/DL — SIGNIFICANT CHANGE UP (ref 1.6–2.6)
MCHC RBC-ENTMCNC: 29.7 PG — SIGNIFICANT CHANGE UP (ref 27–34)
MCHC RBC-ENTMCNC: 33.2 G/DL — SIGNIFICANT CHANGE UP (ref 32–36)
MCV RBC AUTO: 89.2 FL — SIGNIFICANT CHANGE UP (ref 80–100)
MONOCYTES # BLD AUTO: 0.74 K/UL — SIGNIFICANT CHANGE UP (ref 0–0.9)
MONOCYTES NFR BLD AUTO: 11.3 % — SIGNIFICANT CHANGE UP (ref 2–14)
NEUTROPHILS # BLD AUTO: 5.12 K/UL — SIGNIFICANT CHANGE UP (ref 1.8–7.4)
NEUTROPHILS NFR BLD AUTO: 78.5 % — HIGH (ref 43–77)
NRBC BLD AUTO-RTO: 0 /100 WBCS — SIGNIFICANT CHANGE UP (ref 0–0)
PHOSPHATE SERPL-MCNC: 2.8 MG/DL — SIGNIFICANT CHANGE UP (ref 2.5–4.5)
PLATELET # BLD AUTO: 93 K/UL — LOW (ref 150–400)
POTASSIUM SERPL-MCNC: 4 MMOL/L — SIGNIFICANT CHANGE UP (ref 3.5–5.3)
POTASSIUM SERPL-SCNC: 4 MMOL/L — SIGNIFICANT CHANGE UP (ref 3.5–5.3)
PROT SERPL-MCNC: 6.6 G/DL — SIGNIFICANT CHANGE UP (ref 6–8.3)
RBC # BLD: 4.35 M/UL — SIGNIFICANT CHANGE UP (ref 4.2–5.8)
RBC # FLD: 14.3 % — SIGNIFICANT CHANGE UP (ref 10.3–14.5)
RH IG SCN BLD-IMP: POSITIVE — SIGNIFICANT CHANGE UP
SODIUM SERPL-SCNC: 135 MMOL/L — SIGNIFICANT CHANGE UP (ref 135–145)
WBC # BLD: 6.52 K/UL — SIGNIFICANT CHANGE UP (ref 3.8–10.5)
WBC # FLD AUTO: 6.52 K/UL — SIGNIFICANT CHANGE UP (ref 3.8–10.5)

## 2025-03-27 PROCEDURE — 93010 ELECTROCARDIOGRAM REPORT: CPT

## 2025-03-27 PROCEDURE — 99291 CRITICAL CARE FIRST HOUR: CPT | Mod: FS,25

## 2025-03-27 PROCEDURE — 99233 SBSQ HOSP IP/OBS HIGH 50: CPT

## 2025-03-27 PROCEDURE — 99291 CRITICAL CARE FIRST HOUR: CPT

## 2025-03-27 RX ADMIN — Medication 1 PACKET(S): at 11:14

## 2025-03-27 RX ADMIN — METOPROLOL SUCCINATE 50 MILLIGRAM(S): 50 TABLET, EXTENDED RELEASE ORAL at 08:03

## 2025-03-27 RX ADMIN — HEPARIN SODIUM 5000 UNIT(S): 1000 INJECTION INTRAVENOUS; SUBCUTANEOUS at 13:56

## 2025-03-27 RX ADMIN — HEPARIN SODIUM 5000 UNIT(S): 1000 INJECTION INTRAVENOUS; SUBCUTANEOUS at 22:05

## 2025-03-27 RX ADMIN — Medication 324 MILLIGRAM(S): at 05:21

## 2025-03-27 RX ADMIN — ATORVASTATIN CALCIUM 10 MILLIGRAM(S): 80 TABLET, FILM COATED ORAL at 22:04

## 2025-03-27 RX ADMIN — Medication 100 MILLIGRAM(S): at 22:04

## 2025-03-27 RX ADMIN — Medication 324 MILLIGRAM(S): at 17:26

## 2025-03-27 RX ADMIN — Medication 1 APPLICATION(S): at 05:21

## 2025-03-27 RX ADMIN — Medication 20 MILLIGRAM(S): at 05:21

## 2025-03-27 RX ADMIN — TAMSULOSIN HYDROCHLORIDE 0.4 MILLIGRAM(S): 0.4 CAPSULE ORAL at 22:04

## 2025-03-27 RX ADMIN — HEPARIN SODIUM 5000 UNIT(S): 1000 INJECTION INTRAVENOUS; SUBCUTANEOUS at 05:21

## 2025-03-27 RX ADMIN — Medication 25 MILLIGRAM(S): at 05:21

## 2025-03-27 RX ADMIN — Medication 1 APPLICATION(S): at 05:22

## 2025-03-27 NOTE — PROGRESS NOTE ADULT - PROBLEM SELECTOR PLAN 3
TTE 3/20/25 : LVEF 30%,transvalvular gradients are elevated with SEVERE  prosthetic MS, no evidence of LV thrombus   Status post mitral and tricuspid ring repair  Awaiting for possible transplant
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
## 2025-03-27 NOTE — PROGRESS NOTE ADULT - PROBLEM SELECTOR PLAN 1
S/P Lidocaine drip S/P one dose of Mexiletine ( Mexiletine was d/c for severe dizziness this admission)   currently on Toprol 50mg daily and Quinidine twice a day   EP is following patient for Vtach and CRT-D shock.   As reported , pt previously did not tolerate amio, mexilitine and sotalol in the past due to severe dizziness.   COnt with tele monitor, monitor electrolytes  H/O ani mitral PVC ( premature ventricular contractions) ablation 3/11/2024  F/up with EP for further recommendation   Monitor QTC while on Quinidine S/P Lidocaine drip S/P one dose of Mexiletine ( Mexiletine was d/c for severe dizziness this admission)   currently on Toprol 50mg daily and Quinidine twice a day   EP is following patient for Vtach and CRT-D shock.   As reported , pt previously did not tolerate amio, mexilitine and sotalol in the past due to severe dizziness.   COnt with tele monitor, monitor electrolytes  H/O ani mitral PVC ( premature ventricular contractions) ablation 3/11/2024  F/up with EP for further recommendation   Monitor QTC while on Quinidine/ Last QTC was <500 on 3/25 , will get ECG today

## 2025-03-27 NOTE — PROGRESS NOTE ADULT - PROBLEM SELECTOR PLAN 1
- listed UNOS status 2e for heart transplant, ABO O  -c/w torsemide to 20mg QDay   - holding hydralazine 25mg TID iso hypotension   - continue Toprol XL 50mg daily.  - continue Spironolactone 25mg QD today
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
## 2025-03-27 NOTE — PROGRESS NOTE ADULT - PROBLEM SELECTOR PLAN 2
TTE 3/20/25 : LVEF 30%,transvalvular gradients are elevated with severe prosthetic Mitral Stenosis, no evidence of LV thrombus  S/P recent admit 3/19/2025 w/ RHC found to have elevated filling pressures treated with IV diuretics  Diuretics was held 3/26, s/p IVF for low BP. now on  torsemide 20 oral daily  CW : Toprol, Torsemide,  Spironolactone  Afterload reduction on hold w/ borderline BPs  Holding home farxiga while inpatient  HF team is following and status upgraded for transplant  cont to closely monitor

## 2025-03-27 NOTE — PROGRESS NOTE ADULT - ASSESSMENT
70M patient with H/O NICM since 2011, HFrEF (25-30%) s/p MDT CRT-D with baseline CHB , VT/VF, afib s/p GIANFRANCO ligation/MAZE, MV/TV repair, PVC ablation (3/2024) was transferred from outside hospital to [facility] due to  VT/AICD shock.   While admitted to the outside hospital, he was started on a lidocaine gtt. On 3/21 when lidocaine weaned off patient had recurrence of VT requiring AICD shocks. Hence,  was transferred to [facility] for further management. Pt  was on Lidocaine gtt for control and is now on Quinidine for anti-arrythmia. Because of pt's refractory VT pt's listing status for a Heart Transplant was bumped from 6 to 2E temporarily. Patient has been VT free on increased beta blocker and quinidine. Pt will remain inpatient for listing purposes and managed closely by HF team and hence was transferred to [facility] floor on 3/27/25.    Recent admission 3/19 - 3/20/25 for AICD shocks         70M patient with H/O NICM since 2011, HFrEF (25-30%) s/p MDT CRT-D with baseline CHB , VT/VF, afib s/p GIANFRANCO ligation/MAZE, MV/TV repair, PVC ablation (3/2024) was transferred from outside hospital to [facility] due to  VT/AICD shock.   While admitted to the outside hospital, he was started on a lidocaine gtt. On 3/21 when lidocaine weaned off patient had recurrence of VT requiring AICD shocks. Hence,  was transferred to [facility] for further management. Pt  was on Lidocaine gtt for control and is now on Quinidine and uptitrated Toprol for anti-arrythmia. Because of pt's refractory VT pt's listing status for a Heart Transplant was bumped from 6 to 2E temporarily. Pt will remain inpatient for listing purposes and managed closely by HF team and hence was transferred to [facility] on 3/27/25.

## 2025-03-27 NOTE — PROGRESS NOTE ADULT - ASSESSMENT
69-year-old male ABO O past medical history of NICM since 2011, HFrEF LVEF 25-30% s/p MDT CRT-D with baseline CHB, VT/VF, a.fib s/p GIANFRANCO ligation/MAZE, MV/TV repair, PVC ablation 3/2024 intolerant to AADs in the past, recent admission 3/19 - 3/20/25 for AICD shocks initially presented to the [facility] emergency department on 3/21/2025, sent by heart failure specialist for ACID shock. Device reported successful ATP for VT 3/17 at 6:52pm followed by one ATP and 40J shock. He reported feeling dizzy and SOB during the events. He follows with Dr Luca Tamayo for HF, currently undergoing transplant workup, Dr John for CRTD and VT/VF and  for genetic counseling. While admitted to [facility], he was started on a lidocaine gtt. On 3/21 when lidocaine weaned off patient had another two episodes of VT requiring AICD shocks. He was transferred to [facility] for further management.     He's remains on PO diuretics and has good UOP.  He had recurrent VT with resumption on lidocaine gtt prompting increase dose on 3/22 to Lidocaine 1.5mg, and has been quiescent thus far. He has normal end organ function with borderline BP on low dose GDMT. His listing status now upgraded to UNOS status 2e for heart transplant, AB O.    Bedside hemodynamics:  3/22/25 BP 97/76/83, HR 80, CVP 6, PA 58/23/38, PCWP 20, SVR 1100, Gucci CO/CI 5.1/2.6, TD 4/2.08    Cardiac Studies:   TTE 3/20/25 : LVEF 30%, segmental, LVIDd 5.3, walls normal, elevated LV filling pressures, mod RVE with mildly reduced RV function, TAPSE 1.7, severely dilated RA, annuloplasty in MV position, transvalvular gradients are elevated with severe prosthetic MS (peak gradient 15.7, mean gradient 8), trace intravalvular MR, TV annuloplasty ring noted, mild TR,  est PASP 36, no evidence of LV thrombus  TTE 10/2024: LVEF 25-30%, LVEDD 5.9cm, moderately reduced RV function, annuloplasty ring of mitral and tricuspid position, PASP 47 mmHg  RHC 3/19/25- RA 11 PA 58/26 PCWP 32 CO/CI 5.43/2.77  [facility] 6/2023 Nonobstructive CAD

## 2025-03-27 NOTE — PROGRESS NOTE ADULT - PROBLEM SELECTOR PLAN 2
- appreciate EP input  - c/w quinidine 324mg BID    - c/w toprol 50 mg po QD   - s/p CRT-D  -- please interrogate for possible oversensing noted on telemetry on 3/25 at 1:57:25 as well as 3/27 1:56:52

## 2025-03-27 NOTE — PROGRESS NOTE ADULT - PROBLEM SELECTOR PLAN 5
s/p GIANFRANCO ligation/MAZE   rate control as above  not currently on AC s/p GIANFRANCO ligation/MAZE   rate control as above  not currently on full AC

## 2025-03-27 NOTE — PROGRESS NOTE ADULT - ATTENDING COMMENTS
Patient found in chair in NAD, he reports feeling well. No e/o ventricular arrhythmias on telemetry. Torsemide decreased to 20 mg once daily yesterday and he received a 500 ml fluid bolus for hypotension and slight rise in creatinine, now improved. He remains on GDMT. Patient remains listed as UNOS status 2e for OHT.       Continue current GDMT   Torsemide 20 mg daily   Strict I/Os   Daily weight   Anti arrhythmics per EP   OHT once a suitable donor becomes available

## 2025-03-27 NOTE — PROGRESS NOTE ADULT - SUBJECTIVE AND OBJECTIVE BOX
Patient is a 70y old  Male who presents with a chief complaint of VT (27 Mar 2025 14:12)      SUBJECTIVE / OVERNIGHT EVENTS:  70M NICM, HFrEF (25-30%) s/p MDT CRT-D, VT/VF, afib s/p GIANFRANCO ligation/MAZE, MV/TV repair, PVC ablation was transferred from outside hospital to [facility] due to  VT/AICD shock. Pt initially on Lidocaine gtt for control which was transitioned to Quinidine for anti-arrythmia. Because of pt's refractory VT pt's listing status for a Heart Transplant was bumped from 6 to 2E temporarily. For the past 4 days, pt has been VT free on increased beta blocker and quinidine. Pt will remain inpatient for listing purposes and managed closely by HF team and hence was transferred to Tele floor on 3/27/25.    Tele reviewed:  V pacing at 80's as per CMU       ADDITIONAL REVIEW OF SYSTEMS: Negative except for above    MEDICATIONS  (STANDING):  atorvastatin 10 milliGRAM(s) Oral at bedtime  chlorhexidine 2% Cloths 1 Application(s) Topical <User Schedule>  chlorhexidine 4% Liquid 1 Application(s) Topical <User Schedule>  heparin   Injectable 5000 Unit(s) SubCutaneous every 8 hours  metoprolol succinate ER 50 milliGRAM(s) Oral daily  psyllium Powder 1 Packet(s) Oral daily  quiNIDine gluconate  milliGRAM(s) Oral every 12 hours  spironolactone 25 milliGRAM(s) Oral daily  tamsulosin 0.4 milliGRAM(s) Oral at bedtime  torsemide 20 milliGRAM(s) Oral daily  traZODone 100 milliGRAM(s) Oral at bedtime    MEDICATIONS  (PRN):  acetaminophen     Tablet .. 650 milliGRAM(s) Oral every 6 hours PRN Mild Pain (1 - 3), Moderate Pain (4 - 6)  artificial  tears Solution 1 Drop(s) Both EYES every 4 hours PRN Dry Eyes      CAPILLARY BLOOD GLUCOSE        I&O's Summary    26 Mar 2025 07:01  -  27 Mar 2025 07:00  --------------------------------------------------------  IN: 1200 mL / OUT: 730 mL / NET: 470 mL    27 Mar 2025 07:01  -  27 Mar 2025 16:07  --------------------------------------------------------  IN: 360 mL / OUT: 300 mL / NET: 60 mL        PHYSICAL EXAM:  Vital Signs Last 24 Hrs  T(C): 36.7 (27 Mar 2025 15:00), Max: 37.6 (26 Mar 2025 23:00)  T(F): 98 (27 Mar 2025 15:00), Max: 99.7 (26 Mar 2025 23:00)  HR: 81 (27 Mar 2025 15:00) (80 - 81)  BP: 94/67 (27 Mar 2025 15:00) (84/55 - 118/70)  BP(mean): 65 (27 Mar 2025 14:00) (65 - 88)  RR: 18 (27 Mar 2025 15:00) (14 - 30)  SpO2: 97% (27 Mar 2025 15:00) (88% - 100%)    Parameters below as of 27 Mar 2025 15:00  Patient On (Oxygen Delivery Method): room air        PHYSICAL EXAM:  GENERAL: NAD, well-developed  HEAD:  Atraumatic, Normocephalic  EYES:  conjunctiva and sclera clear  NECK: Supple, No JVD  CHEST/LUNG: Clear to auscultation bilaterally; No wheeze  HEART: Regular rate and rhythm; No murmurs, rubs, or gallops  ABDOMEN: Soft, Nontender, Nondistended; Bowel sounds present  EXTREMITIES:  2+ Peripheral Pulses, No clubbing, cyanosis, or edema  PSYCH: AAOx3  NEUROLOGY: non-focal  SKIN: No rashes or lesions      LABS:                        12.9   6.52  )-----------( 93       ( 27 Mar 2025 01:07 )             38.8     03-27    135  |  99  |  25[H]  ----------------------------<  111[H]  4.0   |  22  |  1.28    Ca    8.9      27 Mar 2025 01:07  Phos  2.8     03-27  Mg     2.0     03-27    TPro  6.6  /  Alb  3.8  /  TBili  0.8  /  DBili  x   /  AST  19  /  ALT  18  /  AlkPhos  81  03-27          Urinalysis Basic - ( 27 Mar 2025 01:07 )    Color: x / Appearance: x / SG: x / pH: x  Gluc: 111 mg/dL / Ketone: x  / Bili: x / Urobili: x   Blood: x / Protein: x / Nitrite: x   Leuk Esterase: x / RBC: x / WBC x   Sq Epi: x / Non Sq Epi: x / Bacteria: x          RADIOLOGY & ADDITIONAL TESTS:    Imaging Personally Reviewed:    Electrocardiogram Personally Reviewed:    COORDINATION OF CARE:  Care Discussed with Consultants/Other Providers [Y/N]:  Prior or Outpatient Records Reviewed [Y/N]:     Patient is a 70y old  Male who presents with a chief complaint of VT (27 Mar 2025 14:12)      SUBJECTIVE / OVERNIGHT EVENTS:  70M NICM, HFrEF (25-30%) s/p MDT CRT-D, VT/VF, afib s/p GIANFRANCO ligation/MAZE, MV/TV repair, PVC ablation was transferred from outside hospital to [facility] due to  VT/AICD shock. Pt initially on Lidocaine gtt for control which was transitioned to Quinidine for anti-arrythmia. Because of pt's refractory VT pt's listing status for a Heart Transplant was bumped from 6 to 2E temporarily. For the past 4 days, pt has been VT free on increased beta blocker and quinidine. Pt will remain inpatient for listing purposes and managed closely by HF team and hence was transferred to Tele floor on 3/27/25.  Patient is seen and offers no complaints   Tele reviewed:  V pacing at 80's as per CMU       MEDICATIONS  (STANDING):  atorvastatin 10 milliGRAM(s) Oral at bedtime  chlorhexidine 2% Cloths 1 Application(s) Topical <User Schedule>  chlorhexidine 4% Liquid 1 Application(s) Topical <User Schedule>  heparin   Injectable 5000 Unit(s) SubCutaneous every 8 hours  metoprolol succinate ER 50 milliGRAM(s) Oral daily  psyllium Powder 1 Packet(s) Oral daily  quiNIDine gluconate  milliGRAM(s) Oral every 12 hours  spironolactone 25 milliGRAM(s) Oral daily  tamsulosin 0.4 milliGRAM(s) Oral at bedtime  torsemide 20 milliGRAM(s) Oral daily  traZODone 100 milliGRAM(s) Oral at bedtime    MEDICATIONS  (PRN):  acetaminophen     Tablet .. 650 milliGRAM(s) Oral every 6 hours PRN Mild Pain (1 - 3), Moderate Pain (4 - 6)  artificial  tears Solution 1 Drop(s) Both EYES every 4 hours PRN Dry Eyes      CAPILLARY BLOOD GLUCOSE        I&O's Summary    26 Mar 2025 07:01  -  27 Mar 2025 07:00  --------------------------------------------------------  IN: 1200 mL / OUT: 730 mL / NET: 470 mL    27 Mar 2025 07:01  -  27 Mar 2025 16:07  --------------------------------------------------------  IN: 360 mL / OUT: 300 mL / NET: 60 mL        PHYSICAL EXAM:  Vital Signs Last 24 Hrs  T(C): 36.7 (27 Mar 2025 15:00), Max: 37.6 (26 Mar 2025 23:00)  T(F): 98 (27 Mar 2025 15:00), Max: 99.7 (26 Mar 2025 23:00)  HR: 81 (27 Mar 2025 15:00) (80 - 81)  BP: 94/67 (27 Mar 2025 15:00) (84/55 - 118/70)  BP(mean): 65 (27 Mar 2025 14:00) (65 - 88)  RR: 18 (27 Mar 2025 15:00) (14 - 30)  SpO2: 97% (27 Mar 2025 15:00) (88% - 100%)    Parameters below as of 27 Mar 2025 15:00  Patient On (Oxygen Delivery Method): room air        PHYSICAL EXAM:  GENERAL: NAD, well-developed  HEAD:  Atraumatic, Normocephalic  EYES:  conjunctiva and sclera clear  NECK: Supple, No JVD  CHEST/LUNG: Clear to auscultation bilaterally  HEART: Regular rate and rhythm  ABDOMEN: Soft, Nontender, Nondistended; Bowel sounds present  EXTREMITIES:  no edema  PSYCH: AAOx3  SKIN: healed left leg scar      LABS:                        12.9   6.52  )-----------( 93       ( 27 Mar 2025 01:07 )             38.8     03-27    135  |  99  |  25[H]  ----------------------------<  111[H]  4.0   |  22  |  1.28    Ca    8.9      27 Mar 2025 01:07  Phos  2.8     03-27  Mg     2.0     03-27    TPro  6.6  /  Alb  3.8  /  TBili  0.8  /  DBili  x   /  AST  19  /  ALT  18  /  AlkPhos  81  03-27          Urinalysis Basic - ( 27 Mar 2025 01:07 )    Color: x / Appearance: x / SG: x / pH: x  Gluc: 111 mg/dL / Ketone: x  / Bili: x / Urobili: x   Blood: x / Protein: x / Nitrite: x   Leuk Esterase: x / RBC: x / WBC x   Sq Epi: x / Non Sq Epi: x / Bacteria: x          RADIOLOGY & ADDITIONAL TESTS:    Imaging Personally Reviewed:    Electrocardiogram Personally Reviewed:    COORDINATION OF CARE:  Care Discussed with Consultants/Other Providers [Y/N]:  Prior or Outpatient Records Reviewed [Y/N]:

## 2025-03-27 NOTE — PROGRESS NOTE ADULT - SUBJECTIVE AND OBJECTIVE BOX
PATIENT:  XENA DENSON  [id number]    CHIEF COMPLAINT:  Patient is a 70y old  Male who presents with a chief complaint of VT (26 Mar 2025 19:49)      INTERVAL HISTORYOVERNIGHT EVENTS:      REVIEW OF SYSTEMS:    Constitutional:     [ ] negative [ ] fevers [ ] chills [ ] weight loss [ ] weight gain  HEENT:                  [ ] negative [ ] dry eyes [ ] eye irritation [ ] postnasal drip [ ] nasal congestion  CV:                         [ ] negative  [ ] chest pain [ ] orthopnea [ ] palpitations [ ] murmur  Resp:                     [ ] negative [ ] cough [ ] shortness of breath [ ] dyspnea [ ] wheezing [ ] sputum [ ] hemoptysis  GI:                          [ ] negative [ ] nausea [ ] vomiting [ ] diarrhea [ ] constipation [ ] abd pain [ ] dysphagia   :                        [ ] negative [ ] dysuria [ ] nocturia [ ] hematuria [ ] increased urinary frequency  Musculoskeletal: [ ] negative [ ] back pain [ ] myalgias [ ] arthralgias [ ] fracture  Skin:                       [ ] negative [ ] rash [ ] itch  Neurological:        [ ] negative [ ] headache [ ] dizziness [ ] syncope [ ] weakness [ ] numbness  Psychiatric:           [ ] negative [ ] anxiety [ ] depression  Endocrine:            [ ] negative [ ] diabetes [ ] thyroid problem  Heme/Lymph:      [ ] negative [ ] anemia [ ] bleeding problem  Allergic/Immune: [ ] negative [ ] itchy eyes [ ] nasal discharge [ ] hives [ ] angioedema    [ ] All other systems negative  [ ] Unable to assess ROS because ________.    MEDICATIONS:  MEDICATIONS  (STANDING):  atorvastatin 10 milliGRAM(s) Oral at bedtime  chlorhexidine 2% Cloths 1 Application(s) Topical <User Schedule>  chlorhexidine 4% Liquid 1 Application(s) Topical <User Schedule>  heparin   Injectable 5000 Unit(s) SubCutaneous every 8 hours  metoprolol succinate ER 50 milliGRAM(s) Oral daily  psyllium Powder 1 Packet(s) Oral daily  quiNIDine gluconate  milliGRAM(s) Oral every 12 hours  spironolactone 25 milliGRAM(s) Oral daily  tamsulosin 0.4 milliGRAM(s) Oral at bedtime  torsemide 20 milliGRAM(s) Oral daily  traZODone 100 milliGRAM(s) Oral at bedtime    MEDICATIONS  (PRN):  acetaminophen     Tablet .. 650 milliGRAM(s) Oral every 6 hours PRN Mild Pain (1 - 3), Moderate Pain (4 - 6)  artificial  tears Solution 1 Drop(s) Both EYES every 4 hours PRN Dry Eyes      ALLERGIES:  Allergies    No Known Allergies    Intolerances        OBJECTIVE:  ICU Vital Signs Last 24 Hrs  T(C): 37.2 (27 Mar 2025 07:00), Max: 37.6 (26 Mar 2025 23:00)  T(F): 98.9 (27 Mar 2025 07:00), Max: 99.7 (26 Mar 2025 23:00)  HR: 81 (27 Mar 2025 07:00) (80 - 81)  BP: 91/61 (27 Mar 2025 07:00) (75/52 - 101/67)  BP(mean): 71 (27 Mar 2025 07:) (58 - 80)  ABP: --  ABP(mean): --  RR: 24 (27 Mar 2025 07:00) (14 - 30)  SpO2: 94% (27 Mar 2025 07:) (88% - 100%)    O2 Parameters below as of 27 Mar 2025 07:00  Patient On (Oxygen Delivery Method): room air            Adult Advanced Hemodynamics Last 24 Hrs  CVP(mm Hg): --  CVP(cm H2O): --  CO: --  CI: --  PA: --  PA(mean): --  PCWP: --  SVR: --  SVRI: --  PVR: --  PVRI: --  CAPILLARY BLOOD GLUCOSE        CAPILLARY BLOOD GLUCOSE        I&O's Summary    26 Mar 2025 07:01  -  27 Mar 2025 07:00  --------------------------------------------------------  IN: 1200 mL / OUT: 730 mL / NET: 470 mL      Daily     Daily Weight in k.3 (27 Mar 2025 05:00)    PHYSICAL EXAMINATION:  General: WN/WD NAD  HEENT: PERRLA, EOMI, moist mucous membranes  Neurology: A&Ox3, nonfocal, [place] x 4  Respiratory: CTA B/L, normal respiratory effort, no wheezes, crackles, rales  CV: RRR, S1S2, no murmurs, rubs or gallops  Abdominal: Soft, NT, ND +BS, Last BM  Extremities: No edema, + peripheral pulses  Incisions:   Tubes:    LABS:                          12.9   6.52  )-----------( 93       ( 27 Mar 2025 01:07 )             38.8     -    135  |  99  |  25[H]  ----------------------------<  111[H]  4.0   |  22  |  1.28    Ca    8.9      27 Mar 2025 01:07  Phos  2.8       Mg     2.0         TPro  6.6  /  Alb  3.8  /  TBili  0.8  /  DBili  x   /  AST  19  /  ALT  18  /  AlkPhos  81      LIVER FUNCTIONS - ( 27 Mar 2025 01:07 )  Alb: 3.8 g/dL / Pro: 6.6 g/dL / ALK PHOS: 81 U/L / ALT: 18 U/L / AST: 19 U/L / GGT: x                   Urinalysis Basic - ( 27 Mar 2025 01:07 )    Color: x / Appearance: x / SG: x / pH: x  Gluc: 111 mg/dL / Ketone: x  / Bili: x / Urobili: x   Blood: x / Protein: x / Nitrite: x   Leuk Esterase: x / RBC: x / WBC x   Sq Epi: x / Non Sq Epi: x / Bacteria: x      Assessment and Plan:   · Assessment	  69M PMHx NICM, HFrEF LVEF 25-30% s/p MDT CRT-D with baseline CHB, VT/VF, a.fib s/p GIANFRANCO ligation/MAZE, MV/TV repair, PVC ablation 3/2024 intolerant to AADs in the past who initially presented to the [facility] for AICD shocks/VT, transferred for further management and EP workup.    Plan:    NEURO  - A&Ox3  - trazodone 100 at bedtime for sleep  - continue to monitor mental status as per protocol     RESPIRATORY  - Stable on room air  - continue to monitor SpO2 with goal >94%    CARDIO  #NICM, HFrEF s/p MDT CRT-D  - TTE 3/20 EF 30%, RVe and reduced RVsf, severe prosthetic MS  - recent admit 3/19/2025 w/ [facility] found to have elevated filling pressures treated with IV diuretics  - central sat stable since admission, not requiring inotropes, central line d/c  - diuretics held 3/26, s/p IVF for low BP. torsemide changed to daily  - GDMT: toprol 50, [place] 25  - afterload reduction on hold w/ borderline BPs  - holding home farxiga while inpatient  - hf following, status 2e for transplant    #VT/VF   - hx PVC ablation 3/2024, intolerant to mexiletine, sotalol and amio in the past as per HF documentation  - s/p AICD shocks 3/21  - trialed on mexiletine this admission and d/c for severe dizziness  - transitioned to quinidine 324 BID and lido weaned off 3/25  - continue toprol 50  - f/u EP recommendations  - continue to monitor tele and electrolytes    #hx afib     - s/p GIANFRANCO ligation/MAZE  - rate control as above  - not currently on AC    RENAL/  - sCr increased this am, continue to trend  - Continue monitoring urine output, lytes, SCr/ BUN  - replete lytes prn with goal K >4 and Mg >2    GI  - DASH diet  - diarrhea with quinidine s/p metamucil, improving    ENDO  - glucose controlled, continue to trend on cmp  - a1c and tsh within normal limits    HEME  - H/H and plts stable, trend daily  #DVT PPX: HSQ    ID  - Afebrile, no leukocytosis  - trend WBC and temperature curve     #full code  Lines: SHELTON NAIR cordis 3/21 - 3/26

## 2025-03-27 NOTE — PROGRESS NOTE ADULT - CRITICAL CARE ATTENDING COMMENT
History of non-ischemic cardiomyopathy and VT  Admitted with VT   A+Ox3  Hemodynamics acceptable, no pressors or inotropes  VT, electrically quiescent on a Lidocaine drip with Mexiletine, Quinidine and Toprol - continue to monitor  TTE with severely reduced LVEF  O2 sats mid to high 90s on room air  DASH diet  Labile renal function, dry on exam - continue Torsemide daily  H/H acceptable on HSQ for DVT prophylaxis   Afebrile, no antibiotics  Sugars controlled  No central access  OOB

## 2025-03-27 NOTE — PROGRESS NOTE ADULT - SUBJECTIVE AND OBJECTIVE BOX
Overnight Events: NAEO    Review Of Systems: No chest pain, shortness of breath, or palpitations            Current Meds:  acetaminophen     Tablet .. 650 milliGRAM(s) Oral every 6 hours PRN  artificial  tears Solution 1 Drop(s) Both EYES every 4 hours PRN  atorvastatin 10 milliGRAM(s) Oral at bedtime  chlorhexidine 2% Cloths 1 Application(s) Topical <User Schedule>  chlorhexidine 4% Liquid 1 Application(s) Topical <User Schedule>  heparin   Injectable 5000 Unit(s) SubCutaneous every 8 hours  metoprolol succinate ER 50 milliGRAM(s) Oral daily  psyllium Powder 1 Packet(s) Oral daily  quiNIDine gluconate  milliGRAM(s) Oral every 12 hours  spironolactone 25 milliGRAM(s) Oral daily  tamsulosin 0.4 milliGRAM(s) Oral at bedtime  torsemide 20 milliGRAM(s) Oral daily  traZODone 100 milliGRAM(s) Oral at bedtime      Vitals:  T(F): 97.6 (03-27), Max: 99.7 (03-26)  HR: 80 (03-27) (80 - 81)  BP: 84/55 (03-27) (84/55 - 118/70)  RR: 20 (03-27)  SpO2: 96% (03-27)  I&O's Summary    26 Mar 2025 07:01  -  27 Mar 2025 07:00  --------------------------------------------------------  IN: 1200 mL / OUT: 730 mL / NET: 470 mL    27 Mar 2025 07:01  -  27 Mar 2025 14:13  --------------------------------------------------------  IN: 360 mL / OUT: 300 mL / NET: 60 mL        Physical Exam:  GEN: comfortable appearing, lying in bed in NAD  HEENT: NCAT, MMM  CV: Regular S1, S2, no m/r/g  RESP: CTAB  ABD: Soft, NTND, +BS  EXT: No LE edema, WWP, pulses palpable throughout  NEURO: No focal deficits, AOx3  SKIN:  No rashes                          12.9   6.52  )-----------( 93       ( 27 Mar 2025 01:07 )             38.8     03-27    135  |  99  |  25[H]  ----------------------------<  111[H]  4.0   |  22  |  1.28    Ca    8.9      27 Mar 2025 01:07  Phos  2.8     03-27  Mg     2.0     03-27    TPro  6.6  /  Alb  3.8  /  TBili  0.8  /  DBili  x   /  AST  19  /  ALT  18  /  AlkPhos  81  03-27      CARDIAC MARKERS ( 21 Mar 2025 10:25 )  19 ng/L / x     / x     / x     / x     / x

## 2025-03-28 LAB
ALBUMIN SERPL ELPH-MCNC: 3.8 G/DL — SIGNIFICANT CHANGE UP (ref 3.3–5)
ALP SERPL-CCNC: 80 U/L — SIGNIFICANT CHANGE UP (ref 40–120)
ALT FLD-CCNC: 24 U/L — SIGNIFICANT CHANGE UP (ref 10–45)
ANION GAP SERPL CALC-SCNC: 13 MMOL/L — SIGNIFICANT CHANGE UP (ref 5–17)
APTT BLD: 32 SEC — SIGNIFICANT CHANGE UP (ref 24.5–35.6)
AST SERPL-CCNC: 25 U/L — SIGNIFICANT CHANGE UP (ref 10–40)
BASOPHILS # BLD AUTO: 0.06 K/UL — SIGNIFICANT CHANGE UP (ref 0–0.2)
BASOPHILS NFR BLD AUTO: 1 % — SIGNIFICANT CHANGE UP (ref 0–2)
BILIRUB SERPL-MCNC: 0.4 MG/DL — SIGNIFICANT CHANGE UP (ref 0.2–1.2)
BUN SERPL-MCNC: 24 MG/DL — HIGH (ref 7–23)
CALCIUM SERPL-MCNC: 9 MG/DL — SIGNIFICANT CHANGE UP (ref 8.4–10.5)
CHLORIDE SERPL-SCNC: 100 MMOL/L — SIGNIFICANT CHANGE UP (ref 96–108)
CLOSURE TME COLL+EPINEP BLD: SIGNIFICANT CHANGE UP (ref 150–400)
CO2 SERPL-SCNC: 23 MMOL/L — SIGNIFICANT CHANGE UP (ref 22–31)
CREAT SERPL-MCNC: 1.41 MG/DL — HIGH (ref 0.5–1.3)
EGFR: 54 ML/MIN/1.73M2 — LOW
EGFR: 54 ML/MIN/1.73M2 — LOW
EOSINOPHIL # BLD AUTO: 0.14 K/UL — SIGNIFICANT CHANGE UP (ref 0–0.5)
EOSINOPHIL NFR BLD AUTO: 2.3 % — SIGNIFICANT CHANGE UP (ref 0–6)
FERRITIN SERPL-MCNC: 184 NG/ML — SIGNIFICANT CHANGE UP (ref 30–400)
FOLATE SERPL-MCNC: 14.2 NG/ML — SIGNIFICANT CHANGE UP
GLUCOSE SERPL-MCNC: 87 MG/DL — SIGNIFICANT CHANGE UP (ref 70–99)
HAPTOGLOB SERPL-MCNC: 125 MG/DL — SIGNIFICANT CHANGE UP (ref 34–200)
HCT VFR BLD CALC: 39.8 % — SIGNIFICANT CHANGE UP (ref 39–50)
HEPARIN-PF4 AB RESULT: <0.6 U/ML — SIGNIFICANT CHANGE UP (ref 0–0.9)
HGB BLD-MCNC: 13.2 G/DL — SIGNIFICANT CHANGE UP (ref 13–17)
IMM GRANULOCYTES NFR BLD AUTO: 0.8 % — SIGNIFICANT CHANGE UP (ref 0–0.9)
INR BLD: 1.1 RATIO — SIGNIFICANT CHANGE UP (ref 0.85–1.16)
IRON SATN MFR SERPL: 16 % — SIGNIFICANT CHANGE UP (ref 16–55)
IRON SATN MFR SERPL: 48 UG/DL — SIGNIFICANT CHANGE UP (ref 45–165)
LDH SERPL L TO P-CCNC: 208 U/L — SIGNIFICANT CHANGE UP (ref 50–242)
LYMPHOCYTES # BLD AUTO: 1.03 K/UL — SIGNIFICANT CHANGE UP (ref 1–3.3)
LYMPHOCYTES # BLD AUTO: 17.1 % — SIGNIFICANT CHANGE UP (ref 13–44)
MCHC RBC-ENTMCNC: 29.6 PG — SIGNIFICANT CHANGE UP (ref 27–34)
MCHC RBC-ENTMCNC: 33.2 G/DL — SIGNIFICANT CHANGE UP (ref 32–36)
MCV RBC AUTO: 89.2 FL — SIGNIFICANT CHANGE UP (ref 80–100)
MONOCYTES # BLD AUTO: 1.17 K/UL — HIGH (ref 0–0.9)
MONOCYTES NFR BLD AUTO: 19.5 % — HIGH (ref 2–14)
NEUTROPHILS # BLD AUTO: 3.56 K/UL — SIGNIFICANT CHANGE UP (ref 1.8–7.4)
NEUTROPHILS NFR BLD AUTO: 59.3 % — SIGNIFICANT CHANGE UP (ref 43–77)
NRBC BLD AUTO-RTO: 0 /100 WBCS — SIGNIFICANT CHANGE UP (ref 0–0)
PF4 HEPARIN CMPLX AB SER-ACNC: NEGATIVE — SIGNIFICANT CHANGE UP
PLATELET # BLD AUTO: 115 K/UL — LOW (ref 150–400)
POTASSIUM SERPL-MCNC: 3.7 MMOL/L — SIGNIFICANT CHANGE UP (ref 3.5–5.3)
POTASSIUM SERPL-SCNC: 3.7 MMOL/L — SIGNIFICANT CHANGE UP (ref 3.5–5.3)
PROT SERPL-MCNC: 6.6 G/DL — SIGNIFICANT CHANGE UP (ref 6–8.3)
PROTHROM AB SERPL-ACNC: 12.6 SEC — SIGNIFICANT CHANGE UP (ref 9.9–13.4)
RBC # BLD: 4.46 M/UL — SIGNIFICANT CHANGE UP (ref 4.2–5.8)
RBC # FLD: 14.2 % — SIGNIFICANT CHANGE UP (ref 10.3–14.5)
SODIUM SERPL-SCNC: 136 MMOL/L — SIGNIFICANT CHANGE UP (ref 135–145)
TIBC SERPL-MCNC: 307 UG/DL — SIGNIFICANT CHANGE UP (ref 220–430)
UIBC SERPL-MCNC: 258 UG/DL — SIGNIFICANT CHANGE UP (ref 110–370)
VIT B12 SERPL-MCNC: 387 PG/ML — SIGNIFICANT CHANGE UP (ref 232–1245)
WBC # BLD: 6.01 K/UL — SIGNIFICANT CHANGE UP (ref 3.8–10.5)
WBC # FLD AUTO: 6.01 K/UL — SIGNIFICANT CHANGE UP (ref 3.8–10.5)

## 2025-03-28 PROCEDURE — 93284 PRGRMG EVAL IMPLANTABLE DFB: CPT | Mod: 26

## 2025-03-28 PROCEDURE — 99233 SBSQ HOSP IP/OBS HIGH 50: CPT

## 2025-03-28 RX ADMIN — Medication 1 APPLICATION(S): at 05:41

## 2025-03-28 RX ADMIN — TAMSULOSIN HYDROCHLORIDE 0.4 MILLIGRAM(S): 0.4 CAPSULE ORAL at 21:42

## 2025-03-28 RX ADMIN — Medication 20 MILLIGRAM(S): at 05:38

## 2025-03-28 RX ADMIN — Medication 1 APPLICATION(S): at 05:39

## 2025-03-28 RX ADMIN — HEPARIN SODIUM 5000 UNIT(S): 1000 INJECTION INTRAVENOUS; SUBCUTANEOUS at 13:17

## 2025-03-28 RX ADMIN — Medication 324 MILLIGRAM(S): at 05:38

## 2025-03-28 RX ADMIN — Medication 40 MILLIEQUIVALENT(S): at 17:07

## 2025-03-28 RX ADMIN — Medication 1 PACKET(S): at 12:16

## 2025-03-28 RX ADMIN — Medication 324 MILLIGRAM(S): at 17:05

## 2025-03-28 RX ADMIN — ATORVASTATIN CALCIUM 10 MILLIGRAM(S): 80 TABLET, FILM COATED ORAL at 21:42

## 2025-03-28 RX ADMIN — Medication 100 MILLIGRAM(S): at 21:43

## 2025-03-28 RX ADMIN — HEPARIN SODIUM 5000 UNIT(S): 1000 INJECTION INTRAVENOUS; SUBCUTANEOUS at 05:38

## 2025-03-28 RX ADMIN — Medication 1 DROP(S): at 17:35

## 2025-03-28 NOTE — PROGRESS NOTE ADULT - PROBLEM SELECTOR PLAN 1
S/P Lidocaine drip S/P one dose of Mexiletine (Mexiletine was d/c for severe dizziness this admission)   currently on Toprol 50mg daily and Quinidine twice a day   EP is following patient for Vtach and CRT-D shock.   As reported , pt previously did not tolerate amio, mexilitine and sotalol in the past due to severe dizziness.   Cont with tele monitor, monitor electrolytes  H/O ani mitral PVC (premature ventricular contractions) ablation 3/11/2024  F/up with EP for further recommendation - CRTD interrogation on 3/28  Monitor QTC while on Quinidine/ Last QTC was 493 on 3/27

## 2025-03-28 NOTE — PROGRESS NOTE ADULT - NS ATTEND AMEND GEN_ALL_CORE FT
Patient found sitting in chair in NAD. He reports feeling well. He has remained electrically quiet on quinidine 324 mcg. He remains listed as status 2e after two hospitalizations for VT storm, now off IV. He is intolerant to mexiletine. He remains off hydralazine/ISDN due to hypotension but takes torsemide 20 mg. He hasn't tolerated spironolactone either in the past and his blood pressures remains soft. He reports feeling tired whenever his BP is low 90s or lower. POCUS shows IVC <2 cm and collapsing.     Most recent hemodynamics: 3/22/25 BP 97/76/83, HR 80, CVP 6, PA 58/23/38, PCWP 20, SVR 1100, Gucci CO/CI 5.1/2.6, TD 4/2.08    Decrease torsemide to 10 mg daily   Hold spironolactone for now, can try to resume later if BP improves - likely a bit dehydrated now  Resume low dose hydralazine once BP improves   Continue metoprolol XL   Quinidine per EP  Replete K and Mg   Keep Mg >2.2 and K 4-5   Continue ambulation   ABO O   OHT once a suitable donor becomes available

## 2025-03-28 NOTE — PROGRESS NOTE ADULT - SUBJECTIVE AND OBJECTIVE BOX
Aditya Ross M.D.  Division of Hospital Medicine  Available on [employer] TEAMS    SUBJECTIVE / ACUTE INTERVAL EVENTS: Patient seen and examined with his wife at his bedside. No overnight events. No subjective complaints but patient notes in the past he has become very dizzy even with a single administration of Spironolactone, and so he declined the Rx as prescribed this AM -- will discuss w/ HF. CRTD interrogation per EP today.      OBJECTIVE:   Vital Signs Last 24 Hrs  T(F): 98.5 (28 Mar 2025 04:32), Max: 98.5 (28 Mar 2025 04:32)  HR: 82 (28 Mar 2025 04:32) (80 - 90)  BP: 93/60 (28 Mar 2025 04:32) (84/55 - 100/67)  RR: 18 (28 Mar 2025 04:32) (18 - 22)  SpO2: 90% (28 Mar 2025 04:32) (90% - 97%)  Parameters below as of 28 Mar 2025 04:32  Patient On (Oxygen Delivery Method): room air    I&O's Summary  27 Mar 2025 07:01  -  28 Mar 2025 07:00  --------------------------------------------------------  IN: 582 mL / OUT: 500 mL / NET: 82 mL    Physical Examination:  GEN: elderly man, sitting up in bed in NAD  PSYCH: A&Ox3, mood and affect appear appropriate   NEURO: no focal neurologic deficits appreciated  RESPI: no accessory muscle use, B/L air entry   CARDIO: regular rate/rhythm, no LE edema B/L  ABD: soft, NT, ND  EXT: patient able to move all extremities spontaneously  VASC: peripheral pulses palpated    Labs:                     13.2   6.01  )-----------( 115      ( 28 Mar 2025 06:14 )             39.8     03-28  136  |  100  |  24[H]  ----------------------------<  87  3.7   |  23  |  1.41[H]    Ca    9.0      28 Mar 2025 06:15  Phos  2.8     03-27  Mg     2.0     03-27    TPro  6.6  /  Alb  3.8  /  TBili  0.4  /  DBili  x   /  AST  25  /  ALT  24  /  AlkPhos  80  03-28    PT/INR - ( 28 Mar 2025 06:14 )   PT: 12.6 sec;   INR: 1.10 ratio    PTT - ( 28 Mar 2025 06:14 )  PTT:32.0 sec    Urinalysis Basic - ( 28 Mar 2025 06:15 )  Color: x / Appearance: x / SG: x / pH: x  Gluc: 87 mg/dL / Ketone: x  / Bili: x / Urobili: x   Blood: x / Protein: x / Nitrite: x   Leuk Esterase: x / RBC: x / WBC x   Sq Epi: x / Non Sq Epi: x / Bacteria: x    Consultant(s) Notes Reviewed: Heart Failure, CICU     Care Discussed with Consultants/Other Providers: ACP Cindy Reyes    MEDICATIONS  (STANDING):  atorvastatin 10 milliGRAM(s) Oral at bedtime  chlorhexidine 2% Cloths 1 Application(s) Topical <User Schedule>  chlorhexidine 4% Liquid 1 Application(s) Topical <User Schedule>  heparin   Injectable 5000 Unit(s) SubCutaneous every 8 hours  metoprolol succinate ER 50 milliGRAM(s) Oral daily  psyllium Powder 1 Packet(s) Oral daily  quiNIDine gluconate  milliGRAM(s) Oral every 12 hours  spironolactone 25 milliGRAM(s) Oral daily  tamsulosin 0.4 milliGRAM(s) Oral at bedtime  torsemide 20 milliGRAM(s) Oral daily  traZODone 100 milliGRAM(s) Oral at bedtime    MEDICATIONS  (PRN):  acetaminophen     Tablet .. 650 milliGRAM(s) Oral every 6 hours PRN Mild Pain (1 - 3), Moderate Pain (4 - 6)  artificial  tears Solution 1 Drop(s) Both EYES every 4 hours PRN Dry Eyes

## 2025-03-28 NOTE — PROGRESS NOTE ADULT - PROBLEM SELECTOR PLAN 2
- appreciate EP input  - c/w quinidine 324mg BID    - c/w toprol 50 mg po QD   - s/p CRT-D  -- please interrogate for possible oversensing noted on telemetry on 3/25 at 1:57:25 as well as 3/27 1:56:52  -please keep K>4 and Mg>2, replenish with K tabs and IV mag as needed

## 2025-03-28 NOTE — PROVIDER CONTACT NOTE (OTHER) - ACTION/TREATMENT ORDERED:
Patient and family were able to provide teach back. Will continue to reinforce post-transplant care education.

## 2025-03-28 NOTE — PROGRESS NOTE ADULT - ASSESSMENT
69-year-old male ABO O past medical history of NICM since 2011, HFrEF LVEF 25-30% s/p MDT CRT-D with baseline CHB, VT/VF, a.fib s/p GIANFRANCO ligation/MAZE, MV/TV repair, PVC ablation 3/2024 intolerant to AADs in the past, recent admission 3/19 - 3/20/25 for AICD shocks initially presented to the [facility] emergency department on 3/21/2025, sent by heart failure specialist for ACID shock. Device reported successful ATP for VT 3/17 at 6:52pm followed by one ATP and 40J shock. He reported feeling dizzy and SOB during the events. He follows with Dr Luca Tamayo for HF, currently undergoing transplant workup, Dr John for CRTD and VT/VF and  for genetic counseling. While admitted to [facility], he was started on a lidocaine gtt. On 3/21 when lidocaine weaned off patient had another two episodes of VT requiring AICD shocks. He was transferred to [facility] for further management.     His listing status now upgraded to UNOS status 2e for heart transplant, ABO, O. He has been electrically quiescent thus far on PO antiarrhythmics. Endorsed some dizziness and appears low euvolemic on exam (bedside POCUS show IVC collapsable) and will adjust his GDMTs and diuretics.    Bedside hemodynamics:  3/22/25 BP 97/76/83, HR 80, CVP 6, PA 58/23/38, PCWP 20, SVR 1100, Gucci CO/CI 5.1/2.6, TD 4/2.08    Cardiac Studies:   TTE 3/20/25 : LVEF 30%, segmental, LVIDd 5.3, walls normal, elevated LV filling pressures, mod RVE with mildly reduced RV function, TAPSE 1.7, severely dilated RA, annuloplasty in MV position, transvalvular gradients are elevated with severe prosthetic MS (peak gradient 15.7, mean gradient 8), trace intravalvular MR, TV annuloplasty ring noted, mild TR,  est PASP 36, no evidence of LV thrombus  TTE 10/2024: LVEF 25-30%, LVEDD 5.9cm, moderately reduced RV function, annuloplasty ring of mitral and tricuspid position, PASP 47 mmHg  RHC 3/19/25- RA 11 PA 58/26 PCWP 32 CO/CI 5.43/2.77  [facility] 6/2023 Nonobstructive CAD

## 2025-03-28 NOTE — PROGRESS NOTE ADULT - ASSESSMENT
70yoM w/ PMHx of NICM since 2011, HFrEF (25-30%) s/p MDT CRT-D with baseline CHB , VT/VF, afib s/p GIANFRANCO ligation/MAZE, MV/TV repair, PVC ablation (3/2024) was transferred from outside hospital to [facility] due to  VT/AICD shock.   While admitted to the outside hospital, he was started on a lidocaine gtt. On 3/21 when lidocaine weaned off patient had recurrence of VT requiring AICD shocks. Hence,  was transferred to [facility] for further management. Pt  was on Lidocaine gtt for control and is now on Quinidine and uptitrated Toprol for anti-arrythmia. Because of pt's refractory VT pt's listing status for a Heart Transplant was bumped from 6 to 2E temporarily. Pt will remain inpatient for listing purposes and managed closely by HF team and hence was transferred to [facility] on 3/27/25.

## 2025-03-28 NOTE — PROGRESS NOTE ADULT - PROBLEM SELECTOR PLAN 1
- listed UNOS status 2e for heart transplant, ABO O  - appears euvolemic, will decrease torsemide to 10mg QD i/s/o of dizziness endorsing today  - holding hydralazine 25mg TID iso hypotension   - continue Toprol XL 50mg daily.  - Will hold spironolactone 25mg QD for now, as he endorse previous hx of dizziness with it. Will retrial again in the future once dizziness resolves  -strict I/Os and daily weights. Keep K>4 and Mg>2

## 2025-03-28 NOTE — PROGRESS NOTE ADULT - PROBLEM SELECTOR PLAN 2
TTE 3/20/25 : LVEF 30%,transvalvular gradients are elevated with severe prosthetic Mitral Stenosis, no evidence of LV thrombus  S/P recent admit 3/19/2025 w/ RHC found to have elevated filling pressures treated with IV diuretics  Diuretics was held 3/26, s/p IVF for low BP. now on Torsemide 20 oral daily  GDMT: Toprol, Torsemide; discuss w/ HF as pt refusing Spironolactone  Afterload reduction on hold w/ borderline BPs  Holding home Farxiga while inpatient  HF team is following and status upgraded for transplant  cont to closely monitor

## 2025-03-28 NOTE — PROGRESS NOTE ADULT - SUBJECTIVE AND OBJECTIVE BOX
Subjective:    Medications:  acetaminophen     Tablet .. 650 milliGRAM(s) Oral every 6 hours PRN  artificial  tears Solution 1 Drop(s) Both EYES every 4 hours PRN  atorvastatin 10 milliGRAM(s) Oral at bedtime  chlorhexidine 2% Cloths 1 Application(s) Topical <User Schedule>  chlorhexidine 4% Liquid 1 Application(s) Topical <User Schedule>  heparin   Injectable 5000 Unit(s) SubCutaneous every 8 hours  metoprolol succinate ER 50 milliGRAM(s) Oral daily  psyllium Powder 1 Packet(s) Oral daily  quiNIDine gluconate  milliGRAM(s) Oral every 12 hours  spironolactone 25 milliGRAM(s) Oral daily  tamsulosin 0.4 milliGRAM(s) Oral at bedtime  torsemide 20 milliGRAM(s) Oral daily  traZODone 100 milliGRAM(s) Oral at bedtime      Physical Exam:    Vitals:  Vital Signs Last 24 Hours  T(C): 36.3 (25 @ 12:20), Max: 36.9 (25 @ 04:32)  HR: 80 (25 @ 12:20) (80 - 90)  BP: 97/65 (25 @ 12:20) (93/60 - 100/67)  RR: 18 (25 @ 12:20) (18 - 18)  SpO2: 95% (25 @ 12:20) (90% - 97%)    Weight in k.7 ( @ 08:25)    I&O's Summary    27 Mar 2025 07:01  -  28 Mar 2025 07:00  --------------------------------------------------------  IN: 582 mL / OUT: 500 mL / NET: 82 mL    28 Mar 2025 07:  -  28 Mar 2025 14:14  --------------------------------------------------------  IN: 445 mL / OUT: 500 mL / NET: -55 mL        Tele:    General: No distress. Comfortable.  HEENT: EOM intact.  Neck: Neck supple. JVP not elevated. No masses  Chest: Clear to auscultation bilaterally  CV: Normal S1 and S2. No murmurs, rub, or gallops. Radial pulses normal.  Abdomen: Soft, non-distended, non-tender  Skin: No rashes or skin breakdown  Neurology: Alert and oriented times three. Sensation intact  Psych: Affect normal    Labs:                        13.2   6.01  )-----------( 115      ( 28 Mar 2025 06:14 )             39.8         136  |  100  |  24[H]  ----------------------------<  87  3.7   |  23  |  1.41[H]    Ca    9.0      28 Mar 2025 06:15  Phos  2.8       Mg     2.0         TPro  6.6  /  Alb  3.8  /  TBili  0.4  /  DBili  x   /  AST  25  /  ALT  24  /  AlkPhos  80      PT/INR - ( 28 Mar 2025 06:14 )   PT: 12.6 sec;   INR: 1.10 ratio         PTT - ( 28 Mar 2025 06:14 )  PTT:32.0 sec          Lactate Dehydrogenase, Serum: 208 U/L ( @ 06:15)       Subjective: Patient reports feeling, just came back form a walk in the hallway and felt well.    Medications:  acetaminophen     Tablet .. 650 milliGRAM(s) Oral every 6 hours PRN  artificial  tears Solution 1 Drop(s) Both EYES every 4 hours PRN  atorvastatin 10 milliGRAM(s) Oral at bedtime  chlorhexidine 2% Cloths 1 Application(s) Topical <User Schedule>  chlorhexidine 4% Liquid 1 Application(s) Topical <User Schedule>  heparin   Injectable 5000 Unit(s) SubCutaneous every 8 hours  metoprolol succinate ER 50 milliGRAM(s) Oral daily  psyllium Powder 1 Packet(s) Oral daily  quiNIDine gluconate  milliGRAM(s) Oral every 12 hours  spironolactone 25 milliGRAM(s) Oral daily  tamsulosin 0.4 milliGRAM(s) Oral at bedtime  torsemide 20 milliGRAM(s) Oral daily  traZODone 100 milliGRAM(s) Oral at bedtime      Physical Exam:    Vitals:  Vital Signs Last 24 Hours  T(C): 36.3 (25 @ 12:20), Max: 36.9 (25 @ 04:32)  HR: 80 (25 @ 12:20) (80 - 90)  BP: 97/65 (25 @ 12:20) (93/60 - 100/67)  RR: 18 (25 @ 12:20) (18 - 18)  SpO2: 95% (25 @ 12:20) (90% - 97%)    Weight in k.7 ( @ 08:25)    I&O's Summary    27 Mar 2025 07:01  -  28 Mar 2025 07:00  --------------------------------------------------------  IN: 582 mL / OUT: 500 mL / NET: 82 mL    28 Mar 2025 07:  -  28 Mar 2025 14:14  --------------------------------------------------------  IN: 445 mL / OUT: 500 mL / NET: -55 mL        Tele:    General: No distress. Comfortable.  HEENT: EOM intact.  Neck: Neck supple. JVP not elevated. No masses  Chest: Clear to auscultation bilaterally  CV: Normal S1 and S2. No murmurs, rub, or gallops. Radial pulses normal.  Abdomen: Soft, non-distended, non-tender  Skin: No rashes or skin breakdown  Neurology: Alert and oriented times three. Sensation intact  Psych: Affect normal    Labs:                        13.2   6.01  )-----------( 115      ( 28 Mar 2025 06:14 )             39.8         136  |  100  |  24[H]  ----------------------------<  87  3.7   |  23  |  1.41[H]    Ca    9.0      28 Mar 2025 06:15  Phos  2.8       Mg     2.0         TPro  6.6  /  Alb  3.8  /  TBili  0.4  /  DBili  x   /  AST  25  /  ALT  24  /  AlkPhos  80      PT/INR - ( 28 Mar 2025 06:14 )   PT: 12.6 sec;   INR: 1.10 ratio         PTT - ( 28 Mar 2025 06:14 )  PTT:32.0 sec          Lactate Dehydrogenase, Serum: 208 U/L ( @ 06:15)

## 2025-03-29 LAB
ALBUMIN SERPL ELPH-MCNC: 3.8 G/DL — SIGNIFICANT CHANGE UP (ref 3.3–5)
ALP SERPL-CCNC: 86 U/L — SIGNIFICANT CHANGE UP (ref 40–120)
ALT FLD-CCNC: 36 U/L — SIGNIFICANT CHANGE UP (ref 10–45)
ANION GAP SERPL CALC-SCNC: 13 MMOL/L — SIGNIFICANT CHANGE UP (ref 5–17)
AST SERPL-CCNC: 36 U/L — SIGNIFICANT CHANGE UP (ref 10–40)
BILIRUB SERPL-MCNC: 0.4 MG/DL — SIGNIFICANT CHANGE UP (ref 0.2–1.2)
BUN SERPL-MCNC: 24 MG/DL — HIGH (ref 7–23)
CALCIUM SERPL-MCNC: 9.1 MG/DL — SIGNIFICANT CHANGE UP (ref 8.4–10.5)
CHLORIDE SERPL-SCNC: 101 MMOL/L — SIGNIFICANT CHANGE UP (ref 96–108)
CO2 SERPL-SCNC: 23 MMOL/L — SIGNIFICANT CHANGE UP (ref 22–31)
CREAT SERPL-MCNC: 1.32 MG/DL — HIGH (ref 0.5–1.3)
EGFR: 58 ML/MIN/1.73M2 — LOW
EGFR: 58 ML/MIN/1.73M2 — LOW
FLUAV AG NPH QL: SIGNIFICANT CHANGE UP
FLUBV AG NPH QL: SIGNIFICANT CHANGE UP
GLUCOSE SERPL-MCNC: 91 MG/DL — SIGNIFICANT CHANGE UP (ref 70–99)
HCT VFR BLD CALC: 37.9 % — LOW (ref 39–50)
HGB BLD-MCNC: 12.3 G/DL — LOW (ref 13–17)
MAGNESIUM SERPL-MCNC: 2.1 MG/DL — SIGNIFICANT CHANGE UP (ref 1.6–2.6)
MCHC RBC-ENTMCNC: 29 PG — SIGNIFICANT CHANGE UP (ref 27–34)
MCHC RBC-ENTMCNC: 32.5 G/DL — SIGNIFICANT CHANGE UP (ref 32–36)
MCV RBC AUTO: 89.4 FL — SIGNIFICANT CHANGE UP (ref 80–100)
NRBC BLD AUTO-RTO: 0 /100 WBCS — SIGNIFICANT CHANGE UP (ref 0–0)
PLATELET # BLD AUTO: 122 K/UL — LOW (ref 150–400)
POTASSIUM SERPL-MCNC: 4.1 MMOL/L — SIGNIFICANT CHANGE UP (ref 3.5–5.3)
POTASSIUM SERPL-SCNC: 4.1 MMOL/L — SIGNIFICANT CHANGE UP (ref 3.5–5.3)
PROT SERPL-MCNC: 6.5 G/DL — SIGNIFICANT CHANGE UP (ref 6–8.3)
RBC # BLD: 4.24 M/UL — SIGNIFICANT CHANGE UP (ref 4.2–5.8)
RBC # FLD: 14.1 % — SIGNIFICANT CHANGE UP (ref 10.3–14.5)
RSV RNA NPH QL NAA+NON-PROBE: SIGNIFICANT CHANGE UP
SARS-COV-2 RNA SPEC QL NAA+PROBE: SIGNIFICANT CHANGE UP
SODIUM SERPL-SCNC: 137 MMOL/L — SIGNIFICANT CHANGE UP (ref 135–145)
SOURCE RESPIRATORY: SIGNIFICANT CHANGE UP
WBC # BLD: 5.56 K/UL — SIGNIFICANT CHANGE UP (ref 3.8–10.5)
WBC # FLD AUTO: 5.56 K/UL — SIGNIFICANT CHANGE UP (ref 3.8–10.5)

## 2025-03-29 PROCEDURE — 99233 SBSQ HOSP IP/OBS HIGH 50: CPT

## 2025-03-29 PROCEDURE — 93010 ELECTROCARDIOGRAM REPORT: CPT

## 2025-03-29 RX ORDER — DEXTROMETHORPHAN HBR, GUAIFENESIN 200 MG/10ML
200 LIQUID ORAL EVERY 6 HOURS
Refills: 0 | Status: DISCONTINUED | OUTPATIENT
Start: 2025-03-29 | End: 2025-04-29

## 2025-03-29 RX ORDER — TORSEMIDE 10 MG
10 TABLET ORAL DAILY
Refills: 0 | Status: DISCONTINUED | OUTPATIENT
Start: 2025-03-29 | End: 2025-04-01

## 2025-03-29 RX ADMIN — Medication 324 MILLIGRAM(S): at 17:15

## 2025-03-29 RX ADMIN — Medication 1 APPLICATION(S): at 09:10

## 2025-03-29 RX ADMIN — Medication 100 MILLIGRAM(S): at 21:10

## 2025-03-29 RX ADMIN — Medication 20 MILLIGRAM(S): at 05:32

## 2025-03-29 RX ADMIN — HEPARIN SODIUM 5000 UNIT(S): 1000 INJECTION INTRAVENOUS; SUBCUTANEOUS at 21:10

## 2025-03-29 RX ADMIN — Medication 1 PACKET(S): at 10:59

## 2025-03-29 RX ADMIN — METOPROLOL SUCCINATE 50 MILLIGRAM(S): 50 TABLET, EXTENDED RELEASE ORAL at 05:32

## 2025-03-29 RX ADMIN — Medication 324 MILLIGRAM(S): at 05:31

## 2025-03-29 RX ADMIN — DEXTROMETHORPHAN HBR, GUAIFENESIN 200 MILLIGRAM(S): 200 LIQUID ORAL at 21:10

## 2025-03-29 RX ADMIN — HEPARIN SODIUM 5000 UNIT(S): 1000 INJECTION INTRAVENOUS; SUBCUTANEOUS at 05:33

## 2025-03-29 RX ADMIN — ATORVASTATIN CALCIUM 10 MILLIGRAM(S): 80 TABLET, FILM COATED ORAL at 21:11

## 2025-03-29 RX ADMIN — HEPARIN SODIUM 5000 UNIT(S): 1000 INJECTION INTRAVENOUS; SUBCUTANEOUS at 13:25

## 2025-03-29 RX ADMIN — TAMSULOSIN HYDROCHLORIDE 0.4 MILLIGRAM(S): 0.4 CAPSULE ORAL at 21:10

## 2025-03-29 NOTE — PROGRESS NOTE ADULT - SUBJECTIVE AND OBJECTIVE BOX
INCOMPLETE NOTE    Aditya Ross M.D.  Division of Hospital Medicine  Available on [employer] TEAMS    SUBJECTIVE / ACUTE INTERVAL EVENTS: Patient seen and examined. No overnight events. No subjective complaints.     OBJECTIVE:   Vital Signs Last 24 Hrs  T(F): 98.5 (29 Mar 2025 04:19), Max: 98.5 (29 Mar 2025 04:19)  HR: 81 (29 Mar 2025 04:19) (75 - 81)  BP: 99/66 (29 Mar 2025 04:19) (97/65 - 100/58)  RR: 18 (29 Mar 2025 04:19) (18 - 18)  SpO2: 92% (29 Mar 2025 04:19) (92% - 95%)    Parameters below as of 29 Mar 2025 04:19  Patient On (Oxygen Delivery Method): room air    I&O's Summary  28 Mar 2025 07:01  -  29 Mar 2025 07:00  --------------------------------------------------------  IN: 667 mL / OUT: 950 mL / NET: -283 mL    Physical Examination:  GEN: elderly man, sitting up in bed in NAD  PSYCH: A&Ox3, mood and affect appear appropriate   NEURO: no focal neurologic deficits appreciated  RESPI: no accessory muscle use, B/L air entry   CARDIO: regular rate/rhythm, no LE edema B/L  ABD: soft, NT, ND  EXT: patient able to move all extremities spontaneously  VASC: peripheral pulses palpated    Labs:                      12.3   5.56  )-----------( 122      ( 29 Mar 2025 06:07 )             37.9     03-29  137  |  101  |  24[H]  ----------------------------<  91  4.1   |  23  |  1.32[H]    Ca    9.1      29 Mar 2025 06:07  Mg     2.1     03-29    TPro  6.5  /  Alb  3.8  /  TBili  0.4  /  DBili  x   /  AST  36  /  ALT  36  /  AlkPhos  86  03-29    PT/INR - ( 28 Mar 2025 06:14 )   PT: 12.6 sec;   INR: 1.10 ratio    PTT - ( 28 Mar 2025 06:14 )  PTT:32.0 sec    Urinalysis Basic - ( 29 Mar 2025 06:07 )  Color: x / Appearance: x / SG: x / pH: x  Gluc: 91 mg/dL / Ketone: x  / Bili: x / Urobili: x   Blood: x / Protein: x / Nitrite: x   Leuk Esterase: x / RBC: x / WBC x   Sq Epi: x / Non Sq Epi: x / Bacteria: x    Consultant(s) Notes Reviewed: EP-Interrogation, Heart Failure, Care Coordination    Care Discussed with Consultants/Other Providers: ACP Cindy Reyes    MEDICATIONS  (STANDING):  atorvastatin 10 milliGRAM(s) Oral at bedtime  chlorhexidine 2% Cloths 1 Application(s) Topical <User Schedule>  chlorhexidine 4% Liquid 1 Application(s) Topical <User Schedule>  heparin   Injectable 5000 Unit(s) SubCutaneous every 8 hours  metoprolol succinate ER 50 milliGRAM(s) Oral daily  psyllium Powder 1 Packet(s) Oral daily  quiNIDine gluconate  milliGRAM(s) Oral every 12 hours  tamsulosin 0.4 milliGRAM(s) Oral at bedtime  torsemide 10 milliGRAM(s) Oral daily  traZODone 100 milliGRAM(s) Oral at bedtime    MEDICATIONS  (PRN):  acetaminophen     Tablet .. 650 milliGRAM(s) Oral every 6 hours PRN Mild Pain (1 - 3), Moderate Pain (4 - 6)  artificial  tears Solution 1 Drop(s) Both EYES every 4 hours PRN Dry Eyes Aditya Ross M.D.  Division of Hospital Medicine  Available on [employer] TEAMS    SUBJECTIVE / ACUTE INTERVAL EVENTS: Patient seen and examined with his family at his bedside. No overnight events. Patient complains of cough, likely related to post-nasal drip, at night while sleeping, that has been keeping him up. Will send RVP and add antitussives. Patient requesting SW consult.     OBJECTIVE:   Vital Signs Last 24 Hrs  T(F): 98.5 (29 Mar 2025 04:19), Max: 98.5 (29 Mar 2025 04:19)  HR: 81 (29 Mar 2025 04:19) (75 - 81)  BP: 99/66 (29 Mar 2025 04:19) (97/65 - 100/58)  RR: 18 (29 Mar 2025 04:19) (18 - 18)  SpO2: 92% (29 Mar 2025 04:19) (92% - 95%)    Parameters below as of 29 Mar 2025 04:19  Patient On (Oxygen Delivery Method): room air    I&O's Summary  28 Mar 2025 07:01  -  29 Mar 2025 07:00  --------------------------------------------------------  IN: 667 mL / OUT: 950 mL / NET: -283 mL    Physical Examination:  GEN: elderly man, sitting up in bed in NAD  PSYCH: A&Ox3, mood and affect appear appropriate   NEURO: no focal neurologic deficits appreciated  RESPI: no accessory muscle use, B/L air entry   CARDIO: regular rate/rhythm, no LE edema B/L  ABD: soft, NT, ND  EXT: patient able to move all extremities spontaneously  VASC: peripheral pulses palpated    Labs:                      12.3   5.56  )-----------( 122      ( 29 Mar 2025 06:07 )             37.9     03-29  137  |  101  |  24[H]  ----------------------------<  91  4.1   |  23  |  1.32[H]    Ca    9.1      29 Mar 2025 06:07  Mg     2.1     03-29    TPro  6.5  /  Alb  3.8  /  TBili  0.4  /  DBili  x   /  AST  36  /  ALT  36  /  AlkPhos  86  03-29    PT/INR - ( 28 Mar 2025 06:14 )   PT: 12.6 sec;   INR: 1.10 ratio    PTT - ( 28 Mar 2025 06:14 )  PTT:32.0 sec    Urinalysis Basic - ( 29 Mar 2025 06:07 )  Color: x / Appearance: x / SG: x / pH: x  Gluc: 91 mg/dL / Ketone: x  / Bili: x / Urobili: x   Blood: x / Protein: x / Nitrite: x   Leuk Esterase: x / RBC: x / WBC x   Sq Epi: x / Non Sq Epi: x / Bacteria: x    Consultant(s) Notes Reviewed: EP-Interrogation, Heart Failure, Care Coordination    Care Discussed with Consultants/Other Providers: ACP Cindy Reyes    MEDICATIONS  (STANDING):  atorvastatin 10 milliGRAM(s) Oral at bedtime  chlorhexidine 2% Cloths 1 Application(s) Topical <User Schedule>  chlorhexidine 4% Liquid 1 Application(s) Topical <User Schedule>  heparin   Injectable 5000 Unit(s) SubCutaneous every 8 hours  metoprolol succinate ER 50 milliGRAM(s) Oral daily  psyllium Powder 1 Packet(s) Oral daily  quiNIDine gluconate  milliGRAM(s) Oral every 12 hours  tamsulosin 0.4 milliGRAM(s) Oral at bedtime  torsemide 10 milliGRAM(s) Oral daily  traZODone 100 milliGRAM(s) Oral at bedtime    MEDICATIONS  (PRN):  acetaminophen     Tablet .. 650 milliGRAM(s) Oral every 6 hours PRN Mild Pain (1 - 3), Moderate Pain (4 - 6)  artificial  tears Solution 1 Drop(s) Both EYES every 4 hours PRN Dry Eyes

## 2025-03-29 NOTE — PROGRESS NOTE ADULT - ASSESSMENT
70yoM w/ PMHx of NICM since 2011, HFrEF (25-30%) s/p MDT CRT-D with baseline CHB , VT/VF, afib s/p GIANFRANCO ligation/MAZE, MV/TV repair, PVC ablation (3/2024) was transferred from outside hospital to [facility] due to  VT/AICD shock.   While admitted to the outside hospital, he was started on a lidocaine gtt. On 3/21 when lidocaine weaned off patient had recurrence of VT requiring AICD shocks. Hence,  was transferred to [facility] for further management. Pt  was on Lidocaine gtt for control and is now on Quinidine and uptitrated Toprol for anti-arrythmia. Because of pt's refractory VT pt's listing status for a Heart Transplant was bumped from 6 to 2E temporarily. Pt will remain inpatient for listing purposes and managed closely by HF team and hence was transferred to [facility] on 3/27/25.

## 2025-03-30 LAB
ANION GAP SERPL CALC-SCNC: 14 MMOL/L — SIGNIFICANT CHANGE UP (ref 5–17)
BUN SERPL-MCNC: 25 MG/DL — HIGH (ref 7–23)
CALCIUM SERPL-MCNC: 9.2 MG/DL — SIGNIFICANT CHANGE UP (ref 8.4–10.5)
CHLORIDE SERPL-SCNC: 101 MMOL/L — SIGNIFICANT CHANGE UP (ref 96–108)
CO2 SERPL-SCNC: 25 MMOL/L — SIGNIFICANT CHANGE UP (ref 22–31)
CREAT SERPL-MCNC: 1.29 MG/DL — SIGNIFICANT CHANGE UP (ref 0.5–1.3)
EGFR: 60 ML/MIN/1.73M2 — SIGNIFICANT CHANGE UP
EGFR: 60 ML/MIN/1.73M2 — SIGNIFICANT CHANGE UP
GLUCOSE SERPL-MCNC: 95 MG/DL — SIGNIFICANT CHANGE UP (ref 70–99)
MAGNESIUM SERPL-MCNC: 2 MG/DL — SIGNIFICANT CHANGE UP (ref 1.6–2.6)
PHOSPHATE SERPL-MCNC: 3 MG/DL — SIGNIFICANT CHANGE UP (ref 2.5–4.5)
POTASSIUM SERPL-MCNC: 4.3 MMOL/L — SIGNIFICANT CHANGE UP (ref 3.5–5.3)
POTASSIUM SERPL-SCNC: 4.3 MMOL/L — SIGNIFICANT CHANGE UP (ref 3.5–5.3)
SODIUM SERPL-SCNC: 140 MMOL/L — SIGNIFICANT CHANGE UP (ref 135–145)

## 2025-03-30 PROCEDURE — 99233 SBSQ HOSP IP/OBS HIGH 50: CPT

## 2025-03-30 RX ADMIN — HEPARIN SODIUM 5000 UNIT(S): 1000 INJECTION INTRAVENOUS; SUBCUTANEOUS at 05:33

## 2025-03-30 RX ADMIN — Medication 324 MILLIGRAM(S): at 05:33

## 2025-03-30 RX ADMIN — Medication 1 PACKET(S): at 17:01

## 2025-03-30 RX ADMIN — Medication 10 MILLIGRAM(S): at 05:46

## 2025-03-30 RX ADMIN — METOPROLOL SUCCINATE 50 MILLIGRAM(S): 50 TABLET, EXTENDED RELEASE ORAL at 05:33

## 2025-03-30 RX ADMIN — HEPARIN SODIUM 5000 UNIT(S): 1000 INJECTION INTRAVENOUS; SUBCUTANEOUS at 21:24

## 2025-03-30 RX ADMIN — Medication 1 APPLICATION(S): at 05:33

## 2025-03-30 RX ADMIN — Medication 324 MILLIGRAM(S): at 17:00

## 2025-03-30 RX ADMIN — TAMSULOSIN HYDROCHLORIDE 0.4 MILLIGRAM(S): 0.4 CAPSULE ORAL at 21:24

## 2025-03-30 RX ADMIN — HEPARIN SODIUM 5000 UNIT(S): 1000 INJECTION INTRAVENOUS; SUBCUTANEOUS at 17:00

## 2025-03-30 RX ADMIN — Medication 100 MILLIGRAM(S): at 21:24

## 2025-03-30 RX ADMIN — ATORVASTATIN CALCIUM 10 MILLIGRAM(S): 80 TABLET, FILM COATED ORAL at 21:24

## 2025-03-30 NOTE — PROGRESS NOTE ADULT - SUBJECTIVE AND OBJECTIVE BOX
INCOMPLETE NOTE    Aditya Ross M.D.  Division of Hospital Medicine  Available on [employer] TEAMS    SUBJECTIVE / ACUTE INTERVAL EVENTS: Patient seen and examined. No overnight events. No subjective complaints.     OBJECTIVE:   Vital Signs Last 24 Hrs  T(F): 97.8 (30 Mar 2025 04:00), Max: 98.9 (29 Mar 2025 20:03)  HR: 80 (30 Mar 2025 05:32) (79 - 82)  BP: 101/68 (30 Mar 2025 05:32) (94/65 - 106/73)  RR: 18 (30 Mar 2025 04:00) (18 - 18)  SpO2: 93% (30 Mar 2025 04:00) (93% - 97%)  Parameters below as of 30 Mar 2025 04:00  Patient On (Oxygen Delivery Method): room air    I&O's Summary  29 Mar 2025 07:01  -  30 Mar 2025 07:00  --------------------------------------------------------  IN: 455 mL / OUT: 0 mL / NET: 455 mL    30 Mar 2025 07:01  -  30 Mar 2025 09:43  --------------------------------------------------------  IN: 225 mL / OUT: 0 mL / NET: 225 mL    Physical Examination:  GEN: elderly man, sitting up in bed in NAD  PSYCH: A&Ox3, mood and affect appear appropriate   NEURO: no focal neurologic deficits appreciated  RESPI: no accessory muscle use, B/L air entry   CARDIO: regular rate/rhythm, no LE edema B/L  ABD: soft, NT, ND  EXT: patient able to move all extremities spontaneously  VASC: peripheral pulses palpated    Labs:                     12.3   5.56  )-----------( 122      ( 29 Mar 2025 06:07 )             37.9     03-30  140  |  101  |  25[H]  ----------------------------<  95  4.3   |  25  |  1.29    Ca    9.2      30 Mar 2025 06:24  Phos  3.0     03-30  Mg     2.0     03-30    TPro  6.5  /  Alb  3.8  /  TBili  0.4  /  DBili  x   /  AST  36  /  ALT  36  /  AlkPhos  86  03-29    Urinalysis Basic - ( 30 Mar 2025 06:24 )  Color: x / Appearance: x / SG: x / pH: x  Gluc: 95 mg/dL / Ketone: x  / Bili: x / Urobili: x   Blood: x / Protein: x / Nitrite: x   Leuk Esterase: x / RBC: x / WBC x   Sq Epi: x / Non Sq Epi: x / Bacteria: x    Care Discussed with Consultants/Other Providers: DAV Russell    MEDICATIONS  (STANDING):  atorvastatin 10 milliGRAM(s) Oral at bedtime  chlorhexidine 2% Cloths 1 Application(s) Topical <User Schedule>  chlorhexidine 4% Liquid 1 Application(s) Topical <User Schedule>  heparin   Injectable 5000 Unit(s) SubCutaneous every 8 hours  metoprolol succinate ER 50 milliGRAM(s) Oral daily  psyllium Powder 1 Packet(s) Oral daily  quiNIDine gluconate  milliGRAM(s) Oral every 12 hours  tamsulosin 0.4 milliGRAM(s) Oral at bedtime  torsemide 10 milliGRAM(s) Oral daily  traZODone 100 milliGRAM(s) Oral at bedtime    MEDICATIONS  (PRN):  acetaminophen     Tablet .. 650 milliGRAM(s) Oral every 6 hours PRN Mild Pain (1 - 3), Moderate Pain (4 - 6)  artificial  tears Solution 1 Drop(s) Both EYES every 4 hours PRN Dry Eyes  guaiFENesin Oral Liquid (Sugar-Free) 200 milliGRAM(s) Oral every 6 hours PRN Cough Aditya Ross M.D.  Division of Hospital Medicine  Available on [employer] TEAMS    SUBJECTIVE / ACUTE INTERVAL EVENTS: Patient seen and examined with his family at his bedside. No overnight events. No subjective complaints except for return of diarrhea this AM -- will send GI PCR and monitor stool count; monitor electrolytes closely. RVP negative.     OBJECTIVE:   Vital Signs Last 24 Hrs  T(F): 97.8 (30 Mar 2025 04:00), Max: 98.9 (29 Mar 2025 20:03)  HR: 80 (30 Mar 2025 05:32) (79 - 82)  BP: 101/68 (30 Mar 2025 05:32) (94/65 - 106/73)  RR: 18 (30 Mar 2025 04:00) (18 - 18)  SpO2: 93% (30 Mar 2025 04:00) (93% - 97%)  Parameters below as of 30 Mar 2025 04:00  Patient On (Oxygen Delivery Method): room air    I&O's Summary  29 Mar 2025 07:01  -  30 Mar 2025 07:00  --------------------------------------------------------  IN: 455 mL / OUT: 0 mL / NET: 455 mL    30 Mar 2025 07:01  -  30 Mar 2025 09:43  --------------------------------------------------------  IN: 225 mL / OUT: 0 mL / NET: 225 mL    Physical Examination:  GEN: elderly man, sitting up in bed in NAD  PSYCH: A&Ox3, mood and affect appear appropriate   NEURO: no focal neurologic deficits appreciated  RESPI: no accessory muscle use, B/L air entry   CARDIO: regular rate/rhythm, no LE edema B/L  ABD: soft, NT, ND  EXT: patient able to move all extremities spontaneously  VASC: peripheral pulses palpated    Labs:                     12.3   5.56  )-----------( 122      ( 29 Mar 2025 06:07 )             37.9     03-30  140  |  101  |  25[H]  ----------------------------<  95  4.3   |  25  |  1.29    Ca    9.2      30 Mar 2025 06:24  Phos  3.0     03-30  Mg     2.0     03-30    TPro  6.5  /  Alb  3.8  /  TBili  0.4  /  DBili  x   /  AST  36  /  ALT  36  /  AlkPhos  86  03-29    Urinalysis Basic - ( 30 Mar 2025 06:24 )  Color: x / Appearance: x / SG: x / pH: x  Gluc: 95 mg/dL / Ketone: x  / Bili: x / Urobili: x   Blood: x / Protein: x / Nitrite: x   Leuk Esterase: x / RBC: x / WBC x   Sq Epi: x / Non Sq Epi: x / Bacteria: x    Care Discussed with Consultants/Other Providers: DAV Russell    MEDICATIONS  (STANDING):  atorvastatin 10 milliGRAM(s) Oral at bedtime  chlorhexidine 2% Cloths 1 Application(s) Topical <User Schedule>  chlorhexidine 4% Liquid 1 Application(s) Topical <User Schedule>  heparin   Injectable 5000 Unit(s) SubCutaneous every 8 hours  metoprolol succinate ER 50 milliGRAM(s) Oral daily  psyllium Powder 1 Packet(s) Oral daily  quiNIDine gluconate  milliGRAM(s) Oral every 12 hours  tamsulosin 0.4 milliGRAM(s) Oral at bedtime  torsemide 10 milliGRAM(s) Oral daily  traZODone 100 milliGRAM(s) Oral at bedtime    MEDICATIONS  (PRN):  acetaminophen     Tablet .. 650 milliGRAM(s) Oral every 6 hours PRN Mild Pain (1 - 3), Moderate Pain (4 - 6)  artificial  tears Solution 1 Drop(s) Both EYES every 4 hours PRN Dry Eyes  guaiFENesin Oral Liquid (Sugar-Free) 200 milliGRAM(s) Oral every 6 hours PRN Cough

## 2025-03-30 NOTE — PROGRESS NOTE ADULT - PROBLEM SELECTOR PLAN 1
s/p Lidocaine drip S/P one dose of Mexiletine (Mexiletine was d/dwayne for severe dizziness this admission)   currently on Toprol 50mg daily and Quinidine twice a day   EP is following patient for Vtach and CRT-D shock.   as reported , pt previously did not tolerate amio, mexilitine and sotalol in the past due to severe dizziness.   cont with tele monitor, monitor electrolytes  h/o ani mitral PVC (premature ventricular contractions) ablation 3/11/2024  f/up with EP for further recommendation - CRTD interrogation on 3/28  monitor QTC while on Quinidine/ Last QTC was 493 on 3/27    monitor stool count, and electrolytes given reported diarrhea on 3/30 -- send GI PCR

## 2025-03-30 NOTE — PROGRESS NOTE ADULT - ASSESSMENT
70yoM w/ PMHx of NICM since 2011, HFrEF (25-30%) s/p MDT CRT-D with baseline CHB , VT/VF, afib s/p GIANFRANCO ligation/MAZE, MV/TV repair, PVC ablation (3/2024) was transferred from outside hospital to [facility] due to  VT/AICD shock.   While admitted to the outside hospital, he was started on a lidocaine gtt. On 3/21 when lidocaine weaned off patient had recurrence of VT requiring AICD shocks. Hence,  was transferred to [facility] for further management. Pt  was on Lidocaine gtt for control and is now on Quinidine and uptitrated Toprol for anti-arrythmia. Because of pt's refractory VT pt's listing status for a Heart Transplant was bumped from 6 to 2E temporarily. Pt will remain inpatient for listing purposes and managed closely by HF team and hence was transferred to [facility] on 3/27/25.

## 2025-03-30 NOTE — PROGRESS NOTE ADULT - PROBLEM SELECTOR PLAN 2
TTE 3/20/25 : LVEF 30%,transvalvular gradients are elevated with severe prosthetic Mitral Stenosis, no evidence of LV thrombus  s/p recent admit 3/19/2025 w/ [facility] found to have elevated filling pressures treated with IV diuretics  diuretics were held 3/26, s/p IVF for low BP. now on Torsemide 20 oral daily  GDMT: Toprol, Torsemide; off Spironolactone d/t prior dizziness  afterload reduction on hold w/ borderline BPs  holding home Farxiga while inpatient  HF team is following and status upgraded for transplant  cont to closely monitor

## 2025-03-30 NOTE — PROGRESS NOTE ADULT - PROBLEM SELECTOR PLAN 3
TTE 3/20/25 : LVEF 30%,transvalvular gradients are elevated with SEVERE  prosthetic MS, no evidence of LV thrombus  s/p mitral and tricuspid ring repair  awaiting possible transplant

## 2025-03-31 LAB
ANION GAP SERPL CALC-SCNC: 14 MMOL/L — SIGNIFICANT CHANGE UP (ref 5–17)
BUN SERPL-MCNC: 17 MG/DL — SIGNIFICANT CHANGE UP (ref 7–23)
CALCIUM SERPL-MCNC: 9 MG/DL — SIGNIFICANT CHANGE UP (ref 8.4–10.5)
CHLORIDE SERPL-SCNC: 100 MMOL/L — SIGNIFICANT CHANGE UP (ref 96–108)
CO2 SERPL-SCNC: 24 MMOL/L — SIGNIFICANT CHANGE UP (ref 22–31)
CREAT SERPL-MCNC: 1.3 MG/DL — SIGNIFICANT CHANGE UP (ref 0.5–1.3)
EGFR: 59 ML/MIN/1.73M2 — LOW
EGFR: 59 ML/MIN/1.73M2 — LOW
GI PCR PANEL: DETECTED
GLUCOSE SERPL-MCNC: 93 MG/DL — SIGNIFICANT CHANGE UP (ref 70–99)
HCT VFR BLD CALC: 39.1 % — SIGNIFICANT CHANGE UP (ref 39–50)
HGB BLD-MCNC: 12.8 G/DL — LOW (ref 13–17)
LEGIONELLA AG UR QL: NEGATIVE — SIGNIFICANT CHANGE UP
MAGNESIUM SERPL-MCNC: 2 MG/DL — SIGNIFICANT CHANGE UP (ref 1.6–2.6)
MCHC RBC-ENTMCNC: 29 PG — SIGNIFICANT CHANGE UP (ref 27–34)
MCHC RBC-ENTMCNC: 32.7 G/DL — SIGNIFICANT CHANGE UP (ref 32–36)
MCV RBC AUTO: 88.5 FL — SIGNIFICANT CHANGE UP (ref 80–100)
NOROVIRUS GI+II RNA STL QL NAA+NON-PROBE: DETECTED
NRBC BLD AUTO-RTO: 0 /100 WBCS — SIGNIFICANT CHANGE UP (ref 0–0)
PHOSPHATE SERPL-MCNC: 3.3 MG/DL — SIGNIFICANT CHANGE UP (ref 2.5–4.5)
PLATELET # BLD AUTO: 125 K/UL — LOW (ref 150–400)
POTASSIUM SERPL-MCNC: 3.7 MMOL/L — SIGNIFICANT CHANGE UP (ref 3.5–5.3)
POTASSIUM SERPL-SCNC: 3.7 MMOL/L — SIGNIFICANT CHANGE UP (ref 3.5–5.3)
RBC # BLD: 4.42 M/UL — SIGNIFICANT CHANGE UP (ref 4.2–5.8)
RBC # FLD: 14 % — SIGNIFICANT CHANGE UP (ref 10.3–14.5)
SODIUM SERPL-SCNC: 138 MMOL/L — SIGNIFICANT CHANGE UP (ref 135–145)
UNFRACTIONATED HEPARIN INTERPRETATION: SIGNIFICANT CHANGE UP
UNFRACTIONATED HEPARIN RESULT: NEGATIVE — SIGNIFICANT CHANGE UP
UNFRACTIONATED HEPARIN-HIGH DOSE: 1 % — SIGNIFICANT CHANGE UP
UNFRACTIONATED HEPARIN-LOW DOSE: 1 % — SIGNIFICANT CHANGE UP
WBC # BLD: 4.92 K/UL — SIGNIFICANT CHANGE UP (ref 3.8–10.5)
WBC # FLD AUTO: 4.92 K/UL — SIGNIFICANT CHANGE UP (ref 3.8–10.5)

## 2025-03-31 PROCEDURE — 99233 SBSQ HOSP IP/OBS HIGH 50: CPT

## 2025-03-31 RX ADMIN — Medication 324 MILLIGRAM(S): at 17:05

## 2025-03-31 RX ADMIN — Medication 10 MILLIGRAM(S): at 05:25

## 2025-03-31 RX ADMIN — Medication 1 APPLICATION(S): at 08:33

## 2025-03-31 RX ADMIN — HEPARIN SODIUM 5000 UNIT(S): 1000 INJECTION INTRAVENOUS; SUBCUTANEOUS at 14:26

## 2025-03-31 RX ADMIN — ATORVASTATIN CALCIUM 10 MILLIGRAM(S): 80 TABLET, FILM COATED ORAL at 23:26

## 2025-03-31 RX ADMIN — TAMSULOSIN HYDROCHLORIDE 0.4 MILLIGRAM(S): 0.4 CAPSULE ORAL at 23:25

## 2025-03-31 RX ADMIN — HEPARIN SODIUM 5000 UNIT(S): 1000 INJECTION INTRAVENOUS; SUBCUTANEOUS at 05:25

## 2025-03-31 RX ADMIN — Medication 324 MILLIGRAM(S): at 05:25

## 2025-03-31 RX ADMIN — Medication 100 MILLIGRAM(S): at 23:26

## 2025-03-31 RX ADMIN — HEPARIN SODIUM 5000 UNIT(S): 1000 INJECTION INTRAVENOUS; SUBCUTANEOUS at 23:26

## 2025-03-31 NOTE — PROGRESS NOTE ADULT - SUBJECTIVE AND OBJECTIVE BOX
Aditya Ross M.D.  Division of Hospital Medicine  Available on [employer] TE[employer]    SUBJECTIVE / ACUTE INTERVAL EVENTS: Patient seen and examined. No overnight events. No subjective complaints.     OBJECTIVE:   Vital Signs Last 24 Hrs  T(C): 36.7 (31 Mar 2025 11:16), Max: 37.1 (30 Mar 2025 20:11)  T(F): 98 (31 Mar 2025 11:16), Max: 98.8 (30 Mar 2025 20:11)  HR: 81 (31 Mar 2025 11:16) (78 - 81)  BP: 96/64 (31 Mar 2025 11:16) (94/65 - 103/71)  BP(mean): --  RR: 18 (31 Mar 2025 11:16) (18 - 18)  SpO2: 95% (31 Mar 2025 11:16) (94% - 95%)    Parameters below as of 31 Mar 2025 11:16  Patient On (Oxygen Delivery Method): room air        I&O's Summary    30 Mar 2025 07:01  -  31 Mar 2025 07:00  --------------------------------------------------------  IN: 730 mL / OUT: 350 mL / NET: 380 mL    31 Mar 2025 07:01  -  31 Mar 2025 13:33  --------------------------------------------------------  IN: 480 mL / OUT: 0 mL / NET: 480 mL        Physical Examination:  GEN: thin man, laying in stretcher in NAD  PSYCH: A&Ox3, mood and affect appear appropriate   SKIN: intact, no e/o rash  NEURO: no focal neurologic deficits appreciated; CN II-XII intact, sensation intact B/L, motor 5/5 in all mm groups  EYES: PERRL, anicteric, EOMI, no vertical/horizontal nystagmus  HEAD: NC, AT  NECK: supple  RESPI: no accessory muscle use, B/L air entry, CTAB   CARDIO: regular rate/rhythm, no LE edema B/L  ABD: soft, NT, ND, +BS  EXT: patient able to move all extremities spontaneously  VASC: peripheral pulses palpated    Telemetry:     Labs:                     12.8   4.92  )-----------( 125      ( 31 Mar 2025 06:09 )             39.1     03-31  138  |  100  |  17  ----------------------------<  93  3.7   |  24  |  1.30    Ca    9.0      31 Mar 2025 06:11  Phos  3.3     03-31  Mg     2.0     03-31    Urinalysis Basic - ( 31 Mar 2025 06:11 )  Color: x / Appearance: x / SG: x / pH: x  Gluc: 93 mg/dL / Ketone: x  / Bili: x / Urobili: x   Blood: x / Protein: x / Nitrite: x   Leuk Esterase: x / RBC: x / WBC x   Sq Epi: x / Non Sq Epi: x / Bacteria: x    Imaging Personally Reviewed:    Consultant(s) Notes Reviewed:    Care Discussed with Consultants/Other Providers:    MEDICATIONS  (STANDING):  atorvastatin 10 milliGRAM(s) Oral at bedtime  chlorhexidine 2% Cloths 1 Application(s) Topical <User Schedule>  chlorhexidine 4% Liquid 1 Application(s) Topical <User Schedule>  heparin   Injectable 5000 Unit(s) SubCutaneous every 8 hours  metoprolol succinate ER 50 milliGRAM(s) Oral daily  psyllium Powder 1 Packet(s) Oral daily  quiNIDine gluconate  milliGRAM(s) Oral every 12 hours  tamsulosin 0.4 milliGRAM(s) Oral at bedtime  torsemide 10 milliGRAM(s) Oral daily  traZODone 100 milliGRAM(s) Oral at bedtime    MEDICATIONS  (PRN):  acetaminophen     Tablet .. 650 milliGRAM(s) Oral every 6 hours PRN Mild Pain (1 - 3), Moderate Pain (4 - 6)  artificial  tears Solution 1 Drop(s) Both EYES every 4 hours PRN Dry Eyes  guaiFENesin Oral Liquid (Sugar-Free) 200 milliGRAM(s) Oral every 6 hours PRN Cough Aditya Ross M.D.  Division of Hospital Medicine  Available on [employer] TE[employer]    SUBJECTIVE / ACUTE INTERVAL EVENTS: Patient seen and examined. No overnight events. Patient reports watery diarrhea - GI PCR sent, f/u results; continue to monitor hemodynamics, fluid status, and electrolytes.      OBJECTIVE:   Vital Signs Last 24 Hrs  T(F): 98 (31 Mar 2025 11:16), Max: 98.8 (30 Mar 2025 20:11)  HR: 81 (31 Mar 2025 11:16) (78 - 81)  BP: 96/64 (31 Mar 2025 11:16) (94/65 - 103/71)  RR: 18 (31 Mar 2025 11:16) (18 - 18)  SpO2: 95% (31 Mar 2025 11:16) (94% - 95%)  Parameters below as of 31 Mar 2025 11:16  Patient On (Oxygen Delivery Method): room air    I&O's Summary  30 Mar 2025 07:01  -  31 Mar 2025 07:00  --------------------------------------------------------  IN: 730 mL / OUT: 350 mL / NET: 380 mL    31 Mar 2025 07:01  -  31 Mar 2025 13:33  --------------------------------------------------------  IN: 480 mL / OUT: 0 mL / NET: 480 mL    Physical Examination:  GEN: elderly man, sitting up in bed in NAD  PSYCH: A&Ox3, mood and affect appear appropriate   NEURO: no focal neurologic deficits appreciated  RESPI: no accessory muscle use, B/L air entry   CARDIO: regular rate/rhythm, no LE edema B/L  ABD: soft, NT, ND  EXT: patient able to move all extremities spontaneously  VASC: peripheral pulses palpated    Labs:                     12.8   4.92  )-----------( 125      ( 31 Mar 2025 06:09 )             39.1     03-31  138  |  100  |  17  ----------------------------<  93  3.7   |  24  |  1.30    Ca    9.0      31 Mar 2025 06:11  Phos  3.3     03-31  Mg     2.0     03-31    Urinalysis Basic - ( 31 Mar 2025 06:11 )  Color: x / Appearance: x / SG: x / pH: x  Gluc: 93 mg/dL / Ketone: x  / Bili: x / Urobili: x   Blood: x / Protein: x / Nitrite: x   Leuk Esterase: x / RBC: x / WBC x   Sq Epi: x / Non Sq Epi: x / Bacteria: x    Consultant(s) Notes Reviewed: Care Coordination    Care Discussed with Consultants/Other Providers: DAV Umanzor    MEDICATIONS  (STANDING):  atorvastatin 10 milliGRAM(s) Oral at bedtime  chlorhexidine 2% Cloths 1 Application(s) Topical <User Schedule>  chlorhexidine 4% Liquid 1 Application(s) Topical <User Schedule>  heparin   Injectable 5000 Unit(s) SubCutaneous every 8 hours  metoprolol succinate ER 50 milliGRAM(s) Oral daily  psyllium Powder 1 Packet(s) Oral daily  quiNIDine gluconate  milliGRAM(s) Oral every 12 hours  tamsulosin 0.4 milliGRAM(s) Oral at bedtime  torsemide 10 milliGRAM(s) Oral daily  traZODone 100 milliGRAM(s) Oral at bedtime    MEDICATIONS  (PRN):  acetaminophen     Tablet .. 650 milliGRAM(s) Oral every 6 hours PRN Mild Pain (1 - 3), Moderate Pain (4 - 6)  artificial  tears Solution 1 Drop(s) Both EYES every 4 hours PRN Dry Eyes  guaiFENesin Oral Liquid (Sugar-Free) 200 milliGRAM(s) Oral every 6 hours PRN Cough

## 2025-03-31 NOTE — PROGRESS NOTE ADULT - ASSESSMENT
70yoM w/ PMHx of NICM since 2011, HFrEF (25-30%) s/p MDT CRT-D with baseline CHB , VT/VF, afib s/p GIANFRANCO ligation/MAZE, MV/TV repair, PVC ablation (3/2024) was transferred from outside hospital to [facility] due to  VT/AICD shock.   While admitted to the outside hospital, he was started on a lidocaine gtt. On 3/21 when lidocaine weaned off patient had recurrence of VT requiring AICD shocks. Hence,  was transferred to [facility] for further management. Pt  was on Lidocaine gtt for control and is now on Quinidine and uptitrated Toprol for anti-arrythmia. Because of pt's refractory VT pt's listing status for a Heart Transplant was bumped from 6 to 2E temporarily. Pt will remain inpatient for listing purposes and managed closely by HF team and hence was transferred to [facility] on 3/27/25.

## 2025-03-31 NOTE — PROGRESS NOTE ADULT - PROBLEM SELECTOR PLAN 1
s/p Lidocaine drip S/P one dose of Mexiletine (Mexiletine was d/dwayne for severe dizziness this admission)   currently on Toprol 50mg daily and Quinidine twice a day   EP is following patient for Vtach and CRT-D shock.   as reported , pt previously did not tolerate amio, mexilitine and sotalol in the past due to severe dizziness.   cont with tele monitor, monitor electrolytes  h/o ani mitral PVC (premature ventricular contractions) ablation 3/11/2024  f/up with EP for further recommendation - CRTD interrogation on 3/28  monitor QTC while on Quinidine/ Last QTC was 493 on 3/27 -- will check ECG on 4/1 AM    monitor stool count, and electrolytes given reported diarrhea on 3/30 and 3/31 -- f/u GI PCR to consider initiation of Immodium if noninfectious

## 2025-04-01 DIAGNOSIS — G47.00 INSOMNIA, UNSPECIFIED: ICD-10-CM

## 2025-04-01 DIAGNOSIS — Z86.59 PERSONAL HISTORY OF OTHER MENTAL AND BEHAVIORAL DISORDERS: ICD-10-CM

## 2025-04-01 DIAGNOSIS — A08.11 ACUTE GASTROENTEROPATHY DUE TO NORWALK AGENT: ICD-10-CM

## 2025-04-01 LAB
ANION GAP SERPL CALC-SCNC: 12 MMOL/L — SIGNIFICANT CHANGE UP (ref 5–17)
BUN SERPL-MCNC: 17 MG/DL — SIGNIFICANT CHANGE UP (ref 7–23)
CALCIUM SERPL-MCNC: 9 MG/DL — SIGNIFICANT CHANGE UP (ref 8.4–10.5)
CHLORIDE SERPL-SCNC: 104 MMOL/L — SIGNIFICANT CHANGE UP (ref 96–108)
CO2 SERPL-SCNC: 24 MMOL/L — SIGNIFICANT CHANGE UP (ref 22–31)
CREAT SERPL-MCNC: 1.05 MG/DL — SIGNIFICANT CHANGE UP (ref 0.5–1.3)
EGFR: 76 ML/MIN/1.73M2 — SIGNIFICANT CHANGE UP
EGFR: 76 ML/MIN/1.73M2 — SIGNIFICANT CHANGE UP
GLUCOSE SERPL-MCNC: 88 MG/DL — SIGNIFICANT CHANGE UP (ref 70–99)
HCT VFR BLD CALC: 37.5 % — LOW (ref 39–50)
HGB BLD-MCNC: 12.4 G/DL — LOW (ref 13–17)
MAGNESIUM SERPL-MCNC: 2.2 MG/DL — SIGNIFICANT CHANGE UP (ref 1.6–2.6)
MCHC RBC-ENTMCNC: 29.2 PG — SIGNIFICANT CHANGE UP (ref 27–34)
MCHC RBC-ENTMCNC: 33.1 G/DL — SIGNIFICANT CHANGE UP (ref 32–36)
MCV RBC AUTO: 88.2 FL — SIGNIFICANT CHANGE UP (ref 80–100)
NRBC BLD AUTO-RTO: 0 /100 WBCS — SIGNIFICANT CHANGE UP (ref 0–0)
PHOSPHATE SERPL-MCNC: 3.1 MG/DL — SIGNIFICANT CHANGE UP (ref 2.5–4.5)
PLATELET # BLD AUTO: 141 K/UL — LOW (ref 150–400)
POTASSIUM SERPL-MCNC: 3.8 MMOL/L — SIGNIFICANT CHANGE UP (ref 3.5–5.3)
POTASSIUM SERPL-SCNC: 3.8 MMOL/L — SIGNIFICANT CHANGE UP (ref 3.5–5.3)
RBC # BLD: 4.25 M/UL — SIGNIFICANT CHANGE UP (ref 4.2–5.8)
RBC # FLD: 14 % — SIGNIFICANT CHANGE UP (ref 10.3–14.5)
SODIUM SERPL-SCNC: 140 MMOL/L — SIGNIFICANT CHANGE UP (ref 135–145)
WBC # BLD: 5.67 K/UL — SIGNIFICANT CHANGE UP (ref 3.8–10.5)
WBC # FLD AUTO: 5.67 K/UL — SIGNIFICANT CHANGE UP (ref 3.8–10.5)

## 2025-04-01 PROCEDURE — 99234 HOSP IP/OBS SM DT SF/LOW 45: CPT | Mod: FS

## 2025-04-01 PROCEDURE — 99222 1ST HOSP IP/OBS MODERATE 55: CPT | Mod: FS

## 2025-04-01 PROCEDURE — G0545: CPT

## 2025-04-01 PROCEDURE — 99223 1ST HOSP IP/OBS HIGH 75: CPT

## 2025-04-01 PROCEDURE — 93010 ELECTROCARDIOGRAM REPORT: CPT

## 2025-04-01 PROCEDURE — 99233 SBSQ HOSP IP/OBS HIGH 50: CPT

## 2025-04-01 RX ORDER — CLONAZEPAM 0.5 MG/1
0.25 TABLET ORAL
Refills: 0 | Status: DISCONTINUED | OUTPATIENT
Start: 2025-04-01 | End: 2025-04-06

## 2025-04-01 RX ADMIN — Medication 20 MILLIEQUIVALENT(S): at 19:04

## 2025-04-01 RX ADMIN — ATORVASTATIN CALCIUM 10 MILLIGRAM(S): 80 TABLET, FILM COATED ORAL at 21:33

## 2025-04-01 RX ADMIN — Medication 1 PACKET(S): at 11:16

## 2025-04-01 RX ADMIN — Medication 1 APPLICATION(S): at 05:42

## 2025-04-01 RX ADMIN — Medication 324 MILLIGRAM(S): at 18:10

## 2025-04-01 RX ADMIN — METOPROLOL SUCCINATE 50 MILLIGRAM(S): 50 TABLET, EXTENDED RELEASE ORAL at 05:46

## 2025-04-01 RX ADMIN — Medication 324 MILLIGRAM(S): at 05:46

## 2025-04-01 RX ADMIN — HEPARIN SODIUM 5000 UNIT(S): 1000 INJECTION INTRAVENOUS; SUBCUTANEOUS at 21:33

## 2025-04-01 RX ADMIN — TAMSULOSIN HYDROCHLORIDE 0.4 MILLIGRAM(S): 0.4 CAPSULE ORAL at 21:33

## 2025-04-01 RX ADMIN — Medication 100 MILLIGRAM(S): at 21:33

## 2025-04-01 RX ADMIN — HEPARIN SODIUM 5000 UNIT(S): 1000 INJECTION INTRAVENOUS; SUBCUTANEOUS at 05:46

## 2025-04-01 RX ADMIN — Medication 10 MILLIGRAM(S): at 05:46

## 2025-04-01 RX ADMIN — HEPARIN SODIUM 5000 UNIT(S): 1000 INJECTION INTRAVENOUS; SUBCUTANEOUS at 14:40

## 2025-04-01 NOTE — PROGRESS NOTE ADULT - SUBJECTIVE AND OBJECTIVE BOX
[facility]  Whitney Vargas DO  Available on Teams Andrae    Patient is a 70y old  Male who presents with a chief complaint of VT (31 Mar 2025 13:32)      SUBJECTIVE / OVERNIGHT EVENTS: None. Had 4-5 BMs yesterday, and 3 this morning. Denies shortness of breath, chest pain, leg swelling. No nausea/vomiting. Some abdominal cramping from time to time but able to eat and drink. Feels like the diarrhea is becoming more formed than before, less watery.  ADDITIONAL REVIEW OF SYSTEMS: negative    MEDICATIONS  (STANDING):  atorvastatin 10 milliGRAM(s) Oral at bedtime  chlorhexidine 2% Cloths 1 Application(s) Topical <User Schedule>  chlorhexidine 4% Liquid 1 Application(s) Topical <User Schedule>  heparin   Injectable 5000 Unit(s) SubCutaneous every 8 hours  metoprolol succinate ER 50 milliGRAM(s) Oral daily  psyllium Powder 1 Packet(s) Oral daily  quiNIDine gluconate  milliGRAM(s) Oral every 12 hours  tamsulosin 0.4 milliGRAM(s) Oral at bedtime  traZODone 100 milliGRAM(s) Oral at bedtime    MEDICATIONS  (PRN):  acetaminophen     Tablet .. 650 milliGRAM(s) Oral every 6 hours PRN Mild Pain (1 - 3), Moderate Pain (4 - 6)  artificial  tears Solution 1 Drop(s) Both EYES every 4 hours PRN Dry Eyes  guaiFENesin Oral Liquid (Sugar-Free) 200 milliGRAM(s) Oral every 6 hours PRN Cough      CAPILLARY BLOOD GLUCOSE        I&O's Summary    31 Mar 2025 07:01  -  01 Apr 2025 07:00  --------------------------------------------------------  IN: 720 mL / OUT: 0 mL / NET: 720 mL        PHYSICAL EXAM:  Vital Signs Last 24 Hrs  T(C): 36.8 (01 Apr 2025 12:55), Max: 37 (31 Mar 2025 21:45)  T(F): 98.2 (01 Apr 2025 12:55), Max: 98.6 (31 Mar 2025 21:45)  HR: 80 (01 Apr 2025 12:55) (77 - 80)  BP: 91/60 (01 Apr 2025 12:55) (90/61 - 101/68)  BP(mean): --  RR: 18 (01 Apr 2025 12:55) (18 - 18)  SpO2: 95% (01 Apr 2025 12:55) (94% - 99%)    Parameters below as of 01 Apr 2025 12:55  Patient On (Oxygen Delivery Method): room air        CONSTITUTIONAL: Well-groomed, in no apparent distress  EYES: No conjunctival or scleral injection, non-icteric; PERRLA and symmetric  ENMT: No external nasal lesions; no pharyngeal injection or exudates, oral mucosa with moist membranes  RESPIRATORY: Breathing comfortably; lungs CTA without wheeze/rhonchi/rales  CARDIOVASCULAR: +S1S2, RRR, no M/G/R; pedal pulses full and symmetric; no lower extremity edema  GASTROINTESTINAL: No palpable masses or tenderness, +BS throughout, no rebound/guarding  MUSCULOSKELETAL: no digital clubbing or cyanosis; no paraspinal tenderness; normal strength and tone of extremities  SKIN: No rashes or ulcers noted; no subcutaneous nodules or induration palpable  NEUROLOGIC: CN II-XII intact; sensation intact in LEs b/l to light touch  PSYCHIATRIC: A+O x 3; mood and affect appropriate; appropriate insight and judgment    LABS:                        12.4   5.67  )-----------( 141      ( 01 Apr 2025 06:16 )             37.5     04-01    140  |  104  |  17  ----------------------------<  88  3.8   |  24  |  1.05    Ca    9.0      01 Apr 2025 06:15  Phos  3.1     04-01  Mg     2.2     04-01            Urinalysis Basic - ( 01 Apr 2025 06:15 )    Color: x / Appearance: x / SG: x / pH: x  Gluc: 88 mg/dL / Ketone: x  / Bili: x / Urobili: x   Blood: x / Protein: x / Nitrite: x   Leuk Esterase: x / RBC: x / WBC x   Sq Epi: x / Non Sq Epi: x / Bacteria: x

## 2025-04-01 NOTE — BH CONSULTATION LIAISON ASSESSMENT NOTE - NSBHTIMEACTIVITIESPERFORMED_PSY_A_CORE
I attest my time as an attending is greater than 50% of the total combined time spent on qualifying patient care activities by NP. I have spent 65 minutes in the care of this patient, including: obtaining/reviewing labs, interviewing, providing supportive therapy, medication management in addition to documentation of the above. Time spent excludes time spent on separately reportable services/teaching during the encounter.

## 2025-04-01 NOTE — PROGRESS NOTE ADULT - PROBLEM SELECTOR PLAN 3
- c/w quinidine 324mg BID per EP recs  - c/w toprol 50 mg po QD   - s/p CRT-D: DDDR 80 AP 97%.  98%.  - Possible oversensing noted on telemetry on 3/25 at 1:57:25 as well as 3/27 1:56:52, s/p EP device interrogation with no programming changes  - Keep K>4 and Mg>2, replenish with K tabs and IV mag as needed

## 2025-04-01 NOTE — BH CONSULTATION LIAISON ASSESSMENT NOTE - ATTENDING COMMENTS
71 y/o domiciled, employed, , , male with PPHx of PTSD, unspecified anxiety d/o, with no past psychiatric admissions, with a PMHx of NICM since 2011 HFrEF (LVEF 25-30%) s/p MDT CRT-D with baseline CHB, VT/VF, AFs/p GIANFRANCO ligation/MAZE, MV/TV repair, PVC ablation 3/2024 intolerant to AADs in the past, recent admission 3/19/2025-3/20/2025 for AICD shocks initially presented to the [facility] ED on 3/21/2025, sent by HF specialist for ACID shock. While admitted to [facility], his listing status was upgraded to UNOS status 2e for heart transplant (ABO O) for the refractory VT, with the patient being transferred to [facility] for further management. Patient seen by transplant psychiatry for concerns of depression.   ---  Patient seen in setting of Adjustment disorder with depressed mood given decline in health and the anticipation of a transplant. Notes psychological distress in setting of awaiting transplantation and concerned about being in a setting which makes him vulnerable to additional hospital-acquired infections. Discussed he is most upset at recent Norovirus diagnosis. Declined need for psychotropic medications; known previously to writer by his psychosocial assessment at which time we had discussed initiation of SSRI for hx of PTSD. Patient continues to decline need right now. Amenable to ongoing supportive therapy as well as appreciates visits from Holistic RN and  services.          Recommendations:    -HFrEF- management as per primary tram; MAKEDA deems patient to be good candidate; listed status 2e  -Insomnia: C/W trazodone 100mg PO HS   -Unspecified anxiety d/o: Can consider starting Klonopin 0.25mg Po BID PRN for acute anxiety alleviation   -Labs/studies: **Please repeat EKG last QTC 590ms, TSH w/ FT4  -Adjustment d/o w/ depressed mood & PTSD: Recommended Sertraline once again; patient declined need for standing SSRI at this time  -Supportive therapy provided; C/W  and Holistic RN services

## 2025-04-01 NOTE — PROGRESS NOTE ADULT - PROBLEM SELECTOR PLAN 1
- Listed for heart transplant: deactivated to status 7 given active norovirus infection  - c/w Toprol XL 50mg daily.  - continue holding spironolactone 25mg QD for now, as he endorse previous hx of dizziness with it. Will retrial again in the future once dizziness resolves  - continue holding hydralazine 25mg TID i/s/o hypotension   - will dc torsemide starting tomorrow as pt received dose this AM  - strict I/Os and daily weights  - Keep K>4 and Mg>2 - Listed for heart transplant: deactivated to status 7 given active norovirus infection  - c/w Toprol XL 50mg daily.  - continue holding spironolactone 25mg QD for now, as he endorse previous hx of dizziness with it. Will retrial again in the future once dizziness resolves  - continue holding hydralazine 25mg TID i/s/o hypotension   - will dc torsemide starting tomorrow as pt received dose this AM  - strict I/Os and daily weights  - Keep K>4 and Mg>2  - Would recommend PT to follow, so pt may maintain strength & stamina while inpatient  - Would recommend psychiatry & holistic RN consult to provide emotional support

## 2025-04-01 NOTE — PROGRESS NOTE ADULT - ASSESSMENT
70yoM w/ PMHx of NICM since 2011, HFrEF (25-30%) s/p MDT CRT-D with baseline CHB , VT/VF, afib s/p GIANFRANCO ligation/MAZE, MV/TV repair, PVC ablation (3/2024) was transferred from outside hospital to [facility] due to  VT/AICD shock.   While admitted to the outside hospital, he was started on a lidocaine gtt. On 3/21 when lidocaine weaned off patient had recurrence of VT requiring AICD shocks. Hence,  was transferred to [facility] for further management. Pt  was on Lidocaine gtt for control and is now on Quinidine and uptitrated Toprol for anti-arrythmia. Because of pt's refractory VT pt's listing status for a Heart Transplant was bumped from 6 to 2E temporarily. Pt will remain inpatient for listing purposes and managed closely by HF team and hence was transferred to [facility] on 3/27/25.

## 2025-04-01 NOTE — PROGRESS NOTE ADULT - PROBLEM SELECTOR PLAN 2
- hopefully will be self limited within 1 week  - Monitor BMs per day  - hold torsemide while he's having fluid losses from diarrhea - - -

## 2025-04-01 NOTE — BH CONSULTATION LIAISON ASSESSMENT NOTE - SUMMARY
71 y/o domiciled, employed, , , male with PPHx of PTSD, unspecified anxiety d/o, with no past psychiatric admissions, with a PMHx of NICM since 2011 HFrEF (LVEF 25-30%) s/p MDT CRT-D with baseline CHB, VT/VF, AFs/p GIANFRANCO ligation/MAZE, MV/TV repair, PVC ablation 3/2024 intolerant to AADs in the past, recent admission 3/19/2025-3/20/2025 for AICD shocks initially presented to the [facility] ED on 3/21/2025, sent by HF specialist for ACID shock. While admitted to [facility], his listing status was upgraded to UNOS status 2e for heart transplant (ABO O) for the refractory VT, with the patient being transferred to [facility] for further management. Patient seen by transplant psychiatry for concerns of depression.     Patient on exam attested to symptoms of low mood, with periods of hopelessness, secondary to his prolonged hospitalization, with his recent Norovirus infection, while awaiting transplant. Patient denied symptoms of anhedonia, changes in sleep, susan, ah/vh/hi.si, intent or plan. Discussed starting an antidepressant to address symptoms of low mood of which patient declined at this time. Noted that he amenable to having Klonopin available as PRN, noting that he has not used the medication often even prior to his admission.     see attending attestation for plan  -------------------------------------------------------------    Plan   -Please repeat EKG last QTC 590ms   -C/W trazodone 100mg PO HS if QTC < 500  -Can consider starting Klonopin 0.25mg Po BID PRN for acute anxiety alleviation   -C/W  and Holistic RN services   -Discussed starting standing medication, patient declined at this time, will continue to follow patient        71 y/o domiciled, employed, , , male with PPHx of PTSD, unspecified anxiety d/o, with no past psychiatric admissions, with a PMHx of NICM since 2011 HFrEF (LVEF 25-30%) s/p MDT CRT-D with baseline CHB, VT/VF, AFs/p GIANFRANCO ligation/MAZE, MV/TV repair, PVC ablation 3/2024 intolerant to AADs in the past, recent admission 3/19/2025-3/20/2025 for AICD shocks initially presented to the [facility] ED on 3/21/2025, sent by HF specialist for ACID shock. While admitted to [facility], his listing status was upgraded to UNOS status 2e for heart transplant (ABO O) for the refractory VT, with the patient being transferred to [facility] for further management. Patient seen by transplant psychiatry for concerns of depression.     Patient on exam attested to symptoms of low mood, with periods of hopelessness, secondary to his prolonged hospitalization, with his recent Norovirus infection, while awaiting transplant. Patient denied symptoms of anhedonia, changes in sleep, susan, ah/vh/hi.si, intent or plan. Discussed starting an antidepressant to address symptoms of low mood of which patient declined at this time. Noted that he amenable to having Klonopin available as PRN, noting that he has not used the medication often even prior to his admission.     see attending attestation for plan  -------------------------------------------------------------    Plan   -Please repeat EKG last QTC 590ms    -C/W trazodone 100mg PO HS   -Can consider starting Klonopin 0.25mg Po BID PRN for acute anxiety alleviation   -C/W  and Holistic RN services   -Discussed starting standing medication, patient declined at this time, will continue to follow patient        69 y/o domiciled, employed, , , male with PPHx of PTSD, unspecified anxiety d/o, with no past psychiatric admissions, with a PMHx of NICM since 2011 HFrEF (LVEF 25-30%) s/p MDT CRT-D with baseline CHB, VT/VF, AFs/p GIANFRANCO ligation/MAZE, MV/TV repair, PVC ablation 3/2024 intolerant to AADs in the past, recent admission 3/19/2025-3/20/2025 for AICD shocks initially presented to the [facility] ED on 3/21/2025, sent by HF specialist for ACID shock. While admitted to [facility], his listing status was upgraded to UNOS status 2e for heart transplant (ABO O) for the refractory VT, with the patient being transferred to [facility] for further management. Patient seen by transplant psychiatry for concerns of depression.     Patient on exam attested to symptoms of low mood, with periods of hopelessness, secondary to his prolonged hospitalization, with his recent Norovirus infection, while awaiting transplant. Patient denied symptoms of anhedonia, changes in sleep, susan, ah/vh/hi.si, intent or plan. Discussed starting an antidepressant to address symptoms of low mood of which patient declined at this time. Noted that he amenable to having Klonopin available as PRN, noting that he has not used the medication often even prior to his admission.     see attending attestation for plan  -------------------------------------------------------------

## 2025-04-01 NOTE — BH CONSULTATION LIAISON ASSESSMENT NOTE - HPI (INCLUDE ILLNESS QUALITY, SEVERITY, DURATION, TIMING, CONTEXT, MODIFYING FACTORS, ASSOCIATED SIGNS AND SYMPTOMS)
71 y/o domiciled, employed, , , male with PPHx of PTSD, unspecified anxiety d/o, with no past psychiatric admissions, with a PMHx of NICM since 2011 HFrEF (LVEF 25-30%) s/p MDT CRT-D with baseline CHB, VT/VF, AFs/p GIANFRANCO ligation/MAZE, MV/TV repair, PVC ablation 3/2024 intolerant to AADs in the past, recent admission 3/19/2025-3/20/2025 for AICD shocks initially presented to the [facility] ED on 3/21/2025, sent by HF specialist for ACID shock. While admitted to [facility], his listing status was upgraded to UNOS status 2e for heart transplant (ABO O) for the refractory VT, with the patient being transferred to [facility] for further management. Patient seen by transplant psychiatry for concerns of depression.     Patient on exam was A/Ox4, calm, cooperative, patient endorsed since he was last seen by transplant psychiatry last year, he was doing well, until his current admission. Patient endorsed symptoms of frustration with symptoms of low mood, and hopelessness at times, with his prolonged hospitalization, as well as having Norovirus, with patient stating on exam, "it is one of my biggest fears, your in a hospital." Patient endorsed that since he is on isolation for norvirus he has been confined to his room, stating that he cannot walk in the hallway which has further added to his frustrations. Patient noted that has had concerns of his wife commuting from [place] to the hospital, as well as noting that they had to cancel their 50th anniversary celebration this weekend due to his current hospitalization. patient noted that the concerns regarding his health, and his family, has been causing him anxiety, as patient stated he feels his life has been "turned upside down."   Patient denied any symptoms of anhedonia, changes in appetite or sleep, noting that with the use of Trazodone his sleep quality has been good. Discussed starting an antidepressant to address symptoms of low mood of which patient declined at this time. Noted that he amenable to having Klonopin available as PRN, noting that he has not used the medication often even prior to his admission. Patient denied any ah/vh/hi/si, intent or plan on exam.

## 2025-04-01 NOTE — BH CONSULTATION LIAISON ASSESSMENT NOTE - NSBHCHARTREVIEWLAB_PSY_A_CORE FT
12.4   5.67  )-----------( 141      ( 01 Apr 2025 06:16 )             37.5     04-01    140  |  104  |  17  ----------------------------<  88  3.8   |  24  |  1.05    Ca    9.0      01 Apr 2025 06:15  Phos  3.1     04-01  Mg     2.2     04-01

## 2025-04-01 NOTE — CHART NOTE - NSCHARTNOTEFT_GEN_A_CORE
As per physch rec start pt on klonopin 0.25 bid prn.   Dr. Vargas doesn't rec repeat ekg because the machine read is not accurate.  EP did a  manual qtc calculation and its more like 460.

## 2025-04-01 NOTE — PROGRESS NOTE ADULT - ASSESSMENT
69-year-old male ABO O past medical history of NICM since 2011 HFrEF (LVEF 25-30%) s/p MDT CRT-D with baseline CHB, VT/VF, AFs/p GIANFRANCO ligation/MAZE, MV/TV repair, PVC ablation 3/2024 intolerant to AADs in the past, recent admission 3/19/2025-3/20/2025 for AICD shocks initially presented to the [facility] ED on 3/21/2025, sent by HF specialist for ACID shock. Device reported successful ATP for VT 3/17/2025 at 6:52pm followed by one ATP & 40J shock. He reported feeling dizzy & SOB during the events. He follows with Dr. Luca Tamayo for HF, currently undergoing transplant workup, Dr John for CRTD and VT/VF and Dr. Chu for genetic counseling. While admitted to [facility], he was started on a lidocaine gtt. On 3/21 when lidocaine weaned off patient had another two episodes of VT requiring AICD shocks. He was transferred to [facility] for further management. He was initially maintained on lidocaine gtt from 3/21/2025-3/25/2025 & his listing status was upgraded to UNOS status 2e for heart transplant (ABO O) for the refractory VT. He was transitioned to oral antiarrhythmics (Toprol XL & quinidine) & ultimately transferred from CICU to tele floor on 3/27/2025. Hospital course was further c/b development of watery diarrhea & was found to have +norovirus on 3/31/2025 which prompted deactivation to status 7 listing for heart transplant.    He has been electrically quiescent thus far on PO antiarrhythmics. Low-normotensive, mildly hypovolemic on exam with stable weights & insensible losses 2/2 diarrhea with plans to discontinue diuretics.    Bedside hemodynamics:  3/22/25 BP 97/76/83, HR 80, CVP 6, PA 58/23/38, PCWP 20, SVR 1100, Gucci CO/CI 5.1/2.6, TD 4/2.08    Cardiac Studies:   TTE 3/20/25 : LVEF 30%, segmental, LVIDd 5.3, walls normal, elevated LV filling pressures, mod RVE with mildly reduced RV function, TAPSE 1.7, severely dilated RA, annuloplasty in MV position, transvalvular gradients are elevated with severe prosthetic MS (peak gradient 15.7, mean gradient 8), trace intravalvular MR, TV annuloplasty ring noted, mild TR,  est PASP 36, no evidence of LV thrombus  TTE 10/2024: LVEF 25-30%, LVEDD 5.9cm, moderately reduced RV function, annuloplasty ring of mitral and tricuspid position, PASP 47 mmHg  RHC 3/19/25- RA 11 PA 58/26 PCWP 32 CO/CI 5.43/2.77  [facility] 6/2023 Nonobstructive CAD 70-year-old male ABO O past medical history of NICM since 2011 HFrEF (LVEF 25-30%) s/p MDT CRT-D with baseline CHB, VT/VF, AFs/p GIANFRANCO ligation/MAZE, MV/TV repair, PVC ablation 3/2024 intolerant to AADs in the past, recent admission 3/19/2025-3/20/2025 for AICD shocks initially presented to the [facility] ED on 3/21/2025, sent by HF specialist for ACID shock. Device reported successful ATP for VT 3/17/2025 at 6:52pm followed by one ATP & 40J shock. He reported feeling dizzy & SOB during the events. He follows with Dr. Luca Tamayo for HF, currently undergoing transplant workup, Dr John for CRTD and VT/VF and Dr. Chu for genetic counseling. While admitted to [facility], he was started on a lidocaine gtt. On 3/21 when lidocaine weaned off patient had another two episodes of VT requiring AICD shocks. He was transferred to [facility] for further management. He was initially maintained on lidocaine gtt from 3/21/2025-3/25/2025 & his listing status was upgraded to UNOS status 2e for heart transplant (ABO O) for the refractory VT. He was transitioned to oral antiarrhythmics (Toprol XL & quinidine) & ultimately transferred from CICU to tele floor on 3/27/2025. Hospital course was further c/b development of watery diarrhea & was found to have +norovirus on 3/31/2025 which prompted deactivation to status 7 listing for heart transplant.    He has been electrically quiescent thus far on PO antiarrhythmics. Low-normotensive, mildly hypovolemic on exam with stable weights & insensible losses 2/2 diarrhea with plans to discontinue diuretics.    Bedside hemodynamics:  3/22/25 BP 97/76/83, HR 80, CVP 6, PA 58/23/38, PCWP 20, SVR 1100, Gucci CO/CI 5.1/2.6, TD 4/2.08    Cardiac Studies:   TTE 3/20/25 : LVEF 30%, segmental, LVIDd 5.3, walls normal, elevated LV filling pressures, mod RVE with mildly reduced RV function, TAPSE 1.7, severely dilated RA, annuloplasty in MV position, transvalvular gradients are elevated with severe prosthetic MS (peak gradient 15.7, mean gradient 8), trace intravalvular MR, TV annuloplasty ring noted, mild TR,  est PASP 36, no evidence of LV thrombus  TTE 10/2024: LVEF 25-30%, LVEDD 5.9cm, moderately reduced RV function, annuloplasty ring of mitral and tricuspid position, PASP 47 mmHg  RHC 3/19/25- RA 11 PA 58/26 PCWP 32 CO/CI 5.43/2.77  [facility] 6/2023 Nonobstructive CAD

## 2025-04-01 NOTE — BH CONSULTATION LIAISON ASSESSMENT NOTE - CURRENT MEDICATION
MEDICATIONS  (STANDING):  atorvastatin 10 milliGRAM(s) Oral at bedtime  chlorhexidine 2% Cloths 1 Application(s) Topical <User Schedule>  chlorhexidine 4% Liquid 1 Application(s) Topical <User Schedule>  heparin   Injectable 5000 Unit(s) SubCutaneous every 8 hours  metoprolol succinate ER 50 milliGRAM(s) Oral daily  psyllium Powder 1 Packet(s) Oral daily  quiNIDine gluconate  milliGRAM(s) Oral every 12 hours  tamsulosin 0.4 milliGRAM(s) Oral at bedtime  traZODone 100 milliGRAM(s) Oral at bedtime    MEDICATIONS  (PRN):  acetaminophen     Tablet .. 650 milliGRAM(s) Oral every 6 hours PRN Mild Pain (1 - 3), Moderate Pain (4 - 6)  artificial  tears Solution 1 Drop(s) Both EYES every 4 hours PRN Dry Eyes  guaiFENesin Oral Liquid (Sugar-Free) 200 milliGRAM(s) Oral every 6 hours PRN Cough

## 2025-04-01 NOTE — BH CONSULTATION LIAISON ASSESSMENT NOTE - NSBHCHARTREVIEWVS_PSY_A_CORE FT
Vital Signs Last 24 Hrs  T(C): 36.8 (01 Apr 2025 12:55), Max: 37 (31 Mar 2025 21:45)  T(F): 98.2 (01 Apr 2025 12:55), Max: 98.6 (31 Mar 2025 21:45)  HR: 80 (01 Apr 2025 12:55) (77 - 80)  BP: 91/60 (01 Apr 2025 12:55) (90/61 - 101/68)  BP(mean): --  RR: 18 (01 Apr 2025 12:55) (18 - 18)  SpO2: 95% (01 Apr 2025 12:55) (94% - 99%)    Parameters below as of 01 Apr 2025 12:55  Patient On (Oxygen Delivery Method): room air

## 2025-04-01 NOTE — PROGRESS NOTE ADULT - NS ATTEND AMEND GEN_ALL_CORE FT
69 y/o M with NICM, with CHB and VT/VFib and AFib, s/p GIANFRANCO ligation/MAZE and PVC ablation, previously intolerant to antiarrhythmics, who was undergoing transplant evaluation as an outpatient, who was admitted for ICD shocks. He was started on Lidocaine gtt and continued to have VT and ICD shocks. He was transferred to [facility] and transplant listing was upgraded to status 2E. He has since been stablized on oral antiarrhythmics of Toprol XL and Quinidine. He was found to have Norovirus on 3/31/25, will be made status 7 while symptomatic.   Will stop Torsemide while having ongoing diarrhea.

## 2025-04-01 NOTE — PROGRESS NOTE ADULT - SUBJECTIVE AND OBJECTIVE BOX
ADVANCED HEART FAILURE & TRANSPLANT  - PROGRESS NOTE  *To reach the NS1 Team from 8am to 5pm (MON-FRI), please call [phone],   _______________________________________________________________________________________________________   Subjective: Pt with persistent watery diarrhea & abdominal cramping but reports decent appetite & is able to keep food down. Denies nausea/vomiting or subjective fevers. Pt interested in exercising & walking more. Expresses sadness & would like to speak with psychiatrist.    Medications:  acetaminophen     Tablet .. 650 milliGRAM(s) Oral every 6 hours PRN  artificial  tears Solution 1 Drop(s) Both EYES every 4 hours PRN  atorvastatin 10 milliGRAM(s) Oral at bedtime  chlorhexidine 2% Cloths 1 Application(s) Topical <User Schedule>  chlorhexidine 4% Liquid 1 Application(s) Topical <User Schedule>  guaiFENesin Oral Liquid (Sugar-Free) 200 milliGRAM(s) Oral every 6 hours PRN  heparin   Injectable 5000 Unit(s) SubCutaneous every 8 hours  metoprolol succinate ER 50 milliGRAM(s) Oral daily  psyllium Powder 1 Packet(s) Oral daily  quiNIDine gluconate  milliGRAM(s) Oral every 12 hours  tamsulosin 0.4 milliGRAM(s) Oral at bedtime  torsemide 10 milliGRAM(s) Oral daily  traZODone 100 milliGRAM(s) Oral at bedtime      Physical Exam:  General: No distress. Comfortable.  Neck: Neck supple. JVP not elevated. No masses  Chest: Clear to auscultation bilaterally  CV: Normal S1 & S2. No murmurs, rub, or gallops. Radial pulses normal B/L.  Abdomen: Soft, non-distended, non-tender  Skin: No rashes or skin breakdown  Extremities: No LE edema  Neurology: Alert&Ox3. Sensation intact  Psych: Affect normal    Vitals:  Vital Signs Last 24 Hours  T(C): 36.7 (25 @ 04:20), Max: 37 (25 @ 21:45)  HR: 79 (25 @ 04:20) (77 - 80)  BP: 101/68 (25 @ 04:20) (90/61 - 101/68)  RR: 18 (25 @ 04:20) (18 - 18)  SpO2: 99% (25 @ 04:20) (94% - 99%)    Weight in k.3 ( @ 09:49)    I&O's Summary    31 Mar 2025 07:01  -  2025 07:00  --------------------------------------------------------  IN: 720 mL / OUT: 0 mL / NET: 720 mL    Tele: Dual chamber pacing 79 bpm with occasional PVCs. No events recorded.    General: No distress. Comfortable.  HEENT: EOM intact.  Neck: Neck supple. JVP not elevated. No masses  Chest: Clear to auscultation bilaterally  CV: Normal S1 and S2. No murmurs, rub, or gallops. Radial pulses normal.  Abdomen: Soft, non-distended, non-tender  Skin: No rashes or skin breakdown  Neurology: Alert and oriented times three. Sensation intact  Psych: Affect normal    Labs:                        12.4   5.67  )-----------( 141      ( 2025 06:16 )             37.5     04-    140  |  104  |  17  ----------------------------<  88  3.8   |  24  |  1.05    Ca    9.0      2025 06:15  Phos  3.1     04-01  Mg     2.2     04-    Micro:  GI PCR Panel Stool (25 @ 06:28)   Norovirus GI/GII: Detected

## 2025-04-01 NOTE — PROGRESS NOTE ADULT - PROBLEM SELECTOR PLAN 1
-s/p Lidocaine drip S/P one dose of Mexiletine (Mexiletine was d/dwayne for severe dizziness this admission)   -currently on Toprol 50mg daily and Quinidine twice a day   -EP is following patient for Vtach and CRT-D shock.   -as reported , pt previously did not tolerate amio, mexilitine and sotalol in the past due to severe dizziness.   -cont with tele monitor, monitor electrolytes  -h/o ani mitral PVC (premature ventricular contractions) ablation 3/11/2024  -f/up with EP for further recommendation - CRTD interrogation on 3/28  -monitor QTC while on Quinidine/ Last QTC was 493 on 3/27 -- checked EGD from 4/1, QTc elevated >500, will f/u with EP

## 2025-04-01 NOTE — BH CONSULTATION LIAISON ASSESSMENT NOTE - NSBHATTESTATTENDBILLTIME_PSY_A_CORE
I attest my time as attending is greater than MAHNAZ time spent on qualifying patient care activities.

## 2025-04-01 NOTE — PROGRESS NOTE ADULT - PROBLEM SELECTOR PLAN 2
- Afebrile, no leukocytosis, non-toxic appearing  - ID recs appreciated for re-activating for heart transplant  - continue supportive care per primary team - Afebrile, no leukocytosis, non-toxic appearing  - ID recs appreciated to re-activate listing for heart transplant  - continue supportive care per primary team

## 2025-04-01 NOTE — CONSULT NOTE ADULT - ASSESSMENT
69-year-old male patient past medical history of NICM since 2011, HFrEF LVEF 25-30% s/p MDT CRT-D with baseline CHB, VT/VF, a.fib s/p GIANFRANCO ligation/MAZE, MV/TV repair, PVC ablation 3/2024 intolerant to AADs in the past, recent admission 3/19 - 3/20/25 for AICD shocks initially presented to the [facility] emergency department on 3/21/2025, sent by heart failure specialist for ACID shock. Device reported successful ATP for VT 3/17 at 6:52pm followed by one ATP and 40J shock. He reported feeling dizzy and SOB during the events. He follows with Dr paula abreu for HF, currently undergoing transplant workup, Dr John for CRTD and VT/VF and  for genetic counseling. While admitted to [facility], he was started on a lidocaine gtt. On 3/21 when lidocaine weaned off patient had another two episodes of VT requiring AICD shocks. He was transferred to [facility] for further management.     COVID19/FLU/RSV PCR (3/29) Negative.   Urine legionella Ag (3/31) Negative.   GI PCR Panel (3/31) +Norovirus.     #Pre-Heart Transplant Evaluation, Norovirus Infection  HIV Ab/Ag NEG  HSV 1/2 IgG POS  VZV IgG POS  EBV Serology panel (or VCA IgG) POS  CMV IgGPOS  Hepatitis A IgG NEG  Hepatitis B surface Ig NEG  Hepatitis B core IgG NEG  Hepatitis B surface Antigen NEG  Hepatitis C Antibody NEG  Measles ([place]) IgG POS  Rubella IgG ([place] Measles) POS  Mumps IgG POS  Syphilis screening (antitreponemal antibody with reflex to RPR) NEG  Quantiferon Gold NEG  oxoplasma IgG POS  Strongyloides IgG POS  hepatitis E Ab pos  Coccidiodes AB Negative  Serum Cryptococcal Ag Negative  --Would await until Gastrointestinal symptom resolution with respect to norovirus prior to reactivating for heart transplant.  Would not routinely recommend repeating GI PCR to assess her positivity as positivity can persist due to detection of nonviable virus    Histoplasma Serum Ab Negative  #Encounter to Vaccinate Patient  COVID19: Would benefit from COVID19 [id number] Vaccine Dose  Influenza: Will require  RSV: 9/23/24  Pneumococcal: s/p PCV20  HAV: 9/23/24 Dose 1. Due for dose 2  HBV: 9/23/24 Dose 1. May need to restart series based on gap  MMR: Immune, will not require further vaccination  Varicella: Immune, will not require further vaccination  Shingles: s/p Shingrix  Tdap:  9/23/24    Transplant ID will not follow patient regularly going forward. Please feel free to reach out with any further questions or concerns.    Julius Villa M.D.  [facility] Division of Infectious Disease  8AM-5PM Monday - Friday: Available on [employer] Teams  After Hours and Holidays (or if no response on [employer] Teams): Please contact the Infectious Diseases Office at [phone]     The above assessment and plan were discussed with Alysha Watson (heart failure NP)

## 2025-04-01 NOTE — PROGRESS NOTE ADULT - PROBLEM SELECTOR PROBLEM 3
Onset: 12 hours   Location / description: Patient has a left ear infection, her left cheek has increased swelling from her cheek bone down to her chin, she is having difficulty swallowing, unable to sleep d/t the pain  swelling, dPrecipitating Factors: Acute diffuse otitis externa of left ear   Pain Scale (1 - 10), 10 highest: 7/10  Associated Symptoms: see above  What improves/worsens symptoms: n/a  Symptom specifc medications: Tylenol #3;   amoxicillin (AMOXIL) 875 MG tablet 20 tablet 0 3/3/2017     Sig - Route: Take 1 tablet by mouth 2 times daily. - Oral      LMP : No LMP recorded. Patient has had a hysterectomy.  Are you pregnant or breast feeding: n/a  Recent [facility]: [facility]3/2017  [facility]        Raul Lam MD  Account # [id number]    Reason for Disposition  • [1] SEVERE pain AND [2] not improved 2 hours after pain medicine    Protocols used: EAR - OTITIS EXTERNA FOLLOW-UP CALL-A-       Chronic systolic congestive heart failure

## 2025-04-01 NOTE — CHART NOTE - NSCHARTNOTEFT_GEN_A_CORE
NUTRITION FOLLOW UP NOTE    PATIENT SEEN FOR: nutrition follow up/RD consult    SOURCE: [x] Patient  [x] Current Medical Record  [] RN  [x] Family/support person at bedside (pt's wife) [] Patient unavailable/inappropriate  [] Other:    CHART REVIEWED/EVENTS NOTED.  [] No changes to nutrition care plan to note  [x] Nutrition Status:  - Listed for heart transplant  - +Norovirus 3/31    DIET ORDER:   Diet, DASH/TLC:   Sodium & Cholesterol Restricted  Supplement Feeding Modality:  Oral  Glucerna Shake Cans or Servings Per Day:  1       Frequency:  Daily (03-24-25)    CURRENT DIET ORDER IS:  [] Appropriate:  [] Inadequate:  [x] Other: see recommendations     NUTRITION INTAKE/PROVISION:  [x] PO: Pt reports good po intake of meals. States he did not like the Glucerna shake but would be amenable to trial alternate oral nutrition supplements.  [] Enteral Nutrition:  [] Parenteral Nutrition:    ANTHROPOMETRICS:  Drug Dosing Weight  Height (cm): 170.2 (21 Mar 2025 22:24)  Weight (kg): 81.7 (21 Mar 2025 22:24)  BMI (kg/m2): 28.2 (21 Mar 2025 22:24)    Weights:   Daily Weight in kG (standing) 83.3 (04-01), 83.9 (03-31), 83.9 (03-30), 83.8 (03-29), 84.7 (03-23)  Weight gradually downtrending likely in setting of fluid shifts. RD to continue to monitor weight trends as able/available.     MEDICATIONS:  MEDICATIONS  (STANDING):  atorvastatin 10 milliGRAM(s) Oral at bedtime  chlorhexidine 2% Cloths 1 Application(s) Topical <User Schedule>  chlorhexidine 4% Liquid 1 Application(s) Topical <User Schedule>  heparin   Injectable 5000 Unit(s) SubCutaneous every 8 hours  metoprolol succinate ER 50 milliGRAM(s) Oral daily  psyllium Powder 1 Packet(s) Oral daily  quiNIDine gluconate  milliGRAM(s) Oral every 12 hours  tamsulosin 0.4 milliGRAM(s) Oral at bedtime  torsemide 10 milliGRAM(s) Oral daily  traZODone 100 milliGRAM(s) Oral at bedtime    MEDICATIONS  (PRN):  acetaminophen     Tablet .. 650 milliGRAM(s) Oral every 6 hours PRN Mild Pain (1 - 3), Moderate Pain (4 - 6)  artificial  tears Solution 1 Drop(s) Both EYES every 4 hours PRN Dry Eyes  guaiFENesin Oral Liquid (Sugar-Free) 200 milliGRAM(s) Oral every 6 hours PRN Cough      NUTRITIONALLY PERTINENT LABS:  04-01 Na140 mmol/L Glu 88 mg/dL K+ 3.8 mmol/L Cr  1.05 mg/dL BUN 17 mg/dL   04-01 Phos 3.1 mg/dL   03-29 Alb 3.8 g/dL   03-22 Chol 147 mg/dL LDL --    HDL 62 mg/dL Trig 71 mg/dL  03-29 ALT 36 U/L AST 36 U/L Alkaline Phosphatase 86 U/L  03-22-25 @ 00:58 a1c 6.1    A1C with Estimated Average Glucose Result: 6.1 % (03-22-25 @ 00:58)    NUTRITIONALLY PERTINENT MEDICATIONS/LABS:  [x] Reviewed  [x] Relevant notes on medications/labs:  - Torsemide ordered  - Metamucil ordered  - A1c 6.1% (03/22/25) within pre-DM range    EDEMA:  [x] Reviewed  [x] Relevant notes:  - None noted as per flowsheets    GI/ I&O:  [x] Reviewed  [x] Relevant notes:  - Reports diarrhea (+norovirus) daily. Denies any nausea/vomiting  [] Other:    SKIN:   [x] No pressure injuries documented, per nursing flowsheet  [] Pressure injury previously noted  [] Change in pressure injury documentation:  [] Other:    ESTIMATED NEEDS:  [x] No change:  [] Updated:  Energy:  7839-5354 kcal/day (25-30 kcal/kg)  Protein:  102-119 g/day (1.2-1.4 g/kg)  Fluid:   ml/day or [x] defer to team  Based on: recent weight 84.7 kg (03-23, standing)    NUTRITION DIAGNOSIS:  [x] Prior Dx: Food & Nutrition Related Knowledge Deficit  [] New Dx:    EDUCATION:  [x] Yes: RD reviewed food safety after solid organ transplant guidelines; including, storage/cooking temperatures, cross contamination, expiration dates, and avoiding buffets and avoid eating out. Discussed S&S of food borne illness.  Reviewed food and immunosuppressive drug interactions (prednisone, tacrolimus, and cellcept). Reviewed the importance of BG control while on prednisone. Reviewed importance of a low K+ diet and reviewed foods high in K+ to be mindful of. Reviewed importance of avoiding grapefruit, pomegranate, starfruit and sour oranges. Pt and wife were receptive to information and were able to use teach back points to verbalize understanding.   [] Not appropriate/warranted    NUTRITION CARE PLAN:  1. Diet: Recommend liberalize diet to Low Sodium   2. Supplements: Recommend d/c Glucerna and trial Ensure Max 1x/day.    [] Achieved - Continue current nutrition intervention(s)  [] Current medical condition precludes nutrition intervention at this time.    MONITORING AND EVALUATION:   RD remains available upon request and will follow up per protocol.    Naida Garcia, SAJANN, CDN (Available via [employer] TEAMS)

## 2025-04-01 NOTE — PROGRESS NOTE ADULT - PROBLEM SELECTOR PLAN 3
TTE 3/20/25 : LVEF 30%,transvalvular gradients are elevated with severe prosthetic Mitral Stenosis, no evidence of LV thrombus  s/p recent admit 3/19/2025 w/ [facility] found to have elevated filling pressures treated with IV diuretics  diuretics were held 3/26, s/p IVF for low BP. now on Torsemide 20 oral daily - HELD for dirrhea  GDMT: Toprol, Torsemide; off Spironolactone d/t prior dizziness  afterload reduction on hold w/ borderline BPs  holding home Farxiga while inpatient  HF team is following and status upgraded for transplant  cont to closely monitor

## 2025-04-01 NOTE — BH CONSULTATION LIAISON ASSESSMENT NOTE - NSICDXBHSECONDARYDX_PSY_ALL_CORE
Ventricular tachycardia   I47.20  Chronic systolic congestive heart failure   I50.22  Norovirus   A08.11  Insomnia   G47.00  History of posttraumatic stress disorder (PTSD)   Z86.59

## 2025-04-01 NOTE — BH CONSULTATION LIAISON ASSESSMENT NOTE - NSBHMSEKNOW_PSY_A_CORE
Attending Statement: I have personally performed a face to face diagnostic evaluation on this patient. I have reviewed the ACP note and agree with the history, exam and plan of care, except as noted.     Attending Contribution to Care:  3 y/o M presenting to the ED with R wrist injury while running at park today, notes arm extended backward when running in park. On exam, appears well, nontoxic, generalized swelling to anterior and posterior R wrist, pain with supination, otherwise FROM of hand and elbow. Plan for xray which shows fracture distal radius. ortho consulted, recommend pre-dave splint which we do not have in pediatric size. plan for orthoglass volar splint placed by orthopedics. plan to follow up with Dr. Jean-Baptiste in 3 weeks. ibuprofen or tylenol for pain as needed.
Normal

## 2025-04-01 NOTE — BH CONSULTATION LIAISON ASSESSMENT NOTE - NSBHCHARTREVIEWINVESTIGATE_PSY_A_CORE FT
EKG 04/01      Ventricular Rate 81 BPM    Atrial Rate 81 BPM    P-R Interval 128 ms    QRS Duration 214 ms    Q-T Interval 508 ms    QTC Calculation(Bazett) 590 ms    R Axis 68 degrees    T Axis -81 degrees    Diagnosis Line AV dual-paced rhythm  ABNORMAL ECG  WHEN COMPARED WITH ECG OF 29-MAR-2025 08:18,  NO SIGNIFICANT CHANGE WAS FOUND  Confirmed by HIWOT ALEMAN MD ([id number]) on 4/1/2025 1:45:38 PM

## 2025-04-01 NOTE — BH CONSULTATION LIAISON ASSESSMENT NOTE - DESCRIPTION
Patient endorsed that he work part time at [employer] after he retired from his past full time job, domiciled with his wife

## 2025-04-01 NOTE — BH CONSULTATION LIAISON ASSESSMENT NOTE - RISK ASSESSMENT
Risk factors: Chronic mental illness, mood episode, chronic medical illness   Protective factors: no current active SI/HI/I/P or urges to harm self/others, no history of suicide attempts, no prior inpatient psych hospitalizations, responsibility to family, identifies reasons for living, future orientation, intact social supports, fear of death or pain, cultural/spiritual/moral attitudes against suicide, engagement in work, positive therapeutic relationships, residential stability, no access to firearms, good insight into illness/need for treatment

## 2025-04-02 PROCEDURE — 99233 SBSQ HOSP IP/OBS HIGH 50: CPT | Mod: FS

## 2025-04-02 PROCEDURE — 99231 SBSQ HOSP IP/OBS SF/LOW 25: CPT | Mod: FS

## 2025-04-02 PROCEDURE — 99232 SBSQ HOSP IP/OBS MODERATE 35: CPT

## 2025-04-02 RX ADMIN — Medication 324 MILLIGRAM(S): at 17:21

## 2025-04-02 RX ADMIN — METOPROLOL SUCCINATE 50 MILLIGRAM(S): 50 TABLET, EXTENDED RELEASE ORAL at 05:32

## 2025-04-02 RX ADMIN — Medication 1 PACKET(S): at 13:43

## 2025-04-02 RX ADMIN — Medication 324 MILLIGRAM(S): at 05:32

## 2025-04-02 RX ADMIN — TAMSULOSIN HYDROCHLORIDE 0.4 MILLIGRAM(S): 0.4 CAPSULE ORAL at 21:03

## 2025-04-02 RX ADMIN — ATORVASTATIN CALCIUM 10 MILLIGRAM(S): 80 TABLET, FILM COATED ORAL at 21:03

## 2025-04-02 RX ADMIN — Medication 100 MILLIGRAM(S): at 21:04

## 2025-04-02 RX ADMIN — Medication 1 APPLICATION(S): at 05:34

## 2025-04-02 NOTE — PROGRESS NOTE ADULT - PROBLEM SELECTOR PLAN 2
- self-limiting, improving already  - Monitor BMs per day - so far 1x/day since 4/1  - hold torsemide while he's having fluid losses from diarrhea

## 2025-04-02 NOTE — PROGRESS NOTE ADULT - ASSESSMENT
70yoM w/ PMHx of NICM since 2011, HFrEF (25-30%) s/p MDT CRT-D with baseline CHB , VT/VF, afib s/p GIANFRANCO ligation/MAZE, MV/TV repair, PVC ablation (3/2024) was transferred from outside hospital to [facility] due to  VT/AICD shock.   While admitted to the outside hospital, he was started on a lidocaine gtt. On 3/21 when lidocaine weaned off patient had recurrence of VT requiring AICD shocks. Hence,  was transferred to [facility] for further management. Pt  was on Lidocaine gtt for control and is now on Quinidine and uptitrated Toprol for anti-arrythmia. Because of pt's refractory VT pt's listing status for a Heart Transplant was bumped from 6 to 2E temporarily. Pt will remain inpatient for listing purposes and managed closely by HF team and hence was transferred to [facility] on 3/27/25.

## 2025-04-02 NOTE — PROGRESS NOTE ADULT - PROBLEM SELECTOR PLAN 3
- c/w quinidine 324mg BID per EP recs  - c/w toprol 50 mg po QD   - s/p CRT-D: DDDR 80 AP 97%.  98%.  - Possible oversensing noted on telemetry on 3/25 at 1:57:25 as well as 3/27 1:56:52, s/p EP device interrogation with no programming changes  - Keep K>4 and Mg>2, replenish with K tabs and IV mag as needed - c/w quinidine 324mg BID per EP recs  - c/w toprol 50 mg po QD   - s/p CRT-D: DDDR 80 AP 97%.  98%.  - Possible oversensing noted on telemetry on 3/25 at 1:57:25 as well as 3/27 1:56:52, s/p EP device interrogation 3/28 with no programming changes  - Keep K>4 and Mg>2, replenish with K tabs and IV mag as needed

## 2025-04-02 NOTE — PROGRESS NOTE ADULT - PROBLEM SELECTOR PLAN 4
- Severe MS noted on most recent TTE s/p MV annuloplasty. - Severe MS noted on most recent TTE s/p MV annuloplasty.  - listed for OHT as above

## 2025-04-02 NOTE — PROGRESS NOTE ADULT - PROBLEM SELECTOR PROBLEM 2
[Normal] : normal [Age Appropriate Behavior] : age appropriate behavior Norovirus [Cooperative] : cooperative

## 2025-04-02 NOTE — PROGRESS NOTE ADULT - SUBJECTIVE AND OBJECTIVE BOX
Subjective:    Medications:  acetaminophen     Tablet .. 650 milliGRAM(s) Oral every 6 hours PRN  artificial  tears Solution 1 Drop(s) Both EYES every 4 hours PRN  atorvastatin 10 milliGRAM(s) Oral at bedtime  chlorhexidine 2% Cloths 1 Application(s) Topical <User Schedule>  chlorhexidine 4% Liquid 1 Application(s) Topical <User Schedule>  clonazePAM Oral Disintegrating Tablet 0.25 milliGRAM(s) Oral two times a day PRN  guaiFENesin Oral Liquid (Sugar-Free) 200 milliGRAM(s) Oral every 6 hours PRN  heparin   Injectable 5000 Unit(s) SubCutaneous every 8 hours  metoprolol succinate ER 50 milliGRAM(s) Oral daily  psyllium Powder 1 Packet(s) Oral daily  quiNIDine gluconate  milliGRAM(s) Oral every 12 hours  tamsulosin 0.4 milliGRAM(s) Oral at bedtime  traZODone 100 milliGRAM(s) Oral at bedtime      Physical Exam:  General: No distress. Comfortable.  HEENT: EOM intact.  Neck: Neck supple. JVP not elevated. No masses  Chest: Clear to auscultation bilaterally  CV: Normal S1 and S2. No murmurs, rub, or gallops. Radial pulses normal.  Abdomen: Soft, non-distended, non-tender  Skin: No rashes or skin breakdown  Extremities: No LE edema  Neurology: Alert and oriented times three. Sensation intact  Psych: Affect normal      Vitals:  Vital Signs Last 24 Hours  T(C): 36.4 (25 @ 11:05), Max: 36.9 (25 @ 05:24)  HR: 80 (25 @ 11:05) (80 - 81)  BP: 111/76 (25 @ 11:05) (91/60 - 111/76)  RR: 18 (25 @ 11:05) (17 - 18)  SpO2: 97% (25 @ 11:05) (93% - 97%)    Weight in k.7 ( @ 08:56)    I&O's Summary    2025 07:01  -  2025 07:00  --------------------------------------------------------  IN: 720 mL / OUT: 0 mL / NET: 720 mL        Tele: Dual chamber pacing 79 bpm with occasional PVCs. No events recorded.    Labs:                        12.4   5.67  )-----------( 141      ( 2025 06:16 )             37.5     04-01    140  |  104  |  17  ----------------------------<  88  3.8   |  24  |  1.05    Ca    9.0      2025 06:15  Phos  3.1     04-01  Mg     2.2     04-01    Micro:  GI PCR Panel Stool (25 @ 06:28)   Norovirus GI/GII: Detected Subjective: Pt reports 1 BM since yesterday that was more formed. Denies nausea/vomiting & watery diarrhea at this time. States he slept well last night & has been exercising in the room.    Medications:  acetaminophen     Tablet .. 650 milliGRAM(s) Oral every 6 hours PRN  artificial  tears Solution 1 Drop(s) Both EYES every 4 hours PRN  atorvastatin 10 milliGRAM(s) Oral at bedtime  chlorhexidine 2% Cloths 1 Application(s) Topical <User Schedule>  chlorhexidine 4% Liquid 1 Application(s) Topical <User Schedule>  clonazePAM Oral Disintegrating Tablet 0.25 milliGRAM(s) Oral two times a day PRN  guaiFENesin Oral Liquid (Sugar-Free) 200 milliGRAM(s) Oral every 6 hours PRN  heparin   Injectable 5000 Unit(s) SubCutaneous every 8 hours  metoprolol succinate ER 50 milliGRAM(s) Oral daily  psyllium Powder 1 Packet(s) Oral daily  quiNIDine gluconate  milliGRAM(s) Oral every 12 hours  tamsulosin 0.4 milliGRAM(s) Oral at bedtime  traZODone 100 milliGRAM(s) Oral at bedtime      Physical Exam:  General: No distress. Comfortable.  HEENT: EOM intact.  Neck: Neck supple. JVP not elevated. No masses  Chest: Clear to auscultation bilaterally  CV: Normal S1 and S2. No murmurs, rub, or gallops. Radial pulses normal.  Abdomen: Soft, non-distended, non-tender  Skin: No rashes or skin breakdown  Extremities: No LE edema  Neurology: A&Ox3. Sensation intact  Psych: Affect normal      Vitals:  Vital Signs Last 24 Hours  T(C): 36.4 (25 @ 11:05), Max: 36.9 (25 @ 05:24)  HR: 80 (25 @ 11:05) (80 - 81)  BP: 111/76 (25 @ 11:05) (91/60 - 111/76)  RR: 18 (25 @ 11:05) (17 - 18)  SpO2: 97% (25 @ 11:05) (93% - 97%)    Weight in k.7 ( @ 08:56)    I&O's Summary    2025 07:01  -  2025 07:00  --------------------------------------------------------  IN: 720 mL / OUT: 0 mL / NET: 720 mL        Tele: Dual chamber pacing 79 bpm with occasional PVCs. No events recorded.    Labs:                        12.4   5.67  )-----------( 141      ( 2025 06:16 )             37.5     04-01    140  |  104  |  17  ----------------------------<  88  3.8   |  24  |  1.05    Ca    9.0      2025 06:15  Phos  3.1     04-01  Mg     2.2     04-01    Micro:  GI PCR Panel Stool (25 @ 06:28)   Norovirus GI/GII: Detected

## 2025-04-02 NOTE — PROGRESS NOTE ADULT - ASSESSMENT
70-year-old male ABO O past medical history of NICM since 2011 HFrEF (LVEF 25-30%) s/p MDT CRT-D with baseline CHB, VT/VF, AFs/p GIANFRANCO ligation/MAZE, MV/TV repair, PVC ablation 3/2024 intolerant to AADs in the past, recent admission 3/19/2025-3/20/2025 for AICD shocks initially presented to the [facility] ED on 3/21/2025, sent by HF specialist for ACID shock. Device reported successful ATP for VT 3/17/2025 at 6:52pm followed by one ATP & 40J shock. He reported feeling dizzy & SOB during the events. He follows with Dr. Luca Tamayo for HF, currently undergoing transplant workup, Dr John for CRTD and VT/VF and Dr. Chu for genetic counseling. While admitted to [facility], he was started on a lidocaine gtt. On 3/21 when lidocaine weaned off patient had another two episodes of VT requiring AICD shocks. He was transferred to [facility] for further management. He was initially maintained on lidocaine gtt from 3/21/2025-3/25/2025 & his listing status was upgraded to UNOS status 2e for heart transplant (ABO O) for the refractory VT. He was transitioned to oral antiarrhythmics (Toprol XL & quinidine) & ultimately transferred from CICU to tele floor on 3/27/2025. Hospital course was further c/b development of watery diarrhea & was found to have +norovirus on 3/31/2025 which prompted deactivation to status 7 listing for heart transplant.    He has been electrically quiescent thus far on PO antiarrhythmics. Low-normotensive, mildly hypovolemic on exam with stable weights & insensible losses 2/2 diarrhea with plans to discontinue diuretics.    Bedside hemodynamics:  3/22/25 BP 97/76/83, HR 80, CVP 6, PA 58/23/38, PCWP 20, SVR 1100, Gucci CO/CI 5.1/2.6, TD 4/2.08    Cardiac Studies:   TTE 3/20/25 : LVEF 30%, segmental, LVIDd 5.3, walls normal, elevated LV filling pressures, mod RVE with mildly reduced RV function, TAPSE 1.7, severely dilated RA, annuloplasty in MV position, transvalvular gradients are elevated with severe prosthetic MS (peak gradient 15.7, mean gradient 8), trace intravalvular MR, TV annuloplasty ring noted, mild TR,  est PASP 36, no evidence of LV thrombus  TTE 10/2024: LVEF 25-30%, LVEDD 5.9cm, moderately reduced RV function, annuloplasty ring of mitral and tricuspid position, PASP 47 mmHg  RHC 3/19/25- RA 11 PA 58/26 PCWP 32 CO/CI 5.43/2.77  [facility] 6/2023 Nonobstructive CAD 70-year-old male ABO O past medical history of NICM since 2011 HFrEF (LVEF 25-30%) s/p MDT CRT-D with baseline CHB, VT/VF, AFs/p GIANFRANCO ligation/MAZE, MV/TV repair, PVC ablation 3/2024 intolerant to AADs in the past, recent admission 3/19/2025-3/20/2025 for AICD shocks initially presented to the [facility] ED on 3/21/2025, sent by HF specialist for ACID shock. Device reported successful ATP for VT 3/17/2025 at 6:52pm followed by one ATP & 40J shock. He reported feeling dizzy & SOB during the events. He follows with Dr. Luca Tamayo for HF, currently undergoing transplant workup, Dr John for CRTD and VT/VF and Dr. Chu for genetic counseling. While admitted to [facility], he was started on a lidocaine gtt. On 3/21 when lidocaine weaned off patient had another two episodes of VT requiring AICD shocks. He was transferred to [facility] for further management. He was initially maintained on lidocaine gtt from 3/21/2025-3/25/2025 & his listing status was upgraded to UNOS status 2e for heart transplant (ABO O) for the refractory VT. He was transitioned to oral antiarrhythmics (Toprol XL & quinidine) & ultimately transferred from CICU to tele floor on 3/27/2025. Hospital course was further c/b development of watery diarrhea & was found to have +norovirus on 3/31/2025 which prompted deactivation to status 7 listing for heart transplant.    He has been electrically quiescent thus far on PO antiarrhythmics. Low-normotensive, euvolemic on exam with stable weights.    Bedside hemodynamics:  3/22/25 BP 97/76/83, HR 80, CVP 6, PA 58/23/38, PCWP 20, SVR 1100, Gucci CO/CI 5.1/2.6, TD 4/2.08    Cardiac Studies:   TTE 3/20/25 : LVEF 30%, segmental, LVIDd 5.3, walls normal, elevated LV filling pressures, mod RVE with mildly reduced RV function, TAPSE 1.7, severely dilated RA, annuloplasty in MV position, transvalvular gradients are elevated with severe prosthetic MS (peak gradient 15.7, mean gradient 8), trace intravalvular MR, TV annuloplasty ring noted, mild TR,  est PASP 36, no evidence of LV thrombus  TTE 10/2024: LVEF 25-30%, LVEDD 5.9cm, moderately reduced RV function, annuloplasty ring of mitral and tricuspid position, PASP 47 mmHg  RHC 3/19/25- RA 11 PA 58/26 PCWP 32 CO/CI 5.43/2.77  [facility] 6/2023 Nonobstructive CAD

## 2025-04-02 NOTE — PROGRESS NOTE ADULT - SUBJECTIVE AND OBJECTIVE BOX
[facility] Division of Hospital Medicine  Whitney Vargas DO  Available on Teams Andrae    Patient is a 70y old  Male who presents with a chief complaint of VT (02 Apr 2025 12:17)      SUBJECTIVE / OVERNIGHT EVENTS: none. No events on telemetry. He had one BM yesterday and one BM this morning so far, he's feeling better. Eating well.   ADDITIONAL REVIEW OF SYSTEMS: negative    MEDICATIONS  (STANDING):  atorvastatin 10 milliGRAM(s) Oral at bedtime  chlorhexidine 2% Cloths 1 Application(s) Topical <User Schedule>  chlorhexidine 4% Liquid 1 Application(s) Topical <User Schedule>  heparin   Injectable 5000 Unit(s) SubCutaneous every 8 hours  metoprolol succinate ER 50 milliGRAM(s) Oral daily  psyllium Powder 1 Packet(s) Oral daily  quiNIDine gluconate  milliGRAM(s) Oral every 12 hours  tamsulosin 0.4 milliGRAM(s) Oral at bedtime  traZODone 100 milliGRAM(s) Oral at bedtime    MEDICATIONS  (PRN):  acetaminophen     Tablet .. 650 milliGRAM(s) Oral every 6 hours PRN Mild Pain (1 - 3), Moderate Pain (4 - 6)  artificial  tears Solution 1 Drop(s) Both EYES every 4 hours PRN Dry Eyes  clonazePAM Oral Disintegrating Tablet 0.25 milliGRAM(s) Oral two times a day PRN anxiety  guaiFENesin Oral Liquid (Sugar-Free) 200 milliGRAM(s) Oral every 6 hours PRN Cough      CAPILLARY BLOOD GLUCOSE        I&O's Summary    01 Apr 2025 07:01  -  02 Apr 2025 07:00  --------------------------------------------------------  IN: 720 mL / OUT: 0 mL / NET: 720 mL        PHYSICAL EXAM:  Vital Signs Last 24 Hrs  T(C): 36.4 (02 Apr 2025 11:05), Max: 36.9 (02 Apr 2025 05:24)  T(F): 97.5 (02 Apr 2025 11:05), Max: 98.4 (02 Apr 2025 05:24)  HR: 80 (02 Apr 2025 11:05) (80 - 81)  BP: 111/76 (02 Apr 2025 11:05) (98/66 - 111/76)  BP(mean): --  RR: 18 (02 Apr 2025 11:05) (17 - 18)  SpO2: 97% (02 Apr 2025 11:05) (93% - 97%)    Parameters below as of 02 Apr 2025 11:05  Patient On (Oxygen Delivery Method): room air      CONSTITUTIONAL: Well-groomed, in no apparent distress  EYES: No conjunctival or scleral injection, non-icteric; PERRLA and symmetric  ENMT: No external nasal lesions; no pharyngeal injection or exudates, oral mucosa with moist membranes  RESPIRATORY: Breathing comfortably; lungs CTA without wheeze/rhonchi/rales  CARDIOVASCULAR: +S1S2, RRR, no M/G/R; pedal pulses full and symmetric; no lower extremity edema  GASTROINTESTINAL: No palpable masses or tenderness, +BS throughout, no rebound/guarding  MUSCULOSKELETAL: no digital clubbing or cyanosis; no paraspinal tenderness; normal strength and tone of extremities  SKIN: No rashes or ulcers noted; no subcutaneous nodules or induration palpable  NEUROLOGIC: CN II-XII intact; sensation intact in LEs b/l to light touch  PSYCHIATRIC: A+O x 3; mood and affect appropriate; appropriate insight and judgment    LABS:                        12.4   5.67  )-----------( 141      ( 01 Apr 2025 06:16 )             37.5     04-01    140  |  104  |  17  ----------------------------<  88  3.8   |  24  |  1.05    Ca    9.0      01 Apr 2025 06:15  Phos  3.1     04-01  Mg     2.2     04-01            Urinalysis Basic - ( 01 Apr 2025 06:15 )    Color: x / Appearance: x / SG: x / pH: x  Gluc: 88 mg/dL / Ketone: x  / Bili: x / Urobili: x   Blood: x / Protein: x / Nitrite: x   Leuk Esterase: x / RBC: x / WBC x   Sq Epi: x / Non Sq Epi: x / Bacteria: x

## 2025-04-02 NOTE — PROGRESS NOTE ADULT - PROBLEM SELECTOR PLAN 2
- Afebrile, no leukocytosis, non-toxic appearing  - ID recs appreciated to re-activate listing for heart transplant  - continue supportive care per primary team - Afebrile, no leukocytosis, non-toxic appearing  - Per transplant ID, can re-activate UNOS listing status based on clinical resolution of symptoms  - continue supportive care per primary team

## 2025-04-02 NOTE — BH CONSULTATION LIAISON PROGRESS NOTE - NSBHASSESSMENTFT_PSY_ALL_CORE
71 y/o domiciled, employed, , , male with PPHx of PTSD, unspecified anxiety d/o, with no past psychiatric admissions, with a PMHx of NICM since 2011 HFrEF (LVEF 25-30%) s/p MDT CRT-D with baseline CHB, VT/VF, AFs/p GIANFRANCO ligation/MAZE, MV/TV repair, PVC ablation 3/2024 intolerant to AADs in the past, recent admission 3/19/2025-3/20/2025 for AICD shocks initially presented to the [facility] ED on 3/21/2025, sent by HF specialist for ACID shock. While admitted to [facility], his listing status was upgraded to UNOS status 2e for heart transplant (ABO O) for the refractory VT, with the patient being transferred to [facility] for further management. Patient seen by transplant psychiatry for concerns of depression.     4/2:Patient seen as f/u, denied any acute concerns, noting that his sleep has improved, and noted that since being reactivated on the transplant list, has helped his mood and anxiety. Educated regarding medication at bedside, including PRN medications available.      see attending attestation for plan  -------------------------------------------------------------

## 2025-04-02 NOTE — PROGRESS NOTE ADULT - NS ATTEND AMEND GEN_ALL_CORE FT
69 y/o M with NICM, with CHB and VT/VFib and AFib, s/p GIANFRANCO ligation/MAZE and PVC ablation, previously intolerant to antiarrhythmics, who was undergoing transplant evaluation as an outpatient, who was admitted for ICD shocks. He was started on Lidocaine gtt and continued to have VT and ICD shocks. He was transferred to [facility] and transplant listing was upgraded to status 2E. He has since been stabilized on oral antiarrhythmics of Toprol XL and Quinidine. He was found to have Norovirus on 3/31/25, will be made status 7 while symptomatic, will reactivate today.   Continue to hold Torsemide today.

## 2025-04-02 NOTE — PROGRESS NOTE ADULT - PROBLEM SELECTOR PLAN 1
- Listed for heart transplant: deactivated to status 7 given active norovirus infection  - c/w Toprol XL 50mg daily.  - continue holding spironolactone 25mg QD for now, as he endorse previous hx of dizziness with it. Will retrial again in the future once dizziness resolves  - continue holding hydralazine 25mg TID i/s/o hypotension   - will dc torsemide starting tomorrow as pt received dose this AM  - strict I/Os and daily weights  - Keep K>4 and Mg>2  - Would recommend PT to follow, so pt may maintain strength & stamina while inpatient  - Would recommend psychiatry & holistic RN consult to provide emotional support - Listed for heart transplant: deactivated yesterday 4/1 to status 7 given active norovirus infection but given significant clinical improvement in symptoms, will reactivate to status 2e (2 week exception expires 4/4)  - c/w Toprol XL 50mg daily.  - continue holding spironolactone 25mg QD for now, as he endorsed previous hx of dizziness with it. Will retrial again in the future once dizziness resolves  - continue holding hydralazine 25mg TID i/s/o hypotension   - torsemide dc yesterday 3/31, will plan to hold again today but likely will restart tomorrow to prevent hypervolemia given significant improvement in diarrhea  - strict I/Os and daily weights  - Keep K>4 and Mg>2  - PT consult placed  - Psychiatry recs appreciated for anxiety  - Holistic RN consult placed to provide emotional support

## 2025-04-02 NOTE — PROGRESS NOTE ADULT - PROBLEM SELECTOR PLAN 1
-s/p Lidocaine drip S/P one dose of Mexiletine (Mexiletine was d/dwayne for severe dizziness this admission)   -currently on Toprol 50mg daily and Quinidine twice a day   -EP saw patient for Vtach and CRT-D shock.   -as reported , pt previously did not tolerate amio, mexilitine and sotalol in the past due to severe dizziness.   -cont with tele monitor, monitor electrolytes  -h/o ani mitral PVC (premature ventricular contractions) ablation 3/11/2024  -f/up with EP for further recommendation - CRTD interrogated on 3/28

## 2025-04-03 LAB
ANION GAP SERPL CALC-SCNC: 12 MMOL/L — SIGNIFICANT CHANGE UP (ref 5–17)
BUN SERPL-MCNC: 18 MG/DL — SIGNIFICANT CHANGE UP (ref 7–23)
CALCIUM SERPL-MCNC: 9 MG/DL — SIGNIFICANT CHANGE UP (ref 8.4–10.5)
CHLORIDE SERPL-SCNC: 104 MMOL/L — SIGNIFICANT CHANGE UP (ref 96–108)
CO2 SERPL-SCNC: 22 MMOL/L — SIGNIFICANT CHANGE UP (ref 22–31)
CREAT SERPL-MCNC: 1.05 MG/DL — SIGNIFICANT CHANGE UP (ref 0.5–1.3)
EGFR: 76 ML/MIN/1.73M2 — SIGNIFICANT CHANGE UP
EGFR: 76 ML/MIN/1.73M2 — SIGNIFICANT CHANGE UP
GLUCOSE SERPL-MCNC: 95 MG/DL — SIGNIFICANT CHANGE UP (ref 70–99)
HCT VFR BLD CALC: 37.3 % — LOW (ref 39–50)
HGB BLD-MCNC: 12.1 G/DL — LOW (ref 13–17)
MAGNESIUM SERPL-MCNC: 2 MG/DL — SIGNIFICANT CHANGE UP (ref 1.6–2.6)
MCHC RBC-ENTMCNC: 28.9 PG — SIGNIFICANT CHANGE UP (ref 27–34)
MCHC RBC-ENTMCNC: 32.4 G/DL — SIGNIFICANT CHANGE UP (ref 32–36)
MCV RBC AUTO: 89 FL — SIGNIFICANT CHANGE UP (ref 80–100)
NRBC BLD AUTO-RTO: 0 /100 WBCS — SIGNIFICANT CHANGE UP (ref 0–0)
PHOSPHATE SERPL-MCNC: 3.3 MG/DL — SIGNIFICANT CHANGE UP (ref 2.5–4.5)
PLATELET # BLD AUTO: 167 K/UL — SIGNIFICANT CHANGE UP (ref 150–400)
POTASSIUM SERPL-MCNC: 4.1 MMOL/L — SIGNIFICANT CHANGE UP (ref 3.5–5.3)
POTASSIUM SERPL-SCNC: 4.1 MMOL/L — SIGNIFICANT CHANGE UP (ref 3.5–5.3)
RBC # BLD: 4.19 M/UL — LOW (ref 4.2–5.8)
RBC # FLD: 13.8 % — SIGNIFICANT CHANGE UP (ref 10.3–14.5)
SODIUM SERPL-SCNC: 138 MMOL/L — SIGNIFICANT CHANGE UP (ref 135–145)
WBC # BLD: 6.91 K/UL — SIGNIFICANT CHANGE UP (ref 3.8–10.5)
WBC # FLD AUTO: 6.91 K/UL — SIGNIFICANT CHANGE UP (ref 3.8–10.5)

## 2025-04-03 PROCEDURE — 99232 SBSQ HOSP IP/OBS MODERATE 35: CPT

## 2025-04-03 RX ADMIN — Medication 324 MILLIGRAM(S): at 16:47

## 2025-04-03 RX ADMIN — Medication 324 MILLIGRAM(S): at 06:16

## 2025-04-03 RX ADMIN — TAMSULOSIN HYDROCHLORIDE 0.4 MILLIGRAM(S): 0.4 CAPSULE ORAL at 22:03

## 2025-04-03 RX ADMIN — Medication 100 MILLIGRAM(S): at 22:04

## 2025-04-03 RX ADMIN — ATORVASTATIN CALCIUM 10 MILLIGRAM(S): 80 TABLET, FILM COATED ORAL at 22:03

## 2025-04-03 NOTE — DISCHARGE NOTE PROVIDER - NSDCPNSUBOBJ_GEN_ALL_CORE
VITAL SIGNS-Telemetry:  SR   Vital Signs Last 24 Hrs  T(C): 36.6 (05-13-25 @ 18:47), Max: 36.6 (05-13-25 @ 18:47)  T(F): 97.9 (05-13-25 @ 18:47), Max: 97.9 (05-13-25 @ 18:47)  HR: 99 (05-13-25 @ 18:47) (92 - 99)  BP: 113/75 (05-13-25 @ 18:47) (113/75 - 117/80)  RR: 18 (05-13-25 @ 18:47) (18 - 18)  SpO2: 98% (05-13-25 @ 18:47) (97% - 98%)         05-12 @ 07:01  -  05-13 @ 07:00  --------------------------------------------------------  IN: 1515 mL / OUT: 1600 mL / NET: -85 mL    05-13 @ 07:01  -  05-14 @ 06:03  --------------------------------------------------------  IN: 890 mL / OUT: 1400 mL / NET: -510 mL    Daily     Daily     CAPILLARY BLOOD GLUCOSE  POCT Blood Glucose.: 182 mg/dL (13 May 2025 21:19)  POCT Blood Glucose.: 160 mg/dL (13 May 2025 17:52)  POCT Blood Glucose.: 151 mg/dL (13 May 2025 11:17)  POCT Blood Glucose.: 145 mg/dL (13 May 2025 07:10)         PHYSICAL EXAM:  Neurology: alert and oriented x 3, nonfocal, no gross deficits  CV : S1S2  Sternal Wound :  CDI , Stable  Lungs: cta  Abdomen: soft, nontender, nondistended, positive bowel sounds, last bowel movement       5/13  Extremities:    no edema  no calf tenderness

## 2025-04-03 NOTE — PROGRESS NOTE ADULT - PROBLEM SELECTOR PLAN 3
TTE 3/20/25 : LVEF 30%,transvalvular gradients are elevated with severe prosthetic Mitral Stenosis, no evidence of LV thrombus  s/p recent admit 3/19/2025 w/ RHC found to have elevated filling pressures treated with IV diuretics  diuretics were held 3/26, s/p IVF for low BP. now on Torsemide 20 oral daily - HELD for diarrhea  GDMT: Toprol, Torsemide; off Spironolactone d/t prior dizziness  afterload reduction on hold w/ borderline BPs  holding home Farxiga while inpatient  HF team is following and status upgraded for transplant  cont to closely monitor

## 2025-04-03 NOTE — PROGRESS NOTE ADULT - PROBLEM SELECTOR PLAN 2
- self-limiting, resolved  - Monitor BMs per day - so far 1x/day since 4/1  - hold torsemide while he's having fluid losses from diarrhea  - discussed with infection control, contact iso ends on day 6 (likely Saturday?) and as long as patient is not symptomatic

## 2025-04-03 NOTE — DISCHARGE NOTE PROVIDER - NSDCCPCAREPLAN_GEN_ALL_CORE_FT
PRINCIPAL DISCHARGE DIAGNOSIS  Diagnosis: Heart transplant recipient  Assessment and Plan of Treatment:

## 2025-04-03 NOTE — PROGRESS NOTE ADULT - SUBJECTIVE AND OBJECTIVE BOX
[facility] Division of Hospital Medicine  Whitney Vargas DO  Available on Teams Andrae    Patient is a 70y old  Male who presents with a chief complaint of VT (03 Apr 2025 09:57)      SUBJECTIVE / OVERNIGHT EVENTS: none. No diarrhea. No shortness of breath or chest pain. Wondering when his isolation will finish.   ADDITIONAL REVIEW OF SYSTEMS: negative    MEDICATIONS  (STANDING):  atorvastatin 10 milliGRAM(s) Oral at bedtime  chlorhexidine 2% Cloths 1 Application(s) Topical <User Schedule>  chlorhexidine 4% Liquid 1 Application(s) Topical <User Schedule>  heparin   Injectable 5000 Unit(s) SubCutaneous every 8 hours  metoprolol succinate ER 50 milliGRAM(s) Oral daily  psyllium Powder 1 Packet(s) Oral daily  quiNIDine gluconate  milliGRAM(s) Oral every 12 hours  tamsulosin 0.4 milliGRAM(s) Oral at bedtime  traZODone 100 milliGRAM(s) Oral at bedtime    MEDICATIONS  (PRN):  acetaminophen     Tablet .. 650 milliGRAM(s) Oral every 6 hours PRN Mild Pain (1 - 3), Moderate Pain (4 - 6)  artificial  tears Solution 1 Drop(s) Both EYES every 4 hours PRN Dry Eyes  clonazePAM Oral Disintegrating Tablet 0.25 milliGRAM(s) Oral two times a day PRN anxiety  guaiFENesin Oral Liquid (Sugar-Free) 200 milliGRAM(s) Oral every 6 hours PRN Cough      CAPILLARY BLOOD GLUCOSE        I&O's Summary    02 Apr 2025 07:01  -  03 Apr 2025 07:00  --------------------------------------------------------  IN: 600 mL / OUT: 0 mL / NET: 600 mL        PHYSICAL EXAM:  Vital Signs Last 24 Hrs  T(C): 36.6 (03 Apr 2025 12:36), Max: 36.9 (02 Apr 2025 21:17)  T(F): 97.8 (03 Apr 2025 12:36), Max: 98.5 (02 Apr 2025 21:17)  HR: 78 (03 Apr 2025 12:36) (78 - 81)  BP: 102/69 (03 Apr 2025 12:36) (90/60 - 104/72)  BP(mean): --  RR: 18 (03 Apr 2025 12:36) (18 - 18)  SpO2: 97% (03 Apr 2025 12:36) (94% - 97%)    Parameters below as of 03 Apr 2025 12:36  Patient On (Oxygen Delivery Method): room air      CONSTITUTIONAL: Well-groomed, in no apparent distress  EYES: No conjunctival or scleral injection, non-icteric; PERRLA and symmetric  ENMT: No external nasal lesions; no pharyngeal injection or exudates, oral mucosa with moist membranes  RESPIRATORY: Breathing comfortably; lungs CTA without wheeze/rhonchi/rales  CARDIOVASCULAR: +S1S2, RRR, no M/G/R; pedal pulses full and symmetric; no lower extremity edema  GASTROINTESTINAL: No palpable masses or tenderness, +BS throughout, no rebound/guarding  MUSCULOSKELETAL: no digital clubbing or cyanosis; no paraspinal tenderness; normal strength and tone of extremities  SKIN: No rashes or ulcers noted; no subcutaneous nodules or induration palpable  NEUROLOGIC: CN II-XII intact; sensation intact in LEs b/l to light touch  PSYCHIATRIC: A+O x 3; mood and affect appropriate; appropriate insight and judgment    LABS:                        12.1   6.91  )-----------( 167      ( 03 Apr 2025 06:44 )             37.3     04-03    138  |  104  |  18  ----------------------------<  95  4.1   |  22  |  1.05    Ca    9.0      03 Apr 2025 06:44  Phos  3.3     04-03  Mg     2.0     04-03            Urinalysis Basic - ( 03 Apr 2025 06:44 )    Color: x / Appearance: x / SG: x / pH: x  Gluc: 95 mg/dL / Ketone: x  / Bili: x / Urobili: x   Blood: x / Protein: x / Nitrite: x   Leuk Esterase: x / RBC: x / WBC x   Sq Epi: x / Non Sq Epi: x / Bacteria: x

## 2025-04-03 NOTE — PROGRESS NOTE ADULT - ASSESSMENT
70yoM w/ PMHx of NICM since 2011, HFrEF (25-30%) s/p MDT CRT-D with baseline CHB , VT/VF, afib s/p GIANFRANCO ligation/MAZE, MV/TV repair, PVC ablation (3/2024) was transferred from outside hospital to [facility] due to  VT/AICD shock.   While admitted to the outside hospital, he was started on a lidocaine gtt. On 3/21 when lidocaine weaned off patient had recurrence of VT requiring AICD shocks. Hence,  was transferred to [facility] for further management. Pt  was on Lidocaine gtt for control and is now on Quinidine and uptitrated Toprol for anti-arrythmia. Because of pt's refractory VT pt's listing status for a Heart Transplant was bumped from 6 to 2E temporarily. Pt will remain inpatient for listing purposes and managed closely by HF team and hence was transferred to [facility] on 3/27/25.

## 2025-04-03 NOTE — DISCHARGE NOTE PROVIDER - NSDCFUADDAPPT_GEN_ALL_CORE_FT
Patient advised they did not want to proceed with scheduling appointments with the providers on their referrals. They will coordinate care on their own.     Prior to outreaching the patient, it was visible that the patient has secured a follow up appointment which was not scheduled by our team. Patient is scheduled with Dr. Bryan 6/20/25 11:00am [address] [place] [address]    Prior to outreaching the patient, it was visible that the patient has secured a follow up appointment which was not scheduled by our team. Patient is scheduled with Dr. Tamayo 4/30 1:00pm [address] [place] [address] Follow up with Dr. Navarro, Nephro-cardiology, on 7/25/25 @ 10am @ [address] [place], [place] - [facility]  follow up with DR. Dhiraj Mathews, ID on 6/11/25 @ 11:30am  \follow bllodwork on fri 5/16 & monday 5/19  Clinic right heart cath with biopsy on 5/22 with Dr. Urbina

## 2025-04-03 NOTE — DISCHARGE NOTE PROVIDER - HOSPITAL COURSE
HPI:  69-year-old male patient past medical history of NICM since 2011, HFrEF LVEF 25-30% s/p MDT CRT-D with baseline CHB, VT/VF, a.fib s/p GIANFRANCO ligation/MAZE, MV/TV repair, PVC ablation 3/2024 intolerant to AADs in the past, recent admission 3/19 - 3/20/25 for AICD shocks initially presented to the [facility] emergency department on 3/21/2025, sent by heart failure specialist for ACID shock. Device reported successful ATP for VT 3/17 at 6:52pm followed by one ATP and 40J shock. He reported feeling dizzy and SOB during the events. He follows with Dr paula abreu for HF, currently undergoing transplant workup, Dr John for CRTD and VT/VF and  for genetic counseling. While admitted to [facility], he was started on a lidocaine gtt. On 3/21 when lidocaine weaned off patient had another two episodes of VT requiring AICD shocks. He was transferred to [facility] for further management.     On admission to CICU, he is A&O x3, saturating well on room air, av paced @ 80 with /87 on lidocaine gtt @ 1mg/min. (21 Mar 2025 22:55)    Hospital Course: 70-year-old male with a complex cardiac history, was transferred from an outside hospital to [facility] due to refractory VT requiring AICD shocks. Initial management included a lidocaine drip; however, upon weaning, the patient experienced recurrent VT. He was then transitioned to Quinidine and Toprol for anti-arrhythmic therapy. Given the severity of his condition, his heart transplant listing status was temporarily elevated from 6 to 2E. During his stay, the patient developed a self-limiting Norovirus infection, which was managed conservatively. The heart failure team coordinated his care, optimizing medical therapy while preparing for potential heart transplantation, given the severe prosthetic mitral valve stenosis and his overall cardiac status.      Important Medication Changes and Reason:    Active or Pending Issues Requiring Follow-up:  Follow-up with PCP, Heart Failure    Advanced Directives:   [ X] Full code  [ ] DNR  [ ] Hospice    Discharge Diagnoses:  Refractory Ventricular Tachycardia   Norovirus Infection  Chronic Systolic Heart Failure with Reduced Ejection Fraction  Severe Prosthetic Mitral Valve Stenosis  Hypertension   Atrial Fibrillation   Hyperlipidemia       70-year-old male, with reported Hx of NICM since 2011 HFrEF (LVEF 25-30%) s/p MDT CRT-D with baseline CHB, VT/VF, AFs/p GIANFRANCO ligation/MAZE, MV/TV repair, PVC ablation 3/2024 intolerant to AADs in the past, recent admission 3/19/2025-3/20/2025 for AICD shocks initially presented to the [facility] ED on 3/21/2025, sent by HF specialist for ACID shock. Device reported successful ATP for VT 3/17/2025 at 6:52pm followed by one ATP & 40J shock. He reported feeling dizzy & SOB during the events. He follows with Dr. Luca Tamayo for HF, currently undergoing transplant workup, Dr John for CRTD and VT/VF and Dr. Chu for genetic counseling. While admitted to [facility], he was started on a lidocaine gtt. On 3/21 when lidocaine weaned off patient had another two episodes of VT requiring AICD shocks. He was transferred to [facility] for further management. He was initially maintained on lidocaine gtt from 3/21/2025-3/25/2025 & his listing status was upgraded to UNOS status 2e for heart transplant (ABO O) for the refractory VT. He was transitioned to oral antiarrhythmics (Toprol XL & quinidine) & ultimately transferred from CICU to [facility] floor on 3/27/2025. Hospital course was further c/b development of watery diarrhea & was found to have +norovirus on 3/31/2025 which prompted deactivation to status 7 listing for heart transplant. Status reinstated to 2E after diarrhea resolved.   4/29: s/p  OHT, TV repair, PPM removal   Intra op STACEY with LVEF 20% and mild RV dysfunction.  Extubated on 4/29.    Post Op: Had some initial RV failure/PGD (requiring dobutamine 7.5, epi 0.02, levo, vaso, and Lico.  CRRT started to aid volume removal.  Echo from 4/30 with LVEF 70% and mild RV dysfunction.  Levo/Vaso weaned off on 5/1 and Lico weaned off 5/2. He requiring large amounts of pain medication despite 2 chest tubes being removed POD 1 (ketamine + PCA pump).  Hospital course c/b ?delerium and non-cooporation with care, SI, Seen by psych 5/2 and started on trazodone + Pristiq. Psychiatric status improved.  Requiring 1:1 sitter now resolved.  NG tube placed 5/3 but now eating/ NGT removed 5/5. He is s/p first surveillance RHC/Bx 5/8. Chest tubes removed in CTU, no PTX on CXR.   5/9: TRANSFERRED TO [facility]. Management per transplant cardiology team. Transplant ID following on regimen per ID and Tx Cardiology. Per report: Donor Syphilis Serology Positive. Per Transplant ID: Continue benzathine penicillin 2,400,000 units weekly x 3 doses total, second dose 5/8, next due 5/15.  5/10 VSS rounds made w/ Dr. Barrientos -& HF team.  will need PICC line for 4 weeks of Ceftriaxone 2g iv qd (strp pneum meningitis in donor) & PCN for syphilis  in donor- d/c plan home this week.  5/11 VSS c/w IV abx. PICC eval for IV abx. Transplant cardiology for diuretic regimen --> started on PO Bumex 1mg qD. Tacro lvl 8.6, for 2.5mg BID per Transplant cardiology team.  5/12 Spoke to  IR. Per IR sharron for PICC i/s/o GFR. Bedside PICC team aware, and plan for PICC today at bedside. Mg supplemented. Monitor Cr. Cardio-renal following.   5/13 Per Transplant ID Dr Mathews: c/w IV antibiotics via RUE PICC through 5/29; patient to receive last dose PCN prior to discharge WED 5/14 per Transplant team. SW following closely, arranged home infusion set up.   5/14 VSS plan for d/c home today on IV antibx til 5/29

## 2025-04-03 NOTE — DISCHARGE NOTE PROVIDER - CARE PROVIDERS DIRECT ADDRESSES
[email or web address]

## 2025-04-03 NOTE — DISCHARGE NOTE PROVIDER - CARE PROVIDER_API CALL
Luca Tamayo  Interventional Cardiology  [address] [place] [address], [address]  [place] [address]  Phone: [phone]  Fax: [phone]  Follow Up Time: 1 week   Good Barrientos  Thoracic and Cardiac Surgery  [address] [place] [address]  Phone: [phone]  Fax: [phone]  Follow Up Time:     Debbie Mathews  Infectious Disease  [address] [place] [address]  Phone: [phone]  Fax: [phone]  Established Patient  Scheduled Appointment: 07/11/2025 11:30 AM

## 2025-04-03 NOTE — DISCHARGE NOTE PROVIDER - NSDCFUSCHEDAPPT_GEN_ALL_CORE_FT
Luca Tamayo  [facility]  HEARTFAIL [address]  Scheduled Appointment: 04/30/2025    [facility]  CARDIOLOGY [address]  Scheduled Appointment: 06/06/2025    Celestine Bryan  [facility]  ELECTROPH [address]  Scheduled Appointment: 06/20/2025     Debbie Mathews  [facility]  INFDISEASE [address]  Scheduled Appointment: 06/11/2025    Celestine Bryan  [facility]  ELECTROPH [address]  Scheduled Appointment: 06/20/2025    Irma Navarro  [facility]  NEPHRO [address]  Scheduled Appointment: 07/25/2025

## 2025-04-03 NOTE — DISCHARGE NOTE PROVIDER - PROVIDER TOKENS
PROVIDER:[TOKEN:[[id number]],FOLLOWUP:[1 week]] PROVIDER:[TOKEN:[[id number]]],PROVIDER:[TOKEN:[[id number]],SCHEDULEDAPPT:[07/11/2025],SCHEDULEDAPPTTIME:[11:30 AM],ESTABLISHEDPATIENT:[T]]

## 2025-04-03 NOTE — DISCHARGE NOTE PROVIDER - NSDCMRMEDTOKEN_GEN_ALL_CORE_FT
atorvastatin 20 mg oral tablet: 1 tab(s) orally once a day (at bedtime)  clonazePAM 0.5 mg oral tablet: 1 tab(s) orally once a day As needed anxiety  dapagliflozin 10 mg oral tablet: 1 tab(s) orally once a day  furosemide 100 mg/100 mL-0.9% intravenous solution: 80 milliliter(s) intravenous 2 times a day  lidocaine: 1 milligram(s) by continuous intravenous infusion every minute 2 grams in dextrose 5% 250mL, infuse at rate of 7.5mL/hr for dose rate of 1mg/min  losartan 25 mg oral tablet: 1 tab(s) orally once a day  metoprolol succinate 50 mg oral tablet, extended release: 1 tab(s) orally once a day  tamsulosin 0.4 mg oral capsule: 1 cap(s) orally once a day (at bedtime)  torsemide 20 mg oral tablet: 1 tab(s) orally 2 times a day  traZODone 100 mg oral tablet: 1 tab(s) orally once a day (at bedtime)   104 cefTRIAXone: 2 gram(s) intravenous once a day  methocarbamol 500 mg oral tablet: 1 tab(s) orally every 8 hours As needed Muscle Spasm  ocular lubricant preservative-free ophthalmic solution: 1 drop(s) to each affected eye every 12 hours As needed Dry Eyes  oxymetazoline 0.05% nasal spray: 2 puff(s) nasal 2 times a day  polyethylene glycol 3350 oral powder for reconstitution: 17 gram(s) orally 2 times a day  senna leaf extract oral tablet: 2 tab(s) orally once a day (at bedtime)  vancomycin 250 mg/5 mL oral solution: 2.5 milliliter(s) orally 2 times a day   cefTRIAXone: 2 gram(s) intravenous once a day  methocarbamol 500 mg oral tablet: 1 tab(s) orally every 8 hours As needed Muscle Spasm  ocular lubricant preservative-free ophthalmic solution: 1 drop(s) to each affected eye every 12 hours As needed Dry Eyes  oxymetazoline 0.05% nasal spray: 2 puff(s) nasal 2 times a day  polyethylene glycol 3350 oral powder for reconstitution: 17 gram(s) orally 2 times a day  senna leaf extract oral tablet: 2 tab(s) orally once a day (at bedtime)  Valcyte 450 mg oral tablet: 1 tab(s) orally Tuesday, Thursday, Saturday  vancomycin 250 mg/5 mL oral solution: 2.5 milliliter(s) orally 2 times a day   cefTRIAXone: 2 gram(s) intravenous once a day  methocarbamol 500 mg oral tablet: 1 tab(s) orally every 8 hours As needed Muscle Spasm  oxymetazoline 0.05% nasal spray: 2 puff(s) nasal 2 times a day  polyethylene glycol 3350 oral powder for reconstitution: 17 gram(s) orally 2 times a day  senna leaf extract oral tablet: 2 tab(s) orally once a day (at bedtime)  Valcyte 450 mg oral tablet: 1 tab(s) orally Tuesday, Thursday, Saturday  vancomycin 250 mg/5 mL oral solution: 2.5 milliliter(s) orally 2 times a day   cefTRIAXone: 2 gram(s) intravenous once a day  methocarbamol 500 mg oral tablet: 1 tab(s) orally every 8 hours As needed Muscle Spasm  oxymetazoline 0.05% nasal spray: 2 puff(s) nasal 2 times a day  polyethylene glycol 3350 oral powder for reconstitution: 17 gram(s) orally 2 times a day  pravastatin 20 mg oral tablet: 1 tab(s) orally once a day (at bedtime)  senna leaf extract oral tablet: 2 tab(s) orally once a day (at bedtime)  Valcyte 450 mg oral tablet: 1 tab(s) orally Tuesday, Thursday, Saturday  vancomycin 250 mg/5 mL oral solution: 2.5 milliliter(s) orally 2 times a day

## 2025-04-04 LAB
ANION GAP SERPL CALC-SCNC: 12 MMOL/L — SIGNIFICANT CHANGE UP (ref 5–17)
BUN SERPL-MCNC: 18 MG/DL — SIGNIFICANT CHANGE UP (ref 7–23)
CALCIUM SERPL-MCNC: 9 MG/DL — SIGNIFICANT CHANGE UP (ref 8.4–10.5)
CHLORIDE SERPL-SCNC: 105 MMOL/L — SIGNIFICANT CHANGE UP (ref 96–108)
CO2 SERPL-SCNC: 22 MMOL/L — SIGNIFICANT CHANGE UP (ref 22–31)
CREAT SERPL-MCNC: 1.09 MG/DL — SIGNIFICANT CHANGE UP (ref 0.5–1.3)
EGFR: 73 ML/MIN/1.73M2 — SIGNIFICANT CHANGE UP
EGFR: 73 ML/MIN/1.73M2 — SIGNIFICANT CHANGE UP
GLUCOSE SERPL-MCNC: 91 MG/DL — SIGNIFICANT CHANGE UP (ref 70–99)
MAGNESIUM SERPL-MCNC: 2 MG/DL — SIGNIFICANT CHANGE UP (ref 1.6–2.6)
PHOSPHATE SERPL-MCNC: 3.1 MG/DL — SIGNIFICANT CHANGE UP (ref 2.5–4.5)
POTASSIUM SERPL-MCNC: 4 MMOL/L — SIGNIFICANT CHANGE UP (ref 3.5–5.3)
POTASSIUM SERPL-SCNC: 4 MMOL/L — SIGNIFICANT CHANGE UP (ref 3.5–5.3)
SODIUM SERPL-SCNC: 139 MMOL/L — SIGNIFICANT CHANGE UP (ref 135–145)

## 2025-04-04 PROCEDURE — 99232 SBSQ HOSP IP/OBS MODERATE 35: CPT

## 2025-04-04 PROCEDURE — 99233 SBSQ HOSP IP/OBS HIGH 50: CPT | Mod: FS

## 2025-04-04 RX ORDER — TORSEMIDE 10 MG
10 TABLET ORAL DAILY
Refills: 0 | Status: DISCONTINUED | OUTPATIENT
Start: 2025-04-04 | End: 2025-04-17

## 2025-04-04 RX ADMIN — ATORVASTATIN CALCIUM 10 MILLIGRAM(S): 80 TABLET, FILM COATED ORAL at 22:03

## 2025-04-04 RX ADMIN — Medication 324 MILLIGRAM(S): at 06:00

## 2025-04-04 RX ADMIN — Medication 100 MILLIGRAM(S): at 22:03

## 2025-04-04 RX ADMIN — Medication 1 PACKET(S): at 16:36

## 2025-04-04 RX ADMIN — Medication 1 APPLICATION(S): at 06:01

## 2025-04-04 RX ADMIN — TAMSULOSIN HYDROCHLORIDE 0.4 MILLIGRAM(S): 0.4 CAPSULE ORAL at 22:03

## 2025-04-04 RX ADMIN — Medication 10 MILLIGRAM(S): at 16:36

## 2025-04-04 RX ADMIN — Medication 324 MILLIGRAM(S): at 16:36

## 2025-04-04 NOTE — PROGRESS NOTE ADULT - PROBLEM SELECTOR PLAN 1
- Listed for heart transplant: deactivated yesterday 4/1 to status 7 given active norovirus infection but given significant clinical improvement in symptoms, will reactivate to status 2e (2 week exception expires 4/4)  - c/w Toprol XL 50mg daily.  - continue holding spironolactone 25mg QD for now, as he endorsed previous hx of dizziness with it. Will retrial again in the future once dizziness resolves  - continue holding hydralazine 25mg TID i/s/o hypotension   - will resume his torsemide 10mg QD. Diarrhea has improved  - strict I/Os and daily weights  - Keep K>4 and Mg>2  - PT consult placed  - Psychiatry recs appreciated for anxiety  - Holistic RN consult placed to provide emotional support

## 2025-04-04 NOTE — PROGRESS NOTE ADULT - PROBLEM SELECTOR PLAN 3
TTE 3/20/25 : LVEF 30%,transvalvular gradients are elevated with severe prosthetic Mitral Stenosis, no evidence of LV thrombus  s/p recent admit 3/19/2025 w/ RHC found to have elevated filling pressures treated with IV diuretics  diuretics were held 3/26, s/p IVF for low BP.   GDMT: Toprol, Torsemide; off Spironolactone d/t prior dizziness  afterload reduction on hold w/ borderline BPs  holding home Farxiga while inpatient  HF team is following and status upgraded for transplant  -restarted torsemide 10mg QD per heart failure team  cont to closely monitor

## 2025-04-04 NOTE — PROGRESS NOTE ADULT - ASSESSMENT
70-year-old male ABO O past medical history of NICM since 2011 HFrEF (LVEF 25-30%) s/p MDT CRT-D with baseline CHB, VT/VF, AFs/p GIANFRANCO ligation/MAZE, MV/TV repair, PVC ablation 3/2024 intolerant to AADs in the past, recent admission 3/19/2025-3/20/2025 for AICD shocks initially presented to the [facility] ED on 3/21/2025, sent by HF specialist for ACID shock. Device reported successful ATP for VT 3/17/2025 at 6:52pm followed by one ATP & 40J shock. He reported feeling dizzy & SOB during the events. He follows with Dr. Luca Tamayo for HF, currently undergoing transplant workup, Dr John for CRTD and VT/VF and Dr. Chu for genetic counseling. While admitted to [facility], he was started on a lidocaine gtt. On 3/21 when lidocaine weaned off patient had another two episodes of VT requiring AICD shocks. He was transferred to [facility] for further management. He was initially maintained on lidocaine gtt from 3/21/2025-3/25/2025 & his listing status was upgraded to UNOS status 2e for heart transplant (ABO O) for the refractory VT. He was transitioned to oral antiarrhythmics (Toprol XL & quinidine) & ultimately transferred from CICU to tele floor on 3/27/2025. Hospital course was further c/b development of watery diarrhea & was found to have +norovirus on 3/31/2025 which prompted deactivation to status 7 listing for heart transplant.    He has been electrically quiescent thus far on PO antiarrhythmics. Near euvolemic and will resume his oral diuretics. Denies any current diarrhea. He is currently reactivated 4/2 for UNOS status 2e    Bedside hemodynamics:  3/22/25 BP 97/76/83, HR 80, CVP 6, PA 58/23/38, PCWP 20, SVR 1100, Gucci CO/CI 5.1/2.6, TD 4/2.08    Cardiac Studies:   TTE 3/20/25 : LVEF 30%, segmental, LVIDd 5.3, walls normal, elevated LV filling pressures, mod RVE with mildly reduced RV function, TAPSE 1.7, severely dilated RA, annuloplasty in MV position, transvalvular gradients are elevated with severe prosthetic MS (peak gradient 15.7, mean gradient 8), trace intravalvular MR, TV annuloplasty ring noted, mild TR,  est PASP 36, no evidence of LV thrombus  TTE 10/2024: LVEF 25-30%, LVEDD 5.9cm, moderately reduced RV function, annuloplasty ring of mitral and tricuspid position, PASP 47 mmHg  RHC 3/19/25- RA 11 PA 58/26 PCWP 32 CO/CI 5.43/2.77  [facility] 6/2023 Nonobstructive CAD

## 2025-04-04 NOTE — PROGRESS NOTE ADULT - ASSESSMENT
70yoM w/ PMHx of NICM since 2011, HFrEF (25-30%) s/p MDT CRT-D with baseline CHB , VT/VF, afib s/p GIANFRANCO ligation/MAZE, MV/TV repair, PVC ablation (3/2024) was transferred from outside hospital to [facility] due to  VT/AICD shock.   While admitted to the outside hospital, he was started on a lidocaine gtt. On 3/21 when lidocaine weaned off patient had recurrence of VT requiring AICD shocks. Hence,  was transferred to [facility] for further management. Pt  was on Lidocaine gtt for control and is now on Quinidine and uptitrated Toprol for anti-arrythmia. Because of pt's refractory VT pt's listing status for a Heart Transplant was bumped from 6 to 2E temporarily. Pt will remain inpatient for listing purposes and managed closely by HF team and hence was transferred to [facility] on 3/27/25.

## 2025-04-04 NOTE — PROGRESS NOTE ADULT - NS ATTEND AMEND GEN_ALL_CORE FT
69 y/o M with NICM, with CHB and VT/VFib and AFib, s/p GIANFRANCO ligation/MAZE and PVC ablation, previously intolerant to antiarrhythmics, who was undergoing transplant evaluation as an outpatient, who was admitted for ICD shocks. He was started on Lidocaine gtt and continued to have VT and ICD shocks. He was transferred to [facility] and transplant listing was upgraded to status 2E. He has since been stabilized on oral antiarrhythmics of Toprol XL and Quinidine. He was found to have Norovirus on 3/31/25, was made status 7 while symptomatic, now reactivated as status 2E.   Resume Torsemide today.

## 2025-04-04 NOTE — PROGRESS NOTE ADULT - SUBJECTIVE AND OBJECTIVE BOX
[facility] Division of Hospital Medicine  Whitney Vargas DO  Available on Teams Andrae    Patient is a 70y old  Male who presents with a chief complaint of VT (04 Apr 2025 12:59)      SUBJECTIVE / OVERNIGHT EVENTS: None. Feels well. No diarrhea. No shortness of breath.  ADDITIONAL REVIEW OF SYSTEMS: negative    MEDICATIONS  (STANDING):  atorvastatin 10 milliGRAM(s) Oral at bedtime  chlorhexidine 2% Cloths 1 Application(s) Topical <User Schedule>  chlorhexidine 4% Liquid 1 Application(s) Topical <User Schedule>  heparin   Injectable 5000 Unit(s) SubCutaneous every 8 hours  metoprolol succinate ER 50 milliGRAM(s) Oral daily  psyllium Powder 1 Packet(s) Oral daily  quiNIDine gluconate  milliGRAM(s) Oral every 12 hours  tamsulosin 0.4 milliGRAM(s) Oral at bedtime  torsemide 10 milliGRAM(s) Oral daily  traZODone 100 milliGRAM(s) Oral at bedtime    MEDICATIONS  (PRN):  acetaminophen     Tablet .. 650 milliGRAM(s) Oral every 6 hours PRN Mild Pain (1 - 3), Moderate Pain (4 - 6)  artificial  tears Solution 1 Drop(s) Both EYES every 4 hours PRN Dry Eyes  clonazePAM Oral Disintegrating Tablet 0.25 milliGRAM(s) Oral two times a day PRN anxiety  guaiFENesin Oral Liquid (Sugar-Free) 200 milliGRAM(s) Oral every 6 hours PRN Cough      CAPILLARY BLOOD GLUCOSE        I&O's Summary    03 Apr 2025 07:01  -  04 Apr 2025 07:00  --------------------------------------------------------  IN: 480 mL / OUT: 0 mL / NET: 480 mL    04 Apr 2025 07:01  -  04 Apr 2025 14:12  --------------------------------------------------------  IN: 720 mL / OUT: 0 mL / NET: 720 mL        PHYSICAL EXAM:  Vital Signs Last 24 Hrs  T(C): 36.6 (04 Apr 2025 12:01), Max: 36.7 (04 Apr 2025 05:35)  T(F): 97.9 (04 Apr 2025 12:01), Max: 98 (04 Apr 2025 05:35)  HR: 79 (04 Apr 2025 12:01) (76 - 84)  BP: 107/71 (04 Apr 2025 12:01) (94/63 - 107/71)  BP(mean): --  RR: 18 (04 Apr 2025 12:01) (18 - 18)  SpO2: 97% (04 Apr 2025 12:01) (94% - 97%)    Parameters below as of 04 Apr 2025 12:01  Patient On (Oxygen Delivery Method): room air      CONSTITUTIONAL: Well-groomed, in no apparent distress  EYES: No conjunctival or scleral injection, non-icteric; PERRLA and symmetric  ENMT: No external nasal lesions; no pharyngeal injection or exudates, oral mucosa with moist membranes  RESPIRATORY: Breathing comfortably; lungs CTA without wheeze/rhonchi/rales  CARDIOVASCULAR: +S1S2, RRR, no M/G/R; pedal pulses full and symmetric; no lower extremity edema  GASTROINTESTINAL: No palpable masses or tenderness, +BS throughout, no rebound/guarding  MUSCULOSKELETAL: no digital clubbing or cyanosis; no paraspinal tenderness; normal strength and tone of extremities  SKIN: No rashes or ulcers noted; no subcutaneous nodules or induration palpable  NEUROLOGIC: CN II-XII intact; sensation intact in LEs b/l to light touch  PSYCHIATRIC: A+O x 3; mood and affect appropriate; appropriate insight and judgment    LABS:                        12.1   6.91  )-----------( 167      ( 03 Apr 2025 06:44 )             37.3     04-04    139  |  105  |  18  ----------------------------<  91  4.0   |  22  |  1.09    Ca    9.0      04 Apr 2025 06:18  Phos  3.1     04-04  Mg     2.0     04-04            Urinalysis Basic - ( 04 Apr 2025 06:18 )    Color: x / Appearance: x / SG: x / pH: x  Gluc: 91 mg/dL / Ketone: x  / Bili: x / Urobili: x   Blood: x / Protein: x / Nitrite: x   Leuk Esterase: x / RBC: x / WBC x   Sq Epi: x / Non Sq Epi: x / Bacteria: x

## 2025-04-04 NOTE — PROGRESS NOTE ADULT - PROBLEM SELECTOR PLAN 2
- Afebrile, no leukocytosis, non-toxic appearing  - Per transplant ID, can re-activate UNOS listing status based on clinical resolution of symptoms  - continue supportive care per primary team

## 2025-04-04 NOTE — PROGRESS NOTE ADULT - SUBJECTIVE AND OBJECTIVE BOX
Subjective:    Medications:  acetaminophen     Tablet .. 650 milliGRAM(s) Oral every 6 hours PRN  artificial  tears Solution 1 Drop(s) Both EYES every 4 hours PRN  atorvastatin 10 milliGRAM(s) Oral at bedtime  chlorhexidine 2% Cloths 1 Application(s) Topical <User Schedule>  chlorhexidine 4% Liquid 1 Application(s) Topical <User Schedule>  clonazePAM Oral Disintegrating Tablet 0.25 milliGRAM(s) Oral two times a day PRN  guaiFENesin Oral Liquid (Sugar-Free) 200 milliGRAM(s) Oral every 6 hours PRN  heparin   Injectable 5000 Unit(s) SubCutaneous every 8 hours  metoprolol succinate ER 50 milliGRAM(s) Oral daily  psyllium Powder 1 Packet(s) Oral daily  quiNIDine gluconate  milliGRAM(s) Oral every 12 hours  tamsulosin 0.4 milliGRAM(s) Oral at bedtime  torsemide 10 milliGRAM(s) Oral daily  traZODone 100 milliGRAM(s) Oral at bedtime      Physical Exam:    Vitals:  Vital Signs Last 24 Hours  T(C): 36.6 (25 @ 12:01), Max: 36.7 (25 @ 05:35)  HR: 79 (25 @ 12:01) (76 - 84)  BP: 107/71 (25 @ 12:01) (94/63 - 107/71)  RR: 18 (25 @ 12:01) (18 - 18)  SpO2: 97% (25 @ 12:01) (94% - 97%)    Weight in k.1 ( @ 11:00)    I&O's Summary    2025 07:01  -  2025 07:00  --------------------------------------------------------  IN: 480 mL / OUT: 0 mL / NET: 480 mL    2025 07:01  -  2025 12:59  --------------------------------------------------------  IN: 360 mL / OUT: 0 mL / NET: 360 mL        Tele:    General: No distress. Comfortable.  HEENT: EOM intact.  Neck: Neck supple. JVP not elevated. No masses  Chest: Clear to auscultation bilaterally  CV: Normal S1 and S2. No murmurs, rub, or gallops. Radial pulses normal.  Abdomen: Soft, non-distended, non-tender  Skin: No rashes or skin breakdown  Neurology: Alert and oriented times three. Sensation intact  Psych: Affect normal    Labs:                        12.1   6.91  )-----------( 167      ( 2025 06:44 )             37.3     04-04    139  |  105  |  18  ----------------------------<  91  4.0   |  22  |  1.09    Ca    9.0      2025 06:18  Phos  3.1     04-04  Mg     2.0     04-04                     Subjective:  -NAEO  -does not endorse any diarrahea    Medications:  acetaminophen     Tablet .. 650 milliGRAM(s) Oral every 6 hours PRN  artificial  tears Solution 1 Drop(s) Both EYES every 4 hours PRN  atorvastatin 10 milliGRAM(s) Oral at bedtime  chlorhexidine 2% Cloths 1 Application(s) Topical <User Schedule>  chlorhexidine 4% Liquid 1 Application(s) Topical <User Schedule>  clonazePAM Oral Disintegrating Tablet 0.25 milliGRAM(s) Oral two times a day PRN  guaiFENesin Oral Liquid (Sugar-Free) 200 milliGRAM(s) Oral every 6 hours PRN  heparin   Injectable 5000 Unit(s) SubCutaneous every 8 hours  metoprolol succinate ER 50 milliGRAM(s) Oral daily  psyllium Powder 1 Packet(s) Oral daily  quiNIDine gluconate  milliGRAM(s) Oral every 12 hours  tamsulosin 0.4 milliGRAM(s) Oral at bedtime  torsemide 10 milliGRAM(s) Oral daily  traZODone 100 milliGRAM(s) Oral at bedtime      Physical Exam:    Vitals:  Vital Signs Last 24 Hours  T(C): 36.6 (25 @ 12:01), Max: 36.7 (25 @ 05:35)  HR: 79 (25 @ 12:01) (76 - 84)  BP: 107/71 (25 @ 12:01) (94/63 - 107/71)  RR: 18 (25 @ 12:01) (18 - 18)  SpO2: 97% (25 @ 12:01) (94% - 97%)    Weight in k.1 ( @ 11:00)    I&O's Summary    2025 07:  -  2025 07:00  --------------------------------------------------------  IN: 480 mL / OUT: 0 mL / NET: 480 mL    2025 07:01  -  2025 12:59  --------------------------------------------------------  IN: 360 mL / OUT: 0 mL / NET: 360 mL        Tele: AV paced 80s    General: No distress. Comfortable.  HEENT: EOM intact.  Neck: Neck supple. JVP mildly elevated  Chest: Clear to auscultation bilaterally  CV: Normal S1 and S2. No murmurs, rub, or gallops. Radial pulses normal.  Abdomen: Soft, non-distended, non-tender  Skin: No rashes or skin breakdown  Neurology: Alert and oriented times three. Sensation intact  Psych: Affect normal    Labs:                        12.1   6.91  )-----------( 167      ( 2025 06:44 )             37.3     04-04    139  |  105  |  18  ----------------------------<  91  4.0   |  22  |  1.09    Ca    9.0      2025 06:18  Phos  3.1     04-04  Mg     2.0     04-04

## 2025-04-04 NOTE — PROGRESS NOTE ADULT - PROBLEM SELECTOR PLAN 3
- c/w quinidine 324mg BID per EP recs  - c/w toprol 50 mg po QD   - s/p CRT-D: DDDR 80 AP 97%.  98%.  - Possible oversensing noted on telemetry on 3/25 at 1:57:25 as well as 3/27 1:56:52, s/p EP device interrogation 3/28 with no programming changes  - Keep K>4 and Mg>2, replenish with K tabs and IV mag as needed

## 2025-04-05 LAB
ANION GAP SERPL CALC-SCNC: 13 MMOL/L — SIGNIFICANT CHANGE UP (ref 5–17)
BUN SERPL-MCNC: 15 MG/DL — SIGNIFICANT CHANGE UP (ref 7–23)
CALCIUM SERPL-MCNC: 9.1 MG/DL — SIGNIFICANT CHANGE UP (ref 8.4–10.5)
CHLORIDE SERPL-SCNC: 104 MMOL/L — SIGNIFICANT CHANGE UP (ref 96–108)
CO2 SERPL-SCNC: 23 MMOL/L — SIGNIFICANT CHANGE UP (ref 22–31)
CREAT SERPL-MCNC: 1.15 MG/DL — SIGNIFICANT CHANGE UP (ref 0.5–1.3)
EGFR: 68 ML/MIN/1.73M2 — SIGNIFICANT CHANGE UP
EGFR: 68 ML/MIN/1.73M2 — SIGNIFICANT CHANGE UP
GLUCOSE SERPL-MCNC: 87 MG/DL — SIGNIFICANT CHANGE UP (ref 70–99)
HCT VFR BLD CALC: 37.2 % — LOW (ref 39–50)
HGB BLD-MCNC: 12.4 G/DL — LOW (ref 13–17)
MAGNESIUM SERPL-MCNC: 1.9 MG/DL — SIGNIFICANT CHANGE UP (ref 1.6–2.6)
MCHC RBC-ENTMCNC: 29.3 PG — SIGNIFICANT CHANGE UP (ref 27–34)
MCHC RBC-ENTMCNC: 33.3 G/DL — SIGNIFICANT CHANGE UP (ref 32–36)
MCV RBC AUTO: 87.9 FL — SIGNIFICANT CHANGE UP (ref 80–100)
NRBC BLD AUTO-RTO: 0 /100 WBCS — SIGNIFICANT CHANGE UP (ref 0–0)
PHOSPHATE SERPL-MCNC: 3.8 MG/DL — SIGNIFICANT CHANGE UP (ref 2.5–4.5)
PLATELET # BLD AUTO: 175 K/UL — SIGNIFICANT CHANGE UP (ref 150–400)
POTASSIUM SERPL-MCNC: 3.7 MMOL/L — SIGNIFICANT CHANGE UP (ref 3.5–5.3)
POTASSIUM SERPL-SCNC: 3.7 MMOL/L — SIGNIFICANT CHANGE UP (ref 3.5–5.3)
RBC # BLD: 4.23 M/UL — SIGNIFICANT CHANGE UP (ref 4.2–5.8)
RBC # FLD: 14 % — SIGNIFICANT CHANGE UP (ref 10.3–14.5)
SODIUM SERPL-SCNC: 140 MMOL/L — SIGNIFICANT CHANGE UP (ref 135–145)
WBC # BLD: 6.99 K/UL — SIGNIFICANT CHANGE UP (ref 3.8–10.5)
WBC # FLD AUTO: 6.99 K/UL — SIGNIFICANT CHANGE UP (ref 3.8–10.5)

## 2025-04-05 PROCEDURE — 99233 SBSQ HOSP IP/OBS HIGH 50: CPT

## 2025-04-05 RX ADMIN — Medication 1 APPLICATION(S): at 06:15

## 2025-04-05 RX ADMIN — Medication 100 MILLIGRAM(S): at 21:28

## 2025-04-05 RX ADMIN — ATORVASTATIN CALCIUM 10 MILLIGRAM(S): 80 TABLET, FILM COATED ORAL at 21:28

## 2025-04-05 RX ADMIN — TAMSULOSIN HYDROCHLORIDE 0.4 MILLIGRAM(S): 0.4 CAPSULE ORAL at 21:28

## 2025-04-05 RX ADMIN — Medication 10 MILLIGRAM(S): at 06:14

## 2025-04-05 RX ADMIN — METOPROLOL SUCCINATE 50 MILLIGRAM(S): 50 TABLET, EXTENDED RELEASE ORAL at 06:14

## 2025-04-05 RX ADMIN — Medication 324 MILLIGRAM(S): at 17:33

## 2025-04-05 RX ADMIN — Medication 1 PACKET(S): at 14:18

## 2025-04-05 RX ADMIN — Medication 324 MILLIGRAM(S): at 06:14

## 2025-04-05 NOTE — PROGRESS NOTE ADULT - PROBLEM SELECTOR PLAN 1
-s/p Lidocaine drip S/P one dose of Mexiletine (Mexiletine was d/dwayne for severe dizziness this admission)   -currently on Toprol 50mg daily and Quinidine twice a day   -EP saw patient for Vtach and CRT-D shock.   -as reported , pt previously did not tolerate amio, mexilitine and sotalol in the past due to severe dizziness.   -cont with tele monitor, monitor electrolytes  -h/o ani mitral PVC (premature ventricular contractions) ablation 3/11/2024  -f/up with EP for further recommendation - CRTD interrogated on 3/28 -s/p Lidocaine drip S/P one dose of Mexiletine (Mexiletine was d/dwayne for severe dizziness this admission)   -currently on Toprol 50mg daily and Quinidine twice a day   -EP saw patient for Vtach and CRT-D shock.   -as reported , pt previously did not tolerate amio, mexilitine and sotalol in the past due to severe dizziness.   -cont with tele monitor, monitor electrolytes  -h/o ani mitral PVC (premature ventricular contractions) ablation 3/11/2024  -f/up with EP for further recommendation - CRTD interrogated on 3/28    -2 self limited episodes of VT 4/5 in the morning that were asymptomatic - d/w cardiology, continue to monitor on same regimen

## 2025-04-05 NOTE — PROGRESS NOTE ADULT - ASSESSMENT
70yoM w/ PMHx of NICM since 2011, HFrEF (25-30%) s/p MDT CRT-D with baseline CHB , VT/VF, afib s/p GIANFRANCO ligation/MAZE, MV/TV repair, PVC ablation (3/2024) was transferred from outside hospital to [facility] due to  VT/AICD shock.   While admitted to the outside hospital, he was started on a lidocaine gtt. On 3/21 when lidocaine weaned off patient had recurrence of VT requiring AICD shocks. Hence,  was transferred to [facility] for further management. Pt  was on Lidocaine gtt for control and is now on Quinidine and uptitrated Toprol for anti-arrythmia. Because of pt's refractory VT pt's listing status for a Heart Transplant was bumped from 6 to 2E temporarily. Pt will remain inpatient for listing purposes and managed closely by HF team and hence was transferred to [facility] on 3/27/25.

## 2025-04-05 NOTE — PROGRESS NOTE ADULT - SUBJECTIVE AND OBJECTIVE BOX
[facility] Division of Hospital Medicine  Whitney Vargas DO  Available on Teams Andrae    Patient is a 70y old  Male who presents with a chief complaint of VT (04 Apr 2025 14:11)      SUBJECTIVE / OVERNIGHT EVENTS: 5-6 beats of VT this morning, self resolved, asymptomatic on telemetry. Patient denies any symptoms or complaints. No dizziness.  ADDITIONAL REVIEW OF SYSTEMS: negative    MEDICATIONS  (STANDING):  atorvastatin 10 milliGRAM(s) Oral at bedtime  chlorhexidine 2% Cloths 1 Application(s) Topical <User Schedule>  chlorhexidine 4% Liquid 1 Application(s) Topical <User Schedule>  heparin   Injectable 5000 Unit(s) SubCutaneous every 8 hours  metoprolol succinate ER 50 milliGRAM(s) Oral daily  psyllium Powder 1 Packet(s) Oral daily  quiNIDine gluconate  milliGRAM(s) Oral every 12 hours  tamsulosin 0.4 milliGRAM(s) Oral at bedtime  torsemide 10 milliGRAM(s) Oral daily  traZODone 100 milliGRAM(s) Oral at bedtime    MEDICATIONS  (PRN):  acetaminophen     Tablet .. 650 milliGRAM(s) Oral every 6 hours PRN Mild Pain (1 - 3), Moderate Pain (4 - 6)  artificial  tears Solution 1 Drop(s) Both EYES every 4 hours PRN Dry Eyes  clonazePAM Oral Disintegrating Tablet 0.25 milliGRAM(s) Oral two times a day PRN anxiety  guaiFENesin Oral Liquid (Sugar-Free) 200 milliGRAM(s) Oral every 6 hours PRN Cough      CAPILLARY BLOOD GLUCOSE        I&O's Summary    04 Apr 2025 07:01  -  05 Apr 2025 07:00  --------------------------------------------------------  IN: 720 mL / OUT: 0 mL / NET: 720 mL        PHYSICAL EXAM:  Vital Signs Last 24 Hrs  T(C): 36.6 (05 Apr 2025 11:36), Max: 36.7 (04 Apr 2025 20:54)  T(F): 97.9 (05 Apr 2025 11:36), Max: 98.1 (04 Apr 2025 20:54)  HR: 81 (05 Apr 2025 11:36) (66 - 81)  BP: 113/78 (05 Apr 2025 11:36) (100/68 - 113/78)  BP(mean): --  RR: 18 (05 Apr 2025 11:36) (18 - 18)  SpO2: 96% (05 Apr 2025 11:36) (91% - 96%)    Parameters below as of 05 Apr 2025 11:36  Patient On (Oxygen Delivery Method): room air    CONSTITUTIONAL: Well-groomed, in no apparent distress  EYES: No conjunctival or scleral injection, non-icteric; PERRLA and symmetric  ENMT: No external nasal lesions; no pharyngeal injection or exudates, oral mucosa with moist membranes  RESPIRATORY: Breathing comfortably; lungs CTA without wheeze/rhonchi/rales  CARDIOVASCULAR: +S1S2, RRR, no M/G/R; pedal pulses full and symmetric; no lower extremity edema  GASTROINTESTINAL: No palpable masses or tenderness, +BS throughout, no rebound/guarding  MUSCULOSKELETAL: no digital clubbing or cyanosis; no paraspinal tenderness; normal strength and tone of extremities  SKIN: No rashes or ulcers noted; no subcutaneous nodules or induration palpable  NEUROLOGIC: CN II-XII intact; sensation intact in LEs b/l to light touch  PSYCHIATRIC: A+O x 3; mood and affect appropriate; appropriate insight and judgment    LABS:                        12.4   6.99  )-----------( 175      ( 05 Apr 2025 07:35 )             37.2     04-05    140  |  104  |  15  ----------------------------<  87  3.7   |  23  |  1.15    Ca    9.1      05 Apr 2025 07:35  Phos  3.8     04-05  Mg     1.9     04-05            Urinalysis Basic - ( 05 Apr 2025 07:35 )    Color: x / Appearance: x / SG: x / pH: x  Gluc: 87 mg/dL / Ketone: x  / Bili: x / Urobili: x   Blood: x / Protein: x / Nitrite: x   Leuk Esterase: x / RBC: x / WBC x   Sq Epi: x / Non Sq Epi: x / Bacteria: x

## 2025-04-06 PROCEDURE — 99232 SBSQ HOSP IP/OBS MODERATE 35: CPT

## 2025-04-06 RX ORDER — MAGNESIUM SULFATE 500 MG/ML
1 SYRINGE (ML) INJECTION ONCE
Refills: 0 | Status: COMPLETED | OUTPATIENT
Start: 2025-04-06 | End: 2025-04-06

## 2025-04-06 RX ORDER — CLONAZEPAM 0.5 MG/1
0.25 TABLET ORAL EVERY 12 HOURS
Refills: 0 | Status: DISCONTINUED | OUTPATIENT
Start: 2025-04-06 | End: 2025-04-06

## 2025-04-06 RX ADMIN — METOPROLOL SUCCINATE 50 MILLIGRAM(S): 50 TABLET, EXTENDED RELEASE ORAL at 05:23

## 2025-04-06 RX ADMIN — Medication 324 MILLIGRAM(S): at 17:02

## 2025-04-06 RX ADMIN — Medication 20 MILLIEQUIVALENT(S): at 08:13

## 2025-04-06 RX ADMIN — Medication 10 MILLIGRAM(S): at 05:23

## 2025-04-06 RX ADMIN — Medication 1 APPLICATION(S): at 05:25

## 2025-04-06 RX ADMIN — Medication 20 MILLIEQUIVALENT(S): at 11:29

## 2025-04-06 RX ADMIN — TAMSULOSIN HYDROCHLORIDE 0.4 MILLIGRAM(S): 0.4 CAPSULE ORAL at 21:56

## 2025-04-06 RX ADMIN — ATORVASTATIN CALCIUM 10 MILLIGRAM(S): 80 TABLET, FILM COATED ORAL at 21:56

## 2025-04-06 RX ADMIN — Medication 100 GRAM(S): at 08:13

## 2025-04-06 RX ADMIN — Medication 324 MILLIGRAM(S): at 05:23

## 2025-04-06 RX ADMIN — Medication 1 PACKET(S): at 11:29

## 2025-04-06 RX ADMIN — Medication 100 MILLIGRAM(S): at 21:56

## 2025-04-06 NOTE — PROGRESS NOTE ADULT - PROBLEM SELECTOR PLAN 3
TTE 3/20/25 : LVEF 30%,transvalvular gradients are elevated with severe prosthetic Mitral Stenosis, no evidence of LV thrombus  s/p recent admit 3/19/2025 w/ [facility] found to have elevated filling pressures treated with IV diuretics  GDMT: Toprol, Torsemide; off Spironolactone d/t prior dizziness  afterload reduction on hold w/ borderline BPs  holding home Farxiga while inpatient  HF team is following and status upgraded for transplant  -restarted torsemide 10mg QD per heart failure team

## 2025-04-06 NOTE — PROVIDER CONTACT NOTE (OTHER) - ACTION/TREATMENT ORDERED:
NP aware & reviewed all of patient's orders, labs, history & plan. NP aware & reviewed all of patient's orders, labs, history & plan. NP reviewed patient's cardiac monitor and assessed patient & ordered to given Klonopin PRN if patient request. NP aware & reviewed orders, labs, history, plan & cardiac monitor & assessed patient & ordered to given Klonopin PRN if patient request. Patient refusing Klonopin stating the feeling passed.

## 2025-04-06 NOTE — PROGRESS NOTE ADULT - ASSESSMENT
70yoM w/ PMHx of NICM since 2011, HFrEF (25-30%) s/p MDT CRT-D with baseline CHB , VT/VF, afib s/p GIANFRANCO ligation/MAZE, MV/TV repair, PVC ablation (3/2024) was transferred from outside hospital to [facility] due to  VT/AICD shock.   While admitted to the outside hospital, he was started on a lidocaine gtt. On 3/21 when lidocaine weaned off patient had recurrence of VT requiring AICD shocks. Hence,  was transferred to [facility] for further management. Pt  was on Lidocaine gtt for control and is now on Quinidine and uptitrated Toprol for anti-arrythmia. Because of pt's refractory VT pt's listing status for a Heart Transplant was bumped from 6 to 2E temporarily. Pt will remain inpatient for listing purposes and managed closely by HF team and hence was transferred to [facility] on 3/27/25.

## 2025-04-06 NOTE — PROGRESS NOTE ADULT - PROBLEM SELECTOR PLAN 1
-s/p Lidocaine drip S/P one dose of Mexiletine (Mexiletine was d/dwayne for severe dizziness this admission)   -currently on Toprol 50mg daily and Quinidine twice a day   -EP saw patient for Vtach and CRT-D shock  -as reported, pt previously did not tolerate amio, mexilitine and sotalol in the past due to severe dizziness.   -cont with tele monitor, monitor electrolytes  -h/o ani mitral PVC (premature ventricular contractions) ablation 3/11/2024  -CRTD interrogated on 3/28    -2 self limited episodes of VT 4/5 in the morning that were asymptomatic - d/w cardiology, continue to monitor on same regimen

## 2025-04-06 NOTE — PROGRESS NOTE ADULT - SUBJECTIVE AND OBJECTIVE BOX
[facility] Division of Hospital Medicine  Whitney Vargas DO  Available on Teams Andrae    Patient is a 70y old  Male who presents with a chief complaint of VT (05 Apr 2025 12:17)      SUBJECTIVE / OVERNIGHT EVENTS: none. No events on telemetry. Patient feels well, no shortness of breath. He does feel like he gets winded when he walks around on the floor after a while.   ADDITIONAL REVIEW OF SYSTEMS: negative    MEDICATIONS  (STANDING):  atorvastatin 10 milliGRAM(s) Oral at bedtime  chlorhexidine 2% Cloths 1 Application(s) Topical <User Schedule>  chlorhexidine 4% Liquid 1 Application(s) Topical <User Schedule>  heparin   Injectable 5000 Unit(s) SubCutaneous every 8 hours  metoprolol succinate ER 50 milliGRAM(s) Oral daily  psyllium Powder 1 Packet(s) Oral daily  quiNIDine gluconate  milliGRAM(s) Oral every 12 hours  tamsulosin 0.4 milliGRAM(s) Oral at bedtime  torsemide 10 milliGRAM(s) Oral daily  traZODone 100 milliGRAM(s) Oral at bedtime    MEDICATIONS  (PRN):  acetaminophen     Tablet .. 650 milliGRAM(s) Oral every 6 hours PRN Mild Pain (1 - 3), Moderate Pain (4 - 6)  artificial  tears Solution 1 Drop(s) Both EYES every 4 hours PRN Dry Eyes  clonazePAM Oral Disintegrating Tablet 0.25 milliGRAM(s) Oral two times a day PRN anxiety  guaiFENesin Oral Liquid (Sugar-Free) 200 milliGRAM(s) Oral every 6 hours PRN Cough      CAPILLARY BLOOD GLUCOSE        I&O's Summary    05 Apr 2025 07:01  -  06 Apr 2025 07:00  --------------------------------------------------------  IN: 360 mL / OUT: 0 mL / NET: 360 mL    06 Apr 2025 07:01  -  06 Apr 2025 11:37  --------------------------------------------------------  IN: 460 mL / OUT: 400 mL / NET: 60 mL        PHYSICAL EXAM:  Vital Signs Last 24 Hrs  T(C): 36.6 (06 Apr 2025 11:33), Max: 36.7 (05 Apr 2025 17:30)  T(F): 97.8 (06 Apr 2025 11:33), Max: 98 (05 Apr 2025 17:30)  HR: 75 (06 Apr 2025 11:33) (75 - 81)  BP: 105/76 (06 Apr 2025 11:33) (96/69 - 105/76)  BP(mean): --  RR: 18 (06 Apr 2025 11:33) (18 - 18)  SpO2: 96% (06 Apr 2025 11:33) (92% - 96%)    Parameters below as of 06 Apr 2025 11:33  Patient On (Oxygen Delivery Method): room air      CONSTITUTIONAL: Well-groomed, in no apparent distress  EYES: No conjunctival or scleral injection, non-icteric; PERRLA and symmetric  ENMT: No external nasal lesions; no pharyngeal injection or exudates, oral mucosa with moist membranes  RESPIRATORY: Breathing comfortably; lungs CTA without wheeze/rhonchi/rales  CARDIOVASCULAR: +S1S2, RRR, no M/G/R; pedal pulses full and symmetric; no lower extremity edema  GASTROINTESTINAL: No palpable masses or tenderness, +BS throughout, no rebound/guarding  MUSCULOSKELETAL: no digital clubbing or cyanosis; no paraspinal tenderness; normal strength and tone of extremities  SKIN: No rashes or ulcers noted; no subcutaneous nodules or induration palpable  NEUROLOGIC: CN II-XII intact; sensation intact in LEs b/l to light touch  PSYCHIATRIC: A+O x 3; mood and affect appropriate; appropriate insight and judgment    LABS:                        12.4   6.99  )-----------( 175      ( 05 Apr 2025 07:35 )             37.2     04-05    140  |  104  |  15  ----------------------------<  87  3.7   |  23  |  1.15    Ca    9.1      05 Apr 2025 07:35  Phos  3.8     04-05  Mg     1.9     04-05            Urinalysis Basic - ( 05 Apr 2025 07:35 )    Color: x / Appearance: x / SG: x / pH: x  Gluc: 87 mg/dL / Ketone: x  / Bili: x / Urobili: x   Blood: x / Protein: x / Nitrite: x   Leuk Esterase: x / RBC: x / WBC x   Sq Epi: x / Non Sq Epi: x / Bacteria: x

## 2025-04-07 LAB
ANION GAP SERPL CALC-SCNC: 13 MMOL/L — SIGNIFICANT CHANGE UP (ref 5–17)
BUN SERPL-MCNC: 20 MG/DL — SIGNIFICANT CHANGE UP (ref 7–23)
CALCIUM SERPL-MCNC: 9.2 MG/DL — SIGNIFICANT CHANGE UP (ref 8.4–10.5)
CHLORIDE SERPL-SCNC: 103 MMOL/L — SIGNIFICANT CHANGE UP (ref 96–108)
CO2 SERPL-SCNC: 23 MMOL/L — SIGNIFICANT CHANGE UP (ref 22–31)
CREAT SERPL-MCNC: 1.14 MG/DL — SIGNIFICANT CHANGE UP (ref 0.5–1.3)
EGFR: 69 ML/MIN/1.73M2 — SIGNIFICANT CHANGE UP
EGFR: 69 ML/MIN/1.73M2 — SIGNIFICANT CHANGE UP
GLUCOSE SERPL-MCNC: 94 MG/DL — SIGNIFICANT CHANGE UP (ref 70–99)
HCT VFR BLD CALC: 37.7 % — LOW (ref 39–50)
HGB BLD-MCNC: 12.5 G/DL — LOW (ref 13–17)
MAGNESIUM SERPL-MCNC: 2.1 MG/DL — SIGNIFICANT CHANGE UP (ref 1.6–2.6)
MCHC RBC-ENTMCNC: 29 PG — SIGNIFICANT CHANGE UP (ref 27–34)
MCHC RBC-ENTMCNC: 33.2 G/DL — SIGNIFICANT CHANGE UP (ref 32–36)
MCV RBC AUTO: 87.5 FL — SIGNIFICANT CHANGE UP (ref 80–100)
NRBC BLD AUTO-RTO: 0 /100 WBCS — SIGNIFICANT CHANGE UP (ref 0–0)
PHOSPHATE SERPL-MCNC: 3.2 MG/DL — SIGNIFICANT CHANGE UP (ref 2.5–4.5)
PLATELET # BLD AUTO: 209 K/UL — SIGNIFICANT CHANGE UP (ref 150–400)
POTASSIUM SERPL-MCNC: 4 MMOL/L — SIGNIFICANT CHANGE UP (ref 3.5–5.3)
POTASSIUM SERPL-SCNC: 4 MMOL/L — SIGNIFICANT CHANGE UP (ref 3.5–5.3)
RBC # BLD: 4.31 M/UL — SIGNIFICANT CHANGE UP (ref 4.2–5.8)
RBC # FLD: 13.8 % — SIGNIFICANT CHANGE UP (ref 10.3–14.5)
SODIUM SERPL-SCNC: 139 MMOL/L — SIGNIFICANT CHANGE UP (ref 135–145)
WBC # BLD: 8.54 K/UL — SIGNIFICANT CHANGE UP (ref 3.8–10.5)
WBC # FLD AUTO: 8.54 K/UL — SIGNIFICANT CHANGE UP (ref 3.8–10.5)

## 2025-04-07 PROCEDURE — 99232 SBSQ HOSP IP/OBS MODERATE 35: CPT

## 2025-04-07 RX ADMIN — METOPROLOL SUCCINATE 50 MILLIGRAM(S): 50 TABLET, EXTENDED RELEASE ORAL at 05:15

## 2025-04-07 RX ADMIN — Medication 324 MILLIGRAM(S): at 05:15

## 2025-04-07 RX ADMIN — Medication 1 APPLICATION(S): at 05:42

## 2025-04-07 RX ADMIN — Medication 324 MILLIGRAM(S): at 17:04

## 2025-04-07 RX ADMIN — ATORVASTATIN CALCIUM 10 MILLIGRAM(S): 80 TABLET, FILM COATED ORAL at 21:51

## 2025-04-07 RX ADMIN — Medication 1 PACKET(S): at 10:57

## 2025-04-07 RX ADMIN — TAMSULOSIN HYDROCHLORIDE 0.4 MILLIGRAM(S): 0.4 CAPSULE ORAL at 21:51

## 2025-04-07 RX ADMIN — Medication 100 MILLIGRAM(S): at 21:51

## 2025-04-07 RX ADMIN — Medication 10 MILLIGRAM(S): at 05:15

## 2025-04-07 NOTE — PROGRESS NOTE ADULT - SUBJECTIVE AND OBJECTIVE BOX
CHIEF COMPLAINT:  Patient is a 70y old  Male who presents with a chief complaint of VT (07 Apr 2025 12:44)      INTERVAL HISTORY/OVERNIGHT EVENTS:  No major overnight events. Diarrhea has resolved. No arrhythmias noted on tele.     ======================  MEDICATIONS:  atorvastatin 10 milliGRAM(s) Oral at bedtime  chlorhexidine 2% Cloths 1 Application(s) Topical <User Schedule>  chlorhexidine 4% Liquid 1 Application(s) Topical <User Schedule>  heparin   Injectable 5000 Unit(s) SubCutaneous every 8 hours  metoprolol succinate ER 50 milliGRAM(s) Oral daily  psyllium Powder 1 Packet(s) Oral daily  quiNIDine gluconate  milliGRAM(s) Oral every 12 hours  tamsulosin 0.4 milliGRAM(s) Oral at bedtime  torsemide 10 milliGRAM(s) Oral daily  traZODone 100 milliGRAM(s) Oral at bedtime      ======================  PHYSICAL EXAMINATION:  GEN:  nad.   HEENT:  eomi. ncat  PULM:  b/l lung sounds   CARD: s1, s2  ABD: +bs. ntnd  EXT:  no new rashes.    NEURO:  no new focal deficits.   ======================  OBJECTIVE:    VS:  T(F): 98.1 (04-07 @ 13:03), Max: 98.1 (04-06 @ 16:23)  HR: 80 (04-07 @ 13:03) (80 - 86)  BP: 102/73 (04-07 @ 13:03) (95/63 - 107/70)  RR: 18 (04-07 @ 13:03) (18 - 18)  SpO2: 96% (04-07 @ 13:03) (95% - 96%)    I/O:      04-04 @ 07:01  -  04-05 @ 07:00  --------------------------------------------------------  IN: 720 mL / OUT: 0 mL / NET: 720 mL    04-05 @ 07:01  -  04-06 @ 07:00  --------------------------------------------------------  IN: 360 mL / OUT: 0 mL / NET: 360 mL    04-06 @ 07:01  -  04-07 @ 07:00  --------------------------------------------------------  IN: 1590 mL / OUT: 1250 mL / NET: 340 mL    ======================    LABS:                          12.5   8.54  )-----------( 209      ( 07 Apr 2025 06:23 )             37.7     04-07    139  |  103  |  20  ----------------------------<  94  4.0   |  23  |  1.14    Ca    9.2      07 Apr 2025 06:23  Phos  3.2     04-07  Mg     2.1     04-07      Urinalysis Basic - ( 07 Apr 2025 06:23 )    Color: x / Appearance: x / SG: x / pH: x  Gluc: 94 mg/dL / Ketone: x  / Bili: x / Urobili: x   Blood: x / Protein: x / Nitrite: x   Leuk Esterase: x / RBC: x / WBC x   Sq Epi: x / Non Sq Epi: x / Bacteria: x

## 2025-04-07 NOTE — PROGRESS NOTE ADULT - SUBJECTIVE AND OBJECTIVE BOX
[facility] Division of Hospital Medicine  Amy Lim MD  Pager (M-F, 4R-0V): [phone]  Other Times:  [phone]    Patient is a 70y old  Male who presents with a chief complaint of VT (06 Apr 2025 11:37)      SUBJECTIVE / OVERNIGHT EVENTS:  feeling well. denies any complaints. no events overnight. no further diarrhea     ADDITIONAL REVIEW OF SYSTEMS: otherwise neg    MEDICATIONS  (STANDING):  atorvastatin 10 milliGRAM(s) Oral at bedtime  chlorhexidine 2% Cloths 1 Application(s) Topical <User Schedule>  chlorhexidine 4% Liquid 1 Application(s) Topical <User Schedule>  heparin   Injectable 5000 Unit(s) SubCutaneous every 8 hours  metoprolol succinate ER 50 milliGRAM(s) Oral daily  psyllium Powder 1 Packet(s) Oral daily  quiNIDine gluconate  milliGRAM(s) Oral every 12 hours  tamsulosin 0.4 milliGRAM(s) Oral at bedtime  torsemide 10 milliGRAM(s) Oral daily  traZODone 100 milliGRAM(s) Oral at bedtime    MEDICATIONS  (PRN):  acetaminophen     Tablet .. 650 milliGRAM(s) Oral every 6 hours PRN Mild Pain (1 - 3), Moderate Pain (4 - 6)  artificial  tears Solution 1 Drop(s) Both EYES every 4 hours PRN Dry Eyes  clonazePAM  Tablet 0.25 milliGRAM(s) Oral every 12 hours PRN anxiety  guaiFENesin Oral Liquid (Sugar-Free) 200 milliGRAM(s) Oral every 6 hours PRN Cough      CAPILLARY BLOOD GLUCOSE        I&O's Summary    06 Apr 2025 07:01  -  07 Apr 2025 07:00  --------------------------------------------------------  IN: 1590 mL / OUT: 1250 mL / NET: 340 mL        PHYSICAL EXAM:  Vital Signs Last 24 Hrs  T(C): 36.7 (07 Apr 2025 05:00), Max: 36.7 (06 Apr 2025 16:23)  T(F): 98 (07 Apr 2025 05:00), Max: 98.1 (06 Apr 2025 16:23)  HR: 82 (07 Apr 2025 05:00) (80 - 86)  BP: 107/70 (07 Apr 2025 05:00) (95/63 - 107/70)  BP(mean): --  RR: 18 (07 Apr 2025 05:00) (18 - 18)  SpO2: 95% (07 Apr 2025 05:00) (95% - 96%)    Parameters below as of 07 Apr 2025 05:00  Patient On (Oxygen Delivery Method): room air        CONSTITUTIONAL: NAD, well-groomed  EYES:  conjunctiva and sclera clear  ENMT: Moist oral mucosa  NECK: Supple, no palpable masses; no JVD  RESPIRATORY: Normal respiratory effort; lungs are clear to auscultation bilaterally  CARDIOVASCULAR: Regular rate and rhythm, normal S1 and S2, no murmur/rub/gallop; No lower extremity edema  ABDOMEN: Nontender to palpation, normoactive bowel sounds, no rebound/guarding  MUSCULOSKELETAL:  no clubbing or cyanosis of digits; no joint swelling or tenderness to palpation  PSYCH: A+O to person, place, and time; affect appropriate  SKIN: No rashes; no palpable lesions    LABS:                        12.5   8.54  )-----------( 209      ( 07 Apr 2025 06:23 )             37.7     04-07    139  |  103  |  20  ----------------------------<  94  4.0   |  23  |  1.14    Ca    9.2      07 Apr 2025 06:23  Phos  3.2     04-07  Mg     2.1     04-07            Urinalysis Basic - ( 07 Apr 2025 06:23 )    Color: x / Appearance: x / SG: x / pH: x  Gluc: 94 mg/dL / Ketone: x  / Bili: x / Urobili: x   Blood: x / Protein: x / Nitrite: x   Leuk Esterase: x / RBC: x / WBC x   Sq Epi: x / Non Sq Epi: x / Bacteria: x          RADIOLOGY & ADDITIONAL TESTS:  Results Reviewed:   Imaging Personally Reviewed:  Electrocardiogram Personally Reviewed:    COORDINATION OF CARE:  Care Discussed with Consultants/Other Providers [Y/N]:  Prior or Outpatient Records Reviewed [Y/N]:

## 2025-04-07 NOTE — PROGRESS NOTE ADULT - ATTENDING COMMENTS
71 y/o M with NICM, with CHB and VT/VFib and AFib, s/p GIANFRANCO ligation/MAZE and PVC ablation, previously intolerant to antiarrhythmics, who was undergoing transplant evaluation as an outpatient, who was admitted for ICD shocks. He was started on Lidocaine gtt and continued to have VT and ICD shocks. He was transferred to [facility] and transplant listing was upgraded to status 2E. He has since been stabilized on oral antiarrhythmics of Toprol XL and Quinidine. He was found to have Norovirus on 3/31/25, was made status 7 while symptomatic, now reactivated as status 2E.   Toprol, quinidine.  Continue torsemide.  Hold hydral and Ald.

## 2025-04-07 NOTE — PROGRESS NOTE ADULT - PROBLEM SELECTOR PLAN 1
- Listed for heart transplant: deactivated 4/1 to status 7 given active norovirus infection but given significant clinical improvement in symptoms reactivated to status 2e    - c/w Toprol XL 50mg daily.  - continue holding spironolactone 25mg QD for now, as he endorsed previous hx of dizziness with it. Will retrial again in the future once dizziness resolves  - continue holding hydralazine 25mg TID i/s/o borderline soft BP  - c/w torsemide 10mg QD. Diarrhea has improved  - strict I/Os and daily weights  - Keep K>4 and Mg>2  - PT consult placed  - Psychiatry recs appreciated for anxiety  - Holistic RN consult placed to provide emotional support.

## 2025-04-07 NOTE — PROGRESS NOTE ADULT - ASSESSMENT
70yoM w/ PMHx of NICM since 2011, HFrEF (25-30%) s/p MDT CRT-D with baseline CHB , VT/VF, afib s/p GIANFRANCO ligation/MAZE, MV/TV repair, PVC ablation (3/2024) was transferred from outside hospital to [facility] due to  VT/AICD shock.   While admitted to the outside hospital, he was started on a lidocaine gtt. On 3/21 when lidocaine weaned off patient had recurrence of VT requiring AICD shocks. Hence,  was transferred to [facility] for further management. Pt  was on Lidocaine gtt for control and is now on Quinidine and uptitrated Toprol for anti-arrythmia. Because of pt's refractory VT pt's listing status for a Heart Transplant was bumped from 6 to 2E temporarily. Pt will remain inpatient for listing purposes and managed closely by HF team and hence was transferred to [facility] on 3/27/25.

## 2025-04-07 NOTE — PROGRESS NOTE ADULT - ASSESSMENT
70-year-old male ABO O past medical history of NICM since 2011 HFrEF (LVEF 25-30%) s/p MDT CRT-D with baseline CHB, VT/VF, AFs/p GIANFRANCO ligation/MAZE, MV/TV repair, PVC ablation 3/2024 intolerant to AADs in the past, recent admission 3/19/2025-3/20/2025 for AICD shocks initially presented to the [facility] ED on 3/21/2025, sent by HF specialist for ACID shock. Device reported successful ATP for VT 3/17/2025 at 6:52pm followed by one ATP & 40J shock. He reported feeling dizzy & SOB during the events. He follows with Dr. Luca Tamayo for HF, currently undergoing transplant workup, Dr John for CRTD and VT/VF and Dr. Chu for genetic counseling. While admitted to [facility], he was started on a lidocaine gtt. On 3/21 when lidocaine weaned off patient had another two episodes of VT requiring AICD shocks. He was transferred to [facility] for further management. He was initially maintained on lidocaine gtt from 3/21/2025-3/25/2025 & his listing status was upgraded to UNOS status 2e for heart transplant (ABO O) for the refractory VT. He was transitioned to oral antiarrhythmics (Toprol XL & quinidine) & ultimately transferred from CICU to tele floor on 3/27/2025. Hospital course was further c/b development of watery diarrhea & was found to have +norovirus on 3/31/2025 which prompted deactivation to status 7 listing for heart transplant. Status reinstated to 2E after diarrhea resolved.     He has been electrically quiescent thus far on PO antiarrhythmics. Near euvolemic and will resume his oral diuretics. Denies any current diarrhea. He is currently reactivated 4/2 for UNOS status 2e    Bedside hemodynamics:  3/22/25 BP 97/76/83, HR 80, CVP 6, PA 58/23/38, PCWP 20, SVR 1100, Gucci CO/CI 5.1/2.6, TD 4/2.08    Cardiac Studies:   TTE 3/20/25 : LVEF 30%, segmental, LVIDd 5.3, walls normal, elevated LV filling pressures, mod RVE with mildly reduced RV function, TAPSE 1.7, severely dilated RA, annuloplasty in MV position, transvalvular gradients are elevated with severe prosthetic MS (peak gradient 15.7, mean gradient 8), trace intravalvular MR, TV annuloplasty ring noted, mild TR,  est PASP 36, no evidence of LV thrombus  TTE 10/2024: LVEF 25-30%, LVEDD 5.9cm, moderately reduced RV function, annuloplasty ring of mitral and tricuspid position, PASP 47 mmHg  [facility] 3/19/25- RA 11 PA 58/26 PCWP 32 CO/CI 5.43/2.77  [facility] 6/2023 Nonobstructive CAD

## 2025-04-07 NOTE — PROGRESS NOTE ADULT - PROBLEM SELECTOR PLAN 4
- Afebrile, no leukocytosis, non-toxic appearing  - Per transplant ID, can re-activate UNOS listing status based on clinical resolution of symptoms  - continue supportive care per primary team.

## 2025-04-07 NOTE — PROGRESS NOTE ADULT - PROBLEM SELECTOR PLAN 2
- c/w quinidine 324mg BID per EP recs  - c/w toprol 50 mg po QD   - s/p CRT-D: DDDR 80 AP 97%.  98%.  - Possible oversensing noted on telemetry on 3/25 at 1:57:25 as well as 3/27 1:56:52, s/p EP device interrogation 3/28 with no programming changes  - Keep K>4 and Mg>2, replenish with K tabs and IV mag as needed.

## 2025-04-07 NOTE — PROGRESS NOTE ADULT - PROBLEM SELECTOR PLAN 1
-s/p Lidocaine drip S/P one dose of Mexiletine (Mexiletine was d/dwayne for severe dizziness this admission)   -currently on Toprol 50mg daily and Quinidine twice a day   -EP saw patient for Vtach and CRT-D shock  -as reported, pt previously did not tolerate amio, mexilitine and sotalol in the past due to severe dizziness.   -cont with tele monitor, monitor electrolytes  -h/o ani mitral PVC (premature ventricular contractions) ablation 3/11/2024  -CRTD interrogated on 3/28  -2 self limited episodes of VT 4/5 in the morning that were asymptomatic -  continue to monitor on same regimen

## 2025-04-08 PROCEDURE — 99152 MOD SED SAME PHYS/QHP 5/>YRS: CPT

## 2025-04-08 PROCEDURE — 99232 SBSQ HOSP IP/OBS MODERATE 35: CPT

## 2025-04-08 PROCEDURE — 93505 ENDOMYOCARDIAL BIOPSY: CPT | Mod: 26

## 2025-04-08 RX ADMIN — ATORVASTATIN CALCIUM 10 MILLIGRAM(S): 80 TABLET, FILM COATED ORAL at 21:51

## 2025-04-08 RX ADMIN — Medication 1 APPLICATION(S): at 05:49

## 2025-04-08 RX ADMIN — Medication 324 MILLIGRAM(S): at 05:51

## 2025-04-08 RX ADMIN — Medication 1 PACKET(S): at 12:04

## 2025-04-08 RX ADMIN — Medication 10 MILLIGRAM(S): at 05:51

## 2025-04-08 RX ADMIN — METOPROLOL SUCCINATE 50 MILLIGRAM(S): 50 TABLET, EXTENDED RELEASE ORAL at 05:51

## 2025-04-08 RX ADMIN — Medication 100 MILLIGRAM(S): at 21:50

## 2025-04-08 RX ADMIN — TAMSULOSIN HYDROCHLORIDE 0.4 MILLIGRAM(S): 0.4 CAPSULE ORAL at 21:51

## 2025-04-08 RX ADMIN — Medication 324 MILLIGRAM(S): at 17:16

## 2025-04-08 NOTE — CHART NOTE - NSCHARTNOTEFT_GEN_A_CORE
NUTRITION FOLLOW UP NOTE    PATIENT SEEN FOR: follow up    SOURCE: [x] Patient  [x] Current Medical Record  [] RN  [x] Family/support person at bedside  [] Patient unavailable/inappropriate  [] Other:    CHART REVIEWED/EVENTS NOTED.  [] No changes to nutrition care plan to note  [x] Nutrition Status:  - h/o NICM, HFrEF, s/p MDT CRT-D w/ baseline CHB, VT/VF, AFIb (s/p GIANFRANCO ligation/MAZE), s/p MV/TV repair, PVC ablation (3/2024) per chart  - transfers to [facility] from outside hospital due to VT/AICD shock per MD; currently listed as UNOS status 2E for heart transplant    DIET ORDER:   Diet, DASH/TLC:   Sodium & Cholesterol Restricted  Supplement Feeding Modality:  Oral  Ensure Clear Cans or Servings Per Day:  1       Frequency:  Daily (25)    CURRENT DIET ORDER IS:  [] Appropriate:  [] Inadequate:  [x] Other:    NUTRITION INTAKE/PROVISION:  [x] PO:  - patient reports eating well since last assessment; no chewing/swallowing difficulty or issues w/ N/V reported  - patient reports no further issues with diarrhea and overall digesting his meals well currently  - patient w/  questions during visit, all questions answered at this time  - will make recommendation for switch in oral nutrition supplement by patient request to Ensure Max  [] Enteral Nutrition:  [] Parenteral Nutrition:    ANTHROPOMETRICS:  Drug Dosing Weight  Height (cm): 170.2 (21 Mar 2025 22:24)  Weight (kg): 81.7 (21 Mar 2025 22:24)  BMI (kg/m2): 28.2 (21 Mar 2025 22:24)  Weights:   Daily Weight in k.1 (-08), Weight in k.5 (-), Weight in k.2 (-), Weight in k.1 (-), Weight in k.4 (-), Weight in k.7 ()  - weight trend noted, intermittent fluctuations noted for time frame, no significant changes in degree of edema for time frame per flowsheets, will continue to follow weight trend    MEDICATIONS:  MEDICATIONS  (STANDING):  atorvastatin 10 milliGRAM(s) Oral at bedtime  chlorhexidine 2% Cloths 1 Application(s) Topical <User Schedule>  chlorhexidine 4% Liquid 1 Application(s) Topical <User Schedule>  heparin   Injectable 5000 Unit(s) SubCutaneous every 8 hours  metoprolol succinate ER 50 milliGRAM(s) Oral daily  psyllium Powder 1 Packet(s) Oral daily  quiNIDine gluconate  milliGRAM(s) Oral every 12 hours  tamsulosin 0.4 milliGRAM(s) Oral at bedtime  torsemide 10 milliGRAM(s) Oral daily  traZODone 100 milliGRAM(s) Oral at bedtime    MEDICATIONS  (PRN):  acetaminophen     Tablet .. 650 milliGRAM(s) Oral every 6 hours PRN Mild Pain (1 - 3), Moderate Pain (4 - 6)  artificial  tears Solution 1 Drop(s) Both EYES every 4 hours PRN Dry Eyes  clonazePAM  Tablet 0.25 milliGRAM(s) Oral every 12 hours PRN anxiety  guaiFENesin Oral Liquid (Sugar-Free) 200 milliGRAM(s) Oral every 6 hours PRN Cough    NUTRITIONALLY PERTINENT LABS:   Na139 mmol/L Glu 94 mg/dL K+ 4.0 mmol/L Cr  1.14 mg/dL BUN 20 mg/dL  Phos 3.2 mg/dL  Chol 147 mg/dL LDL --    HDL 62 mg/dL Trig 71 mg/dL  25 @ 00:58 a1c 6.1    A1C with Estimated Average Glucose Result: 6.1 % (25 @ 00:58)    NUTRITIONALLY PERTINENT MEDICATIONS/LABS:  [x] Reviewed  [x] Relevant notes on medications/labs:  - ordered for torsemide, metamucil    EDEMA:  [x] Reviewed  [] Relevant notes:    GI/ I&O:  [x] Reviewed  [x] Relevant notes: last BM  (no further diarrhea per patient), ordered for metamucil  [] Other:    SKIN:   [x] No pressure injuries documented, per nursing flowsheet  [] Pressure injury previously noted  [] Change in pressure injury documentation:  [] Other:    ESTIMATED NEEDS:  [x] No change:  [] Updated:  Energy: 2117-2541kcal/day (25-30kcal/kg)  Protein: 102-119g/day (1.2-1.4g/kg)  Fluid: ml/day or [x] defer to team  Based on: BW 84.7kg (3/23, standing)    NUTRITION DIAGNOSIS:  [x] Prior Dx: food & nutrition related knowledge deficit  [] New Dx:    EDUCATION:  [x] Yes: adequate caloric/protein intake, food preferences and  questions answered, light review of heart transplant nutrition education with patient and daughter at bedside, all questions were answered  [] Not appropriate/warranted    NUTRITION CARE PLAN:  1. Diet:  - recommend liberalization of diet from DASH/TLC to low sodium to allow more food options for the patient  - encourage PO intake, protein source with each meal as tolerated  2. Supplements:  - recommend change in supplementation to be Ensure Max 1x/day    [] Achieved - Continue current nutrition intervention(s)  [] Current medical condition precludes nutrition intervention at this time.    MONITORING AND EVALUATION:   RD remains available upon request and will follow up per protocol.    Anderson Munson RD, CDN - contact on TEAMS.

## 2025-04-08 NOTE — PROGRESS NOTE ADULT - ATTENDING COMMENTS
69 y/o M with NICM, with CHB and VT/VFib and AFib, s/p GIANFRANCO ligation/MAZE and PVC ablation, previously intolerant to antiarrhythmics, who was undergoing transplant evaluation as an outpatient, who was admitted for ICD shocks. He was started on Lidocaine gtt and continued to have VT and ICD shocks. He was transferred to [facility] and transplant listing was upgraded to status 2E. He has since been stabilized on oral antiarrhythmics of Toprol XL and Quinidine. He was found to have Norovirus on 3/31/25, was made status 7 while symptomatic, now reactivated as status 2E.   Toprol, quinidine. Electrically quiet since 4/4.  Continue torsemide.  Hold hydral and Ald.

## 2025-04-08 NOTE — PROGRESS NOTE ADULT - ASSESSMENT
70-year-old male ABO O past medical history of NICM since 2011 HFrEF (LVEF 25-30%) s/p MDT CRT-D with baseline CHB, VT/VF, AFs/p GIANFRANCO ligation/MAZE, MV/TV repair, PVC ablation 3/2024 intolerant to AADs in the past, recent admission 3/19/2025-3/20/2025 for AICD shocks initially presented to the [facility] ED on 3/21/2025, sent by HF specialist for ACID shock. Device reported successful ATP for VT 3/17/2025 at 6:52pm followed by one ATP & 40J shock. He reported feeling dizzy & SOB during the events. He follows with Dr. Luca Tamayo for HF, currently undergoing transplant workup, Dr John for CRTD and VT/VF and Dr. Chu for genetic counseling. While admitted to [facility], he was started on a lidocaine gtt. On 3/21 when lidocaine weaned off patient had another two episodes of VT requiring AICD shocks. He was transferred to [facility] for further management. He was initially maintained on lidocaine gtt from 3/21/2025-3/25/2025 & his listing status was upgraded to UNOS status 2e for heart transplant (ABO O) for the refractory VT. He was transitioned to oral antiarrhythmics (Toprol XL & quinidine) & ultimately transferred from CICU to tele floor on 3/27/2025. Hospital course was further c/b development of watery diarrhea & was found to have +norovirus on 3/31/2025 which prompted deactivation to status 7 listing for heart transplant. Status reinstated to 2E after diarrhea resolved.     He has been electrically quiescent thus far on PO antiarrhythmics. Near euvolemic and on oral diuretics. Denies any current diarrhea. He is currently reactivated 4/2 for UNOS status 2e    Bedside hemodynamics:  3/22/25 BP 97/76/83, HR 80, CVP 6, PA 58/23/38, PCWP 20, SVR 1100, Gucci CO/CI 5.1/2.6, TD 4/2.08    Cardiac Studies:   TTE 3/20/25 : LVEF 30%, segmental, LVIDd 5.3, walls normal, elevated LV filling pressures, mod RVE with mildly reduced RV function, TAPSE 1.7, severely dilated RA, annuloplasty in MV position, transvalvular gradients are elevated with severe prosthetic MS (peak gradient 15.7, mean gradient 8), trace intravalvular MR, TV annuloplasty ring noted, mild TR,  est PASP 36, no evidence of LV thrombus  TTE 10/2024: LVEF 25-30%, LVEDD 5.9cm, moderately reduced RV function, annuloplasty ring of mitral and tricuspid position, PASP 47 mmHg  [facility] 3/19/25- RA 11 PA 58/26 PCWP 32 CO/CI 5.43/2.77  [facility] 6/2023 Nonobstructive CAD

## 2025-04-08 NOTE — PROGRESS NOTE ADULT - PROBLEM SELECTOR PLAN 1
- Listed for heart transplant: deactivated 4/1 to status 7 given active norovirus infection but given significant clinical improvement in symptoms reactivated to status 2e    - c/w Toprol XL 50mg daily.  - continue holding spironolactone 25mg QD for now, as he endorsed previous hx of dizziness with it. Will try again in the future   - continue holding hydralazine 25mg TID i/s/o borderline soft BP  - c/w torsemide 10mg QD. Diarrhea resolved  - strict I/Os and daily weights  - Keep K>4 and Mg>2  - PT as tolerated  - Psychiatry recs appreciated for anxiety  - Holistic RN consult placed to provide emotional support.

## 2025-04-08 NOTE — PROGRESS NOTE ADULT - SUBJECTIVE AND OBJECTIVE BOX
CHIEF COMPLAINT:  Patient is a 70y old  Male who presents with a chief complaint of VT (08 Apr 2025 12:02)      INTERVAL HISTORY/OVERNIGHT EVENTS:  No major overnight events. PVC noted on tele.     ======================  MEDICATIONS:  atorvastatin 10 milliGRAM(s) Oral at bedtime  chlorhexidine 2% Cloths 1 Application(s) Topical <User Schedule>  chlorhexidine 4% Liquid 1 Application(s) Topical <User Schedule>  heparin   Injectable 5000 Unit(s) SubCutaneous every 8 hours  metoprolol succinate ER 50 milliGRAM(s) Oral daily  psyllium Powder 1 Packet(s) Oral daily  quiNIDine gluconate  milliGRAM(s) Oral every 12 hours  tamsulosin 0.4 milliGRAM(s) Oral at bedtime  torsemide 10 milliGRAM(s) Oral daily  traZODone 100 milliGRAM(s) Oral at bedtime      ======================  PHYSICAL EXAMINATION:  GEN:  nad.   HEENT:  eomi.   PULM:  b/l lung sounds   CARD: s1, s2  ABD: +bs. ntnd  EXT:  no new rashes.    NEURO:  no new focal deficits.   ======================  OBJECTIVE:      VS:  T(F): 98 (04-08 @ 05:00), Max: 98 (04-08 @ 05:00)  HR: 81 (04-08 @ 05:00) (80 - 81)  BP: 100/63 (04-08 @ 05:00) (100/63 - 101/67)  RR: 18 (04-08 @ 05:00) (18 - 18)  SpO2: 93% (04-08 @ 05:00) (93% - 94%)    I/O:      04-05 @ 07:01  -  04-06 @ 07:00  --------------------------------------------------------  IN: 360 mL / OUT: 0 mL / NET: 360 mL    04-06 @ 07:01 - 04-07 @ 07:00  --------------------------------------------------------  IN: 1590 mL / OUT: 1250 mL / NET: 340 mL    04-07 @ 07:01 - 04-08 @ 07:00  --------------------------------------------------------  IN: 240 mL / OUT: 550 mL / NET: -310 mL    04-08 @ 07:01 - 04-08 @ 13:04  --------------------------------------------------------  IN: 0 mL / OUT: 750 mL / NET: -750 mL    ======================    LABS:                          12.5   8.54  )-----------( 209      ( 07 Apr 2025 06:23 )             37.7     04-07    139  |  103  |  20  ----------------------------<  94  4.0   |  23  |  1.14    Ca    9.2      07 Apr 2025 06:23  Phos  3.2     04-07  Mg     2.1     04-07        Urinalysis Basic - ( 07 Apr 2025 06:23 )    Color: x / Appearance: x / SG: x / pH: x  Gluc: 94 mg/dL / Ketone: x  / Bili: x / Urobili: x   Blood: x / Protein: x / Nitrite: x   Leuk Esterase: x / RBC: x / WBC x   Sq Epi: x / Non Sq Epi: x / Bacteria: x

## 2025-04-08 NOTE — PROGRESS NOTE ADULT - ASSESSMENT
70yoM w/ PMHx of NICM since 2011, HFrEF (25-30%) s/p MDT CRT-D with baseline CHB , VT/VF, afib s/p GIANFRANCO ligation/MAZE, MV/TV repair, PVC ablation (3/2024) was transferred from outside hospital to [facility] due to  VT/AICD shock.   While admitted to the outside hospital, he was started on a lidocaine gtt. On 3/21 when lidocaine weaned off patient had recurrence of VT requiring AICD shocks. Hence,  was transferred to [facility] for further management. Pt  was on Lidocaine gtt for control and is now on Quinidine and uptitrated Toprol for anti-arrythmia. Because of pt's refractory VT pt's listing status for a Heart Transplant was bumped from 6 to 2E temporarily. Pt will remain inpatient for listing purposes and managed closely by HF team and hence was transferred to [facility] on 3/27/25.

## 2025-04-08 NOTE — PROGRESS NOTE ADULT - SUBJECTIVE AND OBJECTIVE BOX
[facility] Division of Hospital Medicine  Amy Lim MD  Pager (MAYUR-FABIAN, 0V-7Z): [phone]  Other Times:  [phone]    Patient is a 70y old  Male who presents with a chief complaint of VT (07 Apr 2025 13:44)      SUBJECTIVE / OVERNIGHT EVENTS:  feeling well. denies any complaints. afebrile.  no overnight issues     ADDITIONAL REVIEW OF SYSTEMS: otherwise neg    MEDICATIONS  (STANDING):  atorvastatin 10 milliGRAM(s) Oral at bedtime  chlorhexidine 2% Cloths 1 Application(s) Topical <User Schedule>  chlorhexidine 4% Liquid 1 Application(s) Topical <User Schedule>  heparin   Injectable 5000 Unit(s) SubCutaneous every 8 hours  metoprolol succinate ER 50 milliGRAM(s) Oral daily  psyllium Powder 1 Packet(s) Oral daily  quiNIDine gluconate  milliGRAM(s) Oral every 12 hours  tamsulosin 0.4 milliGRAM(s) Oral at bedtime  torsemide 10 milliGRAM(s) Oral daily  traZODone 100 milliGRAM(s) Oral at bedtime    MEDICATIONS  (PRN):  acetaminophen     Tablet .. 650 milliGRAM(s) Oral every 6 hours PRN Mild Pain (1 - 3), Moderate Pain (4 - 6)  artificial  tears Solution 1 Drop(s) Both EYES every 4 hours PRN Dry Eyes  clonazePAM  Tablet 0.25 milliGRAM(s) Oral every 12 hours PRN anxiety  guaiFENesin Oral Liquid (Sugar-Free) 200 milliGRAM(s) Oral every 6 hours PRN Cough      CAPILLARY BLOOD GLUCOSE        I&O's Summary    07 Apr 2025 07:01  -  08 Apr 2025 07:00  --------------------------------------------------------  IN: 240 mL / OUT: 550 mL / NET: -310 mL    08 Apr 2025 07:01  -  08 Apr 2025 12:05  --------------------------------------------------------  IN: 0 mL / OUT: 750 mL / NET: -750 mL        PHYSICAL EXAM:  Vital Signs Last 24 Hrs  T(C): 36.7 (08 Apr 2025 05:00), Max: 36.7 (07 Apr 2025 13:03)  T(F): 98 (08 Apr 2025 05:00), Max: 98.1 (07 Apr 2025 13:03)  HR: 81 (08 Apr 2025 05:00) (80 - 81)  BP: 100/63 (08 Apr 2025 05:00) (100/63 - 102/73)  BP(mean): --  RR: 18 (08 Apr 2025 05:00) (18 - 18)  SpO2: 93% (08 Apr 2025 05:00) (93% - 96%)    Parameters below as of 08 Apr 2025 05:00  Patient On (Oxygen Delivery Method): room air        CONSTITUTIONAL: NAD, well-groomed  EYES:  conjunctiva and sclera clear  ENMT: Moist oral mucosa  NECK: Supple, no palpable masses; no JVD  RESPIRATORY: Normal respiratory effort; lungs are clear to auscultation bilaterally  CARDIOVASCULAR: Regular rate and rhythm, normal S1 and S2, no murmur/rub/gallop; No lower extremity edema  ABDOMEN: Nontender to palpation, normoactive bowel sounds, no rebound/guarding  MUSCULOSKELETAL:  no clubbing or cyanosis of digits; no joint swelling or tenderness to palpation  PSYCH: A+O to person, place, and time; affect appropriate  SKIN: No rashes; no palpable lesions    LABS:                        12.5   8.54  )-----------( 209      ( 07 Apr 2025 06:23 )             37.7     04-07    139  |  103  |  20  ----------------------------<  94  4.0   |  23  |  1.14    Ca    9.2      07 Apr 2025 06:23  Phos  3.2     04-07  Mg     2.1     04-07            Urinalysis Basic - ( 07 Apr 2025 06:23 )    Color: x / Appearance: x / SG: x / pH: x  Gluc: 94 mg/dL / Ketone: x  / Bili: x / Urobili: x   Blood: x / Protein: x / Nitrite: x   Leuk Esterase: x / RBC: x / WBC x   Sq Epi: x / Non Sq Epi: x / Bacteria: x          RADIOLOGY & ADDITIONAL TESTS:  Results Reviewed:   Imaging Personally Reviewed:  Electrocardiogram Personally Reviewed:    COORDINATION OF CARE:  Care Discussed with Consultants/Other Providers [Y/N]:  Prior or Outpatient Records Reviewed [Y/N]:

## 2025-04-09 PROCEDURE — 99232 SBSQ HOSP IP/OBS MODERATE 35: CPT

## 2025-04-09 RX ADMIN — Medication 324 MILLIGRAM(S): at 05:26

## 2025-04-09 RX ADMIN — Medication 100 MILLIGRAM(S): at 22:03

## 2025-04-09 RX ADMIN — Medication 1 APPLICATION(S): at 06:28

## 2025-04-09 RX ADMIN — ATORVASTATIN CALCIUM 10 MILLIGRAM(S): 80 TABLET, FILM COATED ORAL at 22:03

## 2025-04-09 RX ADMIN — METOPROLOL SUCCINATE 50 MILLIGRAM(S): 50 TABLET, EXTENDED RELEASE ORAL at 05:27

## 2025-04-09 RX ADMIN — Medication 10 MILLIGRAM(S): at 05:26

## 2025-04-09 RX ADMIN — TAMSULOSIN HYDROCHLORIDE 0.4 MILLIGRAM(S): 0.4 CAPSULE ORAL at 22:03

## 2025-04-09 RX ADMIN — Medication 324 MILLIGRAM(S): at 18:51

## 2025-04-09 RX ADMIN — Medication 1 PACKET(S): at 12:19

## 2025-04-09 NOTE — PROGRESS NOTE ADULT - ATTENDING COMMENTS
69 y/o M with NICM, with CHB and VT/VFib and AFib, s/p GIANFRANCO ligation/MAZE and PVC ablation, previously intolerant to antiarrhythmics, who was undergoing transplant evaluation as an outpatient, who was admitted for ICD shocks. He was started on Lidocaine gtt and continued to have VT and ICD shocks. He was transferred to [facility] and transplant listing was upgraded to status 2E. He has since been stabilized on oral antiarrhythmics of Toprol XL and Quinidine. He was found to have Norovirus on 3/31/25, was made status 7 while symptomatic, now reactivated as status 2E.   Toprol, quinidine. Electrically quiet since 4/4.  Continue torsemide.  Hold hydral and Ald.

## 2025-04-09 NOTE — PROGRESS NOTE ADULT - ASSESSMENT
70-year-old male ABO O past medical history of NICM since 2011 HFrEF (LVEF 25-30%) s/p MDT CRT-D with baseline CHB, VT/VF, AFs/p GIANFRANCO ligation/MAZE, MV/TV repair, PVC ablation 3/2024 intolerant to AADs in the past, recent admission 3/19/2025-3/20/2025 for AICD shocks initially presented to the [facility] ED on 3/21/2025, sent by HF specialist for ACID shock. Device reported successful ATP for VT 3/17/2025 at 6:52pm followed by one ATP & 40J shock. He reported feeling dizzy & SOB during the events. He follows with Dr. Luca Tamayo for HF, currently undergoing transplant workup, Dr John for CRTD and VT/VF and Dr. Chu for genetic counseling. While admitted to [facility], he was started on a lidocaine gtt. On 3/21 when lidocaine weaned off patient had another two episodes of VT requiring AICD shocks. He was transferred to [facility] for further management. He was initially maintained on lidocaine gtt from 3/21/2025-3/25/2025 & his listing status was upgraded to UNOS status 2e for heart transplant (ABO O) for the refractory VT. He was transitioned to oral antiarrhythmics (Toprol XL & quinidine) & ultimately transferred from CICU to tele floor on 3/27/2025. Hospital course was further c/b development of watery diarrhea & was found to have +norovirus on 3/31/2025 which prompted deactivation to status 7 listing for heart transplant. Status reinstated to 2E after diarrhea resolved.     He has been electrically quiescent thus far on PO antiarrhythmics. Near euvolemic and on oral diuretics. Diarrhea resolved. He is currently reactivated 4/2 for UNOS status 2e    Bedside hemodynamics:  3/22/25 BP 97/76/83, HR 80, CVP 6, PA 58/23/38, PCWP 20, SVR 1100, Gucci CO/CI 5.1/2.6, TD 4/2.08    Cardiac Studies:   TTE 3/20/25 : LVEF 30%, segmental, LVIDd 5.3, walls normal, elevated LV filling pressures, mod RVE with mildly reduced RV function, TAPSE 1.7, severely dilated RA, annuloplasty in MV position, transvalvular gradients are elevated with severe prosthetic MS (peak gradient 15.7, mean gradient 8), trace intravalvular MR, TV annuloplasty ring noted, mild TR,  est PASP 36, no evidence of LV thrombus  TTE 10/2024: LVEF 25-30%, LVEDD 5.9cm, moderately reduced RV function, annuloplasty ring of mitral and tricuspid position, PASP 47 mmHg  RHC 3/19/25- RA 11 PA 58/26 PCWP 32 CO/CI 5.43/2.77  [facility] 6/2023 Nonobstructive CAD

## 2025-04-09 NOTE — PROGRESS NOTE ADULT - PROBLEM SELECTOR PLAN 2
- c/w quinidine 324mg BID per EP recs  - c/w toprol 50 mg po QD   - s/p CRT-D: DDDR 80 AP 97%.  98%.  - Possible oversensing noted on telemetry on 3/25 at 1:57:25 as well as 3/27 1:56:52, s/p EP device interrogation 3/28 with no programming changes  - Keep K>4 and Mg>2

## 2025-04-09 NOTE — PROGRESS NOTE ADULT - SUBJECTIVE AND OBJECTIVE BOX
CHIEF COMPLAINT:  Patient is a 70y old  Male who presents with a chief complaint of VT (09 Apr 2025 12:39)      INTERVAL HISTORY/OVERNIGHT EVENTS:  No major overnight events.     ======================  MEDICATIONS:  atorvastatin 10 milliGRAM(s) Oral at bedtime  chlorhexidine 2% Cloths 1 Application(s) Topical <User Schedule>  chlorhexidine 4% Liquid 1 Application(s) Topical <User Schedule>  heparin   Injectable 5000 Unit(s) SubCutaneous every 8 hours  metoprolol succinate ER 50 milliGRAM(s) Oral daily  psyllium Powder 1 Packet(s) Oral daily  quiNIDine gluconate  milliGRAM(s) Oral every 12 hours  tamsulosin 0.4 milliGRAM(s) Oral at bedtime  torsemide 10 milliGRAM(s) Oral daily  traZODone 100 milliGRAM(s) Oral at bedtime    ======================  PHYSICAL EXAMINATION:  GEN:  nad.   HEENT:  eomi. ncat  PULM:  b/l lung sounds   CARD: s1, s2  ABD: +bs. ntnd  EXT:  no new rashes.    NEURO:  no new focal deficits.   ======================  OBJECTIVE:        VS:  T(F): 97.7 (04-09 @ 11:16), Max: 98 (04-08 @ 20:48)  HR: 80 (04-09 @ 11:16) (76 - 80)  BP: 93/62 (04-09 @ 11:16) (93/62 - 102/71)  RR: 18 (04-09 @ 11:16) (18 - 18)  SpO2: 95% (04-09 @ 11:16) (94% - 95%)    I/O:      04-06 @ 07:01  -  04-07 @ 07:00  --------------------------------------------------------  IN: 1590 mL / OUT: 1250 mL / NET: 340 mL    04-07 @ 07:01 - 04-08 @ 07:00  --------------------------------------------------------  IN: 240 mL / OUT: 550 mL / NET: -310 mL    04-08 @ 07:01 - 04-09 @ 07:00  --------------------------------------------------------  IN: 240 mL / OUT: 750 mL / NET: -510 mL    04-09 @ 07:01 - 04-09 @ 14:10  --------------------------------------------------------  IN: 0 mL / OUT: 650 mL / NET: -650 mL    ======================

## 2025-04-09 NOTE — PROGRESS NOTE ADULT - SUBJECTIVE AND OBJECTIVE BOX
[facility] Division of Hospital Medicine  Candido Covarrubias MD  Available via MS Teams    SUBJECTIVE / OVERNIGHT EVENTS: No acute events overnight. Patient feeling well overall. Just feeling tired. No chest pain or SOB. No other concerns or complaints at this time.     Review of Systems:   CONSTITUTIONAL: No fever   EYES: No eye pain, visual disturbances, or discharge  ENMT: No difficulty hearing   RESPIRATORY: No SOB. No cough  CARDIOVASCULAR: No chest pain   GASTROINTESTINAL: No abdominal or epigastric pain. No nausea, vomiting, or hematemesis; No diarrhea   GENITOURINARY: No dysuria   NEUROLOGICAL: No headaches   SKIN: No itching    MUSCULOSKELETAL: No joint pain or swelling; No muscle or back pain  PSYCHIATRIC: No depression or anxiety  HEME/LYMPH: No easy bruising or bleeding gums      MEDICATIONS  (STANDING):  atorvastatin 10 milliGRAM(s) Oral at bedtime  chlorhexidine 2% Cloths 1 Application(s) Topical <User Schedule>  chlorhexidine 4% Liquid 1 Application(s) Topical <User Schedule>  heparin   Injectable 5000 Unit(s) SubCutaneous every 8 hours  metoprolol succinate ER 50 milliGRAM(s) Oral daily  psyllium Powder 1 Packet(s) Oral daily  quiNIDine gluconate  milliGRAM(s) Oral every 12 hours  tamsulosin 0.4 milliGRAM(s) Oral at bedtime  torsemide 10 milliGRAM(s) Oral daily  traZODone 100 milliGRAM(s) Oral at bedtime    MEDICATIONS  (PRN):  acetaminophen     Tablet .. 650 milliGRAM(s) Oral every 6 hours PRN Mild Pain (1 - 3), Moderate Pain (4 - 6)  artificial  tears Solution 1 Drop(s) Both EYES every 4 hours PRN Dry Eyes  clonazePAM  Tablet 0.25 milliGRAM(s) Oral every 12 hours PRN anxiety  guaiFENesin Oral Liquid (Sugar-Free) 200 milliGRAM(s) Oral every 6 hours PRN Cough      I&O's Summary    08 Apr 2025 07:01  -  09 Apr 2025 07:00  --------------------------------------------------------  IN: 240 mL / OUT: 750 mL / NET: -510 mL    09 Apr 2025 07:01  -  09 Apr 2025 15:38  --------------------------------------------------------  IN: 0 mL / OUT: 650 mL / NET: -650 mL      PHYSICAL EXAM:  Vital Signs Last 24 Hrs  T(C): 36.5 (09 Apr 2025 11:16), Max: 36.7 (08 Apr 2025 20:48)  T(F): 97.7 (09 Apr 2025 11:16), Max: 98 (08 Apr 2025 20:48)  HR: 80 (09 Apr 2025 11:16) (76 - 80)  BP: 93/62 (09 Apr 2025 11:16) (93/62 - 102/71)  RR: 18 (09 Apr 2025 11:16) (18 - 18)  SpO2: 95% (09 Apr 2025 11:16) (94% - 95%)    Parameters below as of 09 Apr 2025 11:16  Patient On (Oxygen Delivery Method): room air      CONSTITUTIONAL: NAD, well-developed   EYES: PERRLA; conjunctiva and sclera clear  ENMT: Moist oral mucosa, no pharyngeal injection or exudates  NECK: Supple   RESPIRATORY: Normal respiratory effort; lungs are clear to auscultation bilaterally  CARDIOVASCULAR: Regular rate and rhythm, normal S1 and S2   ABDOMEN: Nontender to palpation, normoactive bowel sounds  MUSCULOSKELETAL: no clubbing or cyanosis of digits; no joint swelling or tenderness to palpation  PSYCH: A+O to person, place, and time; affect appropriate  NEUROLOGY: no gross sensory deficits   SKIN: No rashes     LABS:      RADIOLOGY & ADDITIONAL TESTS:  New Imaging Personally Reviewed Today:  New Electrocardiogram Personally Reviewed Today:  Other Results Reviewed Today:   Prior or Outpatient Records Reviewed Today with Summary:    COORDINATION OF CARE:  Consultant Communication and Details of Discussion (where applicable):

## 2025-04-09 NOTE — PROGRESS NOTE ADULT - PROBLEM SELECTOR PLAN 4
- Afebrile, no leukocytosis, non-toxic appearing  - Per transplant ID, can re-activate UNOS listing status as symptoms have resolved  - continue supportive care per primary team.

## 2025-04-09 NOTE — PROGRESS NOTE ADULT - ASSESSMENT
70yoM w/ PMHx of NICM since 2011, HFrEF (25-30%) s/p MDT CRT-D with baseline CHB , VT/VF, afib s/p GIANFRANCO ligation/MAZE, MV/TV repair, PVC ablation (3/2024) was transferred from outside hospital to [facility] due to  VT/AICD shock.   While admitted to the outside hospital, he was started on a lidocaine gtt. On 3/21 when lidocaine weaned off patient had recurrence of VT requiring AICD shocks. Hence,  was transferred to [facility] for further management. Pt  was on Lidocaine gtt for control and is now on Quinidine and uptitrated Toprol for anti-arrythmia. Because of pt's refractory VT pt's listing status for a Heart Transplant was bumped from 6 to 2E temporarily. Pt will remain inpatient for listing purposes and managed closely by HF team and hence was transferred to [facility] on 3/27/25.

## 2025-04-09 NOTE — PROGRESS NOTE ADULT - PROBLEM SELECTOR PLAN 1
ACC/AHA stage D systolic heart failure.   ·  Plan: - Listed for heart transplant: deactivated 4/1 to status 7 due to norovirus infection but reactivated to status 2e after resolution of symptoms    - c/w Toprol XL 50mg daily.  - continue holding spironolactone 25mg QD for now, as he endorsed generalized weakness. Will try again in the future   - continue holding hydralazine 25mg TID i/s/o borderline soft BP  - c/w torsemide 10mg QD. Diarrhea resolved  - strict I/Os and daily weights  - Keep K>4 and Mg>2  - PT as tolerated  - Psychiatry recs appreciated for anxiety

## 2025-04-10 PROCEDURE — 99232 SBSQ HOSP IP/OBS MODERATE 35: CPT

## 2025-04-10 RX ADMIN — METOPROLOL SUCCINATE 50 MILLIGRAM(S): 50 TABLET, EXTENDED RELEASE ORAL at 05:37

## 2025-04-10 RX ADMIN — Medication 1 PACKET(S): at 11:28

## 2025-04-10 RX ADMIN — Medication 324 MILLIGRAM(S): at 18:10

## 2025-04-10 RX ADMIN — Medication 324 MILLIGRAM(S): at 05:37

## 2025-04-10 RX ADMIN — Medication 1 APPLICATION(S): at 05:37

## 2025-04-10 RX ADMIN — Medication 100 MILLIGRAM(S): at 21:50

## 2025-04-10 RX ADMIN — TAMSULOSIN HYDROCHLORIDE 0.4 MILLIGRAM(S): 0.4 CAPSULE ORAL at 21:50

## 2025-04-10 RX ADMIN — ATORVASTATIN CALCIUM 10 MILLIGRAM(S): 80 TABLET, FILM COATED ORAL at 21:51

## 2025-04-10 RX ADMIN — Medication 10 MILLIGRAM(S): at 05:38

## 2025-04-10 NOTE — PROGRESS NOTE ADULT - SUBJECTIVE AND OBJECTIVE BOX
CHIEF COMPLAINT:  Patient is a 70y old  Male who presents with a chief complaint of VT (09 Apr 2025 14:10)      INTERVAL HISTORY/OVERNIGHT EVENTS:  No major overnight events. No events noted on tele.     ======================  MEDICATIONS:  atorvastatin 10 milliGRAM(s) Oral at bedtime  chlorhexidine 2% Cloths 1 Application(s) Topical <User Schedule>  chlorhexidine 4% Liquid 1 Application(s) Topical <User Schedule>  heparin   Injectable 5000 Unit(s) SubCutaneous every 8 hours  metoprolol succinate ER 50 milliGRAM(s) Oral daily  psyllium Powder 1 Packet(s) Oral daily  quiNIDine gluconate  milliGRAM(s) Oral every 12 hours  tamsulosin 0.4 milliGRAM(s) Oral at bedtime  torsemide 10 milliGRAM(s) Oral daily  traZODone 100 milliGRAM(s) Oral at bedtime      ======================  PHYSICAL EXAMINATION:  GEN:  nad.   HEENT:  eomi. ncat  PULM:  b/l lung sounds   CARD: s1, s2  ABD: +bs. ntnd  EXT:  no new rashes.    NEURO:  no new focal deficits.   ======================  OBJECTIVE:    VS:  T(F): 97.6 (04-10 @ 12:11), Max: 98.3 (04-10 @ 04:49)  HR: 79 (04-10 @ 12:11) (79 - 87)  BP: 98/64 (04-10 @ 12:11) (96/64 - 98/64)  RR: 18 (04-10 @ 12:11) (18 - 18)  SpO2: 95% (04-10 @ 12:11) (93% - 95%)    I/O:      04-07 @ 07:01  -  04-08 @ 07:00  --------------------------------------------------------  IN: 240 mL / OUT: 550 mL / NET: -310 mL    04-08 @ 07:01 - 04-09 @ 07:00  --------------------------------------------------------  IN: 240 mL / OUT: 750 mL / NET: -510 mL    04-09 @ 07:01  -  04-10 @ 07:00  --------------------------------------------------------  IN: 240 mL / OUT: 650 mL / NET: -410 mL    04-10 @ 07:01  -  04-10 @ 14:46  --------------------------------------------------------  IN: 0 mL / OUT: 800 mL / NET: -800 mL    ======================

## 2025-04-10 NOTE — PROGRESS NOTE ADULT - ASSESSMENT
70-year-old male ABO O past medical history of NICM since 2011 HFrEF (LVEF 25-30%) s/p MDT CRT-D with baseline CHB, VT/VF, AFs/p GIANFRANCO ligation/MAZE, MV/TV repair, PVC ablation 3/2024 intolerant to AADs in the past, recent admission 3/19/2025-3/20/2025 for AICD shocks initially presented to the [facility] ED on 3/21/2025, sent by HF specialist for ACID shock. Device reported successful ATP for VT 3/17/2025 at 6:52pm followed by one ATP & 40J shock. He reported feeling dizzy & SOB during the events. He follows with Dr. Luca Tamayo for HF, currently undergoing transplant workup, Dr John for CRTD and VT/VF and Dr. Chu for genetic counseling. While admitted to [facility], he was started on a lidocaine gtt. On 3/21 when lidocaine weaned off patient had another two episodes of VT requiring AICD shocks. He was transferred to [facility] for further management. He was initially maintained on lidocaine gtt from 3/21/2025-3/25/2025 & his listing status was upgraded to UNOS status 2e for heart transplant (ABO O) for the refractory VT. He was transitioned to oral antiarrhythmics (Toprol XL & quinidine) & ultimately transferred from CICU to tele floor on 3/27/2025. Hospital course was further c/b development of watery diarrhea & was found to have +norovirus on 3/31/2025 which prompted deactivation to status 7 listing for heart transplant. Status reinstated to 2E after diarrhea resolved.     He has been electrically quiescent since 4/4 on PO antiarrhythmics. Near euvolemic and on oral diuretics. Diarrhea resolved. He is currently reactivated 4/2 for UNOS status 2e.    Bedside hemodynamics:  3/22/25 BP 97/76/83, HR 80, CVP 6, PA 58/23/38, PCWP 20, SVR 1100, Gucci CO/CI 5.1/2.6, TD 4/2.08    Cardiac Studies:   TTE 3/20/25 : LVEF 30%, segmental, LVIDd 5.3, walls normal, elevated LV filling pressures, mod RVE with mildly reduced RV function, TAPSE 1.7, severely dilated RA, annuloplasty in MV position, transvalvular gradients are elevated with severe prosthetic MS (peak gradient 15.7, mean gradient 8), trace intravalvular MR, TV annuloplasty ring noted, mild TR,  est PASP 36, no evidence of LV thrombus  TTE 10/2024: LVEF 25-30%, LVEDD 5.9cm, moderately reduced RV function, annuloplasty ring of mitral and tricuspid position, PASP 47 mmHg  RHC 3/19/25- RA 11 PA 58/26 PCWP 32 CO/CI 5.43/2.77  [facility] 6/2023 Nonobstructive CAD

## 2025-04-10 NOTE — PROGRESS NOTE ADULT - PROBLEM SELECTOR PLAN 2
- c/w quinidine 324mg BID per EP recs  - c/w toprol 50 mg po QD   - s/p CRT-D: DDDR 80 AP 97%.  98%.  - No events on tele.   - Keep K>4 and Mg>2.

## 2025-04-10 NOTE — PROGRESS NOTE ADULT - ATTENDING COMMENTS
69 y/o M with NICM, with CHB and VT/VFib and AFib, s/p GIANFRANCO ligation/MAZE and PVC ablation, previously intolerant to antiarrhythmics, who was undergoing transplant evaluation as an outpatient, who was admitted for ICD shocks. He was started on Lidocaine gtt and continued to have VT and ICD shocks. He was transferred to [facility] and transplant listing was upgraded to status 2E. He has since been stabilized on oral antiarrhythmics of Toprol XL and Quinidine. He was found to have Norovirus on 3/31/25, was made status 7 while symptomatic, now reactivated as status 2E.   Toprol, quinidine. Electrically quiet since 4/4.  Continue torsemide.  Hold hydral and Ald.

## 2025-04-10 NOTE — PROGRESS NOTE ADULT - ASSESSMENT
70yoM w/ PMHx of NICM since 2011, HFrEF (25-30%) s/p MDT CRT-D with baseline CHB , VT/VF, afib s/p GIANFRANCO ligation/MAZE, MV/TV repair, PVC ablation (3/2024) was transferred from outside hospital to [facility] due to  VT/AICD shock.   While admitted to the outside hospital, he was started on a lidocaine gtt. On 3/21 when lidocaine weaned off patient had recurrence of VT requiring AICD shocks. Hence,  was transferred to [facility] for further management. Pt  was on Lidocaine gtt for control and is now on Quinidine and uptitrated Toprol for anti-arrythmia. Because of pt's refractory VT pt's listing status for a Heart Transplant was bumped from 6 to 2E temporarily. Pt will remain inpatient for listing purposes and managed closely by HF team and hence was transferred to [facility] on 3/27/25.

## 2025-04-10 NOTE — PROGRESS NOTE ADULT - SUBJECTIVE AND OBJECTIVE BOX
[facility] Division of Hospital Medicine  Amy Lim MD  Pager (ESPERANZA, 3N-9P): [phone]  Other Times:  [phone]    Patient is a 70y old  Male who presents with a chief complaint of VT (10 Apr 2025 14:40)      SUBJECTIVE / OVERNIGHT EVENTS:  feeling well. denies any complaints.  no overnight issues.   SBP low but asymptomatic.     ADDITIONAL REVIEW OF SYSTEMS: otherwise neg    MEDICATIONS  (STANDING):  atorvastatin 10 milliGRAM(s) Oral at bedtime  chlorhexidine 2% Cloths 1 Application(s) Topical <User Schedule>  chlorhexidine 4% Liquid 1 Application(s) Topical <User Schedule>  heparin   Injectable 5000 Unit(s) SubCutaneous every 8 hours  metoprolol succinate ER 50 milliGRAM(s) Oral daily  psyllium Powder 1 Packet(s) Oral daily  quiNIDine gluconate  milliGRAM(s) Oral every 12 hours  tamsulosin 0.4 milliGRAM(s) Oral at bedtime  torsemide 10 milliGRAM(s) Oral daily  traZODone 100 milliGRAM(s) Oral at bedtime    MEDICATIONS  (PRN):  acetaminophen     Tablet .. 650 milliGRAM(s) Oral every 6 hours PRN Mild Pain (1 - 3), Moderate Pain (4 - 6)  artificial  tears Solution 1 Drop(s) Both EYES every 4 hours PRN Dry Eyes  clonazePAM  Tablet 0.25 milliGRAM(s) Oral every 12 hours PRN anxiety  guaiFENesin Oral Liquid (Sugar-Free) 200 milliGRAM(s) Oral every 6 hours PRN Cough      CAPILLARY BLOOD GLUCOSE        I&O's Summary    09 Apr 2025 07:01  -  10 Apr 2025 07:00  --------------------------------------------------------  IN: 240 mL / OUT: 650 mL / NET: -410 mL    10 Apr 2025 07:01  -  10 Apr 2025 15:04  --------------------------------------------------------  IN: 0 mL / OUT: 800 mL / NET: -800 mL        PHYSICAL EXAM:  Vital Signs Last 24 Hrs  T(C): 36.4 (10 Apr 2025 12:11), Max: 36.8 (10 Apr 2025 04:49)  T(F): 97.6 (10 Apr 2025 12:11), Max: 98.3 (10 Apr 2025 04:49)  HR: 79 (10 Apr 2025 12:11) (79 - 87)  BP: 98/64 (10 Apr 2025 12:11) (96/64 - 98/64)  BP(mean): --  RR: 18 (10 Apr 2025 12:11) (18 - 18)  SpO2: 95% (10 Apr 2025 12:11) (93% - 95%)    Parameters below as of 10 Apr 2025 12:11  Patient On (Oxygen Delivery Method): room air        CONSTITUTIONAL: NAD, well-groomed  EYES:  conjunctiva and sclera clear  ENMT: Moist oral mucosa  NECK: Supple, no palpable masses; no JVD  RESPIRATORY: Normal respiratory effort; lungs are clear to auscultation bilaterally  CARDIOVASCULAR: Regular rate and rhythm, normal S1 and S2, no murmur/rub/gallop; No lower extremity edema  ABDOMEN: Nontender to palpation, normoactive bowel sounds, no rebound/guarding  MUSCULOSKELETAL:  no clubbing or cyanosis of digits; no joint swelling or tenderness to palpation  PSYCH: A+O to person, place, and time; affect appropriate  SKIN: No rashes; no palpable lesions    LABS:                      RADIOLOGY & ADDITIONAL TESTS:  Results Reviewed:   Imaging Personally Reviewed:  Electrocardiogram Personally Reviewed:    COORDINATION OF CARE:  Care Discussed with Consultants/Other Providers [Y/N]:  Prior or Outpatient Records Reviewed [Y/N]:

## 2025-04-10 NOTE — PROGRESS NOTE ADULT - PROBLEM SELECTOR PLAN 1
ACC/AHA stage D systolic heart failure.   ·  Plan: - Listed for heart transplant: deactivated 4/1 to status 7 due to norovirus infection but reactivated to status 2e after resolution of symptoms    - c/w Toprol XL 50mg daily.  - continue holding spironolactone 25mg QD for now, as he endorsed generalized weakness. Will try again in the future   - continue holding hydralazine 25mg TID i/s/o borderline soft BP  - c/w torsemide 10mg QD.    - strict I/Os and daily weights  - Keep K>4 and Mg>2  - PT as tolerated  - Psychiatry recs appreciated for anxiety.  - Check routine labs for tomorrow

## 2025-04-11 LAB
ALBUMIN SERPL ELPH-MCNC: 3.8 G/DL — SIGNIFICANT CHANGE UP (ref 3.3–5)
ALP SERPL-CCNC: 85 U/L — SIGNIFICANT CHANGE UP (ref 40–120)
ALT FLD-CCNC: 65 U/L — HIGH (ref 10–45)
ANION GAP SERPL CALC-SCNC: 11 MMOL/L — SIGNIFICANT CHANGE UP (ref 5–17)
AST SERPL-CCNC: 33 U/L — SIGNIFICANT CHANGE UP (ref 10–40)
BILIRUB DIRECT SERPL-MCNC: 0.2 MG/DL — SIGNIFICANT CHANGE UP (ref 0–0.3)
BILIRUB INDIRECT FLD-MCNC: 0.4 MG/DL — SIGNIFICANT CHANGE UP (ref 0.2–1)
BILIRUB SERPL-MCNC: 0.6 MG/DL — SIGNIFICANT CHANGE UP (ref 0.2–1.2)
BUN SERPL-MCNC: 22 MG/DL — SIGNIFICANT CHANGE UP (ref 7–23)
CALCIUM SERPL-MCNC: 9 MG/DL — SIGNIFICANT CHANGE UP (ref 8.4–10.5)
CHLORIDE SERPL-SCNC: 103 MMOL/L — SIGNIFICANT CHANGE UP (ref 96–108)
CO2 SERPL-SCNC: 25 MMOL/L — SIGNIFICANT CHANGE UP (ref 22–31)
CREAT SERPL-MCNC: 1.14 MG/DL — SIGNIFICANT CHANGE UP (ref 0.5–1.3)
EGFR: 69 ML/MIN/1.73M2 — SIGNIFICANT CHANGE UP
EGFR: 69 ML/MIN/1.73M2 — SIGNIFICANT CHANGE UP
GLUCOSE SERPL-MCNC: 91 MG/DL — SIGNIFICANT CHANGE UP (ref 70–99)
HCT VFR BLD CALC: 35.7 % — LOW (ref 39–50)
HGB BLD-MCNC: 12 G/DL — LOW (ref 13–17)
MAGNESIUM SERPL-MCNC: 2 MG/DL — SIGNIFICANT CHANGE UP (ref 1.6–2.6)
MCHC RBC-ENTMCNC: 29.2 PG — SIGNIFICANT CHANGE UP (ref 27–34)
MCHC RBC-ENTMCNC: 33.6 G/DL — SIGNIFICANT CHANGE UP (ref 32–36)
MCV RBC AUTO: 86.9 FL — SIGNIFICANT CHANGE UP (ref 80–100)
NRBC BLD AUTO-RTO: 0 /100 WBCS — SIGNIFICANT CHANGE UP (ref 0–0)
PHOSPHATE SERPL-MCNC: 3.4 MG/DL — SIGNIFICANT CHANGE UP (ref 2.5–4.5)
PLATELET # BLD AUTO: 201 K/UL — SIGNIFICANT CHANGE UP (ref 150–400)
POTASSIUM SERPL-MCNC: 3.8 MMOL/L — SIGNIFICANT CHANGE UP (ref 3.5–5.3)
POTASSIUM SERPL-SCNC: 3.8 MMOL/L — SIGNIFICANT CHANGE UP (ref 3.5–5.3)
PROT SERPL-MCNC: 6.2 G/DL — SIGNIFICANT CHANGE UP (ref 6–8.3)
RBC # BLD: 4.11 M/UL — LOW (ref 4.2–5.8)
RBC # FLD: 13.5 % — SIGNIFICANT CHANGE UP (ref 10.3–14.5)
SODIUM SERPL-SCNC: 139 MMOL/L — SIGNIFICANT CHANGE UP (ref 135–145)
WBC # BLD: 6.72 K/UL — SIGNIFICANT CHANGE UP (ref 3.8–10.5)
WBC # FLD AUTO: 6.72 K/UL — SIGNIFICANT CHANGE UP (ref 3.8–10.5)

## 2025-04-11 PROCEDURE — 99232 SBSQ HOSP IP/OBS MODERATE 35: CPT

## 2025-04-11 RX ADMIN — Medication 1 APPLICATION(S): at 05:27

## 2025-04-11 RX ADMIN — Medication 1 PACKET(S): at 12:18

## 2025-04-11 RX ADMIN — Medication 324 MILLIGRAM(S): at 05:29

## 2025-04-11 RX ADMIN — METOPROLOL SUCCINATE 50 MILLIGRAM(S): 50 TABLET, EXTENDED RELEASE ORAL at 05:29

## 2025-04-11 RX ADMIN — ATORVASTATIN CALCIUM 10 MILLIGRAM(S): 80 TABLET, FILM COATED ORAL at 21:52

## 2025-04-11 RX ADMIN — Medication 324 MILLIGRAM(S): at 18:01

## 2025-04-11 RX ADMIN — Medication 1 APPLICATION(S): at 05:28

## 2025-04-11 RX ADMIN — TAMSULOSIN HYDROCHLORIDE 0.4 MILLIGRAM(S): 0.4 CAPSULE ORAL at 21:52

## 2025-04-11 RX ADMIN — Medication 100 MILLIGRAM(S): at 21:52

## 2025-04-11 RX ADMIN — Medication 10 MILLIGRAM(S): at 05:29

## 2025-04-11 NOTE — PROGRESS NOTE ADULT - ASSESSMENT
70-year-old male ABO O past medical history of NICM since 2011 HFrEF (LVEF 25-30%) s/p MDT CRT-D with baseline CHB, VT/VF, AFs/p GIANFRANCO ligation/MAZE, MV/TV repair, PVC ablation 3/2024 intolerant to AADs in the past, recent admission 3/19/2025-3/20/2025 for AICD shocks initially presented to the [facility] ED on 3/21/2025, sent by HF specialist for ACID shock. Device reported successful ATP for VT 3/17/2025 at 6:52pm followed by one ATP & 40J shock. He reported feeling dizzy & SOB during the events. He follows with Dr. Luca Tamayo for HF, currently undergoing transplant workup, Dr John for CRTD and VT/VF and Dr. Chu for genetic counseling. While admitted to [facility], he was started on a lidocaine gtt. On 3/21 when lidocaine weaned off patient had another two episodes of VT requiring AICD shocks. He was transferred to [facility] for further management. He was initially maintained on lidocaine gtt from 3/21/2025-3/25/2025 & his listing status was upgraded to UNOS status 2e for heart transplant (ABO O) for the refractory VT. He was transitioned to oral antiarrhythmics (Toprol XL & quinidine) & ultimately transferred from CICU to tele floor on 3/27/2025. Hospital course was further c/b development of watery diarrhea & was found to have +norovirus on 3/31/2025 which prompted deactivation to status 7 listing for heart transplant. Status reinstated to 2E after diarrhea resolved.     He has been electrically quiescent since 4/4 on PO antiarrhythmics. Near euvolemic and on oral diuretics. Diarrhea resolved. He is currently reactivated 4/2 for UNOS status 2e.    Bedside hemodynamics:  3/22/25 BP 97/76/83, HR 80, CVP 6, PA 58/23/38, PCWP 20, SVR 1100, Gucci CO/CI 5.1/2.6, TD 4/2.08    Cardiac Studies:   TTE 3/20/25 : LVEF 30%, segmental, LVIDd 5.3, walls normal, elevated LV filling pressures, mod RVE with mildly reduced RV function, TAPSE 1.7, severely dilated RA, annuloplasty in MV position, transvalvular gradients are elevated with severe prosthetic MS (peak gradient 15.7, mean gradient 8), trace intravalvular MR, TV annuloplasty ring noted, mild TR,  est PASP 36, no evidence of LV thrombus  TTE 10/2024: LVEF 25-30%, LVEDD 5.9cm, moderately reduced RV function, annuloplasty ring of mitral and tricuspid position, PASP 47 mmHg  RHC 3/19/25- RA 11 PA 58/26 PCWP 32 CO/CI 5.43/2.77  [facility] 6/2023 Nonobstructive CAD

## 2025-04-11 NOTE — PROGRESS NOTE ADULT - SUBJECTIVE AND OBJECTIVE BOX
CHIEF COMPLAINT:  Patient is a 70y old  Male who presents with a chief complaint of VT (10 Apr 2025 15:04)      INTERVAL HISTORY/OVERNIGHT EVENTS:  No major overnight events.      ======================  MEDICATIONS:  atorvastatin 10 milliGRAM(s) Oral at bedtime  chlorhexidine 2% Cloths 1 Application(s) Topical <User Schedule>  chlorhexidine 4% Liquid 1 Application(s) Topical <User Schedule>  heparin   Injectable 5000 Unit(s) SubCutaneous every 8 hours  metoprolol succinate ER 50 milliGRAM(s) Oral daily  psyllium Powder 1 Packet(s) Oral daily  quiNIDine gluconate  milliGRAM(s) Oral every 12 hours  tamsulosin 0.4 milliGRAM(s) Oral at bedtime  torsemide 10 milliGRAM(s) Oral daily  traZODone 100 milliGRAM(s) Oral at bedtime    ======================  PHYSICAL EXAMINATION:  GEN:  nad.   HEENT:  eomi. ncat  PULM:  b/l lung sounds   CARD: s1, s2  ABD: +bs. ntnd  EXT:  no new rashes.    NEURO:  no new focal deficits.   ======================  OBJECTIVE:        VS:  T(F): 98 (04-11 @ 05:00), Max: 98 (04-11 @ 05:00)  HR: 82 (04-11 @ 05:00) (79 - 82)  BP: 99/66 (04-11 @ 05:00) (98/64 - 104/79)  RR: 18 (04-11 @ 05:00) (18 - 18)  SpO2: 94% (04-11 @ 05:00) (94% - 96%)    I/O:      04-08 @ 07:01  -  04-09 @ 07:00  --------------------------------------------------------  IN: 240 mL / OUT: 750 mL / NET: -510 mL    04-09 @ 07:01  -  04-10 @ 07:00  --------------------------------------------------------  IN: 240 mL / OUT: 650 mL / NET: -410 mL    04-10 @ 07:01  -  04-11 @ 07:00  --------------------------------------------------------  IN: 240 mL / OUT: 800 mL / NET: -560 mL    ======================    LABS:                          12.0   6.72  )-----------( 201      ( 11 Apr 2025 05:53 )             35.7     04-11    139  |  103  |  22  ----------------------------<  91  3.8   |  25  |  1.14    Ca    9.0      11 Apr 2025 05:53  Phos  3.4     04-11  Mg     2.0     04-11    TPro  6.2  /  Alb  3.8  /  TBili  0.6  /  DBili  0.2  /  AST  33  /  ALT  65[H]  /  AlkPhos  85  04-11    LIVER FUNCTIONS - ( 11 Apr 2025 05:53 )  Alb: 3.8 g/dL / Pro: 6.2 g/dL / ALK PHOS: 85 U/L / ALT: 65 U/L / AST: 33 U/L / GGT: x             Urinalysis Basic - ( 11 Apr 2025 05:53 )    Color: x / Appearance: x / SG: x / pH: x  Gluc: 91 mg/dL / Ketone: x  / Bili: x / Urobili: x   Blood: x / Protein: x / Nitrite: x   Leuk Esterase: x / RBC: x / WBC x   Sq Epi: x / Non Sq Epi: x / Bacteria: x

## 2025-04-11 NOTE — PROGRESS NOTE ADULT - SUBJECTIVE AND OBJECTIVE BOX
[facility] Division of Hospital Medicine  Amy Lim MD  Pager (MAYUR-F, 0I-5S): [phone]  Other Times:  [phone]    Patient is a 70y old  Male who presents with a chief complaint of VT (11 Apr 2025 08:02)      SUBJECTIVE / OVERNIGHT EVENTS:  feeling well. denies any complaints. afebrile no overnight issues     ADDITIONAL REVIEW OF SYSTEMS: otherwise neg    MEDICATIONS  (STANDING):  atorvastatin 10 milliGRAM(s) Oral at bedtime  chlorhexidine 2% Cloths 1 Application(s) Topical <User Schedule>  chlorhexidine 4% Liquid 1 Application(s) Topical <User Schedule>  heparin   Injectable 5000 Unit(s) SubCutaneous every 8 hours  metoprolol succinate ER 50 milliGRAM(s) Oral daily  psyllium Powder 1 Packet(s) Oral daily  quiNIDine gluconate  milliGRAM(s) Oral every 12 hours  tamsulosin 0.4 milliGRAM(s) Oral at bedtime  torsemide 10 milliGRAM(s) Oral daily  traZODone 100 milliGRAM(s) Oral at bedtime    MEDICATIONS  (PRN):  acetaminophen     Tablet .. 650 milliGRAM(s) Oral every 6 hours PRN Mild Pain (1 - 3), Moderate Pain (4 - 6)  artificial  tears Solution 1 Drop(s) Both EYES every 4 hours PRN Dry Eyes  clonazePAM  Tablet 0.25 milliGRAM(s) Oral every 12 hours PRN anxiety  guaiFENesin Oral Liquid (Sugar-Free) 200 milliGRAM(s) Oral every 6 hours PRN Cough      CAPILLARY BLOOD GLUCOSE        I&O's Summary    10 Apr 2025 07:01  -  11 Apr 2025 07:00  --------------------------------------------------------  IN: 240 mL / OUT: 800 mL / NET: -560 mL        PHYSICAL EXAM:  Vital Signs Last 24 Hrs  T(C): 36.6 (11 Apr 2025 12:23), Max: 36.7 (11 Apr 2025 05:00)  T(F): 97.9 (11 Apr 2025 12:23), Max: 98 (11 Apr 2025 05:00)  HR: 79 (11 Apr 2025 12:23) (79 - 82)  BP: 93/59 (11 Apr 2025 12:23) (93/59 - 104/79)  BP(mean): --  RR: 18 (11 Apr 2025 12:23) (18 - 18)  SpO2: 95% (11 Apr 2025 12:23) (94% - 96%)    Parameters below as of 11 Apr 2025 12:23  Patient On (Oxygen Delivery Method): room air      CONSTITUTIONAL: NAD, well-groomed  EYES:  conjunctiva and sclera clear  ENMT: Moist oral mucosa  NECK: Supple, no palpable masses; no JVD  RESPIRATORY: Normal respiratory effort; lungs are clear to auscultation bilaterally  CARDIOVASCULAR: Regular rate and rhythm, normal S1 and S2, no murmur/rub/gallop; No lower extremity edema  ABDOMEN: Nontender to palpation, normoactive bowel sounds, no rebound/guarding  MUSCULOSKELETAL:  no clubbing or cyanosis of digits; no joint swelling or tenderness to palpation  PSYCH: A+O to person, place, and time; affect appropriate  SKIN: No rashes; no palpable lesions    LABS:                        12.0   6.72  )-----------( 201      ( 11 Apr 2025 05:53 )             35.7     04-11    139  |  103  |  22  ----------------------------<  91  3.8   |  25  |  1.14    Ca    9.0      11 Apr 2025 05:53  Phos  3.4     04-11  Mg     2.0     04-11    TPro  6.2  /  Alb  3.8  /  TBili  0.6  /  DBili  0.2  /  AST  33  /  ALT  65[H]  /  AlkPhos  85  04-11          Urinalysis Basic - ( 11 Apr 2025 05:53 )    Color: x / Appearance: x / SG: x / pH: x  Gluc: 91 mg/dL / Ketone: x  / Bili: x / Urobili: x   Blood: x / Protein: x / Nitrite: x   Leuk Esterase: x / RBC: x / WBC x   Sq Epi: x / Non Sq Epi: x / Bacteria: x          RADIOLOGY & ADDITIONAL TESTS:  Results Reviewed:   Imaging Personally Reviewed:  Electrocardiogram Personally Reviewed:    COORDINATION OF CARE:  Care Discussed with Consultants/Other Providers [Y/N]:  Prior or Outpatient Records Reviewed [Y/N]:

## 2025-04-11 NOTE — PROGRESS NOTE ADULT - NS ATTEST RISK PROBLEM GEN_ALL_CORE FT
preparation, patient care, and documentation for this visit, including the following: review of clinical lab tests; review of medical tests/procedures/services, obtaining and/or reviewing separately obtained history, counseling and educating the patient/family/caregiver, referring and communicating with other health care professionals (when not separately reported), documenting clinical information in the electronic health record, and care coordination (not separately reported).

## 2025-04-11 NOTE — PROGRESS NOTE ADULT - ASSESSMENT
70yoM w/ PMHx of NICM since 2011, HFrEF (25-30%) s/p MDT CRT-D with baseline CHB , VT/VF, afib s/p GIANFRANCO ligation/MAZE, MV/TV repair, PVC ablation (3/2024) was transferred from outside hospital to [facility] due to  VT/AICD shock.   While admitted to the outside hospital, he was started on a lidocaine gtt. On 3/21 when lidocaine weaned off patient had recurrence of VT requiring AICD shocks. Hence,  was transferred to [facility] for further management. Pt  was on Lidocaine gtt for control and is now on Quinidine and uptitrated Toprol for anti-arrythmia. Because of pt's refractory VT pt's listing status for a Heart Transplant was bumped from 6 to 2E temporarily. Pt will remain inpatient for listing purposes and managed closely by HF team and hence was transferred to [facility] on 3/27/25.

## 2025-04-11 NOTE — PROGRESS NOTE ADULT - PROBLEM SELECTOR PLAN 1
-s/p Lidocaine drip S/P one dose of Mexiletine (Mexiletine was d/dwayne for severe dizziness this admission)   -currently on Toprol 50mg daily and Quinidine twice a day   -EP saw patient for Vtach and CRT-D shock  -as reported, pt previously did not tolerate amio, mexilitine and sotalol in the past due to severe dizziness.   -cont with tele monitor, monitor electrolytes  -h/o ain mitral PVC (premature ventricular contractions) ablation 3/11/2024  -CRTD interrogated on 3/28  -2 self limited episodes of VT 4/5 in the morning that were asymptomatic -  continue to monitor on same regimen

## 2025-04-11 NOTE — PROGRESS NOTE ADULT - ATTENDING COMMENTS
69 y/o M with NICM, with CHB and VT/VFib and AFib, s/p GIANFRANCO ligation/MAZE and PVC ablation, previously intolerant to antiarrhythmics, who was undergoing transplant evaluation as an outpatient, who was admitted for ICD shocks. He was started on Lidocaine gtt and continued to have VT and ICD shocks. He was transferred to [facility] and transplant listing was upgraded to status 2E. He has since been stabilized on oral antiarrhythmics of Toprol XL and Quinidine. He was found to have Norovirus on 3/31/25, was made status 7 while symptomatic, now reactivated as status 2E.   Toprol, quinidine. Electrically quiet since 4/4.  Continue torsemide.  Hold hydral and Ald.

## 2025-04-11 NOTE — PROGRESS NOTE ADULT - PROBLEM SELECTOR PLAN 4
[Examination Of The Breasts] : a normal appearance [No Masses] : no breast masses were palpable [Labia Majora] : normal [Labia Minora] : normal [Normal] : normal [Uterine Adnexae] : normal TTE 3/20/25 : LVEF 30%,transvalvular gradients are elevated with SEVERE  prosthetic MS, no evidence of LV thrombus  s/p mitral and tricuspid ring repair  awaiting possible transplant

## 2025-04-11 NOTE — PROGRESS NOTE ADULT - PROBLEM SELECTOR PLAN 1
ACC/AHA stage D systolic heart failure.   ·  Plan: - Listed for heart transplant: deactivated 4/1 to status 7 due to norovirus infection but reactivated to status 2e after resolution of symptoms    - c/w Toprol XL 50mg daily.  - continue holding spironolactone 25mg QD for now, as he endorsed generalized weakness. Will try again in the future   - continue holding hydralazine 25mg TID i/s/o borderline soft BP  - c/w torsemide 10mg QD.    - strict I/Os and daily weights  - Keep K>4 and Mg>2  - PT as tolerated  - Psychiatry recs appreciated for anxiety.

## 2025-04-12 PROCEDURE — 99233 SBSQ HOSP IP/OBS HIGH 50: CPT

## 2025-04-12 RX ADMIN — METOPROLOL SUCCINATE 50 MILLIGRAM(S): 50 TABLET, EXTENDED RELEASE ORAL at 05:49

## 2025-04-12 RX ADMIN — Medication 1 PACKET(S): at 11:43

## 2025-04-12 RX ADMIN — Medication 100 MILLIGRAM(S): at 22:29

## 2025-04-12 RX ADMIN — Medication 10 MILLIGRAM(S): at 05:49

## 2025-04-12 RX ADMIN — Medication 324 MILLIGRAM(S): at 05:50

## 2025-04-12 RX ADMIN — Medication 324 MILLIGRAM(S): at 18:35

## 2025-04-12 RX ADMIN — TAMSULOSIN HYDROCHLORIDE 0.4 MILLIGRAM(S): 0.4 CAPSULE ORAL at 22:30

## 2025-04-12 RX ADMIN — Medication 1 APPLICATION(S): at 05:48

## 2025-04-12 RX ADMIN — ATORVASTATIN CALCIUM 10 MILLIGRAM(S): 80 TABLET, FILM COATED ORAL at 22:29

## 2025-04-12 NOTE — PROGRESS NOTE ADULT - SUBJECTIVE AND OBJECTIVE BOX
Contact Information:  Gilles Pillai II, MD, MPH  Internal Medicine    XENA DENSON, MRN-[id number]    Patient is a 70y old  Male who presents with a chief complaint of VT (11 Apr 2025 12:31)      OVERNIGHT EVENTS/INTERVAL/SUBJECTIVE:    CONSTITUTIONAL: No weakness. No fatigue. No fever.  HEAD: No head trauma.   EYES: No vision changes.  ENT: No hearing changes or tinnitus. No ear pain. No changes in smell. No nasal congestion or discharge. No sore throat. No voice hoarseness.   NECK: No neck pain or stiffness. No lumps.  RESPIRATORY: No cough. No SOB. No wheezing. No hemoptysis.   CARDIOVASCULAR: No chest pain. No palpitations.   GASTROINTESTINAL: No dysphagia. No ABD pain. No distension. No constipation. No diarrhea. No pain with defecation. No hematemesis. No hematochezia or melena.  BACK: No back pain.  GENITOURINARY: No dysuria. No frequency or urgency. No hesitancy. No incontinence. No urinary retention. No suprapubic pain. No hematuria.  EXTREMITY: No swelling.  MUSCULOSKELETAL: No joint pain or swelling. No fractures. No stiffness.    SKIN: No rashes. No itching. No skin, hair, or nail changes.  NEUROLOGICAL: No weakness or paralysis. No lightheadedness or dizziness. No HA. No numbness or tingling.   PSYCHIATRIC: No depression.       OBJECTIVE:  Vital Signs Last 24 Hrs  T(C): 36.3 (12 Apr 2025 12:00), Max: 36.4 (12 Apr 2025 04:26)  T(F): 97.3 (12 Apr 2025 12:00), Max: 97.5 (12 Apr 2025 04:26)  HR: 82 (12 Apr 2025 12:00) (80 - 82)  BP: 97/64 (12 Apr 2025 12:00) (97/64 - 105/72)  BP(mean): --  RR: 18 (12 Apr 2025 12:00) (18 - 18)  SpO2: 95% (12 Apr 2025 12:00) (95% - 95%)    Parameters below as of 12 Apr 2025 12:00  Patient On (Oxygen Delivery Method): room air      I&O's Summary    11 Apr 2025 07:01  -  12 Apr 2025 07:00  --------------------------------------------------------  IN: 240 mL / OUT: 0 mL / NET: 240 mL        MEDICATIONS  (STANDING):  atorvastatin 10 milliGRAM(s) Oral at bedtime  chlorhexidine 2% Cloths 1 Application(s) Topical <User Schedule>  chlorhexidine 4% Liquid 1 Application(s) Topical <User Schedule>  heparin   Injectable 5000 Unit(s) SubCutaneous every 8 hours  metoprolol succinate ER 50 milliGRAM(s) Oral daily  psyllium Powder 1 Packet(s) Oral daily  quiNIDine gluconate  milliGRAM(s) Oral every 12 hours  tamsulosin 0.4 milliGRAM(s) Oral at bedtime  torsemide 10 milliGRAM(s) Oral daily  traZODone 100 milliGRAM(s) Oral at bedtime    MEDICATIONS  (PRN):  acetaminophen     Tablet .. 650 milliGRAM(s) Oral every 6 hours PRN Mild Pain (1 - 3), Moderate Pain (4 - 6)  artificial  tears Solution 1 Drop(s) Both EYES every 4 hours PRN Dry Eyes  clonazePAM  Tablet 0.25 milliGRAM(s) Oral every 12 hours PRN anxiety  guaiFENesin Oral Liquid (Sugar-Free) 200 milliGRAM(s) Oral every 6 hours PRN Cough    Allergies    No Known Allergies    Intolerances        CONSTITUTIONAL: No acute distress. Awake and alert.  HEAD: No evidence of trauma. Structures WNL.  EYES: +PERRL. +EOMI. No scleral icterus. No conjunctival injection.  ENT: Moist oral mucosa. No erythema. No pharyngeal exudates.   NECK: Supple. Appropriate ROM. No stiffness. No masses or lymphadenopathy.  RESPIRATORY: CTAB. No wheezes, rales, or rhonchi. No accessory muscle use. No apparent respiratory distress.  CARDIOVASCULAR: +S1/S2. No audible S3/S4. Regular rate and rhythm. No murmurs, rubs, or gallops. 2+ radial pulses x b/l UE; 2+ DP pulses x b/l LE. No LE swelling or edema.  GASTROINTESTINAL: Soft, nontender, nondistended. +BS. No rebound or guarding.   BACK: No spinal or paraspinal tenderness. No CVA tenderness.  MUSCULOSKELETAL: Spontaneous movement in all extremities.  DERMATOLOGICAL: No abnormal rashes or lesions.  NEUROLOGICAL: CN 2-12 grossly intact. No focal deficits. Sensation intact x 4EXT.   PSYCHIATRIC: Appropriate affect. A&Ox3 (oriented to person, place, and time).                              12.0   6.72  )-----------( 201      ( 11 Apr 2025 05:53 )             35.7       04-11    139  |  103  |  22  ----------------------------<  91  3.8   |  25  |  1.14    Ca    9.0      11 Apr 2025 05:53  Phos  3.4     04-11  Mg     2.0     04-11    TPro  6.2  /  Alb  3.8  /  TBili  0.6  /  DBili  0.2  /  AST  33  /  ALT  65[H]  /  AlkPhos  85  04-11    CAPILLARY BLOOD GLUCOSE        LIVER FUNCTIONS - ( 11 Apr 2025 05:53 )  Alb: 3.8 g/dL / Pro: 6.2 g/dL / ALK PHOS: 85 U/L / ALT: 65 U/L / AST: 33 U/L / GGT: x               Urinalysis Basic - ( 11 Apr 2025 05:53 )    Color: x / Appearance: x / SG: x / pH: x  Gluc: 91 mg/dL / Ketone: x  / Bili: x / Urobili: x   Blood: x / Protein: x / Nitrite: x   Leuk Esterase: x / RBC: x / WBC x   Sq Epi: x / Non Sq Epi: x / Bacteria: x            RADIOLOGY AND ADDITIONAL TESTS:    CONSULTANT NOTES REVIEWED:    CARE DISCUSSED WITH THE FOLLOWING CONSULTANTS/PROVIDERS: Contact Information:  Gilles Pillai II, MD, MPH  Internal Medicine    XENA DENSON, MRN-[id number]    Patient is a 70y old  Male who presents with a chief complaint of VT (11 Apr 2025 12:31)      OVERNIGHT EVENTS/INTERVAL/SUBJECTIVE: Patient evaluated at bedside, states that his diarrhea has resolved; otherwise he feels well and has no other complaints.    CONSTITUTIONAL: No weakness. No fatigue. No fever.  HEAD: No head trauma.   EYES: No vision changes.  ENT: No hearing changes or tinnitus. No ear pain. No changes in smell. No nasal congestion or discharge. No sore throat. No voice hoarseness.   NECK: No neck pain or stiffness. No lumps.  RESPIRATORY: No cough. No SOB. No wheezing. No hemoptysis.   CARDIOVASCULAR: No chest pain. No palpitations.   GASTROINTESTINAL: +Diarrhea has resolved. No dysphagia. No ABD pain. No distension. No constipation. No pain with defecation. No hematemesis. No hematochezia or melena.  BACK: No back pain.  GENITOURINARY: No dysuria. No frequency or urgency. No hesitancy. No incontinence. No urinary retention. No suprapubic pain. No hematuria.  EXTREMITY: No swelling.  MUSCULOSKELETAL: No joint pain or swelling. No fractures. No stiffness.    SKIN: No rashes. No itching. No skin, hair, or nail changes.  NEUROLOGICAL: No weakness or paralysis. No lightheadedness or dizziness. No HA. No numbness or tingling.   PSYCHIATRIC: No depression.       OBJECTIVE:  Vital Signs Last 24 Hrs  T(C): 36.3 (12 Apr 2025 12:00), Max: 36.4 (12 Apr 2025 04:26)  T(F): 97.3 (12 Apr 2025 12:00), Max: 97.5 (12 Apr 2025 04:26)  HR: 82 (12 Apr 2025 12:00) (80 - 82)  BP: 97/64 (12 Apr 2025 12:00) (97/64 - 105/72)  BP(mean): --  RR: 18 (12 Apr 2025 12:00) (18 - 18)  SpO2: 95% (12 Apr 2025 12:00) (95% - 95%)    Parameters below as of 12 Apr 2025 12:00  Patient On (Oxygen Delivery Method): room air      I&O's Summary    11 Apr 2025 07:01  -  12 Apr 2025 07:00  --------------------------------------------------------  IN: 240 mL / OUT: 0 mL / NET: 240 mL        MEDICATIONS  (STANDING):  atorvastatin 10 milliGRAM(s) Oral at bedtime  chlorhexidine 2% Cloths 1 Application(s) Topical <User Schedule>  chlorhexidine 4% Liquid 1 Application(s) Topical <User Schedule>  heparin   Injectable 5000 Unit(s) SubCutaneous every 8 hours  metoprolol succinate ER 50 milliGRAM(s) Oral daily  psyllium Powder 1 Packet(s) Oral daily  quiNIDine gluconate  milliGRAM(s) Oral every 12 hours  tamsulosin 0.4 milliGRAM(s) Oral at bedtime  torsemide 10 milliGRAM(s) Oral daily  traZODone 100 milliGRAM(s) Oral at bedtime    MEDICATIONS  (PRN):  acetaminophen     Tablet .. 650 milliGRAM(s) Oral every 6 hours PRN Mild Pain (1 - 3), Moderate Pain (4 - 6)  artificial  tears Solution 1 Drop(s) Both EYES every 4 hours PRN Dry Eyes  clonazePAM  Tablet 0.25 milliGRAM(s) Oral every 12 hours PRN anxiety  guaiFENesin Oral Liquid (Sugar-Free) 200 milliGRAM(s) Oral every 6 hours PRN Cough    Allergies    No Known Allergies    Intolerances        CONSTITUTIONAL: No acute distress. Awake and alert.  RESPIRATORY: CTAB. No wheezes, rales, or rhonchi. No accessory muscle use. No apparent respiratory distress.  CARDIOVASCULAR: +S1/S2. No audible S3/S4. Regular rate and rhythm. No murmurs, rubs, or gallops. No LE swelling or edema.  GASTROINTESTINAL: Soft, nontender, nondistended. +BS. No rebound or guarding.   MUSCULOSKELETAL: Spontaneous movement in all extremities.  NEUROLOGICAL: CN 2-12 grossly intact. No focal deficits. Sensation intact x 4EXT.   PSYCHIATRIC: Appropriate affect. A&Ox3 (oriented to person, place, and time).                              12.0   6.72  )-----------( 201      ( 11 Apr 2025 05:53 )             35.7       04-11    139  |  103  |  22  ----------------------------<  91  3.8   |  25  |  1.14    Ca    9.0      11 Apr 2025 05:53  Phos  3.4     04-11  Mg     2.0     04-11    TPro  6.2  /  Alb  3.8  /  TBili  0.6  /  DBili  0.2  /  AST  33  /  ALT  65[H]  /  AlkPhos  85  04-11    CAPILLARY BLOOD GLUCOSE        LIVER FUNCTIONS - ( 11 Apr 2025 05:53 )  Alb: 3.8 g/dL / Pro: 6.2 g/dL / ALK PHOS: 85 U/L / ALT: 65 U/L / AST: 33 U/L / GGT: x               Urinalysis Basic - ( 11 Apr 2025 05:53 )    Color: x / Appearance: x / SG: x / pH: x  Gluc: 91 mg/dL / Ketone: x  / Bili: x / Urobili: x   Blood: x / Protein: x / Nitrite: x   Leuk Esterase: x / RBC: x / WBC x   Sq Epi: x / Non Sq Epi: x / Bacteria: x            RADIOLOGY AND ADDITIONAL TESTS:    CONSULTANT NOTES REVIEWED:    CARE DISCUSSED WITH THE FOLLOWING CONSULTANTS/PROVIDERS:

## 2025-04-12 NOTE — PROGRESS NOTE ADULT - PROBLEM SELECTOR PLAN 3
TTE 3/20/25 : LVEF 30%,transvalvular gradients are elevated with severe prosthetic Mitral Stenosis, no evidence of LV thrombus  s/p recent admit 3/19/2025 w/ [facility] found to have elevated filling pressures treated with IV diuretics  GDMT: Toprol, Torsemide; off Spironolactone d/t prior dizziness  afterload reduction on hold w/ borderline BPs  holding home Farxiga while inpatient  HF team is following and status upgraded for transplant  -restarted torsemide 10mg QD per heart failure team - TTE 3/20/25 : LVEF 30%,transvalvular gradients are elevated with severe prosthetic Mitral Stenosis, no evidence of LV thrombus  - s/p recent admit 3/19/2025 w/ [facility] found to have elevated filling pressures treated with IV diuretics  - GDMT: C/w Toprol, Torsemide; off Spironolactone due to prior dizziness; afterload reduction (hydralazine) on hold w/ borderline BPs  - Holding home Farxiga while inpatient  -  Appreciate HF recs - status upgraded for transplant

## 2025-04-12 NOTE — PROGRESS NOTE ADULT - ASSESSMENT
70M PMHx NICM since 2011, HFrEF (25-30%) s/p MDT CRT-D with baseline CHB , VT/VF, afib s/p GIANFRANCO ligation/MAZE, MV/TV repair, PVC ablation (3/2024) was transferred from outside hospital to [facility] due to  VT/AICD shock.   While admitted to the outside hospital, he was started on a lidocaine gtt. On 3/21 when lidocaine weaned off patient had recurrence of VT requiring AICD shocks. Hence,  was transferred to [facility] for further management. Pt  was on Lidocaine gtt for control and is now on Quinidine and uptitrated Toprol for anti-arrythmia. Because of pt's refractory VT pt's listing status for a Heart Transplant was bumped from 6 to 2E temporarily. Pt will remain inpatient for listing purposes and managed closely by HF team and hence was transferred to [facility] on 3/27/25.

## 2025-04-12 NOTE — PROGRESS NOTE ADULT - PROBLEM SELECTOR PLAN 5
Pt oriented to self VSS  Pt remained afebile throughout this shift  All meds administered per order.  Pt remianed free of falls  Plan of care reviewed. pt verbalizes understanding.  Patient moving/ turning independently.  Patient afib on monitor  Bed low, side rails up x2, wheels locked, call light in reach.  Pt instructed to call for assistance  Hourly rounding completed.    BP is borderline (EF is also low)   cont with current medications with close monitoring - BP is borderline (90-100s)  - C/w Toprol, Torsemide  - Monitor blood pressures

## 2025-04-12 NOTE — PROGRESS NOTE ADULT - PROBLEM SELECTOR PLAN 1
-s/p Lidocaine drip S/P one dose of Mexiletine (Mexiletine was d/dwayne for severe dizziness this admission)   -currently on Toprol 50mg daily and Quinidine twice a day   -EP saw patient for Vtach and CRT-D shock  -as reported, pt previously did not tolerate amio, mexilitine and sotalol in the past due to severe dizziness.   -cont with tele monitor, monitor electrolytes  -h/o ani mitral PVC (premature ventricular contractions) ablation 3/11/2024  -CRTD interrogated on 3/28  -2 self limited episodes of VT 4/5 in the morning that were asymptomatic -  continue to monitor on same regimen - H/o ani mitral PVC (premature ventricular contractions) ablation 3/11/2024  - S/p Lidocaine drip S/P one dose of Mexiletine (Mexiletine was d/dwayne for severe dizziness this admission)   -Appreciate EP evaluation for Vtach and CRT-D shock - CRTD interrogated on 3/28 - 2 self limited episodes of VT 4/5 in the morning that were asymptomatic  - C/w Toprol 50mg daily and Quinidine BID (pt previously did not tolerate amiodarone, mexiletine and sotalol in the past due to severe dizziness)  - C/w tele monitor, monitor electrolytes

## 2025-04-12 NOTE — PROGRESS NOTE ADULT - PROBLEM SELECTOR PLAN 6
s/p GIANFRANCO ligation/MAZE  rate control as above  not currently on full AC - s/p GIANFRANCO ligation/MAZE  - C/w Toprol

## 2025-04-12 NOTE — PROGRESS NOTE ADULT - PROBLEM SELECTOR PLAN 4
TTE 3/20/25 : LVEF 30%,transvalvular gradients are elevated with SEVERE  prosthetic MS, no evidence of LV thrombus  s/p mitral and tricuspid ring repair  awaiting possible transplant - TTE 3/20/25 : LVEF 30%,transvalvular gradients are elevated with SEVERE  prosthetic MS, no evidence of LV thrombus  - s/p mitral and tricuspid ring repair  - Awaiting possible transplant

## 2025-04-12 NOTE — PROGRESS NOTE ADULT - PROBLEM SELECTOR PLAN 2
- self-limiting, resolved, now off contact isolation - Self-limiting, resolved, now off contact isolation

## 2025-04-13 DIAGNOSIS — Z29.9 ENCOUNTER FOR PROPHYLACTIC MEASURES, UNSPECIFIED: ICD-10-CM

## 2025-04-13 LAB
ALBUMIN SERPL ELPH-MCNC: 3.8 G/DL — SIGNIFICANT CHANGE UP (ref 3.3–5)
ALP SERPL-CCNC: 82 U/L — SIGNIFICANT CHANGE UP (ref 40–120)
ALT FLD-CCNC: 76 U/L — HIGH (ref 10–45)
ANION GAP SERPL CALC-SCNC: 10 MMOL/L — SIGNIFICANT CHANGE UP (ref 5–17)
AST SERPL-CCNC: 39 U/L — SIGNIFICANT CHANGE UP (ref 10–40)
BILIRUB SERPL-MCNC: 0.6 MG/DL — SIGNIFICANT CHANGE UP (ref 0.2–1.2)
BUN SERPL-MCNC: 22 MG/DL — SIGNIFICANT CHANGE UP (ref 7–23)
CALCIUM SERPL-MCNC: 9.2 MG/DL — SIGNIFICANT CHANGE UP (ref 8.4–10.5)
CHLORIDE SERPL-SCNC: 104 MMOL/L — SIGNIFICANT CHANGE UP (ref 96–108)
CO2 SERPL-SCNC: 25 MMOL/L — SIGNIFICANT CHANGE UP (ref 22–31)
CREAT SERPL-MCNC: 1.18 MG/DL — SIGNIFICANT CHANGE UP (ref 0.5–1.3)
EGFR: 66 ML/MIN/1.73M2 — SIGNIFICANT CHANGE UP
EGFR: 66 ML/MIN/1.73M2 — SIGNIFICANT CHANGE UP
GLUCOSE SERPL-MCNC: 90 MG/DL — SIGNIFICANT CHANGE UP (ref 70–99)
HCT VFR BLD CALC: 35.2 % — LOW (ref 39–50)
HGB BLD-MCNC: 12.1 G/DL — LOW (ref 13–17)
MAGNESIUM SERPL-MCNC: 2.2 MG/DL — SIGNIFICANT CHANGE UP (ref 1.6–2.6)
MCHC RBC-ENTMCNC: 30 PG — SIGNIFICANT CHANGE UP (ref 27–34)
MCHC RBC-ENTMCNC: 34.4 G/DL — SIGNIFICANT CHANGE UP (ref 32–36)
MCV RBC AUTO: 87.3 FL — SIGNIFICANT CHANGE UP (ref 80–100)
NRBC BLD AUTO-RTO: 0 /100 WBCS — SIGNIFICANT CHANGE UP (ref 0–0)
PHOSPHATE SERPL-MCNC: 3.5 MG/DL — SIGNIFICANT CHANGE UP (ref 2.5–4.5)
PLATELET # BLD AUTO: 180 K/UL — SIGNIFICANT CHANGE UP (ref 150–400)
POTASSIUM SERPL-MCNC: 3.8 MMOL/L — SIGNIFICANT CHANGE UP (ref 3.5–5.3)
POTASSIUM SERPL-SCNC: 3.8 MMOL/L — SIGNIFICANT CHANGE UP (ref 3.5–5.3)
PROT SERPL-MCNC: 6.6 G/DL — SIGNIFICANT CHANGE UP (ref 6–8.3)
RBC # BLD: 4.03 M/UL — LOW (ref 4.2–5.8)
RBC # FLD: 13.8 % — SIGNIFICANT CHANGE UP (ref 10.3–14.5)
SODIUM SERPL-SCNC: 139 MMOL/L — SIGNIFICANT CHANGE UP (ref 135–145)
WBC # BLD: 5.49 K/UL — SIGNIFICANT CHANGE UP (ref 3.8–10.5)
WBC # FLD AUTO: 5.49 K/UL — SIGNIFICANT CHANGE UP (ref 3.8–10.5)

## 2025-04-13 PROCEDURE — 99233 SBSQ HOSP IP/OBS HIGH 50: CPT

## 2025-04-13 RX ADMIN — Medication 324 MILLIGRAM(S): at 06:34

## 2025-04-13 RX ADMIN — Medication 324 MILLIGRAM(S): at 17:19

## 2025-04-13 RX ADMIN — Medication 1 APPLICATION(S): at 06:51

## 2025-04-13 RX ADMIN — Medication 1 APPLICATION(S): at 06:50

## 2025-04-13 RX ADMIN — METOPROLOL SUCCINATE 50 MILLIGRAM(S): 50 TABLET, EXTENDED RELEASE ORAL at 06:34

## 2025-04-13 RX ADMIN — TAMSULOSIN HYDROCHLORIDE 0.4 MILLIGRAM(S): 0.4 CAPSULE ORAL at 22:11

## 2025-04-13 RX ADMIN — Medication 10 MILLIGRAM(S): at 06:34

## 2025-04-13 RX ADMIN — Medication 100 MILLIGRAM(S): at 22:11

## 2025-04-13 RX ADMIN — Medication 1 PACKET(S): at 13:51

## 2025-04-13 RX ADMIN — ATORVASTATIN CALCIUM 10 MILLIGRAM(S): 80 TABLET, FILM COATED ORAL at 22:11

## 2025-04-13 NOTE — PROGRESS NOTE ADULT - PROBLEM SELECTOR PLAN 1
- H/o ani mitral PVC (premature ventricular contractions) ablation 3/11/2024  - S/p Lidocaine drip S/P one dose of Mexiletine (Mexiletine was d/dwayne for severe dizziness this admission)   -Appreciate EP evaluation for Vtach and CRT-D shock - CRTD interrogated on 3/28 - 2 self limited episodes of VT 4/5 in the morning that were asymptomatic  - C/w Toprol 50mg daily and Quinidine BID (pt previously did not tolerate amiodarone, mexiletine and sotalol in the past due to severe dizziness)  - C/w tele monitor, monitor electrolytes

## 2025-04-13 NOTE — PROGRESS NOTE ADULT - PROBLEM SELECTOR PLAN 3
- TTE 3/20/25 : LVEF 30%,transvalvular gradients are elevated with severe prosthetic Mitral Stenosis, no evidence of LV thrombus  - s/p recent admit 3/19/2025 w/ [facility] found to have elevated filling pressures treated with IV diuretics  - GDMT: C/w Toprol, Torsemide; off Spironolactone due to prior dizziness; afterload reduction (hydralazine) on hold w/ borderline BPs  - Holding home Farxiga while inpatient  -  Appreciate HF recs - status upgraded for transplant

## 2025-04-13 NOTE — PROGRESS NOTE ADULT - PROBLEM SELECTOR PLAN 4
- TTE 3/20/25 : LVEF 30%,transvalvular gradients are elevated with SEVERE  prosthetic MS, no evidence of LV thrombus  - s/p mitral and tricuspid ring repair  - Awaiting possible transplant

## 2025-04-13 NOTE — PROGRESS NOTE ADULT - SUBJECTIVE AND OBJECTIVE BOX
Contact Information:  Gilles Pillai II, MD, MPH  Internal Medicine    XENA DENSON, MRN-[id number]    Patient is a 70y old  Male who presents with a chief complaint of VT (12 Apr 2025 19:29)      OVERNIGHT EVENTS/INTERVAL/SUBJECTIVE:    CONSTITUTIONAL: No weakness. No fatigue. No fever.  HEAD: No head trauma.   EYES: No vision changes.  ENT: No hearing changes or tinnitus. No ear pain. No changes in smell. No nasal congestion or discharge. No sore throat. No voice hoarseness.   NECK: No neck pain or stiffness. No lumps.  RESPIRATORY: No cough. No SOB. No wheezing. No hemoptysis.   CARDIOVASCULAR: No chest pain. No palpitations.   GASTROINTESTINAL: No dysphagia. No ABD pain. No distension. No constipation. No diarrhea. No pain with defecation. No hematemesis. No hematochezia or melena.  BACK: No back pain.  GENITOURINARY: No dysuria. No frequency or urgency. No hesitancy. No incontinence. No urinary retention. No suprapubic pain. No hematuria.  EXTREMITY: No swelling.  MUSCULOSKELETAL: No joint pain or swelling. No fractures. No stiffness.    SKIN: No rashes. No itching. No skin, hair, or nail changes.  NEUROLOGICAL: No weakness or paralysis. No lightheadedness or dizziness. No HA. No numbness or tingling.   PSYCHIATRIC: No depression.       OBJECTIVE:  Vital Signs Last 24 Hrs  T(C): 36.7 (13 Apr 2025 12:47), Max: 36.7 (13 Apr 2025 12:47)  T(F): 98.1 (13 Apr 2025 12:47), Max: 98.1 (13 Apr 2025 12:47)  HR: 77 (13 Apr 2025 12:47) (77 - 81)  BP: 96/63 (13 Apr 2025 12:47) (96/63 - 108/61)  BP(mean): --  RR: 18 (13 Apr 2025 12:47) (18 - 19)  SpO2: 95% (13 Apr 2025 12:47) (93% - 97%)    Parameters below as of 13 Apr 2025 12:47  Patient On (Oxygen Delivery Method): room air      I&O's Summary      MEDICATIONS  (STANDING):  atorvastatin 10 milliGRAM(s) Oral at bedtime  chlorhexidine 2% Cloths 1 Application(s) Topical <User Schedule>  chlorhexidine 4% Liquid 1 Application(s) Topical <User Schedule>  heparin   Injectable 5000 Unit(s) SubCutaneous every 8 hours  metoprolol succinate ER 50 milliGRAM(s) Oral daily  psyllium Powder 1 Packet(s) Oral daily  quiNIDine gluconate  milliGRAM(s) Oral every 12 hours  tamsulosin 0.4 milliGRAM(s) Oral at bedtime  torsemide 10 milliGRAM(s) Oral daily  traZODone 100 milliGRAM(s) Oral at bedtime    MEDICATIONS  (PRN):  acetaminophen     Tablet .. 650 milliGRAM(s) Oral every 6 hours PRN Mild Pain (1 - 3), Moderate Pain (4 - 6)  artificial  tears Solution 1 Drop(s) Both EYES every 4 hours PRN Dry Eyes  clonazePAM  Tablet 0.25 milliGRAM(s) Oral every 12 hours PRN anxiety  guaiFENesin Oral Liquid (Sugar-Free) 200 milliGRAM(s) Oral every 6 hours PRN Cough    Allergies    No Known Allergies    Intolerances        CONSTITUTIONAL: No acute distress. Awake and alert.  HEAD: No evidence of trauma. Structures WNL.  EYES: +PERRL. +EOMI. No scleral icterus. No conjunctival injection.  ENT: Moist oral mucosa. No erythema. No pharyngeal exudates.   NECK: Supple. Appropriate ROM. No stiffness. No masses or lymphadenopathy.  RESPIRATORY: CTAB. No wheezes, rales, or rhonchi. No accessory muscle use. No apparent respiratory distress.  CARDIOVASCULAR: +S1/S2. No audible S3/S4. Regular rate and rhythm. No murmurs, rubs, or gallops. 2+ radial pulses x b/l UE; 2+ DP pulses x b/l LE. No LE swelling or edema.  GASTROINTESTINAL: Soft, nontender, nondistended. +BS. No rebound or guarding.   BACK: No spinal or paraspinal tenderness. No CVA tenderness.  MUSCULOSKELETAL: Spontaneous movement in all extremities.  DERMATOLOGICAL: No abnormal rashes or lesions.  NEUROLOGICAL: CN 2-12 grossly intact. No focal deficits. Sensation intact x 4EXT.   PSYCHIATRIC: Appropriate affect. A&Ox3 (oriented to person, place, and time).                              12.1   5.49  )-----------( 180      ( 13 Apr 2025 06:44 )             35.2       04-13    139  |  104  |  22  ----------------------------<  90  3.8   |  25  |  1.18    Ca    9.2      13 Apr 2025 06:45  Phos  3.5     04-13  Mg     2.2     04-13    TPro  6.6  /  Alb  3.8  /  TBili  0.6  /  DBili  x   /  AST  39  /  ALT  76[H]  /  AlkPhos  82  04-13    CAPILLARY BLOOD GLUCOSE        LIVER FUNCTIONS - ( 13 Apr 2025 06:45 )  Alb: 3.8 g/dL / Pro: 6.6 g/dL / ALK PHOS: 82 U/L / ALT: 76 U/L / AST: 39 U/L / GGT: x               Urinalysis Basic - ( 13 Apr 2025 06:45 )    Color: x / Appearance: x / SG: x / pH: x  Gluc: 90 mg/dL / Ketone: x  / Bili: x / Urobili: x   Blood: x / Protein: x / Nitrite: x   Leuk Esterase: x / RBC: x / WBC x   Sq Epi: x / Non Sq Epi: x / Bacteria: x            RADIOLOGY AND ADDITIONAL TESTS:    CONSULTANT NOTES REVIEWED:    CARE DISCUSSED WITH THE FOLLOWING CONSULTANTS/PROVIDERS: Contact Information:  Gilles Pillai II, MD, MPH  Internal Medicine    XENA DENSON, MRN-[id number]    Patient is a 70y old  Male who presents with a chief complaint of VT (12 Apr 2025 19:29)      OVERNIGHT EVENTS/INTERVAL/SUBJECTIVE: Patient evaluated at bedside with family present - no acute complaints, feels well.    CONSTITUTIONAL: No weakness. No fatigue. No fever.  HEAD: No head trauma.   EYES: No vision changes.  ENT: No hearing changes or tinnitus. No ear pain. No changes in smell. No nasal congestion or discharge. No sore throat. No voice hoarseness.   NECK: No neck pain or stiffness. No lumps.  RESPIRATORY: No cough. No SOB. No wheezing. No hemoptysis.   CARDIOVASCULAR: No chest pain. No palpitations.   GASTROINTESTINAL: No dysphagia. No ABD pain. No distension. No constipation. No diarrhea. No pain with defecation. No hematemesis. No hematochezia or melena.  BACK: No back pain.  GENITOURINARY: No dysuria. No frequency or urgency. No hesitancy. No incontinence. No urinary retention. No suprapubic pain. No hematuria.  EXTREMITY: No swelling.  MUSCULOSKELETAL: No joint pain or swelling. No fractures. No stiffness.    SKIN: No rashes. No itching. No skin, hair, or nail changes.  NEUROLOGICAL: No weakness or paralysis. No lightheadedness or dizziness. No HA. No numbness or tingling.   PSYCHIATRIC: No depression.       OBJECTIVE:  Vital Signs Last 24 Hrs  T(C): 36.7 (13 Apr 2025 12:47), Max: 36.7 (13 Apr 2025 12:47)  T(F): 98.1 (13 Apr 2025 12:47), Max: 98.1 (13 Apr 2025 12:47)  HR: 77 (13 Apr 2025 12:47) (77 - 81)  BP: 96/63 (13 Apr 2025 12:47) (96/63 - 108/61)  BP(mean): --  RR: 18 (13 Apr 2025 12:47) (18 - 19)  SpO2: 95% (13 Apr 2025 12:47) (93% - 97%)    Parameters below as of 13 Apr 2025 12:47  Patient On (Oxygen Delivery Method): room air      I&O's Summary      MEDICATIONS  (STANDING):  atorvastatin 10 milliGRAM(s) Oral at bedtime  chlorhexidine 2% Cloths 1 Application(s) Topical <User Schedule>  chlorhexidine 4% Liquid 1 Application(s) Topical <User Schedule>  heparin   Injectable 5000 Unit(s) SubCutaneous every 8 hours  metoprolol succinate ER 50 milliGRAM(s) Oral daily  psyllium Powder 1 Packet(s) Oral daily  quiNIDine gluconate  milliGRAM(s) Oral every 12 hours  tamsulosin 0.4 milliGRAM(s) Oral at bedtime  torsemide 10 milliGRAM(s) Oral daily  traZODone 100 milliGRAM(s) Oral at bedtime    MEDICATIONS  (PRN):  acetaminophen     Tablet .. 650 milliGRAM(s) Oral every 6 hours PRN Mild Pain (1 - 3), Moderate Pain (4 - 6)  artificial  tears Solution 1 Drop(s) Both EYES every 4 hours PRN Dry Eyes  clonazePAM  Tablet 0.25 milliGRAM(s) Oral every 12 hours PRN anxiety  guaiFENesin Oral Liquid (Sugar-Free) 200 milliGRAM(s) Oral every 6 hours PRN Cough    Allergies    No Known Allergies    Intolerances        CONSTITUTIONAL: No acute distress. Awake and alert.  RESPIRATORY: CTAB. No wheezes, rales, or rhonchi. No accessory muscle use. No apparent respiratory distress.  CARDIOVASCULAR: +S1/S2. No audible S3/S4. Regular rate and rhythm. No murmurs, rubs, or gallops. No LE swelling or edema.  GASTROINTESTINAL: Soft, nontender, nondistended. +BS. No rebound or guarding.   MUSCULOSKELETAL: Spontaneous movement in all extremities.  NEUROLOGICAL: CN 2-12 grossly intact. No focal deficits. Sensation intact x 4EXT.   PSYCHIATRIC: Appropriate affect. A&Ox3 (oriented to person, place, and time).                              12.1   5.49  )-----------( 180      ( 13 Apr 2025 06:44 )             35.2       04-13    139  |  104  |  22  ----------------------------<  90  3.8   |  25  |  1.18    Ca    9.2      13 Apr 2025 06:45  Phos  3.5     04-13  Mg     2.2     04-13    TPro  6.6  /  Alb  3.8  /  TBili  0.6  /  DBili  x   /  AST  39  /  ALT  76[H]  /  AlkPhos  82  04-13    CAPILLARY BLOOD GLUCOSE        LIVER FUNCTIONS - ( 13 Apr 2025 06:45 )  Alb: 3.8 g/dL / Pro: 6.6 g/dL / ALK PHOS: 82 U/L / ALT: 76 U/L / AST: 39 U/L / GGT: x               Urinalysis Basic - ( 13 Apr 2025 06:45 )    Color: x / Appearance: x / SG: x / pH: x  Gluc: 90 mg/dL / Ketone: x  / Bili: x / Urobili: x   Blood: x / Protein: x / Nitrite: x   Leuk Esterase: x / RBC: x / WBC x   Sq Epi: x / Non Sq Epi: x / Bacteria: x            RADIOLOGY AND ADDITIONAL TESTS:    CONSULTANT NOTES REVIEWED:    CARE DISCUSSED WITH THE FOLLOWING CONSULTANTS/PROVIDERS:

## 2025-04-13 NOTE — PROGRESS NOTE ADULT - ASSESSMENT
70M PMHx NICM since 2011, HFrEF (25-30%) s/p MDT CRT-D with baseline CHB , VT/VF, afib s/p GIANFRANCO ligation/MAZE, MV/TV repair, PVC ablation (3/2024) was transferred from outside hospital to [facility] due to  VT/AICD shock.   While admitted to the outside hospital, he was started on a lidocaine gtt. On 3/21 when lidocaine weaned off patient had recurrence of VT requiring AICD shocks. Hence,  was transferred to [facility] for further management. Pt  was on Lidocaine gtt for control and is now on Quinidine and uptitrated Toprol for anti-arrythmia. Because of pt's refractory VT pt's listing status for a Heart Transplant was bumped from 6 to 2E temporarily. Pt will remain inpatient for listing purposes and managed closely by HF team and hence was transferred to [facility] on 3/27/25.

## 2025-04-14 PROCEDURE — 99232 SBSQ HOSP IP/OBS MODERATE 35: CPT

## 2025-04-14 RX ADMIN — Medication 324 MILLIGRAM(S): at 06:07

## 2025-04-14 RX ADMIN — Medication 1 APPLICATION(S): at 06:09

## 2025-04-14 RX ADMIN — ATORVASTATIN CALCIUM 10 MILLIGRAM(S): 80 TABLET, FILM COATED ORAL at 22:16

## 2025-04-14 RX ADMIN — Medication 324 MILLIGRAM(S): at 17:38

## 2025-04-14 RX ADMIN — Medication 1 PACKET(S): at 11:50

## 2025-04-14 RX ADMIN — METOPROLOL SUCCINATE 50 MILLIGRAM(S): 50 TABLET, EXTENDED RELEASE ORAL at 06:07

## 2025-04-14 RX ADMIN — Medication 100 MILLIGRAM(S): at 22:17

## 2025-04-14 RX ADMIN — Medication 10 MILLIGRAM(S): at 06:07

## 2025-04-14 RX ADMIN — TAMSULOSIN HYDROCHLORIDE 0.4 MILLIGRAM(S): 0.4 CAPSULE ORAL at 22:16

## 2025-04-14 NOTE — PROGRESS NOTE ADULT - PROBLEM SELECTOR PLAN 1
ACC/AHA stage D systolic heart failure.   ·  Plan: - Listed for heart transplant: deactivated 4/1 to status 7 due to norovirus infection but reactivated to status 2e after resolution of symptoms    - c/w Toprol XL 50mg daily.  - MRA held d/t reported weakness while taking; Will consider resumption at some point  - May consider resumption of afterload reducing agents once BP permits.   - c/w torsemide 10mg QD.    - strict I/Os and daily weights  - Keep K>4 and Mg>2  - PT as tolerated  - Psychiatry recs appreciated for anxiety.

## 2025-04-14 NOTE — PROGRESS NOTE ADULT - SUBJECTIVE AND OBJECTIVE BOX
Ariane Alejo MD  Division of Hospital Medicine  [facility]   Available on [employer] Teams (Mon-Fri 8am-5pm)    * messages preferred prior to calls  Other Times:  [phone]      Patient is a 70y old  Male who presents with a chief complaint of VT (13 Apr 2025 16:17)      SUBJECTIVE / OVERNIGHT EVENTS: no acute events overnight. no fever, chills, chest pain, sob, n/v, nor abd pain. diarrhea has long been resolved. does endorse concern over lower BP and feeling weaker than baseline, however.  ADDITIONAL REVIEW OF SYSTEMS:    MEDICATIONS  (STANDING):  atorvastatin 10 milliGRAM(s) Oral at bedtime  chlorhexidine 2% Cloths 1 Application(s) Topical <User Schedule>  chlorhexidine 4% Liquid 1 Application(s) Topical <User Schedule>  heparin   Injectable 5000 Unit(s) SubCutaneous every 8 hours  metoprolol succinate ER 50 milliGRAM(s) Oral daily  psyllium Powder 1 Packet(s) Oral daily  quiNIDine gluconate  milliGRAM(s) Oral every 12 hours  tamsulosin 0.4 milliGRAM(s) Oral at bedtime  torsemide 10 milliGRAM(s) Oral daily  traZODone 100 milliGRAM(s) Oral at bedtime    MEDICATIONS  (PRN):  acetaminophen     Tablet .. 650 milliGRAM(s) Oral every 6 hours PRN Mild Pain (1 - 3), Moderate Pain (4 - 6)  artificial  tears Solution 1 Drop(s) Both EYES every 4 hours PRN Dry Eyes  guaiFENesin Oral Liquid (Sugar-Free) 200 milliGRAM(s) Oral every 6 hours PRN Cough      CAPILLARY BLOOD GLUCOSE        I&O's Summary      PHYSICAL EXAM:  Vital Signs Last 24 Hrs  T(C): 36.8 (14 Apr 2025 11:47), Max: 36.8 (14 Apr 2025 11:47)  T(F): 98.3 (14 Apr 2025 11:47), Max: 98.3 (14 Apr 2025 11:47)  HR: 78 (14 Apr 2025 11:47) (75 - 81)  BP: 91/65 (14 Apr 2025 11:47) (91/65 - 101/70)  BP(mean): --  RR: 18 (14 Apr 2025 11:47) (18 - 18)  SpO2: 95% (14 Apr 2025 11:47) (95% - 95%)    Parameters below as of 14 Apr 2025 11:47  Patient On (Oxygen Delivery Method): room air    CONSTITUTIONAL: NAD, well-developed, well-groomed  EYES: PERRLA; conjunctiva and sclera clear  ENMT: Moist oral mucosa, no pharyngeal injection or exudates; normal dentition  NECK: Supple, no palpable masses; no thyromegaly  RESPIRATORY: Normal respiratory effort; lungs are clear to auscultation bilaterally, no wheeze nor crackles  CARDIOVASCULAR: Regular rate and rhythm, normal S1 and S2, no murmur/rub/gallop; No lower extremity edema  ABDOMEN: Soft, Nondistended, Nontender to palpation, normoactive bowel sounds  MUSCULOSKELETAL: No clubbing or cyanosis of digits; no joint swelling or tenderness to palpation  PSYCH: A+O to person, place, and time; affect appropriate  NEUROLOGY: CN 2-12 are intact and symmetric; no gross sensory deficits   SKIN: No rashes; no palpable lesions    LABS:                        12.1   5.49  )-----------( 180      ( 13 Apr 2025 06:44 )             35.2     04-13    139  |  104  |  22  ----------------------------<  90  3.8   |  25  |  1.18    Ca    9.2      13 Apr 2025 06:45  Phos  3.5     04-13  Mg     2.2     04-13    TPro  6.6  /  Alb  3.8  /  TBili  0.6  /  DBili  x   /  AST  39  /  ALT  76[H]  /  AlkPhos  82  04-13        Urinalysis Basic - ( 13 Apr 2025 06:45 )    Color: x / Appearance: x / SG: x / pH: x  Gluc: 90 mg/dL / Ketone: x  / Bili: x / Urobili: x   Blood: x / Protein: x / Nitrite: x   Leuk Esterase: x / RBC: x / WBC x   Sq Epi: x / Non Sq Epi: x / Bacteria: x          RADIOLOGY & ADDITIONAL TESTS:  Results Reviewed:   Imaging Personally Reviewed:  Electrocardiogram Personally Reviewed:    COORDINATION OF CARE:  Care Discussed with Consultants/Other Providers [Y]: medicine YANETH Allen  Prior or Outpatient Records Reviewed [Y/N]:

## 2025-04-14 NOTE — PROGRESS NOTE ADULT - ASSESSMENT
70-year-old male ABO O past medical history of NICM since 2011 HFrEF (LVEF 25-30%) s/p MDT CRT-D with baseline CHB, VT/VF, AFs/p GIANFRANCO ligation/MAZE, MV/TV repair, PVC ablation 3/2024 intolerant to AADs in the past, recent admission 3/19/2025-3/20/2025 for AICD shocks initially presented to the [facility] ED on 3/21/2025, sent by HF specialist for ACID shock. Device reported successful ATP for VT 3/17/2025 at 6:52pm followed by one ATP & 40J shock. He reported feeling dizzy & SOB during the events. He follows with Dr. Luca Tamayo for HF, currently undergoing transplant workup, Dr John for CRTD and VT/VF and Dr. Chu for genetic counseling. While admitted to [facility], he was started on a lidocaine gtt. On 3/21 when lidocaine weaned off patient had another two episodes of VT requiring AICD shocks. He was transferred to [facility] for further management. He was initially maintained on lidocaine gtt from 3/21/2025-3/25/2025 & his listing status was upgraded to UNOS status 2e for heart transplant (ABO O) for the refractory VT. He was transitioned to oral antiarrhythmics (Toprol XL & quinidine) & ultimately transferred from CICU to tele floor on 3/27/2025. Hospital course was further c/b development of watery diarrhea & was found to have +norovirus on 3/31/2025 which prompted deactivation to status 7 listing for heart transplant. Status reinstated to 2E after diarrhea resolved.     He has been electrically quiescent since 4/4 on PO antiarrhythmics. Near euvolemic and on oral diuretics. Diarrhea resolved. He is currently reactivated 4/2 for UNOS status 2e.    Bedside hemodynamics:  3/22/25 BP 97/76/83, HR 80, CVP 6, PA 58/23/38, PCWP 20, SVR 1100, Gucci CO/CI 5.1/2.6, TD 4/2.08    Cardiac Studies:   TTE 3/20/25 : LVEF 30%, segmental, LVIDd 5.3, walls normal, elevated LV filling pressures, mod RVE with mildly reduced RV function, TAPSE 1.7, severely dilated RA, annuloplasty in MV position, transvalvular gradients are elevated with severe prosthetic MS (peak gradient 15.7, mean gradient 8), trace intravalvular MR, TV annuloplasty ring noted, mild TR,  est PASP 36, no evidence of LV thrombus  TTE 10/2024: LVEF 25-30%, LVEDD 5.9cm, moderately reduced RV function, annuloplasty ring of mitral and tricuspid position, PASP 47 mmHg  RHC 3/19/25- RA 11 PA 58/26 PCWP 32 CO/CI 5.43/2.77  [facility] 6/2023 Nonobstructive CAD

## 2025-04-14 NOTE — PROGRESS NOTE ADULT - SUBJECTIVE AND OBJECTIVE BOX
ADVANCED HEART FAILURE & TRANSPLANT  - PROGRESS NOTE  *To reach the NS1 Team from 8am to 5pm (MON-FRI), please call [phone],   _______________________________________________________________________________________________________     Subjective:  - NAEO    Medications:  acetaminophen     Tablet .. 650 milliGRAM(s) Oral every 6 hours PRN  artificial  tears Solution 1 Drop(s) Both EYES every 4 hours PRN  atorvastatin 10 milliGRAM(s) Oral at bedtime  chlorhexidine 2% Cloths 1 Application(s) Topical <User Schedule>  chlorhexidine 4% Liquid 1 Application(s) Topical <User Schedule>  guaiFENesin Oral Liquid (Sugar-Free) 200 milliGRAM(s) Oral every 6 hours PRN  heparin   Injectable 5000 Unit(s) SubCutaneous every 8 hours  metoprolol succinate ER 50 milliGRAM(s) Oral daily  psyllium Powder 1 Packet(s) Oral daily  quiNIDine gluconate  milliGRAM(s) Oral every 12 hours  tamsulosin 0.4 milliGRAM(s) Oral at bedtime  torsemide 10 milliGRAM(s) Oral daily  traZODone 100 milliGRAM(s) Oral at bedtime      Physical Exam:    Vitals:  Vital Signs Last 24 Hours  T(C): 36.8 (04-14-25 @ 11:47), Max: 36.8 (04-14-25 @ 11:47)  HR: 78 (04-14-25 @ 11:47) (75 - 81)  BP: 91/65 (04-14-25 @ 11:47) (91/65 - 101/70)  RR: 18 (04-14-25 @ 11:47) (18 - 18)  SpO2: 95% (04-14-25 @ 11:47) (95% - 95%)    I&O's Summary    Tele:  70's    General: No distress. Comfortable.  HEENT: EOM intact.  Neck: Neck supple. JVP not elevated. No masses  Chest: Clear to auscultation bilaterally  CV: Normal S1 and S2. No murmurs, rub, or gallops. Radial pulses normal, warm peripherally  Abdomen: Soft, non-distended, non-tender  Skin: No rashes or skin breakdown  Extremities: No LE edema  Neurology: Alert and oriented times three. Sensation intact  Psych: Affect normal    Labs:                        12.1   5.49  )-----------( 180      ( 13 Apr 2025 06:44 )             35.2     04-13    139  |  104  |  22  ----------------------------<  90  3.8   |  25  |  1.18    Ca    9.2      13 Apr 2025 06:45  Phos  3.5     04-13  Mg     2.2     04-13    TPro  6.6  /  Alb  3.8  /  TBili  0.6  /  DBili  x   /  AST  39  /  ALT  76[H]  /  AlkPhos  82  04-13

## 2025-04-14 NOTE — PROGRESS NOTE ADULT - NSPROGADDITIONALINFOA_GEN_ALL_CORE
.  Ariane Alejo MD  Division of Hospital Medicine  [facility]   Available on Microsoft Teams - messages preferred prior to calls.    Plan discussed with patient, wife + son-in-law + granddaughter bedside, and medicine NP Keron

## 2025-04-14 NOTE — PROGRESS NOTE ADULT - PROBLEM SELECTOR PLAN 1
- H/o ani mitral PVC (premature ventricular contractions) ablation 3/11/2024  - S/p Lidocaine gtt and one dose of Mexiletine (Mexiletine was d/dwayne for severe dizziness this admission)   - Appreciate EP evaluation for Vtach and CRT-D shock - CRTD interrogated on 3/28 - 2 self limited episodes of VT 4/5 in the morning that were asymptomatic  - C/w Toprol 50mg daily and Quinidine BID (pt previously did not tolerate amiodarone, mexiletine and sotalol in the past due to severe dizziness)  - C/w tele monitor, monitor electrolytes

## 2025-04-14 NOTE — PROGRESS NOTE ADULT - ASSESSMENT
70M PMHx NICM since 2011, HFrEF (25-30%) s/p MDT CRT-D with baseline CHB , VT/VF, afib s/p GIANFRANCO ligation/MAZE, MV/TV repair, PVC ablation (3/2024) was transferred from outside hospital to [facility] due to  VT/AICD shock.   While admitted to the outside hospital, he was started on a lidocaine gtt. On 3/21 when lidocaine weaned off patient had recurrence of VT requiring AICD shocks. Thus transferred to [facility] for further management. Pt  was on Lidocaine gtt for control and is now on Quinidine and uptitrated Toprol for anti-arrythmia. Because of pt's refractory VT pt's listing status for a Heart Transplant was bumped from 6 to 2E temporarily. Pt will remain inpatient for listing purposes and managed closely by HF team and hence was transferred to [facility] on 3/27/25.

## 2025-04-15 LAB
ALBUMIN SERPL ELPH-MCNC: 3.7 G/DL — SIGNIFICANT CHANGE UP (ref 3.3–5)
ALP SERPL-CCNC: 86 U/L — SIGNIFICANT CHANGE UP (ref 40–120)
ALT FLD-CCNC: 96 U/L — HIGH (ref 10–45)
ANION GAP SERPL CALC-SCNC: 14 MMOL/L — SIGNIFICANT CHANGE UP (ref 5–17)
AST SERPL-CCNC: 51 U/L — HIGH (ref 10–40)
BILIRUB SERPL-MCNC: 0.6 MG/DL — SIGNIFICANT CHANGE UP (ref 0.2–1.2)
BUN SERPL-MCNC: 20 MG/DL — SIGNIFICANT CHANGE UP (ref 7–23)
CALCIUM SERPL-MCNC: 9.1 MG/DL — SIGNIFICANT CHANGE UP (ref 8.4–10.5)
CHLORIDE SERPL-SCNC: 103 MMOL/L — SIGNIFICANT CHANGE UP (ref 96–108)
CO2 SERPL-SCNC: 24 MMOL/L — SIGNIFICANT CHANGE UP (ref 22–31)
CREAT SERPL-MCNC: 1.23 MG/DL — SIGNIFICANT CHANGE UP (ref 0.5–1.3)
EGFR: 63 ML/MIN/1.73M2 — SIGNIFICANT CHANGE UP
EGFR: 63 ML/MIN/1.73M2 — SIGNIFICANT CHANGE UP
GLUCOSE SERPL-MCNC: 90 MG/DL — SIGNIFICANT CHANGE UP (ref 70–99)
HCT VFR BLD CALC: 35.4 % — LOW (ref 39–50)
HGB BLD-MCNC: 11.6 G/DL — LOW (ref 13–17)
MAGNESIUM SERPL-MCNC: 2.1 MG/DL — SIGNIFICANT CHANGE UP (ref 1.6–2.6)
MCHC RBC-ENTMCNC: 28.9 PG — SIGNIFICANT CHANGE UP (ref 27–34)
MCHC RBC-ENTMCNC: 32.8 G/DL — SIGNIFICANT CHANGE UP (ref 32–36)
MCV RBC AUTO: 88.1 FL — SIGNIFICANT CHANGE UP (ref 80–100)
NRBC BLD AUTO-RTO: 0 /100 WBCS — SIGNIFICANT CHANGE UP (ref 0–0)
PHOSPHATE SERPL-MCNC: 3.9 MG/DL — SIGNIFICANT CHANGE UP (ref 2.5–4.5)
PLATELET # BLD AUTO: 185 K/UL — SIGNIFICANT CHANGE UP (ref 150–400)
POTASSIUM SERPL-MCNC: 3.7 MMOL/L — SIGNIFICANT CHANGE UP (ref 3.5–5.3)
POTASSIUM SERPL-SCNC: 3.7 MMOL/L — SIGNIFICANT CHANGE UP (ref 3.5–5.3)
PROT SERPL-MCNC: 6.4 G/DL — SIGNIFICANT CHANGE UP (ref 6–8.3)
RBC # BLD: 4.02 M/UL — LOW (ref 4.2–5.8)
RBC # FLD: 13.6 % — SIGNIFICANT CHANGE UP (ref 10.3–14.5)
SODIUM SERPL-SCNC: 141 MMOL/L — SIGNIFICANT CHANGE UP (ref 135–145)
WBC # BLD: 5.26 K/UL — SIGNIFICANT CHANGE UP (ref 3.8–10.5)
WBC # FLD AUTO: 5.26 K/UL — SIGNIFICANT CHANGE UP (ref 3.8–10.5)

## 2025-04-15 PROCEDURE — 99232 SBSQ HOSP IP/OBS MODERATE 35: CPT

## 2025-04-15 RX ADMIN — Medication 1 PACKET(S): at 12:05

## 2025-04-15 RX ADMIN — METOPROLOL SUCCINATE 50 MILLIGRAM(S): 50 TABLET, EXTENDED RELEASE ORAL at 06:30

## 2025-04-15 RX ADMIN — Medication 40 MILLIEQUIVALENT(S): at 12:05

## 2025-04-15 RX ADMIN — Medication 1 APPLICATION(S): at 06:32

## 2025-04-15 RX ADMIN — TAMSULOSIN HYDROCHLORIDE 0.4 MILLIGRAM(S): 0.4 CAPSULE ORAL at 22:11

## 2025-04-15 RX ADMIN — Medication 324 MILLIGRAM(S): at 17:09

## 2025-04-15 RX ADMIN — Medication 40 MILLIEQUIVALENT(S): at 08:33

## 2025-04-15 RX ADMIN — Medication 100 MILLIGRAM(S): at 22:11

## 2025-04-15 RX ADMIN — ATORVASTATIN CALCIUM 10 MILLIGRAM(S): 80 TABLET, FILM COATED ORAL at 22:11

## 2025-04-15 RX ADMIN — Medication 1 APPLICATION(S): at 06:33

## 2025-04-15 RX ADMIN — Medication 10 MILLIGRAM(S): at 06:31

## 2025-04-15 RX ADMIN — Medication 324 MILLIGRAM(S): at 06:31

## 2025-04-15 NOTE — PROGRESS NOTE ADULT - SUBJECTIVE AND OBJECTIVE BOX
Ariane Alejo MD  Division of Hospital Medicine  [facility]   Available on Microsoft Teams (Mon-Fri 8am-5pm)    * messages preferred prior to calls  Other Times:  [phone]      Patient is a 70y old  Male who presents with a chief complaint of VT (14 Apr 2025 16:56)      SUBJECTIVE / OVERNIGHT EVENTS: no acute events overnight. seen ambulating around unit with no chest pain nor BENJAMIN. 6 beats of NSVT on tele noted around ~10:30am - asymptomatic  ADDITIONAL REVIEW OF SYSTEMS:    MEDICATIONS  (STANDING):  atorvastatin 10 milliGRAM(s) Oral at bedtime  chlorhexidine 2% Cloths 1 Application(s) Topical <User Schedule>  chlorhexidine 4% Liquid 1 Application(s) Topical <User Schedule>  heparin   Injectable 5000 Unit(s) SubCutaneous every 8 hours  metoprolol succinate ER 50 milliGRAM(s) Oral daily  psyllium Powder 1 Packet(s) Oral daily  quiNIDine gluconate  milliGRAM(s) Oral every 12 hours  tamsulosin 0.4 milliGRAM(s) Oral at bedtime  torsemide 10 milliGRAM(s) Oral daily  traZODone 100 milliGRAM(s) Oral at bedtime    MEDICATIONS  (PRN):  acetaminophen     Tablet .. 650 milliGRAM(s) Oral every 6 hours PRN Mild Pain (1 - 3), Moderate Pain (4 - 6)  artificial  tears Solution 1 Drop(s) Both EYES every 4 hours PRN Dry Eyes  guaiFENesin Oral Liquid (Sugar-Free) 200 milliGRAM(s) Oral every 6 hours PRN Cough      CAPILLARY BLOOD GLUCOSE        I&O's Summary      PHYSICAL EXAM:  Vital Signs Last 24 Hrs  T(C): 36.4 (15 Apr 2025 10:29), Max: 36.7 (14 Apr 2025 21:03)  T(F): 97.5 (15 Apr 2025 10:29), Max: 98 (14 Apr 2025 21:03)  HR: 79 (15 Apr 2025 10:29) (79 - 84)  BP: 103/69 (15 Apr 2025 10:29) (94/59 - 103/69)  BP(mean): --  RR: 18 (15 Apr 2025 10:29) (18 - 18)  SpO2: 95% (15 Apr 2025 10:29) (94% - 95%)    Parameters below as of 15 Apr 2025 10:29  Patient On (Oxygen Delivery Method): room air    CONSTITUTIONAL: NAD, well-developed, well-groomed  EYES: PERRLA; conjunctiva and sclera clear  ENMT: Moist oral mucosa, no pharyngeal injection or exudates; normal dentition  NECK: Supple, no palpable masses; no thyromegaly  RESPIRATORY: Normal respiratory effort; lungs are clear to auscultation bilaterally, no wheeze nor crackles  CARDIOVASCULAR: Regular rate and rhythm, normal S1 and S2, no murmur/rub/gallop; No lower extremity edema  ABDOMEN: Soft, Nondistended, Nontender to palpation, normoactive bowel sounds  MUSCULOSKELETAL: No clubbing or cyanosis of digits; no joint swelling or tenderness to palpation  PSYCH: A+O to person, place, and time; affect appropriate  NEUROLOGY: CN 2-12 are intact and symmetric; no gross sensory deficits   SKIN: No rashes; no palpable lesions    LABS:                        11.6   5.26  )-----------( 185      ( 15 Apr 2025 06:17 )             35.4     04-15    141  |  103  |  20  ----------------------------<  90  3.7   |  24  |  1.23    Ca    9.1      15 Apr 2025 06:15  Phos  3.9     04-15  Mg     2.1     04-15    TPro  6.4  /  Alb  3.7  /  TBili  0.6  /  DBili  x   /  AST  51[H]  /  ALT  96[H]  /  AlkPhos  86  04-15      Urinalysis Basic - ( 15 Apr 2025 06:15 )    Color: x / Appearance: x / SG: x / pH: x  Gluc: 90 mg/dL / Ketone: x  / Bili: x / Urobili: x   Blood: x / Protein: x / Nitrite: x   Leuk Esterase: x / RBC: x / WBC x   Sq Epi: x / Non Sq Epi: x / Bacteria: x        RADIOLOGY & ADDITIONAL TESTS:  Results Reviewed: no leukocytosis, H/H stable, BUN/Cr uptrended, hypokalemia repleted  Imaging Personally Reviewed:  Electrocardiogram Personally Reviewed:    COORDINATION OF CARE:  Care Discussed with Consultants/Other Providers [Y]: medicine YANETH Beavers  Prior or Outpatient Records Reviewed [Y/N]:

## 2025-04-15 NOTE — PROGRESS NOTE ADULT - NS ATTEND AMEND GEN_ALL_CORE FT
Patient found in chair in NAD. He reports feeling tired with walking but this is not new. He had a run of NSVT today, K was 3.7. Mg 2.1; he remained hemodynamically stable. He has otherwise remained stable on quinidine PO. He is listed as UNOS status 2e for OHT. ABO O      Replete electrolytes   Continue torsemide 10 mg daily  Quinidipine per EP recs   Closely monitoring in telemetry floor   OHT once a suitable donor becomes available

## 2025-04-15 NOTE — PROGRESS NOTE ADULT - PROBLEM SELECTOR PLAN 1
- H/o ani mitral PVC (premature ventricular contractions) ablation 3/11/2024  - S/p Lidocaine gtt and one dose of Mexiletine (Mexiletine was d/dwayne for severe dizziness this admission)   - Appreciate EP evaluation for Vtach and CRT-D shock - CRTD interrogated on 3/28 - 2 self limited episodes of VT 4/5 in the morning that were asymptomatic  - C/w Toprol 50mg daily and Quinidine BID (pt previously did not tolerate amiodarone, mexiletine and sotalol in the past due to severe dizziness)  - 6b NSVT on tele today, HF/EP f/u requested  - C/w tele monitor, monitor electrolytes

## 2025-04-15 NOTE — CHART NOTE - NSCHARTNOTEFT_GEN_A_CORE
NUTRITION FOLLOW UP NOTE    PATIENT SEEN FOR: nutrition follow up    SOURCE: [x] Patient  [x] Current Medical Record  [] RN  [x] Family/support person at bedside (pt's wife) [] Patient unavailable/inappropriate  [] Other:    CHART REVIEWED/EVENTS NOTED.  [] No changes to nutrition care plan to note  [x] Nutrition Status:  - Listed for heart transplant status 2e    DIET ORDER:   Diet, Regular:   Supplement Feeding Modality:  Oral  Ensure Max Cans or Servings Per Day:  1       Frequency:  Daily (04-08-25 @ 17:11) [Active]    CURRENT DIET ORDER IS:  [] Appropriate:  [] Inadequate:  [x] Other: see recommendations     NUTRITION INTAKE/PROVISION:  [x] PO: Pt reports good po intake of meals. States he hasn't been drinking the Ensure and would prefer to d/c it at this time. Has been self-monitoring his sodium intake and would prefer to remain on regular diet if possible to help keep menu options liberalized. Follows a low sodium diet at baseline.  Endorses some minor  issues i.e. missing items on trays; RD to escalate issue to dining services.  [] Enteral Nutrition:  [] Parenteral Nutrition:    ANTHROPOMETRICS:  Drug Dosing Weight  Height (cm): 170.2 (21 Mar 2025 22:24)  Weight (kg): 81.7 (21 Mar 2025 22:24)  BMI (kg/m2): 28.2 (21 Mar 2025 22:24)    Weights:   Daily Weight in kG (standing): 84.6 (04-15), 83.1 (04-08), 83.3 (04-01), 83.9 (03-31), 83.9 (03-30), 83.8 (03-29), 84.7 (03-23)  Weight fluctuating likely in setting of fluid shifts. RD to continue to monitor weight trends as able/available.     MEDICATIONS:  MEDICATIONS  (STANDING):  atorvastatin 10 milliGRAM(s) Oral at bedtime  chlorhexidine 2% Cloths 1 Application(s) Topical <User Schedule>  chlorhexidine 4% Liquid 1 Application(s) Topical <User Schedule>  heparin   Injectable 5000 Unit(s) SubCutaneous every 8 hours  metoprolol succinate ER 50 milliGRAM(s) Oral daily  psyllium Powder 1 Packet(s) Oral daily  quiNIDine gluconate  milliGRAM(s) Oral every 12 hours  tamsulosin 0.4 milliGRAM(s) Oral at bedtime  torsemide 10 milliGRAM(s) Oral daily  traZODone 100 milliGRAM(s) Oral at bedtime    MEDICATIONS  (PRN):  acetaminophen     Tablet .. 650 milliGRAM(s) Oral every 6 hours PRN Mild Pain (1 - 3), Moderate Pain (4 - 6)  artificial  tears Solution 1 Drop(s) Both EYES every 4 hours PRN Dry Eyes  guaiFENesin Oral Liquid (Sugar-Free) 200 milliGRAM(s) Oral every 6 hours PRN Cough      NUTRITIONALLY PERTINENT LABS:  04-15 @ 06:15: Na 141, BUN 20, Cr 1.23, BG 90, K+ 3.7, Phos 3.9, Mg 2.1, Alk Phos 86, ALT/SGPT 96[H], AST/SGOT 51[H]    A1C with Estimated Average Glucose Result: 6.1 % (03-22-25 @ 00:58)      NUTRITIONALLY PERTINENT MEDICATIONS/LABS:  [x] Reviewed  [x] Relevant notes on medications/labs:  - Torsemide ordered  - Metamucil ordered  - A1c 6.1% (03/22/25) within pre-DM range    EDEMA:  [x] Reviewed  [x] Relevant notes:  - None noted as per flowsheets    GI/ I&O:  [x] Reviewed  [x] Relevant notes:  - Denies any GI distress, last BM 4/15 per pt  [] Other:    SKIN:   [x] No pressure injuries documented, per nursing flowsheet  [] Pressure injury previously noted  [] Change in pressure injury documentation:  [] Other:    ESTIMATED NEEDS:  [x] No change:  [] Updated:  Energy:  5225-1085 kcal/day (25-30 kcal/kg)  Protein:  102-119 g/day (1.2-1.4 g/kg)  Fluid:   ml/day or [x] defer to team  Based on: recent weight 84.7 kg (03-23, standing)    NUTRITION DIAGNOSIS:  [x] Prior Dx: Food & Nutrition Related Knowledge Deficit  [] New Dx:    EDUCATION:  [x] Yes: Discussed importance of adequate protein-energy consumption to meet estimated nutrient needs. Encouraged continued adequate intake of meals as tolerated. Pt noted with good comprehension and made aware RD remains available for further questions/concerns.   [] Not appropriate/warranted    NUTRITION CARE PLAN:  1. Diet: Continue Regular diet.  2. Supplements: Recommend d/c Ensure Max per pt preference.    [] Achieved - Continue current nutrition intervention(s)  [] Current medical condition precludes nutrition intervention at this time.    MONITORING AND EVALUATION:   RD remains available upon request and will follow up per protocol.    Naida Garcia, SAJANN, CDN (Available via [employer] TEAMS)

## 2025-04-15 NOTE — PROGRESS NOTE ADULT - SUBJECTIVE AND OBJECTIVE BOX
ADVANCED HEART FAILURE & TRANSPLANT  - PROGRESS NOTE  *To reach the NS1 Team from 8am to 5pm (MON-FRI), please call [phone],   _______________________________________________________________________________________________________     Subjective:  - Seen sitting upright  - States he feels some PVC's but is not constant    Medications:  acetaminophen     Tablet .. 650 milliGRAM(s) Oral every 6 hours PRN  artificial  tears Solution 1 Drop(s) Both EYES every 4 hours PRN  atorvastatin 10 milliGRAM(s) Oral at bedtime  chlorhexidine 2% Cloths 1 Application(s) Topical <User Schedule>  chlorhexidine 4% Liquid 1 Application(s) Topical <User Schedule>  guaiFENesin Oral Liquid (Sugar-Free) 200 milliGRAM(s) Oral every 6 hours PRN  heparin   Injectable 5000 Unit(s) SubCutaneous every 8 hours  metoprolol succinate ER 50 milliGRAM(s) Oral daily  psyllium Powder 1 Packet(s) Oral daily  quiNIDine gluconate  milliGRAM(s) Oral every 12 hours  tamsulosin 0.4 milliGRAM(s) Oral at bedtime  torsemide 10 milliGRAM(s) Oral daily  traZODone 100 milliGRAM(s) Oral at bedtime      Physical Exam:    Vitals:  Vital Signs Last 24 Hours  T(C): 36.4 (04-15-25 @ 10:29), Max: 36.7 (25 @ 21:03)  HR: 79 (04-15-25 @ 10:29) (79 - 84)  BP: 103/69 (04-15-25 @ 10:29) (94/59 - 103/69)  RR: 18 (04-15-25 @ 10:29) (18 - 18)  SpO2: 95% (04-15-25 @ 10:29) (94% - 95%)    Weight in k.6 (04-15 @ 12:48)    I&O's Summary    Tele:  70's, 6 beats NSVT    General: No distress. Comfortable.  HEENT: EOM intact.  Neck: Neck supple. JVP not elevated. No masses  Chest: Clear to auscultation bilaterally  CV: Normal S1 and S2. No murmurs, rub, or gallops. Radial pulses normal, warm peripherally  Abdomen: Soft, non-distended, non-tender  Skin: No rashes or skin breakdown  Extremities: No LE edema  Neurology: Alert and oriented times three. Sensation intact  Psych: Affect normal    Labs:                        11.6   5.26  )-----------( 185      ( 15 Apr 2025 06:17 )             35.4     04-15    141  |  103  |  20  ----------------------------<  90  3.7   |  24  |  1.23    Ca    9.1      15 Apr 2025 06:15  Phos  3.9     04-15  Mg     2.1     04-15    TPro  6.4  /  Alb  3.7  /  TBili  0.6  /  DBili  x   /  AST  51[H]  /  ALT  96[H]  /  AlkPhos  86  04-15

## 2025-04-15 NOTE — PROGRESS NOTE ADULT - PROBLEM SELECTOR PLAN 3
- TTE 3/20/25 : LVEF 30%,transvalvular gradients are elevated with severe prosthetic Mitral Stenosis, no evidence of LV thrombus  - s/p recent admit 3/19/2025 w/ RHC found to have elevated filling pressures treated with IV diuretics  - GDMT: C/w Toprol XL 50mg daily, Torsemide 10mg daily; off Spironolactone due to prior dizziness; afterload reduction (hydralazine) on hold w/ borderline BPs  - Holding home Farxiga while inpatient  - Appreciate HF recs - status upgraded for transplant

## 2025-04-15 NOTE — PROGRESS NOTE ADULT - ASSESSMENT
70M PMHx NICM since 2011, HFrEF (25-30%) s/p MDT CRT-D with baseline CHB , VT/VF, afib s/p GIANFRANCO ligation/MAZE, MV/TV repair, PVC ablation (3/2024) was transferred from outside hospital to [facility] due to  VT/AICD shock.   While admitted to the outside hospital, he was started on a lidocaine gtt. On 3/21 when lidocaine weaned off patient had recurrence of VT requiring AICD shocks. Thus transferred to [facility] for further management. Pt  was on Lidocaine gtt for control and is now on Quinidine and uptitrated Toprol for anti-arrythmia. Because of pt's refractory VT pt's listing status for a Heart Transplant was bumped from 6 to 2E temporarily. Pt will remain inpatient for listing purposes and managed closely by HF team and hence was transferred to [facility] on 3/27/25.

## 2025-04-15 NOTE — PROGRESS NOTE ADULT - NSPROGADDITIONALINFOA_GEN_ALL_CORE
.  Ariane Alejo MD  Division of Hospital Medicine  [facility]   Available on Microsoft Teams - messages preferred prior to calls.    Plan discussed with patient, wife bedside, and medicine NP Ruthann.

## 2025-04-15 NOTE — PROGRESS NOTE ADULT - ASSESSMENT
70-year-old male ABO O past medical history of NICM since 2011 HFrEF (LVEF 25-30%) s/p MDT CRT-D with baseline CHB, VT/VF, AFs/p GIANFRANCO ligation/MAZE, MV/TV repair, PVC ablation 3/2024 intolerant to AADs in the past, recent admission 3/19/2025-3/20/2025 for AICD shocks initially presented to the [facility] ED on 3/21/2025, sent by HF specialist for ACID shock. Device reported successful ATP for VT 3/17/2025 at 6:52pm followed by one ATP & 40J shock. He reported feeling dizzy & SOB during the events. He follows with Dr. Luca Tamayo for HF, currently undergoing transplant workup, Dr John for CRTD and VT/VF and Dr. Chu for genetic counseling. While admitted to [facility], he was started on a lidocaine gtt. On 3/21 when lidocaine weaned off patient had another two episodes of VT requiring AICD shocks. He was transferred to [facility] for further management. He was initially maintained on lidocaine gtt from 3/21/2025-3/25/2025 & his listing status was upgraded to UNOS status 2e for heart transplant (ABO O) for the refractory VT. He was transitioned to oral antiarrhythmics (Toprol XL & quinidine) & ultimately transferred from CICU to tele floor on 3/27/2025. Hospital course was further c/b development of watery diarrhea & was found to have +norovirus on 3/31/2025 which prompted deactivation to status 7 listing for heart transplant. Status reinstated to 2E after diarrhea resolved.     He has been electrically quiescent since 4/4 on PO antiarrhythmics. Near euvolemic and on oral diuretics. Diarrhea resolved. He is currently reactivated 4/2 for UNOS status 2e.    Bedside hemodynamics:  3/22/25 BP 97/76/83, HR 80, CVP 6, PA 58/23/38, PCWP 20, SVR 1100, Gucci CO/CI 5.1/2.6, TD 4/2.08    Cardiac Studies:   TTE 3/20/25 : LVEF 30%, segmental, LVIDd 5.3, walls normal, elevated LV filling pressures, mod RVE with mildly reduced RV function, TAPSE 1.7, severely dilated RA, annuloplasty in MV position, transvalvular gradients are elevated with severe prosthetic MS (peak gradient 15.7, mean gradient 8), trace intravalvular MR, TV annuloplasty ring noted, mild TR,  est PASP 36, no evidence of LV thrombus  TTE 10/2024: LVEF 25-30%, LVEDD 5.9cm, moderately reduced RV function, annuloplasty ring of mitral and tricuspid position, PASP 47 mmHg  RHC 3/19/25- RA 11 PA 58/26 PCWP 32 CO/CI 5.43/2.77  [facility] 6/2023 Nonobstructive CAD

## 2025-04-16 LAB
ALBUMIN SERPL ELPH-MCNC: 3.8 G/DL — SIGNIFICANT CHANGE UP (ref 3.3–5)
ALP SERPL-CCNC: 85 U/L — SIGNIFICANT CHANGE UP (ref 40–120)
ALT FLD-CCNC: 104 U/L — HIGH (ref 10–45)
ANION GAP SERPL CALC-SCNC: 10 MMOL/L — SIGNIFICANT CHANGE UP (ref 5–17)
AST SERPL-CCNC: 48 U/L — HIGH (ref 10–40)
BILIRUB SERPL-MCNC: 0.6 MG/DL — SIGNIFICANT CHANGE UP (ref 0.2–1.2)
BUN SERPL-MCNC: 22 MG/DL — SIGNIFICANT CHANGE UP (ref 7–23)
CALCIUM SERPL-MCNC: 9 MG/DL — SIGNIFICANT CHANGE UP (ref 8.4–10.5)
CHLORIDE SERPL-SCNC: 106 MMOL/L — SIGNIFICANT CHANGE UP (ref 96–108)
CO2 SERPL-SCNC: 23 MMOL/L — SIGNIFICANT CHANGE UP (ref 22–31)
CREAT SERPL-MCNC: 1.25 MG/DL — SIGNIFICANT CHANGE UP (ref 0.5–1.3)
EGFR: 62 ML/MIN/1.73M2 — SIGNIFICANT CHANGE UP
EGFR: 62 ML/MIN/1.73M2 — SIGNIFICANT CHANGE UP
GLUCOSE SERPL-MCNC: 93 MG/DL — SIGNIFICANT CHANGE UP (ref 70–99)
MAGNESIUM SERPL-MCNC: 2.1 MG/DL — SIGNIFICANT CHANGE UP (ref 1.6–2.6)
PHOSPHATE SERPL-MCNC: 3.3 MG/DL — SIGNIFICANT CHANGE UP (ref 2.5–4.5)
POTASSIUM SERPL-MCNC: 4 MMOL/L — SIGNIFICANT CHANGE UP (ref 3.5–5.3)
POTASSIUM SERPL-SCNC: 4 MMOL/L — SIGNIFICANT CHANGE UP (ref 3.5–5.3)
PROT SERPL-MCNC: 6.4 G/DL — SIGNIFICANT CHANGE UP (ref 6–8.3)
SODIUM SERPL-SCNC: 139 MMOL/L — SIGNIFICANT CHANGE UP (ref 135–145)

## 2025-04-16 PROCEDURE — 99232 SBSQ HOSP IP/OBS MODERATE 35: CPT

## 2025-04-16 RX ADMIN — Medication 324 MILLIGRAM(S): at 05:31

## 2025-04-16 RX ADMIN — Medication 10 MILLIGRAM(S): at 05:32

## 2025-04-16 RX ADMIN — Medication 1 APPLICATION(S): at 05:27

## 2025-04-16 RX ADMIN — Medication 100 MILLIGRAM(S): at 22:36

## 2025-04-16 RX ADMIN — TAMSULOSIN HYDROCHLORIDE 0.4 MILLIGRAM(S): 0.4 CAPSULE ORAL at 22:36

## 2025-04-16 RX ADMIN — Medication 1 PACKET(S): at 14:30

## 2025-04-16 RX ADMIN — Medication 324 MILLIGRAM(S): at 18:02

## 2025-04-16 RX ADMIN — Medication 1 APPLICATION(S): at 05:32

## 2025-04-16 RX ADMIN — METOPROLOL SUCCINATE 50 MILLIGRAM(S): 50 TABLET, EXTENDED RELEASE ORAL at 05:31

## 2025-04-16 RX ADMIN — ATORVASTATIN CALCIUM 10 MILLIGRAM(S): 80 TABLET, FILM COATED ORAL at 22:36

## 2025-04-16 NOTE — PROGRESS NOTE ADULT - SUBJECTIVE AND OBJECTIVE BOX
Araine Alejo MD  Division of Hospital Medicine  [facility]   Available on Microsoft Teams (Mon-Fri 8am-5pm)    * messages preferred prior to calls  Other Times:  [phone]      Patient is a 70y old  Male who presents with a chief complaint of VT (15 Apr 2025 17:32)      SUBJECTIVE / OVERNIGHT EVENTS: no acute events overnight. no fever, chills, chest pain, nor dyspnea. feels tired but otherwise doing okay.  ADDITIONAL REVIEW OF SYSTEMS:    MEDICATIONS  (STANDING):  atorvastatin 10 milliGRAM(s) Oral at bedtime  chlorhexidine 2% Cloths 1 Application(s) Topical <User Schedule>  chlorhexidine 4% Liquid 1 Application(s) Topical <User Schedule>  heparin   Injectable 5000 Unit(s) SubCutaneous every 8 hours  metoprolol succinate ER 50 milliGRAM(s) Oral daily  psyllium Powder 1 Packet(s) Oral daily  quiNIDine gluconate  milliGRAM(s) Oral every 12 hours  tamsulosin 0.4 milliGRAM(s) Oral at bedtime  torsemide 10 milliGRAM(s) Oral daily  traZODone 100 milliGRAM(s) Oral at bedtime    MEDICATIONS  (PRN):  acetaminophen     Tablet .. 650 milliGRAM(s) Oral every 6 hours PRN Mild Pain (1 - 3), Moderate Pain (4 - 6)  artificial  tears Solution 1 Drop(s) Both EYES every 4 hours PRN Dry Eyes  guaiFENesin Oral Liquid (Sugar-Free) 200 milliGRAM(s) Oral every 6 hours PRN Cough      CAPILLARY BLOOD GLUCOSE        I&O's Summary    16 Apr 2025 07:01  -  16 Apr 2025 17:34  --------------------------------------------------------  IN: 920 mL / OUT: 0 mL / NET: 920 mL        PHYSICAL EXAM:  Vital Signs Last 24 Hrs  T(C): 36.5 (16 Apr 2025 11:15), Max: 36.7 (15 Apr 2025 20:51)  T(F): 97.7 (16 Apr 2025 11:15), Max: 98 (15 Apr 2025 20:51)  HR: 84 (16 Apr 2025 11:15) (75 - 84)  BP: 108/65 (16 Apr 2025 11:15) (99/67 - 108/72)  BP(mean): --  RR: 18 (16 Apr 2025 11:15) (17 - 18)  SpO2: 95% (16 Apr 2025 11:15) (95% - 95%)    Parameters below as of 16 Apr 2025 11:15  Patient On (Oxygen Delivery Method): room air    CONSTITUTIONAL: NAD, well-developed, well-groomed  EYES: PERRLA; conjunctiva and sclera clear  ENMT: Moist oral mucosa, no pharyngeal injection or exudates; normal dentition  NECK: Supple, no palpable masses; no thyromegaly  RESPIRATORY: Normal respiratory effort; lungs are clear to auscultation bilaterally, no wheeze nor crackles  CARDIOVASCULAR: Regular rate and rhythm, normal S1 and S2, no murmur/rub/gallop; No lower extremity edema  ABDOMEN: Soft, Nondistended, Nontender to palpation, normoactive bowel sounds  MUSCULOSKELETAL: No clubbing or cyanosis of digits; no joint swelling or tenderness to palpation  PSYCH: A+O to person, place, and time; affect appropriate  NEUROLOGY: CN 2-12 are intact and symmetric; no gross sensory deficits   SKIN: No rashes; no palpable lesions    LABS:                        11.6   5.26  )-----------( 185      ( 15 Apr 2025 06:17 )             35.4     04-16    139  |  106  |  22  ----------------------------<  93  4.0   |  23  |  1.25    Ca    9.0      16 Apr 2025 05:52  Phos  3.3     04-16  Mg     2.1     04-16    TPro  6.4  /  Alb  3.8  /  TBili  0.6  /  DBili  x   /  AST  48[H]  /  ALT  104[H]  /  AlkPhos  85  04-16      Urinalysis Basic - ( 16 Apr 2025 05:52 )    Color: x / Appearance: x / SG: x / pH: x  Gluc: 93 mg/dL / Ketone: x  / Bili: x / Urobili: x   Blood: x / Protein: x / Nitrite: x   Leuk Esterase: x / RBC: x / WBC x   Sq Epi: x / Non Sq Epi: x / Bacteria: x      RADIOLOGY & ADDITIONAL TESTS:  Results Reviewed:   Imaging Personally Reviewed:  Electrocardiogram Personally Reviewed:    COORDINATION OF CARE:  Care Discussed with Consultants/Other Providers [Y]: medicine YANETH Valadez  Prior or Outpatient Records Reviewed [Y/N]:

## 2025-04-16 NOTE — PROGRESS NOTE ADULT - PROBLEM SELECTOR PLAN 1
- H/o ani mitral PVC (premature ventricular contractions) ablation 3/11/2024  - S/p Lidocaine gtt and one dose of Mexiletine (Mexiletine was d/dwayne for severe dizziness this admission)   - Appreciate EP evaluation for Vtach and CRT-D shock - CRTD interrogated on 3/28 - 2 self limited episodes of VT 4/5 in the morning that were asymptomatic  - C/w Toprol 50mg daily and Quinidine BID (pt previously did not tolerate amiodarone, mexiletine and sotalol in the past due to severe dizziness)  - 6b NSVT on tele on 4/15, HF/EP f/u requested  - C/w tele monitor, monitor electrolytes

## 2025-04-16 NOTE — PROGRESS NOTE ADULT - NSPROGADDITIONALINFOA_GEN_ALL_CORE
.  Ariane Alejo MD  Division of Hospital Medicine  [facility]   Available on Microsoft Teams - messages preferred prior to calls.    Plan discussed with patient, wife bedside on 4/15, and medicine NP Allyson.

## 2025-04-16 NOTE — PROGRESS NOTE ADULT - ASSESSMENT
70M PMHx NICM since 2011, HFrEF (25-30%) s/p MDT CRT-D with baseline CHB , VT/VF, afib s/p GIANFRANCO ligation/MAZE, MV/TV repair, PVC ablation (3/2024) was transferred from outside hospital to [facility] due to  VT/AICD shock.   While admitted to the outside hospital, he was started on a lidocaine gtt. On 3/21 when lidocaine weaned off patient had recurrence of VT requiring AICD shocks. Thus transferred to [facility] for further management. Pt  was on Lidocaine gtt for control and is now on Quinidine and uptitrated Toprol for anti-arrythmia. Because of pt's refractory VT pt's listing status for a Heart Transplant was bumped from 6 to 2E temporarily. Pt will remain inpatient for listing purposes and managed closely by HF team and hence was transferred to [facility] on 3/27/25.

## 2025-04-17 LAB
ALBUMIN SERPL ELPH-MCNC: 3.7 G/DL — SIGNIFICANT CHANGE UP (ref 3.3–5)
ALP SERPL-CCNC: 86 U/L — SIGNIFICANT CHANGE UP (ref 40–120)
ALT FLD-CCNC: 87 U/L — HIGH (ref 10–45)
ANION GAP SERPL CALC-SCNC: 12 MMOL/L — SIGNIFICANT CHANGE UP (ref 5–17)
APPEARANCE UR: CLEAR — SIGNIFICANT CHANGE UP
AST SERPL-CCNC: 36 U/L — SIGNIFICANT CHANGE UP (ref 10–40)
BILIRUB SERPL-MCNC: 0.6 MG/DL — SIGNIFICANT CHANGE UP (ref 0.2–1.2)
BILIRUB UR-MCNC: NEGATIVE — SIGNIFICANT CHANGE UP
BLD GP AB SCN SERPL QL: NEGATIVE — SIGNIFICANT CHANGE UP
BUN SERPL-MCNC: 23 MG/DL — SIGNIFICANT CHANGE UP (ref 7–23)
CALCIUM SERPL-MCNC: 9.1 MG/DL — SIGNIFICANT CHANGE UP (ref 8.4–10.5)
CHLORIDE SERPL-SCNC: 104 MMOL/L — SIGNIFICANT CHANGE UP (ref 96–108)
CO2 SERPL-SCNC: 24 MMOL/L — SIGNIFICANT CHANGE UP (ref 22–31)
COLOR SPEC: YELLOW — SIGNIFICANT CHANGE UP
CREAT SERPL-MCNC: 1.31 MG/DL — HIGH (ref 0.5–1.3)
DIFF PNL FLD: NEGATIVE — SIGNIFICANT CHANGE UP
EGFR: 59 ML/MIN/1.73M2 — LOW
EGFR: 59 ML/MIN/1.73M2 — LOW
FLUAV AG NPH QL: SIGNIFICANT CHANGE UP
FLUBV AG NPH QL: SIGNIFICANT CHANGE UP
GLUCOSE BLDC GLUCOMTR-MCNC: 96 MG/DL — SIGNIFICANT CHANGE UP (ref 70–99)
GLUCOSE SERPL-MCNC: 88 MG/DL — SIGNIFICANT CHANGE UP (ref 70–99)
GLUCOSE UR QL: NEGATIVE MG/DL — SIGNIFICANT CHANGE UP
HBV SURFACE AG SER-ACNC: SIGNIFICANT CHANGE UP
HCT VFR BLD CALC: 35.9 % — LOW (ref 39–50)
HCV AB S/CO SERPL IA: 0.05 S/CO — SIGNIFICANT CHANGE UP
HCV AB SERPL-IMP: SIGNIFICANT CHANGE UP
HGB BLD-MCNC: 11.6 G/DL — LOW (ref 13–17)
KETONES UR-MCNC: NEGATIVE MG/DL — SIGNIFICANT CHANGE UP
LACTATE SERPL-SCNC: 0.8 MMOL/L — SIGNIFICANT CHANGE UP (ref 0.5–2)
LEUKOCYTE ESTERASE UR-ACNC: NEGATIVE — SIGNIFICANT CHANGE UP
MAGNESIUM SERPL-MCNC: 2 MG/DL — SIGNIFICANT CHANGE UP (ref 1.6–2.6)
MCHC RBC-ENTMCNC: 28.5 PG — SIGNIFICANT CHANGE UP (ref 27–34)
MCHC RBC-ENTMCNC: 32.3 G/DL — SIGNIFICANT CHANGE UP (ref 32–36)
MCV RBC AUTO: 88.2 FL — SIGNIFICANT CHANGE UP (ref 80–100)
NITRITE UR-MCNC: NEGATIVE — SIGNIFICANT CHANGE UP
NRBC BLD AUTO-RTO: 0 /100 WBCS — SIGNIFICANT CHANGE UP (ref 0–0)
PH UR: 5 — SIGNIFICANT CHANGE UP (ref 5–8)
PHOSPHATE SERPL-MCNC: 4 MG/DL — SIGNIFICANT CHANGE UP (ref 2.5–4.5)
PLATELET # BLD AUTO: 159 K/UL — SIGNIFICANT CHANGE UP (ref 150–400)
POTASSIUM SERPL-MCNC: 3.8 MMOL/L — SIGNIFICANT CHANGE UP (ref 3.5–5.3)
POTASSIUM SERPL-SCNC: 3.8 MMOL/L — SIGNIFICANT CHANGE UP (ref 3.5–5.3)
PROT SERPL-MCNC: 6.5 G/DL — SIGNIFICANT CHANGE UP (ref 6–8.3)
PROT UR-MCNC: NEGATIVE MG/DL — SIGNIFICANT CHANGE UP
RBC # BLD: 4.07 M/UL — LOW (ref 4.2–5.8)
RBC # FLD: 13.7 % — SIGNIFICANT CHANGE UP (ref 10.3–14.5)
RH IG SCN BLD-IMP: POSITIVE — SIGNIFICANT CHANGE UP
RSV RNA NPH QL NAA+NON-PROBE: SIGNIFICANT CHANGE UP
SARS-COV-2 RNA SPEC QL NAA+PROBE: SIGNIFICANT CHANGE UP
SODIUM SERPL-SCNC: 140 MMOL/L — SIGNIFICANT CHANGE UP (ref 135–145)
SOURCE RESPIRATORY: SIGNIFICANT CHANGE UP
SP GR SPEC: 1.01 — SIGNIFICANT CHANGE UP (ref 1–1.03)
UROBILINOGEN FLD QL: 0.2 MG/DL — SIGNIFICANT CHANGE UP (ref 0.2–1)
WBC # BLD: 5.56 K/UL — SIGNIFICANT CHANGE UP (ref 3.8–10.5)
WBC # FLD AUTO: 5.56 K/UL — SIGNIFICANT CHANGE UP (ref 3.8–10.5)

## 2025-04-17 PROCEDURE — 71045 X-RAY EXAM CHEST 1 VIEW: CPT | Mod: 26

## 2025-04-17 PROCEDURE — 99232 SBSQ HOSP IP/OBS MODERATE 35: CPT | Mod: GC

## 2025-04-17 PROCEDURE — G0545: CPT

## 2025-04-17 PROCEDURE — 99233 SBSQ HOSP IP/OBS HIGH 50: CPT

## 2025-04-17 RX ORDER — TORSEMIDE 10 MG
10 TABLET ORAL
Refills: 0 | Status: DISCONTINUED | OUTPATIENT
Start: 2025-04-17 | End: 2025-04-29

## 2025-04-17 RX ORDER — CEFEPIME 2 G/20ML
1000 INJECTION, POWDER, FOR SOLUTION INTRAVENOUS ONCE
Refills: 0 | Status: DISCONTINUED | OUTPATIENT
Start: 2025-04-17 | End: 2025-04-23

## 2025-04-17 RX ORDER — VANCOMYCIN HCL IN 5 % DEXTROSE 1.5G/250ML
1000 PLASTIC BAG, INJECTION (ML) INTRAVENOUS ONCE
Refills: 0 | Status: COMPLETED | OUTPATIENT
Start: 2025-04-17 | End: 2025-04-17

## 2025-04-17 RX ORDER — MYCOPHENOLATE MOFETIL 500 MG/1
1000 TABLET, FILM COATED ORAL ONCE
Refills: 0 | Status: DISCONTINUED | OUTPATIENT
Start: 2025-04-17 | End: 2025-04-23

## 2025-04-17 RX ORDER — BUMETANIDE 1 MG/1
2 TABLET ORAL ONCE
Refills: 0 | Status: COMPLETED | OUTPATIENT
Start: 2025-04-17 | End: 2025-04-17

## 2025-04-17 RX ORDER — TORSEMIDE 10 MG
20 TABLET ORAL
Refills: 0 | Status: DISCONTINUED | OUTPATIENT
Start: 2025-04-17 | End: 2025-04-29

## 2025-04-17 RX ORDER — LIDOCAINE HCL/PF 10 MG/ML
1 VIAL (ML) INJECTION ONCE
Refills: 0 | Status: DISCONTINUED | OUTPATIENT
Start: 2025-04-17 | End: 2025-04-17

## 2025-04-17 RX ORDER — METHYLPREDNISOLONE ACETATE 80 MG/ML
1000 INJECTION, SUSPENSION INTRA-ARTICULAR; INTRALESIONAL; INTRAMUSCULAR; SOFT TISSUE ONCE
Refills: 0 | Status: DISCONTINUED | OUTPATIENT
Start: 2025-04-17 | End: 2025-04-23

## 2025-04-17 RX ADMIN — Medication 1 APPLICATION(S): at 05:25

## 2025-04-17 RX ADMIN — Medication 324 MILLIGRAM(S): at 05:29

## 2025-04-17 RX ADMIN — Medication 20 MILLIEQUIVALENT(S): at 12:36

## 2025-04-17 RX ADMIN — Medication 5 MILLILITER(S): at 18:41

## 2025-04-17 RX ADMIN — TAMSULOSIN HYDROCHLORIDE 0.4 MILLIGRAM(S): 0.4 CAPSULE ORAL at 22:16

## 2025-04-17 RX ADMIN — Medication 1 APPLICATION(S): at 22:23

## 2025-04-17 RX ADMIN — METOPROLOL SUCCINATE 50 MILLIGRAM(S): 50 TABLET, EXTENDED RELEASE ORAL at 05:28

## 2025-04-17 RX ADMIN — Medication 1 PACKET(S): at 12:36

## 2025-04-17 RX ADMIN — Medication 324 MILLIGRAM(S): at 18:41

## 2025-04-17 RX ADMIN — BUMETANIDE 2 MILLIGRAM(S): 1 TABLET ORAL at 14:15

## 2025-04-17 RX ADMIN — Medication 100 MILLIGRAM(S): at 22:16

## 2025-04-17 RX ADMIN — ATORVASTATIN CALCIUM 10 MILLIGRAM(S): 80 TABLET, FILM COATED ORAL at 22:16

## 2025-04-17 RX ADMIN — Medication 10 MILLIGRAM(S): at 05:28

## 2025-04-17 NOTE — PROGRESS NOTE ADULT - SUBJECTIVE AND OBJECTIVE BOX
ADVANCED HEART FAILURE & TRANSPLANT  - PROGRESS NOTE  *To reach the NS1 Team from 8am to 5pm (MON-FRI), please call [phone],   _______________________________________________________________________________________________________     Subjective:  - NAEO    Medications:  acetaminophen     Tablet .. 650 milliGRAM(s) Oral every 6 hours PRN  artificial  tears Solution 1 Drop(s) Both EYES every 4 hours PRN  atorvastatin 10 milliGRAM(s) Oral at bedtime  cefepime   IVPB 1000 milliGRAM(s) IV Intermittent once  chlorhexidine 0.12% Liquid 5 milliLiter(s) Swish and Spit two times a day  chlorhexidine 2% Cloths 1 Application(s) Topical <User Schedule>  chlorhexidine 4% Liquid 1 Application(s) Topical <User Schedule>  chlorhexidine 4% Liquid 1 Application(s) Topical once  guaiFENesin Oral Liquid (Sugar-Free) 200 milliGRAM(s) Oral every 6 hours PRN  heparin   Injectable 5000 Unit(s) SubCutaneous every 8 hours  methylPREDNISolone sodium succinate IVPB 1000 milliGRAM(s) IV Intermittent once  metoprolol succinate ER 50 milliGRAM(s) Oral daily  psyllium Powder 1 Packet(s) Oral daily  quiNIDine gluconate  milliGRAM(s) Oral every 12 hours  tamsulosin 0.4 milliGRAM(s) Oral at bedtime  torsemide 20 milliGRAM(s) Oral <User Schedule>  torsemide 10 milliGRAM(s) Oral <User Schedule>  traZODone 100 milliGRAM(s) Oral at bedtime  vancomycin  IVPB 1000 milliGRAM(s) IV Intermittent once      Physical Exam:    Vitals:  Vital Signs Last 24 Hours  T(C): 36.6 (25 @ 14:00), Max: 36.6 (25 @ 21:45)  HR: 80 (25 @ 14:00) (79 - 82)  BP: 111/72 (25 @ 14:00) (96/62 - 111/72)  RR: 18 (25 @ 14:00) (18 - 18)  SpO2: 95% (25 @ 14:00) (93% - 96%)    Weight in k.1 ( @ 08:58)    I&O's Summary    2025 07:  -  2025 07:00  --------------------------------------------------------  IN: 1400 mL / OUT: 0 mL / NET: 1400 mL    2025 07:01  -  2025 15:26  --------------------------------------------------------  IN: 1000 mL / OUT: 0 mL / NET: 1000 mL    Tele: SR 70-80    General: No distress. Comfortable.  HEENT: EOM intact.  Neck: IVC dilated and non-collapsin  Chest: Clear to auscultation bilaterally  CV: Normal S1 and S2. No murmurs, rub, or gallops. Radial pulses normal, warm peripherally  Abdomen: Soft, non-distended, non-tender  Skin: No rashes or skin breakdown  Extremities: No LE edema  Neurology: Alert and oriented times three. Sensation intact  Psych: Affect normal    Labs:                        11.6   5.56  )-----------( 159      ( 2025 06:12 )             35.9         140  |  104  |  23  ----------------------------<  88  3.8   |  24  |  1.31[H]    Ca    9.1      2025 06:12  Phos  4.0       Mg     2.0         TPro  6.5  /  Alb  3.7  /  TBili  0.6  /  DBili  x   /  AST  36  /  ALT  87[H]  /  AlkPhos  86    Lactate, Blood: 0.8 mmol/L ( @ 06:12)

## 2025-04-17 NOTE — PROGRESS NOTE ADULT - SUBJECTIVE AND OBJECTIVE BOX
Follow Up:      Patient without acute complaints at this time. No fevers or chills. Hemodynamically stable and on RA.       REVIEW OF SYSTEMS  Constitutional: No fevers, No chills  Respiratory: No cough, no SOB  Cardiovascular:  No chest pain, No palpitations   Gastrointestinal: No pain, No nausea, No vomiting, No diarrhea, No constipation	    Allergies  No Known Allergies        ANTIMICROBIALS:    cefepime   IVPB 1000 once  vancomycin  IVPB 1000 once      OTHER MEDS: MEDICATIONS  (STANDING):  acetaminophen     Tablet .. 650 every 6 hours PRN  atorvastatin 10 at bedtime  guaiFENesin Oral Liquid (Sugar-Free) 200 every 6 hours PRN  methylPREDNISolone sodium succinate IVPB 1000 once  metoprolol succinate ER 50 daily  mycophenolate mofetil IVPB 1000 once  psyllium Powder 1 daily  quiNIDine gluconate  every 12 hours  tamsulosin 0.4 at bedtime  torsemide 20 <User Schedule>  torsemide 10 <User Schedule>  traZODone 100 at bedtime      Vital Signs Last 24 Hrs  T(F): 97.8 (04-17-25 @ 14:00), Max: 98.3 (04-14-25 @ 11:47)    Vital Signs Last 24 Hrs  HR: 80 (04-17-25 @ 14:00) (79 - 82)  BP: 111/72 (04-17-25 @ 14:00) (96/62 - 111/72)  RR: 18 (04-17-25 @ 14:00)  SpO2: 95% (04-17-25 @ 14:00) (93% - 96%)  Wt(kg): --    EXAM:  Physical Exam:  Constitutional:  well preserved, comfortable  Head/Eyes: no icterus,  EOMI  LUNGS:  CTA  CVS:  normal S1, S2  Abd:  soft, non-tender; non-distended  Neuro: AAO X 3, non- focal    Labs:                        11.6   5.56  )-----------( 159      ( 17 Apr 2025 06:12 )             35.9     04-17    140  |  104  |  23  ----------------------------<  88  3.8   |  24  |  1.31[H]    Ca    9.1      17 Apr 2025 06:12  Phos  4.0     04-17  Mg     2.0     04-17    TPro  6.5  /  Alb  3.7  /  TBili  0.6  /  DBili  x   /  AST  36  /  ALT  87[H]  /  AlkPhos  86  04-17      WBC Trend:  WBC Count: 5.56 (04-17-25 @ 06:12)  WBC Count: 5.26 (04-15-25 @ 06:17)  WBC Count: 5.49 (04-13-25 @ 06:44)      Creatine Trend:  Creatinine: 1.31 (04-17)  Creatinine: 1.25 (04-16)  Creatinine: 1.23 (04-15)  Creatinine: 1.18 (04-13)      Liver Biochemical Testing Trend:  Alanine Aminotransferase (ALT/SGPT): 87 *H* (04-17)  Alanine Aminotransferase (ALT/SGPT): 104 *H* (04-16)  Alanine Aminotransferase (ALT/SGPT): 96 *H* (04-15)  Alanine Aminotransferase (ALT/SGPT): 76 *H* (04-13)  Alanine Aminotransferase (ALT/SGPT): 65 *H* (04-11)  Aspartate Aminotransferase (AST/SGOT): 36 (04-17-25 @ 06:12)  Aspartate Aminotransferase (AST/SGOT): 48 (04-16-25 @ 05:52)  Aspartate Aminotransferase (AST/SGOT): 51 (04-15-25 @ 06:15)  Aspartate Aminotransferase (AST/SGOT): 39 (04-13-25 @ 06:45)  Aspartate Aminotransferase (AST/SGOT): 33 (04-11-25 @ 05:53)  Bilirubin Total: 0.6 (04-17)  Bilirubin Total: 0.6 (04-16)  Bilirubin Total: 0.6 (04-15)  Bilirubin Total: 0.6 (04-13)  Bilirubin Direct: 0.2 (04-11)      Trend LDH  03-28-25 @ 06:15  208      Urinalysis Basic - ( 17 Apr 2025 06:12 )    Color: x / Appearance: x / SG: x / pH: x  Gluc: 88 mg/dL / Ketone: x  / Bili: x / Urobili: x   Blood: x / Protein: x / Nitrite: x   Leuk Esterase: x / RBC: x / WBC x   Sq Epi: x / Non Sq Epi: x / Bacteria: x        MICROBIOLOGY:        Culture - Urine (collected 10 May 2023 15:00)  Source: Clean Catch Clean Catch (Midstream)  Final Report:    <10,000 CFU/mL Normal Urogenital Page                                            Lactate, Blood: 0.8 (04-17 @ 06:12)      RADIOLOGY:    ACC: [id number] EXAM:  XR CHEST PORTABLE ROUTINE 1V   ORDERED BY:  ELO RANDLE     PROCEDURE DATE:  03/25/2025          INTERPRETATION:  Chest one view    HISTORY: Line placement    COMPARISON STUDY: 3/24/2025    Frontal expiratory view ofthe chest shows the heart to be similar in   size. Right jugular Billings-Margaret catheter reaches the right pulmonary   artery. Left cardiac fibrillator and cardiac valve ring are again noted.    The lungs show less pulmonary congestion with clearing of the left base   and there is no evidence of pneumothorax nor right pleural effusion.    IMPRESSION:  Decreased congestion. Catheter as noted.        Thank you for the courtesy of this referral.    --- End of Report ---            HENRIK HER MD; Attending Interventional Radiologist  This document has been electronically signed. Mar 26 2025 11:47AM

## 2025-04-17 NOTE — PROGRESS NOTE ADULT - ASSESSMENT
70-year-old male ABO O past medical history of NICM since 2011 HFrEF (LVEF 25-30%) s/p MDT CRT-D with baseline CHB, VT/VF, AFs/p GIANFRANCO ligation/MAZE, MV/TV repair, PVC ablation 3/2024 intolerant to AADs in the past, recent admission 3/19/2025-3/20/2025 for AICD shocks initially presented to the [facility] ED on 3/21/2025, sent by HF specialist for ACID shock. Device reported successful ATP for VT 3/17/2025 at 6:52pm followed by one ATP & 40J shock. He reported feeling dizzy & SOB during the events. He follows with Dr. Luca Tamayo for HF, currently undergoing transplant workup, Dr John for CRTD and VT/VF and Dr. Chu for genetic counseling. While admitted to [facility], he was started on a lidocaine gtt. On 3/21 when lidocaine weaned off patient had another two episodes of VT requiring AICD shocks. He was transferred to [facility] for further management. He was initially maintained on lidocaine gtt from 3/21/2025-3/25/2025 & his listing status was upgraded to UNOS status 2e for heart transplant (ABO O) for the refractory VT. He was transitioned to oral antiarrhythmics (Toprol XL & quinidine) & ultimately transferred from CICU to tele floor on 3/27/2025. Hospital course was further c/b development of watery diarrhea & was found to have +norovirus on 3/31/2025 which prompted deactivation to status 7 listing for heart transplant. Status reinstated to 2E after diarrhea resolved.     He has been electrically quiescent since 4/4 on PO antiarrhythmics. Near euvolemic and on oral diuretics. Diarrhea resolved. He is currently reactivated 4/2 for UNOS status 2e.    Bedside hemodynamics:  3/22/25 BP 97/76/83, HR 80, CVP 6, PA 58/23/38, PCWP 20, SVR 1100, Gucci CO/CI 5.1/2.6, TD 4/2.08    Cardiac Studies:   TTE 3/20/25 : LVEF 30%, segmental, LVIDd 5.3, walls normal, elevated LV filling pressures, mod RVE with mildly reduced RV function, TAPSE 1.7, severely dilated RA, annuloplasty in MV position, transvalvular gradients are elevated with severe prosthetic MS (peak gradient 15.7, mean gradient 8), trace intravalvular MR, TV annuloplasty ring noted, mild TR,  est PASP 36, no evidence of LV thrombus  TTE 10/2024: LVEF 25-30%, LVEDD 5.9cm, moderately reduced RV function, annuloplasty ring of mitral and tricuspid position, PASP 47 mmHg  RHC 3/19/25- RA 11 PA 58/26 PCWP 32 CO/CI 5.43/2.77  [facility] 6/2023 Nonobstructive CAD

## 2025-04-17 NOTE — PROGRESS NOTE ADULT - ASSESSMENT
69-year-old male patient past medical history of NICM since 2011, HFrEF LVEF 25-30% s/p MDT CRT-D with baseline CHB, VT/VF, a.fib s/p GIANFRANCO ligation/MAZE, MV/TV repair, PVC ablation 3/2024 intolerant to AADs in the past, recent admission 3/19 - 3/20/25 for AICD shocks initially presented to the [facility] emergency department on 3/21/2025, sent by heart failure specialist for ACID shock. Device reported successful ATP for VT 3/17 at 6:52pm followed by one ATP and 40J shock. He reported feeling dizzy and SOB during the events. He follows with Dr paula abreu for HF, currently undergoing transplant workup, Dr John for CRTD and VT/VF and  for genetic counseling. While admitted to [facility], he was started on a lidocaine gtt. On 3/21 when lidocaine weaned off patient had another two episodes of VT requiring AICD shocks. He was transferred to [facility] for further management.     COVID19/FLU/RSV PCR (3/29) Negative.   Urine legionella Ag (3/31) Negative.   GI PCR Panel (3/31) +Norovirus.     #Pre-Heart Transplant Evaluation, Norovirus Infection  HIV Ab/Ag NEG  HSV 1/2 IgG POS  VZV IgG POS  EBV Serology panel (or VCA IgG) POS  CMV IgGPOS  Hepatitis A IgG NEG  Hepatitis B surface Ig NEG  Hepatitis B core IgG NEG  Hepatitis B surface Antigen NEG  Hepatitis C Antibody NEG  Measles (Rubeola) IgG POS  Rubella IgG (Kiswahili Measles) POS  Mumps IgG POS  Syphilis screening (antitreponemal antibody with reflex to RPR) NEG  Quantiferon Gold NEG  oxoplasma IgG POS  Strongyloides IgG POS s/p 2 dose treatment in outpatient setting in 9/2024   hepatitis E Ab pos  Coccidiodes AB Negative  Serum Cryptococcal Ag Negative  Histoplasma Serum Ab Negative  -patient now with resolution of diarrhea and now with formed stools.  Would not routinely recommend repeating GI PCR to assess her positivity as positivity can persist due to detection of nonviable virus    #Encounter to Vaccinate Patient  COVID19: Would benefit from COVID19 [id number] Vaccine Dose  Influenza: Will require  RSV: 9/23/24  Pneumococcal: s/p PCV20  HAV: 9/23/24 Dose 1. HAS NOT RECEIVED DOSE 2. Given plan for transplant soon, would hold off at this time as patient will have reduced immunity for antibody formation. Will have to now wait some time in the post transplant period.   HBV: 9/23/24 Dose 1. Dose 2 administered by PCP sometime in December as per patient. Pleae re-check Hep B serologies now including Hep B quantitative abs   MMR: Immune, will not require further vaccination  Varicella: Immune, will not require further vaccination  Shingles: s/p Shingrix  Tdap:  9/23/24    No hx of MDRO organisms on review of cultures.     No contraindication for transplant from ID perspective at this time.     Case seen and discussed with Dr. Mathews who agrees with assessment and plan. Note not final until attending addendum.    69-year-old male patient past medical history of NICM since 2011, HFrEF LVEF 25-30% s/p MDT CRT-D with baseline CHB, VT/VF, a.fib s/p GIANFRANCO ligation/MAZE, MV/TV repair, PVC ablation 3/2024 intolerant to AADs in the past, recent admission 3/19 - 3/20/25 for AICD shocks initially presented to the [facility] emergency department on 3/21/2025, sent by heart failure specialist for ACID shock. Device reported successful ATP for VT 3/17 at 6:52pm followed by one ATP and 40J shock. He reported feeling dizzy and SOB during the events. He follows with Dr paula abreu for HF, currently undergoing transplant workup, Dr John for CRTD and VT/VF and  for genetic counseling. While admitted to [facility], he was started on a lidocaine gtt. On 3/21 when lidocaine weaned off patient had another two episodes of VT requiring AICD shocks. He was transferred to [facility] for further management.     COVID19/FLU/RSV PCR (3/29) Negative.   Urine legionella Ag (3/31) Negative.   GI PCR Panel (3/31) +Norovirus.     #Pre-Heart Transplant Evaluation, Norovirus Infection  HIV Ab/Ag NEG  HSV 1/2 IgG POS  VZV IgG POS  EBV Serology panel (or VCA IgG) POS  CMV IgGPOS  Hepatitis A IgG NEG  Hepatitis B surface Ig NEG  Hepatitis B core IgG NEG  Hepatitis B surface Antigen NEG  Hepatitis C Antibody NEG  Measles (Rubeola) IgG POS  Rubella IgG (Spanish Measles) POS  Mumps IgG POS  Syphilis screening (antitreponemal antibody with reflex to RPR) NEG  Quantiferon Gold NEG  oxoplasma IgG POS  Strongyloides IgG POS s/p 2 dose treatment in outpatient setting in 9/2024   hepatitis E Ab pos  Coccidiodes AB Negative  Serum Cryptococcal Ag Negative  Histoplasma Serum Ab Negative  -patient now with resolution of diarrhea and now with formed stools.  Would not routinely recommend repeating GI PCR to assess her positivity as positivity can persist due to detection of nonviable virus    #Encounter to Vaccinate Patient  COVID19: Would benefit from COVID19 [id number] Vaccine Dose  Influenza: Will require  RSV: 9/23/24  Pneumococcal: s/p PCV20  HAV: 9/23/24 Dose 1. HAS NOT RECEIVED DOSE 2. Given plan for transplant soon, would hold off at this time as patient will have reduced immunity for antibody formation. Will have to now wait some time in the post transplant period. Please recheck Hepatitis A serologies now prior to transplant.   HBV: 9/23/24 Dose 1. Dose 2 administered by PCP sometime in December as per patient. Pleae re-check Hep B serologies now including Hep B quantitative abs   MMR: Immune, will not require further vaccination  Varicella: Immune, will not require further vaccination  Shingles: s/p Shingrix  Tdap:  9/23/24    No hx of MDRO organisms on review of cultures.     No contraindication for transplant from ID perspective at this time.     Case seen and discussed with Dr. Mathews who agrees with assessment and plan. Note not final until attending addendum.

## 2025-04-17 NOTE — PROGRESS NOTE ADULT - PROBLEM SELECTOR PLAN 1
ACC/AHA stage D systolic heart failure.   ·  Plan: - Listed for heart transplant: deactivated 4/1 to status 7 due to norovirus infection but reactivated to status 2e after resolution of symptoms    - c/w Toprol XL 50mg daily.  - MRA held d/t reported weakness while taking; Will consider resumption at some point  - May consider resumption of afterload reducing agents once BP permits.   - currently torsemide 10mg QD; Please give x1 dose Bumex 2mg IVP then start Torsemide 10mg PO Three times weekly (M.W.F)  - strict I/Os and daily weights  - Keep K>4 and Mg>2  - PT as tolerated  - Psychiatry recs appreciated for anxiety.

## 2025-04-17 NOTE — PROGRESS NOTE ADULT - PROBLEM SELECTOR PLAN 2
- TTE 3/20/25 : LVEF 30%,transvalvular gradients are elevated with severe prosthetic Mitral Stenosis, no evidence of LV thrombus  - s/p recent admit 3/19/2025 w/ RHC found to have elevated filling pressures treated with IV diuretics  - GDMT: C/w Toprol XL 50mg daily; off Spironolactone due to prior dizziness; afterload reduction (hydralazine) on hold w/ borderline BPs  - Holding home Farxiga while inpatient  - uptrending Cr and with transaminitis suggestive of congestive hepatopathy. lactate wnl  - HF f/u appreciated. bumex 2mg IVP x1 now. increase torsemide 10mg daily to alternating 10mg/20mg doses  - Appreciate HF recs - status upgraded for transplant. pending possible OHT overnight - TTE 3/20/25 : LVEF 30%,transvalvular gradients are elevated with severe prosthetic Mitral Stenosis, no evidence of LV thrombus  - s/p recent admit 3/19/2025 w/ RHC found to have elevated filling pressures treated with IV diuretics  - GDMT: C/w Toprol XL 50mg daily; off Spironolactone due to prior dizziness; afterload reduction (hydralazine) on hold w/ borderline BPs  - Holding home Farxiga while inpatient  - uptrending Cr and with transaminitis suggestive of congestive hepatopathy. lactate wnl  - HF f/u appreciated. bumex 2mg IVP x1 now. increase torsemide 10mg daily to alternating 10mg/20mg doses  - Appreciate HF recs - status upgraded for transplant. pending possible OHT overnight. currently NPO.

## 2025-04-17 NOTE — PROGRESS NOTE ADULT - SUBJECTIVE AND OBJECTIVE BOX
Ariane Alejo MD  Division of Hospital Medicine  [facility]   Available on Microsoft Teams (Mon-Fri 8am-5pm)    * messages preferred prior to calls  Other Times:  [phone]      Patient is a 70y old  Male who presents with a chief complaint of VT (17 Apr 2025 12:34)      SUBJECTIVE / OVERNIGHT EVENTS: no acute events overnight. no fever, chills, chest pain, nor worsening dyspnea. unchanged fatigue. Cr uptrending with ongoing elevated LFTs though improved. informed later in day possible transplant tonight.  ADDITIONAL REVIEW OF SYSTEMS:    MEDICATIONS  (STANDING):  atorvastatin 10 milliGRAM(s) Oral at bedtime  cefepime   IVPB 1000 milliGRAM(s) IV Intermittent once  chlorhexidine 0.12% Liquid 5 milliLiter(s) Swish and Spit two times a day  chlorhexidine 2% Cloths 1 Application(s) Topical <User Schedule>  chlorhexidine 4% Liquid 1 Application(s) Topical <User Schedule>  chlorhexidine 4% Liquid 1 Application(s) Topical once  heparin   Injectable 5000 Unit(s) SubCutaneous every 8 hours  methylPREDNISolone sodium succinate IVPB 1000 milliGRAM(s) IV Intermittent once  metoprolol succinate ER 50 milliGRAM(s) Oral daily  mycophenolate mofetil IVPB 1000 milliGRAM(s) IV Intermittent once  psyllium Powder 1 Packet(s) Oral daily  quiNIDine gluconate  milliGRAM(s) Oral every 12 hours  tamsulosin 0.4 milliGRAM(s) Oral at bedtime  torsemide 20 milliGRAM(s) Oral <User Schedule>  torsemide 10 milliGRAM(s) Oral <User Schedule>  traZODone 100 milliGRAM(s) Oral at bedtime  vancomycin  IVPB 1000 milliGRAM(s) IV Intermittent once    MEDICATIONS  (PRN):  acetaminophen     Tablet .. 650 milliGRAM(s) Oral every 6 hours PRN Mild Pain (1 - 3), Moderate Pain (4 - 6)  artificial  tears Solution 1 Drop(s) Both EYES every 4 hours PRN Dry Eyes  guaiFENesin Oral Liquid (Sugar-Free) 200 milliGRAM(s) Oral every 6 hours PRN Cough      CAPILLARY BLOOD GLUCOSE        I&O's Summary    16 Apr 2025 07:01  -  17 Apr 2025 07:00  --------------------------------------------------------  IN: 1400 mL / OUT: 0 mL / NET: 1400 mL    17 Apr 2025 07:01  -  17 Apr 2025 15:51  --------------------------------------------------------  IN: 1000 mL / OUT: 0 mL / NET: 1000 mL        PHYSICAL EXAM:  Vital Signs Last 24 Hrs  T(C): 36.6 (17 Apr 2025 14:00), Max: 36.6 (16 Apr 2025 21:45)  T(F): 97.8 (17 Apr 2025 14:00), Max: 97.8 (16 Apr 2025 21:45)  HR: 80 (17 Apr 2025 14:00) (79 - 82)  BP: 111/72 (17 Apr 2025 14:00) (96/62 - 111/72)  BP(mean): --  RR: 18 (17 Apr 2025 14:00) (18 - 18)  SpO2: 95% (17 Apr 2025 14:00) (93% - 96%)    Parameters below as of 17 Apr 2025 14:00  Patient On (Oxygen Delivery Method): room air    CONSTITUTIONAL: NAD, well-developed, well-groomed  EYES: PERRLA; conjunctiva and sclera clear  ENMT: Moist oral mucosa, no pharyngeal injection or exudates; normal dentition  NECK: Supple, no palpable masses; no thyromegaly  RESPIRATORY: Normal respiratory effort; lungs are clear to auscultation bilaterally, no wheeze nor crackles  CARDIOVASCULAR: Regular rate and rhythm, normal S1 and S2, no murmur/rub/gallop; No lower extremity edema  ABDOMEN: Soft, Nondistended, Nontender to palpation, normoactive bowel sounds  MUSCULOSKELETAL: No clubbing or cyanosis of digits; no joint swelling or tenderness to palpation  PSYCH: A+O to person, place, and time; affect appropriate  NEUROLOGY: CN 2-12 are intact and symmetric; no gross sensory deficits   SKIN: No rashes; no palpable lesions    LABS:                        11.6   5.56  )-----------( 159      ( 17 Apr 2025 06:12 )             35.9     04-17    140  |  104  |  23  ----------------------------<  88  3.8   |  24  |  1.31[H]    Ca    9.1      17 Apr 2025 06:12  Phos  4.0     04-17  Mg     2.0     04-17    TPro  6.5  /  Alb  3.7  /  TBili  0.6  /  DBili  x   /  AST  36  /  ALT  87[H]  /  AlkPhos  86  04-17      Urinalysis Basic - ( 17 Apr 2025 06:12 )    Color: x / Appearance: x / SG: x / pH: x  Gluc: 88 mg/dL / Ketone: x  / Bili: x / Urobili: x   Blood: x / Protein: x / Nitrite: x   Leuk Esterase: x / RBC: x / WBC x   Sq Epi: x / Non Sq Epi: x / Bacteria: x          RADIOLOGY & ADDITIONAL TESTS:  Results Reviewed: uptrending Cr, LFTs improved but remain elevated, lactate wnl  Imaging Personally Reviewed:  Electrocardiogram Personally Reviewed:    COORDINATION OF CARE:  Care Discussed with Consultants/Other Providers [Y]: medicine YANETH Beavers  Prior or Outpatient Records Reviewed [Y/N]:

## 2025-04-17 NOTE — PROGRESS NOTE ADULT - NSPROGADDITIONALINFOA_GEN_ALL_CORE
.  Ariane Alejo MD  Division of Hospital Medicine  [facility]   Available on Microsoft Teams - messages preferred prior to calls.    Informed later in day, pending possible OHT overnight.    Plan discussed with patient, wife bedside, and medicine NP Ruthann.

## 2025-04-17 NOTE — PROGRESS NOTE ADULT - PROBLEM SELECTOR PLAN 1
- H/o ani mitral PVC (premature ventricular contractions) ablation 3/11/2024  - S/p Lidocaine gtt and one dose of Mexiletine (Mexiletine was d/dwayne for severe dizziness this admission)   - Appreciate EP evaluation for Vtach and CRT-D shock - CRTD interrogated on 3/28 - 2 self limited episodes of VT 4/5 in the morning that were asymptomatic  - C/w Toprol 50mg daily and Quinidine BID (pt previously did not tolerate amiodarone, mexiletine and sotalol in the past due to severe dizziness)  - 6b NSVT on tele on 4/15, no further episodes  - C/w tele monitor, monitor electrolytes

## 2025-04-17 NOTE — PRE-ANESTHESIA EVALUATION ADULT - NSRADCARDRESULTSFT_GEN_ALL_CORE
STACEY:    1. Left ventricular cavity is normal in size. Left ventricular wall thickness is normal. Left ventricular systolic function is severely decreased with an ejection fraction of 30 % by Mendoza's method of disks. Global left ventricular hypokinesis.   2. Multiple segmental abnormalities exist. See findings.   3. Elevated left ventricular filling pressure. Analysis of left ventricular diastolic function and filling pressure is made challenging by the presence of mitral annuloplasty ring.   4. Moderately enlarged right ventricular cavity size and mildly reduced right ventricular systolic function.   5. The right atrium is severely dilated.   6. Device lead is visualized in the right heart.   7. No pericardial effusion seen.   8. STACEY could be considered to further evaluated mitral annuloplasty.   9. An annuloplasty ring is noted in the mitral position. Transvalvular mitral gradients are elevated. Severe prosthetic mitral stenosis. There is trace intravalvular mitral regurgitation.  10. An annuloplasty ring is noted in the tricuspid position.  11. Estimated pulmonary artery systolic pressure is 36 mmHg.  12. Technically difficult image quality.  13. There is no evidence of a left ventricular thrombus.    PA systolic pressure 58 on RHC

## 2025-04-17 NOTE — PROGRESS NOTE ADULT - ATTENDING COMMENTS
Patient seen and examined with DR Cates    I agree with her interval history exam and plans as noted above    No contra indications to planned heart transplant from an ID perspective    Dhiraj Mathews MD  Can be called via Teams  After 5pm/weekends [phone]

## 2025-04-17 NOTE — PROGRESS NOTE ADULT - PROBLEM SELECTOR PLAN 8
DVT ppx: hep subc    Dispo: Pending clinical course DVT ppx: hold hep subc for possible OR    Dispo: Pending clinical course

## 2025-04-17 NOTE — PROGRESS NOTE ADULT - ASSESSMENT
70M PMHx NICM since 2011, HFrEF (25-30%) s/p MDT CRT-D with baseline CHB , VT/VF, afib s/p GIANFRANCO ligation/MAZE, MV/TV repair, PVC ablation (3/2024) was transferred from outside hospital to [facility] due to  VT/AICD shock.   While admitted to the outside hospital, he was started on a lidocaine gtt. On 3/21 when lidocaine weaned off patient had recurrence of VT requiring AICD shocks. Thus transferred to [facility] for further management. Pt  was on Lidocaine gtt for control and is now on Quinidine and uptitrated Toprol for anti-arrythmia. Because of pt's refractory VT pt's listing status for a Heart Transplant was bumped from 6 to 2E temporarily. Pt will remain inpatient for listing purposes and managed closely by HF team and hence was transferred to [facility] on 3/27/25. Pending possible OHT tonight.
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
## 2025-04-18 LAB
ALBUMIN SERPL ELPH-MCNC: 4 G/DL — SIGNIFICANT CHANGE UP (ref 3.3–5)
ALP SERPL-CCNC: 90 U/L — SIGNIFICANT CHANGE UP (ref 40–120)
ALT FLD-CCNC: 77 U/L — HIGH (ref 10–45)
ANION GAP SERPL CALC-SCNC: 13 MMOL/L — SIGNIFICANT CHANGE UP (ref 5–17)
APTT BLD: 34.9 SEC — SIGNIFICANT CHANGE UP (ref 24.5–35.6)
AST SERPL-CCNC: 26 U/L — SIGNIFICANT CHANGE UP (ref 10–40)
BILIRUB SERPL-MCNC: 0.8 MG/DL — SIGNIFICANT CHANGE UP (ref 0.2–1.2)
BLD GP AB SCN SERPL QL: NEGATIVE — SIGNIFICANT CHANGE UP
BUN SERPL-MCNC: 22 MG/DL — SIGNIFICANT CHANGE UP (ref 7–23)
CALCIUM SERPL-MCNC: 9.3 MG/DL — SIGNIFICANT CHANGE UP (ref 8.4–10.5)
CHLORIDE SERPL-SCNC: 101 MMOL/L — SIGNIFICANT CHANGE UP (ref 96–108)
CMV IGG FLD QL: >10 U/ML — HIGH
CMV IGG SERPL-IMP: POSITIVE
CO2 SERPL-SCNC: 27 MMOL/L — SIGNIFICANT CHANGE UP (ref 22–31)
CREAT SERPL-MCNC: 1.23 MG/DL — SIGNIFICANT CHANGE UP (ref 0.5–1.3)
EGFR: 63 ML/MIN/1.73M2 — SIGNIFICANT CHANGE UP
EGFR: 63 ML/MIN/1.73M2 — SIGNIFICANT CHANGE UP
GLUCOSE SERPL-MCNC: 85 MG/DL — SIGNIFICANT CHANGE UP (ref 70–99)
HBV CORE AB SER-ACNC: SIGNIFICANT CHANGE UP
HBV DNA # SERPL NAA+PROBE: SIGNIFICANT CHANGE UP
HBV DNA SERPL NAA+PROBE-LOG#: SIGNIFICANT CHANGE UP LOGIU/ML
HBV SURFACE AB SER-ACNC: REACTIVE — SIGNIFICANT CHANGE UP
HCT VFR BLD CALC: 36.7 % — LOW (ref 39–50)
HCV RNA SPEC NAA+PROBE-LOG IU: SIGNIFICANT CHANGE UP
HCV RNA SPEC NAA+PROBE-LOG IU: SIGNIFICANT CHANGE UP LOGIU/ML
HGB BLD-MCNC: 12.4 G/DL — LOW (ref 13–17)
HIV 1+2 AB+HIV1 P24 AG SERPL QL IA: SIGNIFICANT CHANGE UP
HIV-1 VIRAL LOAD RESULT: SIGNIFICANT CHANGE UP
HIV1 RNA # SERPL NAA+PROBE: SIGNIFICANT CHANGE UP COPIES/ML
HIV1 RNA SER-IMP: SIGNIFICANT CHANGE UP
HIV1 RNA SERPL NAA+PROBE-ACNC: SIGNIFICANT CHANGE UP
HIV1 RNA SERPL NAA+PROBE-LOG#: SIGNIFICANT CHANGE UP LG COP/ML
INR BLD: 1.23 RATIO — HIGH (ref 0.85–1.16)
MAGNESIUM SERPL-MCNC: 2.1 MG/DL — SIGNIFICANT CHANGE UP (ref 1.6–2.6)
MCHC RBC-ENTMCNC: 29.7 PG — SIGNIFICANT CHANGE UP (ref 27–34)
MCHC RBC-ENTMCNC: 33.8 G/DL — SIGNIFICANT CHANGE UP (ref 32–36)
MCV RBC AUTO: 88 FL — SIGNIFICANT CHANGE UP (ref 80–100)
MRSA PCR RESULT.: SIGNIFICANT CHANGE UP
NRBC BLD AUTO-RTO: 0 /100 WBCS — SIGNIFICANT CHANGE UP (ref 0–0)
PHOSPHATE SERPL-MCNC: 3.9 MG/DL — SIGNIFICANT CHANGE UP (ref 2.5–4.5)
PLATELET # BLD AUTO: 155 K/UL — SIGNIFICANT CHANGE UP (ref 150–400)
POTASSIUM SERPL-MCNC: 3.7 MMOL/L — SIGNIFICANT CHANGE UP (ref 3.5–5.3)
POTASSIUM SERPL-SCNC: 3.7 MMOL/L — SIGNIFICANT CHANGE UP (ref 3.5–5.3)
PROT SERPL-MCNC: 6.8 G/DL — SIGNIFICANT CHANGE UP (ref 6–8.3)
PROTHROM AB SERPL-ACNC: 14.1 SEC — HIGH (ref 9.9–13.4)
RBC # BLD: 4.17 M/UL — LOW (ref 4.2–5.8)
RBC # FLD: 13.8 % — SIGNIFICANT CHANGE UP (ref 10.3–14.5)
RH IG SCN BLD-IMP: POSITIVE — SIGNIFICANT CHANGE UP
S AUREUS DNA NOSE QL NAA+PROBE: DETECTED
SODIUM SERPL-SCNC: 141 MMOL/L — SIGNIFICANT CHANGE UP (ref 135–145)
WBC # BLD: 5.32 K/UL — SIGNIFICANT CHANGE UP (ref 3.8–10.5)
WBC # FLD AUTO: 5.32 K/UL — SIGNIFICANT CHANGE UP (ref 3.8–10.5)

## 2025-04-18 PROCEDURE — 99233 SBSQ HOSP IP/OBS HIGH 50: CPT

## 2025-04-18 RX ORDER — SENNA 187 MG
2 TABLET ORAL AT BEDTIME
Refills: 0 | Status: DISCONTINUED | OUTPATIENT
Start: 2025-04-18 | End: 2025-04-29

## 2025-04-18 RX ORDER — POLYETHYLENE GLYCOL 3350 17 G/17G
17 POWDER, FOR SOLUTION ORAL DAILY
Refills: 0 | Status: DISCONTINUED | OUTPATIENT
Start: 2025-04-18 | End: 2025-04-29

## 2025-04-18 RX ORDER — BISACODYL 5 MG
10 TABLET, DELAYED RELEASE (ENTERIC COATED) ORAL DAILY
Refills: 0 | Status: DISCONTINUED | OUTPATIENT
Start: 2025-04-18 | End: 2025-04-29

## 2025-04-18 RX ORDER — HEPARIN SODIUM 1000 [USP'U]/ML
5000 INJECTION INTRAVENOUS; SUBCUTANEOUS EVERY 8 HOURS
Refills: 0 | Status: DISCONTINUED | OUTPATIENT
Start: 2025-04-18 | End: 2025-04-29

## 2025-04-18 RX ADMIN — Medication 5 MILLILITER(S): at 05:57

## 2025-04-18 RX ADMIN — Medication 20 MILLIGRAM(S): at 10:55

## 2025-04-18 RX ADMIN — Medication 100 MILLIGRAM(S): at 21:48

## 2025-04-18 RX ADMIN — Medication 324 MILLIGRAM(S): at 05:56

## 2025-04-18 RX ADMIN — METOPROLOL SUCCINATE 50 MILLIGRAM(S): 50 TABLET, EXTENDED RELEASE ORAL at 05:56

## 2025-04-18 RX ADMIN — Medication 324 MILLIGRAM(S): at 17:36

## 2025-04-18 RX ADMIN — Medication 1 APPLICATION(S): at 05:58

## 2025-04-18 RX ADMIN — TAMSULOSIN HYDROCHLORIDE 0.4 MILLIGRAM(S): 0.4 CAPSULE ORAL at 21:48

## 2025-04-18 RX ADMIN — Medication 1 PACKET(S): at 11:16

## 2025-04-18 RX ADMIN — ATORVASTATIN CALCIUM 10 MILLIGRAM(S): 80 TABLET, FILM COATED ORAL at 21:49

## 2025-04-18 NOTE — PROGRESS NOTE ADULT - SUBJECTIVE AND OBJECTIVE BOX
ADVANCED HEART FAILURE & TRANSPLANT  - PROGRESS NOTE  *To reach the NS1 Team from 8am to 5pm (MON-FRI), please call [phone],   _______________________________________________________________________________________________________     Subjective:  - NAEO    Medications:  acetaminophen     Tablet .. 650 milliGRAM(s) Oral every 6 hours PRN  artificial  tears Solution 1 Drop(s) Both EYES every 4 hours PRN  atorvastatin 10 milliGRAM(s) Oral at bedtime  cefepime   IVPB 1000 milliGRAM(s) IV Intermittent once  chlorhexidine 0.12% Liquid 5 milliLiter(s) Swish and Spit two times a day  chlorhexidine 2% Cloths 1 Application(s) Topical <User Schedule>  chlorhexidine 4% Liquid 1 Application(s) Topical <User Schedule>  guaiFENesin Oral Liquid (Sugar-Free) 200 milliGRAM(s) Oral every 6 hours PRN  heparin   Injectable 5000 Unit(s) SubCutaneous every 8 hours  methylPREDNISolone sodium succinate IVPB 1000 milliGRAM(s) IV Intermittent once  metoprolol succinate ER 50 milliGRAM(s) Oral daily  mycophenolate mofetil IVPB 1000 milliGRAM(s) IV Intermittent once  psyllium Powder 1 Packet(s) Oral daily  quiNIDine gluconate  milliGRAM(s) Oral every 12 hours  tamsulosin 0.4 milliGRAM(s) Oral at bedtime  torsemide 20 milliGRAM(s) Oral <User Schedule>  torsemide 10 milliGRAM(s) Oral <User Schedule>  traZODone 100 milliGRAM(s) Oral at bedtime      Physical Exam:    Vitals:  Vital Signs Last 24 Hours  T(C): 36.6 (25 @ 11:08), Max: 36.6 (25 @ 19:27)  HR: 81 (25 @ 11:08) (77 - 81)  BP: 96/64 (25 @ 11:08) (96/64 - 111/78)  RR: 18 (25 @ 11:08) ( - )  SpO2: 94% (25 @ 11:08) (94% - 96%)    Weight in k ( @ 12:00)    I&O's Summary    2025 07:  -  2025 07:00  --------------------------------------------------------  IN: 1000 mL / OUT: 0 mL / NET: 1000 mL    2025 07:01  -  2025 15:00  --------------------------------------------------------  IN: 940 mL / OUT: 0 mL / NET: 940 mL    Tele:  70-80    General: No distress. Comfortable.  HEENT: EOM intact.  Neck: Neck supple. JVP not elevated. No masses  Chest: Clear to auscultation bilaterally  CV: Normal S1 and S2. No murmurs, rub, or gallops. Radial pulses normal, warm peripherally  Abdomen: Soft, non-distended, non-tender  Skin: No rashes or skin breakdown  Extremities: No LE edema  Neurology: Alert and oriented times three. Sensation intact  Psych: Affect normal    Labs:                        12.4   5.32  )-----------( 155      ( 2025 07:18 )             36.7     18    141  |  101  |  22  ----------------------------<  85  3.7   |  27  |  1.23    Ca    9.3      2025 07:17  Phos  3.9     18  Mg     2.1     18    TPro  6.8  /  Alb  4.0  /  TBili  0.8  /  DBili  x   /  AST  26  /  ALT  77[H]  /  AlkPhos  90  04-18  PT/INR - ( 2025 07:18 )   PT: 14.1 sec;   INR: 1.23 ratio    PTT - ( 2025 07:18 )  PTT:34.9 sec  Lactate, Blood: 0.8 mmol/L ( @ 06:12)

## 2025-04-18 NOTE — PROGRESS NOTE ADULT - PROBLEM SELECTOR PLAN 5
- BP is borderline (90-100s)  - C/w Toprol, diuretics as above  - Monitor blood pressures - BP is borderline (90-100s)  - C/w Toprol XL, diuretics as above  - Monitor blood pressures

## 2025-04-18 NOTE — PROGRESS NOTE ADULT - PROBLEM SELECTOR PLAN 4
- TTE 3/20/25 : LVEF 30%,transvalvular gradients are elevated with SEVERE  prosthetic MS, no evidence of LV thrombus  - s/p mitral and tricuspid ring repair  - Awaiting possible transplant - TTE 3/20/25 : LVEF 30%,transvalvular gradients are elevated with SEVERE prosthetic MS, no evidence of LV thrombus  - s/p mitral and tricuspid ring repair  - Awaiting possible transplant

## 2025-04-18 NOTE — PROGRESS NOTE ADULT - ASSESSMENT
70-year-old male ABO O past medical history of NICM since 2011 HFrEF (LVEF 25-30%) s/p MDT CRT-D with baseline CHB, VT/VF, AFs/p GIANFRANCO ligation/MAZE, MV/TV repair, PVC ablation 3/2024 intolerant to AADs in the past, recent admission 3/19/2025-3/20/2025 for AICD shocks initially presented to the [facility] ED on 3/21/2025, sent by HF specialist for ACID shock. Device reported successful ATP for VT 3/17/2025 at 6:52pm followed by one ATP & 40J shock. He reported feeling dizzy & SOB during the events. He follows with Dr. Luca Tamayo for HF, currently undergoing transplant workup, Dr John for CRTD and VT/VF and Dr. Chu for genetic counseling. While admitted to [facility], he was started on a lidocaine gtt. On 3/21 when lidocaine weaned off patient had another two episodes of VT requiring AICD shocks. He was transferred to [facility] for further management. He was initially maintained on lidocaine gtt from 3/21/2025-3/25/2025 & his listing status was upgraded to UNOS status 2e for heart transplant (ABO O) for the refractory VT. He was transitioned to oral antiarrhythmics (Toprol XL & quinidine) & ultimately transferred from CICU to tele floor on 3/27/2025. Hospital course was further c/b development of watery diarrhea & was found to have +norovirus on 3/31/2025 which prompted deactivation to status 7 listing for heart transplant. Status reinstated to 2E after diarrhea resolved.     He has been electrically quiescent since 4/4 on PO antiarrhythmics. Near euvolemic and on oral diuretics. Diarrhea resolved. He is currently reactivated 4/2 for UNOS status 2e.    Bedside hemodynamics:  3/22/25 BP 97/76/83, HR 80, CVP 6, PA 58/23/38, PCWP 20, SVR 1100, Gucci CO/CI 5.1/2.6, TD 4/2.08    Cardiac Studies:   TTE 3/20/25 : LVEF 30%, segmental, LVIDd 5.3, walls normal, elevated LV filling pressures, mod RVE with mildly reduced RV function, TAPSE 1.7, severely dilated RA, annuloplasty in MV position, transvalvular gradients are elevated with severe prosthetic MS (peak gradient 15.7, mean gradient 8), trace intravalvular MR, TV annuloplasty ring noted, mild TR,  est PASP 36, no evidence of LV thrombus  TTE 10/2024: LVEF 25-30%, LVEDD 5.9cm, moderately reduced RV function, annuloplasty ring of mitral and tricuspid position, PASP 47 mmHg  RHC 3/19/25- RA 11 PA 58/26 PCWP 32 CO/CI 5.43/2.77  [facility] 6/2023 Nonobstructive CAD

## 2025-04-18 NOTE — PROGRESS NOTE ADULT - SUBJECTIVE AND OBJECTIVE BOX
[facility] Division of Hospital Medicine  Harsh Arias MD M-F, 8A-5P: MS Teams  Other Times: HIC Extension / HIC Pager      Patient is a 70y old  Male who presents with a chief complaint of VT (17 Apr 2025 17:27)      SUBJECTIVE / OVERNIGHT EVENTS:  Patient was examined    ADDITIONAL REVIEW OF SYSTEMS:    MEDICATIONS  (STANDING):  atorvastatin 10 milliGRAM(s) Oral at bedtime  cefepime   IVPB 1000 milliGRAM(s) IV Intermittent once  chlorhexidine 0.12% Liquid 5 milliLiter(s) Swish and Spit two times a day  chlorhexidine 2% Cloths 1 Application(s) Topical <User Schedule>  chlorhexidine 4% Liquid 1 Application(s) Topical <User Schedule>  methylPREDNISolone sodium succinate IVPB 1000 milliGRAM(s) IV Intermittent once  metoprolol succinate ER 50 milliGRAM(s) Oral daily  mycophenolate mofetil IVPB 1000 milliGRAM(s) IV Intermittent once  psyllium Powder 1 Packet(s) Oral daily  quiNIDine gluconate  milliGRAM(s) Oral every 12 hours  tamsulosin 0.4 milliGRAM(s) Oral at bedtime  torsemide 20 milliGRAM(s) Oral <User Schedule>  torsemide 10 milliGRAM(s) Oral <User Schedule>  traZODone 100 milliGRAM(s) Oral at bedtime    MEDICATIONS  (PRN):  acetaminophen     Tablet .. 650 milliGRAM(s) Oral every 6 hours PRN Mild Pain (1 - 3), Moderate Pain (4 - 6)  artificial  tears Solution 1 Drop(s) Both EYES every 4 hours PRN Dry Eyes  guaiFENesin Oral Liquid (Sugar-Free) 200 milliGRAM(s) Oral every 6 hours PRN Cough      CAPILLARY BLOOD GLUCOSE      POCT Blood Glucose.: 96 mg/dL (17 Apr 2025 22:15)      I&O's Summary    17 Apr 2025 07:01  -  18 Apr 2025 07:00  --------------------------------------------------------  IN: 1000 mL / OUT: 0 mL / NET: 1000 mL        Daily Height in cm: 170.18 (17 Apr 2025 19:27)    Daily     PHYSICAL EXAM:  Vital Signs Last 24 Hrs  T(C): 36.6 (18 Apr 2025 11:08), Max: 36.6 (17 Apr 2025 14:00)  T(F): 97.8 (18 Apr 2025 11:08), Max: 97.9 (17 Apr 2025 21:27)  HR: 81 (18 Apr 2025 11:08) (77 - 81)  BP: 96/64 (18 Apr 2025 11:08) (96/64 - 111/78)  BP(mean): 65 (17 Apr 2025 19:27) (65 - 65)  RR: 18 (18 Apr 2025 11:08) (18 - 18)  SpO2: 94% (18 Apr 2025 11:08) (94% - 96%)    Parameters below as of 18 Apr 2025 11:08  Patient On (Oxygen Delivery Method): room air      CONSTITUTIONAL: NAD, well-developed, well-groomed  EYES: PERRLA; conjunctiva and sclera clear  ENMT: Moist oral mucosa, no pharyngeal injection or exudates; normal dentition  NECK: Supple, no palpable masses; no thyromegaly  RESPIRATORY: Normal respiratory effort; lungs are clear to auscultation bilaterally  CARDIOVASCULAR: Regular rate and rhythm, normal S1 and S2, no murmur/rub/gallop; No lower extremity edema; Peripheral pulses are 2+ bilaterally  ABDOMEN: Nontender to palpation, normoactive bowel sounds, no rebound/guarding; No hepatosplenomegaly  MUSCULOSKELETAL:  no clubbing or cyanosis of digits; no joint swelling or tenderness to palpation, moving all extremities   PSYCH: A+O to person, place, and time; affect appropriate  NEUROLOGY: CN 2-12 are intact and symmetric; no gross sensory/motor deficits   SKIN: No rashes; no palpable lesions    LABS:                        12.4   5.32  )-----------( 155      ( 18 Apr 2025 07:18 )             36.7     04-18    141  |  101  |  22  ----------------------------<  85  3.7   |  27  |  1.23    Ca    9.3      18 Apr 2025 07:17  Phos  3.9     04-18  Mg     2.1     04-18    TPro  6.8  /  Alb  4.0  /  TBili  0.8  /  DBili  x   /  AST  26  /  ALT  77[H]  /  AlkPhos  90  04-18    LIVER FUNCTIONS - ( 18 Apr 2025 07:17 )  Alb: 4.0 g/dL / Pro: 6.8 g/dL / ALK PHOS: 90 U/L / ALT: 77 U/L / AST: 26 U/L / GGT: x           PT/INR - ( 18 Apr 2025 07:18 )   PT: 14.1 sec;   INR: 1.23 ratio         PTT - ( 18 Apr 2025 07:18 )  PTT:34.9 sec      Urinalysis Basic - ( 18 Apr 2025 07:17 )    Color: x / Appearance: x / SG: x / pH: x  Gluc: 85 mg/dL / Ketone: x  / Bili: x / Urobili: x   Blood: x / Protein: x / Nitrite: x   Leuk Esterase: x / RBC: x / WBC x   Sq Epi: x / Non Sq Epi: x / Bacteria: x            RADIOLOGY & ADDITIONAL TESTS:  Results Reviewed:   Imaging Personally Reviewed:  Electrocardiogram Personally Reviewed:    COORDINATION OF CARE:  Care Discussed with Consultants/Other Providers [Y/N]:  Prior or Outpatient Records Reviewed [Y/N]:   [facility] Division of Hospital Medicine  Harsh Arias MD M-F, 8A-5P: MS Teams  Other Times: HIC Extension / HIC Pager      Patient is a 70y old  Male who presents with a chief complaint of VT (17 Apr 2025 17:27)      SUBJECTIVE / OVERNIGHT EVENTS:  Patient was examined today. On ROS he states he had some headache, denies fever, chills, dizziness, chest pain, cough, dyspnea, N/V, abd pain. States he had BM yesterday, ambulating without issue.     ADDITIONAL REVIEW OF SYSTEMS:    MEDICATIONS  (STANDING):  atorvastatin 10 milliGRAM(s) Oral at bedtime  cefepime   IVPB 1000 milliGRAM(s) IV Intermittent once  chlorhexidine 0.12% Liquid 5 milliLiter(s) Swish and Spit two times a day  chlorhexidine 2% Cloths 1 Application(s) Topical <User Schedule>  chlorhexidine 4% Liquid 1 Application(s) Topical <User Schedule>  methylPREDNISolone sodium succinate IVPB 1000 milliGRAM(s) IV Intermittent once  metoprolol succinate ER 50 milliGRAM(s) Oral daily  mycophenolate mofetil IVPB 1000 milliGRAM(s) IV Intermittent once  psyllium Powder 1 Packet(s) Oral daily  quiNIDine gluconate  milliGRAM(s) Oral every 12 hours  tamsulosin 0.4 milliGRAM(s) Oral at bedtime  torsemide 20 milliGRAM(s) Oral <User Schedule>  torsemide 10 milliGRAM(s) Oral <User Schedule>  traZODone 100 milliGRAM(s) Oral at bedtime    MEDICATIONS  (PRN):  acetaminophen     Tablet .. 650 milliGRAM(s) Oral every 6 hours PRN Mild Pain (1 - 3), Moderate Pain (4 - 6)  artificial  tears Solution 1 Drop(s) Both EYES every 4 hours PRN Dry Eyes  guaiFENesin Oral Liquid (Sugar-Free) 200 milliGRAM(s) Oral every 6 hours PRN Cough      CAPILLARY BLOOD GLUCOSE      POCT Blood Glucose.: 96 mg/dL (17 Apr 2025 22:15)      I&O's Summary    17 Apr 2025 07:01  -  18 Apr 2025 07:00  --------------------------------------------------------  IN: 1000 mL / OUT: 0 mL / NET: 1000 mL        Daily Height in cm: 170.18 (17 Apr 2025 19:27)    Daily     PHYSICAL EXAM:  Vital Signs Last 24 Hrs  T(C): 36.6 (18 Apr 2025 11:08), Max: 36.6 (17 Apr 2025 14:00)  T(F): 97.8 (18 Apr 2025 11:08), Max: 97.9 (17 Apr 2025 21:27)  HR: 81 (18 Apr 2025 11:08) (77 - 81)  BP: 96/64 (18 Apr 2025 11:08) (96/64 - 111/78)  BP(mean): 65 (17 Apr 2025 19:27) (65 - 65)  RR: 18 (18 Apr 2025 11:08) (18 - 18)  SpO2: 94% (18 Apr 2025 11:08) (94% - 96%)    Parameters below as of 18 Apr 2025 11:08  Patient On (Oxygen Delivery Method): room air      CONSTITUTIONAL: NAD, well-developed, well-groomed  EYES: PERRLA; conjunctiva and sclera clear  ENMT: Moist oral mucosa, no pharyngeal injection or exudates; normal dentition  NECK: Supple, no palpable masses; no thyromegaly  RESPIRATORY: Normal respiratory effort; lungs are clear to auscultation bilaterally, no wheeze nor crackles  CARDIOVASCULAR: Regular rate and rhythm, normal S1 and S2, no murmur/rub/gallop; No lower extremity edema  ABDOMEN: Soft, Nondistended, Nontender to palpation, normoactive bowel sounds  MUSCULOSKELETAL: No clubbing or cyanosis of digits; no joint swelling or tenderness to palpation  PSYCH: A+O to person, place, and time; affect appropriate  NEUROLOGY: CN 2-12 are intact and symmetric; no gross sensory deficits   SKIN: No rashes; no palpable lesions    LABS:                        12.4   5.32  )-----------( 155      ( 18 Apr 2025 07:18 )             36.7     04-18    141  |  101  |  22  ----------------------------<  85  3.7   |  27  |  1.23    Ca    9.3      18 Apr 2025 07:17  Phos  3.9     04-18  Mg     2.1     04-18    TPro  6.8  /  Alb  4.0  /  TBili  0.8  /  DBili  x   /  AST  26  /  ALT  77[H]  /  AlkPhos  90  04-18    LIVER FUNCTIONS - ( 18 Apr 2025 07:17 )  Alb: 4.0 g/dL / Pro: 6.8 g/dL / ALK PHOS: 90 U/L / ALT: 77 U/L / AST: 26 U/L / GGT: x           PT/INR - ( 18 Apr 2025 07:18 )   PT: 14.1 sec;   INR: 1.23 ratio         PTT - ( 18 Apr 2025 07:18 )  PTT:34.9 sec      Urinalysis Basic - ( 18 Apr 2025 07:17 )    Color: x / Appearance: x / SG: x / pH: x  Gluc: 85 mg/dL / Ketone: x  / Bili: x / Urobili: x   Blood: x / Protein: x / Nitrite: x   Leuk Esterase: x / RBC: x / WBC x   Sq Epi: x / Non Sq Epi: x / Bacteria: x            RADIOLOGY & ADDITIONAL TESTS:  Results Reviewed:   Imaging Personally Reviewed:  Electrocardiogram Personally Reviewed:    COORDINATION OF CARE:  Care Discussed with Consultants/Other Providers [Y/N]:  Prior or Outpatient Records Reviewed [Y/N]:   [facility] Division of Hospital Medicine  Harsh Arias MD M-F, 8A-5P: MS Teams  Other Times: HIC Extension / HIC Pager      Patient is a 70y old  Male who presents with a chief complaint of VT (17 Apr 2025 17:27)      SUBJECTIVE / OVERNIGHT EVENTS:  Patient was examined today. On ROS he states he had some headache, denies fever, chills, dizziness, chest pain, cough, dyspnea, N/V, abd pain, swelling. States he had BM yesterday, ambulating without issue.     ADDITIONAL REVIEW OF SYSTEMS:    MEDICATIONS  (STANDING):  atorvastatin 10 milliGRAM(s) Oral at bedtime  cefepime   IVPB 1000 milliGRAM(s) IV Intermittent once  chlorhexidine 0.12% Liquid 5 milliLiter(s) Swish and Spit two times a day  chlorhexidine 2% Cloths 1 Application(s) Topical <User Schedule>  chlorhexidine 4% Liquid 1 Application(s) Topical <User Schedule>  methylPREDNISolone sodium succinate IVPB 1000 milliGRAM(s) IV Intermittent once  metoprolol succinate ER 50 milliGRAM(s) Oral daily  mycophenolate mofetil IVPB 1000 milliGRAM(s) IV Intermittent once  psyllium Powder 1 Packet(s) Oral daily  quiNIDine gluconate  milliGRAM(s) Oral every 12 hours  tamsulosin 0.4 milliGRAM(s) Oral at bedtime  torsemide 20 milliGRAM(s) Oral <User Schedule>  torsemide 10 milliGRAM(s) Oral <User Schedule>  traZODone 100 milliGRAM(s) Oral at bedtime    MEDICATIONS  (PRN):  acetaminophen     Tablet .. 650 milliGRAM(s) Oral every 6 hours PRN Mild Pain (1 - 3), Moderate Pain (4 - 6)  artificial  tears Solution 1 Drop(s) Both EYES every 4 hours PRN Dry Eyes  guaiFENesin Oral Liquid (Sugar-Free) 200 milliGRAM(s) Oral every 6 hours PRN Cough      CAPILLARY BLOOD GLUCOSE      POCT Blood Glucose.: 96 mg/dL (17 Apr 2025 22:15)      I&O's Summary    17 Apr 2025 07:01  -  18 Apr 2025 07:00  --------------------------------------------------------  IN: 1000 mL / OUT: 0 mL / NET: 1000 mL        Daily Height in cm: 170.18 (17 Apr 2025 19:27)    Daily     PHYSICAL EXAM:  Vital Signs Last 24 Hrs  T(C): 36.6 (18 Apr 2025 11:08), Max: 36.6 (17 Apr 2025 14:00)  T(F): 97.8 (18 Apr 2025 11:08), Max: 97.9 (17 Apr 2025 21:27)  HR: 81 (18 Apr 2025 11:08) (77 - 81)  BP: 96/64 (18 Apr 2025 11:08) (96/64 - 111/78)  BP(mean): 65 (17 Apr 2025 19:27) (65 - 65)  RR: 18 (18 Apr 2025 11:08) (18 - 18)  SpO2: 94% (18 Apr 2025 11:08) (94% - 96%)    Parameters below as of 18 Apr 2025 11:08  Patient On (Oxygen Delivery Method): room air      CONSTITUTIONAL: NAD, well-developed, well-groomed  EYES: PERRLA; conjunctiva and sclera clear  ENMT: Moist oral mucosa, no pharyngeal injection or exudates; normal dentition  NECK: Supple, no palpable masses; no thyromegaly  RESPIRATORY: Normal respiratory effort; lungs are clear to auscultation bilaterally, no wheeze nor crackles  CARDIOVASCULAR: Regular rate and rhythm, normal S1 and S2, no murmur/rub/gallop; No lower extremity edema  ABDOMEN: Soft, Nondistended, Nontender to palpation, normoactive bowel sounds  MUSCULOSKELETAL: No clubbing or cyanosis of digits; no joint swelling or tenderness to palpation  PSYCH: A+O to person, place, and time; affect appropriate  NEUROLOGY: CN 2-12 are intact and symmetric; no gross sensory deficits   SKIN: No rashes; no palpable lesions    LABS:                        12.4   5.32  )-----------( 155      ( 18 Apr 2025 07:18 )             36.7     04-18    141  |  101  |  22  ----------------------------<  85  3.7   |  27  |  1.23    Ca    9.3      18 Apr 2025 07:17  Phos  3.9     04-18  Mg     2.1     04-18    TPro  6.8  /  Alb  4.0  /  TBili  0.8  /  DBili  x   /  AST  26  /  ALT  77[H]  /  AlkPhos  90  04-18    LIVER FUNCTIONS - ( 18 Apr 2025 07:17 )  Alb: 4.0 g/dL / Pro: 6.8 g/dL / ALK PHOS: 90 U/L / ALT: 77 U/L / AST: 26 U/L / GGT: x           PT/INR - ( 18 Apr 2025 07:18 )   PT: 14.1 sec;   INR: 1.23 ratio         PTT - ( 18 Apr 2025 07:18 )  PTT:34.9 sec      Urinalysis Basic - ( 18 Apr 2025 07:17 )    Color: x / Appearance: x / SG: x / pH: x  Gluc: 85 mg/dL / Ketone: x  / Bili: x / Urobili: x   Blood: x / Protein: x / Nitrite: x   Leuk Esterase: x / RBC: x / WBC x   Sq Epi: x / Non Sq Epi: x / Bacteria: x            RADIOLOGY & ADDITIONAL TESTS:  Results Reviewed:   Imaging Personally Reviewed:  Electrocardiogram Personally Reviewed:    COORDINATION OF CARE:  Care Discussed with Consultants/Other Providers [Y/N]:  Prior or Outpatient Records Reviewed [Y/N]:

## 2025-04-18 NOTE — PROGRESS NOTE ADULT - PROBLEM SELECTOR PLAN 1
- H/o ani mitral PVC (premature ventricular contractions) ablation 3/11/2024  - S/p Lidocaine gtt and one dose of Mexiletine (Mexiletine was d/dwayne for severe dizziness this admission)   - Appreciate EP evaluation for Vtach and CRT-D shock - CRTD interrogated on 3/28 - 2 self limited episodes of VT 4/5 in the morning that were asymptomatic  - C/w Toprol 50mg daily and Quinidine BID (pt previously did not tolerate amiodarone, mexiletine and sotalol in the past due to severe dizziness)  - 6b NSVT on tele on 4/15, no further episodes  - C/w tele monitor, monitor electrolytes - H/o ani mitral PVC (premature ventricular contractions) ablation 3/11/2024  - S/p Lidocaine gtt and one dose of Mexiletine (Mexiletine was d/dwayne for severe dizziness this admission)   - Appreciate EP evaluation for Vtach and CRT-D shock - CRTD interrogated on 3/28 - 2 self limited episodes of VT 4/5 in the morning that were asymptomatic  - C/w Toprol 50mg daily and Quinidine BID (pt previously did not tolerate amiodarone, mexiletine and sotalol in the past due to severe dizziness)  - 6b NSVT on tele on 4/15, no further episodes reported so far.   - C/w tele monitor, monitor electrolytes

## 2025-04-18 NOTE — PROGRESS NOTE ADULT - NS ATTEND AMEND GEN_ALL_CORE FT
Patient found in bed in NAD, he denies having any new complaints. He is walking around, eating. He remains listed as status 2e for OHT. ABO O.    Continue torsemide 3 times/week   Continue metoprolol and quinidine   Strict I/Os   Continue ambulating   OHT once a donor becomes available

## 2025-04-18 NOTE — PROGRESS NOTE ADULT - PROBLEM SELECTOR PLAN 8
DVT ppx: hold hep subc for possible OR    Dispo: Pending clinical course DVT ppx: hep subc    Last BM reported by patient 4/17.     Dispo: Pending clinical course

## 2025-04-18 NOTE — PROGRESS NOTE ADULT - PROBLEM SELECTOR PLAN 1
ACC/AHA stage D systolic heart failure.   ·  Plan: - Listed for heart transplant: deactivated 4/1 to status 7 due to norovirus infection but reactivated to status 2e after resolution of symptoms    - c/w Toprol XL 50mg daily.  - MRA held d/t reported weakness while taking; Will consider resumption at some point  - May consider resumption of afterload reducing agents once BP permits.   - currently on Torsemide 10mg PO Three times weekly (M.W.F)  - strict I/Os and daily weights  - Keep K>4 and Mg>2  - PT as tolerated  - Psychiatry recs appreciated for anxiety.

## 2025-04-18 NOTE — PROGRESS NOTE ADULT - PROBLEM SELECTOR PLAN 2
- TTE 3/20/25 : LVEF 30%,transvalvular gradients are elevated with severe prosthetic Mitral Stenosis, no evidence of LV thrombus  - s/p recent admit 3/19/2025 w/ [facility] found to have elevated filling pressures treated with IV diuretics  - GDMT: C/w Toprol XL 50mg daily; off Spironolactone due to prior dizziness; afterload reduction (hydralazine) on hold w/ borderline BPs  - Holding home Farxiga while inpatient  - uptrending Cr and with transaminitis suggestive of congestive hepatopathy. lactate wnl  - HF f/u appreciated. bumex 2mg IVP x1 now. increase torsemide 10mg daily to alternating 10mg/20mg doses  - Appreciate HF recs - status upgraded for transplant. pending possible OHT overnight. currently NPO. - TTE 3/20/25 : LVEF 30%,transvalvular gradients are elevated with severe prosthetic Mitral Stenosis, no evidence of LV thrombus.  - s/p recent admit 3/19/2025 w/ [facility] found to have elevated filling pressures treated with IV diuretics.  - GDMT:  C/w Toprol XL 50mg daily; off Spironolactone due to prior dizziness; afterload reduction (hydralazine) on hold w/ borderline BPs.  - HOLD home Farxiga while inpatient.  - uptrending Cr and with transaminitis suggestive of congestive hepatopathy. lactate wnl.  - HF f/u appreciated. >     > Diuretic management as per HF recs. On torsemide 10mg daily to alternating 10mg/20mg doses.  - Appreciate HF recs - status upgraded for transplant. >      > pending possible OHT.  - Per transplant ID: check hep serologies.

## 2025-04-18 NOTE — PROGRESS NOTE ADULT - ASSESSMENT
70M PMHx NICM since 2011, HFrEF (25-30%) s/p MDT CRT-D with baseline CHB , VT/VF, afib s/p GIANFRANCO ligation/MAZE, MV/TV repair, PVC ablation (3/2024) was transferred from outside hospital to [facility] due to  VT/AICD shock.   While admitted to the outside hospital, he was started on a lidocaine gtt. On 3/21 when lidocaine weaned off patient had recurrence of VT requiring AICD shocks. Thus transferred to [facility] for further management. Pt  was on Lidocaine gtt for control and is now on Quinidine and uptitrated Toprol for anti-arrythmia. Because of pt's refractory VT pt's listing status for a Heart Transplant was bumped from 6 to 2E temporarily. Pt will remain inpatient for listing purposes and managed closely by HF team and hence was transferred to [facility] on 3/27/25. Pending possible OHT tonight.

## 2025-04-19 LAB
HAV IGG SER QL IA: REACTIVE
HAV IGG SER QL IA: REACTIVE
HAV IGM SER-ACNC: SIGNIFICANT CHANGE UP

## 2025-04-19 PROCEDURE — 99233 SBSQ HOSP IP/OBS HIGH 50: CPT

## 2025-04-19 RX ADMIN — Medication 1 APPLICATION(S): at 06:04

## 2025-04-19 RX ADMIN — Medication 10 MILLIGRAM(S): at 13:23

## 2025-04-19 RX ADMIN — Medication 1 PACKET(S): at 13:23

## 2025-04-19 RX ADMIN — Medication 100 MILLIGRAM(S): at 21:57

## 2025-04-19 RX ADMIN — ATORVASTATIN CALCIUM 10 MILLIGRAM(S): 80 TABLET, FILM COATED ORAL at 21:57

## 2025-04-19 RX ADMIN — Medication 5 MILLILITER(S): at 17:32

## 2025-04-19 RX ADMIN — Medication 324 MILLIGRAM(S): at 06:08

## 2025-04-19 RX ADMIN — Medication 324 MILLIGRAM(S): at 17:32

## 2025-04-19 RX ADMIN — METOPROLOL SUCCINATE 50 MILLIGRAM(S): 50 TABLET, EXTENDED RELEASE ORAL at 06:08

## 2025-04-19 RX ADMIN — TAMSULOSIN HYDROCHLORIDE 0.4 MILLIGRAM(S): 0.4 CAPSULE ORAL at 21:57

## 2025-04-19 NOTE — PROGRESS NOTE ADULT - PROBLEM SELECTOR PLAN 2
- TTE 3/20/25 : LVEF 30%,transvalvular gradients are elevated with severe prosthetic Mitral Stenosis, no evidence of LV thrombus.  - s/p recent admit 3/19/2025 w/ [facility] found to have elevated filling pressures treated with IV diuretics.  - GDMT:  C/w Toprol XL 50mg daily; off Spironolactone due to prior dizziness; afterload reduction (hydralazine) on hold w/ borderline BPs.  - HOLD home Farxiga while inpatient.  - uptrending Cr and with transaminitis suggestive of congestive hepatopathy. lactate wnl.  - HF f/u appreciated. >     > Diuretic management as per HF recs. On torsemide 10mg daily to alternating 10mg/20mg doses >> NEED CLARIFICATION on diuretic dosing from HF team.  - Appreciate HF recs - status upgraded for transplant. >      > pending possible OHT.  - Per transplant ID: check hep serologies.

## 2025-04-19 NOTE — PROGRESS NOTE ADULT - ASSESSMENT
70M PMHx NICM since 2011, HFrEF (25-30%) s/p MDT CRT-D with baseline CHB , VT/VF, afib s/p GIANFRANCO ligation/MAZE, MV/TV repair, PVC ablation (3/2024) was transferred from outside hospital to [facility] due to  VT/AICD shock.   While admitted to the outside hospital, he was started on a lidocaine gtt. On 3/21 when lidocaine weaned off patient had recurrence of VT requiring AICD shocks. Thus transferred to [facility] for further management. Pt  was on Lidocaine gtt for control and is now on Quinidine and uptitrated Toprol for anti-arrythmia. Because of pt's refractory VT pt's listing status for a Heart Transplant was bumped from 6 to 2E temporarily. Pt will remain inpatient for listing purposes and managed closely by HF team and hence was transferred to [facility] on 3/27/25. Pending possible OHT tonight.

## 2025-04-19 NOTE — PROGRESS NOTE ADULT - SUBJECTIVE AND OBJECTIVE BOX
[facility] Division of Hospital Medicine  Harsh Arias MD M-F, 8A-5P: MS Teams  Other Times: HIC Extension / HIC Pager      Patient is a 70y old  Male who presents with a chief complaint of VT (18 Apr 2025 15:00)      SUBJECTIVE / OVERNIGHT EVENTS:  Patient was examined today. Patient denies headache, dizziness, fever, chest pain, n, v, abd pain, dyspnea. He states he is walking around.     ADDITIONAL REVIEW OF SYSTEMS:    MEDICATIONS  (STANDING):  atorvastatin 10 milliGRAM(s) Oral at bedtime  cefepime   IVPB 1000 milliGRAM(s) IV Intermittent once  chlorhexidine 0.12% Liquid 5 milliLiter(s) Swish and Spit two times a day  chlorhexidine 2% Cloths 1 Application(s) Topical <User Schedule>  chlorhexidine 4% Liquid 1 Application(s) Topical <User Schedule>  heparin   Injectable 5000 Unit(s) SubCutaneous every 8 hours  methylPREDNISolone sodium succinate IVPB 1000 milliGRAM(s) IV Intermittent once  metoprolol succinate ER 50 milliGRAM(s) Oral daily  mycophenolate mofetil IVPB 1000 milliGRAM(s) IV Intermittent once  polyethylene glycol 3350 17 Gram(s) Oral daily  psyllium Powder 1 Packet(s) Oral daily  quiNIDine gluconate  milliGRAM(s) Oral every 12 hours  senna 2 Tablet(s) Oral at bedtime  tamsulosin 0.4 milliGRAM(s) Oral at bedtime  torsemide 20 milliGRAM(s) Oral <User Schedule>  torsemide 10 milliGRAM(s) Oral <User Schedule>  traZODone 100 milliGRAM(s) Oral at bedtime    MEDICATIONS  (PRN):  acetaminophen     Tablet .. 650 milliGRAM(s) Oral every 6 hours PRN Mild Pain (1 - 3), Moderate Pain (4 - 6)  artificial  tears Solution 1 Drop(s) Both EYES every 4 hours PRN Dry Eyes  bisacodyl Suppository 10 milliGRAM(s) Rectal daily PRN Constipation  guaiFENesin Oral Liquid (Sugar-Free) 200 milliGRAM(s) Oral every 6 hours PRN Cough      CAPILLARY BLOOD GLUCOSE          I&O's Summary    18 Apr 2025 07:01  -  19 Apr 2025 07:00  --------------------------------------------------------  IN: 1180 mL / OUT: 0 mL / NET: 1180 mL        Daily     Daily     PHYSICAL EXAM:  Vital Signs Last 24 Hrs  T(C): 36.7 (19 Apr 2025 11:22), Max: 37 (18 Apr 2025 20:37)  T(F): 98 (19 Apr 2025 11:22), Max: 98.6 (18 Apr 2025 20:37)  HR: 81 (19 Apr 2025 11:22) (79 - 81)  BP: 93/64 (19 Apr 2025 11:22) (92/62 - 97/60)  BP(mean): --  RR: 18 (19 Apr 2025 11:22) (18 - 18)  SpO2: 93% (19 Apr 2025 11:22) (93% - 94%)    Parameters below as of 19 Apr 2025 11:22  Patient On (Oxygen Delivery Method): room air      CONSTITUTIONAL: NAD, well-developed, well-groomed  EYES: PERRLA; conjunctiva and sclera clear  ENMT: Moist oral mucosa, no pharyngeal injection or exudates; normal dentition  NECK: Supple, no palpable masses; no thyromegaly  RESPIRATORY: Normal respiratory effort; lungs are clear to auscultation bilaterally, no wheeze nor crackles  CARDIOVASCULAR: Regular rate and rhythm, normal S1 and S2, no murmur/rub/gallop; No lower extremity edema  ABDOMEN: Soft, Nondistended, Nontender to palpation, normoactive bowel sounds  MUSCULOSKELETAL: No clubbing or cyanosis of digits; no joint swelling or tenderness to palpation  PSYCH: A+O to person, place, and time; affect appropriate  NEUROLOGY: CN 2-12 are intact and symmetric; no gross sensory deficits   SKIN: No rashes; no palpable lesions    LABS:                        12.4   5.32  )-----------( 155      ( 18 Apr 2025 07:18 )             36.7     04-18    141  |  101  |  22  ----------------------------<  85  3.7   |  27  |  1.23    Ca    9.3      18 Apr 2025 07:17  Phos  3.9     04-18  Mg     2.1     04-18    TPro  6.8  /  Alb  4.0  /  TBili  0.8  /  DBili  x   /  AST  26  /  ALT  77[H]  /  AlkPhos  90  04-18    LIVER FUNCTIONS - ( 18 Apr 2025 07:17 )  Alb: 4.0 g/dL / Pro: 6.8 g/dL / ALK PHOS: 90 U/L / ALT: 77 U/L / AST: 26 U/L / GGT: x           PT/INR - ( 18 Apr 2025 07:18 )   PT: 14.1 sec;   INR: 1.23 ratio         PTT - ( 18 Apr 2025 07:18 )  PTT:34.9 sec      Urinalysis Basic - ( 18 Apr 2025 07:17 )    Color: x / Appearance: x / SG: x / pH: x  Gluc: 85 mg/dL / Ketone: x  / Bili: x / Urobili: x   Blood: x / Protein: x / Nitrite: x   Leuk Esterase: x / RBC: x / WBC x   Sq Epi: x / Non Sq Epi: x / Bacteria: x            RADIOLOGY & ADDITIONAL TESTS:  Results Reviewed:   Imaging Personally Reviewed:  Electrocardiogram Personally Reviewed:    COORDINATION OF CARE:  Care Discussed with Consultants/Other Providers [Y/N]:  Prior or Outpatient Records Reviewed [Y/N]:

## 2025-04-19 NOTE — PROGRESS NOTE ADULT - PROBLEM SELECTOR PLAN 1
- H/o ani mitral PVC (premature ventricular contractions) ablation 3/11/2024  - S/p Lidocaine gtt and one dose of Mexiletine (Mexiletine was d/dwayne for severe dizziness this admission)   - Appreciate EP evaluation for Vtach and CRT-D shock - CRTD interrogated on 3/28 - 2 self limited episodes of VT 4/5 in the morning that were asymptomatic  - C/w Toprol 50mg daily and Quinidine BID (pt previously did not tolerate amiodarone, mexiletine and sotalol in the past due to severe dizziness)  - 6b NSVT on tele on 4/15, no further episodes reported so far.   - C/w tele monitor, monitor electrolytes

## 2025-04-20 PROCEDURE — 99233 SBSQ HOSP IP/OBS HIGH 50: CPT

## 2025-04-20 RX ADMIN — Medication 5 MILLILITER(S): at 17:55

## 2025-04-20 RX ADMIN — Medication 324 MILLIGRAM(S): at 17:55

## 2025-04-20 RX ADMIN — Medication 100 MILLIGRAM(S): at 22:01

## 2025-04-20 RX ADMIN — ATORVASTATIN CALCIUM 10 MILLIGRAM(S): 80 TABLET, FILM COATED ORAL at 22:01

## 2025-04-20 RX ADMIN — Medication 10 MILLIGRAM(S): at 11:06

## 2025-04-20 RX ADMIN — TAMSULOSIN HYDROCHLORIDE 0.4 MILLIGRAM(S): 0.4 CAPSULE ORAL at 22:02

## 2025-04-20 RX ADMIN — METOPROLOL SUCCINATE 50 MILLIGRAM(S): 50 TABLET, EXTENDED RELEASE ORAL at 06:33

## 2025-04-20 RX ADMIN — Medication 324 MILLIGRAM(S): at 06:33

## 2025-04-20 RX ADMIN — Medication 1 PACKET(S): at 11:06

## 2025-04-20 NOTE — PROGRESS NOTE ADULT - SUBJECTIVE AND OBJECTIVE BOX
[facility] Division of Hospital Medicine  Harsh Arias MD M-F, 8A-5P: MS Teams  Other Times: HIC Extension / HIC Pager      Patient is a 70y old  Male who presents with a chief complaint of VT (2025 12:49)      SUBJECTIVE / OVERNIGHT EVENTS:  Patient was examined today. Patient denies any acute complaint, denies headache, dizziness, fever, chills, chest pain, dyspnea, abd pain, n/v, leg swelling, states he is walking a bit and feels ok no dyspnea.     ADDITIONAL REVIEW OF SYSTEMS:    MEDICATIONS  (STANDING):  atorvastatin 10 milliGRAM(s) Oral at bedtime  cefepime   IVPB 1000 milliGRAM(s) IV Intermittent once  chlorhexidine 0.12% Liquid 5 milliLiter(s) Swish and Spit two times a day  chlorhexidine 2% Cloths 1 Application(s) Topical <User Schedule>  chlorhexidine 4% Liquid 1 Application(s) Topical <User Schedule>  heparin   Injectable 5000 Unit(s) SubCutaneous every 8 hours  methylPREDNISolone sodium succinate IVPB 1000 milliGRAM(s) IV Intermittent once  metoprolol succinate ER 50 milliGRAM(s) Oral daily  mycophenolate mofetil IVPB 1000 milliGRAM(s) IV Intermittent once  polyethylene glycol 3350 17 Gram(s) Oral daily  psyllium Powder 1 Packet(s) Oral daily  quiNIDine gluconate  milliGRAM(s) Oral every 12 hours  senna 2 Tablet(s) Oral at bedtime  tamsulosin 0.4 milliGRAM(s) Oral at bedtime  torsemide 20 milliGRAM(s) Oral <User Schedule>  torsemide 10 milliGRAM(s) Oral <User Schedule>  traZODone 100 milliGRAM(s) Oral at bedtime    MEDICATIONS  (PRN):  acetaminophen     Tablet .. 650 milliGRAM(s) Oral every 6 hours PRN Mild Pain (1 - 3), Moderate Pain (4 - 6)  artificial  tears Solution 1 Drop(s) Both EYES every 4 hours PRN Dry Eyes  bisacodyl Suppository 10 milliGRAM(s) Rectal daily PRN Constipation  guaiFENesin Oral Liquid (Sugar-Free) 200 milliGRAM(s) Oral every 6 hours PRN Cough      CAPILLARY BLOOD GLUCOSE          I&O's Summary      Daily     Daily Weight in k.4 (2025 08:49)    PHYSICAL EXAM:  Vital Signs Last 24 Hrs  T(C): 36.6 (2025 11:04), Max: 36.7 (2025 20:49)  T(F): 97.8 (2025 11:04), Max: 98 (2025 20:49)  HR: 77 (2025 11:04) (77 - 82)  BP: 100/67 (2025 11:04) (93/68 - 102/70)  BP(mean): --  RR: 16 (2025 11:04) (16 - 18)  SpO2: 95% (2025 11:04) (95% - 96%)    Parameters below as of 2025 11:04  Patient On (Oxygen Delivery Method): room air      CONSTITUTIONAL: NAD, well-developed, well-groomed  EYES: PERRLA; conjunctiva and sclera clear  ENMT: Moist oral mucosa, no pharyngeal injection or exudates; normal dentition  NECK: Supple, no palpable masses; no thyromegaly  RESPIRATORY: Normal respiratory effort; lungs are clear to auscultation bilaterally, no wheeze nor crackles  CARDIOVASCULAR: Regular rate and rhythm, normal S1 and S2, no murmur/rub/gallop; No lower extremity edema  ABDOMEN: Soft, Nondistended, Nontender to palpation, normoactive bowel sounds  MUSCULOSKELETAL: No clubbing or cyanosis of digits; no joint swelling or tenderness to palpation  PSYCH: A+O to person, place, and time; affect appropriate  NEUROLOGY: CN 2-12 are intact and symmetric; no gross sensory deficits   SKIN: No rashes; no palpable lesions      LABS:                          RADIOLOGY & ADDITIONAL TESTS:  Results Reviewed:   Imaging Personally Reviewed:  Electrocardiogram Personally Reviewed:    COORDINATION OF CARE:  Care Discussed with Consultants/Other Providers [Y/N]:  Prior or Outpatient Records Reviewed [Y/N]:

## 2025-04-20 NOTE — PROGRESS NOTE ADULT - ASSESSMENT
70M PMHx NICM since 2011, HFrEF (25-30%) s/p MDT CRT-D with baseline CHB , VT/VF, afib s/p GIANFRANCO ligation/MAZE, MV/TV repair, PVC ablation (3/2024) was transferred from outside hospital to [facility] due to  VT/AICD shock.   While admitted to the outside hospital, he was started on a lidocaine gtt. On 3/21 when lidocaine weaned off patient had recurrence of VT requiring AICD shocks. Thus transferred to [facility] for further management. Pt  was on Lidocaine gtt for control and is now on Quinidine and uptitrated Toprol for anti-arrythmia. Because of pt's refractory VT pt's listing status for a Heart Transplant was bumped from 6 to 2E temporarily. Pt will remain inpatient for listing purposes and managed closely by HF team and hence was transferred to [facility] on 3/27/25. Pending possible OHT tonight.

## 2025-04-21 LAB
ALBUMIN SERPL ELPH-MCNC: 3.8 G/DL — SIGNIFICANT CHANGE UP (ref 3.3–5)
ALP SERPL-CCNC: 87 U/L — SIGNIFICANT CHANGE UP (ref 40–120)
ALT FLD-CCNC: 63 U/L — HIGH (ref 10–45)
ANION GAP SERPL CALC-SCNC: 13 MMOL/L — SIGNIFICANT CHANGE UP (ref 5–17)
AST SERPL-CCNC: 25 U/L — SIGNIFICANT CHANGE UP (ref 10–40)
BILIRUB SERPL-MCNC: 0.6 MG/DL — SIGNIFICANT CHANGE UP (ref 0.2–1.2)
BUN SERPL-MCNC: 19 MG/DL — SIGNIFICANT CHANGE UP (ref 7–23)
CALCIUM SERPL-MCNC: 8.9 MG/DL — SIGNIFICANT CHANGE UP (ref 8.4–10.5)
CHLORIDE SERPL-SCNC: 104 MMOL/L — SIGNIFICANT CHANGE UP (ref 96–108)
CO2 SERPL-SCNC: 24 MMOL/L — SIGNIFICANT CHANGE UP (ref 22–31)
CREAT SERPL-MCNC: 1.21 MG/DL — SIGNIFICANT CHANGE UP (ref 0.5–1.3)
EGFR: 64 ML/MIN/1.73M2 — SIGNIFICANT CHANGE UP
EGFR: 64 ML/MIN/1.73M2 — SIGNIFICANT CHANGE UP
GLUCOSE SERPL-MCNC: 85 MG/DL — SIGNIFICANT CHANGE UP (ref 70–99)
HCT VFR BLD CALC: 35.6 % — LOW (ref 39–50)
HGB BLD-MCNC: 12 G/DL — LOW (ref 13–17)
MCHC RBC-ENTMCNC: 29.3 PG — SIGNIFICANT CHANGE UP (ref 27–34)
MCHC RBC-ENTMCNC: 33.7 G/DL — SIGNIFICANT CHANGE UP (ref 32–36)
MCV RBC AUTO: 86.8 FL — SIGNIFICANT CHANGE UP (ref 80–100)
NRBC BLD AUTO-RTO: 0 /100 WBCS — SIGNIFICANT CHANGE UP (ref 0–0)
PLATELET # BLD AUTO: 146 K/UL — LOW (ref 150–400)
POTASSIUM SERPL-MCNC: 3.7 MMOL/L — SIGNIFICANT CHANGE UP (ref 3.5–5.3)
POTASSIUM SERPL-SCNC: 3.7 MMOL/L — SIGNIFICANT CHANGE UP (ref 3.5–5.3)
PROT SERPL-MCNC: 6.4 G/DL — SIGNIFICANT CHANGE UP (ref 6–8.3)
RBC # BLD: 4.1 M/UL — LOW (ref 4.2–5.8)
RBC # FLD: 13.6 % — SIGNIFICANT CHANGE UP (ref 10.3–14.5)
SODIUM SERPL-SCNC: 141 MMOL/L — SIGNIFICANT CHANGE UP (ref 135–145)
WBC # BLD: 4.81 K/UL — SIGNIFICANT CHANGE UP (ref 3.8–10.5)
WBC # FLD AUTO: 4.81 K/UL — SIGNIFICANT CHANGE UP (ref 3.8–10.5)

## 2025-04-21 PROCEDURE — 99232 SBSQ HOSP IP/OBS MODERATE 35: CPT

## 2025-04-21 PROCEDURE — 99233 SBSQ HOSP IP/OBS HIGH 50: CPT | Mod: FS

## 2025-04-21 RX ADMIN — Medication 1 APPLICATION(S): at 05:24

## 2025-04-21 RX ADMIN — METOPROLOL SUCCINATE 50 MILLIGRAM(S): 50 TABLET, EXTENDED RELEASE ORAL at 08:04

## 2025-04-21 RX ADMIN — ATORVASTATIN CALCIUM 10 MILLIGRAM(S): 80 TABLET, FILM COATED ORAL at 22:04

## 2025-04-21 RX ADMIN — Medication 40 MILLIEQUIVALENT(S): at 14:32

## 2025-04-21 RX ADMIN — Medication 324 MILLIGRAM(S): at 17:14

## 2025-04-21 RX ADMIN — TAMSULOSIN HYDROCHLORIDE 0.4 MILLIGRAM(S): 0.4 CAPSULE ORAL at 22:04

## 2025-04-21 RX ADMIN — Medication 324 MILLIGRAM(S): at 05:27

## 2025-04-21 RX ADMIN — Medication 100 MILLIGRAM(S): at 22:04

## 2025-04-21 RX ADMIN — Medication 1 PACKET(S): at 11:10

## 2025-04-21 RX ADMIN — Medication 5 MILLILITER(S): at 05:24

## 2025-04-21 RX ADMIN — Medication 20 MILLIGRAM(S): at 11:10

## 2025-04-21 NOTE — PROGRESS NOTE ADULT - ASSESSMENT
70M PMHx NICM since 2011, HFrEF (25-30%) s/p MDT CRT-D with baseline CHB , VT/VF, afib s/p GIANFRANCO ligation/MAZE, MV/TV repair, PVC ablation (3/2024) was transferred from outside hospital to [facility] due to  VT/AICD shock.   While admitted to the outside hospital, he was started on a lidocaine gtt. On 3/21 when lidocaine weaned off patient had recurrence of VT requiring AICD shocks. Thus transferred to [facility] for further management. Pt  was on Lidocaine gtt for control and is now on Quinidine and uptitrated Toprol for anti-arrythmia. Because of pt's refractory VT pt's listing status for a Heart Transplant was bumped from 6 to 2E temporarily. Pt will remain inpatient for listing purposes and managed closely by HF team and hence was transferred to [facility] floor on 3/27/25. Pending OHT.

## 2025-04-21 NOTE — PROGRESS NOTE ADULT - ASSESSMENT
70-year-old male ABO O past medical history of NICM since 2011 HFrEF (LVEF 25-30%) s/p MDT CRT-D with baseline CHB, VT/VF, AFs/p GIANFRANCO ligation/MAZE, MV/TV repair, PVC ablation 3/2024 intolerant to AADs in the past, recent admission 3/19/2025-3/20/2025 for AICD shocks initially presented to the [facility] ED on 3/21/2025, sent by HF specialist for ACID shock. Device reported successful ATP for VT 3/17/2025 at 6:52pm followed by one ATP & 40J shock. He reported feeling dizzy & SOB during the events. He follows with Dr. Luca Tamayo for HF, currently undergoing transplant workup, Dr John for CRTD and VT/VF and Dr. Chu for genetic counseling. While admitted to [facility], he was started on a lidocaine gtt. On 3/21 when lidocaine weaned off patient had another two episodes of VT requiring AICD shocks. He was transferred to [facility] for further management. He was initially maintained on lidocaine gtt from 3/21/2025-3/25/2025 & his listing status was upgraded to UNOS status 2e for heart transplant (ABO O) for the refractory VT. He was transitioned to oral antiarrhythmics (Toprol XL & quinidine) & ultimately transferred from CICU to tele floor on 3/27/2025. Hospital course was further c/b development of watery diarrhea & was found to have +norovirus on 3/31/2025 which prompted deactivation to status 7 listing for heart transplant. Status reinstated to 2E after diarrhea resolved.     He has occasional runs of NSVT, on oral antiarrythmics. He currently appears euvolemic with stable weight. He has normal end organ function. He is currently reactivated 4/2 for UNOS status 2e, awaiting a suitable donor.    Bedside hemodynamics:  3/22/25 BP 97/76/83, HR 80, CVP 6, PA 58/23/38, PCWP 20, SVR 1100, Gucci CO/CI 5.1/2.6, TD 4/2.08    Cardiac Studies:   TTE 3/20/25 : LVEF 30%, segmental, LVIDd 5.3, walls normal, elevated LV filling pressures, mod RVE with mildly reduced RV function, TAPSE 1.7, severely dilated RA, annuloplasty in MV position, transvalvular gradients are elevated with severe prosthetic MS (peak gradient 15.7, mean gradient 8), trace intravalvular MR, TV annuloplasty ring noted, mild TR,  est PASP 36, no evidence of LV thrombus  TTE 10/2024: LVEF 25-30%, LVEDD 5.9cm, moderately reduced RV function, annuloplasty ring of mitral and tricuspid position, PASP 47 mmHg  RHC 3/19/25- RA 11 PA 58/26 PCWP 32 CO/CI 5.43/2.77  [facility] 6/2023 Nonobstructive CAD

## 2025-04-21 NOTE — PROGRESS NOTE ADULT - PROBLEM SELECTOR PLAN 4
- Resolved  - Afebrile, no leukocytosis, non-toxic appearing  - Per transplant ID, can re-activate UNOS listing status as symptoms have resolved

## 2025-04-21 NOTE — PROGRESS NOTE ADULT - PROBLEM SELECTOR PLAN 2
- TTE 3/20/25 : LVEF 30%,transvalvular gradients are elevated with severe prosthetic Mitral Stenosis, no evidence of LV thrombus.  - s/p recent admit 3/19/2025 w/ R[facility] found to have elevated filling pressures treated with IV diuretics.  - GDMT:  C/w Toprol XL 50mg daily; off Spironolactone due to prior dizziness; afterload reduction (hydralazine) on hold w/ borderline BPs.  - HOLD home Farxiga while inpatient.  - uptrending Cr and with transaminitis suggestive of congestive hepatopathy. lactate wnl.  - HF f/u appreciated. >     > Diuretic management as per HF recs. On torsemide 10mg daily to alternating 10mg/20mg doses >> NEED CLARIFICATION on diuretic dosing from HF team. 4/21: reached out to HF team, F/U on their recs.   - Appreciate HF recs - status upgraded for transplant. >      > pending possible OHT.  - Per transplant ID: check hep serologies. - TTE 3/20/25 : LVEF 30%,transvalvular gradients are elevated with severe prosthetic Mitral Stenosis, no evidence of LV thrombus.  - s/p recent admit 3/19/2025 w/ R[facility] found to have elevated filling pressures treated with IV diuretics.  - GDMT:  C/w Toprol XL 50mg daily; off Spironolactone due to prior dizziness; afterload reduction (hydralazine) on hold w/ borderline BPs.  - HOLD home Farxiga while inpatient.  - uptrending Cr and with transaminitis suggestive of congestive hepatopathy. lactate wnl.  - HF f/u appreciated. >     > Diuretic management as per HF recs. On torsemide 10mg daily to alternating 10mg/20mg doses 4/21: discussed with HF team, they rec 40mg Kcl on the days he gets torsemide 20mg and  20meq Kcl on the days he gets torsemide 10mg.  - Appreciate HF recs - status upgraded for transplant. >      > pending possible OHT.  - Per transplant ID: check hep serologies. - TTE 3/20/25 : LVEF 30%,transvalvular gradients are elevated with severe prosthetic Mitral Stenosis, no evidence of LV thrombus.  - s/p recent admit 3/19/2025 w/ [facility] found to have elevated filling pressures treated with IV diuretics.  - GDMT:  C/w Toprol XL 50mg daily; off Spironolactone due to prior dizziness; afterload reduction (hydralazine) on hold w/ borderline BPs.  - HOLD home Farxiga while inpatient.  - uptrending Cr and with transaminitis suggestive of congestive hepatopathy. lactate wnl.  - HF f/u appreciated. >     > Diuretic management as per HF recs. On torsemide 10mg daily to alternating 10mg/20mg doses 4/21: discussed with HF team, they rec 40meq Kcl on the days he gets torsemide 20mg and  20meq Kcl on the days he gets torsemide 10mg.  - Appreciate HF recs - status upgraded for transplant. >      > pending possible OHT.  - Per transplant ID: check hep serologies.

## 2025-04-21 NOTE — PROGRESS NOTE ADULT - NS ATTEND AMEND GEN_ALL_CORE FT
71 y/o M with NICM, with CHB and VT/VFib and AFib, s/p GIANFRANCO ligation/MAZE and PVC ablation, previously intolerant to antiarrhythmics, who was undergoing transplant evaluation as an outpatient, who was admitted for ICD shocks. He was started on Lidocaine gtt and continued to have VT and ICD shocks. He was transferred to [facility] and transplant listing was upgraded to status 2E. He has since been stabilized on oral antiarrhythmics of Toprol XL and Quinidine. Hospital course c/b Norovirus on 3/31/25, was made status 7 while symptomatic, then reactivated as status 2E.   Volume status stable on alternating days of Torsemide 20mg PO daily and 10mg PO daily. Has been hypokalemic but doesn't want to start Aldactone due to side effects in the past, so recommend starting standing KCL supplementation.   Awaiting suitable donor.

## 2025-04-21 NOTE — PROGRESS NOTE ADULT - SUBJECTIVE AND OBJECTIVE BOX
ADVANCED HEART FAILURE & TRANSPLANT  - PROGRESS NOTE  *To reach the NS2 Team from 8am to 5pm, please call [phone]   ___________________________________________________________________________  Subjective:  - no events overnight  - reports a headache, he thinks is stress related  - walking around the hallways without SOB, CP, lightheadedness  - appetite good  - no events on tele    Medications:  acetaminophen     Tablet .. 650 milliGRAM(s) Oral every 6 hours PRN  artificial  tears Solution 1 Drop(s) Both EYES every 4 hours PRN  atorvastatin 10 milliGRAM(s) Oral at bedtime  bisacodyl Suppository 10 milliGRAM(s) Rectal daily PRN  cefepime   IVPB 1000 milliGRAM(s) IV Intermittent once  chlorhexidine 0.12% Liquid 5 milliLiter(s) Swish and Spit two times a day  chlorhexidine 2% Cloths 1 Application(s) Topical <User Schedule>  chlorhexidine 4% Liquid 1 Application(s) Topical <User Schedule>  guaiFENesin Oral Liquid (Sugar-Free) 200 milliGRAM(s) Oral every 6 hours PRN  heparin   Injectable 5000 Unit(s) SubCutaneous every 8 hours  methylPREDNISolone sodium succinate IVPB 1000 milliGRAM(s) IV Intermittent once  metoprolol succinate ER 50 milliGRAM(s) Oral daily  mycophenolate mofetil IVPB 1000 milliGRAM(s) IV Intermittent once  polyethylene glycol 3350 17 Gram(s) Oral daily  psyllium Powder 1 Packet(s) Oral daily  quiNIDine gluconate  milliGRAM(s) Oral every 12 hours  senna 2 Tablet(s) Oral at bedtime  tamsulosin 0.4 milliGRAM(s) Oral at bedtime  torsemide 20 milliGRAM(s) Oral <User Schedule>  torsemide 10 milliGRAM(s) Oral <User Schedule>  traZODone 100 milliGRAM(s) Oral at bedtime      Physical Exam:    Vitals:  Vital Signs Last 24 Hours  T(C): 36.5 (25 @ 12:45), Max: 36.6 (25 @ 20:49)  HR: 81 (25 @ 12:45) (76 - 81)  BP: 111/77 (25 @ 12:45) (95/67 - 111/77)  RR: 18 (25 @ 12:45) (18 - 18)  SpO2: 96% (25 @ 12:45) (93% - 96%)    Weight in k.4 ( @ 08:17)    I&O's Summary    Tele: BiV paced, no events    General: No distress. Comfortable.  HEENT: EOM intact.  Neck: Neck supple. JVP not elevated. No masses  Chest: Clear to auscultation bilaterally  CV: Normal S1 and S2. II/VI SM. No LE edema  Abdomen: Soft, non-distended, non-tender  Skin: Warm peripherally  Neurology: Alert and oriented times three. Sensation intact  Psych: Affect normal    Labs:                        12.0   4.81  )-----------( 146      ( 2025 06:30 )             35.6         141  |  104  |  19  ----------------------------<  85  3.7   |  24  |  1.21    Ca    8.9      2025 06:31    TPro  6.4  /  Alb  3.8  /  TBili  0.6  /  DBili  x   /  AST  25  /  ALT  63[H]  /  AlkPhos  87

## 2025-04-21 NOTE — PROGRESS NOTE ADULT - PROBLEM SELECTOR PLAN 1
ACC/AHA stage D systolic heart failure.   ·  Plan: - Listed for heart transplant: deactivated 4/1 to status 7 due to norovirus infection but reactivated to status 2e after resolution of symptoms  - c/w Toprol XL 50mg daily.  - MRA held d/t reported weakness/lightheadedness while taking  - Continue torsemide 20mg alternating with 10mg QOD.   - On days he takes torsemide 20mg will give KCl 40 meq, on days he takes torsemide 10mg will give KCl 20 meq  - strict I/Os and daily weights  - Keep K>4 and Mg>2  - PT as tolerated

## 2025-04-21 NOTE — PROGRESS NOTE ADULT - SUBJECTIVE AND OBJECTIVE BOX
[facility] Division of Hospital Medicine  Harsh Arias MD M-F, 8A-5P: MS Teams  Other Times: HIC Extension / HIC Pager      Patient is a 70y old  Male who presents with a chief complaint of VT (2025 12:29)      SUBJECTIVE / OVERNIGHT EVENTS:  Patient was examined today. He denies headache, dizziness, fever, chills, chest pain, dsypnea, n/v, abd pain, difficulty ambulating.     ADDITIONAL REVIEW OF SYSTEMS:    MEDICATIONS  (STANDING):  atorvastatin 10 milliGRAM(s) Oral at bedtime  cefepime   IVPB 1000 milliGRAM(s) IV Intermittent once  chlorhexidine 0.12% Liquid 5 milliLiter(s) Swish and Spit two times a day  chlorhexidine 2% Cloths 1 Application(s) Topical <User Schedule>  chlorhexidine 4% Liquid 1 Application(s) Topical <User Schedule>  heparin   Injectable 5000 Unit(s) SubCutaneous every 8 hours  methylPREDNISolone sodium succinate IVPB 1000 milliGRAM(s) IV Intermittent once  metoprolol succinate ER 50 milliGRAM(s) Oral daily  mycophenolate mofetil IVPB 1000 milliGRAM(s) IV Intermittent once  polyethylene glycol 3350 17 Gram(s) Oral daily  psyllium Powder 1 Packet(s) Oral daily  quiNIDine gluconate  milliGRAM(s) Oral every 12 hours  senna 2 Tablet(s) Oral at bedtime  tamsulosin 0.4 milliGRAM(s) Oral at bedtime  torsemide 20 milliGRAM(s) Oral <User Schedule>  torsemide 10 milliGRAM(s) Oral <User Schedule>  traZODone 100 milliGRAM(s) Oral at bedtime    MEDICATIONS  (PRN):  acetaminophen     Tablet .. 650 milliGRAM(s) Oral every 6 hours PRN Mild Pain (1 - 3), Moderate Pain (4 - 6)  artificial  tears Solution 1 Drop(s) Both EYES every 4 hours PRN Dry Eyes  bisacodyl Suppository 10 milliGRAM(s) Rectal daily PRN Constipation  guaiFENesin Oral Liquid (Sugar-Free) 200 milliGRAM(s) Oral every 6 hours PRN Cough      CAPILLARY BLOOD GLUCOSE          I&O's Summary      Daily     Daily Weight in k.4 (2025 08:17)    PHYSICAL EXAM:  Vital Signs Last 24 Hrs  T(C): 36.6 (2025 04:25), Max: 36.6 (2025 20:49)  T(F): 97.9 (2025 04:25), Max: 97.9 (2025 20:49)  HR: 76 (2025 08:00) (76 - 82)  BP: 102/69 (2025 08:00) (95/67 - 110/79)  BP(mean): --  RR: 18 (2025 08:00) (16 - 18)  SpO2: 96% (2025 08:00) (93% - 97%)    Parameters below as of 2025 08:00  Patient On (Oxygen Delivery Method): room air        CONSTITUTIONAL: NAD, well-developed, well-groomed  EYES: PERRLA; conjunctiva and sclera clear  ENMT: Moist oral mucosa, no pharyngeal injection or exudates; normal dentition  NECK: Supple,  RESPIRATORY: Normal respiratory effort; lungs are clear to auscultation bilaterally, no wheeze nor crackles  CARDIOVASCULAR: Regular rate and rhythm, normal S1 and S2, no murmur/rub/gallop; No lower extremity edema  ABDOMEN: Soft, Nondistended, Nontender to palpation, normoactive bowel sounds  MUSCULOSKELETAL: No clubbing or cyanosis of digits; no joint swelling or tenderness to palpation  PSYCH: A+O to person, place, and time; affect appropriate  NEUROLOGY: CN 2-12 are intact and symmetric; no gross sensory deficits   SKIN: No rashes; no palpable lesions    LABS:                        12.0   4.81  )-----------( 146      ( 2025 06:30 )             35.6     04-21    141  |  104  |  19  ----------------------------<  85  3.7   |  24  |  1.21    Ca    8.9      2025 06:31    TPro  6.4  /  Alb  3.8  /  TBili  0.6  /  DBili  x   /  AST  25  /  ALT  63[H]  /  AlkPhos  87  04-21    LIVER FUNCTIONS - ( 2025 06:31 )  Alb: 3.8 g/dL / Pro: 6.4 g/dL / ALK PHOS: 87 U/L / ALT: 63 U/L / AST: 25 U/L / GGT: x                 Urinalysis Basic - ( 2025 06:31 )    Color: x / Appearance: x / SG: x / pH: x  Gluc: 85 mg/dL / Ketone: x  / Bili: x / Urobili: x   Blood: x / Protein: x / Nitrite: x   Leuk Esterase: x / RBC: x / WBC x   Sq Epi: x / Non Sq Epi: x / Bacteria: x            RADIOLOGY & ADDITIONAL TESTS:  Results Reviewed:   Imaging Personally Reviewed:  Electrocardiogram Personally Reviewed:    COORDINATION OF CARE:  Care Discussed with Consultants/Other Providers [Y/N]:  Prior or Outpatient Records Reviewed [Y/N]:

## 2025-04-22 PROCEDURE — 99233 SBSQ HOSP IP/OBS HIGH 50: CPT

## 2025-04-22 RX ADMIN — Medication 100 MILLIGRAM(S): at 21:51

## 2025-04-22 RX ADMIN — Medication 20 MILLIEQUIVALENT(S): at 11:28

## 2025-04-22 RX ADMIN — Medication 324 MILLIGRAM(S): at 17:31

## 2025-04-22 RX ADMIN — Medication 10 MILLIGRAM(S): at 11:28

## 2025-04-22 RX ADMIN — Medication 324 MILLIGRAM(S): at 06:34

## 2025-04-22 RX ADMIN — ATORVASTATIN CALCIUM 10 MILLIGRAM(S): 80 TABLET, FILM COATED ORAL at 21:51

## 2025-04-22 RX ADMIN — TAMSULOSIN HYDROCHLORIDE 0.4 MILLIGRAM(S): 0.4 CAPSULE ORAL at 21:52

## 2025-04-22 RX ADMIN — METOPROLOL SUCCINATE 50 MILLIGRAM(S): 50 TABLET, EXTENDED RELEASE ORAL at 06:34

## 2025-04-22 RX ADMIN — Medication 1 PACKET(S): at 11:30

## 2025-04-22 NOTE — CHART NOTE - NSCHARTNOTEFT_GEN_A_CORE
NUTRITION FOLLOW UP NOTE    PATIENT SEEN FOR: nutrition follow up    SOURCE: [x] Patient  [x] Current Medical Record  [] RN  [x] Family/support person at bedside (pt's wife) [] Patient unavailable/inappropriate  [] Other:    CHART REVIEWED/EVENTS NOTED.  [] No changes to nutrition care plan to note  [x] Nutrition Status:  - Listed for heart transplant status 2e.     DIET ORDER:   Diet, Regular (04-18-25 @ 08:48) [Active]    CURRENT DIET ORDER IS:  [x] Appropriate:  [] Inadequate:  [] Other:      NUTRITION INTAKE/PROVISION:  [x] PO: Pt reports good po intake of meals. Has been self-monitoring his sodium intake and would prefer to remain on regular diet if possible to help keep menu options liberalized. Follows a low sodium diet at baseline. Pt previously endorsed some minor  issues i.e. missing items on trays; RD escalated issue to dining services and issue now improved.  [] Enteral Nutrition:  [] Parenteral Nutrition:    ANTHROPOMETRICS:  Drug Dosing Weight  Height (cm): 170.2 (21 Mar 2025 22:24)  Weight (kg): 81.7 (21 Mar 2025 22:24)  BMI (kg/m2): 28.2 (21 Mar 2025 22:24)    Weights:   Daily Weight in kG (standing): 84.6 (04-22), 84.6 (04-15), 83.1 (04-08), 83.3 (04-01), 83.9 (03-31), 83.9 (03-30), 83.8 (03-29), 84.7 (03-23)  Weight fluctuating likely in setting of fluid shifts. RD to continue to monitor weight trends as able/available.     MEDICATIONS:  MEDICATIONS  (STANDING):  atorvastatin 10 milliGRAM(s) Oral at bedtime  cefepime   IVPB 1000 milliGRAM(s) IV Intermittent once  chlorhexidine 0.12% Liquid 5 milliLiter(s) Swish and Spit two times a day  chlorhexidine 2% Cloths 1 Application(s) Topical <User Schedule>  chlorhexidine 4% Liquid 1 Application(s) Topical <User Schedule>  heparin   Injectable 5000 Unit(s) SubCutaneous every 8 hours  methylPREDNISolone sodium succinate IVPB 1000 milliGRAM(s) IV Intermittent once  metoprolol succinate ER 50 milliGRAM(s) Oral daily  mycophenolate mofetil IVPB 1000 milliGRAM(s) IV Intermittent once  polyethylene glycol 3350 17 Gram(s) Oral daily  psyllium Powder 1 Packet(s) Oral daily  quiNIDine gluconate  milliGRAM(s) Oral every 12 hours  senna 2 Tablet(s) Oral at bedtime  tamsulosin 0.4 milliGRAM(s) Oral at bedtime  torsemide 20 milliGRAM(s) Oral <User Schedule>  torsemide 10 milliGRAM(s) Oral <User Schedule>  traZODone 100 milliGRAM(s) Oral at bedtime    MEDICATIONS  (PRN):  acetaminophen     Tablet .. 650 milliGRAM(s) Oral every 6 hours PRN Mild Pain (1 - 3), Moderate Pain (4 - 6)  artificial  tears Solution 1 Drop(s) Both EYES every 4 hours PRN Dry Eyes  bisacodyl Suppository 10 milliGRAM(s) Rectal daily PRN Constipation  guaiFENesin Oral Liquid (Sugar-Free) 200 milliGRAM(s) Oral every 6 hours PRN Cough      NUTRITIONALLY PERTINENT LABS:  04-21 @ 06:31: Na 141, BUN 19, Cr 1.21, BG 85, K+ 3.7, Alk Phos 87, ALT/SGPT 63[H], AST/SGOT 25    A1C with Estimated Average Glucose Result: 6.1 % (03-22-25 @ 00:58)      NUTRITIONALLY PERTINENT MEDICATIONS/LABS:  [x] Reviewed  [x] Relevant notes on medications/labs:  - Torsemide ordered  - Metamucil ordered  - Solumedrol and Cellcept ordered (pt was scheduled for possible OHT on 4/17)  - A1c 6.1% (03/22/25) within pre-DM range    EDEMA:  [x] Reviewed  [x] Relevant notes:  - None noted as per flowsheets    GI/ I&O:  [x] Reviewed  [x] Relevant notes:  - Denies any GI distress, last BM 4/22 per pt  - Dulcolax suppository, miralax, senna ordered  [] Other:    SKIN:   [x] No pressure injuries documented, per nursing flowsheet  [] Pressure injury previously noted  [] Change in pressure injury documentation:  [] Other:    ESTIMATED NEEDS:  [x] No change:  [] Updated:  Energy:  7249-2801 kcal/day (25-30 kcal/kg)  Protein:  102-119 g/day (1.2-1.4 g/kg)  Fluid:   ml/day or [x] defer to team  Based on: recent weight 84.7 kg (03-23, standing)    NUTRITION DIAGNOSIS:  [x] Prior Dx: Food & Nutrition Related Knowledge Deficit  [] New Dx:    EDUCATION:  [x] Yes: Reinforced importance of adequate protein-energy consumption to meet estimated nutrient needs. Encouraged continued adequate intake of meals as tolerated. Pt noted with good comprehension and made aware RD remains available for further questions/concerns.   [] Not appropriate/warranted    NUTRITION CARE PLAN:  1. Diet: Continue Regular diet.    [] Achieved - Continue current nutrition intervention(s)  [] Current medical condition precludes nutrition intervention at this time.    MONITORING AND EVALUATION:   RD remains available upon request and will follow up per protocol.    Naida Garcia, SAJANN, CDN (Available via [employer] TEAMS)

## 2025-04-22 NOTE — PROGRESS NOTE ADULT - PROBLEM SELECTOR PLAN 8
DVT ppx: hep subc    Last BM reported by patient 4/17.     Dispo: Pending clinical course DVT ppx: hep subc    Last BM reported by patient 4/22.    Dispo: Pending clinical course

## 2025-04-22 NOTE — PROGRESS NOTE ADULT - SUBJECTIVE AND OBJECTIVE BOX
[facility] Division of Hospital Medicine  Harsh Arias MD M-F, 8A-5P: MS Teams  Other Times: HIC Extension / HIC Pager      Patient is a 70y old  Male who presents with a chief complaint of VT (2025 18:25)      SUBJECTIVE / OVERNIGHT EVENTS:  Patient was examined today. Denies any new symptom. He is walking in the hallway without issue. He denies headache, dizziness, fever, chills, chest pain, dsypnea, n/v, abd pain, difficulty ambulating.     ADDITIONAL REVIEW OF SYSTEMS:    MEDICATIONS  (STANDING):  atorvastatin 10 milliGRAM(s) Oral at bedtime  cefepime   IVPB 1000 milliGRAM(s) IV Intermittent once  chlorhexidine 0.12% Liquid 5 milliLiter(s) Swish and Spit two times a day  chlorhexidine 2% Cloths 1 Application(s) Topical <User Schedule>  chlorhexidine 4% Liquid 1 Application(s) Topical <User Schedule>  heparin   Injectable 5000 Unit(s) SubCutaneous every 8 hours  methylPREDNISolone sodium succinate IVPB 1000 milliGRAM(s) IV Intermittent once  metoprolol succinate ER 50 milliGRAM(s) Oral daily  mycophenolate mofetil IVPB 1000 milliGRAM(s) IV Intermittent once  polyethylene glycol 3350 17 Gram(s) Oral daily  psyllium Powder 1 Packet(s) Oral daily  quiNIDine gluconate  milliGRAM(s) Oral every 12 hours  senna 2 Tablet(s) Oral at bedtime  tamsulosin 0.4 milliGRAM(s) Oral at bedtime  torsemide 20 milliGRAM(s) Oral <User Schedule>  torsemide 10 milliGRAM(s) Oral <User Schedule>  traZODone 100 milliGRAM(s) Oral at bedtime    MEDICATIONS  (PRN):  acetaminophen     Tablet .. 650 milliGRAM(s) Oral every 6 hours PRN Mild Pain (1 - 3), Moderate Pain (4 - 6)  artificial  tears Solution 1 Drop(s) Both EYES every 4 hours PRN Dry Eyes  bisacodyl Suppository 10 milliGRAM(s) Rectal daily PRN Constipation  guaiFENesin Oral Liquid (Sugar-Free) 200 milliGRAM(s) Oral every 6 hours PRN Cough      CAPILLARY BLOOD GLUCOSE          I&O's Summary    2025 07:01  -  2025 12:41  --------------------------------------------------------  IN: 120 mL / OUT: 0 mL / NET: 120 mL        Daily     Daily Weight in k.6 (2025 09:00)    PHYSICAL EXAM:  Vital Signs Last 24 Hrs  T(C): 36.6 (2025 11:00), Max: 36.7 (2025 21:07)  T(F): 97.8 (2025 11:00), Max: 98.1 (2025 06:41)  HR: 80 (:) (80 - 87)  BP: 95/68 (:00) (95/62 - 111/77)  BP(mean): --  RR: 18 (:00) (18 - 18)  SpO2: 94% (:00) (94% - 96%)    Parameters below as of 2025 11:00  Patient On (Oxygen Delivery Method): room air      CONSTITUTIONAL: NAD, well-developed, well-groomed  EYES: PERRLA; conjunctiva and sclera clear  ENMT: Moist oral mucosa, no pharyngeal injection or exudates; normal dentition  NECK: Supple,  RESPIRATORY: Normal respiratory effort; lungs are clear to auscultation bilaterally, no wheeze nor crackles  CARDIOVASCULAR: Regular rate and rhythm, normal S1 and S2, no murmur/rub/gallop; No lower extremity edema  ABDOMEN: Soft, Nondistended, Nontender to palpation, normoactive bowel sounds  MUSCULOSKELETAL: No clubbing or cyanosis of digits; no joint swelling or tenderness to palpation  PSYCH: A+O to person, place, and time; affect appropriate  NEUROLOGY: CN 2-12 are intact and symmetric; no gross sensory deficits   SKIN: No rashes; no palpable lesions    LABS:                        12.0   4.81  )-----------( 146      ( 2025 06:30 )             35.6     04-21    141  |  104  |  19  ----------------------------<  85  3.7   |  24  |  1.21    Ca    8.9      2025 06:31    TPro  6.4  /  Alb  3.8  /  TBili  0.6  /  DBili  x   /  AST  25  /  ALT  63[H]  /  AlkPhos  87  04-21    LIVER FUNCTIONS - ( 2025 06:31 )  Alb: 3.8 g/dL / Pro: 6.4 g/dL / ALK PHOS: 87 U/L / ALT: 63 U/L / AST: 25 U/L / GGT: x                 Urinalysis Basic - ( 2025 06:31 )    Color: x / Appearance: x / SG: x / pH: x  Gluc: 85 mg/dL / Ketone: x  / Bili: x / Urobili: x   Blood: x / Protein: x / Nitrite: x   Leuk Esterase: x / RBC: x / WBC x   Sq Epi: x / Non Sq Epi: x / Bacteria: x            RADIOLOGY & ADDITIONAL TESTS:  Results Reviewed:   Imaging Personally Reviewed:  Electrocardiogram Personally Reviewed:    COORDINATION OF CARE:  Care Discussed with Consultants/Other Providers [Y/N]:  Prior or Outpatient Records Reviewed [Y/N]:   [facility] Division of Hospital Medicine  Harsh Arias MD M-F, 8A-5P: MS Teams  Other Times: HIC Extension / HIC Pager      Patient is a 70y old  Male who presents with a chief complaint of VT (2025 18:25)      SUBJECTIVE / OVERNIGHT EVENTS:  Patient was examined today. Denies any new symptom. He is walking in the hallway without issue. He denies headache, dizziness, fever, chills, chest pain, dyspnea n/v, abd pain, difficulty ambulating.     ADDITIONAL REVIEW OF SYSTEMS:    MEDICATIONS  (STANDING):  atorvastatin 10 milliGRAM(s) Oral at bedtime  cefepime   IVPB 1000 milliGRAM(s) IV Intermittent once  chlorhexidine 0.12% Liquid 5 milliLiter(s) Swish and Spit two times a day  chlorhexidine 2% Cloths 1 Application(s) Topical <User Schedule>  chlorhexidine 4% Liquid 1 Application(s) Topical <User Schedule>  heparin   Injectable 5000 Unit(s) SubCutaneous every 8 hours  methylPREDNISolone sodium succinate IVPB 1000 milliGRAM(s) IV Intermittent once  metoprolol succinate ER 50 milliGRAM(s) Oral daily  mycophenolate mofetil IVPB 1000 milliGRAM(s) IV Intermittent once  polyethylene glycol 3350 17 Gram(s) Oral daily  psyllium Powder 1 Packet(s) Oral daily  quiNIDine gluconate  milliGRAM(s) Oral every 12 hours  senna 2 Tablet(s) Oral at bedtime  tamsulosin 0.4 milliGRAM(s) Oral at bedtime  torsemide 20 milliGRAM(s) Oral <User Schedule>  torsemide 10 milliGRAM(s) Oral <User Schedule>  traZODone 100 milliGRAM(s) Oral at bedtime    MEDICATIONS  (PRN):  acetaminophen     Tablet .. 650 milliGRAM(s) Oral every 6 hours PRN Mild Pain (1 - 3), Moderate Pain (4 - 6)  artificial  tears Solution 1 Drop(s) Both EYES every 4 hours PRN Dry Eyes  bisacodyl Suppository 10 milliGRAM(s) Rectal daily PRN Constipation  guaiFENesin Oral Liquid (Sugar-Free) 200 milliGRAM(s) Oral every 6 hours PRN Cough      CAPILLARY BLOOD GLUCOSE          I&O's Summary    2025 07:01  -  2025 12:41  --------------------------------------------------------  IN: 120 mL / OUT: 0 mL / NET: 120 mL        Daily     Daily Weight in k.6 (2025 09:00)    PHYSICAL EXAM:  Vital Signs Last 24 Hrs  T(C): 36.6 (2025 11:00), Max: 36.7 (2025 21:07)  T(F): 97.8 (2025 11:00), Max: 98.1 (2025 06:41)  HR: 80 (:00) (80 - 87)  BP: 95/68 (:00) (95/62 - 111/77)  BP(mean): --  RR: 18 (2025 11:00) (18 - 18)  SpO2: 94% (:00) (94% - 96%)    Parameters below as of 2025 11:00  Patient On (Oxygen Delivery Method): room air      CONSTITUTIONAL: NAD, well-developed, well-groomed  EYES: PERRLA; conjunctiva and sclera clear  ENMT: Moist oral mucosa, no pharyngeal injection or exudates; normal dentition  NECK: Supple,  RESPIRATORY: Normal respiratory effort; lungs are clear to auscultation bilaterally, no wheeze nor crackles  CARDIOVASCULAR: Regular rate and rhythm, normal S1 and S2, no murmur/rub/gallop; No lower extremity edema  ABDOMEN: Soft, Nondistended, Nontender to palpation, normoactive bowel sounds  MUSCULOSKELETAL: No clubbing or cyanosis of digits; no joint swelling or tenderness to palpation  PSYCH: A+O to person, place, and time; affect appropriate  NEUROLOGY: CN 2-12 are intact and symmetric; no gross sensory deficits   SKIN: No rashes; no palpable lesions    LABS:                        12.0   4.81  )-----------( 146      ( 2025 06:30 )             35.6     04-21    141  |  104  |  19  ----------------------------<  85  3.7   |  24  |  1.21    Ca    8.9      2025 06:31    TPro  6.4  /  Alb  3.8  /  TBili  0.6  /  DBili  x   /  AST  25  /  ALT  63[H]  /  AlkPhos  87  04-21    LIVER FUNCTIONS - ( 2025 06:31 )  Alb: 3.8 g/dL / Pro: 6.4 g/dL / ALK PHOS: 87 U/L / ALT: 63 U/L / AST: 25 U/L / GGT: x                 Urinalysis Basic - ( 2025 06:31 )    Color: x / Appearance: x / SG: x / pH: x  Gluc: 85 mg/dL / Ketone: x  / Bili: x / Urobili: x   Blood: x / Protein: x / Nitrite: x   Leuk Esterase: x / RBC: x / WBC x   Sq Epi: x / Non Sq Epi: x / Bacteria: x            RADIOLOGY & ADDITIONAL TESTS:  Results Reviewed:   Imaging Personally Reviewed:  Electrocardiogram Personally Reviewed:    COORDINATION OF CARE:  Care Discussed with Consultants/Other Providers [Y/N]:  Prior or Outpatient Records Reviewed [Y/N]:

## 2025-04-22 NOTE — PROGRESS NOTE ADULT - ASSESSMENT
70M PMHx NICM since 2011, HFrEF (25-30%) s/p MDT CRT-D with baseline CHB , VT/VF, afib s/p GIANFRANCO ligation/MAZE, MV/TV repair, PVC ablation (3/2024) was transferred from outside hospital to [facility] due to  VT/AICD shock.   While admitted to the outside hospital, he was started on a lidocaine gtt. On 3/21 when lidocaine weaned off patient had recurrence of VT requiring AICD shocks. Thus transferred to [facility] for further management. Pt  was on Lidocaine gtt for control and is now on Quinidine and uptitrated Toprol for anti-arrythmia. Because of pt's refractory VT pt's listing status for a Heart Transplant was bumped from 6 to 2E temporarily. Pt will remain inpatient for listing purposes and managed closely by HF team and hence was transferred to [facility] floor on 3/27/25. Pending OHT.

## 2025-04-22 NOTE — PROGRESS NOTE ADULT - PROBLEM SELECTOR PLAN 2
- TTE 3/20/25 : LVEF 30%,transvalvular gradients are elevated with severe prosthetic Mitral Stenosis, no evidence of LV thrombus.  - s/p recent admit 3/19/2025 w/ [facility] found to have elevated filling pressures treated with IV diuretics.  - GDMT:  C/w Toprol XL 50mg daily; off Spironolactone due to prior dizziness; afterload reduction (hydralazine) on hold w/ borderline BPs.  - HOLD home Farxiga while inpatient.  - uptrending Cr and with transaminitis suggestive of congestive hepatopathy. lactate wnl.  - HF f/u appreciated. >     > Diuretic management as per HF recs. On torsemide 10mg daily to alternating 10mg/20mg doses 4/21: discussed with HF team, they rec 40meq Kcl on the days he gets torsemide 20mg and  20meq Kcl on the days he gets torsemide 10mg.  - Appreciate HF recs - status upgraded for transplant. >      > pending possible OHT.  - Per transplant ID: check hep serologies.

## 2025-04-23 LAB
ANION GAP SERPL CALC-SCNC: 12 MMOL/L — SIGNIFICANT CHANGE UP (ref 5–17)
BUN SERPL-MCNC: 21 MG/DL — SIGNIFICANT CHANGE UP (ref 7–23)
CALCIUM SERPL-MCNC: 9 MG/DL — SIGNIFICANT CHANGE UP (ref 8.4–10.5)
CHLORIDE SERPL-SCNC: 105 MMOL/L — SIGNIFICANT CHANGE UP (ref 96–108)
CO2 SERPL-SCNC: 23 MMOL/L — SIGNIFICANT CHANGE UP (ref 22–31)
CREAT SERPL-MCNC: 1.3 MG/DL — SIGNIFICANT CHANGE UP (ref 0.5–1.3)
EGFR: 59 ML/MIN/1.73M2 — LOW
EGFR: 59 ML/MIN/1.73M2 — LOW
GLUCOSE SERPL-MCNC: 101 MG/DL — HIGH (ref 70–99)
HCT VFR BLD CALC: 36.3 % — LOW (ref 39–50)
HGB BLD-MCNC: 12.1 G/DL — LOW (ref 13–17)
MAGNESIUM SERPL-MCNC: 2 MG/DL — SIGNIFICANT CHANGE UP (ref 1.6–2.6)
MCHC RBC-ENTMCNC: 29.1 PG — SIGNIFICANT CHANGE UP (ref 27–34)
MCHC RBC-ENTMCNC: 33.3 G/DL — SIGNIFICANT CHANGE UP (ref 32–36)
MCV RBC AUTO: 87.3 FL — SIGNIFICANT CHANGE UP (ref 80–100)
NRBC BLD AUTO-RTO: 0 /100 WBCS — SIGNIFICANT CHANGE UP (ref 0–0)
PHOSPHATE SERPL-MCNC: 3.4 MG/DL — SIGNIFICANT CHANGE UP (ref 2.5–4.5)
PLATELET # BLD AUTO: 117 K/UL — LOW (ref 150–400)
POTASSIUM SERPL-MCNC: 3.9 MMOL/L — SIGNIFICANT CHANGE UP (ref 3.5–5.3)
POTASSIUM SERPL-SCNC: 3.9 MMOL/L — SIGNIFICANT CHANGE UP (ref 3.5–5.3)
RBC # BLD: 4.16 M/UL — LOW (ref 4.2–5.8)
RBC # FLD: 14 % — SIGNIFICANT CHANGE UP (ref 10.3–14.5)
SODIUM SERPL-SCNC: 140 MMOL/L — SIGNIFICANT CHANGE UP (ref 135–145)
WBC # BLD: 4.76 K/UL — SIGNIFICANT CHANGE UP (ref 3.8–10.5)
WBC # FLD AUTO: 4.76 K/UL — SIGNIFICANT CHANGE UP (ref 3.8–10.5)

## 2025-04-23 PROCEDURE — 99232 SBSQ HOSP IP/OBS MODERATE 35: CPT

## 2025-04-23 PROCEDURE — 99233 SBSQ HOSP IP/OBS HIGH 50: CPT | Mod: FS

## 2025-04-23 RX ADMIN — Medication 324 MILLIGRAM(S): at 06:14

## 2025-04-23 RX ADMIN — METOPROLOL SUCCINATE 50 MILLIGRAM(S): 50 TABLET, EXTENDED RELEASE ORAL at 06:14

## 2025-04-23 RX ADMIN — Medication 324 MILLIGRAM(S): at 17:20

## 2025-04-23 RX ADMIN — Medication 40 MILLIEQUIVALENT(S): at 11:11

## 2025-04-23 RX ADMIN — ATORVASTATIN CALCIUM 10 MILLIGRAM(S): 80 TABLET, FILM COATED ORAL at 21:48

## 2025-04-23 RX ADMIN — Medication 1 PACKET(S): at 11:16

## 2025-04-23 RX ADMIN — Medication 100 MILLIGRAM(S): at 21:47

## 2025-04-23 RX ADMIN — Medication 20 MILLIGRAM(S): at 11:09

## 2025-04-23 RX ADMIN — TAMSULOSIN HYDROCHLORIDE 0.4 MILLIGRAM(S): 0.4 CAPSULE ORAL at 21:48

## 2025-04-23 NOTE — PROGRESS NOTE ADULT - SUBJECTIVE AND OBJECTIVE BOX
ADVANCED HEART FAILURE & TRANSPLANT  - PROGRESS NOTE  *To reach the NS2 Team from 8am to 5pm, please call [phone]   ___________________________________________________________________________  Subjective:  - no acute events  - noted weight gain. Did have ravioli yesterday  - walking without SOB, lightheadedness, CP  - overall reports feeling well    Medications:  acetaminophen     Tablet .. 650 milliGRAM(s) Oral every 6 hours PRN  artificial  tears Solution 1 Drop(s) Both EYES every 4 hours PRN  atorvastatin 10 milliGRAM(s) Oral at bedtime  bisacodyl Suppository 10 milliGRAM(s) Rectal daily PRN  chlorhexidine 0.12% Liquid 5 milliLiter(s) Swish and Spit two times a day  chlorhexidine 2% Cloths 1 Application(s) Topical <User Schedule>  chlorhexidine 4% Liquid 1 Application(s) Topical <User Schedule>  guaiFENesin Oral Liquid (Sugar-Free) 200 milliGRAM(s) Oral every 6 hours PRN  heparin   Injectable 5000 Unit(s) SubCutaneous every 8 hours  metoprolol succinate ER 50 milliGRAM(s) Oral daily  polyethylene glycol 3350 17 Gram(s) Oral daily  potassium chloride    Tablet ER 40 milliEquivalent(s) Oral daily  psyllium Powder 1 Packet(s) Oral daily  quiNIDine gluconate  milliGRAM(s) Oral every 12 hours  senna 2 Tablet(s) Oral at bedtime  tamsulosin 0.4 milliGRAM(s) Oral at bedtime  torsemide 20 milliGRAM(s) Oral <User Schedule>  torsemide 10 milliGRAM(s) Oral <User Schedule>  traZODone 100 milliGRAM(s) Oral at bedtime      Physical Exam:    Vitals:  Vital Signs Last 24 Hours  T(C): 36.2 (25 @ 11:00), Max: 36.8 (25 @ 21:32)  HR: 82 (25 @ 17:22) (78 - 82)  BP: 100/69 (25 @ 17:22) (93/61 - 102/70)  RR: 18 (25 @ 11:00) (18 - 18)  SpO2: 96% (25 @ 11:00) (95% - 96%)    Weight in k ( @ 08:52)    I&O's Summary    2025 07:01  -  2025 07:00  --------------------------------------------------------  IN: 240 mL / OUT: 0 mL / NET: 240 mL    Tele: BiV paced, no events    General: No distress. Comfortable.  HEENT: EOM intact.  Neck: Neck supple. JVP not elevated. No masses  Chest: Clear to auscultation bilaterally  CV: Normal S1 and S2. II/VI SM. No LE edema  Abdomen: Soft, non-distended, non-tender  Skin: Warm peripherally  Neurology: Alert and oriented times three. Sensation intact  Psych: Affect normal    Labs:                        12.1   4.76  )-----------( 117      ( 2025 06:39 )             36.3         140  |  105  |  21  ----------------------------<  101[H]  3.9   |  23  |  1.30    Ca    9.0      2025 06:39  Phos  3.4       Mg     2.0

## 2025-04-23 NOTE — PROGRESS NOTE ADULT - SUBJECTIVE AND OBJECTIVE BOX
[facility] Division of Hospital Medicine  Harsh Arias MD M-F, 8A-5P: MS Teams  Other Times: HIC Extension / HIC Pager      Patient is a 70y old  Male who presents with a chief complaint of VT (2025 12:59)      SUBJECTIVE / OVERNIGHT EVENTS:  Patient was examined today. He is walking around hallway without issue. He is worried about weight gain this week. Rest of the ROS neg. Patient requesting if he can walk outside the building today.     ADDITIONAL REVIEW OF SYSTEMS:    MEDICATIONS  (STANDING):  atorvastatin 10 milliGRAM(s) Oral at bedtime  cefepime   IVPB 1000 milliGRAM(s) IV Intermittent once  chlorhexidine 0.12% Liquid 5 milliLiter(s) Swish and Spit two times a day  chlorhexidine 2% Cloths 1 Application(s) Topical <User Schedule>  chlorhexidine 4% Liquid 1 Application(s) Topical <User Schedule>  heparin   Injectable 5000 Unit(s) SubCutaneous every 8 hours  methylPREDNISolone sodium succinate IVPB 1000 milliGRAM(s) IV Intermittent once  metoprolol succinate ER 50 milliGRAM(s) Oral daily  mycophenolate mofetil IVPB 1000 milliGRAM(s) IV Intermittent once  polyethylene glycol 3350 17 Gram(s) Oral daily  potassium chloride    Tablet ER 10 milliEquivalent(s) Oral daily  psyllium Powder 1 Packet(s) Oral daily  quiNIDine gluconate  milliGRAM(s) Oral every 12 hours  senna 2 Tablet(s) Oral at bedtime  tamsulosin 0.4 milliGRAM(s) Oral at bedtime  torsemide 20 milliGRAM(s) Oral <User Schedule>  torsemide 10 milliGRAM(s) Oral <User Schedule>  traZODone 100 milliGRAM(s) Oral at bedtime    MEDICATIONS  (PRN):  acetaminophen     Tablet .. 650 milliGRAM(s) Oral every 6 hours PRN Mild Pain (1 - 3), Moderate Pain (4 - 6)  artificial  tears Solution 1 Drop(s) Both EYES every 4 hours PRN Dry Eyes  bisacodyl Suppository 10 milliGRAM(s) Rectal daily PRN Constipation  guaiFENesin Oral Liquid (Sugar-Free) 200 milliGRAM(s) Oral every 6 hours PRN Cough      CAPILLARY BLOOD GLUCOSE          I&O's Summary    2025 07:01  -  2025 07:00  --------------------------------------------------------  IN: 240 mL / OUT: 0 mL / NET: 240 mL        Daily     Daily Weight in k (2025 08:52)    PHYSICAL EXAM:  Vital Signs Last 24 Hrs  T(C): 36.2 (2025 11:00), Max: 36.8 (2025 21:32)  T(F): 97.2 (2025 11:00), Max: 98.2 (2025 21:32)  HR: 79 (2025 11:00) (78 - 81)  BP: 93/61 (2025 11:00) (93/61 - 102/70)  BP(mean): --  RR: 18 (2025 11:00) (18 - 18)  SpO2: 96% (2025 11:00) (95% - 96%)    Parameters below as of 2025 11:00  Patient On (Oxygen Delivery Method): room air      CONSTITUTIONAL: NAD, well-developed, well-groomed  EYES: PERRLA; conjunctiva and sclera clear  ENMT: Moist oral mucosa,   NECK: Supple,  RESPIRATORY: Normal respiratory effort; lungs are clear to auscultation bilaterally, no wheeze nor crackles  CARDIOVASCULAR: Regular rate and rhythm, normal S1 and S2, no murmur/rub/gallop; No lower extremity edema  ABDOMEN: Soft, Nondistended, Nontender to palpation, normoactive bowel sounds  MUSCULOSKELETAL: No clubbing or cyanosis of digits; no joint swelling or tenderness to palpation  PSYCH: A+O to person, place, and time; affect appropriate  NEUROLOGY: CN 2-12 are intact and symmetric; no gross sensory deficits   SKIN: No rashes; no palpable lesions  LABS:                        12.1   4.76  )-----------( 117      ( 2025 06:39 )             36.3     04-23    140  |  105  |  21  ----------------------------<  101[H]  3.9   |  23  |  1.30    Ca    9.0      2025 06:39  Phos  3.4     -  Mg     2.0     -              Urinalysis Basic - ( 2025 06:39 )    Color: x / Appearance: x / SG: x / pH: x  Gluc: 101 mg/dL / Ketone: x  / Bili: x / Urobili: x   Blood: x / Protein: x / Nitrite: x   Leuk Esterase: x / RBC: x / WBC x   Sq Epi: x / Non Sq Epi: x / Bacteria: x            RADIOLOGY & ADDITIONAL TESTS:  Results Reviewed:   Imaging Personally Reviewed:  Electrocardiogram Personally Reviewed:    COORDINATION OF CARE:  Care Discussed with Consultants/Other Providers [Y/N]:  Prior or Outpatient Records Reviewed [Y/N]:

## 2025-04-23 NOTE — PROGRESS NOTE ADULT - PROBLEM SELECTOR PLAN 8
DVT ppx: hep subc    Last BM reported by patient 4/22.    Dispo: Pending clinical course DVT ppx: hep subc    Last BM reported by patient 4/22.    Dispo: Pending clinical course    - 4/23: discussed with patient's wife at bedside.

## 2025-04-23 NOTE — PROGRESS NOTE ADULT - NS ATTEND AMEND GEN_ALL_CORE FT
69 y/o M with NICM, with CHB and VT/VFib and AFib, s/p GIANFRANCO ligation/MAZE and PVC ablation, previously intolerant to antiarrhythmics, who was undergoing transplant evaluation as an outpatient, who was admitted for ICD shocks. He was started on Lidocaine gtt and continued to have VT and ICD shocks. He was transferred to [facility] and transplant listing was upgraded to status 2E. He has since been stabilized on oral antiarrhythmics of Toprol XL and Quinidine. Hospital course c/b Norovirus on 3/31/25, was made status 7 while symptomatic, then reactivated as status 2E.   Volume status stable on alternating days of Torsemide 20mg PO daily and 10mg PO daily. Weight increased today, will monitor to determine need for additional diuretics.   Decreasing platelets, will re-check CBC and ALFREDITO panel tomorrow.   Awaiting suitable donor.

## 2025-04-23 NOTE — PROGRESS NOTE ADULT - ASSESSMENT
70M PMHx NICM since 2011, HFrEF (25-30%) s/p MDT CRT-D with baseline CHB , VT/VF, afib s/p GIANFRANCO ligation/MAZE, MV/TV repair, PVC ablation (3/2024) was transferred from outside hospital to [facility] due to  VT/AICD shock.   While admitted to the outside hospital, he was started on a lidocaine gtt. On 3/21 when lidocaine weaned off patient had recurrence of VT requiring AICD shocks. Thus transferred to [facility] for further management. Pt  was on Lidocaine gtt for control and is now on Quinidine and uptitrated Toprol for anti-arrythmia. Because of pt's refractory VT pt's listing status for a Heart Transplant was bumped from 6 to 2E temporarily. Pt will remain inpatient for listing purposes and managed closely by HF team and hence was transferred to [facility] floor on 3/27/25. Pending OHT.

## 2025-04-23 NOTE — PROGRESS NOTE ADULT - ASSESSMENT
70-year-old male ABO O past medical history of NICM since 2011 HFrEF (LVEF 25-30%) s/p MDT CRT-D with baseline CHB, VT/VF, AFs/p GIANFRANCO ligation/MAZE, MV/TV repair, PVC ablation 3/2024 intolerant to AADs in the past, recent admission 3/19/2025-3/20/2025 for AICD shocks initially presented to the [facility] ED on 3/21/2025, sent by HF specialist for ACID shock. Device reported successful ATP for VT 3/17/2025 at 6:52pm followed by one ATP & 40J shock. He reported feeling dizzy & SOB during the events. He follows with Dr. Luca Tamayo for HF, currently undergoing transplant workup, Dr John for CRTD and VT/VF and Dr. Chu for genetic counseling. While admitted to [facility], he was started on a lidocaine gtt. On 3/21 when lidocaine weaned off patient had another two episodes of VT requiring AICD shocks. He was transferred to [facility] for further management. He was initially maintained on lidocaine gtt from 3/21/2025-3/25/2025 & his listing status was upgraded to UNOS status 2e for heart transplant (ABO O) for the refractory VT. He was transitioned to oral antiarrhythmics (Toprol XL & quinidine) & ultimately transferred from CICU to tele floor on 3/27/2025. Hospital course was further c/b development of watery diarrhea & was found to have +norovirus on 3/31/2025 which prompted deactivation to status 7 listing for heart transplant. Status reinstated to 2E after diarrhea resolved.     He has occasional runs of NSVT, on oral antiarrhythmics. He currently appears euvolemic with stable weight on current diuretic regimen. He has normal end organ function. He was reactivated 4/2 and is currently listed UNOS status 2e, awaiting a suitable donor.    Bedside hemodynamics:  3/22/25 BP 97/76/83, HR 80, CVP 6, PA 58/23/38, PCWP 20, SVR 1100, Gucci CO/CI 5.1/2.6, TD 4/2.08    Cardiac Studies:   TTE 3/20/25 : LVEF 30%, segmental, LVIDd 5.3, walls normal, elevated LV filling pressures, mod RVE with mildly reduced RV function, TAPSE 1.7, severely dilated RA, annuloplasty in MV position, transvalvular gradients are elevated with severe prosthetic MS (peak gradient 15.7, mean gradient 8), trace intravalvular MR, TV annuloplasty ring noted, mild TR,  est PASP 36, no evidence of LV thrombus  TTE 10/2024: LVEF 25-30%, LVEDD 5.9cm, moderately reduced RV function, annuloplasty ring of mitral and tricuspid position, PASP 47 mmHg  RHC 3/19/25- RA 11 PA 58/26 PCWP 32 CO/CI 5.43/2.77  [facility] 6/2023 Nonobstructive CAD

## 2025-04-24 LAB
HCT VFR BLD CALC: 36.1 % — LOW (ref 39–50)
HEPARIN-PF4 AB RESULT: <0.6 U/ML — SIGNIFICANT CHANGE UP (ref 0–0.9)
HGB BLD-MCNC: 12.3 G/DL — LOW (ref 13–17)
MCHC RBC-ENTMCNC: 29.9 PG — SIGNIFICANT CHANGE UP (ref 27–34)
MCHC RBC-ENTMCNC: 34.1 G/DL — SIGNIFICANT CHANGE UP (ref 32–36)
MCV RBC AUTO: 87.6 FL — SIGNIFICANT CHANGE UP (ref 80–100)
NRBC BLD AUTO-RTO: 0 /100 WBCS — SIGNIFICANT CHANGE UP (ref 0–0)
PF4 HEPARIN CMPLX AB SER-ACNC: NEGATIVE — SIGNIFICANT CHANGE UP
PLATELET # BLD AUTO: 125 K/UL — LOW (ref 150–400)
RBC # BLD: 4.12 M/UL — LOW (ref 4.2–5.8)
RBC # FLD: 14 % — SIGNIFICANT CHANGE UP (ref 10.3–14.5)
WBC # BLD: 5.06 K/UL — SIGNIFICANT CHANGE UP (ref 3.8–10.5)
WBC # FLD AUTO: 5.06 K/UL — SIGNIFICANT CHANGE UP (ref 3.8–10.5)

## 2025-04-24 PROCEDURE — 99232 SBSQ HOSP IP/OBS MODERATE 35: CPT

## 2025-04-24 RX ADMIN — Medication 5 MILLILITER(S): at 17:45

## 2025-04-24 RX ADMIN — ATORVASTATIN CALCIUM 10 MILLIGRAM(S): 80 TABLET, FILM COATED ORAL at 21:50

## 2025-04-24 RX ADMIN — TAMSULOSIN HYDROCHLORIDE 0.4 MILLIGRAM(S): 0.4 CAPSULE ORAL at 21:51

## 2025-04-24 RX ADMIN — Medication 1 PACKET(S): at 11:06

## 2025-04-24 RX ADMIN — METOPROLOL SUCCINATE 50 MILLIGRAM(S): 50 TABLET, EXTENDED RELEASE ORAL at 05:44

## 2025-04-24 RX ADMIN — Medication 324 MILLIGRAM(S): at 17:49

## 2025-04-24 RX ADMIN — Medication 10 MILLIGRAM(S): at 09:55

## 2025-04-24 RX ADMIN — Medication 100 MILLIGRAM(S): at 21:51

## 2025-04-24 RX ADMIN — Medication 324 MILLIGRAM(S): at 05:44

## 2025-04-24 NOTE — PROGRESS NOTE ADULT - PROBLEM SELECTOR PLAN 8
DVT ppx: hep subc    Last BM reported by patient 4/22.    Dispo: Pending clinical course    - 4/23: discussed with patient's wife at bedside.

## 2025-04-24 NOTE — PROGRESS NOTE ADULT - SUBJECTIVE AND OBJECTIVE BOX
[facility] Division of Hospital Medicine  Harsh Arias MD M-F, 8A-5P: MS Teams  Other Times: HIC Extension / HIC Pager      Patient is a 70y old  Male who presents with a chief complaint of VT (2025 17:47)      SUBJECTIVE / OVERNIGHT EVENTS:  Patient was examined today. Patient states he felt well walking outside yesterday. He is requesting psych consult stating he is not able to live his life outside because of waiting which is bothering him.     ADDITIONAL REVIEW OF SYSTEMS:    MEDICATIONS  (STANDING):  atorvastatin 10 milliGRAM(s) Oral at bedtime  chlorhexidine 0.12% Liquid 5 milliLiter(s) Swish and Spit two times a day  chlorhexidine 2% Cloths 1 Application(s) Topical <User Schedule>  chlorhexidine 4% Liquid 1 Application(s) Topical <User Schedule>  heparin   Injectable 5000 Unit(s) SubCutaneous every 8 hours  metoprolol succinate ER 50 milliGRAM(s) Oral daily  polyethylene glycol 3350 17 Gram(s) Oral daily  potassium chloride ER tablet 20 milliEquivalent(s) 20 milliEquivalent(s) Oral <User Schedule>  potassium chloride ER tablet 40 milliEquivalent(s) 40 milliEquivalent(s) Oral <User Schedule>  psyllium Powder 1 Packet(s) Oral daily  quiNIDine gluconate  milliGRAM(s) Oral every 12 hours  senna 2 Tablet(s) Oral at bedtime  tamsulosin 0.4 milliGRAM(s) Oral at bedtime  torsemide 20 milliGRAM(s) Oral <User Schedule>  torsemide 10 milliGRAM(s) Oral <User Schedule>  traZODone 100 milliGRAM(s) Oral at bedtime    MEDICATIONS  (PRN):  acetaminophen     Tablet .. 650 milliGRAM(s) Oral every 6 hours PRN Mild Pain (1 - 3), Moderate Pain (4 - 6)  artificial  tears Solution 1 Drop(s) Both EYES every 4 hours PRN Dry Eyes  bisacodyl Suppository 10 milliGRAM(s) Rectal daily PRN Constipation  guaiFENesin Oral Liquid (Sugar-Free) 200 milliGRAM(s) Oral every 6 hours PRN Cough      CAPILLARY BLOOD GLUCOSE          I&O's Summary    2025 07:01  -  2025 07:00  --------------------------------------------------------  IN: 540 mL / OUT: 0 mL / NET: 540 mL    2025 07:01  -  2025 12:18  --------------------------------------------------------  IN: 480 mL / OUT: 0 mL / NET: 480 mL        Daily     Daily Weight in k.5 (2025 08:07)    PHYSICAL EXAM:  Vital Signs Last 24 Hrs  T(C): 36.7 (2025 11:13), Max: 36.8 (2025 21:23)  T(F): 98 (2025 11:13), Max: 98.2 (2025 21:23)  HR: 81 (2025 11:13) (81 - 83)  BP: 98/64 (2025 11:13) (98/64 - 103/72)  BP(mean): --  RR: 18 (2025 11:13) (18 - 18)  SpO2: 95% (2025 11:13) (93% - 95%)    Parameters below as of 2025 11:13  Patient On (Oxygen Delivery Method): room air        CONSTITUTIONAL: NAD, well-developed, well-groomed  EYES: PERRLA; conjunctiva and sclera clear  ENMT: Moist oral mucosa,   NECK: Supple,  RESPIRATORY: Normal respiratory effort; lungs are clear to auscultation bilaterally, no wheeze nor crackles  CARDIOVASCULAR: Regular rate and rhythm, normal S1 and S2, no murmur/rub/gallop; No lower extremity edema  ABDOMEN: Soft, Nondistended, Nontender to palpation, normoactive bowel sounds  MUSCULOSKELETAL: No clubbing or cyanosis of digits; no joint swelling or tenderness to palpation  PSYCH: A+O to person, place, and time; affect appropriate  NEUROLOGY: CN 2-12 are intact and symmetric; no gross sensory deficits   SKIN: No rashes; no palpable lesions    LABS:                        12.3   5.06  )-----------( 125      ( 2025 06:14 )             36.1     04-23    140  |  105  |  21  ----------------------------<  101[H]  3.9   |  23  |  1.30    Ca    9.0      2025 06:39  Phos  3.4     04-  Mg     2.0     04-23              Urinalysis Basic - ( 2025 06:39 )    Color: x / Appearance: x / SG: x / pH: x  Gluc: 101 mg/dL / Ketone: x  / Bili: x / Urobili: x   Blood: x / Protein: x / Nitrite: x   Leuk Esterase: x / RBC: x / WBC x   Sq Epi: x / Non Sq Epi: x / Bacteria: x            RADIOLOGY & ADDITIONAL TESTS:  Results Reviewed:   Imaging Personally Reviewed:  Electrocardiogram Personally Reviewed:    COORDINATION OF CARE:  Care Discussed with Consultants/Other Providers [Y/N]:  Prior or Outpatient Records Reviewed [Y/N]:

## 2025-04-24 NOTE — PROGRESS NOTE ADULT - ASSESSMENT
70M PMHx NICM since 2011, HFrEF (25-30%) s/p MDT CRT-D with baseline CHB , VT/VF, afib s/p GIANFRANCO ligation/MAZE, MV/TV repair, PVC ablation (3/2024) was transferred from outside hospital to [facility] due to  VT/AICD shock.   While admitted to the outside hospital, he was started on a lidocaine gtt. On 3/21 when lidocaine weaned off patient had recurrence of VT requiring AICD shocks. Thus transferred to [facility] for further management. Pt  was on Lidocaine gtt for control and is now on Quinidine and uptitrated Toprol for anti-arrythmia. Because of pt's refractory VT pt's listing status for a Heart Transplant was bumped from 6 to 2E temporarily. Pt will remain inpatient for listing purposes and managed closely by HF team and hence was transferred to [facility] floor on 3/27/25. Pending OHT.

## 2025-04-24 NOTE — PROGRESS NOTE ADULT - PROBLEM SELECTOR PLAN 3
- Self-limiting, resolved, now off contact isolation      # adjustment disorder: 4/24: patient requesting psych - will consult.

## 2025-04-25 LAB
ANION GAP SERPL CALC-SCNC: 13 MMOL/L — SIGNIFICANT CHANGE UP (ref 5–17)
BUN SERPL-MCNC: 22 MG/DL — SIGNIFICANT CHANGE UP (ref 7–23)
CALCIUM SERPL-MCNC: 8.9 MG/DL — SIGNIFICANT CHANGE UP (ref 8.4–10.5)
CHLORIDE SERPL-SCNC: 103 MMOL/L — SIGNIFICANT CHANGE UP (ref 96–108)
CO2 SERPL-SCNC: 26 MMOL/L — SIGNIFICANT CHANGE UP (ref 22–31)
CREAT SERPL-MCNC: 1.27 MG/DL — SIGNIFICANT CHANGE UP (ref 0.5–1.3)
EGFR: 61 ML/MIN/1.73M2 — SIGNIFICANT CHANGE UP
EGFR: 61 ML/MIN/1.73M2 — SIGNIFICANT CHANGE UP
GLUCOSE SERPL-MCNC: 90 MG/DL — SIGNIFICANT CHANGE UP (ref 70–99)
HCT VFR BLD CALC: 35.8 % — LOW (ref 39–50)
HGB BLD-MCNC: 12 G/DL — LOW (ref 13–17)
MAGNESIUM SERPL-MCNC: 1.9 MG/DL — SIGNIFICANT CHANGE UP (ref 1.6–2.6)
MCHC RBC-ENTMCNC: 29.3 PG — SIGNIFICANT CHANGE UP (ref 27–34)
MCHC RBC-ENTMCNC: 33.5 G/DL — SIGNIFICANT CHANGE UP (ref 32–36)
MCV RBC AUTO: 87.5 FL — SIGNIFICANT CHANGE UP (ref 80–100)
NRBC BLD AUTO-RTO: 0 /100 WBCS — SIGNIFICANT CHANGE UP (ref 0–0)
PHOSPHATE SERPL-MCNC: 4 MG/DL — SIGNIFICANT CHANGE UP (ref 2.5–4.5)
PLATELET # BLD AUTO: 135 K/UL — LOW (ref 150–400)
POTASSIUM SERPL-MCNC: 3.8 MMOL/L — SIGNIFICANT CHANGE UP (ref 3.5–5.3)
POTASSIUM SERPL-SCNC: 3.8 MMOL/L — SIGNIFICANT CHANGE UP (ref 3.5–5.3)
RBC # BLD: 4.09 M/UL — LOW (ref 4.2–5.8)
RBC # FLD: 14 % — SIGNIFICANT CHANGE UP (ref 10.3–14.5)
SODIUM SERPL-SCNC: 142 MMOL/L — SIGNIFICANT CHANGE UP (ref 135–145)
WBC # BLD: 5.07 K/UL — SIGNIFICANT CHANGE UP (ref 3.8–10.5)
WBC # FLD AUTO: 5.07 K/UL — SIGNIFICANT CHANGE UP (ref 3.8–10.5)

## 2025-04-25 PROCEDURE — 99232 SBSQ HOSP IP/OBS MODERATE 35: CPT | Mod: FS

## 2025-04-25 PROCEDURE — 99233 SBSQ HOSP IP/OBS HIGH 50: CPT

## 2025-04-25 PROCEDURE — 99233 SBSQ HOSP IP/OBS HIGH 50: CPT | Mod: FS

## 2025-04-25 RX ORDER — CLONAZEPAM 0.5 MG/1
0.25 TABLET ORAL EVERY 12 HOURS
Refills: 0 | Status: DISCONTINUED | OUTPATIENT
Start: 2025-04-25 | End: 2025-04-29

## 2025-04-25 RX ADMIN — TAMSULOSIN HYDROCHLORIDE 0.4 MILLIGRAM(S): 0.4 CAPSULE ORAL at 22:00

## 2025-04-25 RX ADMIN — ATORVASTATIN CALCIUM 10 MILLIGRAM(S): 80 TABLET, FILM COATED ORAL at 22:00

## 2025-04-25 RX ADMIN — Medication 324 MILLIGRAM(S): at 17:58

## 2025-04-25 RX ADMIN — Medication 1 PACKET(S): at 11:37

## 2025-04-25 RX ADMIN — METOPROLOL SUCCINATE 50 MILLIGRAM(S): 50 TABLET, EXTENDED RELEASE ORAL at 05:31

## 2025-04-25 RX ADMIN — Medication 324 MILLIGRAM(S): at 05:31

## 2025-04-25 RX ADMIN — Medication 100 MILLIGRAM(S): at 22:00

## 2025-04-25 RX ADMIN — Medication 20 MILLIGRAM(S): at 11:37

## 2025-04-25 NOTE — PROGRESS NOTE ADULT - PROBLEM SELECTOR PLAN 1
ACC/AHA stage D systolic heart failure.   - Listed for heart transplant: deactivated 4/1 to status 7 due to norovirus infection but reactivated to status 2e after resolution of symptoms  - c/w Toprol XL 50mg daily.  - MRA held d/t reported weakness/lightheadedness while taking  - Continue torsemide 20mg alternating with 10mg QOD.   - Please increase KCl to 40meq daily to maintain K > 4  - strict I/Os and daily weights  - Keep K>4 and Mg>2  - PT as tolerated

## 2025-04-25 NOTE — BH CONSULTATION LIAISON PROGRESS NOTE - NSBHASSESSMENTFT_PSY_ALL_CORE
71 y/o domiciled, employed, , , male with PPHx of PTSD, unspecified anxiety d/o, with no past psychiatric admissions, with a PMHx of NICM since 2011 HFrEF (LVEF 25-30%) s/p MDT CRT-D with baseline CHB, VT/VF, AFs/p GIANFRANCO ligation/MAZE, MV/TV repair, PVC ablation 3/2024 intolerant to AADs in the past, recent admission 3/19/2025-3/20/2025 for AICD shocks initially presented to the [facility] ED on 3/21/2025, sent by HF specialist for ACID shock. While admitted to [facility], his listing status was upgraded to UNOS status 2e for heart transplant (ABO O) for the refractory VT, with the patient being transferred to [facility] for further management. Patient seen by transplant psychiatry for concerns of depression.     4/2:Patient seen as f/u, denied any acute concerns, noting that his sleep has improved, and noted that since being reactivated on the transplant list, has helped his mood and anxiety. Educated regarding medication at bedside, including PRN medications available.    4/25:Patient seen as f/u, for concerns for low and frustration during prolonged hospitalization, discussed starting an antidepressant for his anxiety of which patient declined, will recommend to restart patient Klonopin at this time.     see attending attestation for plan  -------------------------------------------------------------    Plan  -C/W trazodone 100mg PO HS, please monitor patient QTC  -Can consider starting Klonopin 0.5mg Po QD PRN for acute anxiety alleviation   -Discussed starting a standing antidepressant once again; patient declined need for standing SSRI at this time  -Supportive therapy provided; C/W  and holistic RN consult  71 y/o domiciled, employed, , , male with PPHx of PTSD, unspecified anxiety d/o, with no past psychiatric admissions, with a PMHx of NICM since 2011 HFrEF (LVEF 25-30%) s/p MDT CRT-D with baseline CHB, VT/VF, AFs/p GIANFRANCO ligation/MAZE, MV/TV repair, PVC ablation 3/2024 intolerant to AADs in the past, recent admission 3/19/2025-3/20/2025 for AICD shocks initially presented to the [facility] ED on 3/21/2025, sent by HF specialist for ACID shock. While admitted to [facility], his listing status was upgraded to UNOS status 2e for heart transplant (ABO O) for the refractory VT, with the patient being transferred to [facility] for further management. Patient seen by transplant psychiatry for concerns of depression.     4/2:Patient seen as f/u, denied any acute concerns, noting that his sleep has improved, and noted that since being reactivated on the transplant list, has helped his mood and anxiety. Educated regarding medication at bedside, including PRN medications available.    4/25:Patient seen as f/u, for concerns for low and frustration during prolonged hospitalization, discussed starting an antidepressant for his anxiety of which patient declined, will recommend to restart patient Klonopin at this time.     see attending attestation for plan  -------------------------------------------------------------

## 2025-04-25 NOTE — PROGRESS NOTE ADULT - SUBJECTIVE AND OBJECTIVE BOX
ADVANCED HEART FAILURE & TRANSPLANT  - PROGRESS NOTE  *To reach the NS2 Team from 8am to 5pm, please call [phone]   ___________________________________________________________________________  Subjective:  - no acute events  - feeling well. walking around the nursing unit without SOB, lightheadedness, CP    Medications:  acetaminophen     Tablet .. 650 milliGRAM(s) Oral every 6 hours PRN  artificial  tears Solution 1 Drop(s) Both EYES every 4 hours PRN  atorvastatin 10 milliGRAM(s) Oral at bedtime  bisacodyl Suppository 10 milliGRAM(s) Rectal daily PRN  chlorhexidine 0.12% Liquid 5 milliLiter(s) Swish and Spit two times a day  chlorhexidine 2% Cloths 1 Application(s) Topical <User Schedule>  chlorhexidine 4% Liquid 1 Application(s) Topical <User Schedule>  clonazePAM  Tablet 0.25 milliGRAM(s) Oral every 12 hours PRN  guaiFENesin Oral Liquid (Sugar-Free) 200 milliGRAM(s) Oral every 6 hours PRN  heparin   Injectable 5000 Unit(s) SubCutaneous every 8 hours  metoprolol succinate ER 50 milliGRAM(s) Oral daily  polyethylene glycol 3350 17 Gram(s) Oral daily  potassium chloride ER tablet 20 milliEquivalent(s) 20 milliEquivalent(s) Oral <User Schedule>  potassium chloride ER tablet 40 milliEquivalent(s) 40 milliEquivalent(s) Oral <User Schedule>  psyllium Powder 1 Packet(s) Oral daily  quiNIDine gluconate  milliGRAM(s) Oral every 12 hours  senna 2 Tablet(s) Oral at bedtime  tamsulosin 0.4 milliGRAM(s) Oral at bedtime  torsemide 20 milliGRAM(s) Oral <User Schedule>  torsemide 10 milliGRAM(s) Oral <User Schedule>  traZODone 100 milliGRAM(s) Oral at bedtime      Physical Exam:    Vitals:  Vital Signs Last 24 Hours  T(C): 36.7 (25 @ 11:04), Max: 36.8 (25 @ 21:00)  HR: 80 (25 @ 11:04) (70 - 83)  BP: 98/66 (25 @ 11:04) (92/61 - 98/66)  RR: 18 (25 @ 11:04) (18 - 18)  SpO2: 95% (25 @ 11:04) (93% - 95%)    Weight in k.4 ( @ 08:09)    I&O's Summary    2025 07:  -  2025 07:00  --------------------------------------------------------  IN: 1220 mL / OUT: 0 mL / NET: 1220 mL    2025 07:  -  2025 15:28  --------------------------------------------------------  IN: 840 mL / OUT: 0 mL / NET: 840 mL    Tele: paced    General: No distress. Comfortable.  HEENT: EOM intact.  Neck: Neck supple. JVP not elevated. No masses  Chest: Clear to auscultation bilaterally  CV: Normal S1 and S2. No murmurs, rub, or gallops. No LE edema  Abdomen: Soft, non-distended, non-tender  Skin: Warm peripherally  Neurology: Alert and oriented times three. Sensation intact  Psych: Affect normal    Labs:                        12.0   5.07  )-----------( 135      ( 2025 06:19 )             35.8         142  |  103  |  22  ----------------------------<  90  3.8   |  26  |  1.27    Ca    8.9      2025 06:20  Phos  4.0       Mg     1.9

## 2025-04-25 NOTE — PROGRESS NOTE ADULT - PROBLEM SELECTOR PLAN 5
37 weeks O- blood type.   2 prior C sections    Onset:  2 weeks ago.   Location/Description:  swelling in her feet and hands to the point where she cannot bend her hands. S  he has a swollen face and people are noticing.  She is feeling a sore throat and woozy and fatigued.  She is not having headaches.  She has some black spot in her vision at random times- This has not happened today.   She has had pain to her right upper rib and thought it was her placenta- this is intermittent and NOT present now.   She has had some back pain that she describes as a back spasm that comes and goes and moves into her stomach.   She has not had any nausea or vomiting.   She is not able to check her blood pressure.  She notes fetal movements and has felt movements today.  She is not bleeding and does not think her water broke.   Precipitating Factors:  10/17/23- L & D eval for blurred vision.   Pain Scale (1-10), 10 highest:  8 but nothing today.   Associated Symptoms:  Swelling to her feet, hands, face and abdomen. Feeling woozy, fatigued and having 'spasms' of pain in her back.  She has black spots in her vision at times- Not present now.   LMP: No LMP recorded. Patient is pregnant.  EDC:  23  Gestational Age:  37 weeks  Blood Type: O-  OB History:   OB History    Para Term  AB Living   5 2 2 0 1 2   SAB IAB Ectopic Molar Multiple Live Births   0 0 0 0 0 1           Vaginal/C Section:  2 prior C sections  Group B Strep (pos or neg):  GBS-  History of previous Labor & Delivery:  She was evaluated in L & d on 10/17/23 for blurred vision and is a recent transfer from [facility].  She has had 2 prior C sections.   Recent Care:  23- Was seen for an OB check.     Care advice: come into the clinic for evaluation today. If your symptoms worsen- Go to L & D     - BP is borderline (90-100s)  - C/w Toprol XL, diuretics as above  - Monitor blood pressures

## 2025-04-25 NOTE — PROGRESS NOTE ADULT - ASSESSMENT
70-year-old male ABO O past medical history of NICM since 2011 HFrEF (LVEF 25-30%) s/p MDT CRT-D with baseline CHB, VT/VF, AFs/p GIANFRANCO ligation/MAZE, MV/TV repair, PVC ablation 3/2024 intolerant to AADs in the past, recent admission 3/19/2025-3/20/2025 for AICD shocks initially presented to the [facility] ED on 3/21/2025, sent by HF specialist for ACID shock. Device reported successful ATP for VT 3/17/2025 at 6:52pm followed by one ATP & 40J shock. He reported feeling dizzy & SOB during the events. He follows with Dr. Luca Tamayo for HF, currently undergoing transplant workup, Dr John for CRTD and VT/VF and Dr. Chu for genetic counseling. While admitted to [facility], he was started on a lidocaine gtt. On 3/21 when lidocaine weaned off patient had another two episodes of VT requiring AICD shocks. He was transferred to [facility] for further management. He was initially maintained on lidocaine gtt from 3/21/2025-3/25/2025 & his listing status was upgraded to UNOS status 2e for heart transplant (ABO O) for the refractory VT. He was transitioned to oral antiarrhythmics (Toprol XL & quinidine) & ultimately transferred from CICU to tele floor on 3/27/2025. Hospital course was further c/b development of watery diarrhea & was found to have +norovirus on 3/31/2025 which prompted deactivation to status 7 listing for heart transplant. Status reinstated to 2E after diarrhea resolved.     He has occasional runs of NSVT, on oral antiarrhythmics. He currently appears euvolemic with stable weight on current diuretic regimen. He has normal end organ function. He was reactivated 4/2 and is currently listed UNOS status 2e, PRA 3% (2 unacceptables, last drawn 4/8) awaiting a suitable donor.    Bedside hemodynamics:  3/22/25 BP 97/76/83, HR 80, CVP 6, PA 58/23/38, PCWP 20, SVR 1100, Gucci CO/CI 5.1/2.6, TD 4/2.08    Cardiac Studies:   TTE 3/20/25 : LVEF 30%, segmental, LVIDd 5.3, walls normal, elevated LV filling pressures, mod RVE with mildly reduced RV function, TAPSE 1.7, severely dilated RA, annuloplasty in MV position, transvalvular gradients are elevated with severe prosthetic MS (peak gradient 15.7, mean gradient 8), trace intravalvular MR, TV annuloplasty ring noted, mild TR,  est PASP 36, no evidence of LV thrombus  TTE 10/2024: LVEF 25-30%, LVEDD 5.9cm, moderately reduced RV function, annuloplasty ring of mitral and tricuspid position, PASP 47 mmHg  [facility] 3/19/25- RA 11 PA 58/26 PCWP 32 CO/CI 5.43/2.77  [facility] 6/2023 Nonobstructive CAD

## 2025-04-25 NOTE — PROGRESS NOTE ADULT - SUBJECTIVE AND OBJECTIVE BOX
[facility] Division of Hospital Medicine  Harsh Arias MD M-F, 8A-5P: MS Teams  Other Times: HIC Extension / HIC Pager      Patient is a 70y old  Male who presents with a chief complaint of VT (2025 12:18)      SUBJECTIVE / OVERNIGHT EVENTS:  Patient was examined    ADDITIONAL REVIEW OF SYSTEMS:    MEDICATIONS  (STANDING):  atorvastatin 10 milliGRAM(s) Oral at bedtime  chlorhexidine 0.12% Liquid 5 milliLiter(s) Swish and Spit two times a day  chlorhexidine 2% Cloths 1 Application(s) Topical <User Schedule>  chlorhexidine 4% Liquid 1 Application(s) Topical <User Schedule>  heparin   Injectable 5000 Unit(s) SubCutaneous every 8 hours  metoprolol succinate ER 50 milliGRAM(s) Oral daily  polyethylene glycol 3350 17 Gram(s) Oral daily  potassium chloride ER tablet 20 milliEquivalent(s) 20 milliEquivalent(s) Oral <User Schedule>  potassium chloride ER tablet 40 milliEquivalent(s) 40 milliEquivalent(s) Oral <User Schedule>  psyllium Powder 1 Packet(s) Oral daily  quiNIDine gluconate  milliGRAM(s) Oral every 12 hours  senna 2 Tablet(s) Oral at bedtime  tamsulosin 0.4 milliGRAM(s) Oral at bedtime  torsemide 20 milliGRAM(s) Oral <User Schedule>  torsemide 10 milliGRAM(s) Oral <User Schedule>  traZODone 100 milliGRAM(s) Oral at bedtime    MEDICATIONS  (PRN):  acetaminophen     Tablet .. 650 milliGRAM(s) Oral every 6 hours PRN Mild Pain (1 - 3), Moderate Pain (4 - 6)  artificial  tears Solution 1 Drop(s) Both EYES every 4 hours PRN Dry Eyes  bisacodyl Suppository 10 milliGRAM(s) Rectal daily PRN Constipation  guaiFENesin Oral Liquid (Sugar-Free) 200 milliGRAM(s) Oral every 6 hours PRN Cough      CAPILLARY BLOOD GLUCOSE          I&O's Summary    2025 07:01  -  2025 07:00  --------------------------------------------------------  IN: 1220 mL / OUT: 0 mL / NET: 1220 mL    2025 07:01  -  2025 13:28  --------------------------------------------------------  IN: 480 mL / OUT: 0 mL / NET: 480 mL        Daily     Daily Weight in k.4 (2025 08:09)    PHYSICAL EXAM:  Vital Signs Last 24 Hrs  T(C): 36.7 (2025 11:04), Max: 36.8 (2025 21:00)  T(F): 98 (2025 11:04), Max: 98.2 (2025 21:00)  HR: 80 (2025 11:04) (70 - 83)  BP: 98/66 (2025 11:04) (92/61 - 98/66)  BP(mean): --  RR: 18 (2025 11:04) (18 - 18)  SpO2: 95% (2025 11:04) (93% - 95%)    Parameters below as of 2025 11:04  Patient On (Oxygen Delivery Method): room air      CONSTITUTIONAL: NAD, well-developed, well-groomed  EYES: PERRLA; conjunctiva and sclera clear  ENMT: Moist oral mucosa, no pharyngeal injection or exudates; normal dentition  NECK: Supple, no palpable masses; no thyromegaly  RESPIRATORY: Normal respiratory effort; lungs are clear to auscultation bilaterally  CARDIOVASCULAR: Regular rate and rhythm, normal S1 and S2, no murmur/rub/gallop; No lower extremity edema; Peripheral pulses are 2+ bilaterally  ABDOMEN: Nontender to palpation, normoactive bowel sounds, no rebound/guarding; No hepatosplenomegaly  MUSCULOSKELETAL:  no clubbing or cyanosis of digits; no joint swelling or tenderness to palpation, moving all extremities   PSYCH: A+O to person, place, and time; affect appropriate  NEUROLOGY: CN 2-12 are intact and symmetric; no gross sensory/motor deficits   SKIN: No rashes; no palpable lesions    LABS:                        12.0   5.07  )-----------( 135      ( 2025 06:19 )             35.8     04-25    142  |  103  |  22  ----------------------------<  90  3.8   |  26  |  1.27    Ca    8.9      2025 06:20  Phos  4.0     04-25  Mg     1.9     04-25              Urinalysis Basic - ( 2025 06:20 )    Color: x / Appearance: x / SG: x / pH: x  Gluc: 90 mg/dL / Ketone: x  / Bili: x / Urobili: x   Blood: x / Protein: x / Nitrite: x   Leuk Esterase: x / RBC: x / WBC x   Sq Epi: x / Non Sq Epi: x / Bacteria: x            RADIOLOGY & ADDITIONAL TESTS:  Results Reviewed:   Imaging Personally Reviewed:  Electrocardiogram Personally Reviewed:    COORDINATION OF CARE:  Care Discussed with Consultants/Other Providers [Y/N]:  Prior or Outpatient Records Reviewed [Y/N]:   [facility] Division of Hospital Medicine  Harsh Arias MD M-F, 8A-5P: MS Teams  Other Times: HIC Extension / HIC Pager      Patient is a 70y old  Male who presents with a chief complaint of VT (2025 12:18)      SUBJECTIVE / OVERNIGHT EVENTS:  Patient was examined today. He states that he feels better mentally today. Rest of the 10 point ROS negative.     ADDITIONAL REVIEW OF SYSTEMS:    MEDICATIONS  (STANDING):  atorvastatin 10 milliGRAM(s) Oral at bedtime  chlorhexidine 0.12% Liquid 5 milliLiter(s) Swish and Spit two times a day  chlorhexidine 2% Cloths 1 Application(s) Topical <User Schedule>  chlorhexidine 4% Liquid 1 Application(s) Topical <User Schedule>  heparin   Injectable 5000 Unit(s) SubCutaneous every 8 hours  metoprolol succinate ER 50 milliGRAM(s) Oral daily  polyethylene glycol 3350 17 Gram(s) Oral daily  potassium chloride ER tablet 20 milliEquivalent(s) 20 milliEquivalent(s) Oral <User Schedule>  potassium chloride ER tablet 40 milliEquivalent(s) 40 milliEquivalent(s) Oral <User Schedule>  psyllium Powder 1 Packet(s) Oral daily  quiNIDine gluconate  milliGRAM(s) Oral every 12 hours  senna 2 Tablet(s) Oral at bedtime  tamsulosin 0.4 milliGRAM(s) Oral at bedtime  torsemide 20 milliGRAM(s) Oral <User Schedule>  torsemide 10 milliGRAM(s) Oral <User Schedule>  traZODone 100 milliGRAM(s) Oral at bedtime    MEDICATIONS  (PRN):  acetaminophen     Tablet .. 650 milliGRAM(s) Oral every 6 hours PRN Mild Pain (1 - 3), Moderate Pain (4 - 6)  artificial  tears Solution 1 Drop(s) Both EYES every 4 hours PRN Dry Eyes  bisacodyl Suppository 10 milliGRAM(s) Rectal daily PRN Constipation  guaiFENesin Oral Liquid (Sugar-Free) 200 milliGRAM(s) Oral every 6 hours PRN Cough      CAPILLARY BLOOD GLUCOSE          I&O's Summary    2025 07:01  -  2025 07:00  --------------------------------------------------------  IN: 1220 mL / OUT: 0 mL / NET: 1220 mL    2025 07:01  -  2025 13:28  --------------------------------------------------------  IN: 480 mL / OUT: 0 mL / NET: 480 mL        Daily     Daily Weight in k.4 (2025 08:09)    PHYSICAL EXAM:  Vital Signs Last 24 Hrs  T(C): 36.7 (2025 11:04), Max: 36.8 (2025 21:00)  T(F): 98 (2025 11:04), Max: 98.2 (2025 21:00)  HR: 80 (2025 11:04) (70 - 83)  BP: 98/66 (2025 11:04) (92/61 - 98/66)  BP(mean): --  RR: 18 (2025 11:04) (18 - 18)  SpO2: 95% (2025 11:04) (93% - 95%)    Parameters below as of 2025 11:04  Patient On (Oxygen Delivery Method): room air        CONSTITUTIONAL: NAD, well-developed, well-groomed  EYES: PERRLA; conjunctiva and sclera clear  ENMT: Moist oral mucosa,   NECK: Supple,  RESPIRATORY: Normal respiratory effort; lungs are clear to auscultation bilaterally, no wheeze nor crackles  CARDIOVASCULAR: Regular rate and rhythm, normal S1 and S2, no murmur/rub/gallop; No lower extremity edema  ABDOMEN: Soft, Nondistended, Nontender to palpation, normoactive bowel sounds  MUSCULOSKELETAL: No clubbing or cyanosis of digits; no joint swelling or tenderness to palpation  PSYCH: A+O to person, place, and time; affect appropriate  NEUROLOGY: CN 2-12 are intact and symmetric; no gross sensory deficits   SKIN: No rashes; no palpable lesions    LABS:                        12.0   5.07  )-----------( 135      ( 2025 06:19 )             35.8     04-25    142  |  103  |  22  ----------------------------<  90  3.8   |  26  |  1.27    Ca    8.9      2025 06:20  Phos  4.0     04-25  Mg     1.9     -25              Urinalysis Basic - ( 2025 06:20 )    Color: x / Appearance: x / SG: x / pH: x  Gluc: 90 mg/dL / Ketone: x  / Bili: x / Urobili: x   Blood: x / Protein: x / Nitrite: x   Leuk Esterase: x / RBC: x / WBC x   Sq Epi: x / Non Sq Epi: x / Bacteria: x            RADIOLOGY & ADDITIONAL TESTS:  Results Reviewed:   Imaging Personally Reviewed:  Electrocardiogram Personally Reviewed:    COORDINATION OF CARE:  Care Discussed with Consultants/Other Providers [Y/N]:  Prior or Outpatient Records Reviewed [Y/N]:

## 2025-04-25 NOTE — PROGRESS NOTE ADULT - PROBLEM SELECTOR PLAN 3
- Self-limiting, resolved, now off contact isolation      # adjustment disorder: 4/24: patient requesting psych -consult placed, F/U on recs.

## 2025-04-25 NOTE — PROGRESS NOTE ADULT - NS ATTEND AMEND GEN_ALL_CORE FT
69 y/o M with NICM, with CHB and VT/VFib and AFib, s/p GIANFRANCO ligation/MAZE and PVC ablation, previously intolerant to antiarrhythmics, who was undergoing transplant evaluation as an outpatient, who was admitted for ICD shocks. He was started on Lidocaine gtt and continued to have VT and ICD shocks. He was transferred to [facility] and transplant listing was upgraded to status 2E. He has since been stabilized on oral antiarrhythmics of Toprol XL and Quinidine. Hospital course c/b Norovirus on 3/31/25, was made status 7 while symptomatic, then reactivated as status 2E.   Volume status stable on alternating days of Torsemide 20mg PO daily and 10mg PO daily. Continue KCL supplementation.  Had thrombocytopenia, unclear precipitating factors, now improving.   UNOS status 2E. Awaiting suitable donor.

## 2025-04-25 NOTE — PROGRESS NOTE ADULT - ASSESSMENT
70M PMHx NICM since 2011, HFrEF (25-30%) s/p MDT CRT-D with baseline CHB , VT/VF, afib s/p GIANFRANCO ligation/MAZE, MV/TV repair, PVC ablation (3/2024) was transferred from outside hospital to [facility] due to  VT/AICD shock.   While admitted to the outside hospital, he was started on a lidocaine gtt. On 3/21 when lidocaine weaned off patient had recurrence of VT requiring AICD shocks. Thus transferred to [facility] for further management. Pt  was on Lidocaine gtt for control and is now on Quinidine and uptitrated Toprol for anti-arrythmia. Because of pt's refractory VT pt's listing status for a Heart Transplant was bumped from 6 to 2E temporarily. Pt will remain inpatient for listing purposes and managed closely by HF team and hence was transferred to [facility] floor on 3/27/25. Pending OHT.

## 2025-04-26 LAB
ALBUMIN SERPL ELPH-MCNC: 4.1 G/DL — SIGNIFICANT CHANGE UP (ref 3.3–5)
ALP SERPL-CCNC: 89 U/L — SIGNIFICANT CHANGE UP (ref 40–120)
ALT FLD-CCNC: 70 U/L — HIGH (ref 10–45)
ANION GAP SERPL CALC-SCNC: 13 MMOL/L — SIGNIFICANT CHANGE UP (ref 5–17)
APTT BLD: 33.3 SEC — SIGNIFICANT CHANGE UP (ref 26.1–36.8)
AST SERPL-CCNC: 33 U/L — SIGNIFICANT CHANGE UP (ref 10–40)
BASOPHILS # BLD AUTO: 0.04 K/UL — SIGNIFICANT CHANGE UP (ref 0–0.2)
BASOPHILS NFR BLD AUTO: 0.8 % — SIGNIFICANT CHANGE UP (ref 0–2)
BILIRUB SERPL-MCNC: 0.6 MG/DL — SIGNIFICANT CHANGE UP (ref 0.2–1.2)
BUN SERPL-MCNC: 20 MG/DL — SIGNIFICANT CHANGE UP (ref 7–23)
CALCIUM SERPL-MCNC: 9.1 MG/DL — SIGNIFICANT CHANGE UP (ref 8.4–10.5)
CHLORIDE SERPL-SCNC: 105 MMOL/L — SIGNIFICANT CHANGE UP (ref 96–108)
CO2 SERPL-SCNC: 24 MMOL/L — SIGNIFICANT CHANGE UP (ref 22–31)
CREAT SERPL-MCNC: 1.22 MG/DL — SIGNIFICANT CHANGE UP (ref 0.5–1.3)
EGFR: 64 ML/MIN/1.73M2 — SIGNIFICANT CHANGE UP
EGFR: 64 ML/MIN/1.73M2 — SIGNIFICANT CHANGE UP
EOSINOPHIL # BLD AUTO: 0.18 K/UL — SIGNIFICANT CHANGE UP (ref 0–0.5)
EOSINOPHIL NFR BLD AUTO: 3.7 % — SIGNIFICANT CHANGE UP (ref 0–6)
GLUCOSE SERPL-MCNC: 104 MG/DL — HIGH (ref 70–99)
HCT VFR BLD CALC: 37.8 % — LOW (ref 39–50)
HGB BLD-MCNC: 12.7 G/DL — LOW (ref 13–17)
IMM GRANULOCYTES NFR BLD AUTO: 0.2 % — SIGNIFICANT CHANGE UP (ref 0–0.9)
INR BLD: 1.18 RATIO — HIGH (ref 0.85–1.16)
LYMPHOCYTES # BLD AUTO: 0.96 K/UL — LOW (ref 1–3.3)
LYMPHOCYTES # BLD AUTO: 19.6 % — SIGNIFICANT CHANGE UP (ref 13–44)
MAGNESIUM SERPL-MCNC: 2.1 MG/DL — SIGNIFICANT CHANGE UP (ref 1.6–2.6)
MCHC RBC-ENTMCNC: 29.5 PG — SIGNIFICANT CHANGE UP (ref 27–34)
MCHC RBC-ENTMCNC: 33.6 G/DL — SIGNIFICANT CHANGE UP (ref 32–36)
MCV RBC AUTO: 87.7 FL — SIGNIFICANT CHANGE UP (ref 80–100)
MONOCYTES # BLD AUTO: 0.52 K/UL — SIGNIFICANT CHANGE UP (ref 0–0.9)
MONOCYTES NFR BLD AUTO: 10.6 % — SIGNIFICANT CHANGE UP (ref 2–14)
NEUTROPHILS # BLD AUTO: 3.2 K/UL — SIGNIFICANT CHANGE UP (ref 1.8–7.4)
NEUTROPHILS NFR BLD AUTO: 65.1 % — SIGNIFICANT CHANGE UP (ref 43–77)
NRBC BLD AUTO-RTO: 0 /100 WBCS — SIGNIFICANT CHANGE UP (ref 0–0)
PHOSPHATE SERPL-MCNC: 3.5 MG/DL — SIGNIFICANT CHANGE UP (ref 2.5–4.5)
PLATELET # BLD AUTO: 130 K/UL — LOW (ref 150–400)
POTASSIUM SERPL-MCNC: 4.2 MMOL/L — SIGNIFICANT CHANGE UP (ref 3.5–5.3)
POTASSIUM SERPL-SCNC: 4.2 MMOL/L — SIGNIFICANT CHANGE UP (ref 3.5–5.3)
PROT SERPL-MCNC: 7 G/DL — SIGNIFICANT CHANGE UP (ref 6–8.3)
PROTHROM AB SERPL-ACNC: 13.4 SEC — SIGNIFICANT CHANGE UP (ref 9.9–13.4)
RBC # BLD: 4.31 M/UL — SIGNIFICANT CHANGE UP (ref 4.2–5.8)
RBC # FLD: 14.1 % — SIGNIFICANT CHANGE UP (ref 10.3–14.5)
SODIUM SERPL-SCNC: 142 MMOL/L — SIGNIFICANT CHANGE UP (ref 135–145)
WBC # BLD: 4.91 K/UL — SIGNIFICANT CHANGE UP (ref 3.8–10.5)
WBC # FLD AUTO: 4.91 K/UL — SIGNIFICANT CHANGE UP (ref 3.8–10.5)

## 2025-04-26 PROCEDURE — 99232 SBSQ HOSP IP/OBS MODERATE 35: CPT

## 2025-04-26 RX ADMIN — Medication 1 PACKET(S): at 11:51

## 2025-04-26 RX ADMIN — Medication 10 MILLIGRAM(S): at 11:50

## 2025-04-26 RX ADMIN — Medication 324 MILLIGRAM(S): at 06:15

## 2025-04-26 RX ADMIN — Medication 100 MILLIGRAM(S): at 21:30

## 2025-04-26 RX ADMIN — Medication 324 MILLIGRAM(S): at 16:49

## 2025-04-26 RX ADMIN — TAMSULOSIN HYDROCHLORIDE 0.4 MILLIGRAM(S): 0.4 CAPSULE ORAL at 21:30

## 2025-04-26 RX ADMIN — ATORVASTATIN CALCIUM 10 MILLIGRAM(S): 80 TABLET, FILM COATED ORAL at 21:30

## 2025-04-26 RX ADMIN — METOPROLOL SUCCINATE 50 MILLIGRAM(S): 50 TABLET, EXTENDED RELEASE ORAL at 06:16

## 2025-04-26 NOTE — PROGRESS NOTE ADULT - SUBJECTIVE AND OBJECTIVE BOX
SUBJECTIVE / OVERNIGHT EVENTS:  Today is hospital day 36d. There are no new issues or overnight events.   Did not report any lightheadedness, vertigo, shortness of breathe, chest pain, palpitations, vomiting, diarrhea currently    HPI:  69-year-old male patient past medical history of NICM since 2011, HFrEF LVEF 25-30% s/p MDT CRT-D with baseline CHB, VT/VF, a.fib s/p GIANFRANCO ligation/MAZE, MV/TV repair, PVC ablation 3/2024 intolerant to AADs in the past, recent admission 3/19 - 3/20/25 for AICD shocks initially presented to the [facility] emergency department on 3/21/2025, sent by heart failure specialist for ACID shock. Device reported successful ATP for VT 3/17 at 6:52pm followed by one ATP and 40J shock. He reported feeling dizzy and SOB during the events. He follows with Dr paula abreu for HF, currently undergoing transplant workup, Dr John for CRTD and VT/VF and  for genetic counseling. While admitted to [facility], he was started on a lidocaine gtt. On 3/21 when lidocaine weaned off patient had another two episodes of VT requiring AICD shocks. He was transferred to [facility] for further management.     On admission to CICU, he is A&O x3, saturating well on room air, av paced @ 80 with /87 on lidocaine gtt @ 1mg/min. (21 Mar 2025 22:55)    MEDICATIONS  (STANDING):  atorvastatin 10 milliGRAM(s) Oral at bedtime  chlorhexidine 0.12% Liquid 5 milliLiter(s) Swish and Spit two times a day  chlorhexidine 2% Cloths 1 Application(s) Topical <User Schedule>  chlorhexidine 4% Liquid 1 Application(s) Topical <User Schedule>  heparin   Injectable 5000 Unit(s) SubCutaneous every 8 hours  metoprolol succinate ER 50 milliGRAM(s) Oral daily  polyethylene glycol 3350 17 Gram(s) Oral daily  potassium chloride ER tablet 20 milliEquivalent(s) 20 milliEquivalent(s) Oral <User Schedule>  potassium chloride ER tablet 40 milliEquivalent(s) 40 milliEquivalent(s) Oral <User Schedule>  psyllium Powder 1 Packet(s) Oral daily  quiNIDine gluconate  milliGRAM(s) Oral every 12 hours  senna 2 Tablet(s) Oral at bedtime  tamsulosin 0.4 milliGRAM(s) Oral at bedtime  torsemide 20 milliGRAM(s) Oral <User Schedule>  torsemide 10 milliGRAM(s) Oral <User Schedule>  traZODone 100 milliGRAM(s) Oral at bedtime    MEDICATIONS  (PRN):  acetaminophen     Tablet .. 650 milliGRAM(s) Oral every 6 hours PRN Mild Pain (1 - 3), Moderate Pain (4 - 6)  artificial  tears Solution 1 Drop(s) Both EYES every 4 hours PRN Dry Eyes  bisacodyl Suppository 10 milliGRAM(s) Rectal daily PRN Constipation  clonazePAM  Tablet 0.25 milliGRAM(s) Oral every 12 hours PRN anxiety  guaiFENesin Oral Liquid (Sugar-Free) 200 milliGRAM(s) Oral every 6 hours PRN Cough    HOME MEDICATIONS:  atorvastatin 20 mg oral tablet: 1 tab(s) orally once a day (at bedtime)  clonazePAM 0.5 mg oral tablet: 1 tab(s) orally once a day As needed anxiety  dapagliflozin 10 mg oral tablet: 1 tab(s) orally once a day  furosemide 100 mg/100 mL-0.9% intravenous solution: 80 milliliter(s) intravenous 2 times a day  lidocaine: 1 milligram(s) by continuous intravenous infusion every minute 2 grams in dextrose 5% 250mL, infuse at rate of 7.5mL/hr for dose rate of 1mg/min  losartan 25 mg oral tablet: 1 tab(s) orally once a day  metoprolol succinate 50 mg oral tablet, extended release: 1 tab(s) orally once a day  tamsulosin 0.4 mg oral capsule: 1 cap(s) orally once a day (at bedtime)  torsemide 20 mg oral tablet: 1 tab(s) orally 2 times a day  traZODone 100 mg oral tablet: 1 tab(s) orally once a day (at bedtime)    PHYSICAL EXAM  Vital Signs Last 24 Hrs  T(C): 36.6 (26 Apr 2025 11:00), Max: 36.6 (26 Apr 2025 11:00)  T(F): 97.9 (26 Apr 2025 11:00), Max: 97.9 (26 Apr 2025 11:00)  HR: 88 (26 Apr 2025 11:00) (76 - 88)  BP: 96/66 (26 Apr 2025 11:00) (96/65 - 107/77)  BP(mean): --  RR: 18 (26 Apr 2025 11:00) (18 - 18)  SpO2: 96% (26 Apr 2025 11:00) (94% - 96%)    Parameters below as of 26 Apr 2025 11:00  Patient On (Oxygen Delivery Method): room air        04-25-25 @ 07:01  -  04-26-25 @ 07:00  --------------------------------------------------------  IN: 1440 mL / OUT: 0 mL / NET: 1440 mL    04-26-25 @ 07:01  -  04-26-25 @ 13:23  --------------------------------------------------------  IN: 360 mL / OUT: 0 mL / NET: 360 mL      CONSTITUTIONAL: Well-groomed, in no apparent distress;  EYES: No conjunctival or scleral injection, non-icteric;  ENMT: No external nasal lesions; Normal outer ears;  NECK: Trachea midline;  RESPIRATORY: Normal respiratory effort; Decreased breathe sounds bilaterally without wheeze/rhonchi/rales;  CARDIOVASCULAR: Regular rate and rhythm;  GASTROINTESTINAL: Non-distended; No palpable masses; No rebound/guarding;  EXTREMITIES:  1+ lower extremity edema;  NEUROLOGY: Does respond to commands appropriately;  PSYCHIATRY: Mood and Affect appropriate    LABS:                        12.7   4.91  )-----------( 130      ( 26 Apr 2025 07:09 )             37.8     04-26    142  |  105  |  20  ----------------------------<  104[H]  4.2   |  24  |  1.22    Ca    9.1      26 Apr 2025 07:09  Phos  3.5     04-26  Mg     2.1     04-26    TPro  7.0  /  Alb  4.1  /  TBili  0.6  /  DBili  x   /  AST  33  /  ALT  70[H]  /  AlkPhos  89  04-26    PT/INR - ( 26 Apr 2025 07:10 )   PT: 13.4 sec;   INR: 1.18 ratio         PTT - ( 26 Apr 2025 07:10 )  PTT:33.3 sec      Urinalysis Basic - ( 26 Apr 2025 07:09 )    Color: x / Appearance: x / SG: x / pH: x  Gluc: 104 mg/dL / Ketone: x  / Bili: x / Urobili: x   Blood: x / Protein: x / Nitrite: x   Leuk Esterase: x / RBC: x / WBC x   Sq Epi: x / Non Sq Epi: x / Bacteria: x            RADIOLOGY & ADDITIONAL TESTS:  EKG  12 Lead ECG:   Ventricular Rate 81 BPM    Atrial Rate 81 BPM    P-R Interval 128 ms    QRS Duration 214 ms    Q-T Interval 508 ms    QTC Calculation(Bazett) 590 ms    R Axis 68 degrees    T Axis -81 degrees    Diagnosis Line AV dual-paced rhythm  ABNORMAL ECG  WHEN COMPARED WITH ECG OF 29-MAR-2025 08:18,  NO SIGNIFICANT CHANGE WAS FOUND  Confirmed by HIWOT ALEMAN MD ([id number]) on 4/1/2025 1:45:38 PM (04-01-25 @ 09:40)  12 Lead ECG:   Ventricular Rate 82 BPM    Atrial Rate 82 BPM    P-R Interval 208 ms    QRS Duration 146 ms    Q-T Interval 448 ms    QTC Calculation(Bazett) 523 ms    R Axis 99 degrees    T Axis -74 degrees    Diagnosis Line AV dual-paced rhythm  ABNORMAL ECG  WHEN COMPARED WITH ECG OF 27-MAR-2025 01:32, (UNCONFIRMED)  NO SIGNIFICANT CHANGE WAS FOUND  Confirmed by DANNI PADILLA MD ([id number]) on 3/29/2025 10:14:59 AM (03-29-25 @ 08:18)

## 2025-04-26 NOTE — PROGRESS NOTE ADULT - ASSESSMENT
70M PMHx NICM since 2011, HFrEF (25-30%) s/p MDT CRT-D with baseline CHB , VT/VF, afib s/p GIANFRANCO ligation/MAZE, MV/TV repair, PVC ablation (3/2024) was transferred from outside hospital to [facility] due to  VT/AICD shock.   While admitted to the outside hospital, he was started on a lidocaine gtt. On 3/21 when lidocaine weaned off patient had recurrence of VT requiring AICD shocks. Thus transferred to [facility] for further management. Pt  was on Lidocaine gtt for control and is now on Quinidine and uptitrated Toprol for anti-arrythmia. Because of pt's refractory VT pt's listing status for a Heart Transplant was bumped from 6 to 2E temporarily. Pt will remain inpatient for listing purposes and managed closely by HF team and hence was transferred to [facility] floor on 3/27/25. Pending OHT.

## 2025-04-27 LAB
ALBUMIN SERPL ELPH-MCNC: 4 G/DL — SIGNIFICANT CHANGE UP (ref 3.3–5)
ALP SERPL-CCNC: 83 U/L — SIGNIFICANT CHANGE UP (ref 40–120)
ALT FLD-CCNC: 59 U/L — HIGH (ref 10–45)
ANION GAP SERPL CALC-SCNC: 12 MMOL/L — SIGNIFICANT CHANGE UP (ref 5–17)
AST SERPL-CCNC: 26 U/L — SIGNIFICANT CHANGE UP (ref 10–40)
BASOPHILS # BLD AUTO: 0.04 K/UL — SIGNIFICANT CHANGE UP (ref 0–0.2)
BASOPHILS NFR BLD AUTO: 0.8 % — SIGNIFICANT CHANGE UP (ref 0–2)
BILIRUB SERPL-MCNC: 0.6 MG/DL — SIGNIFICANT CHANGE UP (ref 0.2–1.2)
BLD GP AB SCN SERPL QL: NEGATIVE — SIGNIFICANT CHANGE UP
BUN SERPL-MCNC: 21 MG/DL — SIGNIFICANT CHANGE UP (ref 7–23)
CALCIUM SERPL-MCNC: 9 MG/DL — SIGNIFICANT CHANGE UP (ref 8.4–10.5)
CHLORIDE SERPL-SCNC: 104 MMOL/L — SIGNIFICANT CHANGE UP (ref 96–108)
CO2 SERPL-SCNC: 25 MMOL/L — SIGNIFICANT CHANGE UP (ref 22–31)
CREAT SERPL-MCNC: 1.26 MG/DL — SIGNIFICANT CHANGE UP (ref 0.5–1.3)
EGFR: 61 ML/MIN/1.73M2 — SIGNIFICANT CHANGE UP
EGFR: 61 ML/MIN/1.73M2 — SIGNIFICANT CHANGE UP
EOSINOPHIL # BLD AUTO: 0.2 K/UL — SIGNIFICANT CHANGE UP (ref 0–0.5)
EOSINOPHIL NFR BLD AUTO: 4.1 % — SIGNIFICANT CHANGE UP (ref 0–6)
FLUAV AG NPH QL: SIGNIFICANT CHANGE UP
FLUBV AG NPH QL: SIGNIFICANT CHANGE UP
GLUCOSE SERPL-MCNC: 99 MG/DL — SIGNIFICANT CHANGE UP (ref 70–99)
HCT VFR BLD CALC: 36 % — LOW (ref 39–50)
HGB BLD-MCNC: 11.8 G/DL — LOW (ref 13–17)
LYMPHOCYTES # BLD AUTO: 0.95 K/UL — LOW (ref 1–3.3)
LYMPHOCYTES # BLD AUTO: 19.6 % — SIGNIFICANT CHANGE UP (ref 13–44)
MAGNESIUM SERPL-MCNC: 2.1 MG/DL — SIGNIFICANT CHANGE UP (ref 1.6–2.6)
MCHC RBC-ENTMCNC: 28.6 PG — SIGNIFICANT CHANGE UP (ref 27–34)
MCHC RBC-ENTMCNC: 32.8 G/DL — SIGNIFICANT CHANGE UP (ref 32–36)
MCV RBC AUTO: 87.4 FL — SIGNIFICANT CHANGE UP (ref 80–100)
MONOCYTES # BLD AUTO: 0.58 K/UL — SIGNIFICANT CHANGE UP (ref 0–0.9)
MONOCYTES NFR BLD AUTO: 12 % — SIGNIFICANT CHANGE UP (ref 2–14)
NEUTROPHILS # BLD AUTO: 3.08 K/UL — SIGNIFICANT CHANGE UP (ref 1.8–7.4)
NEUTROPHILS NFR BLD AUTO: 63.5 % — SIGNIFICANT CHANGE UP (ref 43–77)
NRBC BLD AUTO-RTO: 0 /100 WBCS — SIGNIFICANT CHANGE UP (ref 0–0)
PHOSPHATE SERPL-MCNC: 3.7 MG/DL — SIGNIFICANT CHANGE UP (ref 2.5–4.5)
PLATELET # BLD AUTO: 121 K/UL — LOW (ref 150–400)
POTASSIUM SERPL-MCNC: 3.7 MMOL/L — SIGNIFICANT CHANGE UP (ref 3.5–5.3)
POTASSIUM SERPL-SCNC: 3.7 MMOL/L — SIGNIFICANT CHANGE UP (ref 3.5–5.3)
PROT SERPL-MCNC: 6.6 G/DL — SIGNIFICANT CHANGE UP (ref 6–8.3)
RBC # BLD: 4.12 M/UL — LOW (ref 4.2–5.8)
RBC # FLD: 14.1 % — SIGNIFICANT CHANGE UP (ref 10.3–14.5)
RH IG SCN BLD-IMP: POSITIVE — SIGNIFICANT CHANGE UP
RSV RNA NPH QL NAA+NON-PROBE: SIGNIFICANT CHANGE UP
SARS-COV-2 RNA SPEC QL NAA+PROBE: SIGNIFICANT CHANGE UP
SODIUM SERPL-SCNC: 141 MMOL/L — SIGNIFICANT CHANGE UP (ref 135–145)
SOURCE RESPIRATORY: SIGNIFICANT CHANGE UP
WBC # BLD: 4.85 K/UL — SIGNIFICANT CHANGE UP (ref 3.8–10.5)
WBC # FLD AUTO: 4.85 K/UL — SIGNIFICANT CHANGE UP (ref 3.8–10.5)

## 2025-04-27 PROCEDURE — 99232 SBSQ HOSP IP/OBS MODERATE 35: CPT

## 2025-04-27 PROCEDURE — 71045 X-RAY EXAM CHEST 1 VIEW: CPT | Mod: 26

## 2025-04-27 RX ADMIN — Medication 20 MILLIEQUIVALENT(S): at 11:08

## 2025-04-27 RX ADMIN — METOPROLOL SUCCINATE 50 MILLIGRAM(S): 50 TABLET, EXTENDED RELEASE ORAL at 06:05

## 2025-04-27 RX ADMIN — CLONAZEPAM 0.25 MILLIGRAM(S): 0.5 TABLET ORAL at 13:41

## 2025-04-27 RX ADMIN — Medication 324 MILLIGRAM(S): at 06:05

## 2025-04-27 RX ADMIN — TAMSULOSIN HYDROCHLORIDE 0.4 MILLIGRAM(S): 0.4 CAPSULE ORAL at 22:05

## 2025-04-27 RX ADMIN — Medication 100 MILLIGRAM(S): at 22:05

## 2025-04-27 RX ADMIN — ATORVASTATIN CALCIUM 10 MILLIGRAM(S): 80 TABLET, FILM COATED ORAL at 22:05

## 2025-04-27 RX ADMIN — Medication 1 PACKET(S): at 11:06

## 2025-04-27 RX ADMIN — Medication 324 MILLIGRAM(S): at 17:29

## 2025-04-27 RX ADMIN — Medication 10 MILLIGRAM(S): at 11:07

## 2025-04-27 NOTE — PROGRESS NOTE ADULT - SUBJECTIVE AND OBJECTIVE BOX
[facility] Division of Hospital Medicine  Yang Lang MD  Available via MS Teams    SUBJECTIVE / OVERNIGHT EVENTS:  no acute events overnight, denies any new complaints   ADDITIONAL REVIEW OF SYSTEMS:    MEDICATIONS  (STANDING):  atorvastatin 10 milliGRAM(s) Oral at bedtime  chlorhexidine 0.12% Liquid 5 milliLiter(s) Swish and Spit two times a day  chlorhexidine 2% Cloths 1 Application(s) Topical <User Schedule>  chlorhexidine 4% Liquid 1 Application(s) Topical <User Schedule>  heparin   Injectable 5000 Unit(s) SubCutaneous every 8 hours  metoprolol succinate ER 50 milliGRAM(s) Oral daily  polyethylene glycol 3350 17 Gram(s) Oral daily  potassium chloride ER tablet 20 milliEquivalent(s) 20 milliEquivalent(s) Oral <User Schedule>  potassium chloride ER tablet 40 milliEquivalent(s) 40 milliEquivalent(s) Oral <User Schedule>  psyllium Powder 1 Packet(s) Oral daily  quiNIDine gluconate  milliGRAM(s) Oral every 12 hours  senna 2 Tablet(s) Oral at bedtime  tamsulosin 0.4 milliGRAM(s) Oral at bedtime  torsemide 20 milliGRAM(s) Oral <User Schedule>  torsemide 10 milliGRAM(s) Oral <User Schedule>  traZODone 100 milliGRAM(s) Oral at bedtime    MEDICATIONS  (PRN):  acetaminophen     Tablet .. 650 milliGRAM(s) Oral every 6 hours PRN Mild Pain (1 - 3), Moderate Pain (4 - 6)  artificial  tears Solution 1 Drop(s) Both EYES every 4 hours PRN Dry Eyes  bisacodyl Suppository 10 milliGRAM(s) Rectal daily PRN Constipation  clonazePAM  Tablet 0.25 milliGRAM(s) Oral every 12 hours PRN anxiety  guaiFENesin Oral Liquid (Sugar-Free) 200 milliGRAM(s) Oral every 6 hours PRN Cough      I&O's Summary    26 Apr 2025 07:01  -  27 Apr 2025 07:00  --------------------------------------------------------  IN: 1440 mL / OUT: 0 mL / NET: 1440 mL        PHYSICAL EXAM:  Vital Signs Last 24 Hrs  T(C): 36.5 (27 Apr 2025 10:41), Max: 36.6 (26 Apr 2025 20:16)  T(F): 97.7 (27 Apr 2025 10:41), Max: 97.9 (27 Apr 2025 06:02)  HR: 82 (27 Apr 2025 10:41) (79 - 95)  BP: 99/68 (27 Apr 2025 10:41) (95/68 - 99/68)  BP(mean): --  RR: 18 (27 Apr 2025 10:41) (18 - 18)  SpO2: 94% (27 Apr 2025 10:41) (92% - 95%)    Parameters below as of 27 Apr 2025 10:41  Patient On (Oxygen Delivery Method): room air    EYES: No conjunctival or scleral injection, non-icteric;  RESPIRATORY: Normal respiratory effort; Decreased breathe sounds bilaterally without wheeze/rhonchi/rales;  CARDIOVASCULAR: Regular rate and rhythm;  GASTROINTESTINAL: Non-distended; No palpable masses; No rebound/guarding;  EXTREMITIES:  1+ lower extremity edema;  NEUROLOGY: Does respond to commands appropriately;  LABS:                        11.8   4.85  )-----------( 121      ( 27 Apr 2025 06:36 )             36.0     04-27    141  |  104  |  21  ----------------------------<  99  3.7   |  25  |  1.26    Ca    9.0      27 Apr 2025 06:36  Phos  3.7     04-27  Mg     2.1     04-27    TPro  6.6  /  Alb  4.0  /  TBili  0.6  /  DBili  x   /  AST  26  /  ALT  59[H]  /  AlkPhos  83  04-27    PT/INR - ( 26 Apr 2025 07:10 )   PT: 13.4 sec;   INR: 1.18 ratio         PTT - ( 26 Apr 2025 07:10 )  PTT:33.3 sec      Urinalysis Basic - ( 27 Apr 2025 06:36 )    Color: x / Appearance: x / SG: x / pH: x  Gluc: 99 mg/dL / Ketone: x  / Bili: x / Urobili: x   Blood: x / Protein: x / Nitrite: x   Leuk Esterase: x / RBC: x / WBC x   Sq Epi: x / Non Sq Epi: x / Bacteria: x            RADIOLOGY & ADDITIONAL TESTS:  New Imaging Personally Reviewed Today:  New Electrocardiogram Personally Reviewed Today:  Other Results Reviewed Today:   Prior or Outpatient Records Reviewed Today with Summary:    COORDINATION OF CARE:  Consultant Communication and Details of Discussion (where applicable):

## 2025-04-27 NOTE — PROGRESS NOTE ADULT - PROBLEM SELECTOR PLAN 3
Detail Level: Detailed - Self-limiting, resolved, now off contact isolation      # adjustment disorder: 4/24: patient requesting psych -consult placed, F/U on recs.

## 2025-04-27 NOTE — PROGRESS NOTE ADULT - ASSESSMENT
70M PMHx NICM since 2011, HFrEF (25-30%) s/p MDT CRT-D with baseline CHB , VT/VF, afib s/p GIANFRANCO ligation/MAZE, MV/TV repair, PVC ablation (3/2024) was transferred from outside hospital to [facility] due to  VT/AICD shock.   While admitted to the outside hospital, he was started on a lidocaine gtt. On 3/21 when lidocaine weaned off patient had recurrence of VT requiring AICD shocks. Thus transferred to [facility] for further management. Pt  was on Lidocaine gtt for control and is now on Quinidine and uptitrated Toprol for anti-arrythmia. Because of pt's refractory VT pt's listing status for a Heart Transplant was bumped from 6 to 2E temporarily. Pt will remain inpatient for listing purposes and managed closely by HF team and hence was transferred to [facility] floor on 3/27/25. Pending OHT.

## 2025-04-28 ENCOUNTER — TRANSCRIPTION ENCOUNTER (OUTPATIENT)
Age: 70
End: 2025-04-28

## 2025-04-28 ENCOUNTER — APPOINTMENT (OUTPATIENT)
Dept: CARDIOTHORACIC SURGERY | Facility: HOSPITAL | Age: 70
End: 2025-04-28

## 2025-04-28 ENCOUNTER — RESULT REVIEW (OUTPATIENT)
Age: 70
End: 2025-04-28

## 2025-04-28 LAB
ALBUMIN SERPL ELPH-MCNC: 4.1 G/DL — SIGNIFICANT CHANGE UP (ref 3.3–5)
ALP SERPL-CCNC: 90 U/L — SIGNIFICANT CHANGE UP (ref 40–120)
ALT FLD-CCNC: 73 U/L — HIGH (ref 10–45)
ANION GAP SERPL CALC-SCNC: 10 MMOL/L — SIGNIFICANT CHANGE UP (ref 5–17)
ANION GAP SERPL CALC-SCNC: 12 MMOL/L — SIGNIFICANT CHANGE UP (ref 5–17)
AST SERPL-CCNC: 31 U/L — SIGNIFICANT CHANGE UP (ref 10–40)
BASE EXCESS BLDA CALC-SCNC: -1 MMOL/L — SIGNIFICANT CHANGE UP (ref -2–3)
BASE EXCESS BLDA CALC-SCNC: -2 MMOL/L — SIGNIFICANT CHANGE UP (ref -2–3)
BASE EXCESS BLDA CALC-SCNC: -2 MMOL/L — SIGNIFICANT CHANGE UP (ref -2–3)
BASE EXCESS BLDA CALC-SCNC: 0 MMOL/L — SIGNIFICANT CHANGE UP (ref -2–3)
BASE EXCESS BLDA CALC-SCNC: 1 MMOL/L — SIGNIFICANT CHANGE UP (ref -2–3)
BASE EXCESS BLDA CALC-SCNC: 2 MMOL/L — SIGNIFICANT CHANGE UP (ref -2–3)
BASE EXCESS BLDV CALC-SCNC: -1 MMOL/L — SIGNIFICANT CHANGE UP (ref -2–3)
BASE EXCESS BLDV CALC-SCNC: 0 MMOL/L — SIGNIFICANT CHANGE UP (ref -2–3)
BASE EXCESS BLDV CALC-SCNC: 1 MMOL/L — SIGNIFICANT CHANGE UP (ref -2–3)
BASOPHILS # BLD AUTO: 0.04 K/UL — SIGNIFICANT CHANGE UP (ref 0–0.2)
BASOPHILS NFR BLD AUTO: 0.8 % — SIGNIFICANT CHANGE UP (ref 0–2)
BILIRUB SERPL-MCNC: 0.8 MG/DL — SIGNIFICANT CHANGE UP (ref 0.2–1.2)
BUN SERPL-MCNC: 21 MG/DL — SIGNIFICANT CHANGE UP (ref 7–23)
BUN SERPL-MCNC: 22 MG/DL — SIGNIFICANT CHANGE UP (ref 7–23)
CA-I BLDA-SCNC: 0.99 MMOL/L — LOW (ref 1.15–1.33)
CA-I BLDA-SCNC: 1.02 MMOL/L — LOW (ref 1.15–1.33)
CA-I BLDA-SCNC: 1.04 MMOL/L — LOW (ref 1.15–1.33)
CA-I BLDA-SCNC: 1.04 MMOL/L — LOW (ref 1.15–1.33)
CA-I BLDA-SCNC: 1.05 MMOL/L — LOW (ref 1.15–1.33)
CA-I BLDA-SCNC: 1.09 MMOL/L — LOW (ref 1.15–1.33)
CA-I BLDA-SCNC: 1.15 MMOL/L — SIGNIFICANT CHANGE UP (ref 1.15–1.33)
CA-I BLDA-SCNC: 1.2 MMOL/L — SIGNIFICANT CHANGE UP (ref 1.15–1.33)
CA-I BLDA-SCNC: 1.24 MMOL/L — SIGNIFICANT CHANGE UP (ref 1.15–1.33)
CA-I BLDA-SCNC: 1.25 MMOL/L — SIGNIFICANT CHANGE UP (ref 1.15–1.33)
CA-I BLDA-SCNC: 1.29 MMOL/L — SIGNIFICANT CHANGE UP (ref 1.15–1.33)
CA-I SERPL-SCNC: 0.98 MMOL/L — LOW (ref 1.15–1.33)
CA-I SERPL-SCNC: 1.03 MMOL/L — LOW (ref 1.15–1.33)
CA-I SERPL-SCNC: 1.32 MMOL/L — SIGNIFICANT CHANGE UP (ref 1.15–1.33)
CALCIUM SERPL-MCNC: 9.3 MG/DL — SIGNIFICANT CHANGE UP (ref 8.4–10.5)
CALCIUM SERPL-MCNC: 9.3 MG/DL — SIGNIFICANT CHANGE UP (ref 8.4–10.5)
CHLORIDE BLDA-SCNC: 101 MMOL/L — SIGNIFICANT CHANGE UP (ref 96–108)
CHLORIDE BLDA-SCNC: 102 MMOL/L — SIGNIFICANT CHANGE UP (ref 96–108)
CHLORIDE BLDA-SCNC: 102 MMOL/L — SIGNIFICANT CHANGE UP (ref 96–108)
CHLORIDE BLDA-SCNC: 103 MMOL/L — SIGNIFICANT CHANGE UP (ref 96–108)
CHLORIDE BLDA-SCNC: 104 MMOL/L — SIGNIFICANT CHANGE UP (ref 96–108)
CHLORIDE BLDA-SCNC: 104 MMOL/L — SIGNIFICANT CHANGE UP (ref 96–108)
CHLORIDE BLDA-SCNC: 105 MMOL/L — SIGNIFICANT CHANGE UP (ref 96–108)
CHLORIDE BLDA-SCNC: 105 MMOL/L — SIGNIFICANT CHANGE UP (ref 96–108)
CHLORIDE BLDA-SCNC: 106 MMOL/L — SIGNIFICANT CHANGE UP (ref 96–108)
CHLORIDE BLDV-SCNC: 101 MMOL/L — SIGNIFICANT CHANGE UP (ref 96–108)
CHLORIDE BLDV-SCNC: 103 MMOL/L — SIGNIFICANT CHANGE UP (ref 96–108)
CHLORIDE BLDV-SCNC: 104 MMOL/L — SIGNIFICANT CHANGE UP (ref 96–108)
CHLORIDE SERPL-SCNC: 104 MMOL/L — SIGNIFICANT CHANGE UP (ref 96–108)
CHLORIDE SERPL-SCNC: 105 MMOL/L — SIGNIFICANT CHANGE UP (ref 96–108)
CO2 BLDA-SCNC: 24 MMOL/L — SIGNIFICANT CHANGE UP (ref 22–30)
CO2 BLDA-SCNC: 26 MMOL/L — SIGNIFICANT CHANGE UP (ref 22–30)
CO2 BLDA-SCNC: 27 MMOL/L — SIGNIFICANT CHANGE UP (ref 22–30)
CO2 BLDA-SCNC: 28 MMOL/L — SIGNIFICANT CHANGE UP (ref 22–30)
CO2 BLDA-SCNC: 29 MMOL/L — SIGNIFICANT CHANGE UP (ref 22–30)
CO2 BLDV-SCNC: 27 MMOL/L — SIGNIFICANT CHANGE UP (ref 22–30)
CO2 BLDV-SCNC: 28 MMOL/L — SIGNIFICANT CHANGE UP (ref 22–30)
CO2 BLDV-SCNC: 29 MMOL/L — SIGNIFICANT CHANGE UP (ref 22–30)
CO2 SERPL-SCNC: 24 MMOL/L — SIGNIFICANT CHANGE UP (ref 22–31)
CO2 SERPL-SCNC: 24 MMOL/L — SIGNIFICANT CHANGE UP (ref 22–31)
COHGB MFR BLDA: 1.1 % — SIGNIFICANT CHANGE UP
COHGB MFR BLDA: 1.1 % — SIGNIFICANT CHANGE UP
COHGB MFR BLDA: 1.2 % — SIGNIFICANT CHANGE UP
COHGB MFR BLDA: 1.4 % — SIGNIFICANT CHANGE UP
COHGB MFR BLDA: 1.6 % — SIGNIFICANT CHANGE UP
COHGB MFR BLDA: 1.6 % — SIGNIFICANT CHANGE UP
COHGB MFR BLDA: 1.7 % — SIGNIFICANT CHANGE UP
COHGB MFR BLDA: 1.7 % — SIGNIFICANT CHANGE UP
COHGB MFR BLDA: 1.8 % — SIGNIFICANT CHANGE UP
COHGB MFR BLDV: 1.4 % — SIGNIFICANT CHANGE UP
COHGB MFR BLDV: 1.6 % — SIGNIFICANT CHANGE UP
COHGB MFR BLDV: 1.9 % — SIGNIFICANT CHANGE UP
CREAT SERPL-MCNC: 1.14 MG/DL — SIGNIFICANT CHANGE UP (ref 0.5–1.3)
CREAT SERPL-MCNC: 1.22 MG/DL — SIGNIFICANT CHANGE UP (ref 0.5–1.3)
EGFR: 64 ML/MIN/1.73M2 — SIGNIFICANT CHANGE UP
EGFR: 64 ML/MIN/1.73M2 — SIGNIFICANT CHANGE UP
EGFR: 69 ML/MIN/1.73M2 — SIGNIFICANT CHANGE UP
EGFR: 69 ML/MIN/1.73M2 — SIGNIFICANT CHANGE UP
EOSINOPHIL # BLD AUTO: 0.2 K/UL — SIGNIFICANT CHANGE UP (ref 0–0.5)
EOSINOPHIL NFR BLD AUTO: 3.8 % — SIGNIFICANT CHANGE UP (ref 0–6)
GAS PNL BLDA: SIGNIFICANT CHANGE UP
GAS PNL BLDA: SIGNIFICANT CHANGE UP
GAS PNL BLDV: 135 MMOL/L — LOW (ref 136–145)
GAS PNL BLDV: 135 MMOL/L — LOW (ref 136–145)
GAS PNL BLDV: 139 MMOL/L — SIGNIFICANT CHANGE UP (ref 136–145)
GLUCOSE BLDA-MCNC: 108 MG/DL — HIGH (ref 70–99)
GLUCOSE BLDA-MCNC: 120 MG/DL — HIGH (ref 70–99)
GLUCOSE BLDA-MCNC: 127 MG/DL — HIGH (ref 70–99)
GLUCOSE BLDA-MCNC: 127 MG/DL — HIGH (ref 70–99)
GLUCOSE BLDA-MCNC: 149 MG/DL — HIGH (ref 70–99)
GLUCOSE BLDA-MCNC: 152 MG/DL — HIGH (ref 70–99)
GLUCOSE BLDA-MCNC: 152 MG/DL — HIGH (ref 70–99)
GLUCOSE BLDA-MCNC: 161 MG/DL — HIGH (ref 70–99)
GLUCOSE BLDA-MCNC: 163 MG/DL — HIGH (ref 70–99)
GLUCOSE BLDA-MCNC: 199 MG/DL — HIGH (ref 70–99)
GLUCOSE BLDA-MCNC: 202 MG/DL — HIGH (ref 70–99)
GLUCOSE BLDV-MCNC: 107 MG/DL — HIGH (ref 70–99)
GLUCOSE BLDV-MCNC: 147 MG/DL — HIGH (ref 70–99)
GLUCOSE BLDV-MCNC: 158 MG/DL — HIGH (ref 70–99)
GLUCOSE SERPL-MCNC: 100 MG/DL — HIGH (ref 70–99)
GLUCOSE SERPL-MCNC: 97 MG/DL — SIGNIFICANT CHANGE UP (ref 70–99)
HCO3 BLDA-SCNC: 23 MMOL/L — SIGNIFICANT CHANGE UP (ref 21–28)
HCO3 BLDA-SCNC: 24 MMOL/L — SIGNIFICANT CHANGE UP (ref 21–28)
HCO3 BLDA-SCNC: 24 MMOL/L — SIGNIFICANT CHANGE UP (ref 21–28)
HCO3 BLDA-SCNC: 25 MMOL/L — SIGNIFICANT CHANGE UP (ref 21–28)
HCO3 BLDA-SCNC: 26 MMOL/L — SIGNIFICANT CHANGE UP (ref 21–28)
HCO3 BLDA-SCNC: 27 MMOL/L — SIGNIFICANT CHANGE UP (ref 21–28)
HCO3 BLDA-SCNC: 28 MMOL/L — SIGNIFICANT CHANGE UP (ref 21–28)
HCO3 BLDV-SCNC: 25 MMOL/L — SIGNIFICANT CHANGE UP (ref 22–29)
HCO3 BLDV-SCNC: 27 MMOL/L — SIGNIFICANT CHANGE UP (ref 22–29)
HCO3 BLDV-SCNC: 27 MMOL/L — SIGNIFICANT CHANGE UP (ref 22–29)
HCT VFR BLD CALC: 36.6 % — LOW (ref 39–50)
HCT VFR BLDA CALC: 31 % — LOW (ref 39–51)
HCT VFR BLDA CALC: 32 % — LOW (ref 39–51)
HCT VFR BLDA CALC: 32 % — LOW (ref 39–51)
HCT VFR BLDA CALC: 33 % — LOW (ref 39–51)
HCT VFR BLDA CALC: 34 % — LOW (ref 39–51)
HCT VFR BLDA CALC: 35 % — LOW (ref 39–51)
HCT VFR BLDA CALC: 36 % — LOW (ref 39–51)
HEPARINASE TEG R TIME: 8.6 MIN — HIGH (ref 4.3–8.3)
HGB BLD CALC-MCNC: 10.3 G/DL — LOW (ref 12.6–17.4)
HGB BLD CALC-MCNC: 11.4 G/DL — LOW (ref 12.6–17.4)
HGB BLD CALC-MCNC: 11.8 G/DL — LOW (ref 12.6–17.4)
HGB BLD-MCNC: 12.4 G/DL — LOW (ref 13–17)
HGB BLDA-MCNC: 10.6 G/DL — LOW (ref 12.6–17.4)
HGB BLDA-MCNC: 10.6 G/DL — LOW (ref 12.6–17.4)
HGB BLDA-MCNC: 10.9 G/DL — LOW (ref 12.6–17.4)
HGB BLDA-MCNC: 11.3 G/DL — LOW (ref 12.6–17.4)
HGB BLDA-MCNC: 11.4 G/DL — LOW (ref 12.6–17.4)
HGB BLDA-MCNC: 11.5 G/DL — LOW (ref 12.6–17.4)
HGB BLDA-MCNC: 11.7 G/DL — LOW (ref 12.6–17.4)
HGB BLDA-MCNC: 11.7 G/DL — LOW (ref 12.6–17.4)
HGB BLDA-MCNC: 11.9 G/DL — LOW (ref 12.6–17.4)
HGB BLDA-MCNC: 11.9 G/DL — LOW (ref 12.6–17.4)
HGB BLDA-MCNC: 12 G/DL — LOW (ref 12.6–17.4)
IMM GRANULOCYTES NFR BLD AUTO: 0.6 % — SIGNIFICANT CHANGE UP (ref 0–0.9)
LACTATE BLDA-MCNC: 0.8 MMOL/L — SIGNIFICANT CHANGE UP (ref 0.5–2)
LACTATE BLDA-MCNC: 0.9 MMOL/L — SIGNIFICANT CHANGE UP (ref 0.5–2)
LACTATE BLDA-MCNC: 1.2 MMOL/L — SIGNIFICANT CHANGE UP (ref 0.5–2)
LACTATE BLDA-MCNC: 1.2 MMOL/L — SIGNIFICANT CHANGE UP (ref 0.5–2)
LACTATE BLDA-MCNC: 1.5 MMOL/L — SIGNIFICANT CHANGE UP (ref 0.5–2)
LACTATE BLDA-MCNC: 1.9 MMOL/L — SIGNIFICANT CHANGE UP (ref 0.5–2)
LACTATE BLDA-MCNC: 2 MMOL/L — SIGNIFICANT CHANGE UP (ref 0.5–2)
LACTATE BLDA-MCNC: 2.1 MMOL/L — HIGH (ref 0.5–2)
LACTATE BLDA-MCNC: 2.4 MMOL/L — HIGH (ref 0.5–2)
LACTATE BLDV-MCNC: 0.9 MMOL/L — SIGNIFICANT CHANGE UP (ref 0.5–2)
LACTATE BLDV-MCNC: 1.9 MMOL/L — SIGNIFICANT CHANGE UP (ref 0.5–2)
LACTATE BLDV-MCNC: 1.9 MMOL/L — SIGNIFICANT CHANGE UP (ref 0.5–2)
LYMPHOCYTES # BLD AUTO: 0.82 K/UL — LOW (ref 1–3.3)
LYMPHOCYTES # BLD AUTO: 15.4 % — SIGNIFICANT CHANGE UP (ref 13–44)
MAGNESIUM SERPL-MCNC: 2.1 MG/DL — SIGNIFICANT CHANGE UP (ref 1.6–2.6)
MCHC RBC-ENTMCNC: 29.5 PG — SIGNIFICANT CHANGE UP (ref 27–34)
MCHC RBC-ENTMCNC: 33.9 G/DL — SIGNIFICANT CHANGE UP (ref 32–36)
MCV RBC AUTO: 87.1 FL — SIGNIFICANT CHANGE UP (ref 80–100)
METHGB MFR BLDA: 0.5 % — SIGNIFICANT CHANGE UP
METHGB MFR BLDA: 0.5 % — SIGNIFICANT CHANGE UP
METHGB MFR BLDA: 0.7 % — SIGNIFICANT CHANGE UP
METHGB MFR BLDA: 0.8 % — SIGNIFICANT CHANGE UP
METHGB MFR BLDA: 0.9 % — SIGNIFICANT CHANGE UP
METHGB MFR BLDA: 1.5 % — SIGNIFICANT CHANGE UP
METHGB MFR BLDA: 1.6 % — HIGH
METHGB MFR BLDA: 1.7 % — HIGH
METHGB MFR BLDV: 0.4 % — SIGNIFICANT CHANGE UP
METHGB MFR BLDV: 1.3 % — SIGNIFICANT CHANGE UP
METHGB MFR BLDV: 1.5 % — SIGNIFICANT CHANGE UP
MONOCYTES # BLD AUTO: 0.58 K/UL — SIGNIFICANT CHANGE UP (ref 0–0.9)
MONOCYTES NFR BLD AUTO: 10.9 % — SIGNIFICANT CHANGE UP (ref 2–14)
NEUTROPHILS # BLD AUTO: 3.64 K/UL — SIGNIFICANT CHANGE UP (ref 1.8–7.4)
NEUTROPHILS NFR BLD AUTO: 68.5 % — SIGNIFICANT CHANGE UP (ref 43–77)
NRBC BLD AUTO-RTO: 0 /100 WBCS — SIGNIFICANT CHANGE UP (ref 0–0)
OXYHGB MFR BLDA: 96.6 % — HIGH (ref 90–95)
OXYHGB MFR BLDA: 96.8 % — HIGH (ref 90–95)
OXYHGB MFR BLDA: 97 % — HIGH (ref 90–95)
OXYHGB MFR BLDA: 97.5 % — HIGH (ref 90–95)
OXYHGB MFR BLDA: 97.5 % — HIGH (ref 90–95)
OXYHGB MFR BLDA: 97.6 % — HIGH (ref 90–95)
OXYHGB MFR BLDA: 97.8 % — HIGH (ref 90–95)
OXYHGB MFR BLDA: 97.8 % — HIGH (ref 90–95)
OXYHGB MFR BLDA: 98 % — HIGH (ref 90–95)
OXYHGB MFR BLDA: 98.1 % — HIGH (ref 90–95)
OXYHGB MFR BLDA: 98.2 % — HIGH (ref 90–95)
PCO2 BLDA: 34 MMHG — LOW (ref 35–48)
PCO2 BLDA: 45 MMHG — SIGNIFICANT CHANGE UP (ref 35–48)
PCO2 BLDA: 46 MMHG — SIGNIFICANT CHANGE UP (ref 35–48)
PCO2 BLDA: 47 MMHG — SIGNIFICANT CHANGE UP (ref 35–48)
PCO2 BLDV: 48 MMHG — SIGNIFICANT CHANGE UP (ref 42–55)
PCO2 BLDV: 48 MMHG — SIGNIFICANT CHANGE UP (ref 42–55)
PCO2 BLDV: 54 MMHG — SIGNIFICANT CHANGE UP (ref 42–55)
PH BLDA: 7.33 — LOW (ref 7.35–7.45)
PH BLDA: 7.34 — LOW (ref 7.35–7.45)
PH BLDA: 7.34 — LOW (ref 7.35–7.45)
PH BLDA: 7.35 — SIGNIFICANT CHANGE UP (ref 7.35–7.45)
PH BLDA: 7.35 — SIGNIFICANT CHANGE UP (ref 7.35–7.45)
PH BLDA: 7.36 — SIGNIFICANT CHANGE UP (ref 7.35–7.45)
PH BLDA: 7.36 — SIGNIFICANT CHANGE UP (ref 7.35–7.45)
PH BLDA: 7.37 — SIGNIFICANT CHANGE UP (ref 7.35–7.45)
PH BLDA: 7.37 — SIGNIFICANT CHANGE UP (ref 7.35–7.45)
PH BLDA: 7.39 — SIGNIFICANT CHANGE UP (ref 7.35–7.45)
PH BLDA: 7.43 — SIGNIFICANT CHANGE UP (ref 7.35–7.45)
PH BLDV: 7.3 — LOW (ref 7.32–7.43)
PH BLDV: 7.33 — SIGNIFICANT CHANGE UP (ref 7.32–7.43)
PH BLDV: 7.36 — SIGNIFICANT CHANGE UP (ref 7.32–7.43)
PHOSPHATE SERPL-MCNC: 3.3 MG/DL — SIGNIFICANT CHANGE UP (ref 2.5–4.5)
PLATELET # BLD AUTO: 110 K/UL — LOW (ref 150–400)
PO2 BLDA: 274 MMHG — HIGH (ref 83–108)
PO2 BLDA: 322 MMHG — HIGH (ref 83–108)
PO2 BLDA: 326 MMHG — HIGH (ref 83–108)
PO2 BLDA: 338 MMHG — HIGH (ref 83–108)
PO2 BLDA: 374 MMHG — HIGH (ref 83–108)
PO2 BLDA: 417 MMHG — HIGH (ref 83–108)
PO2 BLDA: 426 MMHG — HIGH (ref 83–108)
PO2 BLDA: 426 MMHG — HIGH (ref 83–108)
PO2 BLDA: 438 MMHG — HIGH (ref 83–108)
PO2 BLDA: 448 MMHG — HIGH (ref 83–108)
PO2 BLDA: 536 MMHG — HIGH (ref 83–108)
PO2 BLDV: 42 MMHG — SIGNIFICANT CHANGE UP (ref 25–45)
PO2 BLDV: 54 MMHG — HIGH (ref 25–45)
PO2 BLDV: 68 MMHG — HIGH (ref 25–45)
POTASSIUM BLDA-SCNC: 3.5 MMOL/L — SIGNIFICANT CHANGE UP (ref 3.5–5.1)
POTASSIUM BLDA-SCNC: 3.6 MMOL/L — SIGNIFICANT CHANGE UP (ref 3.5–5.1)
POTASSIUM BLDA-SCNC: 3.6 MMOL/L — SIGNIFICANT CHANGE UP (ref 3.5–5.1)
POTASSIUM BLDA-SCNC: 3.7 MMOL/L — SIGNIFICANT CHANGE UP (ref 3.5–5.1)
POTASSIUM BLDA-SCNC: 3.9 MMOL/L — SIGNIFICANT CHANGE UP (ref 3.5–5.1)
POTASSIUM BLDA-SCNC: 4.1 MMOL/L — SIGNIFICANT CHANGE UP (ref 3.5–5.1)
POTASSIUM BLDA-SCNC: 4.3 MMOL/L — SIGNIFICANT CHANGE UP (ref 3.5–5.1)
POTASSIUM BLDA-SCNC: 4.5 MMOL/L — SIGNIFICANT CHANGE UP (ref 3.5–5.1)
POTASSIUM BLDA-SCNC: 4.5 MMOL/L — SIGNIFICANT CHANGE UP (ref 3.5–5.1)
POTASSIUM BLDV-SCNC: 3.6 MMOL/L — SIGNIFICANT CHANGE UP (ref 3.5–5.1)
POTASSIUM BLDV-SCNC: 4.3 MMOL/L — SIGNIFICANT CHANGE UP (ref 3.5–5.1)
POTASSIUM BLDV-SCNC: 4.4 MMOL/L — SIGNIFICANT CHANGE UP (ref 3.5–5.1)
POTASSIUM SERPL-MCNC: 3.8 MMOL/L — SIGNIFICANT CHANGE UP (ref 3.5–5.3)
POTASSIUM SERPL-MCNC: 4.3 MMOL/L — SIGNIFICANT CHANGE UP (ref 3.5–5.3)
POTASSIUM SERPL-SCNC: 3.8 MMOL/L — SIGNIFICANT CHANGE UP (ref 3.5–5.3)
POTASSIUM SERPL-SCNC: 4.3 MMOL/L — SIGNIFICANT CHANGE UP (ref 3.5–5.3)
PROT SERPL-MCNC: 7.1 G/DL — SIGNIFICANT CHANGE UP (ref 6–8.3)
RAPIDTEG MAXIMUM AMPLITUDE: 52.6 MM — SIGNIFICANT CHANGE UP (ref 52–70)
RBC # BLD: 4.2 M/UL — SIGNIFICANT CHANGE UP (ref 4.2–5.8)
RBC # FLD: 14 % — SIGNIFICANT CHANGE UP (ref 10.3–14.5)
SAO2 % BLDA: 100 % — HIGH (ref 94–98)
SAO2 % BLDA: 99.6 % — HIGH (ref 94–98)
SAO2 % BLDA: 99.8 % — HIGH (ref 94–98)
SAO2 % BLDV: 74.6 % — SIGNIFICANT CHANGE UP (ref 67–88)
SAO2 % BLDV: 87.3 % — SIGNIFICANT CHANGE UP (ref 67–88)
SAO2 % BLDV: 96.5 % — HIGH (ref 67–88)
SODIUM BLDA-SCNC: 133 MMOL/L — LOW (ref 136–145)
SODIUM BLDA-SCNC: 134 MMOL/L — LOW (ref 136–145)
SODIUM BLDA-SCNC: 135 MMOL/L — LOW (ref 136–145)
SODIUM BLDA-SCNC: 136 MMOL/L — SIGNIFICANT CHANGE UP (ref 136–145)
SODIUM BLDA-SCNC: 137 MMOL/L — SIGNIFICANT CHANGE UP (ref 136–145)
SODIUM SERPL-SCNC: 138 MMOL/L — SIGNIFICANT CHANGE UP (ref 135–145)
SODIUM SERPL-SCNC: 141 MMOL/L — SIGNIFICANT CHANGE UP (ref 135–145)
TEG FUNCTIONAL FIBRINOGEN: 13.9 MM — LOW (ref 15–32)
TEG MAXIMUM AMPLITUDE: 52.6 MM — SIGNIFICANT CHANGE UP (ref 52–69)
TEG REACTION TIME: 9 MIN — SIGNIFICANT CHANGE UP (ref 4.6–9.1)
WBC # BLD: 5.31 K/UL — SIGNIFICANT CHANGE UP (ref 3.8–10.5)
WBC # FLD AUTO: 5.31 K/UL — SIGNIFICANT CHANGE UP (ref 3.8–10.5)

## 2025-04-28 PROCEDURE — 33464 VALVULOPLASTY TRICUSPID: CPT

## 2025-04-28 PROCEDURE — 99232 SBSQ HOSP IP/OBS MODERATE 35: CPT

## 2025-04-28 PROCEDURE — 88309 TISSUE EXAM BY PATHOLOGIST: CPT | Mod: 26

## 2025-04-28 PROCEDURE — 33464 VALVULOPLASTY TRICUSPID: CPT | Mod: 80

## 2025-04-28 PROCEDURE — 33945 TRANSPLANTATION OF HEART: CPT | Mod: 80

## 2025-04-28 PROCEDURE — 93287 PERI-PX DEVICE EVAL & PRGR: CPT | Mod: 26

## 2025-04-28 PROCEDURE — 33945 TRANSPLANTATION OF HEART: CPT

## 2025-04-28 DEVICE — FLOSEAL WITH RECOTHROM THROMBIN 10ML: Type: IMPLANTABLE DEVICE | Status: FUNCTIONAL

## 2025-04-28 DEVICE — CATH THERMDIL SWAN GANZ VIP 7.5FR: Type: IMPLANTABLE DEVICE | Status: FUNCTIONAL

## 2025-04-28 DEVICE — CANNULA ATRASUMP 1/4" X 38CM: Type: IMPLANTABLE DEVICE | Status: FUNCTIONAL

## 2025-04-28 DEVICE — CANNULA ANTEGRADE CARDIOPLEGIA 14 GA STRL: Type: IMPLANTABLE DEVICE | Status: FUNCTIONAL

## 2025-04-28 DEVICE — GUIDEWIRE AMPLATZ SUPER-STIFF STRAIGHT .035" X 180CM: Type: IMPLANTABLE DEVICE | Status: FUNCTIONAL

## 2025-04-28 DEVICE — PACING WIRE WHITE M-22 LOOP 89MM: Type: IMPLANTABLE DEVICE | Status: FUNCTIONAL

## 2025-04-28 DEVICE — IMPLANTABLE DEVICE: Type: IMPLANTABLE DEVICE | Status: FUNCTIONAL

## 2025-04-28 DEVICE — CATH SILICONE THORACIC 28FR STRAIGHT: Type: IMPLANTABLE DEVICE | Status: FUNCTIONAL

## 2025-04-28 DEVICE — LIGATING CLIPS WECK HORIZON SMALL-WIDE (RED) 24: Type: IMPLANTABLE DEVICE | Status: FUNCTIONAL

## 2025-04-28 DEVICE — CANNULA VESSEL 3MM BLUNT TIP CLEAR 1-WAY VALVE: Type: IMPLANTABLE DEVICE | Status: FUNCTIONAL

## 2025-04-28 DEVICE — SURGICEL NU-KNIT 6 X 9": Type: IMPLANTABLE DEVICE | Status: FUNCTIONAL

## 2025-04-28 DEVICE — KIT A-LINE 1LUM 20G X 12CM SAFE KIT: Type: IMPLANTABLE DEVICE | Status: FUNCTIONAL

## 2025-04-28 DEVICE — SURGICEL FIBRILLAR 2 X 4": Type: IMPLANTABLE DEVICE | Status: FUNCTIONAL

## 2025-04-28 DEVICE — SHEATH INTRODUCER TERUMO PINNACLE CORONARY 5FR X 10CM X 0.038" MINI WIRE: Type: IMPLANTABLE DEVICE | Status: FUNCTIONAL

## 2025-04-28 DEVICE — WIRE PACING 16MM 3/8CIR 89MM KN: Type: IMPLANTABLE DEVICE | Status: FUNCTIONAL

## 2025-04-28 DEVICE — KIT CVC 2LUM MAC 9FR CHG: Type: IMPLANTABLE DEVICE | Status: FUNCTIONAL

## 2025-04-28 DEVICE — PACING WIRE WHITE M-24 BAREWIRE 37MM X 89MM: Type: IMPLANTABLE DEVICE | Status: FUNCTIONAL

## 2025-04-28 DEVICE — LIGATING CLIPS WECK HORIZON MEDIUM (BLUE) 24: Type: IMPLANTABLE DEVICE | Status: FUNCTIONAL

## 2025-04-28 DEVICE — CANNULA VENOUS 1 STAGE RIGHT ANGLE METAL TIP 24FR X 3/8": Type: IMPLANTABLE DEVICE | Status: FUNCTIONAL

## 2025-04-28 DEVICE — CANNULA VENOUS 1 STAGE STRAIGHT 28FR X 3/8": Type: IMPLANTABLE DEVICE | Status: FUNCTIONAL

## 2025-04-28 DEVICE — CHEST DRAIN THORACIC ARGYLE PVC 32FR STRAIGHT: Type: IMPLANTABLE DEVICE | Status: FUNCTIONAL

## 2025-04-28 DEVICE — COR-KNOT MINI DEVICE COMBO KIT: Type: IMPLANTABLE DEVICE | Status: FUNCTIONAL

## 2025-04-28 DEVICE — CANNULA ARTERIAL OPTISITE 20FR X 3/8" VENTED: Type: IMPLANTABLE DEVICE | Status: FUNCTIONAL

## 2025-04-28 RX ORDER — VANCOMYCIN HCL IN 5 % DEXTROSE 1.5G/250ML
1000 PLASTIC BAG, INJECTION (ML) INTRAVENOUS ONCE
Refills: 0 | Status: DISCONTINUED | OUTPATIENT
Start: 2025-04-28 | End: 2025-04-29

## 2025-04-28 RX ORDER — LIDOCAINE HCL/PF 10 MG/ML
1 VIAL (ML) INJECTION ONCE
Refills: 0 | Status: DISCONTINUED | OUTPATIENT
Start: 2025-04-28 | End: 2025-04-29

## 2025-04-28 RX ORDER — TRAMADOL HYDROCHLORIDE 50 MG/1
25 TABLET, FILM COATED ORAL EVERY 8 HOURS
Refills: 0 | Status: DISCONTINUED | OUTPATIENT
Start: 2025-04-29 | End: 2025-05-02

## 2025-04-28 RX ORDER — LIDOCAINE HYDROCHLORIDE 20 MG/ML
3 JELLY TOPICAL DAILY
Refills: 0 | Status: COMPLETED | OUTPATIENT
Start: 2025-04-29 | End: 2025-05-05

## 2025-04-28 RX ORDER — CEFEPIME 2 G/20ML
1000 INJECTION, POWDER, FOR SOLUTION INTRAVENOUS ONCE
Refills: 0 | Status: DISCONTINUED | OUTPATIENT
Start: 2025-04-28 | End: 2025-04-29

## 2025-04-28 RX ORDER — METHYLPREDNISOLONE ACETATE 80 MG/ML
INJECTION, SUSPENSION INTRA-ARTICULAR; INTRALESIONAL; INTRAMUSCULAR; SOFT TISSUE
Refills: 0 | Status: DISCONTINUED | OUTPATIENT
Start: 2025-04-29 | End: 2025-05-06

## 2025-04-28 RX ORDER — METHYLPREDNISOLONE ACETATE 80 MG/ML
1000 INJECTION, SUSPENSION INTRA-ARTICULAR; INTRALESIONAL; INTRAMUSCULAR; SOFT TISSUE ONCE
Refills: 0 | Status: DISCONTINUED | OUTPATIENT
Start: 2025-04-28 | End: 2025-04-29

## 2025-04-28 RX ORDER — TRAZODONE HCL 100 MG
100 TABLET ORAL AT BEDTIME
Refills: 0 | Status: DISCONTINUED | OUTPATIENT
Start: 2025-04-29 | End: 2025-05-05

## 2025-04-28 RX ORDER — GABAPENTIN 400 MG/1
100 CAPSULE ORAL EVERY 8 HOURS
Refills: 0 | Status: DISCONTINUED | OUTPATIENT
Start: 2025-04-29 | End: 2025-04-30

## 2025-04-28 RX ORDER — ACETAMINOPHEN 500 MG/5ML
500 LIQUID (ML) ORAL EVERY 6 HOURS
Refills: 0 | Status: COMPLETED | OUTPATIENT
Start: 2025-04-29 | End: 2025-05-06

## 2025-04-28 RX ORDER — MYCOPHENOLATE MOFETIL 500 MG/1
1000 TABLET, FILM COATED ORAL ONCE
Refills: 0 | Status: DISCONTINUED | OUTPATIENT
Start: 2025-04-28 | End: 2025-04-29

## 2025-04-28 RX ORDER — MYCOPHENOLATE MOFETIL 500 MG/1
1000 TABLET, FILM COATED ORAL EVERY 12 HOURS
Refills: 0 | Status: DISCONTINUED | OUTPATIENT
Start: 2025-04-29 | End: 2025-05-03

## 2025-04-28 RX ORDER — MUPIROCIN CALCIUM 20 MG/G
1 CREAM TOPICAL EVERY 12 HOURS
Refills: 0 | Status: DISCONTINUED | OUTPATIENT
Start: 2025-04-28 | End: 2025-04-29

## 2025-04-28 RX ORDER — METHOCARBAMOL 500 MG/1
500 TABLET, FILM COATED ORAL EVERY 8 HOURS
Refills: 0 | Status: DISCONTINUED | OUTPATIENT
Start: 2025-04-29 | End: 2025-04-29

## 2025-04-28 RX ORDER — VANCOMYCIN HCL IN 5 % DEXTROSE 1.5G/250ML
125 PLASTIC BAG, INJECTION (ML) INTRAVENOUS EVERY 12 HOURS
Refills: 0 | Status: DISCONTINUED | OUTPATIENT
Start: 2025-04-29 | End: 2025-05-14

## 2025-04-28 RX ADMIN — Medication 20 MILLIGRAM(S): at 09:38

## 2025-04-28 RX ADMIN — Medication 324 MILLIGRAM(S): at 05:40

## 2025-04-28 RX ADMIN — Medication 1 APPLICATION(S): at 05:30

## 2025-04-28 RX ADMIN — METOPROLOL SUCCINATE 50 MILLIGRAM(S): 50 TABLET, EXTENDED RELEASE ORAL at 05:40

## 2025-04-28 NOTE — PRE-ANESTHESIA EVALUATION ADULT - NSRADCARDRESULTSFT_GEN_ALL_CORE
< from: TTE W or WO Ultrasound Enhancing Agent (03.20.25 @ 09:16) >    CONCLUSIONS:      1. Left ventricular cavity is normal in size. Left ventricular wall thickness is normal. Left ventricular systolic function is severely decreased with an ejection fraction of 30 % by Mendoza's method of disks. Global left ventricular hypokinesis.   2. Multiple segmental abnormalities exist. See findings.   3. Elevated left ventricular filling pressure. Analysis of left ventricular diastolic function and filling pressure is made challenging by the presence of mitral annuloplasty ring.   4. Moderately enlarged right ventricular cavity size and mildly reduced right ventricular systolic function.   5. The right atrium is severely dilated.   6. Device lead is visualized in the right heart.   7. No pericardial effusion seen.   8. STACEY could be considered to further evaluated mitral annuloplasty.   9. An annuloplasty ring is noted in the mitral position. Transvalvular mitral gradients are elevated. Severe prosthetic mitral stenosis. There is trace intravalvular mitral regurgitation.  10. An annuloplasty ring is noted in the tricuspid position.  11. Estimated pulmonary artery systolic pressure is 36 mmHg.  12. Technically difficult image quality.  13. There is no evidence of a left ventricular thrombus.    < end of copied text >

## 2025-04-28 NOTE — PROGRESS NOTE ADULT - PROBLEM SELECTOR PLAN 2
- TTE 3/20/25 : LVEF 30%,transvalvular gradients are elevated with severe prosthetic Mitral Stenosis, no evidence of LV thrombus.  - s/p recent admit 3/19/2025 w/ [facility] found to have elevated filling pressures treated with IV diuretics.  - GDMT:  C/w Toprol XL 50mg daily; off Spironolactone due to prior dizziness; afterload reduction (hydralazine) on hold w/ borderline BPs.  - HOLD home Farxiga while inpatient.  - uptrending Cr and with transaminitis suggestive of congestive hepatopathy. lactate wnl.  - HF f/u appreciated. >     > Diuretic management as per HF recs. On torsemide 10mg daily to alternating 10mg/20mg doses 4/21: discussed with HF team, they rec 40meq Kcl on the days he gets torsemide 20mg and  20meq Kcl on the days he gets torsemide 10mg.  - Appreciate HF recs - status upgraded for transplant. >      > pending possible OHT today , NPO  - Per transplant ID: check hep serologies.

## 2025-04-28 NOTE — PROGRESS NOTE ADULT - ASSESSMENT
70M PMHx NICM since 2011, HFrEF (25-30%) s/p MDT CRT-D with baseline CHB , VT/VF, afib s/p GIANFRANCO ligation/MAZE, MV/TV repair, PVC ablation (3/2024) was transferred from outside hospital to [facility] due to  VT/AICD shock.   While admitted to the outside hospital, he was started on a lidocaine gtt. On 3/21 when lidocaine weaned off patient had recurrence of VT requiring AICD shocks. Thus transferred to [facility] for further management. Pt  was on Lidocaine gtt for control and is now on Quinidine and uptitrated Toprol for anti-arrythmia. Because of pt's refractory VT pt's listing status for a Heart Transplant was bumped from 6 to 2E temporarily. Pt will remain inpatient for listing purposes and managed closely by HF team and hence was transferred to [facility] floor on 3/27/25. Pending OHT.

## 2025-04-28 NOTE — PRE-ANESTHESIA EVALUATION ADULT - NSANTHPEFT_GEN_ALL_CORE
Irregular  NAD  No increased WOB on RA
GENERAL: NAD  HEAD:  Atraumatic, Normocephalic  CHEST/LUNG: Clear to auscultation bilaterally  HEART: Normal S1/S2  PSYCH: AAOx3  NEUROLOGY: non-focal  SKIN: No obvious rashes or lesions

## 2025-04-28 NOTE — PROCEDURE NOTE - NSPROCNAME_GEN_A_CORE
CRT-D (Cardiac Resynchronization Therapy with Defibrillation Capabilities) Interrogation Note
CRT-D (Cardiac Resynchronization Therapy with Defibrillation Capabilities) Interrogation Note

## 2025-04-28 NOTE — PROGRESS NOTE ADULT - SUBJECTIVE AND OBJECTIVE BOX
[facility] Division of Hospital Medicine  Amy Lim MD  Pager (MAYUR-FABIAN, 3T-9N): [phone]  Other Times:  [phone]    Patient is a 70y old  Male who presents with a chief complaint of VT (27 Apr 2025 14:39)      SUBJECTIVE / OVERNIGHT EVENTS:  feeling well. denies any complaints. afebrile.     ADDITIONAL REVIEW OF SYSTEMS: otherwise neg    MEDICATIONS  (STANDING):  atorvastatin 10 milliGRAM(s) Oral at bedtime  cefepime   IVPB 1000 milliGRAM(s) IV Intermittent once  chlorhexidine 0.12% Liquid 5 milliLiter(s) Swish and Spit two times a day  chlorhexidine 0.12% Liquid 5 milliLiter(s) Swish and Spit two times a day  chlorhexidine 2% Cloths 1 Application(s) Topical <User Schedule>  chlorhexidine 4% Liquid 1 Application(s) Topical once  chlorhexidine 4% Liquid 1 Application(s) Topical <User Schedule>  heparin   Injectable 5000 Unit(s) SubCutaneous every 8 hours  lidocaine 1% Injectable 1 milliLiter(s) Local Injection once  methylPREDNISolone sodium succinate IVPB 1000 milliGRAM(s) IV Intermittent once  metoprolol succinate ER 50 milliGRAM(s) Oral daily  mupirocin 2% Nasal 1 Application(s) Both Nostrils every 12 hours  mycophenolate mofetil IVPB 1000 milliGRAM(s) IV Intermittent once  polyethylene glycol 3350 17 Gram(s) Oral daily  potassium chloride    Tablet ER 20 milliEquivalent(s) Oral once  potassium chloride ER tablet 20 milliEquivalent(s) 20 milliEquivalent(s) Oral <User Schedule>  potassium chloride ER tablet 40 milliEquivalent(s) 40 milliEquivalent(s) Oral <User Schedule>  psyllium Powder 1 Packet(s) Oral daily  quiNIDine gluconate  milliGRAM(s) Oral every 12 hours  senna 2 Tablet(s) Oral at bedtime  tamsulosin 0.4 milliGRAM(s) Oral at bedtime  torsemide 10 milliGRAM(s) Oral <User Schedule>  torsemide 20 milliGRAM(s) Oral <User Schedule>  traZODone 100 milliGRAM(s) Oral at bedtime  vancomycin  IVPB 1000 milliGRAM(s) IV Intermittent once    MEDICATIONS  (PRN):  acetaminophen     Tablet .. 650 milliGRAM(s) Oral every 6 hours PRN Mild Pain (1 - 3), Moderate Pain (4 - 6)  artificial  tears Solution 1 Drop(s) Both EYES every 4 hours PRN Dry Eyes  bisacodyl Suppository 10 milliGRAM(s) Rectal daily PRN Constipation  clonazePAM  Tablet 0.25 milliGRAM(s) Oral every 12 hours PRN anxiety  guaiFENesin Oral Liquid (Sugar-Free) 200 milliGRAM(s) Oral every 6 hours PRN Cough      CAPILLARY BLOOD GLUCOSE        I&O's Summary    27 Apr 2025 07:01  -  28 Apr 2025 07:00  --------------------------------------------------------  IN: 200 mL / OUT: 0 mL / NET: 200 mL        PHYSICAL EXAM:  Vital Signs Last 24 Hrs  T(C): 36.5 (28 Apr 2025 10:43), Max: 36.7 (27 Apr 2025 20:57)  T(F): 97.7 (28 Apr 2025 10:43), Max: 98.1 (27 Apr 2025 20:57)  HR: 80 (28 Apr 2025 10:43) (75 - 83)  BP: 103/72 (28 Apr 2025 10:43) (101/68 - 106/72)  BP(mean): 65 (28 Apr 2025 10:33) (65 - 65)  RR: 18 (28 Apr 2025 10:43) (18 - 18)  SpO2: 95% (28 Apr 2025 10:43) (94% - 95%)    Parameters below as of 28 Apr 2025 10:43  Patient On (Oxygen Delivery Method): room air        CONSTITUTIONAL: NAD, well-groomed  EYES:  conjunctiva and sclera clear  ENMT: Moist oral mucosa  NECK: Supple, no palpable masses; no JVD  RESPIRATORY: Normal respiratory effort; lungs are clear to auscultation bilaterally  CARDIOVASCULAR: Regular rate and rhythm, normal S1 and S2, no murmur/rub/gallop; No lower extremity edema  ABDOMEN: Nontender to palpation, normoactive bowel sounds, no rebound/guarding  MUSCULOSKELETAL:  no clubbing or cyanosis of digits; no joint swelling or tenderness to palpation  PSYCH: A+O to person, place, and time; affect appropriate  SKIN: No rashes; no palpable lesions    LABS:                        12.4   5.31  )-----------( 110      ( 28 Apr 2025 06:33 )             36.6     04-28    141  |  105  |  22  ----------------------------<  100[H]  4.3   |  24  |  1.14    Ca    9.3      28 Apr 2025 10:05  Phos  3.3     04-28  Mg     2.1     04-28    TPro  7.1  /  Alb  4.1  /  TBili  0.8  /  DBili  x   /  AST  31  /  ALT  73[H]  /  AlkPhos  90  04-28          Urinalysis Basic - ( 28 Apr 2025 10:05 )    Color: x / Appearance: x / SG: x / pH: x  Gluc: 100 mg/dL / Ketone: x  / Bili: x / Urobili: x   Blood: x / Protein: x / Nitrite: x   Leuk Esterase: x / RBC: x / WBC x   Sq Epi: x / Non Sq Epi: x / Bacteria: x          RADIOLOGY & ADDITIONAL TESTS:  Results Reviewed:   Imaging Personally Reviewed:  Electrocardiogram Personally Reviewed:    COORDINATION OF CARE:  Care Discussed with Consultants/Other Providers [Y/N]:  Prior or Outpatient Records Reviewed [Y/N]:

## 2025-04-28 NOTE — PROGRESS NOTE ADULT - PROBLEM SELECTOR PROBLEM 7
HLD (hyperlipidemia)

## 2025-04-28 NOTE — PROCEDURE NOTE - ADDITIONAL PROCEDURE DETAILS
Indication: Concern for oversensing on telemetry  Presenting rhythm: AP-  Underlying rhythm: NSR with CHB, no escape at VVI 30  Normal device function and lead parameters.  AP 97%.  98%.  No episodes noted in arrhythmia log.  No re-programming changes made.
Indication for interrogation: Heart transplant    Raghavendra parameters changed to DOO 80 BPM per discussion with anesthesia.    Tachy detection turned OFF.

## 2025-04-28 NOTE — PRE-ANESTHESIA EVALUATION ADULT - NSANTHPMHFT_GEN_ALL_CORE
69-year-old male patient past medical history of NICM since 2011, HFrEF LVEF 25-30% s/p MDT CRT-D with baseline CHB, VT/VF, a.fib s/p GIANFRANCO ligation/MAZE, MV/TV repair, PVC ablation 3/2024 intolerant to AADs in the past, recent admission 3/19 - 3/20/25 for AICD shocks initially presented to the [facility] emergency department on 3/21/2025, sent by heart failure specialist for ACID shock. Device reported successful ATP for VT 3/17 at 6:52pm followed by one ATP and 40J shock. He reported feeling dizzy and SOB during the events. He follows with Dr paula abreu for HF, currently undergoing transplant workup, Dr John for CRTD and VT/VF and  for genetic counseling. While admitted to [facility], he was started on a lidocaine gtt. On 3/21 when lidocaine weaned off patient had another two episodes of VT requiring AICD shocks. He was transferred to [facility] for further management.
70M PMHx NICM since 2011, HFrEF (25-30%) s/p MDT CRT-D with baseline CHB , VT/VF, afib s/p GIANFRANCO ligation/MAZE, MV/TV repair, PVC ablation (3/2024) was transferred from outside hospital to [facility] due to  VT/AICD shock.   While admitted to the outside hospital, he was started on a lidocaine gtt. On 3/21 when lidocaine weaned off patient had recurrence of VT requiring AICD shocks. Thus transferred to [facility] for further management. Pt  was on Lidocaine gtt for control and is now on Quinidine and uptitrated Toprol for anti-arrythmia. Because of pt's refractory VT pt's listing status for a Heart Transplant was bumped from 6 to 2E temporarily. Pt will remain inpatient for listing purposes and managed closely by HF team and hence was transferred to [facility] floor on 3/27/25. Pending OHT.

## 2025-04-28 NOTE — PRE-ANESTHESIA EVALUATION ADULT - NSANTHOSAYNRD_GEN_A_CORE
No. EDNA screening performed.  STOP BANG Legend: 0-2 = LOW Risk; 3-4 = INTERMEDIATE Risk; 5-8 = HIGH Risk
No. EDNA screening performed.  STOP BANG Legend: 0-2 = LOW Risk; 3-4 = INTERMEDIATE Risk; 5-8 = HIGH Risk
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
## 2025-04-29 DIAGNOSIS — Z94.1 HEART TRANSPLANT STATUS: ICD-10-CM

## 2025-04-29 DIAGNOSIS — D84.9 IMMUNODEFICIENCY, UNSPECIFIED: ICD-10-CM

## 2025-04-29 LAB
ALBUMIN SERPL ELPH-MCNC: 3.3 G/DL — SIGNIFICANT CHANGE UP (ref 3.3–5)
ALBUMIN SERPL ELPH-MCNC: 4.3 G/DL — SIGNIFICANT CHANGE UP (ref 3.3–5)
ALBUMIN SERPL ELPH-MCNC: 4.6 G/DL — SIGNIFICANT CHANGE UP (ref 3.3–5)
ALP SERPL-CCNC: 45 U/L — SIGNIFICANT CHANGE UP (ref 40–120)
ALP SERPL-CCNC: 53 U/L — SIGNIFICANT CHANGE UP (ref 40–120)
ALP SERPL-CCNC: 64 U/L — SIGNIFICANT CHANGE UP (ref 40–120)
ALT FLD-CCNC: 36 U/L — SIGNIFICANT CHANGE UP (ref 10–45)
ALT FLD-CCNC: 42 U/L — SIGNIFICANT CHANGE UP (ref 10–45)
ALT FLD-CCNC: 47 U/L — HIGH (ref 10–45)
AMORPH CRY # UR COMP ASSIST: PRESENT
ANION GAP SERPL CALC-SCNC: 16 MMOL/L — SIGNIFICANT CHANGE UP (ref 5–17)
ANION GAP SERPL CALC-SCNC: 17 MMOL/L — SIGNIFICANT CHANGE UP (ref 5–17)
ANION GAP SERPL CALC-SCNC: 20 MMOL/L — HIGH (ref 5–17)
APPEARANCE UR: ABNORMAL
APTT BLD: 27.4 SEC — SIGNIFICANT CHANGE UP (ref 26.1–36.8)
APTT BLD: 27.8 SEC — SIGNIFICANT CHANGE UP (ref 26.1–36.8)
APTT BLD: 49.7 SEC — HIGH (ref 26.1–36.8)
AST SERPL-CCNC: 50 U/L — HIGH (ref 10–40)
AST SERPL-CCNC: 69 U/L — HIGH (ref 10–40)
AST SERPL-CCNC: 75 U/L — HIGH (ref 10–40)
BACTERIA # UR AUTO: NEGATIVE /HPF — SIGNIFICANT CHANGE UP
BASE EXCESS BLDMV CALC-SCNC: -0.2 MMOL/L — SIGNIFICANT CHANGE UP (ref -3–3)
BASE EXCESS BLDMV CALC-SCNC: -0.5 MMOL/L — SIGNIFICANT CHANGE UP (ref -3–3)
BASE EXCESS BLDMV CALC-SCNC: -1.4 MMOL/L — SIGNIFICANT CHANGE UP (ref -3–3)
BASE EXCESS BLDMV CALC-SCNC: -1.5 MMOL/L — SIGNIFICANT CHANGE UP (ref -3–3)
BASE EXCESS BLDMV CALC-SCNC: -1.7 MMOL/L — SIGNIFICANT CHANGE UP (ref -3–3)
BASE EXCESS BLDMV CALC-SCNC: -1.7 MMOL/L — SIGNIFICANT CHANGE UP (ref -3–3)
BASE EXCESS BLDMV CALC-SCNC: -1.9 MMOL/L — SIGNIFICANT CHANGE UP (ref -3–3)
BASE EXCESS BLDMV CALC-SCNC: -2.2 MMOL/L — SIGNIFICANT CHANGE UP (ref -3–3)
BASE EXCESS BLDMV CALC-SCNC: -2.4 MMOL/L — SIGNIFICANT CHANGE UP (ref -3–3)
BASE EXCESS BLDMV CALC-SCNC: -2.9 MMOL/L — SIGNIFICANT CHANGE UP (ref -3–3)
BASE EXCESS BLDMV CALC-SCNC: -4.6 MMOL/L — LOW (ref -3–3)
BASE EXCESS BLDMV CALC-SCNC: -4.8 MMOL/L — LOW (ref -3–3)
BASE EXCESS BLDMV CALC-SCNC: -5 MMOL/L — LOW (ref -3–3)
BASE EXCESS BLDMV CALC-SCNC: 1.9 MMOL/L — SIGNIFICANT CHANGE UP (ref -3–3)
BASE EXCESS BLDMV CALC-SCNC: 2.8 MMOL/L — SIGNIFICANT CHANGE UP (ref -3–3)
BASOPHILS # BLD AUTO: 0 K/UL — SIGNIFICANT CHANGE UP (ref 0–0.2)
BASOPHILS # BLD AUTO: 0.01 K/UL — SIGNIFICANT CHANGE UP (ref 0–0.2)
BASOPHILS # BLD AUTO: 0.01 K/UL — SIGNIFICANT CHANGE UP (ref 0–0.2)
BASOPHILS NFR BLD AUTO: 0 % — SIGNIFICANT CHANGE UP (ref 0–2)
BASOPHILS NFR BLD AUTO: 0.1 % — SIGNIFICANT CHANGE UP (ref 0–2)
BASOPHILS NFR BLD AUTO: 0.1 % — SIGNIFICANT CHANGE UP (ref 0–2)
BILIRUB DIRECT SERPL-MCNC: 1.1 MG/DL — HIGH (ref 0–0.3)
BILIRUB INDIRECT FLD-MCNC: 1.7 MG/DL — HIGH (ref 0.2–1)
BILIRUB SERPL-MCNC: 2.6 MG/DL — HIGH (ref 0.2–1.2)
BILIRUB SERPL-MCNC: 2.8 MG/DL — HIGH (ref 0.2–1.2)
BILIRUB SERPL-MCNC: 3 MG/DL — HIGH (ref 0.2–1.2)
BILIRUB UR-MCNC: NEGATIVE — SIGNIFICANT CHANGE UP
BUN SERPL-MCNC: 24 MG/DL — HIGH (ref 7–23)
BUN SERPL-MCNC: 30 MG/DL — HIGH (ref 7–23)
BUN SERPL-MCNC: 37 MG/DL — HIGH (ref 7–23)
CALCIUM SERPL-MCNC: 10.3 MG/DL — SIGNIFICANT CHANGE UP (ref 8.4–10.5)
CALCIUM SERPL-MCNC: 10.8 MG/DL — HIGH (ref 8.4–10.5)
CALCIUM SERPL-MCNC: 9.6 MG/DL — SIGNIFICANT CHANGE UP (ref 8.4–10.5)
CAST: 1 /LPF — SIGNIFICANT CHANGE UP (ref 0–4)
CHLORIDE SERPL-SCNC: 101 MMOL/L — SIGNIFICANT CHANGE UP (ref 96–108)
CHLORIDE SERPL-SCNC: 104 MMOL/L — SIGNIFICANT CHANGE UP (ref 96–108)
CHLORIDE SERPL-SCNC: 99 MMOL/L — SIGNIFICANT CHANGE UP (ref 96–108)
CO2 BLDMV-SCNC: 23 MMOL/L — SIGNIFICANT CHANGE UP (ref 21–29)
CO2 BLDMV-SCNC: 24 MMOL/L — SIGNIFICANT CHANGE UP (ref 21–29)
CO2 BLDMV-SCNC: 25 MMOL/L — SIGNIFICANT CHANGE UP (ref 21–29)
CO2 BLDMV-SCNC: 25 MMOL/L — SIGNIFICANT CHANGE UP (ref 21–29)
CO2 BLDMV-SCNC: 26 MMOL/L — SIGNIFICANT CHANGE UP (ref 21–29)
CO2 BLDMV-SCNC: 27 MMOL/L — SIGNIFICANT CHANGE UP (ref 21–29)
CO2 BLDMV-SCNC: 28 MMOL/L — SIGNIFICANT CHANGE UP (ref 21–29)
CO2 BLDMV-SCNC: 28 MMOL/L — SIGNIFICANT CHANGE UP (ref 21–29)
CO2 BLDMV-SCNC: 29 MMOL/L — SIGNIFICANT CHANGE UP (ref 21–29)
CO2 BLDMV-SCNC: 30 MMOL/L — HIGH (ref 21–29)
CO2 BLDMV-SCNC: 31 MMOL/L — HIGH (ref 21–29)
CO2 SERPL-SCNC: 20 MMOL/L — LOW (ref 22–31)
CO2 SERPL-SCNC: 23 MMOL/L — SIGNIFICANT CHANGE UP (ref 22–31)
CO2 SERPL-SCNC: 23 MMOL/L — SIGNIFICANT CHANGE UP (ref 22–31)
COLOR SPEC: ABNORMAL
CREAT ?TM UR-MCNC: 46 MG/DL — SIGNIFICANT CHANGE UP
CREAT SERPL-MCNC: 1.73 MG/DL — HIGH (ref 0.5–1.3)
CREAT SERPL-MCNC: 2.42 MG/DL — HIGH (ref 0.5–1.3)
CREAT SERPL-MCNC: 3.24 MG/DL — HIGH (ref 0.5–1.3)
DIFF PNL FLD: ABNORMAL
EGFR: 20 ML/MIN/1.73M2 — LOW
EGFR: 20 ML/MIN/1.73M2 — LOW
EGFR: 28 ML/MIN/1.73M2 — LOW
EGFR: 28 ML/MIN/1.73M2 — LOW
EGFR: 42 ML/MIN/1.73M2 — LOW
EGFR: 42 ML/MIN/1.73M2 — LOW
EOSINOPHIL # BLD AUTO: 0 K/UL — SIGNIFICANT CHANGE UP (ref 0–0.5)
EOSINOPHIL NFR BLD AUTO: 0 % — SIGNIFICANT CHANGE UP (ref 0–6)
FIBRINOGEN PPP-MCNC: 256 MG/DL — SIGNIFICANT CHANGE UP (ref 200–445)
GAS PNL BLDA: SIGNIFICANT CHANGE UP
GAS PNL BLDMV: SIGNIFICANT CHANGE UP
GAS PNL BLDV: SIGNIFICANT CHANGE UP
GLUCOSE BLDC GLUCOMTR-MCNC: 102 MG/DL — HIGH (ref 70–99)
GLUCOSE BLDC GLUCOMTR-MCNC: 107 MG/DL — HIGH (ref 70–99)
GLUCOSE BLDC GLUCOMTR-MCNC: 110 MG/DL — HIGH (ref 70–99)
GLUCOSE BLDC GLUCOMTR-MCNC: 118 MG/DL — HIGH (ref 70–99)
GLUCOSE BLDC GLUCOMTR-MCNC: 120 MG/DL — HIGH (ref 70–99)
GLUCOSE BLDC GLUCOMTR-MCNC: 123 MG/DL — HIGH (ref 70–99)
GLUCOSE BLDC GLUCOMTR-MCNC: 130 MG/DL — HIGH (ref 70–99)
GLUCOSE BLDC GLUCOMTR-MCNC: 135 MG/DL — HIGH (ref 70–99)
GLUCOSE BLDC GLUCOMTR-MCNC: 137 MG/DL — HIGH (ref 70–99)
GLUCOSE BLDC GLUCOMTR-MCNC: 147 MG/DL — HIGH (ref 70–99)
GLUCOSE BLDC GLUCOMTR-MCNC: 153 MG/DL — HIGH (ref 70–99)
GLUCOSE BLDC GLUCOMTR-MCNC: 155 MG/DL — HIGH (ref 70–99)
GLUCOSE BLDC GLUCOMTR-MCNC: 160 MG/DL — HIGH (ref 70–99)
GLUCOSE BLDC GLUCOMTR-MCNC: 161 MG/DL — HIGH (ref 70–99)
GLUCOSE BLDC GLUCOMTR-MCNC: 163 MG/DL — HIGH (ref 70–99)
GLUCOSE BLDC GLUCOMTR-MCNC: 170 MG/DL — HIGH (ref 70–99)
GLUCOSE BLDC GLUCOMTR-MCNC: 176 MG/DL — HIGH (ref 70–99)
GLUCOSE BLDC GLUCOMTR-MCNC: 184 MG/DL — HIGH (ref 70–99)
GLUCOSE SERPL-MCNC: 136 MG/DL — HIGH (ref 70–99)
GLUCOSE SERPL-MCNC: 152 MG/DL — HIGH (ref 70–99)
GLUCOSE SERPL-MCNC: 161 MG/DL — HIGH (ref 70–99)
GLUCOSE UR QL: NEGATIVE MG/DL — SIGNIFICANT CHANGE UP
HCO3 BLDMV-SCNC: 22 MMOL/L — SIGNIFICANT CHANGE UP (ref 20–28)
HCO3 BLDMV-SCNC: 23 MMOL/L — SIGNIFICANT CHANGE UP (ref 20–28)
HCO3 BLDMV-SCNC: 24 MMOL/L — SIGNIFICANT CHANGE UP (ref 20–28)
HCO3 BLDMV-SCNC: 25 MMOL/L — SIGNIFICANT CHANGE UP (ref 20–28)
HCO3 BLDMV-SCNC: 25 MMOL/L — SIGNIFICANT CHANGE UP (ref 20–28)
HCO3 BLDMV-SCNC: 26 MMOL/L — SIGNIFICANT CHANGE UP (ref 20–28)
HCO3 BLDMV-SCNC: 26 MMOL/L — SIGNIFICANT CHANGE UP (ref 20–28)
HCO3 BLDMV-SCNC: 27 MMOL/L — SIGNIFICANT CHANGE UP (ref 20–28)
HCO3 BLDMV-SCNC: 28 MMOL/L — SIGNIFICANT CHANGE UP (ref 20–28)
HCO3 BLDMV-SCNC: 29 MMOL/L — HIGH (ref 20–28)
HCT VFR BLD CALC: 27.2 % — LOW (ref 39–50)
HCT VFR BLD CALC: 30.4 % — LOW (ref 39–50)
HCT VFR BLD CALC: 32.5 % — LOW (ref 39–50)
HGB BLD-MCNC: 10.6 G/DL — LOW (ref 13–17)
HGB BLD-MCNC: 10.9 G/DL — LOW (ref 13–17)
HGB BLD-MCNC: 9 G/DL — LOW (ref 13–17)
HOROWITZ INDEX BLDMV+IHG-RTO: 100 — SIGNIFICANT CHANGE UP
HOROWITZ INDEX BLDMV+IHG-RTO: 40 — SIGNIFICANT CHANGE UP
HOROWITZ INDEX BLDMV+IHG-RTO: 60 — SIGNIFICANT CHANGE UP
IMM GRANULOCYTES NFR BLD AUTO: 0.6 % — SIGNIFICANT CHANGE UP (ref 0–0.9)
IMM GRANULOCYTES NFR BLD AUTO: 0.8 % — SIGNIFICANT CHANGE UP (ref 0–0.9)
INR BLD: 1.34 RATIO — HIGH (ref 0.85–1.16)
INR BLD: 1.41 RATIO — HIGH (ref 0.85–1.16)
INR BLD: 1.6 RATIO — HIGH (ref 0.85–1.16)
KETONES UR-MCNC: NEGATIVE MG/DL — SIGNIFICANT CHANGE UP
LEUKOCYTE ESTERASE UR-ACNC: ABNORMAL
LYMPHOCYTES # BLD AUTO: 0.38 K/UL — LOW (ref 1–3.3)
LYMPHOCYTES # BLD AUTO: 0.69 K/UL — LOW (ref 1–3.3)
LYMPHOCYTES # BLD AUTO: 0.77 K/UL — LOW (ref 1–3.3)
LYMPHOCYTES # BLD AUTO: 3.2 % — LOW (ref 13–44)
LYMPHOCYTES # BLD AUTO: 3.5 % — LOW (ref 13–44)
LYMPHOCYTES # BLD AUTO: 4.1 % — LOW (ref 13–44)
MAGNESIUM SERPL-MCNC: 2.3 MG/DL — SIGNIFICANT CHANGE UP (ref 1.6–2.6)
MAGNESIUM SERPL-MCNC: 2.5 MG/DL — SIGNIFICANT CHANGE UP (ref 1.6–2.6)
MAGNESIUM SERPL-MCNC: 2.6 MG/DL — SIGNIFICANT CHANGE UP (ref 1.6–2.6)
MANUAL SMEAR VERIFICATION: SIGNIFICANT CHANGE UP
MCHC RBC-ENTMCNC: 29.2 PG — SIGNIFICANT CHANGE UP (ref 27–34)
MCHC RBC-ENTMCNC: 29.4 PG — SIGNIFICANT CHANGE UP (ref 27–34)
MCHC RBC-ENTMCNC: 29.9 PG — SIGNIFICANT CHANGE UP (ref 27–34)
MCHC RBC-ENTMCNC: 33.1 G/DL — SIGNIFICANT CHANGE UP (ref 32–36)
MCHC RBC-ENTMCNC: 33.5 G/DL — SIGNIFICANT CHANGE UP (ref 32–36)
MCHC RBC-ENTMCNC: 34.9 G/DL — SIGNIFICANT CHANGE UP (ref 32–36)
MCV RBC AUTO: 85.9 FL — SIGNIFICANT CHANGE UP (ref 80–100)
MCV RBC AUTO: 87.6 FL — SIGNIFICANT CHANGE UP (ref 80–100)
MCV RBC AUTO: 88.3 FL — SIGNIFICANT CHANGE UP (ref 80–100)
METAMYELOCYTES # FLD: 0.9 % — HIGH (ref 0–0)
METAMYELOCYTES NFR BLD: 0.9 % — HIGH (ref 0–0)
MONOCYTES # BLD AUTO: 0.9 K/UL — SIGNIFICANT CHANGE UP (ref 0–0.9)
MONOCYTES # BLD AUTO: 1.16 K/UL — HIGH (ref 0–0.9)
MONOCYTES # BLD AUTO: 1.38 K/UL — HIGH (ref 0–0.9)
MONOCYTES NFR BLD AUTO: 5.3 % — SIGNIFICANT CHANGE UP (ref 2–14)
MONOCYTES NFR BLD AUTO: 7.6 % — SIGNIFICANT CHANGE UP (ref 2–14)
MONOCYTES NFR BLD AUTO: 8.1 % — SIGNIFICANT CHANGE UP (ref 2–14)
NEUTROPHILS # BLD AUTO: 10.51 K/UL — HIGH (ref 1.8–7.4)
NEUTROPHILS # BLD AUTO: 14.83 K/UL — HIGH (ref 1.8–7.4)
NEUTROPHILS # BLD AUTO: 19.77 K/UL — HIGH (ref 1.8–7.4)
NEUTROPHILS NFR BLD AUTO: 87.1 % — HIGH (ref 43–77)
NEUTROPHILS NFR BLD AUTO: 88.3 % — HIGH (ref 43–77)
NEUTROPHILS NFR BLD AUTO: 88.5 % — HIGH (ref 43–77)
NEUTS BAND # BLD: 1.8 % — SIGNIFICANT CHANGE UP (ref 0–8)
NEUTS BAND NFR BLD: 1.8 % — SIGNIFICANT CHANGE UP (ref 0–8)
NITRITE UR-MCNC: NEGATIVE — SIGNIFICANT CHANGE UP
NRBC BLD AUTO-RTO: 0 /100 WBCS — SIGNIFICANT CHANGE UP (ref 0–0)
NRBC BLD AUTO-RTO: 0 /100 WBCS — SIGNIFICANT CHANGE UP (ref 0–0)
O2 CT VFR BLD CALC: 39 MMHG — SIGNIFICANT CHANGE UP (ref 30–65)
O2 CT VFR BLD CALC: 40 MMHG — SIGNIFICANT CHANGE UP (ref 30–65)
O2 CT VFR BLD CALC: 40 MMHG — SIGNIFICANT CHANGE UP (ref 30–65)
O2 CT VFR BLD CALC: 41 MMHG — SIGNIFICANT CHANGE UP (ref 30–65)
O2 CT VFR BLD CALC: 42 MMHG — SIGNIFICANT CHANGE UP (ref 30–65)
O2 CT VFR BLD CALC: 43 MMHG — SIGNIFICANT CHANGE UP (ref 30–65)
O2 CT VFR BLD CALC: 44 MMHG — SIGNIFICANT CHANGE UP (ref 30–65)
O2 CT VFR BLD CALC: 45 MMHG — SIGNIFICANT CHANGE UP (ref 30–65)
O2 CT VFR BLD CALC: 46 MMHG — SIGNIFICANT CHANGE UP (ref 30–65)
O2 CT VFR BLD CALC: 48 MMHG — SIGNIFICANT CHANGE UP (ref 30–65)
OSMOLALITY UR: 345 MOS/KG — SIGNIFICANT CHANGE UP (ref 300–900)
PCO2 BLDMV: 41 MMHG — SIGNIFICANT CHANGE UP (ref 30–65)
PCO2 BLDMV: 44 MMHG — SIGNIFICANT CHANGE UP (ref 30–65)
PCO2 BLDMV: 45 MMHG — SIGNIFICANT CHANGE UP (ref 30–65)
PCO2 BLDMV: 46 MMHG — SIGNIFICANT CHANGE UP (ref 30–65)
PCO2 BLDMV: 46 MMHG — SIGNIFICANT CHANGE UP (ref 30–65)
PCO2 BLDMV: 47 MMHG — SIGNIFICANT CHANGE UP (ref 30–65)
PCO2 BLDMV: 47 MMHG — SIGNIFICANT CHANGE UP (ref 30–65)
PCO2 BLDMV: 49 MMHG — SIGNIFICANT CHANGE UP (ref 30–65)
PCO2 BLDMV: 50 MMHG — SIGNIFICANT CHANGE UP (ref 30–65)
PCO2 BLDMV: 50 MMHG — SIGNIFICANT CHANGE UP (ref 30–65)
PCO2 BLDMV: 51 MMHG — SIGNIFICANT CHANGE UP (ref 30–65)
PCO2 BLDMV: 52 MMHG — SIGNIFICANT CHANGE UP (ref 30–65)
PCO2 BLDMV: 63 MMHG — SIGNIFICANT CHANGE UP (ref 30–65)
PH BLDMV: 7.24 — LOW (ref 7.32–7.45)
PH BLDMV: 7.28 — LOW (ref 7.32–7.45)
PH BLDMV: 7.29 — LOW (ref 7.32–7.45)
PH BLDMV: 7.29 — LOW (ref 7.32–7.45)
PH BLDMV: 7.32 — SIGNIFICANT CHANGE UP (ref 7.32–7.45)
PH BLDMV: 7.32 — SIGNIFICANT CHANGE UP (ref 7.32–7.45)
PH BLDMV: 7.33 — SIGNIFICANT CHANGE UP (ref 7.32–7.45)
PH BLDMV: 7.34 — SIGNIFICANT CHANGE UP (ref 7.32–7.45)
PH BLDMV: 7.34 — SIGNIFICANT CHANGE UP (ref 7.32–7.45)
PH BLDMV: 7.35 — SIGNIFICANT CHANGE UP (ref 7.32–7.45)
PH BLDMV: 7.36 — SIGNIFICANT CHANGE UP (ref 7.32–7.45)
PH BLDMV: 7.36 — SIGNIFICANT CHANGE UP (ref 7.32–7.45)
PH UR: 5 — SIGNIFICANT CHANGE UP (ref 5–8)
PHOSPHATE SERPL-MCNC: 3.7 MG/DL — SIGNIFICANT CHANGE UP (ref 2.5–4.5)
PHOSPHATE SERPL-MCNC: 3.7 MG/DL — SIGNIFICANT CHANGE UP (ref 2.5–4.5)
PHOSPHATE SERPL-MCNC: 4.3 MG/DL — SIGNIFICANT CHANGE UP (ref 2.5–4.5)
PLAT MORPH BLD: NORMAL — SIGNIFICANT CHANGE UP
PLATELET # BLD AUTO: 174 K/UL — SIGNIFICANT CHANGE UP (ref 150–400)
PLATELET # BLD AUTO: 91 K/UL — LOW (ref 150–400)
PLATELET # BLD AUTO: 97 K/UL — LOW (ref 150–400)
POIKILOCYTOSIS BLD QL AUTO: SLIGHT — SIGNIFICANT CHANGE UP
POTASSIUM BLDA-SCNC: 4.6 MMOL/L — SIGNIFICANT CHANGE UP (ref 3.5–5.1)
POTASSIUM SERPL-MCNC: 3.8 MMOL/L — SIGNIFICANT CHANGE UP (ref 3.5–5.3)
POTASSIUM SERPL-MCNC: 4.4 MMOL/L — SIGNIFICANT CHANGE UP (ref 3.5–5.3)
POTASSIUM SERPL-MCNC: 5.1 MMOL/L — SIGNIFICANT CHANGE UP (ref 3.5–5.3)
POTASSIUM SERPL-SCNC: 3.8 MMOL/L — SIGNIFICANT CHANGE UP (ref 3.5–5.3)
POTASSIUM SERPL-SCNC: 4.4 MMOL/L — SIGNIFICANT CHANGE UP (ref 3.5–5.3)
POTASSIUM SERPL-SCNC: 5.1 MMOL/L — SIGNIFICANT CHANGE UP (ref 3.5–5.3)
POTASSIUM UR-SCNC: 72 MMOL/L — SIGNIFICANT CHANGE UP
PROT ?TM UR-MCNC: 146 MG/DL — HIGH (ref 0–12)
PROT SERPL-MCNC: 5.6 G/DL — LOW (ref 6–8.3)
PROT SERPL-MCNC: 6.4 G/DL — SIGNIFICANT CHANGE UP (ref 6–8.3)
PROT SERPL-MCNC: 6.5 G/DL — SIGNIFICANT CHANGE UP (ref 6–8.3)
PROT UR-MCNC: 300 MG/DL
PROT/CREAT UR-RTO: 3.2 RATIO — HIGH (ref 0–0.2)
PROTHROM AB SERPL-ACNC: 15.4 SEC — HIGH (ref 9.9–13.4)
PROTHROM AB SERPL-ACNC: 16 SEC — HIGH (ref 9.9–13.4)
PROTHROM AB SERPL-ACNC: 18.3 SEC — HIGH (ref 9.9–13.4)
RBC # BLD: 3.08 M/UL — LOW (ref 4.2–5.8)
RBC # BLD: 3.54 M/UL — LOW (ref 4.2–5.8)
RBC # BLD: 3.71 M/UL — LOW (ref 4.2–5.8)
RBC # FLD: 14 % — SIGNIFICANT CHANGE UP (ref 10.3–14.5)
RBC # FLD: 14.2 % — SIGNIFICANT CHANGE UP (ref 10.3–14.5)
RBC # FLD: 14.6 % — HIGH (ref 10.3–14.5)
RBC BLD AUTO: SIGNIFICANT CHANGE UP
RBC CASTS # UR COMP ASSIST: 914 /HPF — HIGH (ref 0–4)
REVIEW: SIGNIFICANT CHANGE UP
SAO2 % BLDMV: 57 — LOW (ref 60–90)
SAO2 % BLDMV: 62.1 — SIGNIFICANT CHANGE UP (ref 60–90)
SAO2 % BLDMV: 64.4 — SIGNIFICANT CHANGE UP (ref 60–90)
SAO2 % BLDMV: 65.7 — SIGNIFICANT CHANGE UP (ref 60–90)
SAO2 % BLDMV: 66 — SIGNIFICANT CHANGE UP (ref 60–90)
SAO2 % BLDMV: 66.6 — SIGNIFICANT CHANGE UP (ref 60–90)
SAO2 % BLDMV: 67.5 — SIGNIFICANT CHANGE UP (ref 60–90)
SAO2 % BLDMV: 68.5 — SIGNIFICANT CHANGE UP (ref 60–90)
SAO2 % BLDMV: 69 — SIGNIFICANT CHANGE UP (ref 60–90)
SAO2 % BLDMV: 69 — SIGNIFICANT CHANGE UP (ref 60–90)
SAO2 % BLDMV: 69.2 — SIGNIFICANT CHANGE UP (ref 60–90)
SAO2 % BLDMV: 71.4 — SIGNIFICANT CHANGE UP (ref 60–90)
SAO2 % BLDMV: 74.8 — SIGNIFICANT CHANGE UP (ref 60–90)
SAO2 % BLDMV: 75.2 — SIGNIFICANT CHANGE UP (ref 60–90)
SAO2 % BLDMV: 75.4 — SIGNIFICANT CHANGE UP (ref 60–90)
SODIUM SERPL-SCNC: 139 MMOL/L — SIGNIFICANT CHANGE UP (ref 135–145)
SODIUM SERPL-SCNC: 141 MMOL/L — SIGNIFICANT CHANGE UP (ref 135–145)
SODIUM SERPL-SCNC: 143 MMOL/L — SIGNIFICANT CHANGE UP (ref 135–145)
SODIUM UR-SCNC: 29 MMOL/L — SIGNIFICANT CHANGE UP
SP GR SPEC: 1.02 — SIGNIFICANT CHANGE UP (ref 1–1.03)
SQUAMOUS # UR AUTO: 1 /HPF — SIGNIFICANT CHANGE UP (ref 0–5)
UROBILINOGEN FLD QL: 0.2 MG/DL — SIGNIFICANT CHANGE UP (ref 0.2–1)
UUN UR-MCNC: 81 MG/DL — SIGNIFICANT CHANGE UP
VANCOMYCIN TROUGH SERPL-MCNC: 10.4 UG/ML — SIGNIFICANT CHANGE UP (ref 10–20)
VANCOMYCIN TROUGH SERPL-MCNC: 11.7 UG/ML — SIGNIFICANT CHANGE UP (ref 10–20)
WBC # BLD: 11.9 K/UL — HIGH (ref 3.8–10.5)
WBC # BLD: 17.02 K/UL — HIGH (ref 3.8–10.5)
WBC # BLD: 21.89 K/UL — HIGH (ref 3.8–10.5)
WBC # FLD AUTO: 11.9 K/UL — HIGH (ref 3.8–10.5)
WBC # FLD AUTO: 17.02 K/UL — HIGH (ref 3.8–10.5)
WBC # FLD AUTO: 21.89 K/UL — HIGH (ref 3.8–10.5)
WBC UR QL: 3 /HPF — SIGNIFICANT CHANGE UP (ref 0–5)

## 2025-04-29 PROCEDURE — 99291 CRITICAL CARE FIRST HOUR: CPT

## 2025-04-29 PROCEDURE — G0545: CPT

## 2025-04-29 PROCEDURE — 71045 X-RAY EXAM CHEST 1 VIEW: CPT | Mod: 26

## 2025-04-29 PROCEDURE — 76770 US EXAM ABDO BACK WALL COMP: CPT | Mod: 26

## 2025-04-29 PROCEDURE — 99233 SBSQ HOSP IP/OBS HIGH 50: CPT

## 2025-04-29 RX ORDER — MILRINONE LACTATE 1 MG/ML
0.25 INJECTION, SOLUTION INTRAVENOUS
Qty: 20 | Refills: 0 | Status: DISCONTINUED | OUTPATIENT
Start: 2025-04-29 | End: 2025-04-30

## 2025-04-29 RX ORDER — METHYLPREDNISOLONE ACETATE 80 MG/ML
125 INJECTION, SUSPENSION INTRA-ARTICULAR; INTRALESIONAL; INTRAMUSCULAR; SOFT TISSUE EVERY 8 HOURS
Refills: 0 | Status: COMPLETED | OUTPATIENT
Start: 2025-04-29 | End: 2025-04-29

## 2025-04-29 RX ORDER — PENICILLIN G BENZATHINE 2.4MM/4ML
2.4 SYRINGE (ML) INTRAMUSCULAR
Refills: 0 | Status: DISCONTINUED | OUTPATIENT
Start: 2025-05-01 | End: 2025-05-14

## 2025-04-29 RX ORDER — ALBUMIN (HUMAN) 12.5 G/50ML
100 INJECTION, SOLUTION INTRAVENOUS ONCE
Refills: 0 | Status: COMPLETED | OUTPATIENT
Start: 2025-04-29 | End: 2025-04-29

## 2025-04-29 RX ORDER — HYDROMORPHONE/SOD CHLOR,ISO/PF 2 MG/10 ML
0.5 SYRINGE (ML) INJECTION ONCE
Refills: 0 | Status: DISCONTINUED | OUTPATIENT
Start: 2025-04-29 | End: 2025-04-29

## 2025-04-29 RX ORDER — DEXTROSE 50 % IN WATER 50 %
50 SYRINGE (ML) INTRAVENOUS
Refills: 0 | Status: DISCONTINUED | OUTPATIENT
Start: 2025-04-29 | End: 2025-05-14

## 2025-04-29 RX ORDER — METHYLPREDNISOLONE ACETATE 80 MG/ML
16 INJECTION, SUSPENSION INTRA-ARTICULAR; INTRALESIONAL; INTRAMUSCULAR; SOFT TISSUE EVERY 12 HOURS
Refills: 0 | Status: DISCONTINUED | OUTPATIENT
Start: 2025-05-06 | End: 2025-05-06

## 2025-04-29 RX ORDER — METHYLPREDNISOLONE ACETATE 80 MG/ML
40 INJECTION, SUSPENSION INTRA-ARTICULAR; INTRALESIONAL; INTRAMUSCULAR; SOFT TISSUE EVERY 12 HOURS
Refills: 0 | Status: COMPLETED | OUTPATIENT
Start: 2025-04-30 | End: 2025-05-01

## 2025-04-29 RX ORDER — BUMETANIDE 1 MG/1
4 TABLET ORAL
Qty: 20 | Refills: 0 | Status: DISCONTINUED | OUTPATIENT
Start: 2025-04-29 | End: 2025-04-30

## 2025-04-29 RX ORDER — CEFEPIME 2 G/20ML
1000 INJECTION, POWDER, FOR SOLUTION INTRAVENOUS EVERY 8 HOURS
Refills: 0 | Status: COMPLETED | OUTPATIENT
Start: 2025-04-29 | End: 2025-05-01

## 2025-04-29 RX ORDER — CHLOROTHIAZIDE 250 MG/1
1000 TABLET ORAL ONCE
Refills: 0 | Status: COMPLETED | OUTPATIENT
Start: 2025-04-29 | End: 2025-04-29

## 2025-04-29 RX ORDER — TACROLIMUS 0.5 MG/1
1 CAPSULE ORAL
Refills: 0 | Status: DISCONTINUED | OUTPATIENT
Start: 2025-04-29 | End: 2025-05-01

## 2025-04-29 RX ORDER — ALBUMIN (HUMAN) 12.5 G/50ML
250 INJECTION, SOLUTION INTRAVENOUS ONCE
Refills: 0 | Status: COMPLETED | OUTPATIENT
Start: 2025-04-29 | End: 2025-04-29

## 2025-04-29 RX ORDER — BUMETANIDE 1 MG/1
2 TABLET ORAL ONCE
Refills: 0 | Status: COMPLETED | OUTPATIENT
Start: 2025-04-29 | End: 2025-04-29

## 2025-04-29 RX ORDER — SODIUM BICARBONATE 1 MEQ/ML
50 SYRINGE (ML) INTRAVENOUS ONCE
Refills: 0 | Status: COMPLETED | OUTPATIENT
Start: 2025-04-29 | End: 2025-04-29

## 2025-04-29 RX ORDER — METHYLPREDNISOLONE ACETATE 80 MG/ML
28 INJECTION, SUSPENSION INTRA-ARTICULAR; INTRALESIONAL; INTRAMUSCULAR; SOFT TISSUE EVERY 12 HOURS
Refills: 0 | Status: COMPLETED | OUTPATIENT
Start: 2025-05-03 | End: 2025-05-04

## 2025-04-29 RX ORDER — METHYLPREDNISOLONE ACETATE 80 MG/ML
36 INJECTION, SUSPENSION INTRA-ARTICULAR; INTRALESIONAL; INTRAMUSCULAR; SOFT TISSUE EVERY 12 HOURS
Refills: 0 | Status: COMPLETED | OUTPATIENT
Start: 2025-05-01 | End: 2025-05-02

## 2025-04-29 RX ORDER — CALCIUM GLUCONATE 20 MG/ML
2 INJECTION, SOLUTION INTRAVENOUS ONCE
Refills: 0 | Status: COMPLETED | OUTPATIENT
Start: 2025-04-29 | End: 2025-04-29

## 2025-04-29 RX ORDER — NOREPINEPHRINE BITARTRATE 8 MG
0.05 SOLUTION INTRAVENOUS
Qty: 8 | Refills: 0 | Status: DISCONTINUED | OUTPATIENT
Start: 2025-04-29 | End: 2025-05-01

## 2025-04-29 RX ORDER — HEPARIN SODIUM 1000 [USP'U]/ML
300 INJECTION INTRAVENOUS; SUBCUTANEOUS
Qty: 10000 | Refills: 0 | Status: DISCONTINUED | OUTPATIENT
Start: 2025-04-29 | End: 2025-04-30

## 2025-04-29 RX ORDER — VASOPRESSIN 20 [USP'U]/ML
0.1 INJECTION INTRAVENOUS
Qty: 40 | Refills: 0 | Status: DISCONTINUED | OUTPATIENT
Start: 2025-04-29 | End: 2025-05-01

## 2025-04-29 RX ORDER — METHYLPREDNISOLONE ACETATE 80 MG/ML
20 INJECTION, SUSPENSION INTRA-ARTICULAR; INTRALESIONAL; INTRAMUSCULAR; SOFT TISSUE EVERY 12 HOURS
Refills: 0 | Status: COMPLETED | OUTPATIENT
Start: 2025-05-05 | End: 2025-05-06

## 2025-04-29 RX ORDER — DEXMEDETOMIDINE HYDROCHLORIDE IN SODIUM CHLORIDE 4 UG/ML
0.5 INJECTION INTRAVENOUS
Qty: 200 | Refills: 0 | Status: DISCONTINUED | OUTPATIENT
Start: 2025-04-29 | End: 2025-04-30

## 2025-04-29 RX ORDER — METHOCARBAMOL 500 MG/1
1000 TABLET, FILM COATED ORAL EVERY 8 HOURS
Refills: 0 | Status: COMPLETED | OUTPATIENT
Start: 2025-04-29 | End: 2025-05-01

## 2025-04-29 RX ORDER — METHYLPREDNISOLONE ACETATE 80 MG/ML
24 INJECTION, SUSPENSION INTRA-ARTICULAR; INTRALESIONAL; INTRAMUSCULAR; SOFT TISSUE EVERY 12 HOURS
Refills: 0 | Status: COMPLETED | OUTPATIENT
Start: 2025-05-04 | End: 2025-05-05

## 2025-04-29 RX ORDER — BUMETANIDE 1 MG/1
1 TABLET ORAL ONCE
Refills: 0 | Status: DISCONTINUED | OUTPATIENT
Start: 2025-04-29 | End: 2025-04-29

## 2025-04-29 RX ORDER — CEFTRIAXONE 500 MG/1
2000 INJECTION, POWDER, FOR SOLUTION INTRAMUSCULAR; INTRAVENOUS
Refills: 0 | Status: COMPLETED | OUTPATIENT
Start: 2025-05-02 | End: 2025-05-12

## 2025-04-29 RX ORDER — BUMETANIDE 1 MG/1
1 TABLET ORAL ONCE
Refills: 0 | Status: COMPLETED | OUTPATIENT
Start: 2025-04-29 | End: 2025-04-29

## 2025-04-29 RX ORDER — METHYLPREDNISOLONE ACETATE 80 MG/ML
32 INJECTION, SUSPENSION INTRA-ARTICULAR; INTRALESIONAL; INTRAMUSCULAR; SOFT TISSUE EVERY 12 HOURS
Refills: 0 | Status: COMPLETED | OUTPATIENT
Start: 2025-05-02 | End: 2025-05-03

## 2025-04-29 RX ORDER — DEXTROSE 50 % IN WATER 50 %
25 SYRINGE (ML) INTRAVENOUS
Refills: 0 | Status: DISCONTINUED | OUTPATIENT
Start: 2025-04-29 | End: 2025-05-14

## 2025-04-29 RX ORDER — SUGAMMADEX 100 MG/ML
200 INJECTION, SOLUTION INTRAVENOUS ONCE
Refills: 0 | Status: COMPLETED | OUTPATIENT
Start: 2025-04-29 | End: 2025-04-29

## 2025-04-29 RX ORDER — HYDROMORPHONE/SOD CHLOR,ISO/PF 2 MG/10 ML
0.25 SYRINGE (ML) INJECTION ONCE
Refills: 0 | Status: DISCONTINUED | OUTPATIENT
Start: 2025-04-29 | End: 2025-04-29

## 2025-04-29 RX ORDER — FENTANYL CITRATE-0.9 % NACL/PF 100MCG/2ML
25 SYRINGE (ML) INTRAVENOUS ONCE
Refills: 0 | Status: DISCONTINUED | OUTPATIENT
Start: 2025-04-29 | End: 2025-04-29

## 2025-04-29 RX ORDER — VANCOMYCIN HCL IN 5 % DEXTROSE 1.5G/250ML
500 PLASTIC BAG, INJECTION (ML) INTRAVENOUS EVERY 12 HOURS
Refills: 0 | Status: COMPLETED | OUTPATIENT
Start: 2025-04-29 | End: 2025-05-01

## 2025-04-29 RX ORDER — DOBUTAMINE 250 MG/20ML
1.5 INJECTION INTRAVENOUS
Qty: 500 | Refills: 0 | Status: DISCONTINUED | OUTPATIENT
Start: 2025-04-29 | End: 2025-05-06

## 2025-04-29 RX ORDER — CALCIUM CHLORIDE 100 MG/ML
1000 INJECTION, SOLUTION INTRAVENOUS ONCE
Refills: 0 | Status: COMPLETED | OUTPATIENT
Start: 2025-04-29 | End: 2025-04-29

## 2025-04-29 RX ADMIN — Medication 500 MILLIGRAM(S): at 23:38

## 2025-04-29 RX ADMIN — Medication 0.5 MILLIGRAM(S): at 15:15

## 2025-04-29 RX ADMIN — Medication 100 MILLIGRAM(S): at 18:06

## 2025-04-29 RX ADMIN — TRAMADOL HYDROCHLORIDE 25 MILLIGRAM(S): 50 TABLET, FILM COATED ORAL at 23:38

## 2025-04-29 RX ADMIN — Medication 1 APPLICATION(S): at 11:32

## 2025-04-29 RX ADMIN — BUMETANIDE 1 MILLIGRAM(S): 1 TABLET ORAL at 13:25

## 2025-04-29 RX ADMIN — Medication 50 MILLIEQUIVALENT(S): at 00:30

## 2025-04-29 RX ADMIN — Medication 25 MICROGRAM(S): at 13:18

## 2025-04-29 RX ADMIN — Medication 0.5 MILLIGRAM(S): at 02:35

## 2025-04-29 RX ADMIN — LIDOCAINE HYDROCHLORIDE 3 PATCH: 20 JELLY TOPICAL at 18:31

## 2025-04-29 RX ADMIN — Medication 0.25 MILLIGRAM(S): at 17:53

## 2025-04-29 RX ADMIN — Medication 0.5 MILLIGRAM(S): at 15:30

## 2025-04-29 RX ADMIN — Medication 100 MILLIGRAM(S): at 23:38

## 2025-04-29 RX ADMIN — Medication 25 MICROGRAM(S): at 13:33

## 2025-04-29 RX ADMIN — METHOCARBAMOL 220 MILLIGRAM(S): 500 TABLET, FILM COATED ORAL at 22:09

## 2025-04-29 RX ADMIN — BUMETANIDE 1 MILLIGRAM(S): 1 TABLET ORAL at 02:16

## 2025-04-29 RX ADMIN — Medication 100 MILLIGRAM(S): at 01:57

## 2025-04-29 RX ADMIN — MILRINONE LACTATE 6.13 MICROGRAM(S)/KG/MIN: 1 INJECTION, SOLUTION INTRAVENOUS at 17:37

## 2025-04-29 RX ADMIN — MYCOPHENOLATE MOFETIL 83.34 MILLIGRAM(S): 500 TABLET, FILM COATED ORAL at 20:40

## 2025-04-29 RX ADMIN — CHLOROTHIAZIDE 100 MILLIGRAM(S): 250 TABLET ORAL at 14:51

## 2025-04-29 RX ADMIN — GABAPENTIN 100 MILLIGRAM(S): 400 CAPSULE ORAL at 13:47

## 2025-04-29 RX ADMIN — METHYLPREDNISOLONE ACETATE 125 MILLIGRAM(S): 80 INJECTION, SUSPENSION INTRA-ARTICULAR; INTRALESIONAL; INTRAMUSCULAR; SOFT TISSUE at 13:57

## 2025-04-29 RX ADMIN — ALBUMIN (HUMAN) 50 MILLILITER(S): 12.5 INJECTION, SOLUTION INTRAVENOUS at 13:26

## 2025-04-29 RX ADMIN — GABAPENTIN 100 MILLIGRAM(S): 400 CAPSULE ORAL at 23:38

## 2025-04-29 RX ADMIN — Medication 10 MILLILITER(S): at 07:22

## 2025-04-29 RX ADMIN — CALCIUM CHLORIDE 1000 MILLIGRAM(S): 100 INJECTION, SOLUTION INTRAVENOUS at 02:24

## 2025-04-29 RX ADMIN — LIDOCAINE HYDROCHLORIDE 3 PATCH: 20 JELLY TOPICAL at 11:30

## 2025-04-29 RX ADMIN — DEXMEDETOMIDINE HYDROCHLORIDE IN SODIUM CHLORIDE 10.2 MICROGRAM(S)/KG/HR: 4 INJECTION INTRAVENOUS at 07:23

## 2025-04-29 RX ADMIN — METHYLPREDNISOLONE ACETATE 125 MILLIGRAM(S): 80 INJECTION, SUSPENSION INTRA-ARTICULAR; INTRALESIONAL; INTRAMUSCULAR; SOFT TISSUE at 06:04

## 2025-04-29 RX ADMIN — CEFEPIME 100 MILLIGRAM(S): 2 INJECTION, POWDER, FOR SOLUTION INTRAVENOUS at 20:41

## 2025-04-29 RX ADMIN — TRAMADOL HYDROCHLORIDE 25 MILLIGRAM(S): 50 TABLET, FILM COATED ORAL at 14:17

## 2025-04-29 RX ADMIN — Medication 15 MILLILITER(S): at 06:04

## 2025-04-29 RX ADMIN — ALBUMIN (HUMAN) 50 MILLILITER(S): 12.5 INJECTION, SOLUTION INTRAVENOUS at 07:46

## 2025-04-29 RX ADMIN — CEFEPIME 100 MILLIGRAM(S): 2 INJECTION, POWDER, FOR SOLUTION INTRAVENOUS at 05:00

## 2025-04-29 RX ADMIN — CALCIUM GLUCONATE 200 GRAM(S): 20 INJECTION, SOLUTION INTRAVENOUS at 21:00

## 2025-04-29 RX ADMIN — Medication 0.25 MILLIGRAM(S): at 17:38

## 2025-04-29 RX ADMIN — SUGAMMADEX 200 MILLIGRAM(S): 100 INJECTION, SOLUTION INTRAVENOUS at 02:20

## 2025-04-29 RX ADMIN — VASOPRESSIN 15 UNIT(S)/MIN: 20 INJECTION INTRAVENOUS at 07:21

## 2025-04-29 RX ADMIN — BUMETANIDE 10 MG/HR: 1 TABLET ORAL at 13:47

## 2025-04-29 RX ADMIN — TACROLIMUS 1 MILLIGRAM(S): 0.5 CAPSULE ORAL at 20:46

## 2025-04-29 RX ADMIN — METHOCARBAMOL 500 MILLIGRAM(S): 500 TABLET, FILM COATED ORAL at 13:47

## 2025-04-29 RX ADMIN — Medication 40 MILLIGRAM(S): at 11:32

## 2025-04-29 RX ADMIN — DOBUTAMINE 12.3 MICROGRAM(S)/KG/MIN: 250 INJECTION INTRAVENOUS at 07:22

## 2025-04-29 RX ADMIN — Medication 0.5 MILLIGRAM(S): at 02:20

## 2025-04-29 RX ADMIN — BUMETANIDE 2 MILLIGRAM(S): 1 TABLET ORAL at 14:07

## 2025-04-29 RX ADMIN — LIDOCAINE HYDROCHLORIDE 3 PATCH: 20 JELLY TOPICAL at 23:50

## 2025-04-29 RX ADMIN — CALCIUM GLUCONATE 200 GRAM(S): 20 INJECTION, SOLUTION INTRAVENOUS at 01:20

## 2025-04-29 RX ADMIN — Medication 50 MILLIEQUIVALENT(S): at 21:51

## 2025-04-29 RX ADMIN — ALBUMIN (HUMAN) 125 MILLILITER(S): 12.5 INJECTION, SOLUTION INTRAVENOUS at 02:09

## 2025-04-29 RX ADMIN — ALBUMIN (HUMAN) 125 MILLILITER(S): 12.5 INJECTION, SOLUTION INTRAVENOUS at 01:18

## 2025-04-29 RX ADMIN — TRAMADOL HYDROCHLORIDE 25 MILLIGRAM(S): 50 TABLET, FILM COATED ORAL at 13:47

## 2025-04-29 RX ADMIN — Medication 0.5 MILLIGRAM(S): at 09:30

## 2025-04-29 RX ADMIN — BUMETANIDE 1 MILLIGRAM(S): 1 TABLET ORAL at 07:45

## 2025-04-29 RX ADMIN — MYCOPHENOLATE MOFETIL 83.34 MILLIGRAM(S): 500 TABLET, FILM COATED ORAL at 07:48

## 2025-04-29 RX ADMIN — CEFEPIME 100 MILLIGRAM(S): 2 INJECTION, POWDER, FOR SOLUTION INTRAVENOUS at 11:32

## 2025-04-29 NOTE — BRIEF OPERATIVE NOTE - COMMENTS
*** estimated blood loss not applicable due to use of cardiotomy and cell saver suction ***  CXR not performed prior to transport to CTICU

## 2025-04-29 NOTE — PROGRESS NOTE ADULT - SUBJECTIVE AND OBJECTIVE BOX
Follow Up:      Interval History:    REVIEW OF SYSTEMS  [  ] ROS unobtainable because:    [  ] All other systems negative except as noted below    Constitutional:  [ ] fever [ ] chills  [ ] weight loss  [ ] weakness  Skin:  [ ] rash [ ] phlebitis	  Eyes: [ ] icterus [ ] pain  [ ] discharge	  ENMT: [ ] sore throat  [ ] thrush [ ] ulcers [ ] exudates  Respiratory: [ ] dyspnea [ ] hemoptysis [ ] cough [ ] sputum	  Cardiovascular:  [ ] chest pain [ ] palpitations [ ] edema	  Gastrointestinal:  [ ] nausea [ ] vomiting [ ] diarrhea [ ] constipation [ ] pain	  Genitourinary:  [ ] dysuria [ ] frequency [ ] hematuria [ ] discharge [ ] flank pain  [ ] incontinence  Musculoskeletal:  [ ] myalgias [ ] arthralgias [ ] arthritis  [ ] back pain  Neurological:  [ ] headache [ ] seizures  [ ] confusion/altered mental status    Allergies  No Known Allergies        ANTIMICROBIALS:  cefepime   IVPB 1000 every 8 hours  vancomycin    Solution 125 every 12 hours  vancomycin  IVPB 500 every 12 hours      OTHER MEDS:  MEDICATIONS  (STANDING):  acetaminophen     Tablet .. 500 every 6 hours  buMETAnide Infusion 2 <Continuous>  dexMEDEtomidine Infusion 0.5 <Continuous>  dextrose 50% Injectable 50 every 15 minutes  dextrose 50% Injectable 25 every 15 minutes  DOBUTamine Infusion 6 <Continuous>  gabapentin 100 every 8 hours  HYDROmorphone  Injectable 0.5 once  insulin regular Infusion 3 <Continuous>  methocarbamol 500 every 8 hours  methylPREDNISolone sodium succinate Injectable    methylPREDNISolone sodium succinate Injectable 125 every 8 hours  mycophenolate mofetil IVPB 1000 every 12 hours  norepinephrine Infusion 0.05 <Continuous>  pantoprazole    Tablet 40 before breakfast  pantoprazole  Injectable 40 daily  tacrolimus 1 <User Schedule>  traMADol 25 every 8 hours  traZODone 100 at bedtime  vasopressin Infusion 0.1 <Continuous>      Vital Signs Last 24 Hrs  T(C): 37 (29 Apr 2025 12:00), Max: 37 (29 Apr 2025 12:00)  T(F): 98.6 (29 Apr 2025 12:00), Max: 98.6 (29 Apr 2025 12:00)  HR: 109 (29 Apr 2025 15:00) (109 - 109)  BP: 102/71 (29 Apr 2025 15:00) (80/53 - 105/76)  BP(mean): 80 (29 Apr 2025 15:00) (61 - 86)  RR: 18 (29 Apr 2025 15:00) (12 - 25)  SpO2: 97% (29 Apr 2025 15:00) (82% - 100%)    Parameters below as of 29 Apr 2025 12:00  Patient On (Oxygen Delivery Method): nasal cannula, high flow  O2 Flow (L/min): 40  O2 Concentration (%): 40    PHYSICAL EXAMINATION:  General: Alert and Awake, NAD  HEENT: PERRL, EOMI  Neck: Supple  Cardiac: RRR, No M/R/G  Resp: CTAB, No Wh/Rh/Ra  Abdomen: NBS, NT/ND, No HSM, No rigidity or guarding  MSK: No LE edema. No Calf tenderness  : No nagy  Skin: No rashes or lesions. Skin is warm and dry to the touch.   Neuro: Alert and Awake. CN 2-12 Grossly intact. Moves all four extremities spontaneously.  Psych: Calm, Pleasant, Cooperative                          10.6   11.90 )-----------( 91       ( 29 Apr 2025 09:40 )             30.4       04-29    141  |  101  |  30[H]  ----------------------------<  136[H]  4.4   |  23  |  2.42[H]    Ca    10.8[H]      29 Apr 2025 09:40  Phos  3.7     04-29  Mg     2.5     04-29    TPro  6.4  /  Alb  4.3  /  TBili  3.0[H]  /  DBili  x   /  AST  69[H]  /  ALT  42  /  AlkPhos  53  04-29      Urinalysis Basic - ( 29 Apr 2025 09:40 )    Color: x / Appearance: x / SG: x / pH: x  Gluc: 136 mg/dL / Ketone: x  / Bili: x / Urobili: x   Blood: x / Protein: x / Nitrite: x   Leuk Esterase: x / RBC: x / WBC x   Sq Epi: x / Non Sq Epi: x / Bacteria: x        MICROBIOLOGY:  Vancomycin Level, Trough: 10.4 ug/mL (04-29-25 @ 00:33)  v    CMV IgG Antibody: >10.00 U/mL (04-17-25 @ 15:45)  HIV-1 RNA Quantitative, Viral Load Log: NOT DET. lg /mL (04-17-25 @ 15:45)          RADIOLOGY:    <The imaging below has been reviewed and visualized by me independently. Findings as detailed in report below> Follow Up:  s/p OHT    Interval History: s/p OHT overnight. afebrile. extubated.     REVIEW OF SYSTEMS  [  ] ROS unobtainable because:    [ x ] All other systems negative except as noted below    Constitutional:  [ ] fever [ ] chills  [ ] weight loss  [ ] weakness  Skin:  [ ] rash [ ] phlebitis	  Eyes: [ ] icterus [ ] pain  [ ] discharge	  ENMT: [ ] sore throat  [ ] thrush [ ] ulcers [ ] exudates  Respiratory: [ ] dyspnea [ ] hemoptysis [ ] cough [ ] sputum	  Cardiovascular:  [ x] chest pain [ ] palpitations [ ] edema	  Gastrointestinal:  [ ] nausea [ ] vomiting [ ] diarrhea [ ] constipation [ ] pain	  Genitourinary:  [ ] dysuria [ ] frequency [ ] hematuria [ ] discharge [ ] flank pain  [ ] incontinence  Musculoskeletal:  [ ] myalgias [ ] arthralgias [ ] arthritis  [ ] back pain  Neurological:  [ ] headache [ ] seizures  [ ] confusion/altered mental status    Allergies  No Known Allergies        ANTIMICROBIALS:  cefepime   IVPB 1000 every 8 hours  vancomycin    Solution 125 every 12 hours  vancomycin  IVPB 500 every 12 hours      OTHER MEDS:  MEDICATIONS  (STANDING):  acetaminophen     Tablet .. 500 every 6 hours  buMETAnide Infusion 2 <Continuous>  dexMEDEtomidine Infusion 0.5 <Continuous>  dextrose 50% Injectable 50 every 15 minutes  dextrose 50% Injectable 25 every 15 minutes  DOBUTamine Infusion 6 <Continuous>  gabapentin 100 every 8 hours  HYDROmorphone  Injectable 0.5 once  insulin regular Infusion 3 <Continuous>  methocarbamol 500 every 8 hours  methylPREDNISolone sodium succinate Injectable    methylPREDNISolone sodium succinate Injectable 125 every 8 hours  mycophenolate mofetil IVPB 1000 every 12 hours  norepinephrine Infusion 0.05 <Continuous>  pantoprazole    Tablet 40 before breakfast  pantoprazole  Injectable 40 daily  tacrolimus 1 <User Schedule>  traMADol 25 every 8 hours  traZODone 100 at bedtime  vasopressin Infusion 0.1 <Continuous>      Vital Signs Last 24 Hrs  T(C): 37 (29 Apr 2025 12:00), Max: 37 (29 Apr 2025 12:00)  T(F): 98.6 (29 Apr 2025 12:00), Max: 98.6 (29 Apr 2025 12:00)  HR: 109 (29 Apr 2025 15:00) (109 - 109)  BP: 102/71 (29 Apr 2025 15:00) (80/53 - 105/76)  BP(mean): 80 (29 Apr 2025 15:00) (61 - 86)  RR: 18 (29 Apr 2025 15:00) (12 - 25)  SpO2: 97% (29 Apr 2025 15:00) (82% - 100%)    Parameters below as of 29 Apr 2025 12:00  Patient On (Oxygen Delivery Method): nasal cannula, high flow  O2 Flow (L/min): 40  O2 Concentration (%): 40    PHYSICAL EXAMINATION:  General: Alert and Awake, NAD  Chest: +CT with s/s output  Cardiac: RRR, No M/R/G  Resp: CTAB, No Wh/Rh/Ra  Abdomen: NBS, NT/ND, No HSM, No rigidity or guarding  MSK: No LE edema. No Calf tenderness  Skin: No rashes or lesions. Skin is warm and dry to the touch.   Neuro: Alert and Awake. CN 2-12 Grossly intact. Moves all four extremities spontaneously.  Psych: Calm, Pleasant, Cooperative                          10.6   11.90 )-----------( 91       ( 29 Apr 2025 09:40 )             30.4       04-29    141  |  101  |  30[H]  ----------------------------<  136[H]  4.4   |  23  |  2.42[H]    Ca    10.8[H]      29 Apr 2025 09:40  Phos  3.7     04-29  Mg     2.5     04-29    TPro  6.4  /  Alb  4.3  /  TBili  3.0[H]  /  DBili  x   /  AST  69[H]  /  ALT  42  /  AlkPhos  53  04-29      Urinalysis Basic - ( 29 Apr 2025 09:40 )    Color: x / Appearance: x / SG: x / pH: x  Gluc: 136 mg/dL / Ketone: x  / Bili: x / Urobili: x   Blood: x / Protein: x / Nitrite: x   Leuk Esterase: x / RBC: x / WBC x   Sq Epi: x / Non Sq Epi: x / Bacteria: x        MICROBIOLOGY:  Vancomycin Level, Trough: 10.4 ug/mL (04-29-25 @ 00:33)  v    CMV IgG Antibody: >10.00 U/mL (04-17-25 @ 15:45)  HIV-1 RNA Quantitative, Viral Load Log: NOT DET. lg /mL (04-17-25 @ 15:45)          RADIOLOGY:    <The imaging below has been reviewed and visualized by me independently. Findings as detailed in report below>    < from: Xray Chest 1 View- PORTABLE-Routine (04.29.25 @ 06:27) >  IMPRESSION:  Tubes and lines as above.  No pneumothorax.    < end of copied text >

## 2025-04-29 NOTE — BRIEF OPERATIVE NOTE - BRIEF OP NOTE DRAINS
Right and left pleural chest tubes  Mediastinal substernal chest tube  Mediastinal anjelica drains x2

## 2025-04-29 NOTE — PROGRESS NOTE ADULT - ASSESSMENT
70-year-old male ABO O past medical history of NICM since 2011 HFrEF (LVEF 25-30%) s/p MDT CRT-D with baseline CHB, VT/VF, AFs/p GIANFRANCO ligation/MAZE, MV/TV repair, PVC ablation 3/2024 intolerant to AADs in the past, recent admission 3/19/2025-3/20/2025 for AICD shocks initially presented to the [facility] ED on 3/21/2025, sent by HF specialist for ACID shock. Device reported successful ATP for VT 3/17/2025 at 6:52pm followed by one ATP & 40J shock. He reported feeling dizzy & SOB during the events. He follows with Dr. Luca Tamayo for HF, currently undergoing transplant workup, Dr John for CRTD and VT/VF and Dr. Chu for genetic counseling. While admitted to [facility], he was started on a lidocaine gtt. On 3/21 when lidocaine weaned off patient had another two episodes of VT requiring AICD shocks. He was transferred to [facility] for further management. He was initially maintained on lidocaine gtt from 3/21/2025-3/25/2025 & his listing status was upgraded to UNOS status 2e for heart transplant (ABO O) for the refractory VT. He was transitioned to oral antiarrhythmics (Toprol XL & quinidine) & ultimately transferred from CICU to tele floor on 3/27/2025. Hospital course was further c/b development of watery diarrhea & was found to have +norovirus on 3/31/2025 which prompted deactivation to status 7 listing for heart transplant. Status reinstated to 2E after diarrhea resolved.     He underwent successful OHT on 4/28 and admitted to CTU for close post-op monitoring. Currently on Dobutamine 7.5, vasopressin 0.02, Lico @ 20. On ventilator pressure support mode.     Bedside hemodynamics:  4/29/25 BP 94/57/67, , CVP 11, PA 28/15/20, Gucci CI 2.2  3/22/25 BP 97/76/83, HR 80, CVP 6, PA 58/23/38, PCWP 20, SVR 1100, Gucci CO/CI 5.1/2.6, TD 4/2.08    Cardiac Studies:   STACEY 4/28/25 intraop: LVEF 20%, global, dilated RV with mildly reduced RVSF  TTE 3/20/25 : LVEF 30%, segmental, LVIDd 5.3, walls normal, elevated LV filling pressures, mod RVE with mildly reduced RV function, TAPSE 1.7, severely dilated RA, annuloplasty in MV position, transvalvular gradients are elevated with severe prosthetic MS (peak gradient 15.7, mean gradient 8), trace intravalvular MR, TV annuloplasty ring noted, mild TR,  est PASP 36, no evidence of LV thrombus  TTE 10/2024: LVEF 25-30%, LVEDD 5.9cm, moderately reduced RV function, annuloplasty ring of mitral and tricuspid position, PASP 47 mmHg  RHC 3/19/25- RA 11 PA 58/26 PCWP 32 CO/CI 5.43/2.77  [facility] 6/2023 Nonobstructive CAD 70-year-old male ABO O past medical history of NICM since 2011 HFrEF (LVEF 25-30%) s/p MDT CRT-D with baseline CHB, VT/VF, AFs/p GIANFRANCO ligation/MAZE, MV/TV repair, PVC ablation 3/2024 intolerant to AADs in the past, recent admission 3/19/2025-3/20/2025 for AICD shocks initially presented to the [facility] ED on 3/21/2025, sent by HF specialist for ACID shock. Device reported successful ATP for VT 3/17/2025 at 6:52pm followed by one ATP & 40J shock. He reported feeling dizzy & SOB during the events. He follows with Dr. Luca Tamayo for HF, currently undergoing transplant workup, Dr John for CRTD and VT/VF and Dr. Chu for genetic counseling. While admitted to [facility], he was started on a lidocaine gtt. On 3/21 when lidocaine weaned off patient had another two episodes of VT requiring AICD shocks. He was transferred to [facility] for further management. He was initially maintained on lidocaine gtt from 3/21/2025-3/25/2025 & his listing status was upgraded to UNOS status 2e for heart transplant (ABO O) for the refractory VT. He was transitioned to oral antiarrhythmics (Toprol XL & quinidine) & ultimately transferred from CICU to tele floor on 3/27/2025. Hospital course was further c/b development of watery diarrhea & was found to have +norovirus on 3/31/2025 which prompted deactivation to status 7 listing for heart transplant. Status reinstated to 2E after diarrhea resolved.     He underwent successful OHT on 4/28 (CMV +/+, Toxo -/+,ischemic time 258min, intra op 2plts + 2 cryo).  Now admitted to CTU for close post-op care. Currently on Dobutamine 7.5, vasopressin 0.02, Lico @ 20. On ventilator pressure support mode.     Bedside hemodynamics:  4/29/25 BP 94/57/67, , CVP 11, PA 28/15/20, Gucci CI 2.2  3/22/25 BP 97/76/83, HR 80, CVP 6, PA 58/23/38, PCWP 20, SVR 1100, Gucci CO/CI 5.1/2.6, TD 4/2.08    Cardiac Studies:   STACEY 4/28/25 intraop: LVEF 20%, global, dilated RV with mildly reduced RVSF  TTE 3/20/25 : LVEF 30%, segmental, LVIDd 5.3, walls normal, elevated LV filling pressures, mod RVE with mildly reduced RV function, TAPSE 1.7, severely dilated RA, annuloplasty in MV position, transvalvular gradients are elevated with severe prosthetic MS (peak gradient 15.7, mean gradient 8), trace intravalvular MR, TV annuloplasty ring noted, mild TR,  est PASP 36, no evidence of LV thrombus  TTE 10/2024: LVEF 25-30%, LVEDD 5.9cm, moderately reduced RV function, annuloplasty ring of mitral and tricuspid position, PASP 47 mmHg  [facility] 3/19/25- RA 11 PA 58/26 PCWP 32 CO/CI 5.43/2.77  [facility] 6/2023 Nonobstructive CAD

## 2025-04-29 NOTE — BRIEF OPERATIVE NOTE - IV INFUSIONS - BLOOD PRODUCTS
Colonoscopy Discharge Instructions       Marya Hancock  [id number]  1961      COLONOSCOPY FINDINGS:  Your colonoscopy showed: 1. One colon polyp which was completely removed. 2. Moderte diverticulosis of the left colon. FOLLOW UP RECOMMENDATIONS:   Dr. Beryle Levee will contact you with your results. Dr. Beryle Levee will recommend your next colonoscopy after the Pathology results are available. DISCOMFORT:  If you have redness at your IV site- apply warm compress to area; if redness or soreness persist- contact your physician  There may be a slight amount of blood passed from the rectum, more than a teaspoon of bright red blood is not expected - contact your physician  Gaseous discomfort is common- walking, belching will help relieve any gas pains. If discomfort persist- contact your physician    DIET:   High fiber diet. ACTIVITY:  You may resume your normal daily activities, however, it is recommended that you spend the remainder of the day resting - avoiding any strenuous activities. You may not operate a vehicle for 24 hours  You may not engage in an occupation involving machinery or appliances for rest of today  You may not drink alcoholic beverages for at least 24 hours  Avoid making any critical decisions for at least 24 hour    CALL M.D.   ANY SIGN OF:   Increasing pain, nausea, vomiting  Abdominal distension (swelling)  New increased bleeding   Fever or chills  Pain in chest area or shortness of breath      Mario Ellis MD, FACS, FASCRS  Colon and Rectal Surgery  [facility]  Office [phone]  Fax     [phone] SUMMARY from Nurse    PATIENT INSTRUCTIONS:    After general anesthesia or intravenous sedation, for 24 hours or while taking prescription Narcotics:  · Limit your activities  · Do not drive and operate hazardous machinery  · Do not make important personal or business decisions  · Do  not drink alcoholic beverages  · If you have not urinated within 8 hours after discharge, please contact your surgeon on call. Report the following to your surgeon:  · Excessive pain, swelling, redness or odor of or around the surgical area  · Temperature over 100.5  · Nausea and vomiting lasting longer than 4 hours or if unable to take medications  · Any signs of decreased circulation or nerve impairment to extremity: change in color, persistent  numbness, tingling, coldness or increase pain  · Any questions    What to do at Home:  Recommended activity: Activity as tolerated and no driving for today. *  Please give a list of your current medications to your Primary Care Provider. *  Please update this list whenever your medications are discontinued, doses are      changed, or new medications (including over-the-counter products) are added. *  Please carry medication information at all times in case of emergency situations. These are general instructions for a healthy lifestyle:    No smoking/ No tobacco products/ Avoid exposure to second hand smoke  Surgeon General's Warning:  Quitting smoking now greatly reduces serious risk to your health. Obesity, smoking, and sedentary lifestyle greatly increases your risk for illness    A healthy diet, regular physical exercise & weight monitoring are important for maintaining a healthy lifestyle    You may be retaining fluid if you have a history of heart failure or if you experience any of the following symptoms:  Weight gain of 3 pounds or more overnight or 5 pounds in a week, increased swelling in our hands or feet or shortness of breath while lying flat in bed. Please call your doctor as soon as you notice any of these symptoms; do not wait until your next office visit.     Recognize signs and symptoms of STROKE:    F-face looks uneven    A-arms unable to move or move unevenly    S-speech slurred or non-existent    T-time-call 911 as soon as signs and symptoms begin-DO NOT go       Back to bed or wait to see if you get better-TIME IS BRAIN. Warning Signs of HEART ATTACK     Call 911 if you have these symptoms:   Chest discomfort. Most heart attacks involve discomfort in the center of the chest that lasts more than a few minutes, or that goes away and comes back. It can feel like uncomfortable pressure, squeezing, fullness, or pain.  Discomfort in other areas of the upper body. Symptoms can include pain or discomfort in one or both arms, the back, neck, jaw, or stomach.  Shortness of breath with or without chest discomfort.  Other signs may include breaking out in a cold sweat, nausea, or lightheadedness. Don't wait more than five minutes to call 911 - MINUTES MATTER! Fast action can save your life. Calling 911 is almost always the fastest way to get lifesaving treatment. Emergency Medical Services staff can begin treatment when they arrive -- up to an hour sooner than if someone gets to the hospital by car. The discharge information has been reviewed with the patient.   The patient verbalized understanding.    ___________________________________________________________________________________________________________________________________ 2x PLT  2x Cryo

## 2025-04-29 NOTE — PROGRESS NOTE ADULT - CRITICAL CARE ATTENDING COMMENT
Patient was seen and examined with the resident.  I agree with the above except for the following:    POD 0 from OHT last evening with TV ring repair.  Out of the OR on dobutamine 5+ levo and vaso.  Received 2 units of platelets +2 units of cryo in OR.    Has been weaned off of pressors.  Dobutamine up to 7.5.  WESLEY to be weaned today.  Filling pressures this a.m. slightly elevated.  RA 9–11, PA 30/20 (25), MOvO2 of 74.8% with Co/CI 3.8/1.9.  Lactate downtrending.  Received bumex 1mg IV overnight.  Can continue with PRN doses to keepe CVP single digits.      Retrospective crossmatch with class 2 DSA DR GramajoK.  Will initiative on tacrolimus tonight with 1mg BID.  If renal function becomes prohibitive, can consider simulect.

## 2025-04-29 NOTE — PROGRESS NOTE ADULT - SUBJECTIVE AND OBJECTIVE BOX
Patient seen and examined at the bedside.    Remains critically ill on continuous ICU monitoring, at risk for life threatening decompensation.  All Labs, data reviewed. Plan of care discussed in length during multi-disciplinary ICU rounds.       Brief Summary:  *XX yo M/F with pre op dx s/p surgery on XX/YY*      24 Hour events:      Objective:  Vital Signs Last 24 Hrs  T(C): 36.2 (29 Apr 2025 08:00), Max: 36.7 (28 Apr 2025 10:33)  T(F): 97.2 (29 Apr 2025 08:00), Max: 97.7 (28 Apr 2025 10:43)  HR: 109 (29 Apr 2025 08:30) (80 - 109)  BP: 103/72 (28 Apr 2025 10:43) (101/68 - 103/72)  BP(mean): 65 (28 Apr 2025 10:33) (65 - 65)  RR: 14 (29 Apr 2025 08:30) (13 - 23)  SpO2: 97% (29 Apr 2025 08:30) (82% - 100%)    Parameters below as of 29 Apr 2025 08:00  Patient On (Oxygen Delivery Method): ventilator    O2 Concentration (%): 40    Mode: CPAP with PS  FiO2: 40  PEEP: 10  PS: 12  ITime: 1  MAP: 13  PIP: 23              Physical Exam:   General: Alert and interactive   Neurology: Oriented, following commands  Respiratory: Clear bilaterally   CV: Sinus   *If on ECMO/LVAD Add settings+ AC Therapy*  Abdominal: Soft, Nontender  Extremities: Warm, well-perfused  -------------------------------------------------------------------------------------------------------------------------------    Labs:                        10.9   21.89 )-----------( 174      ( 29 Apr 2025 00:32 )             32.5     04-29    143  |  104  |  24[H]  ----------------------------<  161[H]  3.8   |  23  |  1.73[H]    Ca    9.6      29 Apr 2025 00:33  Phos  3.7     04-29  Mg     2.6     04-29    TPro  5.6[L]  /  Alb  3.3  /  TBili  2.8[H]  /  DBili  1.1[H]  /  AST  75[H]  /  ALT  47[H]  /  AlkPhos  64  04-29    LIVER FUNCTIONS - ( 29 Apr 2025 00:33 )  Alb: 3.3 g/dL / Pro: 5.6 g/dL / ALK PHOS: 64 U/L / ALT: 47 U/L / AST: 75 U/L / GGT: x           PT/INR - ( 29 Apr 2025 00:33 )   PT: 16.0 sec;   INR: 1.41 ratio         PTT - ( 29 Apr 2025 00:33 )  PTT:49.7 sec  ABG - ( 29 Apr 2025 08:10 )  pH, Arterial: 7.36  pH, Blood: x     /  pCO2: 44    /  pO2: 117   / HCO3: 25    / Base Excess: -0.7  /  SaO2: 99.7                  ALL MEDICATIONS   MEDICATIONS  (STANDING):  acetaminophen     Tablet .. 500 milliGRAM(s) Oral every 6 hours  cefepime   IVPB 1000 milliGRAM(s) IV Intermittent every 8 hours  chlorhexidine 0.12% Liquid 15 milliLiter(s) Oral Mucosa every 12 hours  chlorhexidine 2% Cloths 1 Application(s) Topical daily  dexMEDEtomidine Infusion 0.5 MICROgram(s)/kG/Hr (10.2 mL/Hr) IV Continuous <Continuous>  dextrose 50% Injectable 50 milliLiter(s) IV Push every 15 minutes  dextrose 50% Injectable 25 milliLiter(s) IV Push every 15 minutes  DOBUTamine Infusion 5 MICROgram(s)/kG/Min (12.3 mL/Hr) IV Continuous <Continuous>  gabapentin 100 milliGRAM(s) Oral every 8 hours  insulin regular Infusion 3 Unit(s)/Hr (3 mL/Hr) IV Continuous <Continuous>  lidocaine   4% Patch 3 Patch Transdermal daily  methocarbamol 500 milliGRAM(s) Oral every 8 hours  methylPREDNISolone sodium succinate Injectable   IV Push   methylPREDNISolone sodium succinate Injectable 125 milliGRAM(s) IV Push every 8 hours  mycophenolate mofetil IVPB 1000 milliGRAM(s) IV Intermittent every 12 hours  norepinephrine Infusion 0.05 MICROgram(s)/kG/Min (7.66 mL/Hr) IV Continuous <Continuous>  pantoprazole    Tablet 40 milliGRAM(s) Oral before breakfast  pantoprazole  Injectable 40 milliGRAM(s) IV Push daily  potassium chloride  10 mEq/50 mL IVPB 10 milliEquivalent(s) IV Intermittent once  potassium chloride  10 mEq/50 mL IVPB 10 milliEquivalent(s) IV Intermittent once  sodium chloride 0.9%. 1000 milliLiter(s) (10 mL/Hr) IV Continuous <Continuous>  traMADol 25 milliGRAM(s) Oral every 8 hours  traZODone 100 milliGRAM(s) Oral at bedtime  vancomycin    Solution 125 milliGRAM(s) Oral every 12 hours  vancomycin  IVPB 500 milliGRAM(s) IV Intermittent every 12 hours  vasopressin Infusion 0.1 Unit(s)/Min (15 mL/Hr) IV Continuous <Continuous>    MEDICATIONS  (PRN):    ------------------------------------------------------------------------------------------------------------------------------  Assessment:    At risk for delirium   At risk for hemodynamic decompensation  At high risk for cardiac arrythmias   At high risk for respiratory complications        Plan:   ***Neuro***  Maintain day/night cycle to prevenct ICU delerium   Postoperative acute pain control with Tylenol, Gabapentin, and prns.  Meds for sleep at bedtime     ***Cardiovascular***  At high risk for hemodynamic instability and cardiac arrhythmias.  Monitor for sign of hypoperfusion, trend lactate  *If on ECMO/LVAD/Impella/IABP* Mechanical circulatory support   Maintain MAPs > 65 mm Hg. Titrate *pressor name*    Titrate *inotrope name* Keep CVP<10.  *Add other cardiac meds*  Monitor chest tube output.      ***Pulmonary***  *Leave black if supplemental O2*  Postoperative acute respiratory insufficiency/failure  Wean ventilator as tolerated. Lung protective strategy  *If extubated* Deep breathing and coughing exercises, IS, Mobilization, nebs, Chest PT  Wean oxygen as able. *Remove if not on vent*      ***GI***  FEES Eval *Only if consulted*  Keep NPO for now.  Continue Tube feeds *if on Tfs*  Protonix/Pepcid for stress ulcer prophylaxis   Bowel regimen.   Trend LFTs    ***Renal***  ZAHRA / CKD/ ESRD on HD ***Remove if not applicable***  Trend Creatinine/BUN  [place] catheter for strict I/O measurements. *remove if not present*  Replete electrolytes as indicated.  Diuresis *Specifiy med*      ***ID***  Leukocytosis *above 11*  Leukopenia *Below 3*  *If no abxs*  Monitor off antibiotics   *summarize abx/indication*  Secondary prophylaxis *Leave blank*     ***Endocrine***  Insulin per protocol for Hyperglycemia     ***Hematology***  Acute blood loss anemia and thrombocytopenia   no current transfusion indication *change if recieved products, or if no thrombocytopenia*  Heparin/Argatroban/Lovenox for anticoagulation/ DVT prophylaxis         I, Alexandra Fierro MD, personally performed the services described in this documentation. all medical record entries made by the scribe were at my direction and in my presence. I have reviewed the chart and agree that the record reflects my personal performance and is accurate and complete  Electronically signed:   Alexandra Fierro MD  CT ICU attending     ICU time: ** mins     By signing my name below, I, Marc Meza, attest that this documentation has been prepared under the direction and in the presence of Alexandra Fierro MD  Electronically signed: Marc Meza, 04-29-25 @ 09:10             Patient seen and examined at the bedside.    Remains critically ill on continuous ICU monitoring, at risk for life threatening decompensation.  All Labs, data reviewed. Plan of care discussed in length during multi-disciplinary ICU rounds.     Brief Summary:  70-year-old male with history of NICM since  HFrEF (LVEF 25-30%)   Now s/p OHT on 25     24 Hour events:  Tolerates PS on a vent, will try to extubate  Remains on , Levo and Veso gtt  Wean pressors as able, monitor CVP closely goal <12  Bumex gtt to reduce feeling pressures       Objective:  Vital Signs Last 24 Hrs  T(C): 36.2 (2025 08:00), Max: 36.7 (2025 10:33)  T(F): 97.2 (2025 08:00), Max: 97.7 (2025 10:43)  HR: 109 (2025 08:30) (80 - 109)  BP: 103/72 (2025 10:43) (101/68 - 103/72)  BP(mean): 65 (2025 10:33) (65 - 65)  RR: 14 (2025 08:30) (13 - 23)  SpO2: 97% (2025 08:30) (82% - 100%)    Parameters below as of 2025 08:00  Patient On (Oxygen Delivery Method): ventilator    O2 Concentration (%): 40    Mode: CPAP with PS  FiO2: 40  PEEP: 10  PS: 12  ITime: 1  MAP: 13  PIP: 23            Physical Exam:   General: NAD  Neurology:  following commands, moving all extrimities  Respiratory: B/L breath sounds, Intubated  CV: Paced at 110    Abdominal: Soft, Nontender  Extremities: Warm, well-perfused  [place]  -------------------------------------------------------------------------------------------------------------------------------    Labs:                        10.9   21.89 )-----------( 174      ( 2025 00:32 )             32.5         143  |  104  |  24[H]  ----------------------------<  161[H]  3.8   |  23  |  1.73[H]    Ca    9.6      2025 00:33  Phos  3.7       Mg     2.6         TPro  5.6[L]  /  Alb  3.3  /  TBili  2.8[H]  /  DBili  1.1[H]  /  AST  75[H]  /  ALT  47[H]  /  AlkPhos  64      LIVER FUNCTIONS - ( 2025 00:33 )  Alb: 3.3 g/dL / Pro: 5.6 g/dL / ALK PHOS: 64 U/L / ALT: 47 U/L / AST: 75 U/L / GGT: x           PT/INR - ( 2025 00:33 )   PT: 16.0 sec;   INR: 1.41 ratio         PTT - ( 2025 00:33 )  PTT:49.7 sec  ABG - ( 2025 08:10 )  pH, Arterial: 7.36  pH, Blood: x     /  pCO2: 44    /  pO2: 117   / HCO3: 25    / Base Excess: -0.7  /  SaO2: 99.7      ALL MEDICATIONS   MEDICATIONS  (STANDING):  acetaminophen     Tablet .. 500 milliGRAM(s) Oral every 6 hours  cefepime   IVPB 1000 milliGRAM(s) IV Intermittent every 8 hours  chlorhexidine 0.12% Liquid 15 milliLiter(s) Oral Mucosa every 12 hours  chlorhexidine 2% Cloths 1 Application(s) Topical daily  dexMEDEtomidine Infusion 0.5 MICROgram(s)/kG/Hr (10.2 mL/Hr) IV Continuous <Continuous>  dextrose 50% Injectable 50 milliLiter(s) IV Push every 15 minutes  dextrose 50% Injectable 25 milliLiter(s) IV Push every 15 minutes  DOBUTamine Infusion 5 MICROgram(s)/kG/Min (12.3 mL/Hr) IV Continuous <Continuous>  gabapentin 100 milliGRAM(s) Oral every 8 hours  insulin regular Infusion 3 Unit(s)/Hr (3 mL/Hr) IV Continuous <Continuous>  lidocaine   4% Patch 3 Patch Transdermal daily  methocarbamol 500 milliGRAM(s) Oral every 8 hours  methylPREDNISolone sodium succinate Injectable   IV Push   methylPREDNISolone sodium succinate Injectable 125 milliGRAM(s) IV Push every 8 hours  mycophenolate mofetil IVPB 1000 milliGRAM(s) IV Intermittent every 12 hours  norepinephrine Infusion 0.05 MICROgram(s)/kG/Min (7.66 mL/Hr) IV Continuous <Continuous>  pantoprazole    Tablet 40 milliGRAM(s) Oral before breakfast  pantoprazole  Injectable 40 milliGRAM(s) IV Push daily  potassium chloride  10 mEq/50 mL IVPB 10 milliEquivalent(s) IV Intermittent once  potassium chloride  10 mEq/50 mL IVPB 10 milliEquivalent(s) IV Intermittent once  sodium chloride 0.9%. 1000 milliLiter(s) (10 mL/Hr) IV Continuous <Continuous>  traMADol 25 milliGRAM(s) Oral every 8 hours  traZODone 100 milliGRAM(s) Oral at bedtime  vancomycin    Solution 125 milliGRAM(s) Oral every 12 hours  vancomycin  IVPB 500 milliGRAM(s) IV Intermittent every 12 hours  vasopressin Infusion 0.1 Unit(s)/Min (15 mL/Hr) IV Continuous <Continuous>    MEDICATIONS  (PRN):    ------------------------------------------------------------------------------------------------------------------------------    Assessment:  69 yo NICM, s/p heart transplant     At risk for hemodynamic decompensation  At high risk for cardiac arrythmias   At high risk for respiratory complications  On inotropic medications  On pressors  ZAHRA  CKD      Plan:   ***Neuro***  Maintain day/night cycle to prevent ICU delirium   Postoperative acute pain control with Tylenol, tramadol, gabapentin prn Dilaudid     ***Cardiovascular***  At high risk for hemodynamic instability and cardiac arrhythmias.  Monitor for sign of hypoperfusion, trend lactate, SVo2  Continue with Dobutamine   Wean pressors as able  Keep CVP <12  Keep Cale at 20ppm  Monitor chest tube output    ***Pulmonary***  Postoperative acute respiratory insufficiency  PS tolerates, plan to extubate  Deep breathing and coughing exercises, IS, Mobilization, nebs, Chest PT    ***GI***  NPO for now  Dysphasia test after extubation  Protonix prophylaxis   Bowel regimen.   Trend LFTs    ***Renal***  ZAHRA   Bumex gtt, keep CVP <12  Trend Creatinine/BUN  Replete electrolytes as indicated.    ***ID***  Leukocytosis  Cefepime, IV vanc  Taco Cellcept steroids per transplant  team  Po Vanc for ppx  PCN and Ceftriaxone for donor culture to follow  from Thursday     ***Endocrine***  Insulin per protocol for Hyperglycemia     ***Hematology***  Acute blood loss anemia and thrombocytopenia   CBC, Coags, teg , monitor for bleeding      I, Alexandra Fierro MD, personally performed the services described in this documentation. all medical record entries made by the scribe were at my direction and in my presence. I have reviewed the chart and agree that the record reflects my personal performance and is accurate and complete  Electronically signed:   Alexandra Fierro MD  CT ICU attending     ICU time: 55 mins

## 2025-04-29 NOTE — AIRWAY PLACEMENT NOTE ADULT - AIRWAY COMMENTS:
Pt from OR intubated size 8.0 ET tube in place, 22 at the lip, airway was patent and secure. Pt was placed on mechanical ventilation and was in no apparent distress at this time.

## 2025-04-29 NOTE — PROGRESS NOTE ADULT - PROBLEM SELECTOR PLAN 3
- s/p heart transplant 4/28   - No events on tele.   - Keep K>4 and Mg>2. - Resolved  - Afebrile, no leukocytosis, non-toxic appearing

## 2025-04-29 NOTE — PROGRESS NOTE ADULT - PROBLEM SELECTOR PLAN 1
ACC/AHA stage D systolic heart failure, s/p heart transplant 4/28  - Currently on Dobutamine 7.5, Vasopressin 0.02, Lico 20  - Chest tubes x 4 in place, per CTS  - Immunosuppressive      - Cellcept 1000mg BID      - Solu-medrol taper: Currently 125mg q8h (4/29) ->      - Tarco: 1mg BID starting this PM  - Diuresis: guided by CVP  - antibiotics      - PO Vanco ppx (4/29 - )      - Cefepime 1000mg (4/29 - )      - Vanco IV 500mg BID (4/29 - )      - CTX 2000mg QD for donor strep pneumo      - Penicillin IM for donor syphilis ACC/AHA stage D systolic heart failure, s/p heart transplant 4/28  - Currently on Dobutamine 7.5, Vasopressin 0.02, Lico 20  - Chest tubes x 4 in place, per CTS  - Immunosuppressive      - Cellcept 1000mg BID      - Solu-medrol taper: Currently 125mg q8h (4/29)      - Tarco: 1mg BID starting this PM  - Diuresis: guided by CVP  - antibiotics      - PO Vanco ppx (4/29 - )      - Cefepime 1000mg (4/29 - )      - Vanco IV 500mg BID (4/29 - )      - CTX 2000mg QD for donor strep pneumo+ in CSF      - Penicillin IM for donor syphilis ACC/AHA stage D systolic heart failure, s/p heart transplant 4/28, CMV +/+, Toxo -/+  - Retrospective crossmatch with new class II DSA DR HILLIARD.    - Currently on Dobutamine 7.5, Vasopressin 0.02, Lico 20  - Chest tubes x 4 in place, per CTS  - Diuresis: guided by CVP

## 2025-04-29 NOTE — BRIEF OPERATIVE NOTE - OPERATION/FINDINGS
Total CPB: 134 + 42 Cold ischemic time  Procedure: Orthotopic heart transplantation removal of ICD CRT, tricuspid valve repair (34 mm Physio band)    _____ greater saphenous vein harvested endoscopically by ________ PA  Total CPB: 134 + 42 minutes = 176 minutes | Cold ischemic time: 258 minutes  Procedure: Redo sternotomy, orthotopic heart transplantation, removal of ICD CRT, tricuspid valve repair (34 mm Physio ring)   2A 2V

## 2025-04-29 NOTE — PROGRESS NOTE ADULT - PROBLEM SELECTOR PLAN 2
ACC/AHA stage D systolic heart failure.   - s/p heart transplant 4/28   - strict I/Os and daily weights  - Keep K>4 and Mg>2  - PT as tolerated - Immunosuppression:      - Cellcept 1000mg BID      - Solu-medrol taper: Currently 125mg q8h (4/29)      - Tarco: 1mg BID starting this PM    - Antibiotics      - PO Vanco ppx (4/29 - )      - Cefepime 1000mg (4/29 - )      - Vanco IV 500mg BID (4/29 - )      - CTX 2000mg QD for donor strep pneumo+ in CSF      - Penicillin IM for donor syphilis + ab

## 2025-04-29 NOTE — AIRWAY REMOVAL NOTE  ADULT & PEDS - ARTIFICAL AIRWAY REMOVAL COMMENTS
Written order for extubation verified. The patient was identified by full name and birth date compared to the identification band. Present during the procedure was (ARA Looney)

## 2025-04-29 NOTE — BRIEF OPERATIVE NOTE - NSICDXBRIEFPROCEDURE_GEN_ALL_CORE_FT
PROCEDURES:  Heart transplant 29-Apr-2025 00:23:28  Dana Horton  Removal of implantable cardioverter-defibrillator (ICD) system 29-Apr-2025 00:23:39  Dana Horton  Repair of tricuspid valve in adult 29-Apr-2025 00:23:51  Dana Horton

## 2025-04-29 NOTE — PROGRESS NOTE ADULT - ASSESSMENT
69-year-old male patient past medical history of NICM since 2011, HFrEF LVEF 25-30% s/p MDT CRT-D with baseline CHB, VT/VF, a.fib s/p GIANFRANCO ligation/MAZE, MV/TV repair, PVC ablation 3/2024 intolerant to AADs in the past, recent admission 3/19 - 3/20/25 for AICD shocks initially presented to the [facility] emergency department on 3/21/2025, sent by heart failure specialist for ACID shock. Device reported successful ATP for VT 3/17 at 6:52pm followed by one ATP and 40J shock. He reported feeling dizzy and SOB during the events. He follows with Dr paula abreu for HF, currently undergoing transplant workup, Dr John for CRTD and VT/VF and  for genetic counseling. While admitted to [facility], he was started on a lidocaine gtt. On 3/21 when lidocaine weaned off patient had another two episodes of VT requiring AICD shocks. He was transferred to [facility] for further management.     Status Post OHT 4/29/25    #Heart transplant recipient (CMV +/+, EBV +/+, Toxo -/+)  --Continue vancomycin and cefepime for 72 hours as perioperative coverage  --Will need initiation of Valcyte for CMV prophylaxis  --Will need initiation of Bactrim for PCP and toxoplasma prophylaxis    #Donor Syphilis Serology Positive  --Recommend benzathine penicillin 2,400,000 units weekly x 3 doses    #Donor Streptococcus pneumoniae meningitis  --After completion of perioperative antibiotics would switch to ceftriaxone 2 g IV every 24 hours to complete 2 weeks of antibiotics post transplant    I will continue to follow. Please feel free to contact me with any further questions.    Julius Villa M.D.  [facility] Division of Infectious Disease  8AM-5PM Monday - Friday: Available on [employer] Teams  After Hours and Holidays (or if no response on [employer] Teams): Please contact the Infectious Diseases Office at [phone]

## 2025-04-29 NOTE — PROGRESS NOTE ADULT - SUBJECTIVE AND OBJECTIVE BOX
Patient seen and examined at bedside.    Overnight Events:   s/p OHT 4/28  Remains intubated   On Dobutamine 7.5, vaso 0.02, Lico @ 20    Current Meds:  acetaminophen     Tablet .. 500 milliGRAM(s) Oral every 6 hours  cefepime   IVPB 1000 milliGRAM(s) IV Intermittent every 8 hours  chlorhexidine 2% Cloths 1 Application(s) Topical daily  dexMEDEtomidine Infusion 0.5 MICROgram(s)/kG/Hr IV Continuous <Continuous>  dextrose 50% Injectable 50 milliLiter(s) IV Push every 15 minutes  dextrose 50% Injectable 25 milliLiter(s) IV Push every 15 minutes  DOBUTamine Infusion 5 MICROgram(s)/kG/Min IV Continuous <Continuous>  gabapentin 100 milliGRAM(s) Oral every 8 hours  insulin regular Infusion 3 Unit(s)/Hr IV Continuous <Continuous>  lidocaine   4% Patch 3 Patch Transdermal daily  methocarbamol 500 milliGRAM(s) Oral every 8 hours  methylPREDNISolone sodium succinate Injectable   IV Push   methylPREDNISolone sodium succinate Injectable 125 milliGRAM(s) IV Push every 8 hours  mycophenolate mofetil IVPB 1000 milliGRAM(s) IV Intermittent every 12 hours  norepinephrine Infusion 0.05 MICROgram(s)/kG/Min IV Continuous <Continuous>  pantoprazole    Tablet 40 milliGRAM(s) Oral before breakfast  pantoprazole  Injectable 40 milliGRAM(s) IV Push daily  potassium chloride  10 mEq/50 mL IVPB 10 milliEquivalent(s) IV Intermittent once  potassium chloride  10 mEq/50 mL IVPB 10 milliEquivalent(s) IV Intermittent once  sodium chloride 0.9%. 1000 milliLiter(s) IV Continuous <Continuous>  traMADol 25 milliGRAM(s) Oral every 8 hours  traZODone 100 milliGRAM(s) Oral at bedtime  vancomycin    Solution 125 milliGRAM(s) Oral every 12 hours  vancomycin  IVPB 500 milliGRAM(s) IV Intermittent every 12 hours  vasopressin Infusion 0.1 Unit(s)/Min IV Continuous <Continuous>      Vitals:  T(F): 98.6 (04-29), Max: 98.6 (04-29)  HR: 109 (04-29) (109 - 109)  BP: --  RR: 20 (04-29)  SpO2: 98% (04-29)  I&O's Summary    28 Apr 2025 07:01  -  29 Apr 2025 07:00  --------------------------------------------------------  IN: 1258.3 mL / OUT: 610 mL / NET: 648.3 mL    29 Apr 2025 07:01  -  29 Apr 2025 12:34  --------------------------------------------------------  IN: 632.3 mL / OUT: 480 mL / NET: 152.3 mL        Physical Exam:                            10.6   11.90 )-----------( 91       ( 29 Apr 2025 09:40 )             30.4     04-29    141  |  101  |  30[H]  ----------------------------<  136[H]  4.4   |  23  |  2.42[H]    Ca    10.8[H]      29 Apr 2025 09:40  Phos  3.7     04-29  Mg     2.5     04-29    TPro  6.4  /  Alb  4.3  /  TBili  3.0[H]  /  DBili  x   /  AST  69[H]  /  ALT  42  /  AlkPhos  53  04-29    PT/INR - ( 29 Apr 2025 09:40 )   PT: 15.4 sec;   INR: 1.34 ratio         PTT - ( 29 Apr 2025 09:40 )  PTT:27.8 sec              New ECG(s): Personally reviewed    Echo:    Stress Testing:     Cath:    New Imaging:    Interpretation of Telemetry:

## 2025-04-30 ENCOUNTER — APPOINTMENT (OUTPATIENT)
Dept: HEART FAILURE | Facility: CLINIC | Age: 70
End: 2025-04-30

## 2025-04-30 ENCOUNTER — RESULT REVIEW (OUTPATIENT)
Age: 70
End: 2025-04-30

## 2025-04-30 DIAGNOSIS — N17.9 ACUTE KIDNEY FAILURE, UNSPECIFIED: ICD-10-CM

## 2025-04-30 LAB
ALBUMIN SERPL ELPH-MCNC: 4.5 G/DL — SIGNIFICANT CHANGE UP (ref 3.3–5)
ALBUMIN SERPL ELPH-MCNC: 4.6 G/DL — SIGNIFICANT CHANGE UP (ref 3.3–5)
ALP SERPL-CCNC: 44 U/L — SIGNIFICANT CHANGE UP (ref 40–120)
ALP SERPL-CCNC: 48 U/L — SIGNIFICANT CHANGE UP (ref 40–120)
ALT FLD-CCNC: 31 U/L — SIGNIFICANT CHANGE UP (ref 10–45)
ALT FLD-CCNC: 34 U/L — SIGNIFICANT CHANGE UP (ref 10–45)
ANION GAP SERPL CALC-SCNC: 16 MMOL/L — SIGNIFICANT CHANGE UP (ref 5–17)
ANION GAP SERPL CALC-SCNC: 23 MMOL/L — HIGH (ref 5–17)
APTT BLD: 26.9 SEC — SIGNIFICANT CHANGE UP (ref 26.1–36.8)
AST SERPL-CCNC: 36 U/L — SIGNIFICANT CHANGE UP (ref 10–40)
AST SERPL-CCNC: 47 U/L — HIGH (ref 10–40)
BASE EXCESS BLDMV CALC-SCNC: -0.7 MMOL/L — SIGNIFICANT CHANGE UP (ref -3–3)
BASE EXCESS BLDMV CALC-SCNC: -1.8 MMOL/L — SIGNIFICANT CHANGE UP (ref -3–3)
BASE EXCESS BLDMV CALC-SCNC: -3.2 MMOL/L — LOW (ref -3–3)
BASE EXCESS BLDMV CALC-SCNC: -3.6 MMOL/L — LOW (ref -3–3)
BASE EXCESS BLDMV CALC-SCNC: -3.9 MMOL/L — LOW (ref -3–3)
BASE EXCESS BLDMV CALC-SCNC: -4.3 MMOL/L — LOW (ref -3–3)
BASE EXCESS BLDMV CALC-SCNC: -5.1 MMOL/L — LOW (ref -3–3)
BASOPHILS # BLD AUTO: 0.04 K/UL — SIGNIFICANT CHANGE UP (ref 0–0.2)
BASOPHILS NFR BLD AUTO: 0.2 % — SIGNIFICANT CHANGE UP (ref 0–2)
BILIRUB SERPL-MCNC: 2.1 MG/DL — HIGH (ref 0.2–1.2)
BILIRUB SERPL-MCNC: 2.2 MG/DL — HIGH (ref 0.2–1.2)
BUN SERPL-MCNC: 27 MG/DL — HIGH (ref 7–23)
BUN SERPL-MCNC: 37 MG/DL — HIGH (ref 7–23)
CALCIUM SERPL-MCNC: 10.8 MG/DL — HIGH (ref 8.4–10.5)
CALCIUM SERPL-MCNC: 9.2 MG/DL — SIGNIFICANT CHANGE UP (ref 8.4–10.5)
CHLORIDE SERPL-SCNC: 98 MMOL/L — SIGNIFICANT CHANGE UP (ref 96–108)
CHLORIDE SERPL-SCNC: 99 MMOL/L — SIGNIFICANT CHANGE UP (ref 96–108)
CO2 BLDMV-SCNC: 24 MMOL/L — SIGNIFICANT CHANGE UP (ref 21–29)
CO2 BLDMV-SCNC: 24 MMOL/L — SIGNIFICANT CHANGE UP (ref 21–29)
CO2 BLDMV-SCNC: 25 MMOL/L — SIGNIFICANT CHANGE UP (ref 21–29)
CO2 BLDMV-SCNC: 25 MMOL/L — SIGNIFICANT CHANGE UP (ref 21–29)
CO2 BLDMV-SCNC: 26 MMOL/L — SIGNIFICANT CHANGE UP (ref 21–29)
CO2 BLDMV-SCNC: 28 MMOL/L — SIGNIFICANT CHANGE UP (ref 21–29)
CO2 BLDMV-SCNC: 29 MMOL/L — SIGNIFICANT CHANGE UP (ref 21–29)
CO2 SERPL-SCNC: 19 MMOL/L — LOW (ref 22–31)
CO2 SERPL-SCNC: 22 MMOL/L — SIGNIFICANT CHANGE UP (ref 22–31)
CREAT SERPL-MCNC: 2.8 MG/DL — HIGH (ref 0.5–1.3)
CREAT SERPL-MCNC: 3.46 MG/DL — HIGH (ref 0.5–1.3)
EGFR: 18 ML/MIN/1.73M2 — LOW
EGFR: 18 ML/MIN/1.73M2 — LOW
EGFR: 24 ML/MIN/1.73M2 — LOW
EGFR: 24 ML/MIN/1.73M2 — LOW
EOSINOPHIL # BLD AUTO: 0 K/UL — SIGNIFICANT CHANGE UP (ref 0–0.5)
EOSINOPHIL NFR BLD AUTO: 0 % — SIGNIFICANT CHANGE UP (ref 0–6)
FIBRINOGEN PPP-MCNC: 303 MG/DL — SIGNIFICANT CHANGE UP (ref 200–445)
GAS PNL BLDA: SIGNIFICANT CHANGE UP
GAS PNL BLDMV: SIGNIFICANT CHANGE UP
GLUCOSE BLDC GLUCOMTR-MCNC: 105 MG/DL — HIGH (ref 70–99)
GLUCOSE BLDC GLUCOMTR-MCNC: 106 MG/DL — HIGH (ref 70–99)
GLUCOSE BLDC GLUCOMTR-MCNC: 107 MG/DL — HIGH (ref 70–99)
GLUCOSE BLDC GLUCOMTR-MCNC: 109 MG/DL — HIGH (ref 70–99)
GLUCOSE BLDC GLUCOMTR-MCNC: 119 MG/DL — HIGH (ref 70–99)
GLUCOSE BLDC GLUCOMTR-MCNC: 120 MG/DL — HIGH (ref 70–99)
GLUCOSE BLDC GLUCOMTR-MCNC: 120 MG/DL — HIGH (ref 70–99)
GLUCOSE BLDC GLUCOMTR-MCNC: 121 MG/DL — HIGH (ref 70–99)
GLUCOSE BLDC GLUCOMTR-MCNC: 124 MG/DL — HIGH (ref 70–99)
GLUCOSE BLDC GLUCOMTR-MCNC: 130 MG/DL — HIGH (ref 70–99)
GLUCOSE BLDC GLUCOMTR-MCNC: 142 MG/DL — HIGH (ref 70–99)
GLUCOSE BLDC GLUCOMTR-MCNC: 144 MG/DL — HIGH (ref 70–99)
GLUCOSE BLDC GLUCOMTR-MCNC: 145 MG/DL — HIGH (ref 70–99)
GLUCOSE BLDC GLUCOMTR-MCNC: 148 MG/DL — HIGH (ref 70–99)
GLUCOSE BLDC GLUCOMTR-MCNC: 157 MG/DL — HIGH (ref 70–99)
GLUCOSE BLDC GLUCOMTR-MCNC: 158 MG/DL — HIGH (ref 70–99)
GLUCOSE BLDC GLUCOMTR-MCNC: 161 MG/DL — HIGH (ref 70–99)
GLUCOSE BLDC GLUCOMTR-MCNC: 161 MG/DL — HIGH (ref 70–99)
GLUCOSE BLDC GLUCOMTR-MCNC: 182 MG/DL — HIGH (ref 70–99)
GLUCOSE BLDC GLUCOMTR-MCNC: 182 MG/DL — HIGH (ref 70–99)
GLUCOSE BLDC GLUCOMTR-MCNC: 185 MG/DL — HIGH (ref 70–99)
GLUCOSE BLDC GLUCOMTR-MCNC: 188 MG/DL — HIGH (ref 70–99)
GLUCOSE BLDC GLUCOMTR-MCNC: 199 MG/DL — HIGH (ref 70–99)
GLUCOSE BLDC GLUCOMTR-MCNC: 70 MG/DL — SIGNIFICANT CHANGE UP (ref 70–99)
GLUCOSE BLDC GLUCOMTR-MCNC: 97 MG/DL — SIGNIFICANT CHANGE UP (ref 70–99)
GLUCOSE SERPL-MCNC: 102 MG/DL — HIGH (ref 70–99)
GLUCOSE SERPL-MCNC: 193 MG/DL — HIGH (ref 70–99)
HCO3 BLDMV-SCNC: 22 MMOL/L — SIGNIFICANT CHANGE UP (ref 20–28)
HCO3 BLDMV-SCNC: 23 MMOL/L — SIGNIFICANT CHANGE UP (ref 20–28)
HCO3 BLDMV-SCNC: 23 MMOL/L — SIGNIFICANT CHANGE UP (ref 20–28)
HCO3 BLDMV-SCNC: 24 MMOL/L — SIGNIFICANT CHANGE UP (ref 20–28)
HCO3 BLDMV-SCNC: 25 MMOL/L — SIGNIFICANT CHANGE UP (ref 20–28)
HCO3 BLDMV-SCNC: 26 MMOL/L — SIGNIFICANT CHANGE UP (ref 20–28)
HCO3 BLDMV-SCNC: 27 MMOL/L — SIGNIFICANT CHANGE UP (ref 20–28)
HCT VFR BLD CALC: 27.1 % — LOW (ref 39–50)
HGB BLD-MCNC: 9.1 G/DL — LOW (ref 13–17)
HOROWITZ INDEX BLDMV+IHG-RTO: 40 — SIGNIFICANT CHANGE UP
IMM GRANULOCYTES NFR BLD AUTO: 1.1 % — HIGH (ref 0–0.9)
INR BLD: 1.55 RATIO — HIGH (ref 0.85–1.16)
LYMPHOCYTES # BLD AUTO: 0.5 K/UL — LOW (ref 1–3.3)
LYMPHOCYTES # BLD AUTO: 2 % — LOW (ref 13–44)
MAGNESIUM SERPL-MCNC: 2.2 MG/DL — SIGNIFICANT CHANGE UP (ref 1.6–2.6)
MCHC RBC-ENTMCNC: 29.5 PG — SIGNIFICANT CHANGE UP (ref 27–34)
MCHC RBC-ENTMCNC: 33.6 G/DL — SIGNIFICANT CHANGE UP (ref 32–36)
MCV RBC AUTO: 88 FL — SIGNIFICANT CHANGE UP (ref 80–100)
MONOCYTES # BLD AUTO: 1.29 K/UL — HIGH (ref 0–0.9)
MONOCYTES NFR BLD AUTO: 5.2 % — SIGNIFICANT CHANGE UP (ref 2–14)
NEUTROPHILS # BLD AUTO: 22.88 K/UL — HIGH (ref 1.8–7.4)
NEUTROPHILS NFR BLD AUTO: 91.5 % — HIGH (ref 43–77)
NRBC BLD AUTO-RTO: 0 /100 WBCS — SIGNIFICANT CHANGE UP (ref 0–0)
O2 CT VFR BLD CALC: 31 MMHG — SIGNIFICANT CHANGE UP (ref 30–65)
O2 CT VFR BLD CALC: 34 MMHG — SIGNIFICANT CHANGE UP (ref 30–65)
O2 CT VFR BLD CALC: 37 MMHG — SIGNIFICANT CHANGE UP (ref 30–65)
O2 CT VFR BLD CALC: 38 MMHG — SIGNIFICANT CHANGE UP (ref 30–65)
O2 CT VFR BLD CALC: 40 MMHG — SIGNIFICANT CHANGE UP (ref 30–65)
O2 CT VFR BLD CALC: 41 MMHG — SIGNIFICANT CHANGE UP (ref 30–65)
O2 CT VFR BLD CALC: 42 MMHG — SIGNIFICANT CHANGE UP (ref 30–65)
PCO2 BLDMV: 47 MMHG — SIGNIFICANT CHANGE UP (ref 30–65)
PCO2 BLDMV: 48 MMHG — SIGNIFICANT CHANGE UP (ref 30–65)
PCO2 BLDMV: 52 MMHG — SIGNIFICANT CHANGE UP (ref 30–65)
PCO2 BLDMV: 53 MMHG — SIGNIFICANT CHANGE UP (ref 30–65)
PCO2 BLDMV: 55 MMHG — SIGNIFICANT CHANGE UP (ref 30–65)
PCO2 BLDMV: 55 MMHG — SIGNIFICANT CHANGE UP (ref 30–65)
PCO2 BLDMV: 56 MMHG — SIGNIFICANT CHANGE UP (ref 30–65)
PH BLDMV: 7.26 — LOW (ref 7.32–7.45)
PH BLDMV: 7.27 — LOW (ref 7.32–7.45)
PH BLDMV: 7.28 — LOW (ref 7.32–7.45)
PH BLDMV: 7.29 — LOW (ref 7.32–7.45)
PH BLDMV: 7.3 — LOW (ref 7.32–7.45)
PHOSPHATE SERPL-MCNC: 4.8 MG/DL — HIGH (ref 2.5–4.5)
PLATELET # BLD AUTO: 107 K/UL — LOW (ref 150–400)
POTASSIUM SERPL-MCNC: 4 MMOL/L — SIGNIFICANT CHANGE UP (ref 3.5–5.3)
POTASSIUM SERPL-MCNC: 4.2 MMOL/L — SIGNIFICANT CHANGE UP (ref 3.5–5.3)
POTASSIUM SERPL-SCNC: 4 MMOL/L — SIGNIFICANT CHANGE UP (ref 3.5–5.3)
POTASSIUM SERPL-SCNC: 4.2 MMOL/L — SIGNIFICANT CHANGE UP (ref 3.5–5.3)
PROT SERPL-MCNC: 6.5 G/DL — SIGNIFICANT CHANGE UP (ref 6–8.3)
PROT SERPL-MCNC: 6.7 G/DL — SIGNIFICANT CHANGE UP (ref 6–8.3)
PROTHROM AB SERPL-ACNC: 17.7 SEC — HIGH (ref 9.9–13.4)
RBC # BLD: 3.08 M/UL — LOW (ref 4.2–5.8)
RBC # FLD: 14.6 % — HIGH (ref 10.3–14.5)
SAO2 % BLDMV: 50.5 — LOW (ref 60–90)
SAO2 % BLDMV: 51.7 — LOW (ref 60–90)
SAO2 % BLDMV: 57.9 — LOW (ref 60–90)
SAO2 % BLDMV: 61.9 — SIGNIFICANT CHANGE UP (ref 60–90)
SAO2 % BLDMV: 62.5 — SIGNIFICANT CHANGE UP (ref 60–90)
SAO2 % BLDMV: 63.4 — SIGNIFICANT CHANGE UP (ref 60–90)
SAO2 % BLDMV: 65 — SIGNIFICANT CHANGE UP (ref 60–90)
SODIUM SERPL-SCNC: 137 MMOL/L — SIGNIFICANT CHANGE UP (ref 135–145)
SODIUM SERPL-SCNC: 140 MMOL/L — SIGNIFICANT CHANGE UP (ref 135–145)
TACROLIMUS SERPL-MCNC: 4.3 NG/ML — SIGNIFICANT CHANGE UP
VANCOMYCIN TROUGH SERPL-MCNC: 11.6 UG/ML — SIGNIFICANT CHANGE UP (ref 10–20)
VANCOMYCIN TROUGH SERPL-MCNC: 13.6 UG/ML — SIGNIFICANT CHANGE UP (ref 10–20)
WBC # BLD: 24.98 K/UL — HIGH (ref 3.8–10.5)
WBC # FLD AUTO: 24.98 K/UL — HIGH (ref 3.8–10.5)

## 2025-04-30 PROCEDURE — 71045 X-RAY EXAM CHEST 1 VIEW: CPT | Mod: 26,76

## 2025-04-30 PROCEDURE — 99292 CRITICAL CARE ADDL 30 MIN: CPT

## 2025-04-30 PROCEDURE — 99223 1ST HOSP IP/OBS HIGH 75: CPT | Mod: GC

## 2025-04-30 PROCEDURE — 99291 CRITICAL CARE FIRST HOUR: CPT

## 2025-04-30 PROCEDURE — G0545: CPT

## 2025-04-30 PROCEDURE — 93308 TTE F-UP OR LMTD: CPT | Mod: 26

## 2025-04-30 PROCEDURE — 93325 DOPPLER ECHO COLOR FLOW MAPG: CPT | Mod: 26

## 2025-04-30 PROCEDURE — 93010 ELECTROCARDIOGRAM REPORT: CPT

## 2025-04-30 PROCEDURE — 99233 SBSQ HOSP IP/OBS HIGH 50: CPT

## 2025-04-30 PROCEDURE — 93321 DOPPLER ECHO F-UP/LMTD STD: CPT | Mod: 26

## 2025-04-30 RX ORDER — HEPARIN SODIUM 1000 [USP'U]/ML
300 INJECTION INTRAVENOUS; SUBCUTANEOUS
Qty: 10000 | Refills: 0 | Status: DISCONTINUED | OUTPATIENT
Start: 2025-04-30 | End: 2025-04-30

## 2025-04-30 RX ORDER — HEPARIN SODIUM 1000 [USP'U]/ML
5000 INJECTION INTRAVENOUS; SUBCUTANEOUS EVERY 8 HOURS
Refills: 0 | Status: DISCONTINUED | OUTPATIENT
Start: 2025-04-30 | End: 2025-05-01

## 2025-04-30 RX ORDER — DEXMEDETOMIDINE HYDROCHLORIDE IN SODIUM CHLORIDE 4 UG/ML
0.3 INJECTION INTRAVENOUS
Qty: 200 | Refills: 0 | Status: DISCONTINUED | OUTPATIENT
Start: 2025-04-30 | End: 2025-05-01

## 2025-04-30 RX ORDER — METHYLNALTREXONE BROMIDE 12 MG/.6ML
12 INJECTION, SOLUTION SUBCUTANEOUS ONCE
Refills: 0 | Status: COMPLETED | OUTPATIENT
Start: 2025-04-30 | End: 2025-04-30

## 2025-04-30 RX ORDER — SODIUM BICARBONATE 1 MEQ/ML
50 SYRINGE (ML) INTRAVENOUS ONCE
Refills: 0 | Status: COMPLETED | OUTPATIENT
Start: 2025-04-30 | End: 2025-04-30

## 2025-04-30 RX ORDER — ALBUMIN (HUMAN) 12.5 G/50ML
100 INJECTION, SOLUTION INTRAVENOUS ONCE
Refills: 0 | Status: COMPLETED | OUTPATIENT
Start: 2025-04-30 | End: 2025-04-30

## 2025-04-30 RX ORDER — SENNA 187 MG
2 TABLET ORAL AT BEDTIME
Refills: 0 | Status: DISCONTINUED | OUTPATIENT
Start: 2025-04-30 | End: 2025-05-14

## 2025-04-30 RX ORDER — FENTANYL CITRATE-0.9 % NACL/PF 100MCG/2ML
25 SYRINGE (ML) INTRAVENOUS ONCE
Refills: 0 | Status: DISCONTINUED | OUTPATIENT
Start: 2025-04-30 | End: 2025-04-30

## 2025-04-30 RX ORDER — HYDROMORPHONE/SOD CHLOR,ISO/PF 2 MG/10 ML
0.5 SYRINGE (ML) INJECTION ONCE
Refills: 0 | Status: DISCONTINUED | OUTPATIENT
Start: 2025-04-30 | End: 2025-04-30

## 2025-04-30 RX ORDER — CALCIUM GLUCONATE 20 MG/ML
2 INJECTION, SOLUTION INTRAVENOUS ONCE
Refills: 0 | Status: COMPLETED | OUTPATIENT
Start: 2025-04-30 | End: 2025-04-30

## 2025-04-30 RX ORDER — HYDROMORPHONE/SOD CHLOR,ISO/PF 2 MG/10 ML
0.5 SYRINGE (ML) INJECTION
Refills: 0 | Status: DISCONTINUED | OUTPATIENT
Start: 2025-04-30 | End: 2025-05-02

## 2025-04-30 RX ORDER — PREGABALIN 75 MG/1
25 CAPSULE ORAL
Refills: 0 | Status: DISCONTINUED | OUTPATIENT
Start: 2025-04-30 | End: 2025-05-02

## 2025-04-30 RX ORDER — PREGABALIN 75 MG/1
50 CAPSULE ORAL ONCE
Refills: 0 | Status: DISCONTINUED | OUTPATIENT
Start: 2025-04-30 | End: 2025-04-30

## 2025-04-30 RX ORDER — NALOXEGOL OXALATE 12.5 MG/1
12.5 TABLET, FILM COATED ORAL ONCE
Refills: 0 | Status: COMPLETED | OUTPATIENT
Start: 2025-04-30 | End: 2025-04-30

## 2025-04-30 RX ORDER — NALOXEGOL OXALATE 12.5 MG/1
25 TABLET, FILM COATED ORAL ONCE
Refills: 0 | Status: DISCONTINUED | OUTPATIENT
Start: 2025-04-30 | End: 2025-04-30

## 2025-04-30 RX ORDER — ACETAMINOPHEN 500 MG/5ML
1000 LIQUID (ML) ORAL ONCE
Refills: 0 | Status: COMPLETED | OUTPATIENT
Start: 2025-04-30 | End: 2025-04-30

## 2025-04-30 RX ORDER — HEPARIN SODIUM 1000 [USP'U]/ML
300 INJECTION INTRAVENOUS; SUBCUTANEOUS
Qty: 10000 | Refills: 0 | Status: DISCONTINUED | OUTPATIENT
Start: 2025-04-30 | End: 2025-05-01

## 2025-04-30 RX ORDER — KETAMINE HCL IN 0.9 % NACL 50 MG/5 ML
25 SYRINGE (ML) INTRAVENOUS
Refills: 0 | Status: DISCONTINUED | OUTPATIENT
Start: 2025-04-30 | End: 2025-05-02

## 2025-04-30 RX ORDER — POLYETHYLENE GLYCOL 3350 17 G/17G
17 POWDER, FOR SOLUTION ORAL DAILY
Refills: 0 | Status: DISCONTINUED | OUTPATIENT
Start: 2025-04-30 | End: 2025-05-14

## 2025-04-30 RX ORDER — OXYCODONE HYDROCHLORIDE 30 MG/1
10 TABLET ORAL EVERY 4 HOURS
Refills: 0 | Status: DISCONTINUED | OUTPATIENT
Start: 2025-04-30 | End: 2025-05-02

## 2025-04-30 RX ORDER — HYDROMORPHONE/SOD CHLOR,ISO/PF 2 MG/10 ML
30 SYRINGE (ML) INJECTION
Refills: 0 | Status: DISCONTINUED | OUTPATIENT
Start: 2025-04-30 | End: 2025-05-02

## 2025-04-30 RX ORDER — KETAMINE HCL IN 0.9 % NACL 50 MG/5 ML
25 SYRINGE (ML) INTRAVENOUS
Refills: 0 | Status: DISCONTINUED | OUTPATIENT
Start: 2025-04-30 | End: 2025-04-30

## 2025-04-30 RX ADMIN — NOREPINEPHRINE BITARTRATE 7.66 MICROGRAM(S)/KG/MIN: 8 SOLUTION at 07:29

## 2025-04-30 RX ADMIN — Medication 500000 UNIT(S): at 07:30

## 2025-04-30 RX ADMIN — MYCOPHENOLATE MOFETIL 83.34 MILLIGRAM(S): 500 TABLET, FILM COATED ORAL at 08:09

## 2025-04-30 RX ADMIN — Medication 3 UNIT(S)/HR: at 19:42

## 2025-04-30 RX ADMIN — Medication 500000 UNIT(S): at 11:32

## 2025-04-30 RX ADMIN — Medication 3 UNIT(S)/HR: at 07:28

## 2025-04-30 RX ADMIN — Medication 400 MILLIGRAM(S): at 20:00

## 2025-04-30 RX ADMIN — Medication 30 MILLILITER(S): at 19:25

## 2025-04-30 RX ADMIN — Medication 1000 MILLIGRAM(S): at 20:30

## 2025-04-30 RX ADMIN — Medication 100 MILLIGRAM(S): at 17:53

## 2025-04-30 RX ADMIN — NALOXEGOL OXALATE 12.5 MILLIGRAM(S): 12.5 TABLET, FILM COATED ORAL at 11:47

## 2025-04-30 RX ADMIN — LIDOCAINE HYDROCHLORIDE 3 PATCH: 20 JELLY TOPICAL at 23:00

## 2025-04-30 RX ADMIN — VASOPRESSIN 15 UNIT(S)/MIN: 20 INJECTION INTRAVENOUS at 00:41

## 2025-04-30 RX ADMIN — TRAMADOL HYDROCHLORIDE 25 MILLIGRAM(S): 50 TABLET, FILM COATED ORAL at 05:40

## 2025-04-30 RX ADMIN — METHOCARBAMOL 220 MILLIGRAM(S): 500 TABLET, FILM COATED ORAL at 05:09

## 2025-04-30 RX ADMIN — OXYCODONE HYDROCHLORIDE 10 MILLIGRAM(S): 30 TABLET ORAL at 10:40

## 2025-04-30 RX ADMIN — DOBUTAMINE 14.7 MICROGRAM(S)/KG/MIN: 250 INJECTION INTRAVENOUS at 00:41

## 2025-04-30 RX ADMIN — DOBUTAMINE 14.7 MICROGRAM(S)/KG/MIN: 250 INJECTION INTRAVENOUS at 19:40

## 2025-04-30 RX ADMIN — METHYLPREDNISOLONE ACETATE 40 MILLIGRAM(S): 80 INJECTION, SUSPENSION INTRA-ARTICULAR; INTRALESIONAL; INTRAMUSCULAR; SOFT TISSUE at 17:53

## 2025-04-30 RX ADMIN — METHYLPREDNISOLONE ACETATE 125 MILLIGRAM(S): 80 INJECTION, SUSPENSION INTRA-ARTICULAR; INTRALESIONAL; INTRAMUSCULAR; SOFT TISSUE at 00:23

## 2025-04-30 RX ADMIN — Medication 100 MILLIGRAM(S): at 22:55

## 2025-04-30 RX ADMIN — TACROLIMUS 1 MILLIGRAM(S): 0.5 CAPSULE ORAL at 07:30

## 2025-04-30 RX ADMIN — TRAMADOL HYDROCHLORIDE 25 MILLIGRAM(S): 50 TABLET, FILM COATED ORAL at 05:09

## 2025-04-30 RX ADMIN — Medication 125 MILLIGRAM(S): at 05:09

## 2025-04-30 RX ADMIN — TRAMADOL HYDROCHLORIDE 25 MILLIGRAM(S): 50 TABLET, FILM COATED ORAL at 13:20

## 2025-04-30 RX ADMIN — Medication 9.19 MICROGRAM(S)/KG/MIN: at 00:41

## 2025-04-30 RX ADMIN — TRAMADOL HYDROCHLORIDE 25 MILLIGRAM(S): 50 TABLET, FILM COATED ORAL at 22:55

## 2025-04-30 RX ADMIN — CEFEPIME 100 MILLIGRAM(S): 2 INJECTION, POWDER, FOR SOLUTION INTRAVENOUS at 20:30

## 2025-04-30 RX ADMIN — LIDOCAINE HYDROCHLORIDE 3 PATCH: 20 JELLY TOPICAL at 19:00

## 2025-04-30 RX ADMIN — Medication 3 UNIT(S)/HR: at 18:53

## 2025-04-30 RX ADMIN — HEPARIN SODIUM 5000 UNIT(S): 1000 INJECTION INTRAVENOUS; SUBCUTANEOUS at 13:21

## 2025-04-30 RX ADMIN — Medication 500000 UNIT(S): at 20:30

## 2025-04-30 RX ADMIN — Medication 2 TABLET(S): at 22:55

## 2025-04-30 RX ADMIN — Medication 0.5 MILLIGRAM(S): at 04:30

## 2025-04-30 RX ADMIN — Medication 0.5 MILLIGRAM(S): at 08:45

## 2025-04-30 RX ADMIN — Medication 0.5 MILLIGRAM(S): at 08:33

## 2025-04-30 RX ADMIN — GABAPENTIN 100 MILLIGRAM(S): 400 CAPSULE ORAL at 05:09

## 2025-04-30 RX ADMIN — Medication 0.5 MILLIGRAM(S): at 12:20

## 2025-04-30 RX ADMIN — Medication 0.5 MILLIGRAM(S): at 12:30

## 2025-04-30 RX ADMIN — Medication 10 MILLILITER(S): at 00:42

## 2025-04-30 RX ADMIN — OXYCODONE HYDROCHLORIDE 10 MILLIGRAM(S): 30 TABLET ORAL at 10:13

## 2025-04-30 RX ADMIN — METHYLNALTREXONE BROMIDE 12 MILLIGRAM(S): 12 INJECTION, SOLUTION SUBCUTANEOUS at 11:35

## 2025-04-30 RX ADMIN — VASOPRESSIN 15 UNIT(S)/MIN: 20 INJECTION INTRAVENOUS at 07:26

## 2025-04-30 RX ADMIN — Medication 1 APPLICATION(S): at 12:53

## 2025-04-30 RX ADMIN — Medication 10 MILLILITER(S): at 07:29

## 2025-04-30 RX ADMIN — DOBUTAMINE 18.4 MICROGRAM(S)/KG/MIN: 250 INJECTION INTRAVENOUS at 07:26

## 2025-04-30 RX ADMIN — CALCIUM GLUCONATE 200 GRAM(S): 20 INJECTION, SOLUTION INTRAVENOUS at 21:01

## 2025-04-30 RX ADMIN — Medication 100 MILLIGRAM(S): at 05:38

## 2025-04-30 RX ADMIN — PREGABALIN 50 MILLIGRAM(S): 75 CAPSULE ORAL at 13:20

## 2025-04-30 RX ADMIN — OXYCODONE HYDROCHLORIDE 10 MILLIGRAM(S): 30 TABLET ORAL at 14:23

## 2025-04-30 RX ADMIN — CEFEPIME 100 MILLIGRAM(S): 2 INJECTION, POWDER, FOR SOLUTION INTRAVENOUS at 11:32

## 2025-04-30 RX ADMIN — Medication 500000 UNIT(S): at 16:28

## 2025-04-30 RX ADMIN — NOREPINEPHRINE BITARTRATE 7.66 MICROGRAM(S)/KG/MIN: 8 SOLUTION at 19:41

## 2025-04-30 RX ADMIN — Medication 0.5 MILLIGRAM(S): at 02:10

## 2025-04-30 RX ADMIN — Medication 0.5 MILLIGRAM(S): at 00:05

## 2025-04-30 RX ADMIN — Medication 50 MILLIEQUIVALENT(S): at 01:11

## 2025-04-30 RX ADMIN — Medication 50 MILLIEQUIVALENT(S): at 02:01

## 2025-04-30 RX ADMIN — OXYCODONE HYDROCHLORIDE 10 MILLIGRAM(S): 30 TABLET ORAL at 14:40

## 2025-04-30 RX ADMIN — Medication 50 MILLIEQUIVALENT(S): at 21:01

## 2025-04-30 RX ADMIN — Medication 6.13 MICROGRAM(S)/KG/MIN: at 19:41

## 2025-04-30 RX ADMIN — Medication 25 MILLIGRAM(S): at 15:27

## 2025-04-30 RX ADMIN — VASOPRESSIN 15 UNIT(S)/MIN: 20 INJECTION INTRAVENOUS at 11:38

## 2025-04-30 RX ADMIN — HEPARIN SODIUM 0.6 UNIT(S)/HR: 1000 INJECTION INTRAVENOUS; SUBCUTANEOUS at 02:37

## 2025-04-30 RX ADMIN — TACROLIMUS 1 MILLIGRAM(S): 0.5 CAPSULE ORAL at 20:30

## 2025-04-30 RX ADMIN — Medication 40 MILLIGRAM(S): at 11:32

## 2025-04-30 RX ADMIN — DOBUTAMINE 18.4 MICROGRAM(S)/KG/MIN: 250 INJECTION INTRAVENOUS at 15:28

## 2025-04-30 RX ADMIN — VASOPRESSIN 15 UNIT(S)/MIN: 20 INJECTION INTRAVENOUS at 19:42

## 2025-04-30 RX ADMIN — Medication 500 MILLIGRAM(S): at 00:10

## 2025-04-30 RX ADMIN — Medication 25 MICROGRAM(S): at 05:25

## 2025-04-30 RX ADMIN — NOREPINEPHRINE BITARTRATE 7.66 MICROGRAM(S)/KG/MIN: 8 SOLUTION at 17:54

## 2025-04-30 RX ADMIN — Medication 25 MICROGRAM(S): at 07:57

## 2025-04-30 RX ADMIN — POLYETHYLENE GLYCOL 3350 17 GRAM(S): 17 POWDER, FOR SOLUTION ORAL at 11:32

## 2025-04-30 RX ADMIN — LIDOCAINE HYDROCHLORIDE 3 PATCH: 20 JELLY TOPICAL at 11:33

## 2025-04-30 RX ADMIN — DEXMEDETOMIDINE HYDROCHLORIDE IN SODIUM CHLORIDE 6.13 MICROGRAM(S)/KG/HR: 4 INJECTION INTRAVENOUS at 05:40

## 2025-04-30 RX ADMIN — TRAMADOL HYDROCHLORIDE 25 MILLIGRAM(S): 50 TABLET, FILM COATED ORAL at 00:10

## 2025-04-30 RX ADMIN — TRAMADOL HYDROCHLORIDE 25 MILLIGRAM(S): 50 TABLET, FILM COATED ORAL at 14:00

## 2025-04-30 RX ADMIN — PREGABALIN 25 MILLIGRAM(S): 75 CAPSULE ORAL at 18:40

## 2025-04-30 RX ADMIN — Medication 10 MILLILITER(S): at 19:41

## 2025-04-30 RX ADMIN — Medication 25 MICROGRAM(S): at 05:45

## 2025-04-30 RX ADMIN — MYCOPHENOLATE MOFETIL 83.34 MILLIGRAM(S): 500 TABLET, FILM COATED ORAL at 20:30

## 2025-04-30 RX ADMIN — Medication 3 UNIT(S)/HR: at 00:41

## 2025-04-30 RX ADMIN — Medication 1000 MILLIGRAM(S): at 12:00

## 2025-04-30 RX ADMIN — Medication 25 MICROGRAM(S): at 08:15

## 2025-04-30 RX ADMIN — Medication 30 MILLILITER(S): at 17:29

## 2025-04-30 RX ADMIN — CEFEPIME 100 MILLIGRAM(S): 2 INJECTION, POWDER, FOR SOLUTION INTRAVENOUS at 03:35

## 2025-04-30 RX ADMIN — Medication 0.5 MILLIGRAM(S): at 00:20

## 2025-04-30 RX ADMIN — Medication 6.13 MICROGRAM(S)/KG/MIN: at 07:25

## 2025-04-30 RX ADMIN — HEPARIN SODIUM 5000 UNIT(S): 1000 INJECTION INTRAVENOUS; SUBCUTANEOUS at 22:54

## 2025-04-30 RX ADMIN — METHOCARBAMOL 220 MILLIGRAM(S): 500 TABLET, FILM COATED ORAL at 13:08

## 2025-04-30 RX ADMIN — Medication 125 MILLIGRAM(S): at 17:54

## 2025-04-30 RX ADMIN — METHOCARBAMOL 220 MILLIGRAM(S): 500 TABLET, FILM COATED ORAL at 22:54

## 2025-04-30 RX ADMIN — NOREPINEPHRINE BITARTRATE 7.66 MICROGRAM(S)/KG/MIN: 8 SOLUTION at 00:42

## 2025-04-30 RX ADMIN — Medication 0.5 MILLIGRAM(S): at 04:45

## 2025-04-30 RX ADMIN — ALBUMIN (HUMAN) 50 MILLILITER(S): 12.5 INJECTION, SOLUTION INTRAVENOUS at 00:30

## 2025-04-30 RX ADMIN — Medication 400 MILLIGRAM(S): at 11:51

## 2025-04-30 RX ADMIN — Medication 0.5 MILLIGRAM(S): at 02:35

## 2025-04-30 RX ADMIN — HEPARIN SODIUM 0.6 UNIT(S)/HR: 1000 INJECTION INTRAVENOUS; SUBCUTANEOUS at 11:36

## 2025-04-30 NOTE — PHYSICAL THERAPY INITIAL EVALUATION ADULT - THERAPY FREQUENCY, PT EVAL
2-3x/week Mohs Histo Method Verbiage: Each section was then color coded, mapped and processed in the Mohs lab using the Mohs protocol and submitted for frozen section. \\n\\Bandar histologic findings, including description and depth of tumor present at margins (if any) and presence or absence of perineural involvement, or scar, is noted on the Mohs map.

## 2025-04-30 NOTE — PHYSICAL THERAPY INITIAL EVALUATION ADULT - PERTINENT HX OF CURRENT PROBLEM, REHAB EVAL
69-year-old male patient past medical history of NICM since 2011, HFrEF LVEF 25-30% s/p MDT CRT-D with baseline CHB, VT/VF, a.fib s/p GIANFRANCO ligation/MAZE, MV/TV repair, PVC ablation 3/2024 intolerant to AADs in the past, recent admission 3/19 - 3/20/25 for AICD shocks initially presented to the [facility] emergency department on 3/21/2025, sent by heart failure specialist for ACID shock. Device reported successful ATP for VT 3/17 at 6:52pm followed by one ATP and 40J shock. He reported feeling dizzy and SOB during the events. He follows with Dr paula abreu for HF, currently undergoing transplant workup, Dr John for CRTD and VT/VF and  for genetic counseling. While admitted to [facility], he was started on a lidocaine gtt. On 3/21 when lidocaine weaned off patient had another two episodes of VT requiring AICD shocks. He was transferred to [facility] for further management. Status Post OHT 4/29/25. Heart transplant recipient (CMV +/+, EBV +/+, Toxo -/+), #Donor Syphilis Serology Positive, Donor Streptococcus pneumoniae meningitis.
70-year-old male ABO O past medical history of NICM since 2011 HFrEF (LVEF 25-30%) s/p MDT CRT-D with baseline CHB, VT/VF, AFs/p GIANFRANCO ligation/MAZE, MV/TV repair, PVC ablation 3/2024 intolerant to AADs in the past, recent admission 3/19/2025-3/20/2025 for AICD shocks initially presented to the [facility] ED on 3/21/2025, sent by HF specialist for ACID shock. Device reported successful ATP for VT 3/17/2025 at 6:52pm followed by one ATP & 40J shock. He reported feeling dizzy & SOB during the events. He follows with Dr. Luca Tamayo for HF, currently undergoing transplant workup, Dr John for CRTD and VT/VF and Dr. Chu for genetic counseling. While admitted to [facility], he was started on a lidocaine gtt. On 3/21 when lidocaine weaned off patient had another two episodes of VT requiring AICD shocks. He was transferred to [facility] for further management. He was initially maintained on lidocaine gtt from 3/21/2025-3/25/2025 & his listing status was upgraded to UNOS status 2e for heart transplant (ABO O) for the refractory VT. He was transitioned to oral antiarrhythmics (Toprol XL & quinidine) & ultimately transferred from CICU to tele floor on 3/27/2025. Hospital course was further c/b development of watery diarrhea & was found to have +norovirus on 3/31/2025 which prompted deactivation to status 7 listing for heart transplant.

## 2025-04-30 NOTE — PHYSICAL THERAPY INITIAL EVALUATION ADULT - PLANNED THERAPY INTERVENTIONS, PT EVAL
GOAL: Pt will be able to independently asc/desc 2 steps with 1HR within 2 weeks./bed mobility training/gait training/transfer training

## 2025-04-30 NOTE — PHYSICAL THERAPY INITIAL EVALUATION ADULT - ADDITIONAL COMMENTS
Pt lives with spouse in home with 2 stairs to entrance, bedroom and bathroom on first floor. Prior to admission pt independent with all functional mobility including ambulation without AD.
Pt lives with wife in a private home with 2 steps to enter. First floor set up. Pt was functionally independent prior to admission w/o AD. Working.

## 2025-04-30 NOTE — PHYSICAL THERAPY INITIAL EVALUATION ADULT - GENERAL OBSERVATIONS, REHAB EVAL
Pt received seated in chair, A&Ox4, following 100% of commands, +tele
Pt received seated in chair in NAD, +A-line, +R IJ, +CTs, +external PM, +HFNC, +Coronado, +CRRT (paused) +ICU monitoring. Pt AOx4 pleasant and cooperative.

## 2025-04-30 NOTE — PROGRESS NOTE ADULT - PROBLEM SELECTOR PLAN 1
ACC/AHA stage D systolic heart failure, s/p heart transplant 4/28, CMV +/+, Toxo -/+  - Retrospective crossmatch with new class II DSA DR HILLIARD.    - Currently being supported on: Dobutamine 7.5, Epi 0.02, Levophed 0.07, Vasopressin 0.06 and Lico @ 20  - Chest tubes x 4 in place, per CTS  - Diuresis: guided by CVP ACC/AHA stage D systolic heart failure, s/p heart transplant 4/28, CMV +/+, Toxo -/+  - Retrospective crossmatch with new class II DSA DR HILLIARD.    - Currently being supported on: Dobutamine 7.5, Epi 0.02, Levophed 0.07, Vasopressin 0.06 and Lico @ 20  - Chest tubes x 4 in place, per CTS  - Diuresis: guided by CVP  - Repeat TTE today (4/30)

## 2025-04-30 NOTE — CONSULT NOTE ADULT - ATTENDING COMMENTS
ZAHRA- ATN post cardiac transplant in the setting of cardiogenic shock   Currently on 2 pressors and 2 inotropes   started CVVHDF overnight for fluid overload     - continue CVVHDF   - Fluid removal as tolerated  - increased blood flow to 250 ml/min and added heparin to prevent clotting     jens calvillo  nephrology attending   please contact me on TEAMS   Office- [phone]

## 2025-04-30 NOTE — PROGRESS NOTE ADULT - PROBLEM SELECTOR PLAN 2
- Immunosuppression:      - Cellcept 1000mg BID      - Solu-medrol taper: Currently 40mg BID (4/30)      - Tarco: 1mg BID, goal trough 8-10    - Antibiotics      - PO Vanco ppx (4/29 - )      - Cefepime 1000mg ppx (4/29 - )      - Vanco IV 500mg BID ppx (4/29 - )      - CTX 2000mg QD for donor strep pneumo+ in CSF      - Penicillin IM for donor syphilis + ab - Immunosuppression:      - Cellcept 1000mg BID      - Solu-medrol taper: Currently 40mg BID (4/30)      - Tarco: 1mg BID, goal trough 8-10    - Antibiotics      - PO Vanco ppx (4/29 - )      - Cefepime 1000mg ppx (4/29 - )      - Vanco IV 500mg BID ppx (4/29 - )      - CTX 2000mg QD for donor strep pneumo+ in CSF, planned for possible 4 weeks course      - Penicillin IM for donor syphilis + ab

## 2025-04-30 NOTE — PROGRESS NOTE ADULT - ASSESSMENT
69-year-old male patient past medical history of NICM since 2011, HFrEF LVEF 25-30% s/p MDT CRT-D with baseline CHB, VT/VF, a.fib s/p GIANFRANCO ligation/MAZE, MV/TV repair, PVC ablation 3/2024 intolerant to AADs in the past, recent admission 3/19 - 3/20/25 for AICD shocks initially presented to the [facility] emergency department on 3/21/2025, sent by heart failure specialist for ACID shock. Device reported successful ATP for VT 3/17 at 6:52pm followed by one ATP and 40J shock. He reported feeling dizzy and SOB during the events. He follows with Dr paula abreu for HF, currently undergoing transplant workup, Dr John for CRTD and VT/VF and  for genetic counseling. While admitted to [facility], he was started on a lidocaine gtt. On 3/21 when lidocaine weaned off patient had another two episodes of VT requiring AICD shocks. He was transferred to [facility] for further management.     Status Post OHT 4/29/25 also with removal of ICD CRT, tricuspid valve repair (34 mm Physio ring)  Per report no evidence of vegetations on valves at time of transplant    Donor CSF culture (April 22) heavy growth Streptococcus pneumoniae.  Ceftriaxone <= 0.25 (susceptible) penicillin 0.12 (resistant for CSF) vancomycin 0.25 (susceptible)    Donor TTE without evidence of vegetations  Donor CT C/A/P with no acute abnormality of abdomen or pelvis.  Left lower lobe consolidation  Donor blood cultures were with no growth to date    #Heart transplant recipient (CMV +/+, EBV +/+, Toxo -/+)  --Continue vancomycin and cefepime for 72 hours as perioperative coverage  --Will need initiation of Valcyte for CMV prophylaxis  --Will need initiation of Bactrim for PCP and toxoplasma prophylaxis    #Donor Syphilis Serology Positive  --Continue benzathine penicillin 2,400,000 units weekly x 3 doses total    #Donor Streptococcus pneumoniae meningitis  --After completion of perioperative antibiotics would switch to ceftriaxone 2 g IV every 24 hours to complete 2 weeks of antibiotics post transplant. Will discuss potentially extending to 4 weeks post-op given unclear etiology of patients S. pneum meningitis (concern for drug overdose) and valvular dysfunction in recipient at time of implant.     I will continue to follow. Please feel free to contact me with any further questions.    Julius Berlinrut, M.D.  [facility] Division of Infectious Disease  8AM-5PM Monday - Friday: Available on [employer] Teams  After Hours and Holidays (or if no response on [employer] Teams): Please contact the Infectious Diseases Office at [phone]    The above assessment and plan were discussed with transplant cardiology team

## 2025-04-30 NOTE — CONSULT NOTE ADULT - PROBLEM SELECTOR RECOMMENDATION 9
Pt with ZAHRA in the setting of post OHT, anemia. . Exact duration of ZAHRA however unknown. Send UA, urine electrolytes, spot urine TP/CR. Send serological work-up for secondary causes of renal failure including SILVIA, P-ANCA, C-ANCA, Anti-GBM ab, HBsAg, Hep C antibody, HIV, Parvovirus, C3, C4, serum and urine immunofixation. Recommend Coronado catheter placement. Agree with IVF NS for now.  Will need to consider HD if renal failure continues to worsen. Monitor labs and urine output. Avoid NSAIDs, ACEI/ARBS, RCA and nephrotoxins. Dose medications as per eGFR. Pt with ZAHRA iso post OHT surgery, anemia, hypotension, possibly pre-renal vs ATN. Per HIE review, pt's baseline Scr is 1.0-1.2. On 4/28, SCr was 1.1 and on 4/29, Scr rubia to 3.2. UA-turbid, 300 protein, small leuks, large blood, 914 rbc's, amorphous salt crystals, UPCR-3.2. Pt had drop in hgb from 12.4 (4/28) to 9.0 (4/29). Coronado catheter in place with improvement in UOP on bumex drip. Pt on multiple pressor support. Pt noted to have elevated CVP of 20 and was started on CRRT with improvement of CVP to 11. Discussed with the patient that he will require RRT/HD, risks and benefits associated with RRT/HD explained at length. HD consent obtained and kept in patients chart. Monitor labs and urine output. Avoid NSAIDs, ACEI/ARBS, RCA and nephrotoxins. Dose medications as per eGFR.      Please call if you have any questions  Jerry Odom, PGY4  Nephrology Fellow  [phone]/Teams preferred  (After 5PM or weekends, reach out to fellow on call) Pt with ZAHRA iso post OHT surgery, anemia, hypotension, possibly pre-renal vs ATN. Per HIE review, pt's baseline Scr is 1.0-1.2. On 4/28, SCr was 1.1 and on 4/29, Scr rubia to 3.2. UA-turbid, 300 protein, small leuks, large blood, 914 rbc's, amorphous salt crystals, UPCR-3.2. Pt had drop in hgb from 12.4 (4/28) to 9.0 (4/29). Coronado catheter in place with improvement in UOP on bumex drip. Pt on multiple pressor support. Pt noted to have elevated CVP of 20 and was started on CRRT with improvement of CVP to 11. Discussed with the patient that he will require RRT/HD, risks and benefits associated with RRT/HD explained at length. HD consent obtained and kept in patients chart. Pt beginning Tac today, less likely Tac toxicity. Monitor Tac level. Monitor labs and urine output. Avoid NSAIDs, ACEI/ARBS, RCA and nephrotoxins. Dose medications as per eGFR.      Please call if you have any questions  Jerry Odom, PGY4  Nephrology Fellow  [phone]/Teams preferred  (After 5PM or weekends, reach out to fellow on call) Pt with ZAHRA iso post OHT surgery, anemia, hypotension, possibly pre-renal vs ATN. Per HIE review, pt's baseline Scr is 1.0-1.2. On 4/28, SCr was 1.1 and on 4/29, Scr rubia to 3.2. UA-turbid, 300 protein, small leuks, large blood, 914 rbc's, amorphous salt crystals, UPCR-3.2. Pt had drop in hgb from 12.4 (4/28) to 9.0 (4/29). Coronado catheter in place with improvement in UOP on bumex drip. Pt on multiple pressor support. Pt noted to have elevated CVP of 20 and was started on CRRT with improvement of CVP to 11. Discussed with the patient that he will require RRT/HD, risks and benefits associated with RRT/HD explained at length. HD consent obtained and kept in patients chart. Pt beginning Tac today, less likely Tac toxicity. Monitor Tac level. Please get repeat urine lytes, as patient had elevated UPCR, but could be from large blood in UA. Monitor labs and urine output. Avoid NSAIDs, ACEI/ARBS, RCA and nephrotoxins. Dose medications as per eGFR.      Please call if you have any questions  Jerry Odom, PGY4  Nephrology Fellow  [phone]/Teams preferred  (After 5PM or weekends, reach out to fellow on call)

## 2025-04-30 NOTE — PROGRESS NOTE ADULT - SUBJECTIVE AND OBJECTIVE BOX
Patient seen and examined at the bedside.    Remains critically ill on continuous ICU monitoring, at risk for life threatening decompensation.  All Labs, data reviewed. Plan of care discussed in length during multi-disciplinary ICU rounds.       Brief Summary:  *XX yo M/F with pre op dx s/p surgery on XX/YY*      24 Hour events:      Objective:  Vital Signs Last 24 Hrs  T(C): 2.8 (30 Apr 2025 13:00), Max: 37.7 (29 Apr 2025 20:00)  T(F): 37.2 (30 Apr 2025 13:00), Max: 99.9 (29 Apr 2025 20:00)  HR: 101 (30 Apr 2025 14:00) (101 - 110)  BP: 100/62 (30 Apr 2025 14:00) (91/55 - 112/67)  BP(mean): 76 (30 Apr 2025 14:00) (65 - 84)  RR: 23 (30 Apr 2025 14:00) (11 - 33)  SpO2: 95% (30 Apr 2025 14:00) (90% - 100%)    Parameters below as of 30 Apr 2025 12:00  Patient On (Oxygen Delivery Method): nasal cannula, high flow  O2 Flow (L/min): 40  O2 Concentration (%): 40                Physical Exam:   General: Alert and interactive   Neurology: Oriented, following commands  Respiratory: Clear bilaterally   CV: Sinus   *If on ECMO/LVAD Add settings+ AC Therapy*  Abdominal: Soft, Nontender  Extremities: Warm, well-perfused  -------------------------------------------------------------------------------------------------------------------------------    Labs:                        9.1    24.98 )-----------( 107      ( 30 Apr 2025 00:25 )             27.1     04-30    137  |  99  |  27[H]  ----------------------------<  102[H]  4.2   |  22  |  2.80[H]    Ca    9.2      30 Apr 2025 13:24  Phos  4.8     04-30  Mg     2.2     04-30    TPro  6.7  /  Alb  4.6  /  TBili  2.1[H]  /  DBili  x   /  AST  36  /  ALT  31  /  AlkPhos  44  04-30    LIVER FUNCTIONS - ( 30 Apr 2025 13:24 )  Alb: 4.6 g/dL / Pro: 6.7 g/dL / ALK PHOS: 44 U/L / ALT: 31 U/L / AST: 36 U/L / GGT: x           PT/INR - ( 30 Apr 2025 00:26 )   PT: 17.7 sec;   INR: 1.55 ratio         PTT - ( 30 Apr 2025 00:26 )  PTT:26.9 sec  ABG - ( 30 Apr 2025 12:52 )  pH, Arterial: 7.33  pH, Blood: x     /  pCO2: 45    /  pO2: 111   / HCO3: 24    / Base Excess: -2.2  /  SaO2: 99.2                  ALL MEDICATIONS   MEDICATIONS  (STANDING):  acetaminophen     Tablet .. 500 milliGRAM(s) Oral every 6 hours  cefepime   IVPB 1000 milliGRAM(s) IV Intermittent every 8 hours  chlorhexidine 2% Cloths 1 Application(s) Topical daily  CRRT Treatment    <Continuous>  dexMEDEtomidine Infusion 0.3 MICROgram(s)/kG/Hr (6.13 mL/Hr) IV Continuous <Continuous>  dextrose 50% Injectable 50 milliLiter(s) IV Push every 15 minutes  dextrose 50% Injectable 25 milliLiter(s) IV Push every 15 minutes  DOBUTamine Infusion 7.5 MICROgram(s)/kG/Min (18.4 mL/Hr) IV Continuous <Continuous>  EPINEPHrine    Infusion 0.02 MICROgram(s)/kG/Min (6.13 mL/Hr) IV Continuous <Continuous>  heparin   Injectable 5000 Unit(s) SubCutaneous every 8 hours  heparin  Infusion Syringe 300 Unit(s)/Hr (0.6 mL/Hr) CRRT <Continuous>  insulin regular Infusion 3 Unit(s)/Hr (3 mL/Hr) IV Continuous <Continuous>  lidocaine   4% Patch 3 Patch Transdermal daily  methocarbamol IVPB 1000 milliGRAM(s) IV Intermittent every 8 hours  methylPREDNISolone sodium succinate Injectable 40 milliGRAM(s) IV Push every 12 hours  methylPREDNISolone sodium succinate Injectable   IV Push   mycophenolate mofetil IVPB 1000 milliGRAM(s) IV Intermittent every 12 hours  norepinephrine Infusion 0.05 MICROgram(s)/kG/Min (7.66 mL/Hr) IV Continuous <Continuous>  nystatin    Suspension 139806 Unit(s) Oral <User Schedule>  pantoprazole  Injectable 40 milliGRAM(s) IV Push daily  polyethylene glycol 3350 17 Gram(s) Oral daily  potassium chloride  10 mEq/50 mL IVPB 10 milliEquivalent(s) IV Intermittent once  potassium chloride  10 mEq/50 mL IVPB 10 milliEquivalent(s) IV Intermittent once  pregabalin 25 milliGRAM(s) Oral two times a day  PrismaSATE Dialysate BGK 4 / 2.5 5000 milliLiter(s) (1400 mL/Hr) CRRT <Continuous>  PrismaSOL Filtration BGK 4 / 2.5 5000 milliLiter(s) (1000 mL/Hr) CRRT <Continuous>  PrismaSOL Filtration BGK 4 / 2.5 5000 milliLiter(s) (200 mL/Hr) CRRT <Continuous>  senna 2 Tablet(s) Oral at bedtime  sodium chloride 0.9%. 1000 milliLiter(s) (10 mL/Hr) IV Continuous <Continuous>  tacrolimus 1 milliGRAM(s) Oral <User Schedule>  traMADol 25 milliGRAM(s) Oral every 8 hours  traZODone 100 milliGRAM(s) Oral at bedtime  vancomycin    Solution 125 milliGRAM(s) Oral every 12 hours  vancomycin  IVPB 500 milliGRAM(s) IV Intermittent every 12 hours  vasopressin Infusion 0.1 Unit(s)/Min (15 mL/Hr) IV Continuous <Continuous>    MEDICATIONS  (PRN):  ketamine Injectable 25 milliGRAM(s) IV Push every 2 hours PRN severe pain  oxyCODONE    IR 10 milliGRAM(s) Oral every 4 hours PRN Moderate Pain (4 - 6)    ------------------------------------------------------------------------------------------------------------------------------  Assessment:    At risk for delirium   At risk for hemodynamic decompensation  At high risk for cardiac arrythmias   At high risk for respiratory complications        Plan:   ***Neuro***  Maintain day/night cycle to prevenct ICU delerium   Postoperative acute pain control with Tylenol, Gabapentin, and prns.  Meds for sleep at bedtime     ***Cardiovascular***  At high risk for hemodynamic instability and cardiac arrhythmias.  Monitor for sign of hypoperfusion, trend lactate  *If on ECMO/LVAD/Impella/IABP* Mechanical circulatory support   Maintain MAPs > 65 mm Hg. Titrate *pressor name*    Titrate *inotrope name* Keep CVP<10.  *Add other cardiac meds*  Monitor chest tube output.      ***Pulmonary***  *Leave black if supplemental O2*  Postoperative acute respiratory insufficiency/failure  Wean ventilator as tolerated. Lung protective strategy  *If extubated* Deep breathing and coughing exercises, IS, Mobilization, nebs, Chest PT  Wean oxygen as able. *Remove if not on vent*      ***GI***  FEES Eval *Only if consulted*  Keep NPO for now.  Continue Tube feeds *if on Tfs*  Protonix/Pepcid for stress ulcer prophylaxis   Bowel regimen.   Trend LFTs    ***Renal***  ZAHRA / CKD/ ESRD on HD ***Remove if not applicable***  Trend Creatinine/BUN  Coronado catheter for strict I/O measurements. *remove if not present*  Replete electrolytes as indicated.  Diuresis *Specifiy med*      ***ID***  Leukocytosis *above 11*  Leukopenia *Below 3*  *If no abxs*  Monitor off antibiotics   *summarize abx/indication*  Secondary prophylaxis *Leave blank*     ***Endocrine***  Insulin per protocol for Hyperglycemia     ***Hematology***  Acute blood loss anemia and thrombocytopenia   no current transfusion indication *change if recieved products, or if no thrombocytopenia*  Heparin/Argatroban/Lovenox for anticoagulation/ DVT prophylaxis         IAlexandra MD, personally performed the services described in this documentation. all medical record entries made by the scribe were at my direction and in my presence. I have reviewed the chart and agree that the record reflects my personal performance and is accurate and complete  Electronically signed:   Alexandra Fierro MD  CT ICU attending     ICU time: ** mins     By signing my name below, I, Marc Meza, attest that this documentation has been prepared under the direction and in the presence of Alexandra Fierro MD  Electronically signed: Marc Meza, 04-30-25 @ 14:50             Patient seen and examined at the bedside.    Remains critically ill on continuous ICU monitoring, at risk for life threatening decompensation.  All Labs, data reviewed. Plan of care discussed in length during multi-disciplinary ICU rounds.       Brief Summary:  70-year-old male with history of NICM since 2011 HFrEF (LVEF 25-30%)   Now s/p OHT on 4/29/25     24 Hour events:  Remains on Dobutamine and epi  Started on CRRT, CVP is still up    Objective:  Vital Signs Last 24 Hrs  T(C): 2.8 (30 Apr 2025 13:00), Max: 37.7 (29 Apr 2025 20:00)  T(F): 37.2 (30 Apr 2025 13:00), Max: 99.9 (29 Apr 2025 20:00)  HR: 101 (30 Apr 2025 14:00) (101 - 110)  BP: 100/62 (30 Apr 2025 14:00) (91/55 - 112/67)  BP(mean): 76 (30 Apr 2025 14:00) (65 - 84)  RR: 23 (30 Apr 2025 14:00) (11 - 33)  SpO2: 95% (30 Apr 2025 14:00) (90% - 100%)    Parameters below as of 30 Apr 2025 12:00  Patient On (Oxygen Delivery Method): nasal cannula, high flow  O2 Flow (L/min): 40  O2 Concentration (%): 40    Physical Exam:   General: Alert and interactive   Neurology: Oriented, following commands  Respiratory: B/L breath sounds  CV: Paced at 110    Abdominal: Soft, Nontender  Extremities: Warm, well-perfused  -------------------------------------------------------------------------------------------------------------------------------    Labs:                        9.1    24.98 )-----------( 107      ( 30 Apr 2025 00:25 )             27.1     04-30    137  |  99  |  27[H]  ----------------------------<  102[H]  4.2   |  22  |  2.80[H]    Ca    9.2      30 Apr 2025 13:24  Phos  4.8     04-30  Mg     2.2     04-30    TPro  6.7  /  Alb  4.6  /  TBili  2.1[H]  /  DBili  x   /  AST  36  /  ALT  31  /  AlkPhos  44  04-30    LIVER FUNCTIONS - ( 30 Apr 2025 13:24 )  Alb: 4.6 g/dL / Pro: 6.7 g/dL / ALK PHOS: 44 U/L / ALT: 31 U/L / AST: 36 U/L / GGT: x           PT/INR - ( 30 Apr 2025 00:26 )   PT: 17.7 sec;   INR: 1.55 ratio         PTT - ( 30 Apr 2025 00:26 )  PTT:26.9 sec  ABG - ( 30 Apr 2025 12:52 )  pH, Arterial: 7.33  pH, Blood: x     /  pCO2: 45    /  pO2: 111   / HCO3: 24    / Base Excess: -2.2  /  SaO2: 99.2      ALL MEDICATIONS   MEDICATIONS  (STANDING):  acetaminophen     Tablet .. 500 milliGRAM(s) Oral every 6 hours  cefepime   IVPB 1000 milliGRAM(s) IV Intermittent every 8 hours  chlorhexidine 2% Cloths 1 Application(s) Topical daily  CRRT Treatment    <Continuous>  dexMEDEtomidine Infusion 0.3 MICROgram(s)/kG/Hr (6.13 mL/Hr) IV Continuous <Continuous>  dextrose 50% Injectable 50 milliLiter(s) IV Push every 15 minutes  dextrose 50% Injectable 25 milliLiter(s) IV Push every 15 minutes  DOBUTamine Infusion 7.5 MICROgram(s)/kG/Min (18.4 mL/Hr) IV Continuous <Continuous>  EPINEPHrine    Infusion 0.02 MICROgram(s)/kG/Min (6.13 mL/Hr) IV Continuous <Continuous>  heparin   Injectable 5000 Unit(s) SubCutaneous every 8 hours  heparin  Infusion Syringe 300 Unit(s)/Hr (0.6 mL/Hr) CRRT <Continuous>  insulin regular Infusion 3 Unit(s)/Hr (3 mL/Hr) IV Continuous <Continuous>  lidocaine   4% Patch 3 Patch Transdermal daily  methocarbamol IVPB 1000 milliGRAM(s) IV Intermittent every 8 hours  methylPREDNISolone sodium succinate Injectable 40 milliGRAM(s) IV Push every 12 hours  methylPREDNISolone sodium succinate Injectable   IV Push   mycophenolate mofetil IVPB 1000 milliGRAM(s) IV Intermittent every 12 hours  norepinephrine Infusion 0.05 MICROgram(s)/kG/Min (7.66 mL/Hr) IV Continuous <Continuous>  nystatin    Suspension 405177 Unit(s) Oral <User Schedule>  pantoprazole  Injectable 40 milliGRAM(s) IV Push daily  polyethylene glycol 3350 17 Gram(s) Oral daily  potassium chloride  10 mEq/50 mL IVPB 10 milliEquivalent(s) IV Intermittent once  potassium chloride  10 mEq/50 mL IVPB 10 milliEquivalent(s) IV Intermittent once  pregabalin 25 milliGRAM(s) Oral two times a day  PrismaSATE Dialysate BGK 4 / 2.5 5000 milliLiter(s) (1400 mL/Hr) CRRT <Continuous>  PrismaSOL Filtration BGK 4 / 2.5 5000 milliLiter(s) (1000 mL/Hr) CRRT <Continuous>  PrismaSOL Filtration BGK 4 / 2.5 5000 milliLiter(s) (200 mL/Hr) CRRT <Continuous>  senna 2 Tablet(s) Oral at bedtime  sodium chloride 0.9%. 1000 milliLiter(s) (10 mL/Hr) IV Continuous <Continuous>  tacrolimus 1 milliGRAM(s) Oral <User Schedule>  traMADol 25 milliGRAM(s) Oral every 8 hours  traZODone 100 milliGRAM(s) Oral at bedtime  vancomycin    Solution 125 milliGRAM(s) Oral every 12 hours  vancomycin  IVPB 500 milliGRAM(s) IV Intermittent every 12 hours  vasopressin Infusion 0.1 Unit(s)/Min (15 mL/Hr) IV Continuous <Continuous>    MEDICATIONS  (PRN):  ketamine Injectable 25 milliGRAM(s) IV Push every 2 hours PRN severe pain  oxyCODONE    IR 10 milliGRAM(s) Oral every 4 hours PRN Moderate Pain (4 - 6)    ------------------------------------------------------------------------------------------------------------------------------  Assessment:  71 yo NICM, s/p heart transplant     At risk for hemodynamic decompensation  At high risk for cardiac arrythmias   At high risk for respiratory complications  On inotropic medications  On pressors  ZAHRA  on CRRT  CKD      Plan:   ***Neuro***  Maintain day/night cycle to prevent ICU delirium   Postoperative acute pain control with Tylenol, Lyrica, oxycodone, prn diuladid   Meds for sleep at bedtime     ***Cardiovascular***  At high risk for hemodynamic instability and cardiac arrhythmias.  Monitor for sign of hypoperfusion, trend lactate, SVo2  Continue with Dobutamine and Epi  Wean pressors as able  Keep CVP <12  Keep Cale at 20ppm  Monitor chest tube output    ***Pulmonary***  Postoperative acute respiratory insufficiency  Deep breathing and coughing exercises, IS, Mobilization, nebs, Chest PT    ***GI***  Full liquid diet,   Advance as able  Protonix prophylaxis   Bowel regimen.   Trend LFTs    ***Renal***  ZAHRA / CKD- continue CRRT and fluid removal for CVP <12  Trend Creatinine/BUN  Continue with Bumex gtt  Replete electrolytes as indicated.    ***ID***  Leukocytosis  Cefepime, IV vanc  Taco Cellcept steroids per transplant  team  Po Vanc for ppx  PCN and Ceftriaxone for donor culture to follow  from Thursday     ***Endocrine***  Insulin per protocol for Hyperglycemia     ***Hematology***  Acute blood loss anemia and thrombocytopenia   CBC, Coags, teg , monitor for bleeding  Heparin SQ for DVt ppx    I, Alexandra Fierro MD, personally performed the services described in this documentation. all medical record entries made by the scribe were at my direction and in my presence. I have reviewed the chart and agree that the record reflects my personal performance and is accurate and complete  Electronically signed:   Alexandra Fierro MD  CT ICU attending     ICU time: 55 mins

## 2025-04-30 NOTE — PHYSICAL THERAPY INITIAL EVALUATION ADULT - DIAGNOSIS, PT EVAL
Pt is functionally independent with no skilled PT needs.
pt has decreased activity tolerance, decreased functional mobility capacity, decreased strength, impaired balance

## 2025-04-30 NOTE — PROGRESS NOTE ADULT - SUBJECTIVE AND OBJECTIVE BOX
Follow Up:      Interval History:    REVIEW OF SYSTEMS  [  ] ROS unobtainable because:    [  ] All other systems negative except as noted below    Constitutional:  [ ] fever [ ] chills  [ ] weight loss  [ ] weakness  Skin:  [ ] rash [ ] phlebitis	  Eyes: [ ] icterus [ ] pain  [ ] discharge	  ENMT: [ ] sore throat  [ ] thrush [ ] ulcers [ ] exudates  Respiratory: [ ] dyspnea [ ] hemoptysis [ ] cough [ ] sputum	  Cardiovascular:  [ ] chest pain [ ] palpitations [ ] edema	  Gastrointestinal:  [ ] nausea [ ] vomiting [ ] diarrhea [ ] constipation [ ] pain	  Genitourinary:  [ ] dysuria [ ] frequency [ ] hematuria [ ] discharge [ ] flank pain  [ ] incontinence  Musculoskeletal:  [ ] myalgias [ ] arthralgias [ ] arthritis  [ ] back pain  Neurological:  [ ] headache [ ] seizures  [ ] confusion/altered mental status    Allergies  No Known Allergies        ANTIMICROBIALS:  cefepime   IVPB 1000 every 8 hours  nystatin    Suspension 620294 <User Schedule>  vancomycin    Solution 125 every 12 hours  vancomycin  IVPB 500 every 12 hours      OTHER MEDS:  MEDICATIONS  (STANDING):  acetaminophen     Tablet .. 500 every 6 hours  dexMEDEtomidine Infusion 0.3 <Continuous>  dextrose 50% Injectable 50 every 15 minutes  dextrose 50% Injectable 25 every 15 minutes  DOBUTamine Infusion 6 <Continuous>  EPINEPHrine    Infusion 0.02 <Continuous>  heparin   Injectable 5000 every 8 hours  heparin  Infusion Syringe 300 <Continuous>  HYDROmorphone PCA (1 mG/mL) 30 PCA Continuous  HYDROmorphone PCA (1 mG/mL) Rescue Clinician Bolus 0.5 every 15 minutes PRN  insulin regular Infusion 3 <Continuous>  ketamine Injectable 25 every 2 hours PRN  methocarbamol IVPB 1000 every 8 hours  methylPREDNISolone sodium succinate Injectable    methylPREDNISolone sodium succinate Injectable 40 every 12 hours  mycophenolate mofetil IVPB 1000 every 12 hours  norepinephrine Infusion 0.05 <Continuous>  oxyCODONE    IR 10 every 4 hours PRN  pantoprazole  Injectable 40 daily  polyethylene glycol 3350 17 daily  pregabalin 25 two times a day  senna 2 at bedtime  tacrolimus 1 <User Schedule>  traMADol 25 every 8 hours  traZODone 100 at bedtime  vasopressin Infusion 0.1 <Continuous>      Vital Signs Last 24 Hrs  T(C): 37 (30 Apr 2025 16:00), Max: 37.7 (29 Apr 2025 20:00)  T(F): 98.6 (30 Apr 2025 16:00), Max: 99.9 (29 Apr 2025 20:00)  HR: 104 (30 Apr 2025 16:30) (100 - 110)  BP: 107/57 (30 Apr 2025 16:00) (91/55 - 112/67)  BP(mean): 77 (30 Apr 2025 16:00) (65 - 84)  RR: 22 (30 Apr 2025 16:30) (9 - 33)  SpO2: 93% (30 Apr 2025 16:30) (90% - 100%)    Parameters below as of 30 Apr 2025 16:00  Patient On (Oxygen Delivery Method): nasal cannula, high flow  O2 Flow (L/min): 40  O2 Concentration (%): 40    PHYSICAL EXAMINATION:  General: Alert and Awake, NAD  HEENT: PERRL, EOMI  Neck: Supple  Cardiac: RRR, No M/R/G  Resp: CTAB, No Wh/Rh/Ra  Abdomen: NBS, NT/ND, No HSM, No rigidity or guarding  MSK: No LE edema. No Calf tenderness  : No nagy  Skin: No rashes or lesions. Skin is warm and dry to the touch.   Neuro: Alert and Awake. CN 2-12 Grossly intact. Moves all four extremities spontaneously.  Psych: Calm, Pleasant, Cooperative                          9.1    24.98 )-----------( 107      ( 30 Apr 2025 00:25 )             27.1       04-30    137  |  99  |  27[H]  ----------------------------<  102[H]  4.2   |  22  |  2.80[H]    Ca    9.2      30 Apr 2025 13:24  Phos  4.8     04-30  Mg     2.2     04-30    TPro  6.7  /  Alb  4.6  /  TBili  2.1[H]  /  DBili  x   /  AST  36  /  ALT  31  /  AlkPhos  44  04-30      Urinalysis Basic - ( 30 Apr 2025 13:24 )    Color: x / Appearance: x / SG: x / pH: x  Gluc: 102 mg/dL / Ketone: x  / Bili: x / Urobili: x   Blood: x / Protein: x / Nitrite: x   Leuk Esterase: x / RBC: x / WBC x   Sq Epi: x / Non Sq Epi: x / Bacteria: x        MICROBIOLOGY:  Vancomycin Level, Trough: 13.6 ug/mL (04-30-25 @ 04:31)  Vancomycin Level, Trough: 11.7 ug/mL (04-29-25 @ 17:30)  v    CMV IgG Antibody: >10.00 U/mL (04-17-25 @ 15:45)  HIV-1 RNA Quantitative, Viral Load Log: NOT DET. lg /mL (04-17-25 @ 15:45)          RADIOLOGY:    <The imaging below has been reviewed and visualized by me independently. Findings as detailed in report below> Follow Up:  Status post heart    Interval History: Afebrile.  Continued postoperative chest pain    REVIEW OF SYSTEMS  [  ] ROS unobtainable because:    [ x ] All other systems negative except as noted below    Constitutional:  [ ] fever [ ] chills  [ ] weight loss  [ ] weakness  Skin:  [ ] rash [ ] phlebitis	  Eyes: [ ] icterus [ ] pain  [ ] discharge	  ENMT: [ ] sore throat  [ ] thrush [ ] ulcers [ ] exudates  Respiratory: [ ] dyspnea [ ] hemoptysis [ ] cough [ ] sputum	  Cardiovascular:  [ x] chest pain [ ] palpitations [ ] edema	  Gastrointestinal:  [ ] nausea [ ] vomiting [ ] diarrhea [ ] constipation [ ] pain	  Genitourinary:  [ ] dysuria [ ] frequency [ ] hematuria [ ] discharge [ ] flank pain  [ ] incontinence  Musculoskeletal:  [ ] myalgias [ ] arthralgias [ ] arthritis  [ ] back pain  Neurological:  [ ] headache [ ] seizures  [ ] confusion/altered mental status    Allergies  No Known Allergies        ANTIMICROBIALS:  cefepime   IVPB 1000 every 8 hours  nystatin    Suspension 323390 <User Schedule>  vancomycin    Solution 125 every 12 hours  vancomycin  IVPB 500 every 12 hours      OTHER MEDS:  MEDICATIONS  (STANDING):  acetaminophen     Tablet .. 500 every 6 hours  dexMEDEtomidine Infusion 0.3 <Continuous>  dextrose 50% Injectable 50 every 15 minutes  dextrose 50% Injectable 25 every 15 minutes  DOBUTamine Infusion 6 <Continuous>  EPINEPHrine    Infusion 0.02 <Continuous>  heparin   Injectable 5000 every 8 hours  heparin  Infusion Syringe 300 <Continuous>  HYDROmorphone PCA (1 mG/mL) 30 PCA Continuous  HYDROmorphone PCA (1 mG/mL) Rescue Clinician Bolus 0.5 every 15 minutes PRN  insulin regular Infusion 3 <Continuous>  ketamine Injectable 25 every 2 hours PRN  methocarbamol IVPB 1000 every 8 hours  methylPREDNISolone sodium succinate Injectable    methylPREDNISolone sodium succinate Injectable 40 every 12 hours  mycophenolate mofetil IVPB 1000 every 12 hours  norepinephrine Infusion 0.05 <Continuous>  oxyCODONE    IR 10 every 4 hours PRN  pantoprazole  Injectable 40 daily  polyethylene glycol 3350 17 daily  pregabalin 25 two times a day  senna 2 at bedtime  tacrolimus 1 <User Schedule>  traMADol 25 every 8 hours  traZODone 100 at bedtime  vasopressin Infusion 0.1 <Continuous>      Vital Signs Last 24 Hrs  T(C): 37 (30 Apr 2025 16:00), Max: 37.7 (29 Apr 2025 20:00)  T(F): 98.6 (30 Apr 2025 16:00), Max: 99.9 (29 Apr 2025 20:00)  HR: 104 (30 Apr 2025 16:30) (100 - 110)  BP: 107/57 (30 Apr 2025 16:00) (91/55 - 112/67)  BP(mean): 77 (30 Apr 2025 16:00) (65 - 84)  RR: 22 (30 Apr 2025 16:30) (9 - 33)  SpO2: 93% (30 Apr 2025 16:30) (90% - 100%)    Parameters below as of 30 Apr 2025 16:00  Patient On (Oxygen Delivery Method): nasal cannula, high flow  O2 Flow (L/min): 40  O2 Concentration (%): 40    PHYSICAL EXAMINATION:  General: Alert and Awake, NAD  Chest: +CT with s/s output  Cardiac: RRR, No M/R/G  Resp: CTAB, No Wh/Rh/Ra  Abdomen: NBS, NT/ND, No HSM, No rigidity or guarding  MSK: No LE edema. No Calf tenderness  Skin: No rashes or lesions. Skin is warm and dry to the touch.   Neuro: Alert and Awake. CN 2-12 Grossly intact. Moves all four extremities spontaneously.  Psych: Calm, Pleasant, Cooperative                            9.1    24.98 )-----------( 107      ( 30 Apr 2025 00:25 )             27.1       04-30    137  |  99  |  27[H]  ----------------------------<  102[H]  4.2   |  22  |  2.80[H]    Ca    9.2      30 Apr 2025 13:24  Phos  4.8     04-30  Mg     2.2     04-30    TPro  6.7  /  Alb  4.6  /  TBili  2.1[H]  /  DBili  x   /  AST  36  /  ALT  31  /  AlkPhos  44  04-30      Urinalysis Basic - ( 30 Apr 2025 13:24 )    Color: x / Appearance: x / SG: x / pH: x  Gluc: 102 mg/dL / Ketone: x  / Bili: x / Urobili: x   Blood: x / Protein: x / Nitrite: x   Leuk Esterase: x / RBC: x / WBC x   Sq Epi: x / Non Sq Epi: x / Bacteria: x        MICROBIOLOGY:  Vancomycin Level, Trough: 13.6 ug/mL (04-30-25 @ 04:31)  Vancomycin Level, Trough: 11.7 ug/mL (04-29-25 @ 17:30)  v    CMV IgG Antibody: >10.00 U/mL (04-17-25 @ 15:45)  HIV-1 RNA Quantitative, Viral Load Log: NOT DET. lg /mL (04-17-25 @ 15:45)          RADIOLOGY:    <The imaging below has been reviewed and visualized by me independently. Findings as detailed in report below>    < from: Xray Chest 1 View- PORTABLE-Urgent (Xray Chest 1 View- PORTABLE-Urgent .) (04.30.25 @ 12:35) >  IMPRESSION:  Support devices as above.  Hazy bibasilar opacities may represent pleural effusion/atelectasis.    < end of copied text >

## 2025-04-30 NOTE — PHYSICAL THERAPY INITIAL EVALUATION ADULT - ONSET DATE, REHAB EVAL
Myah Barron     This post procedure note has been dictated. Full report in imaging tab.    Ameya Pearl MD  7/20/2019 1:26 PM     29-Apr-2025

## 2025-04-30 NOTE — PROGRESS NOTE ADULT - ASSESSMENT
70-year-old male ABO O past medical history of NICM since 2011 HFrEF (LVEF 25-30%) s/p MDT CRT-D with baseline CHB, VT/VF, AFs/p GIANFRANCO ligation/MAZE, MV/TV repair, PVC ablation 3/2024 intolerant to AADs in the past, recent admission 3/19/2025-3/20/2025 for AICD shocks initially presented to the [facility] ED on 3/21/2025, sent by HF specialist for ACID shock. Device reported successful ATP for VT 3/17/2025 at 6:52pm followed by one ATP & 40J shock. He reported feeling dizzy & SOB during the events. He follows with Dr. Luca Tamayo for HF, currently undergoing transplant workup, Dr John for CRTD and VT/VF and Dr. Chu for genetic counseling. While admitted to [facility], he was started on a lidocaine gtt. On 3/21 when lidocaine weaned off patient had another two episodes of VT requiring AICD shocks. He was transferred to [facility] for further management. He was initially maintained on lidocaine gtt from 3/21/2025-3/25/2025 & his listing status was upgraded to UNOS status 2e for heart transplant (ABO O) for the refractory VT. He was transitioned to oral antiarrhythmics (Toprol XL & quinidine) & ultimately transferred from CICU to tele floor on 3/27/2025. Hospital course was further c/b development of watery diarrhea & was found to have +norovirus on 3/31/2025 which prompted deactivation to status 7 listing for heart transplant. Status reinstated to 2E after diarrhea resolved.     He underwent successful OHT on 4/28 (CMV +/+, Toxo -/+,ischemic time 258min, intra op 2plts + 2 cryo).  Now admitted to CTU for close post-op care and extubated on 4/29, on dobutamine, levo, vaso, epi, Lico and CRRT for support.  Bedside hemodynamics:  4/29/25 BP 94/57/67, , CVP 11, PA 28/15/20, Gucci CI 2.2  3/22/25 BP 97/76/83, HR 80, CVP 6, PA 58/23/38, PCWP 20, SVR 1100, Gucci CO/CI 5.1/2.6, TD 4/2.08    Cardiac Studies:   STACEY 4/28/25 intraop: LVEF 20%, global, dilated RV with mildly reduced RVSF  TTE 3/20/25 : LVEF 30%, segmental, LVIDd 5.3, walls normal, elevated LV filling pressures, mod RVE with mildly reduced RV function, TAPSE 1.7, severely dilated RA, annuloplasty in MV position, transvalvular gradients are elevated with severe prosthetic MS (peak gradient 15.7, mean gradient 8), trace intravalvular MR, TV annuloplasty ring noted, mild TR,  est PASP 36, no evidence of LV thrombus  TTE 10/2024: LVEF 25-30%, LVEDD 5.9cm, moderately reduced RV function, annuloplasty ring of mitral and tricuspid position, PASP 47 mmHg  [facility] 3/19/25- RA 11 PA 58/26 PCWP 32 CO/CI 5.43/2.77  [facility] 6/2023 Nonobstructive CAD

## 2025-04-30 NOTE — PROGRESS NOTE ADULT - SUBJECTIVE AND OBJECTIVE BOX
HPI:  69-year-old male patient past medical history of NICM since 2011, HFrEF LVEF 25-30% s/p MDT CRT-D with baseline CHB, VT/VF, a.fib s/p GIANFRANCO ligation/MAZE, MV/TV repair, PVC ablation 3/2024 intolerant to AADs in the past, recent admission 3/19 - 3/20/25 for AICD shocks initially presented to the [facility] emergency department on 3/21/2025, sent by heart failure specialist for ACID shock. Device reported successful ATP for VT 3/17 at 6:52pm followed by one ATP and 40J shock. He reported feeling dizzy and SOB during the events. He follows with Dr paula abreu for HF, currently undergoing transplant workup, Dr John for CRTD and VT/VF and  for genetic counseling. While admitted to [facility], he was started on a lidocaine gtt. On 3/21 when lidocaine weaned off patient had another two episodes of VT requiring AICD shocks. He was transferred to [facility] for further management.     On admission to CICU, he is A&O x3, saturating well on room air, av paced @ 80 with /87 on lidocaine gtt @ 1mg/min. (21 Mar 2025 22:55)    Patient admitted initially for AICD shocks with history of NICM and underwent OHT, TV repair, and PPM removal on 4/29/25.    Patient seen and examined at the bedside.  Remained critically ill on continuous ICU monitoring.    OBJECTIVE:  Vital Signs Last 24 Hrs  T(C): 37.4 (30 Apr 2025 17:00), Max: 37.7 (29 Apr 2025 20:00)  T(F): 99.3 (30 Apr 2025 17:00), Max: 99.9 (29 Apr 2025 20:00)  HR: 102 (30 Apr 2025 18:45) (100 - 110)  BP: 87/52 (30 Apr 2025 18:00) (87/52 - 112/67)  BP(mean): 64 (30 Apr 2025 18:00) (64 - 84)  RR: 20 (30 Apr 2025 18:45) (9 - 33)  SpO2: 98% (30 Apr 2025 18:45) (90% - 100%)    Parameters below as of 30 Apr 2025 16:00  Patient On (Oxygen Delivery Method): nasal cannula, high flow  O2 Flow (L/min): 40  O2 Concentration (%): 40    Physical Exam:   General: NAD  Neurology: nonfocal  Eyes: bilateral pupils equal and reactive  ENT/Neck: Neck supple, trachea midline, No JVD  Respiratory: Clear bilaterally  CV: S1S2, no murmurs        [x] Sternal dressing, [x] Mediastinal CT x2, [x] R Pleural CT        [x] Paced rhythm, [x] Temporary pacing - VVI 60  Abdominal: Soft, NT, ND +BS  Extremities: 1-2+ pedal edema noted, + peripheral pulses  Skin: No Rashes, Hematoma, Ecchymosis    Assessment/Plan:    ***Neuro***  Addressed analgesic regimen with Tylenol, Dilaudid PCA, Ketamine, Robaxin, Oxycodone, Lyrica, and Tramadol to optimize function. Patient continues on Trazadone nightly.    ***Cardiovascular***  Patient is a 69 year old male with history of NICM now s/p OHT, TV repair, and PPM removal on 4/30/25. Currently requiring support with IV Dobutamine and Epinephrine infusions for cardiogenic shock, and IV Levophed and Vasopressin infusions for vasogenic shock. Lactate elevated to 2.3, continue trending to monitor for fluid resuscitation as indicated. Invasive hemodynamic monitoring with a PA catheter & an A-line were required for the following of serial CI's/MV02's and continuous central venous, pulmonary artery pressure and MAP/BP monitoring to ensure adequate cardiovascular support. Temporary epicardial pacing wires in place, currently V-pacing at 60 with underlying sinus tachycardia.    ***Pulmonary***  Respiratory status required supplemental oxygen with high flow nasal cannula and nitric oxide at 20 ppm, close monitoring of breathing pattern and respiratory rate & the following of continuous pulse oximetry for support & to prevent decompensation.    ***Heme***  Acute blood loss due to anemia, no current plan for transfusion. Monitor hemoglobin and hematocrit levels. Heparin SQ and infusion continued for venous thromboembolism prophylaxis in addition to sequential compression devices.    ***GI***  Patient is on a clear liquid diet. Protonix for stress ulcer prophylaxis. Continue bowel regimen with Miralax and Senna.    ***Renal***  Patient with oliguric acute kidney injury on chronic kidney disease receiving continuous renal replacement therapy. Continue to maintain fluid balance as tolerated for CVP <12.    ***ID***  Afebrile, white blood count above normal limits. Continue trending white blood count and monitoring fever curve. Perioperative coverage with Cefepime and IV Vancomycin. PO Vancomycin for C. diff prophylaxis. Immunosuppression with Solu-medrol, Cellcept, and Tacrolimus.    ***Endocrine***  Metabolic stability, stress hyperglycemia required an IV regular Insulin drip while following serial glucose levels to help achieve and maintain euglycemia.        Patient requires continuous monitoring with bedside rhythm monitoring, pulse oximetry monitoring, pulmonary artery pressure, and continuous central venous and arterial pressure monitoring; and intermittent blood gas analysis. Care plan discussed with the ICU care team.   Patient remained critical, at risk for life threatening decompensation.    I have spent 50 minutes providing critical care management to this patient.    By signing my name below, I, Smooth Solomon, attest that this documentation has been prepared under the direction and in the presence of Angelita Fofana MD   Electronically signed: Kasey Abad, 04-30-25 @ 19:22    I, Angelita Fofana, personally performed the services described in this documentation. All medical record entries made by the scribe were at my direction and in my presence. I have reviewed the chart and agree that the record reflects my personal performance and is accurate and complete  Electronically signed: Angelita Fofana MD  HPI:  69-year-old male patient past medical history of NICM since 2011, HFrEF LVEF 25-30% s/p MDT CRT-D with baseline CHB, VT/VF, a.fib s/p GIANFRANCO ligation/MAZE, MV/TV repair, PVC ablation 3/2024 intolerant to AADs in the past, recent admission 3/19 - 3/20/25 for AICD shocks initially presented to the [facility] emergency department on 3/21/2025, sent by heart failure specialist for ACID shock. Device reported successful ATP for VT 3/17 at 6:52pm followed by one ATP and 40J shock. He reported feeling dizzy and SOB during the events. He follows with Dr paula abreu for HF, currently undergoing transplant workup, Dr John for CRTD and VT/VF and  for genetic counseling. While admitted to [facility], he was started on a lidocaine gtt. On 3/21 when lidocaine weaned off patient had another two episodes of VT requiring AICD shocks. He was transferred to [facility] for further management.     On admission to CICU, he is A&O x3, saturating well on room air, av paced @ 80 with /87 on lidocaine gtt @ 1mg/min. (21 Mar 2025 22:55)    Patient admitted initially for AICD shocks with history of NICM. He has been managed for heart failure and was listed more urgently for heart transplant due to refractory VT. He underwent OHT, TV repair, and PPM removal on 4/29/25 and is now recovering in CTU.    Patient seen and examined at the bedside.  Remained critically ill on continuous ICU monitoring.    OBJECTIVE:  Vital Signs Last 24 Hrs  T(C): 37.4 (30 Apr 2025 17:00), Max: 37.7 (29 Apr 2025 20:00)  T(F): 99.3 (30 Apr 2025 17:00), Max: 99.9 (29 Apr 2025 20:00)  HR: 102 (30 Apr 2025 18:45) (100 - 110)  BP: 87/52 (30 Apr 2025 18:00) (87/52 - 112/67)  BP(mean): 64 (30 Apr 2025 18:00) (64 - 84)  RR: 20 (30 Apr 2025 18:45) (9 - 33)  SpO2: 98% (30 Apr 2025 18:45) (90% - 100%)    Parameters below as of 30 Apr 2025 16:00  Patient On (Oxygen Delivery Method): nasal cannula, high flow  O2 Flow (L/min): 40  O2 Concentration (%): 40    Physical Exam:   General: Normal body habitus, breathing comfortably at rest  Neurology: anxious and complains of confusion, oriented x 3 and follows simple commands, [place]  Eyes: bilateral pupils equal and reactive  ENT/Neck: Neck supple, trachea midline, No JVD  Respiratory: Clear bilaterally  CV: S1S2, no murmurs        [x] Sternal dressing, [x] Mediastinal CT x2, [x] R Pleural CT        [x] Paced rhythm, [x] Temporary pacing - VVI 60  Abdominal: Soft, NT, ND +BS  Extremities: 1+ pedal edema noted, + peripheral pulses  Skin: No Rashes, Hematoma, Ecchymosis    Assessment/Plan:    ***Neuro***  Anxiety with mild delirium. Addressed analgesic regimen with Tylenol, Dilaudid PCA, Ketamine, Robaxin, Oxycodone, Lyrica, and Tramadol to optimize function. Patient continues on Trazadone nightly.    ***Cardiovascular***  Patient is a 69 year old male with history of NICM now s/p OHT, TV repair, and PPM removal on 4/30/25. Currently requiring IV Dobutamine, Epinephrine, Norepinephrine and Vasopressin infusions. for cardiogenic shock. Lactate elevated to 2.3 and now improved to 1.9. Invasive hemodynamic monitoring with a PA catheter & an A-line were required for the following of serial CI's/MV02's and continuous central venous, pulmonary artery pressure and MAP/BP monitoring to ensure adequate cardiovascular support. Temporary epicardial pacing wires in place, currently set at VVI 60.    ***Pulmonary***  Respiratory status required supplemental oxygen with high flow nasal cannula. Nitric oxide at 20 ppm for pulmonary hyptertension and unloading of RV. Continue close monitoring of breathing pattern and respiratory rate & the following of continuous pulse oximetry for support & to prevent decompensation. He has mild, uncompensated respiratory acidosis. Encourage incentive spirometry.    ***Heme***  Acute blood loss due to anemia. One unit of PRBC's ordered. Monitor hemoglobin and hematocrit levels. Heparin SQ continued for venous thromboembolism prophylaxis in addition to sequential compression devices. In addition, heparin infusion maintained via CRRT circuit.    ***GI***  Patient is on a clear liquid diet. Protonix for stress ulcer prophylaxis. Continue bowel regimen with Miralax and Senna.    ***Renal***  Patient with oliguric acute kidney injury on chronic kidney disease receiving continuous renal replacement therapy. Continue to maintain negative fluid balance as tolerated for CVP <12.    ***ID***  Afebrile, white blood count above normal limits. Continue trending white blood count and monitoring fever curve. IV Penicillin ordered due to donor positive for syphilis. In addition, donor was positive for Strep pneumoniae meningitis and will start on treatment with Ceftriaxone once periop antibiotics completed. Perioperative coverage with Cefepime and IV Vancomycin. PO Vancomycin for C. diff prophylaxis. Immunosuppression with Solu-medrol, Cellcept, and Tacrolimus.    ***Endocrine***  Metabolic stability, stress hyperglycemia required an IV regular Insulin drip while following serial glucose levels to help achieve and maintain euglycemia.        Patient requires continuous monitoring with bedside rhythm monitoring, pulse oximetry monitoring, pulmonary artery pressure, and continuous central venous and arterial pressure monitoring; and intermittent blood gas analysis. Care plan discussed with the ICU care team.   Patient remained critical, at risk for life threatening decompensation.    I have spent 52 minutes providing critical care management to this patient.    By signing my name below, I, Smooth Solomon, attest that this documentation has been prepared under the direction and in the presence of Angelita Fofana MD   Electronically signed: Kasey Abad, 04-30-25 @ 19:22    I, Angelita Fofana, personally performed the services described in this documentation. All medical record entries made by the yaritzaibjewell were at my direction and in my presence. I have reviewed the chart and agree that the record reflects my personal performance and is accurate and complete  Electronically signed: Angelita Fofana MD

## 2025-04-30 NOTE — PROGRESS NOTE ADULT - CRITICAL CARE ATTENDING COMMENT
Patient was seen and examined with the fellow.  I agree with the above except for the following:    POD 1 from OHT.  Patients main concern is pain, L pleural and sternal chest tube to be removed.  Overnight with worsening RV failure.  Required CRRT for volume control.  Still making urine at 40-50 cc/h.  Now on 77.5 and epi 0.02 for RV support with 2 pressors of levo 0.09 and vaso 0.06.  WESLEY at 20.  PAC this morning RA 11–12, PA 37/15, MVO2 65%, CI 3.4.  Echo today with LVEF > 65% with decreased RV function.  Immunosuppression: continue on tac 1mg BID, cellcept 1gm BID, + steroids.  ABX: as above.

## 2025-04-30 NOTE — CHART NOTE - NSCHARTNOTEFT_GEN_A_CORE
NUTRITION FOLLOW UP NOTE    PATIENT SEEN FOR: follow up    SOURCE: [x] Patient  [x] Current Medical Record  [x] RN  [] Family/support person at bedside  [] Patient unavailable/inappropriate  [] Other:    CHART REVIEWED/EVENTS NOTED.  [] No changes to nutrition care plan to note  [x] Nutrition Status:  - h/o NICM, HFrEF, s/p MDT CRT-D w/ baseline CHB, VT/VF, AFIb (s/p GIANFRANCO ligation/MAZE), s/p MV/TV repair, PVC ablation (3/2024) per chart  - transfers to [facility] from outside hospital due to VT/AICD shock per MD; was listed as UNOS status 2E for heart transplant, patient is now s/p OHT 25, remains on CRRT    DIET ORDER:   Diet, Clear Liquid (25)    CURRENT DIET ORDER IS:  [x] Appropriate: extubated post-OP yesterday, oral diet advanced to clear liquids today by team  [] Inadequate:  [] Other:    NUTRITION INTAKE/PROVISION:  [x] PO:  - patient reports tried some liquids during the day and had good tolerance so far also reported by RNs, no swallowing difficulty reported w/ current clear liquid diet, no N/V reported  - of note, patient maintained good PO intake/appetite pre-OP leading into surgery, previously had oral nutrition supplementation with Ensure Max and eventually did not want to take anymore and was removed thereafter  [] Enteral Nutrition:  [] Parenteral Nutrition:    ANTHROPOMETRICS:  Drug Dosing Weight  Height (cm): 170.2 (2025 10:33)  Weight (kg): 81.7 (2025 10:33)  BMI (kg/m2): 28.2 (2025 10:33)  BSA (m2): 1.93 (2025 10:33)  Weights:   Daily Weight in k.7 (-), Weight in k.6 (), Weight in k.4 (), Weight in k.4 (), Weight in k.5 ()  - overall weight trend noted; mostly consistent BW trend for time frame, anticipated fluctuations from  onward given s/p OHT on , remains on CRRT, will continue to follow weight trend    MEDICATIONS:  MEDICATIONS  (STANDING):  acetaminophen     Tablet .. 500 milliGRAM(s) Oral every 6 hours  cefepime   IVPB 1000 milliGRAM(s) IV Intermittent every 8 hours  chlorhexidine 2% Cloths 1 Application(s) Topical daily  CRRT Treatment    <Continuous>  dexMEDEtomidine Infusion 0.3 MICROgram(s)/kG/Hr (6.13 mL/Hr) IV Continuous <Continuous>  dextrose 50% Injectable 50 milliLiter(s) IV Push every 15 minutes  dextrose 50% Injectable 25 milliLiter(s) IV Push every 15 minutes  DOBUTamine Infusion 6 MICROgram(s)/kG/Min (14.7 mL/Hr) IV Continuous <Continuous>  EPINEPHrine    Infusion 0.02 MICROgram(s)/kG/Min (6.13 mL/Hr) IV Continuous <Continuous>  heparin   Injectable 5000 Unit(s) SubCutaneous every 8 hours  heparin  Infusion Syringe 300 Unit(s)/Hr (0.6 mL/Hr) CRRT <Continuous>  insulin regular Infusion 3 Unit(s)/Hr (3 mL/Hr) IV Continuous <Continuous>  lidocaine   4% Patch 3 Patch Transdermal daily  methocarbamol IVPB 1000 milliGRAM(s) IV Intermittent every 8 hours  methylPREDNISolone sodium succinate Injectable 40 milliGRAM(s) IV Push every 12 hours  methylPREDNISolone sodium succinate Injectable   IV Push   mycophenolate mofetil IVPB 1000 milliGRAM(s) IV Intermittent every 12 hours  norepinephrine Infusion 0.05 MICROgram(s)/kG/Min (7.66 mL/Hr) IV Continuous <Continuous>  nystatin    Suspension 197368 Unit(s) Oral <User Schedule>  pantoprazole  Injectable 40 milliGRAM(s) IV Push daily  polyethylene glycol 3350 17 Gram(s) Oral daily  potassium chloride  10 mEq/50 mL IVPB 10 milliEquivalent(s) IV Intermittent once  potassium chloride  10 mEq/50 mL IVPB 10 milliEquivalent(s) IV Intermittent once  pregabalin 25 milliGRAM(s) Oral two times a day  PrismaSATE Dialysate BGK 4 / 2.5 5000 milliLiter(s) (1400 mL/Hr) CRRT <Continuous>  PrismaSOL Filtration BGK 4 / 2.5 5000 milliLiter(s) (1000 mL/Hr) CRRT <Continuous>  PrismaSOL Filtration BGK 4 / 2.5 5000 milliLiter(s) (200 mL/Hr) CRRT <Continuous>  senna 2 Tablet(s) Oral at bedtime  sodium chloride 0.9%. 1000 milliLiter(s) (10 mL/Hr) IV Continuous <Continuous>  tacrolimus 1 milliGRAM(s) Oral <User Schedule>  traMADol 25 milliGRAM(s) Oral every 8 hours  traZODone 100 milliGRAM(s) Oral at bedtime  vancomycin    Solution 125 milliGRAM(s) Oral every 12 hours  vancomycin  IVPB 500 milliGRAM(s) IV Intermittent every 12 hours  vasopressin Infusion 0.1 Unit(s)/Min (15 mL/Hr) IV Continuous <Continuous>    MEDICATIONS  (PRN):  ketamine Injectable 25 milliGRAM(s) IV Push every 2 hours PRN severe pain  oxyCODONE    IR 10 milliGRAM(s) Oral every 4 hours PRN Moderate Pain (4 - 6)    NUTRITIONALLY PERTINENT LABS:   Na137 mmol/L Glu 102 mg/dL[H] K+ 4.2 mmol/L Cr  2.80 mg/dL[H] BUN 27 mg/dL[H]  Phos 4.8 mg/dL[H]  Alb 4.6 g/dL ALT 31 U/L AST 36 U/L Alkaline Phosphatase 44 U/L    A1C with Estimated Average Glucose Result: 6.1 % (25 @ 00:58)    Finger Sticks:  POCT Blood Glucose.: 188 mg/dL ( @ 15:32)  POCT Blood Glucose.: 105 mg/dL ( @ 13:16)  POCT Blood Glucose.: 97 mg/dL ( @ 12:49)  POCT Blood Glucose.: 106 mg/dL ( @ 12:12)  POCT Blood Glucose.: 107 mg/dL ( @ 11:06)  POCT Blood Glucose.: 109 mg/dL ( @ 10:16)  POCT Blood Glucose.: 124 mg/dL ( @ 09:00)  POCT Blood Glucose.: 121 mg/dL ( @ 07:57)  POCT Blood Glucose.: 119 mg/dL ( @ 06:47)  POCT Blood Glucose.: 120 mg/dL ( @ 06:05)  POCT Blood Glucose.: 120 mg/dL ( @ 05:05)  POCT Blood Glucose.: 70 mg/dL ( @ 05:02)  POCT Blood Glucose.: 130 mg/dL ( @ 03:55)  POCT Blood Glucose.: 161 mg/dL ( @ 02:52)  POCT Blood Glucose.: 157 mg/dL ( @ 01:57)  POCT Blood Glucose.: 182 mg/dL ( @ 00:59)  POCT Blood Glucose.: 199 mg/dL ( @ 21:54)  POCT Blood Glucose.: 176 mg/dL ( @ 18:48)  POCT Blood Glucose.: 184 mg/dL ( @ 18:14)  POCT Blood Glucose.: 123 mg/dL ( @ 16:52)    NUTRITIONALLY PERTINENT MEDICATIONS/LABS:  [x] Reviewed  [x] Relevant notes on medications/labs:  - hyperphosphatemic today and BUN/Cr trend noted; s/p OHT 25, remains on CRRT  - recent BG trend w/ intermittent fluctuations from lower 100s to at times mid 100s, ordered for insulin gtt, off in recent hours per I/Os  - inotropic support w/ dobutamine  - vasopressor support w/ epinephrine, vasopressin, levophed  - ordered for antibiotic regimen by team  - ordered for solumedrol taper, tacrolimus, cellcept (s/p OHT 25)    EDEMA:  [x] Reviewed  [x] Relevant notes:  - current +1 generalized edema noted per documentation    GI/ I&O:  [x] Reviewed  [x] Relevant notes:  - last BM , ordered for senna, miralax  [] Other:    SKIN:   [x] No pressure injuries documented, per nursing flowsheet  [] Pressure injury previously noted  [] Change in pressure injury documentation:  [x] Other: midsternal surgical incision and L anterior chest wall surgical incision s/p OHT per documentation    ESTIMATED NEEDS:  [] No change:  [x] Updated:  Energy: 2287-2710kcal/day (27-32kcal/kg)  Protein: 110-135g/day (1.3-1.6g/kg)  Fluid: ml/day or [x] defer to team  Based on: BW 84.7kg (3/23, standing) w/ considerations for now being s/p OHT, midsternal surgical incision, remains on CRRT, newly extubated    NUTRITION DIAGNOSIS:  [x] Prior Dx: food & nutrition related knowledge deficit  [] New Dx:    EDUCATION:  [x] Yes: current diet order, staging of diet advancement when medically appropriate by team pending further clinical course  [] Not appropriate/warranted    NUTRITION CARE PLAN:  1. Diet:  - advanced to clear liquids today by team; diet advancement per team/SLP as deemed medically appropriate pending further clinical course  - provide assistance with meals for the patient as needed  2. Multivitamin/mineral supplementation:  - hyperphosphatemic today: continue to follow electrolyte trends, if phosphorus remains elevated, recommend dietary phosphorus restriction    [] Achieved - Continue current nutrition intervention(s)  [] Current medical condition precludes nutrition intervention at this time.    MONITORING AND EVALUATION:   RD remains available upon request and will follow up per protocol.    Anderson Munson, SAJAN, CDN - contact on TEAMS.

## 2025-04-30 NOTE — PROGRESS NOTE ADULT - SUBJECTIVE AND OBJECTIVE BOX
Patient seen and examined at bedside.    Overnight Events:   Remains on dobutamine, levophed, epinephrine, vasopressin, Lico for support  Started CRRT   Extubated    Current Meds:  acetaminophen     Tablet .. 500 milliGRAM(s) Oral every 6 hours  cefepime   IVPB 1000 milliGRAM(s) IV Intermittent every 8 hours  chlorhexidine 2% Cloths 1 Application(s) Topical daily  CRRT Treatment    <Continuous>  dexMEDEtomidine Infusion 0.3 MICROgram(s)/kG/Hr IV Continuous <Continuous>  dextrose 50% Injectable 50 milliLiter(s) IV Push every 15 minutes  dextrose 50% Injectable 25 milliLiter(s) IV Push every 15 minutes  DOBUTamine Infusion 7.5 MICROgram(s)/kG/Min IV Continuous <Continuous>  EPINEPHrine    Infusion 0.02 MICROgram(s)/kG/Min IV Continuous <Continuous>  heparin   Injectable 5000 Unit(s) SubCutaneous every 8 hours  heparin  Infusion Syringe 300 Unit(s)/Hr CRRT <Continuous>  insulin regular Infusion 3 Unit(s)/Hr IV Continuous <Continuous>  ketamine Injectable 25 milliGRAM(s) IV Push every 2 hours PRN  lidocaine   4% Patch 3 Patch Transdermal daily  methocarbamol IVPB 1000 milliGRAM(s) IV Intermittent every 8 hours  methylPREDNISolone sodium succinate Injectable 40 milliGRAM(s) IV Push every 12 hours  methylPREDNISolone sodium succinate Injectable   IV Push   mycophenolate mofetil IVPB 1000 milliGRAM(s) IV Intermittent every 12 hours  norepinephrine Infusion 0.05 MICROgram(s)/kG/Min IV Continuous <Continuous>  nystatin    Suspension 590434 Unit(s) Oral <User Schedule>  oxyCODONE    IR 10 milliGRAM(s) Oral every 4 hours PRN  pantoprazole  Injectable 40 milliGRAM(s) IV Push daily  polyethylene glycol 3350 17 Gram(s) Oral daily  potassium chloride  10 mEq/50 mL IVPB 10 milliEquivalent(s) IV Intermittent once  potassium chloride  10 mEq/50 mL IVPB 10 milliEquivalent(s) IV Intermittent once  pregabalin 50 milliGRAM(s) Oral once  pregabalin 25 milliGRAM(s) Oral two times a day  PrismaSATE Dialysate BGK 4 / 2.5 5000 milliLiter(s) CRRT <Continuous>  PrismaSOL Filtration BGK 4 / 2.5 5000 milliLiter(s) CRRT <Continuous>  PrismaSOL Filtration BGK 4 / 2.5 5000 milliLiter(s) CRRT <Continuous>  senna 2 Tablet(s) Oral at bedtime  sodium chloride 0.9%. 1000 milliLiter(s) IV Continuous <Continuous>  tacrolimus 1 milliGRAM(s) Oral <User Schedule>  traMADol 25 milliGRAM(s) Oral every 8 hours  traZODone 100 milliGRAM(s) Oral at bedtime  vancomycin    Solution 125 milliGRAM(s) Oral every 12 hours  vancomycin  IVPB 500 milliGRAM(s) IV Intermittent every 12 hours  vasopressin Infusion 0.1 Unit(s)/Min IV Continuous <Continuous>      Vitals:  T(F): 99 (04-30), Max: 99.9 (04-29)  HR: 109 (04-30) (108 - 110)  BP: 91/55 (04-30) (91/55 - 112/67)  RR: 22 (04-30)  SpO2: 94% (04-30)  I&O's Summary    29 Apr 2025 07:01  -  30 Apr 2025 07:00  --------------------------------------------------------  IN: 3085.4 mL / OUT: 2208 mL / NET: 877.4 mL    30 Apr 2025 07:01  -  30 Apr 2025 13:11  --------------------------------------------------------  IN: 743.4 mL / OUT: 932 mL / NET: -188.6 mL        Physical Exam:  Appearance: No acute distress; well appearing  Eyes: PERRL, EOMI, pink conjunctiva  HEENT: Normal oral mucosa  Cardiovascular: RRR, S1, S2, no murmurs, rubs, or gallops; no edema; no JVD  Respiratory: Clear to auscultation bilaterally  Gastrointestinal: soft, non-tender, non-distended with normal bowel sounds  Musculoskeletal: No clubbing; no joint deformity   Neurologic: Non-focal  Lymphatic: No lymphadenopathy  Psychiatry: AAOx3, mood & affect appropriate  Skin: No rashes, ecchymoses, or cyanosis                          9.1    24.98 )-----------( 107      ( 30 Apr 2025 00:25 )             27.1     04-30    140  |  98  |  37[H]  ----------------------------<  193[H]  4.0   |  19[L]  |  3.46[H]    Ca    10.8[H]      30 Apr 2025 00:26  Phos  4.8     04-30  Mg     2.2     04-30    TPro  6.5  /  Alb  4.5  /  TBili  2.2[H]  /  DBili  x   /  AST  47[H]  /  ALT  34  /  AlkPhos  48  04-30    PT/INR - ( 30 Apr 2025 00:26 )   PT: 17.7 sec;   INR: 1.55 ratio         PTT - ( 30 Apr 2025 00:26 )  PTT:26.9 sec              New ECG(s): Personally reviewed    Echo:    Stress Testing:     Cath:    New Imaging:    Interpretation of Telemetry:

## 2025-04-30 NOTE — PHYSICAL THERAPY INITIAL EVALUATION ADULT - PREDICTED DURATION OF THERAPY (DAYS/WKS), PT EVAL
pt functionally independent, PT to sign off pending clarification from ACP regarding any transplant workup needs.
2 weeks

## 2025-05-01 LAB
ACANTHOCYTES BLD QL SMEAR: SLIGHT — SIGNIFICANT CHANGE UP
ALBUMIN SERPL ELPH-MCNC: 4.4 G/DL — SIGNIFICANT CHANGE UP (ref 3.3–5)
ALBUMIN SERPL ELPH-MCNC: 4.5 G/DL — SIGNIFICANT CHANGE UP (ref 3.3–5)
ALP SERPL-CCNC: 47 U/L — SIGNIFICANT CHANGE UP (ref 40–120)
ALP SERPL-CCNC: 54 U/L — SIGNIFICANT CHANGE UP (ref 40–120)
ALT FLD-CCNC: 27 U/L — SIGNIFICANT CHANGE UP (ref 10–45)
ALT FLD-CCNC: 28 U/L — SIGNIFICANT CHANGE UP (ref 10–45)
ANION GAP SERPL CALC-SCNC: 14 MMOL/L — SIGNIFICANT CHANGE UP (ref 5–17)
ANION GAP SERPL CALC-SCNC: 18 MMOL/L — HIGH (ref 5–17)
ANISOCYTOSIS BLD QL: SLIGHT — SIGNIFICANT CHANGE UP
APTT BLD: 26.9 SEC — SIGNIFICANT CHANGE UP (ref 26.1–36.8)
APTT BLD: 27.3 SEC — SIGNIFICANT CHANGE UP (ref 26.1–36.8)
AST SERPL-CCNC: 27 U/L — SIGNIFICANT CHANGE UP (ref 10–40)
AST SERPL-CCNC: 30 U/L — SIGNIFICANT CHANGE UP (ref 10–40)
BASE EXCESS BLDMV CALC-SCNC: -2.8 MMOL/L — SIGNIFICANT CHANGE UP (ref -3–3)
BASE EXCESS BLDMV CALC-SCNC: -3.3 MMOL/L — LOW (ref -3–3)
BASE EXCESS BLDMV CALC-SCNC: -3.3 MMOL/L — LOW (ref -3–3)
BASE EXCESS BLDMV CALC-SCNC: -3.4 MMOL/L — LOW (ref -3–3)
BASE EXCESS BLDMV CALC-SCNC: -4 MMOL/L — LOW (ref -3–3)
BASE EXCESS BLDMV CALC-SCNC: -4.3 MMOL/L — LOW (ref -3–3)
BASOPHILS # BLD AUTO: 0 K/UL — SIGNIFICANT CHANGE UP (ref 0–0.2)
BASOPHILS # BLD AUTO: 0.01 K/UL — SIGNIFICANT CHANGE UP (ref 0–0.2)
BASOPHILS NFR BLD AUTO: 0 % — SIGNIFICANT CHANGE UP (ref 0–2)
BASOPHILS NFR BLD AUTO: 0 % — SIGNIFICANT CHANGE UP (ref 0–2)
BILIRUB SERPL-MCNC: 1.9 MG/DL — HIGH (ref 0.2–1.2)
BILIRUB SERPL-MCNC: 2.1 MG/DL — HIGH (ref 0.2–1.2)
BLD GP AB SCN SERPL QL: NEGATIVE — SIGNIFICANT CHANGE UP
BUN SERPL-MCNC: 24 MG/DL — HIGH (ref 7–23)
BUN SERPL-MCNC: 25 MG/DL — HIGH (ref 7–23)
BURR CELLS BLD QL SMEAR: PRESENT — SIGNIFICANT CHANGE UP
BURR CELLS BLD QL SMEAR: SLIGHT — SIGNIFICANT CHANGE UP
CALCIUM SERPL-MCNC: 8.9 MG/DL — SIGNIFICANT CHANGE UP (ref 8.4–10.5)
CALCIUM SERPL-MCNC: 9.2 MG/DL — SIGNIFICANT CHANGE UP (ref 8.4–10.5)
CHLORIDE SERPL-SCNC: 100 MMOL/L — SIGNIFICANT CHANGE UP (ref 96–108)
CHLORIDE SERPL-SCNC: 101 MMOL/L — SIGNIFICANT CHANGE UP (ref 96–108)
CO2 BLDMV-SCNC: 24 MMOL/L — SIGNIFICANT CHANGE UP (ref 21–29)
CO2 BLDMV-SCNC: 24 MMOL/L — SIGNIFICANT CHANGE UP (ref 21–29)
CO2 BLDMV-SCNC: 25 MMOL/L — SIGNIFICANT CHANGE UP (ref 21–29)
CO2 BLDMV-SCNC: 25 MMOL/L — SIGNIFICANT CHANGE UP (ref 21–29)
CO2 BLDMV-SCNC: 26 MMOL/L — SIGNIFICANT CHANGE UP (ref 21–29)
CO2 BLDMV-SCNC: 26 MMOL/L — SIGNIFICANT CHANGE UP (ref 21–29)
CO2 SERPL-SCNC: 19 MMOL/L — LOW (ref 22–31)
CO2 SERPL-SCNC: 20 MMOL/L — LOW (ref 22–31)
CREAT SERPL-MCNC: 2.52 MG/DL — HIGH (ref 0.5–1.3)
CREAT SERPL-MCNC: 2.67 MG/DL — HIGH (ref 0.5–1.3)
EGFR: 25 ML/MIN/1.73M2 — LOW
EGFR: 25 ML/MIN/1.73M2 — LOW
EGFR: 27 ML/MIN/1.73M2 — LOW
EGFR: 27 ML/MIN/1.73M2 — LOW
EOSINOPHIL # BLD AUTO: 0 K/UL — SIGNIFICANT CHANGE UP (ref 0–0.5)
EOSINOPHIL # BLD AUTO: 0 K/UL — SIGNIFICANT CHANGE UP (ref 0–0.5)
EOSINOPHIL NFR BLD AUTO: 0 % — SIGNIFICANT CHANGE UP (ref 0–6)
EOSINOPHIL NFR BLD AUTO: 0 % — SIGNIFICANT CHANGE UP (ref 0–6)
FIBRINOGEN PPP-MCNC: 303 MG/DL — SIGNIFICANT CHANGE UP (ref 200–445)
GAS PNL BLDA: SIGNIFICANT CHANGE UP
GAS PNL BLDMV: SIGNIFICANT CHANGE UP
GLUCOSE BLDC GLUCOMTR-MCNC: 101 MG/DL — HIGH (ref 70–99)
GLUCOSE BLDC GLUCOMTR-MCNC: 101 MG/DL — HIGH (ref 70–99)
GLUCOSE BLDC GLUCOMTR-MCNC: 106 MG/DL — HIGH (ref 70–99)
GLUCOSE BLDC GLUCOMTR-MCNC: 106 MG/DL — HIGH (ref 70–99)
GLUCOSE BLDC GLUCOMTR-MCNC: 109 MG/DL — HIGH (ref 70–99)
GLUCOSE BLDC GLUCOMTR-MCNC: 117 MG/DL — HIGH (ref 70–99)
GLUCOSE BLDC GLUCOMTR-MCNC: 118 MG/DL — HIGH (ref 70–99)
GLUCOSE BLDC GLUCOMTR-MCNC: 119 MG/DL — HIGH (ref 70–99)
GLUCOSE BLDC GLUCOMTR-MCNC: 122 MG/DL — HIGH (ref 70–99)
GLUCOSE BLDC GLUCOMTR-MCNC: 124 MG/DL — HIGH (ref 70–99)
GLUCOSE BLDC GLUCOMTR-MCNC: 125 MG/DL — HIGH (ref 70–99)
GLUCOSE BLDC GLUCOMTR-MCNC: 125 MG/DL — HIGH (ref 70–99)
GLUCOSE BLDC GLUCOMTR-MCNC: 126 MG/DL — HIGH (ref 70–99)
GLUCOSE BLDC GLUCOMTR-MCNC: 127 MG/DL — HIGH (ref 70–99)
GLUCOSE BLDC GLUCOMTR-MCNC: 129 MG/DL — HIGH (ref 70–99)
GLUCOSE BLDC GLUCOMTR-MCNC: 132 MG/DL — HIGH (ref 70–99)
GLUCOSE BLDC GLUCOMTR-MCNC: 137 MG/DL — HIGH (ref 70–99)
GLUCOSE BLDC GLUCOMTR-MCNC: 140 MG/DL — HIGH (ref 70–99)
GLUCOSE BLDC GLUCOMTR-MCNC: 141 MG/DL — HIGH (ref 70–99)
GLUCOSE BLDC GLUCOMTR-MCNC: 141 MG/DL — HIGH (ref 70–99)
GLUCOSE BLDC GLUCOMTR-MCNC: 154 MG/DL — HIGH (ref 70–99)
GLUCOSE BLDC GLUCOMTR-MCNC: 154 MG/DL — HIGH (ref 70–99)
GLUCOSE BLDC GLUCOMTR-MCNC: 90 MG/DL — SIGNIFICANT CHANGE UP (ref 70–99)
GLUCOSE SERPL-MCNC: 127 MG/DL — HIGH (ref 70–99)
GLUCOSE SERPL-MCNC: 138 MG/DL — HIGH (ref 70–99)
HCO3 BLDMV-SCNC: 23 MMOL/L — SIGNIFICANT CHANGE UP (ref 20–28)
HCO3 BLDMV-SCNC: 24 MMOL/L — SIGNIFICANT CHANGE UP (ref 20–28)
HCT VFR BLD CALC: 24.6 % — LOW (ref 39–50)
HCT VFR BLD CALC: 25.5 % — LOW (ref 39–50)
HGB BLD-MCNC: 8.2 G/DL — LOW (ref 13–17)
HGB BLD-MCNC: 8.6 G/DL — LOW (ref 13–17)
HOROWITZ INDEX BLDMV+IHG-RTO: 40 — SIGNIFICANT CHANGE UP
HOROWITZ INDEX BLDMV+IHG-RTO: 50 — SIGNIFICANT CHANGE UP
IMM GRANULOCYTES NFR BLD AUTO: 2.4 % — HIGH (ref 0–0.9)
INR BLD: 1.17 RATIO — HIGH (ref 0.85–1.16)
INR BLD: 1.27 RATIO — HIGH (ref 0.85–1.16)
LYMPHOCYTES # BLD AUTO: 0.18 K/UL — LOW (ref 1–3.3)
LYMPHOCYTES # BLD AUTO: 0.36 K/UL — LOW (ref 1–3.3)
LYMPHOCYTES # BLD AUTO: 0.9 % — LOW (ref 13–44)
LYMPHOCYTES # BLD AUTO: 1.8 % — LOW (ref 13–44)
MAGNESIUM SERPL-MCNC: 2.2 MG/DL — SIGNIFICANT CHANGE UP (ref 1.6–2.6)
MANUAL SMEAR VERIFICATION: SIGNIFICANT CHANGE UP
MCHC RBC-ENTMCNC: 28.9 PG — SIGNIFICANT CHANGE UP (ref 27–34)
MCHC RBC-ENTMCNC: 29.4 PG — SIGNIFICANT CHANGE UP (ref 27–34)
MCHC RBC-ENTMCNC: 33.3 G/DL — SIGNIFICANT CHANGE UP (ref 32–36)
MCHC RBC-ENTMCNC: 33.7 G/DL — SIGNIFICANT CHANGE UP (ref 32–36)
MCV RBC AUTO: 86.6 FL — SIGNIFICANT CHANGE UP (ref 80–100)
MCV RBC AUTO: 87 FL — SIGNIFICANT CHANGE UP (ref 80–100)
MICROCYTES BLD QL: SLIGHT — SIGNIFICANT CHANGE UP
MONOCYTES # BLD AUTO: 0.67 K/UL — SIGNIFICANT CHANGE UP (ref 0–0.9)
MONOCYTES # BLD AUTO: 1.14 K/UL — HIGH (ref 0–0.9)
MONOCYTES NFR BLD AUTO: 3.4 % — SIGNIFICANT CHANGE UP (ref 2–14)
MONOCYTES NFR BLD AUTO: 5.6 % — SIGNIFICANT CHANGE UP (ref 2–14)
NEUTROPHILS # BLD AUTO: 18.41 K/UL — HIGH (ref 1.8–7.4)
NEUTROPHILS # BLD AUTO: 18.82 K/UL — HIGH (ref 1.8–7.4)
NEUTROPHILS NFR BLD AUTO: 90.2 % — HIGH (ref 43–77)
NEUTROPHILS NFR BLD AUTO: 95.7 % — HIGH (ref 43–77)
NRBC BLD AUTO-RTO: 0 /100 WBCS — SIGNIFICANT CHANGE UP (ref 0–0)
O2 CT VFR BLD CALC: 38 MMHG — SIGNIFICANT CHANGE UP (ref 30–65)
O2 CT VFR BLD CALC: 39 MMHG — SIGNIFICANT CHANGE UP (ref 30–65)
O2 CT VFR BLD CALC: 39 MMHG — SIGNIFICANT CHANGE UP (ref 30–65)
O2 CT VFR BLD CALC: 40 MMHG — SIGNIFICANT CHANGE UP (ref 30–65)
O2 CT VFR BLD CALC: 41 MMHG — SIGNIFICANT CHANGE UP (ref 30–65)
O2 CT VFR BLD CALC: 42 MMHG — SIGNIFICANT CHANGE UP (ref 30–65)
OVALOCYTES BLD QL SMEAR: SLIGHT — SIGNIFICANT CHANGE UP
PCO2 BLDMV: 46 MMHG — SIGNIFICANT CHANGE UP (ref 30–65)
PCO2 BLDMV: 46 MMHG — SIGNIFICANT CHANGE UP (ref 30–65)
PCO2 BLDMV: 49 MMHG — SIGNIFICANT CHANGE UP (ref 30–65)
PCO2 BLDMV: 49 MMHG — SIGNIFICANT CHANGE UP (ref 30–65)
PCO2 BLDMV: 50 MMHG — SIGNIFICANT CHANGE UP (ref 30–65)
PCO2 BLDMV: 51 MMHG — SIGNIFICANT CHANGE UP (ref 30–65)
PH BLDMV: 7.27 — LOW (ref 7.32–7.45)
PH BLDMV: 7.28 — LOW (ref 7.32–7.45)
PH BLDMV: 7.29 — LOW (ref 7.32–7.45)
PH BLDMV: 7.3 — LOW (ref 7.32–7.45)
PH BLDMV: 7.3 — LOW (ref 7.32–7.45)
PH BLDMV: 7.31 — LOW (ref 7.32–7.45)
PHOSPHATE SERPL-MCNC: 3.4 MG/DL — SIGNIFICANT CHANGE UP (ref 2.5–4.5)
PLAT MORPH BLD: NORMAL — SIGNIFICANT CHANGE UP
PLATELET # BLD AUTO: 55 K/UL — LOW (ref 150–400)
PLATELET # BLD AUTO: 55 K/UL — LOW (ref 150–400)
POIKILOCYTOSIS BLD QL AUTO: SIGNIFICANT CHANGE UP
POTASSIUM SERPL-MCNC: 4.1 MMOL/L — SIGNIFICANT CHANGE UP (ref 3.5–5.3)
POTASSIUM SERPL-MCNC: 4.3 MMOL/L — SIGNIFICANT CHANGE UP (ref 3.5–5.3)
POTASSIUM SERPL-SCNC: 4.1 MMOL/L — SIGNIFICANT CHANGE UP (ref 3.5–5.3)
POTASSIUM SERPL-SCNC: 4.3 MMOL/L — SIGNIFICANT CHANGE UP (ref 3.5–5.3)
PROT SERPL-MCNC: 6.2 G/DL — SIGNIFICANT CHANGE UP (ref 6–8.3)
PROT SERPL-MCNC: 6.6 G/DL — SIGNIFICANT CHANGE UP (ref 6–8.3)
PROTHROM AB SERPL-ACNC: 13.4 SEC — SIGNIFICANT CHANGE UP (ref 9.9–13.4)
PROTHROM AB SERPL-ACNC: 14.4 SEC — HIGH (ref 9.9–13.4)
RBC # BLD: 2.84 M/UL — LOW (ref 4.2–5.8)
RBC # BLD: 2.93 M/UL — LOW (ref 4.2–5.8)
RBC # FLD: 15.8 % — HIGH (ref 10.3–14.5)
RBC # FLD: 16.4 % — HIGH (ref 10.3–14.5)
RBC BLD AUTO: ABNORMAL
RH IG SCN BLD-IMP: POSITIVE — SIGNIFICANT CHANGE UP
SAO2 % BLDMV: 61 — SIGNIFICANT CHANGE UP (ref 60–90)
SAO2 % BLDMV: 64.7 — SIGNIFICANT CHANGE UP (ref 60–90)
SAO2 % BLDMV: 65.9 — SIGNIFICANT CHANGE UP (ref 60–90)
SAO2 % BLDMV: 66.2 — SIGNIFICANT CHANGE UP (ref 60–90)
SAO2 % BLDMV: 67.9 — SIGNIFICANT CHANGE UP (ref 60–90)
SAO2 % BLDMV: 69 — SIGNIFICANT CHANGE UP (ref 60–90)
SCHISTOCYTES BLD QL AUTO: SLIGHT — SIGNIFICANT CHANGE UP
SODIUM SERPL-SCNC: 134 MMOL/L — LOW (ref 135–145)
SODIUM SERPL-SCNC: 138 MMOL/L — SIGNIFICANT CHANGE UP (ref 135–145)
TACROLIMUS SERPL-MCNC: 4.5 NG/ML — SIGNIFICANT CHANGE UP
VANCOMYCIN TROUGH SERPL-MCNC: 12.6 UG/ML — SIGNIFICANT CHANGE UP (ref 10–20)
VANCOMYCIN TROUGH SERPL-MCNC: 13.2 UG/ML — SIGNIFICANT CHANGE UP (ref 10–20)
WBC # BLD: 19.67 K/UL — HIGH (ref 3.8–10.5)
WBC # BLD: 20.42 K/UL — HIGH (ref 3.8–10.5)
WBC # FLD AUTO: 19.67 K/UL — HIGH (ref 3.8–10.5)
WBC # FLD AUTO: 20.42 K/UL — HIGH (ref 3.8–10.5)

## 2025-05-01 PROCEDURE — 99232 SBSQ HOSP IP/OBS MODERATE 35: CPT | Mod: GC

## 2025-05-01 PROCEDURE — 99291 CRITICAL CARE FIRST HOUR: CPT

## 2025-05-01 PROCEDURE — 93010 ELECTROCARDIOGRAM REPORT: CPT

## 2025-05-01 PROCEDURE — 71045 X-RAY EXAM CHEST 1 VIEW: CPT | Mod: 26

## 2025-05-01 RX ORDER — METOCLOPRAMIDE HCL 10 MG
10 TABLET ORAL EVERY 8 HOURS
Refills: 0 | Status: COMPLETED | OUTPATIENT
Start: 2025-05-01 | End: 2025-05-03

## 2025-05-01 RX ORDER — BISACODYL 5 MG
10 TABLET, DELAYED RELEASE (ENTERIC COATED) ORAL ONCE
Refills: 0 | Status: COMPLETED | OUTPATIENT
Start: 2025-05-01 | End: 2025-05-01

## 2025-05-01 RX ORDER — HEPARIN SODIUM 1000 [USP'U]/ML
300 INJECTION INTRAVENOUS; SUBCUTANEOUS
Qty: 10000 | Refills: 0 | Status: DISCONTINUED | OUTPATIENT
Start: 2025-05-01 | End: 2025-05-02

## 2025-05-01 RX ORDER — TACROLIMUS 0.5 MG/1
2 CAPSULE ORAL
Refills: 0 | Status: DISCONTINUED | OUTPATIENT
Start: 2025-05-01 | End: 2025-05-02

## 2025-05-01 RX ORDER — NALOXEGOL OXALATE 12.5 MG/1
12.5 TABLET, FILM COATED ORAL DAILY
Refills: 0 | Status: DISCONTINUED | OUTPATIENT
Start: 2025-05-01 | End: 2025-05-14

## 2025-05-01 RX ADMIN — TRAMADOL HYDROCHLORIDE 25 MILLIGRAM(S): 50 TABLET, FILM COATED ORAL at 05:25

## 2025-05-01 RX ADMIN — PREGABALIN 25 MILLIGRAM(S): 75 CAPSULE ORAL at 17:18

## 2025-05-01 RX ADMIN — Medication 10 MILLILITER(S): at 19:14

## 2025-05-01 RX ADMIN — TRAMADOL HYDROCHLORIDE 25 MILLIGRAM(S): 50 TABLET, FILM COATED ORAL at 13:24

## 2025-05-01 RX ADMIN — NALOXEGOL OXALATE 12.5 MILLIGRAM(S): 12.5 TABLET, FILM COATED ORAL at 10:37

## 2025-05-01 RX ADMIN — Medication 3 UNIT(S)/HR: at 19:14

## 2025-05-01 RX ADMIN — LIDOCAINE HYDROCHLORIDE 3 PATCH: 20 JELLY TOPICAL at 23:30

## 2025-05-01 RX ADMIN — TRAMADOL HYDROCHLORIDE 25 MILLIGRAM(S): 50 TABLET, FILM COATED ORAL at 13:49

## 2025-05-01 RX ADMIN — MYCOPHENOLATE MOFETIL 83.34 MILLIGRAM(S): 500 TABLET, FILM COATED ORAL at 07:25

## 2025-05-01 RX ADMIN — METHOCARBAMOL 220 MILLIGRAM(S): 500 TABLET, FILM COATED ORAL at 05:24

## 2025-05-01 RX ADMIN — TACROLIMUS 1 MILLIGRAM(S): 0.5 CAPSULE ORAL at 07:25

## 2025-05-01 RX ADMIN — CEFEPIME 100 MILLIGRAM(S): 2 INJECTION, POWDER, FOR SOLUTION INTRAVENOUS at 19:42

## 2025-05-01 RX ADMIN — Medication 2.4 MILLION UNIT(S): at 13:22

## 2025-05-01 RX ADMIN — PREGABALIN 25 MILLIGRAM(S): 75 CAPSULE ORAL at 05:26

## 2025-05-01 RX ADMIN — Medication 500000 UNIT(S): at 12:24

## 2025-05-01 RX ADMIN — TRAMADOL HYDROCHLORIDE 25 MILLIGRAM(S): 50 TABLET, FILM COATED ORAL at 21:46

## 2025-05-01 RX ADMIN — Medication 500000 UNIT(S): at 16:31

## 2025-05-01 RX ADMIN — MYCOPHENOLATE MOFETIL 83.34 MILLIGRAM(S): 500 TABLET, FILM COATED ORAL at 19:43

## 2025-05-01 RX ADMIN — Medication 10 MILLILITER(S): at 07:14

## 2025-05-01 RX ADMIN — POLYETHYLENE GLYCOL 3350 17 GRAM(S): 17 POWDER, FOR SOLUTION ORAL at 12:25

## 2025-05-01 RX ADMIN — LIDOCAINE HYDROCHLORIDE 3 PATCH: 20 JELLY TOPICAL at 18:25

## 2025-05-01 RX ADMIN — METHOCARBAMOL 220 MILLIGRAM(S): 500 TABLET, FILM COATED ORAL at 13:21

## 2025-05-01 RX ADMIN — Medication 500 MILLIGRAM(S): at 05:55

## 2025-05-01 RX ADMIN — Medication 100 MILLIGRAM(S): at 18:39

## 2025-05-01 RX ADMIN — METHYLPREDNISOLONE ACETATE 36 MILLIGRAM(S): 80 INJECTION, SUSPENSION INTRA-ARTICULAR; INTRALESIONAL; INTRAMUSCULAR; SOFT TISSUE at 18:12

## 2025-05-01 RX ADMIN — METHYLPREDNISOLONE ACETATE 40 MILLIGRAM(S): 80 INJECTION, SUSPENSION INTRA-ARTICULAR; INTRALESIONAL; INTRAMUSCULAR; SOFT TISSUE at 05:24

## 2025-05-01 RX ADMIN — Medication 500 MILLIGRAM(S): at 17:45

## 2025-05-01 RX ADMIN — DOBUTAMINE 12.3 MICROGRAM(S)/KG/MIN: 250 INJECTION INTRAVENOUS at 07:12

## 2025-05-01 RX ADMIN — Medication 125 MILLIGRAM(S): at 17:16

## 2025-05-01 RX ADMIN — Medication 1 APPLICATION(S): at 12:35

## 2025-05-01 RX ADMIN — TRAMADOL HYDROCHLORIDE 25 MILLIGRAM(S): 50 TABLET, FILM COATED ORAL at 21:16

## 2025-05-01 RX ADMIN — Medication 500 MILLIGRAM(S): at 12:30

## 2025-05-01 RX ADMIN — TRAMADOL HYDROCHLORIDE 25 MILLIGRAM(S): 50 TABLET, FILM COATED ORAL at 05:00

## 2025-05-01 RX ADMIN — CEFEPIME 100 MILLIGRAM(S): 2 INJECTION, POWDER, FOR SOLUTION INTRAVENOUS at 04:17

## 2025-05-01 RX ADMIN — Medication 100 MILLIGRAM(S): at 06:36

## 2025-05-01 RX ADMIN — Medication 40 MILLIGRAM(S): at 12:24

## 2025-05-01 RX ADMIN — Medication 10 MILLIGRAM(S): at 21:30

## 2025-05-01 RX ADMIN — CEFEPIME 100 MILLIGRAM(S): 2 INJECTION, POWDER, FOR SOLUTION INTRAVENOUS at 12:28

## 2025-05-01 RX ADMIN — Medication 10 MILLIGRAM(S): at 21:54

## 2025-05-01 RX ADMIN — Medication 6.13 MICROGRAM(S)/KG/MIN: at 07:12

## 2025-05-01 RX ADMIN — Medication 6.13 MICROGRAM(S)/KG/MIN: at 19:14

## 2025-05-01 RX ADMIN — Medication 500 MILLIGRAM(S): at 05:25

## 2025-05-01 RX ADMIN — DOBUTAMINE 12.3 MICROGRAM(S)/KG/MIN: 250 INJECTION INTRAVENOUS at 19:15

## 2025-05-01 RX ADMIN — TACROLIMUS 2 MILLIGRAM(S): 0.5 CAPSULE ORAL at 19:42

## 2025-05-01 RX ADMIN — Medication 500 MILLIGRAM(S): at 13:00

## 2025-05-01 RX ADMIN — LIDOCAINE HYDROCHLORIDE 3 PATCH: 20 JELLY TOPICAL at 12:34

## 2025-05-01 RX ADMIN — Medication 125 MILLIGRAM(S): at 05:25

## 2025-05-01 RX ADMIN — Medication 500000 UNIT(S): at 19:42

## 2025-05-01 RX ADMIN — Medication 100 MILLIGRAM(S): at 21:17

## 2025-05-01 RX ADMIN — Medication 30 MILLILITER(S): at 07:01

## 2025-05-01 RX ADMIN — HEPARIN SODIUM 5000 UNIT(S): 1000 INJECTION INTRAVENOUS; SUBCUTANEOUS at 05:25

## 2025-05-01 RX ADMIN — Medication 500 MILLIGRAM(S): at 17:15

## 2025-05-01 RX ADMIN — Medication 500000 UNIT(S): at 07:25

## 2025-05-01 RX ADMIN — Medication 30 MILLILITER(S): at 19:08

## 2025-05-01 RX ADMIN — Medication 2 TABLET(S): at 21:17

## 2025-05-01 RX ADMIN — Medication 3 UNIT(S)/HR: at 07:13

## 2025-05-01 NOTE — PROGRESS NOTE ADULT - PROBLEM SELECTOR PLAN 2
- Immunosuppression:      - Cellcept 1000mg BID      - Solu-medrol taper: Currently 36mg BID (5/1)      - Tarco: 1mg BID, goal trough 8-10    - Antibiotics      - PO Vanco ppx (4/29 - )      - Cefepime 1000mg ppx (4/29 - )      - Vanco IV 500mg BID ppx (4/29 - )      - CTX 2000mg QD for donor strep pneumo+ in CSF, planned for possible 4 weeks course      - Penicillin IM for donor syphilis + ab - Immunosuppression:      - Cellcept 1000mg BID      - Solu-medrol taper: Currently 36mg BID (5/1)      - Tarco: 1mg BID, goal trough 8-10    - Antibiotics      - PO Vanco ppx (4/29 - )      - Cefepime 1000mg ppx (4/29 - )      - Vanco IV 500mg BID ppx (4/29 - )      - CTX 2000mg QD for donor strep pneumo+ in CSF, planned for possible 2-4 weeks course      - Penicillin IM for donor syphilis + ab

## 2025-05-01 NOTE — PROGRESS NOTE ADULT - SUBJECTIVE AND OBJECTIVE BOX
Patient seen and examined at bedside.    Overnight Events:   NAEO  In significant pain yesterday PM, now better controlled on Dilaudid PCA pump  Weaned off levo and vaso    Current Meds:  acetaminophen     Tablet .. 500 milliGRAM(s) Oral every 6 hours  cefepime   IVPB 1000 milliGRAM(s) IV Intermittent every 8 hours  chlorhexidine 2% Cloths 1 Application(s) Topical daily  dexMEDEtomidine Infusion 0.3 MICROgram(s)/kG/Hr IV Continuous <Continuous>  dextrose 50% Injectable 50 milliLiter(s) IV Push every 15 minutes  dextrose 50% Injectable 25 milliLiter(s) IV Push every 15 minutes  DOBUTamine Infusion 5 MICROgram(s)/kG/Min IV Continuous <Continuous>  EPINEPHrine    Infusion 0.02 MICROgram(s)/kG/Min IV Continuous <Continuous>  HYDROmorphone PCA (1 mG/mL) 30 milliLiter(s) PCA Continuous PCA Continuous  HYDROmorphone PCA (1 mG/mL) Rescue Clinician Bolus 0.5 milliGRAM(s) IV Push every 15 minutes PRN  insulin regular Infusion 3 Unit(s)/Hr IV Continuous <Continuous>  ketamine Injectable 25 milliGRAM(s) IV Push every 2 hours PRN  lidocaine   4% Patch 3 Patch Transdermal daily  methocarbamol IVPB 1000 milliGRAM(s) IV Intermittent every 8 hours  methylPREDNISolone sodium succinate Injectable 36 milliGRAM(s) IV Push every 12 hours  methylPREDNISolone sodium succinate Injectable   IV Push   mycophenolate mofetil IVPB 1000 milliGRAM(s) IV Intermittent every 12 hours  naloxegol 12.5 milliGRAM(s) Oral daily  norepinephrine Infusion 0.05 MICROgram(s)/kG/Min IV Continuous <Continuous>  nystatin    Suspension 675420 Unit(s) Oral <User Schedule>  oxyCODONE    IR 10 milliGRAM(s) Oral every 4 hours PRN  pantoprazole  Injectable 40 milliGRAM(s) IV Push daily  penicillin   G benzathine Injectable 2.4 Million Unit(s) IntraMuscular <User Schedule>  polyethylene glycol 3350 17 Gram(s) Oral daily  potassium chloride  10 mEq/50 mL IVPB 10 milliEquivalent(s) IV Intermittent once  potassium chloride  10 mEq/50 mL IVPB 10 milliEquivalent(s) IV Intermittent once  pregabalin 25 milliGRAM(s) Oral two times a day  senna 2 Tablet(s) Oral at bedtime  sodium chloride 0.9%. 1000 milliLiter(s) IV Continuous <Continuous>  tacrolimus 1 milliGRAM(s) Oral <User Schedule>  traMADol 25 milliGRAM(s) Oral every 8 hours  traZODone 100 milliGRAM(s) Oral at bedtime  vancomycin    Solution 125 milliGRAM(s) Oral every 12 hours  vancomycin  IVPB 500 milliGRAM(s) IV Intermittent every 12 hours  vasopressin Infusion 0.1 Unit(s)/Min IV Continuous <Continuous>      Vitals:  T(F): 99.1 (05-01), Max: 99.3 (04-30)  HR: 109 (05-01) (97 - 109)  BP: 101/62 (05-01) (87/52 - 107/57)  RR: 24 (05-01)  SpO2: 97% (05-01)  I&O's Summary    30 Apr 2025 07:01  -  01 May 2025 07:00  --------------------------------------------------------  IN: 3175.8 mL / OUT: 4292 mL / NET: -1116.2 mL    01 May 2025 07:01  -  01 May 2025 13:20  --------------------------------------------------------  IN: 335.8 mL / OUT: 789 mL / NET: -453.2 mL        Physical Exam:  Appearance: No acute distress; well appearing  Eyes: PERRL, EOMI, pink conjunctiva  HEENT: Normal oral mucosa  Cardiovascular: RRR, S1, S2  Respiratory: Clear to auscultation bilaterally  Gastrointestinal: soft, non-tender, non-distended with normal bowel sounds  Musculoskeletal: No clubbing; no joint deformity                             8.6    20.42 )-----------( 55       ( 01 May 2025 12:38 )             25.5     05-01    138  |  101  |  24[H]  ----------------------------<  127[H]  4.1   |  19[L]  |  2.67[H]    Ca    9.2      01 May 2025 12:38  Phos  3.4     05-01  Mg     2.2     05-01    TPro  6.6  /  Alb  4.5  /  TBili  2.1[H]  /  DBili  x   /  AST  27  /  ALT  28  /  AlkPhos  54  05-01    PT/INR - ( 01 May 2025 12:38 )   PT: 13.4 sec;   INR: 1.17 ratio         PTT - ( 01 May 2025 12:38 )  PTT:27.3 sec              New ECG(s): Personally reviewed    Echo:    Stress Testing:     Cath:    New Imaging:    Interpretation of Telemetry:

## 2025-05-01 NOTE — PROGRESS NOTE ADULT - SUBJECTIVE AND OBJECTIVE BOX
Day 3\1 of Anesthesia Pain Management Service    SUBJECTIVE: I have throat pain  Pain Scale Score:	[X] Refer to charted pain scores    THERAPY:    [ ] IV PCA Morphine		[ ] 5 mg/mL	[ ] 1 mg/mL  [X] IV PCA Hydromorphone	[ ] 5 mg/mL	[X] 1 mg/mL  [ ] IV PCA Fentanyl		[ ] 50 micrograms/mL    Demand dose: 0.2 mg     Lockout: 6 minutes   Continuous Rate: 0 mg/hr  4 Hour Limit: 4 mg    MEDICATIONS  (STANDING):  acetaminophen     Tablet .. 500 milliGRAM(s) Oral every 6 hours  cefepime   IVPB 1000 milliGRAM(s) IV Intermittent every 8 hours  chlorhexidine 2% Cloths 1 Application(s) Topical daily  CRRT Treatment    <Continuous>  dexMEDEtomidine Infusion 0.3 MICROgram(s)/kG/Hr (6.13 mL/Hr) IV Continuous <Continuous>  dextrose 50% Injectable 50 milliLiter(s) IV Push every 15 minutes  dextrose 50% Injectable 25 milliLiter(s) IV Push every 15 minutes  DOBUTamine Infusion 5 MICROgram(s)/kG/Min (12.3 mL/Hr) IV Continuous <Continuous>  EPINEPHrine    Infusion 0.02 MICROgram(s)/kG/Min (6.13 mL/Hr) IV Continuous <Continuous>  heparin   Injectable 5000 Unit(s) SubCutaneous every 8 hours  heparin  Infusion Syringe 300 Unit(s)/Hr (0.6 mL/Hr) CRRT <Continuous>  HYDROmorphone PCA (1 mG/mL) 30 milliLiter(s) PCA Continuous PCA Continuous  insulin regular Infusion 3 Unit(s)/Hr (3 mL/Hr) IV Continuous <Continuous>  lidocaine   4% Patch 3 Patch Transdermal daily  methocarbamol IVPB 1000 milliGRAM(s) IV Intermittent every 8 hours  methylPREDNISolone sodium succinate Injectable   IV Push   methylPREDNISolone sodium succinate Injectable 36 milliGRAM(s) IV Push every 12 hours  mycophenolate mofetil IVPB 1000 milliGRAM(s) IV Intermittent every 12 hours  naloxegol 12.5 milliGRAM(s) Oral daily  norepinephrine Infusion 0.05 MICROgram(s)/kG/Min (7.66 mL/Hr) IV Continuous <Continuous>  nystatin    Suspension [id number] Unit(s) Oral <User Schedule>  pantoprazole  Injectable 40 milliGRAM(s) IV Push daily  penicillin   G benzathine Injectable 2.4 Million Unit(s) IntraMuscular <User Schedule>  polyethylene glycol 3350 17 Gram(s) Oral daily  potassium chloride  10 mEq/50 mL IVPB 10 milliEquivalent(s) IV Intermittent once  potassium chloride  10 mEq/50 mL IVPB 10 milliEquivalent(s) IV Intermittent once  pregabalin 25 milliGRAM(s) Oral two times a day  PrismaSATE Dialysate BGK 4 / 2.5 5000 milliLiter(s) (1400 mL/Hr) CRRT <Continuous>  PrismaSOL Filtration BGK 4 / 2.5 5000 milliLiter(s) (1000 mL/Hr) CRRT <Continuous>  PrismaSOL Filtration BGK 4 / 2.5 5000 milliLiter(s) (200 mL/Hr) CRRT <Continuous>  senna 2 Tablet(s) Oral at bedtime  sodium chloride 0.9%. 1000 milliLiter(s) (10 mL/Hr) IV Continuous <Continuous>  tacrolimus 1 milliGRAM(s) Oral <User Schedule>  traMADol 25 milliGRAM(s) Oral every 8 hours  traZODone 100 milliGRAM(s) Oral at bedtime  vancomycin    Solution 125 milliGRAM(s) Oral every 12 hours  vancomycin  IVPB 500 milliGRAM(s) IV Intermittent every 12 hours  vasopressin Infusion 0.1 Unit(s)/Min (15 mL/Hr) IV Continuous <Continuous>    MEDICATIONS  (PRN):  HYDROmorphone PCA (1 mG/mL) Rescue Clinician Bolus 0.5 milliGRAM(s) IV Push every 15 minutes PRN for Pain Scale GREATER THAN 6  ketamine Injectable 25 milliGRAM(s) IV Push every 2 hours PRN severe pain  oxyCODONE    IR 10 milliGRAM(s) Oral every 4 hours PRN Moderate Pain (4 - 6)      OBJECTIVE:    Sedation Score:	[ X] Alert 	[ ] Drowsy 	[ ] Arousable	[ ] Asleep	[ ] Unresponsive    Side Effects:	[X ] None	[ ] Nausea	[ ] Vomiting	[ ] Pruritus  		[ ] Other:    Vital Signs Last 24 Hrs  T(C): 36.9 (01 May 2025 08:00), Max: 37.4 (30 Apr 2025 17:00)  T(F): 98.4 (01 May 2025 08:00), Max: 99.3 (30 Apr 2025 17:00)  HR: 105 (01 May 2025 09:35) (97 - 109)  BP: 96/60 (01 May 2025 09:00) (87/52 - 107/57)  BP(mean): 71 (01 May 2025 09:00) (63 - 80)  RR: 20 (01 May 2025 09:35) (9 - 28)  SpO2: 98% (01 May 2025 09:35) (92% - 100%)    Parameters below as of 01 May 2025 09:35  Patient On (Oxygen Delivery Method): nasal cannula, high flow  O2 Flow (L/min): 50  O2 Concentration (%): 40    ASSESSMENT/ PLAN    Therapy to  be:               [X] Continued   [ ] Discontinued   [ ] Changed to PRN Analgesics    Documentation and Verification of current medications:   [X] Done	[ ] Not done, not eligible    Comments: Endorsing minimal incisional pain, + throat pain  Total PCA use 3.8mg / 15 hours. Reeducated to use.

## 2025-05-01 NOTE — PROGRESS NOTE ADULT - ASSESSMENT
70-year-old male with history of NICM since 2011 HFrEF (LVEF 25-30%)   Now s/p OHT on 4/29/25 c/b oliguric ZAHRA requiring CRRT for fluid removal while on pressors possibly due to post-transplant ?RV failure.

## 2025-05-01 NOTE — PROGRESS NOTE ADULT - SUBJECTIVE AND OBJECTIVE BOX
Patient seen and examined at the bedside.    Remains critically ill on continuous ICU monitoring, at risk for life threatening decompensation.  All Labs, data reviewed. Plan of care discussed in length during multi-disciplinary ICU rounds.       Brief Summary:  70-year-old male with history of NICM since 2011 HFrEF (LVEF 25-30%)   Now s/p OHT on 4/29/25     24 Hour events:  Tolerating volume removal.  Remains on Dobutamine and Epinephrine as well as nitric oxide.  Off pressors currently.  Pain requiring PCA      Objective:  ICU Vital Signs Last 24 Hrs  T(C): 36.9 (01 May 2025 08:00), Max: 37.4 (30 Apr 2025 17:00)  T(F): 98.4 (01 May 2025 08:00), Max: 99.3 (30 Apr 2025 17:00)  HR: 104 (01 May 2025 09:00) (97 - 109)  BP: 96/60 (01 May 2025 09:00) (87/52 - 107/57)  BP(mean): 71 (01 May 2025 09:00) (63 - 80)  ABP: 108/57 (01 May 2025 09:00) (98/48 - 125/64)  ABP(mean): 71 (01 May 2025 09:00) (59 - 80)  RR: 17 (01 May 2025 09:00) (9 - 28)  SpO2: 98% (01 May 2025 09:00) (92% - 100%)    O2 Parameters below as of 01 May 2025 08:00  Patient On (Oxygen Delivery Method): nasal cannula, high flow  O2 Flow (L/min): 50  O2 Concentration (%): 40      Physical Exam:   General: Alert and interactive   Neurology: Oriented, following commands  Respiratory: Bilateral breath sounds  CV: Paced at 110    Abdominal: Soft, Nontender  Extremities: Warm, well-perfused  [place]    -------------------------------------------------------------------------------------------------------------------------------    Labs:                                   8.2    19.67 )-----------( 55       ( 01 May 2025 00:35 )             24.6     PT/INR - ( 01 May 2025 00:35 )   PT: 14.4 sec;   INR: 1.27 ratio       PTT - ( 01 May 2025 00:35 )  PTT:26.9 sec      134    |  100    |  25     ----------------------------<  138        ( 01 May 2025 00:36 )  4.3     |  20     |  2.52     Ca    8.9        ( 01 May 2025 00:36 )  Phos  3.4       ( 01 May 2025 00:36 )  Mg     2.2       ( 01 May 2025 00:36 )    TPro  6.2    /  Alb  4.4    /  TBili  1.9    /  DBili  x      /  AST  30     /  ALT  27     /  AlkPhos  47     ( 01 May 2025 00:36 )    LIVER FUNCTIONS - ( 01 May 2025 00:36 )  Alb: 4.4 g/dL / Pro: 6.2 g/dL / ALK PHOS: 47 U/L / ALT: 27 U/L / AST: 30 U/L / GGT: x           ABG - ( 01 May 2025 08:00 )  pH, Arterial: 7.34  pH, Blood: x     /  pCO2: 43    /  pO2: 131   / HCO3: 23    / Base Excess: -2.5  /  SaO2: 99.1          ------------------------------------------------------------------------------------------------------------------------------  Assessment:  69 yo NICM, s/p heart transplant     RV dysfunction  Postop acute pulmonary insufficiency  ZAHRA  Acute blood loss anemia  Thrombocytopenia  Hyperglycemia      Plan:   ***Neuro***  Maintain day/night cycle to prevent ICU delirium   Postoperative acute pain control with Tylenol, Lyrica, and Dilaudid PCA with prns.    ***Cardiovascular***  Monitor for sign of hypoperfusion, trend lactate, SVo2  Remains on Dobutamine and Epinephrine  Wean nitric oxide slowly.  Keep CVP <12  Monitor chest tube output    ***Pulmonary***  Postoperative acute pulmonary insufficiency  Deep breathing and coughing exercises, IS, Mobilization, nebs, Chest PT    ***GI***  Tolerating limited diet.  Reglan for gastric bubble.  Protonix for prophylaxis   Bowel regimen.   Trend LFTs    ***Renal***  ZAHRA / CKD- continue CRRT and fluid removal for CVP <12    ***ID***  Leukocytosis  Perioperative Vanco and Cefepime.  Tacro Cellcept Steroid taper for immunosuppression.  Prophylaxis with PO Vanco.  PCN and Ceftriaxone for donor cultures    ***Endocrine***  Insulin infusion for Hyperglycemia     ***Hematology***  Acute blood loss anemia and thrombocytopenia - no transfusion indication currently.  CBC, Coags, teg , monitor for bleeding  Heparin SQ for DVt ppx - hold in setting of low platelets.    I, Chata Huntley MD, personally performed the services described in this documentation.  I have reviewed the chart and agree that the record reflects my personal performance and is accurate and complete.    Electronically signed:   Chata Huntley MD  CT ICU attending     ICU time: 65 mins                  Patient seen and examined at the bedside.    Remains critically ill on continuous ICU monitoring, at risk for life threatening decompensation.  All Labs, data reviewed. Plan of care discussed in length during multi-disciplinary ICU rounds.       Brief Summary:  70-year-old male with history of NICM since 2011 HFrEF (LVEF 25-30%)   Now s/p OHT on 4/29/25     24 Hour events:  Tolerating volume removal.  Remains on Dobutamine and Epinephrine as well as nitric oxide.  Off pressors currently.  Pain improved with start of PCA.      Objective:  ICU Vital Signs Last 24 Hrs  T(C): 36.9 (01 May 2025 08:00), Max: 37.4 (30 Apr 2025 17:00)  T(F): 98.4 (01 May 2025 08:00), Max: 99.3 (30 Apr 2025 17:00)  HR: 104 (01 May 2025 09:00) (97 - 109)  BP: 96/60 (01 May 2025 09:00) (87/52 - 107/57)  BP(mean): 71 (01 May 2025 09:00) (63 - 80)  ABP: 108/57 (01 May 2025 09:00) (98/48 - 125/64)  ABP(mean): 71 (01 May 2025 09:00) (59 - 80)  RR: 17 (01 May 2025 09:00) (9 - 28)  SpO2: 98% (01 May 2025 09:00) (92% - 100%)    O2 Parameters below as of 01 May 2025 08:00  Patient On (Oxygen Delivery Method): nasal cannula, high flow  O2 Flow (L/min): 50  O2 Concentration (%): 40      Physical Exam:   General: Alert and interactive   Neurology: Oriented, following commands  Respiratory: Bilateral breath sounds  CV: Paced at 110    Abdominal: Soft, Nontender  Extremities: Warm, well-perfused  [place]    -------------------------------------------------------------------------------------------------------------------------------    Labs:                                   8.2    19.67 )-----------( 55       ( 01 May 2025 00:35 )             24.6     PT/INR - ( 01 May 2025 00:35 )   PT: 14.4 sec;   INR: 1.27 ratio       PTT - ( 01 May 2025 00:35 )  PTT:26.9 sec      134    |  100    |  25     ----------------------------<  138        ( 01 May 2025 00:36 )  4.3     |  20     |  2.52     Ca    8.9        ( 01 May 2025 00:36 )  Phos  3.4       ( 01 May 2025 00:36 )  Mg     2.2       ( 01 May 2025 00:36 )    TPro  6.2    /  Alb  4.4    /  TBili  1.9    /  DBili  x      /  AST  30     /  ALT  27     /  AlkPhos  47     ( 01 May 2025 00:36 )    LIVER FUNCTIONS - ( 01 May 2025 00:36 )  Alb: 4.4 g/dL / Pro: 6.2 g/dL / ALK PHOS: 47 U/L / ALT: 27 U/L / AST: 30 U/L / GGT: x           ABG - ( 01 May 2025 08:00 )  pH, Arterial: 7.34  pH, Blood: x     /  pCO2: 43    /  pO2: 131   / HCO3: 23    / Base Excess: -2.5  /  SaO2: 99.1          ------------------------------------------------------------------------------------------------------------------------------  Assessment:  71 yo NICM, s/p heart transplant     RV dysfunction  Postop acute pulmonary insufficiency  ZAHRA  Acute blood loss anemia  Thrombocytopenia  Hyperglycemia      Plan:   ***Neuro***  Maintain day/night cycle to prevent ICU delirium   Postoperative acute pain control with Tylenol, Lyrica, and Dilaudid PCA with prns.    ***Cardiovascular***  Monitor for sign of hypoperfusion, trend lactate, SVo2  Remains on Dobutamine and Epinephrine  Wean nitric oxide slowly.  Keep CVP <12  Monitor chest tube output    ***Pulmonary***  Postoperative acute pulmonary insufficiency  Deep breathing and coughing exercises, IS, Mobilization, nebs, Chest PT    ***GI***  Tolerating limited diet.  Reglan for gastric bubble.  Protonix for prophylaxis   Bowel regimen.   Trend LFTs    ***Renal***  ZAHRA / CKD- continue CRRT and fluid removal for CVP <12    ***ID***  Leukocytosis  Perioperative Vanco and Cefepime.  Tacro, Cellcept, Steroid taper for immunosuppression.  Prophylaxis with PO Vanco.  PCN and Ceftriaxone for donor cultures (syphylis, S pneumo)    ***Endocrine***  Insulin infusion for Hyperglycemia     ***Hematology***  Acute blood loss anemia and thrombocytopenia - no transfusion indication currently.  CBC, Coags, teg , monitor for bleeding  Heparin SQ for VTE prophylaxis on hold in setting of low platelets.    I, Chata Huntley MD, personally performed the services described in this documentation.  I have reviewed the chart and agree that the record reflects my personal performance and is accurate and complete.    Electronically signed:   Chata Huntley MD  CT ICU attending     ICU time: 65 mins

## 2025-05-01 NOTE — PROGRESS NOTE ADULT - ATTENDING COMMENTS
ZAHRA- ATN post cardiac transplant in the setting of cardiogenic shock ( ? related to intra-op Tricuspid regurg repair/RV overload)  Pressor needs reducing   started CVVHDF for fluid overload     - continue CVVHDF with high pre-filter replacement fluid  - Fluid removal as tolerated  - increased blood flow to 250 ml/min and added heparin to prevent clotting     jens calvillo  nephrology attending   please contact me on TEAMS   Office- [phone]

## 2025-05-01 NOTE — OCCUPATIONAL THERAPY INITIAL EVALUATION ADULT - ADDITIONAL COMMENTS
Pt lives in private home with spouse, 2 steps to enter and main level set up. As per patient, independent with ADLs prior to admission, ambulated independently with no assistive devices. Denies hx of falls, denies any DME in home, Pt very active, works at [employer]

## 2025-05-01 NOTE — PROGRESS NOTE ADULT - ASSESSMENT
70-year-old male ABO O past medical history of NICM since 2011 HFrEF (LVEF 25-30%) s/p MDT CRT-D with baseline CHB, VT/VF, AFs/p GIANFRANCO ligation/MAZE, MV/TV repair, PVC ablation 3/2024 intolerant to AADs in the past, recent admission 3/19/2025-3/20/2025 for AICD shocks initially presented to the [facility] ED on 3/21/2025, sent by HF specialist for ACID shock. Device reported successful ATP for VT 3/17/2025 at 6:52pm followed by one ATP & 40J shock. He reported feeling dizzy & SOB during the events. He follows with Dr. Luca Tamayo for HF, currently undergoing transplant workup, Dr John for CRTD and VT/VF and Dr. Chu for genetic counseling. While admitted to [facility], he was started on a lidocaine gtt. On 3/21 when lidocaine weaned off patient had another two episodes of VT requiring AICD shocks. He was transferred to [facility] for further management. He was initially maintained on lidocaine gtt from 3/21/2025-3/25/2025 & his listing status was upgraded to UNOS status 2e for heart transplant (ABO O) for the refractory VT. He was transitioned to oral antiarrhythmics (Toprol XL & quinidine) & ultimately transferred from CICU to tele floor on 3/27/2025. Hospital course was further c/b development of watery diarrhea & was found to have +norovirus on 3/31/2025 which prompted deactivation to status 7 listing for heart transplant. Status reinstated to 2E after diarrhea resolved.     He underwent successful OHT on 4/28 (CMV +/+, Toxo -/+,ischemic time 258min, intra op 2plts + 2 cryo). Admitted to CTU for close post-op care and extubated on 4/29, on dobutamine, levo, vaso, epi, Lico and CRRT for support.  Bedside hemodynamics:  5/1/25 BP 99/59/72, , CVP 11, PA 40/17/24, Gucci CO/CI 5.5/2.8  4/29/25 BP 94/57/67, , CVP 11, PA 28/15/20, Gucci CI 2.2  3/22/25 BP 97/76/83, HR 80, CVP 6, PA 58/23/38, PCWP 20, SVR 1100, Gucci CO/CI 5.1/2.6, TD 4/2.08    Cardiac Studies:   TTE 4/30/25: LVEF 71%, no RMWA, RV normal size, reduced RVSF  STACEY 4/28/25 intraop: LVEF 20%, global, dilated RV with mildly reduced RVSF  TTE 3/20/25 : LVEF 30%, segmental, LVIDd 5.3, walls normal, elevated LV filling pressures, mod RVE with mildly reduced RV function, TAPSE 1.7, severely dilated RA, annuloplasty in MV position, transvalvular gradients are elevated with severe prosthetic MS (peak gradient 15.7, mean gradient 8), trace intravalvular MR, TV annuloplasty ring noted, mild TR,  est PASP 36, no evidence of LV thrombus  TTE 10/2024: LVEF 25-30%, LVEDD 5.9cm, moderately reduced RV function, annuloplasty ring of mitral and tricuspid position, PASP 47 mmHg  [facility] 3/19/25- RA 11 PA 58/26 PCWP 32 CO/CI 5.43/2.77  [facility] 6/2023 Nonobstructive CAD 70-year-old male ABO O past medical history of NICM since 2011 HFrEF (LVEF 25-30%) s/p MDT CRT-D with baseline CHB, VT/VF, AFs/p GIANFRANCO ligation/MAZE, MV/TV repair, PVC ablation 3/2024 intolerant to AADs in the past, recent admission 3/19/2025-3/20/2025 for AICD shocks initially presented to the [facility] ED on 3/21/2025, sent by HF specialist for ACID shock. Device reported successful ATP for VT 3/17/2025 at 6:52pm followed by one ATP & 40J shock. He reported feeling dizzy & SOB during the events. He follows with Dr. Luca Tamayo for HF, currently undergoing transplant workup, Dr John for CRTD and VT/VF and Dr. Chu for genetic counseling. While admitted to [facility], he was started on a lidocaine gtt. On 3/21 when lidocaine weaned off patient had another two episodes of VT requiring AICD shocks. He was transferred to [facility] for further management. He was initially maintained on lidocaine gtt from 3/21/2025-3/25/2025 & his listing status was upgraded to UNOS status 2e for heart transplant (ABO O) for the refractory VT. He was transitioned to oral antiarrhythmics (Toprol XL & quinidine) & ultimately transferred from CICU to tele floor on 3/27/2025. Hospital course was further c/b development of watery diarrhea & was found to have +norovirus on 3/31/2025 which prompted deactivation to status 7 listing for heart transplant. Status reinstated to 2E after diarrhea resolved.     He underwent successful OHT on 4/28 (CMV +/+, Toxo -/+,ischemic time 258min, intra op 2plts + 2 cryo). Admitted to CTU for close post-op care and extubated on 4/29, on dobutamine, levo, vaso, epi, Lico and CRRT for support.  Levo/Vaso weaned off overnight.      Bedside hemodynamics:  5/1/25 BP 99/59/72, , CVP 11, PA 40/17/24, Gucci CO/CI 5.5/2.8  4/29/25 BP 94/57/67, , CVP 11, PA 28/15/20, Gucci CI 2.2  3/22/25 BP 97/76/83, HR 80, CVP 6, PA 58/23/38, PCWP 20, SVR 1100, Gucci CO/CI 5.1/2.6, TD 4/2.08    Cardiac Studies:   TTE 4/30/25: LVEF 71%, no RMWA, RV normal size, reduced RVSF  STACEY 4/28/25 intraop: LVEF 20%, global, dilated RV with mildly reduced RVSF  TTE 3/20/25 : LVEF 30%, segmental, LVIDd 5.3, walls normal, elevated LV filling pressures, mod RVE with mildly reduced RV function, TAPSE 1.7, severely dilated RA, annuloplasty in MV position, transvalvular gradients are elevated with severe prosthetic MS (peak gradient 15.7, mean gradient 8), trace intravalvular MR, TV annuloplasty ring noted, mild TR,  est PASP 36, no evidence of LV thrombus  TTE 10/2024: LVEF 25-30%, LVEDD 5.9cm, moderately reduced RV function, annuloplasty ring of mitral and tricuspid position, PASP 47 mmHg  [facility] 3/19/25- RA 11 PA 58/26 PCWP 32 CO/CI 5.43/2.77  [facility] 6/2023 Nonobstructive CAD

## 2025-05-01 NOTE — PROGRESS NOTE ADULT - SUBJECTIVE AND OBJECTIVE BOX
Patient seen and examined at the bedside.    Remained critically ill on continuous ICU monitoring.    OBJECTIVE:  Vital Signs Last 24 Hrs  T(C): 37 (01 May 2025 16:00), Max: 37.3 (30 Apr 2025 20:00)  T(F): 98.6 (01 May 2025 16:00), Max: 99.1 (30 Apr 2025 20:00)  HR: 106 (01 May 2025 19:00) (97 - 110)  BP: 103/63 (01 May 2025 19:00) (91/51 - 116/69)  BP(mean): 77 (01 May 2025 19:00) (63 - 88)  RR: 17 (01 May 2025 19:00) (10 - 33)  SpO2: 99% (01 May 2025 19:00) (93% - 100%)    Parameters below as of 01 May 2025 16:12  Patient On (Oxygen Delivery Method): nasal cannula, high flow  O2 Flow (L/min): 40  O2 Concentration (%): 40      Physical Exam:   General: Normal body habitus, breathing comfortably at rest  Neurology: anxious and complains of confusion, oriented x 3 and follows simple commands, [place]  Eyes: bilateral pupils equal and reactive  ENT/Neck: Neck supple, trachea midline, No JVD  Respiratory: Clear bilaterally  CV: S1S2, no murmurs        [x] Sternal dressing, [x] Mediastinal CT x2, [x] R Pleural CT        [x] Paced rhythm, [x] Temporary pacing - VVI 60  Abdominal: Soft, NT, ND +BS  Extremities: 1+ pedal edema noted, + peripheral pulses  Skin: No Rashes, Hematoma, Ecchymosis      Assessment:  69 yo NICM, s/p heart transplant     RV dysfunction  Postop acute pulmonary insufficiency  ZAHRA  Acute blood loss anemia  Thrombocytopenia  Hyperglycemia        Plan:   ***Neuro***  [x] Nonfocal    Pain management with PCA pump and tramadol   Pregabalin daily  Post operative neuro assessment     ***Cardiovascular***  Invasive hemodynamic monitoring, assess perfusion indices   SR / CVP 8 / MAP 81 / PAP 21 / Hct 25.5 / Lactate 1.2   [x] Dobutamine 5 mcg/kg/min   [x] Epinephrine 0.02 mcg/kg/min  Reassessment of hemodynamics post resuscitation   Monitor chest tube outputs   [x] AC therapy with Heparin   Serial EKG and cardiac enzymes     ***Pulmonary***  [x] HFNC 40L/40%  Encourage incentive spirometry, continue pulse ox monitoring, follow ABGs               ***GI***  [x] Diet: CC Diet   [x] Protonix   Bowel regimen with Miralax and senna     ***Renal***  CRRT Treatment   Continue to monitor I/Os, BUN/Creatinine.   Replete lytes PRN  [place] present     ***ID***  Cefepime for postop prophylaxis   IV Vanco solution for c.diff prophylaxis   Immunosuppressive regimen and tacro    ***Endocrine***  [x] Stress Hyperglycemia : HbA1c 6.1%                - [x] Insulin gtt             - Need tight glycemic control to prevent wound infection.            Patient requires continuous monitoring with bedside rhythm monitoring, pulse oximetry monitoring, and continuous central venous and arterial pressure monitoring; and intermittent blood gas analysis. Care plan discussed with the ICU care team.   Patient remained critical, at risk for life threatening decompensation.    I have spent 40 minutes providing critical care management to this patient.    By signing my name below, I, Raquel Lara, attest that this documentation has been prepared under the direction and in the presence of ARA Andujar   Electronically signed: Elsy Smith, 05-01-25 @ 19:31    I, Felciiano Madrid , personally performed the services described in this documentation. all medical record entries made by the elsy were at my direction and in my presence. I have reviewed the chart and agree that the record reflects my personal performance and is accurate and complete  Electronically signed: ARA Andujar  Patient seen and examined at the bedside.    Remained critically ill on continuous ICU monitoring.    OBJECTIVE:  Vital Signs Last 24 Hrs  T(C): 37 (01 May 2025 16:00), Max: 37.3 (30 Apr 2025 20:00)  T(F): 98.6 (01 May 2025 16:00), Max: 99.1 (30 Apr 2025 20:00)  HR: 106 (01 May 2025 19:00) (97 - 110)  BP: 103/63 (01 May 2025 19:00) (91/51 - 116/69)  BP(mean): 77 (01 May 2025 19:00) (63 - 88)  RR: 17 (01 May 2025 19:00) (10 - 33)  SpO2: 99% (01 May 2025 19:00) (93% - 100%)    Parameters below as of 01 May 2025 16:12  Patient On (Oxygen Delivery Method): nasal cannula, high flow  O2 Flow (L/min): 40  O2 Concentration (%): 40      Physical Exam:   General: Normal body habitus, breathing comfortably at rest  Neurology: anxious, oriented x 3 and follows commands, [place], worked with PT   Eyes: bilateral pupils equal and reactive  ENT/Neck: Neck supple, trachea midline, No JVD  Respiratory: low inspiratory effort, diminished in the bases, on HFNC & Lico   CV: S1S2, no murmurs        [x] Sternal dressing, [x] Mediastinal CT x2, [x] R Pleural CT        [x] NSR, [x] Temporary back-up pacing - VVI 60  Abdominal: Soft, NT, ND +BS  Extremities: 1+ pedal edema noted, + peripheral pulses  Skin: No Rashes, Hematoma, Ecchymosis      Assessment:  69 yo NICM, s/p heart transplant     RV dysfunction-remains on Lico and inotropic support   Postop acute pulmonary insufficiency-on HFNC & Lico   ZAHRA-on CVVHD  Acute blood loss anemia  Thrombocytopenia  Hyperglycemia        Plan:   ***Neuro***  [x] Nonfocal    Pain management with PCA pump and tramadol   Pregabalin daily  Post operative neuro assessment     ***Cardiovascular***  Invasive hemodynamic monitoring, assess perfusion indices   SR / CVP 8 / MAP 81 / PAP 21 / Hct 25.5 / Lactate 1.2   [x] Dobutamine 5 mcg/kg/min   [x] Epinephrine 0.02 mcg/kg/min  Reassessment of hemodynamics post resuscitation   Monitor chest tube outputs   [x] AC therapy with Heparin   Serial EKG and cardiac enzymes     ***Pulmonary***  [x] HFNC 40L/40% w/ Lico @ 10 -- wean Lico to 5 OVN   Encourage incentive spirometry, continue pulse ox monitoring, follow ABGs               ***GI***  [x] Diet: CC Diet   [x] Protonix   Bowel regimen with Miralax and senna , suppository tonight     ***Renal***  CRRT Treatment   Continue to monitor I/Os, BUN/Creatinine.   Replete lytes PRN  [place] present     ***ID***  Cefepime for postop prophylaxis   IV Vanco solution for c.diff prophylaxis   Immunosuppressive regimen and tacro    ***Endocrine***  [x] Stress Hyperglycemia : HbA1c 6.1%                - [x] Insulin gtt             - Need tight glycemic control to prevent wound infection.            Patient requires continuous monitoring with bedside rhythm monitoring, pulse oximetry monitoring, and continuous central venous and arterial pressure monitoring; and intermittent blood gas analysis. Care plan discussed with the ICU care team.   Patient remained critical, at risk for life threatening decompensation.    I have spent 40 minutes providing critical care management to this patient.    By signing my name below, I, Raquel Lara, attest that this documentation has been prepared under the direction and in the presence of ARA Andujar   Electronically signed: Kasey Smith, 05-01-25 @ 19:31    I, Feliciano Madrid , personally performed the services described in this documentation. all medical record entries made by the yaritzaibjewell were at my direction and in my presence. I have reviewed the chart and agree that the record reflects my personal performance and is accurate and complete  Electronically signed: ARA Andujar

## 2025-05-01 NOTE — OCCUPATIONAL THERAPY INITIAL EVALUATION ADULT - ADL RETRAINING, OT EVAL
GOAL: Patient will perform LB dressing with independence by 4 weeks. GOAL: Patient will perform toilet transfer and hygiene with independence by 4 weeks.

## 2025-05-01 NOTE — PROGRESS NOTE ADULT - CRITICAL CARE ATTENDING COMMENT
Patient was seen and examined with the fellow.  I agree with the above except for the following:    POD 2.  Doing well.  Pain better controlled.  Escalated to PCA pump.  Now off of pressos, will wean Lico.  Would leave + Epi in place for RV support.    PAC: RA 12-13, PA 41/26 (24), MVO2 64%, TD 2.8.  Tac level today 4.5.   Will increase to 2mg BID. Continue on Cellcept 1gm BID + steroid taper.  DSA class II antibodies compared to pre transplant and these appear to be preformed.  Monitor platelets - low at 55 overnight.

## 2025-05-01 NOTE — PROGRESS NOTE ADULT - PROBLEM SELECTOR PLAN 1
Patient's baseline Scr is 1.0-1.2. On 4/28, SCr was 1.1 and on 4/29, Scr rubia to 3.2. UA-turbid, 300 protein, small leuks, large blood, 914 rbc's, UPCR-3.2. Pt had drop in hgb from 12.4 (4/28) to 9.0 (4/29). Pt was on IV vasopressor support. Pt noted to have elevated CVP of 20, and was not responding well enough to diuresis, so was started on CRRT for UF. Pt now only on Dobutamine and Epinephrine gtt. Remains anuric.    -Labs reviewed. Plan to continue CRRT, tolerating UF rate of 150 mL/hr. Filter was clotting. Increased BFR and added heparin to circuit. If platelets continue to drop, will need to stop Heparin. If continues to need CRRT with increasing UF rates, will need to consider regional citrate anticoagulation.  -Dose meds for CrCl of ~ 30 mL/min  -Avoid nephrotoxins.

## 2025-05-01 NOTE — PROGRESS NOTE ADULT - PROBLEM SELECTOR PLAN 1
Cally Meneses is a 9year ACC/AHA stage D systolic heart failure, s/p heart transplant 4/28, CMV +/+, Toxo -/+  - Retrospective crossmatch with new class II DSA DR HILLIARD.    - Currently being supported on: Dobutamine 5, Epi 0.02, and Lico @ 20  - Chest tubes x 2 in place, per CTS  - Diuresis: guided by CVP ACC/AHA stage D systolic heart failure, s/p heart transplant 4/28, CMV +/+, Toxo -/+  - Retrospective crossmatch with class II DSA DR HILLIARD (preformed from pre transplant).    - Currently being supported on: Dobutamine 5, Epi 0.02, and Lico @ 20  - Chest tubes x 2 in place, per CTS  - Diuresis: guided by CVP

## 2025-05-01 NOTE — OCCUPATIONAL THERAPY INITIAL EVALUATION ADULT - PERTINENT HX OF CURRENT PROBLEM, REHAB EVAL
69-year-old male patient past medical history of NICM since 2011, HFrEF LVEF 25-30% s/p MDT CRT-D with baseline CHB, VT/VF, a.fib s/p GIANFRANCO ligation/MAZE, MV/TV repair, PVC ablation 3/2024 intolerant to AADs in the past, recent admission 3/19 - 3/20/25 for AICD shocks initially presented to the [facility] emergency department on 3/21/2025, sent by heart failure specialist for ACID shock. Device reported successful ATP for VT 3/17 at 6:52pm followed by one ATP and 40J shock. He reported feeling dizzy and SOB during the events. He follows with Dr paula abreu for HF, currently undergoing transplant workup, Dr John for CRTD and VT/VF and  for genetic counseling. While admitted to [facility], he was started on a lidocaine gtt. On 3/21 when lidocaine weaned off patient had another two episodes of VT requiring AICD shocks. He was transferred to [facility] for further management.   On admission to CICU, he is A&O x3, saturating well on room air, av paced @ 80 with /87 on lidocaine gtt @ 1mg/min. (21 Mar 2025 22:55) Patient admitted initially for AICD shocks with history of NICM. He has been managed for heart failure and was listed more urgently for heart transplant due to refractory VT. He underwent OHT, TV repair, and PPM removal on 4/29/25 and is now recovering in CTU.  Status Post OHT 4/29/25 also with removal of ICD CRT, tricuspid valve repair (34 mm Physio ring)  Per report no evidence of vegetations on valves at time of transplant

## 2025-05-01 NOTE — PROGRESS NOTE ADULT - SUBJECTIVE AND OBJECTIVE BOX
[facility] DIVISION OF KIDNEY DISEASES AND HYPERTENSION --    Reason for consult: ZAHRA    24 hour events/subjective: Patient seen and examined at bedside. Tolerating UF on CRRT, pressor-dependent. On HFNC. No acute complaints.      PAST HISTORY  --------------------------------------------------------------------------------  No significant changes to PMH, PSH, FHx, SHx, unless otherwise noted    ALLERGIES & MEDICATIONS  --------------------------------------------------------------------------------  Allergies    No Known Allergies      Standing Inpatient Medications  acetaminophen     Tablet .. 500 milliGRAM(s) Oral every 6 hours  cefepime   IVPB 1000 milliGRAM(s) IV Intermittent every 8 hours  chlorhexidine 2% Cloths 1 Application(s) Topical daily  dexMEDEtomidine Infusion 0.3 MICROgram(s)/kG/Hr IV Continuous <Continuous>  dextrose 50% Injectable 50 milliLiter(s) IV Push every 15 minutes  dextrose 50% Injectable 25 milliLiter(s) IV Push every 15 minutes  DOBUTamine Infusion 5 MICROgram(s)/kG/Min IV Continuous <Continuous>  EPINEPHrine    Infusion 0.02 MICROgram(s)/kG/Min IV Continuous <Continuous>  HYDROmorphone PCA (1 mG/mL) 30 milliLiter(s) PCA Continuous PCA Continuous  insulin regular Infusion 3 Unit(s)/Hr IV Continuous <Continuous>  lidocaine   4% Patch 3 Patch Transdermal daily  methocarbamol IVPB 1000 milliGRAM(s) IV Intermittent every 8 hours  methylPREDNISolone sodium succinate Injectable 36 milliGRAM(s) IV Push every 12 hours  methylPREDNISolone sodium succinate Injectable   IV Push   mycophenolate mofetil IVPB 1000 milliGRAM(s) IV Intermittent every 12 hours  naloxegol 12.5 milliGRAM(s) Oral daily  norepinephrine Infusion 0.05 MICROgram(s)/kG/Min IV Continuous <Continuous>  nystatin    Suspension 970813 Unit(s) Oral <User Schedule>  pantoprazole  Injectable 40 milliGRAM(s) IV Push daily  penicillin   G benzathine Injectable 2.4 Million Unit(s) IntraMuscular <User Schedule>  polyethylene glycol 3350 17 Gram(s) Oral daily  potassium chloride  10 mEq/50 mL IVPB 10 milliEquivalent(s) IV Intermittent once  potassium chloride  10 mEq/50 mL IVPB 10 milliEquivalent(s) IV Intermittent once  pregabalin 25 milliGRAM(s) Oral two times a day  senna 2 Tablet(s) Oral at bedtime  sodium chloride 0.9%. 1000 milliLiter(s) IV Continuous <Continuous>  tacrolimus 1 milliGRAM(s) Oral <User Schedule>  traMADol 25 milliGRAM(s) Oral every 8 hours  traZODone 100 milliGRAM(s) Oral at bedtime  vancomycin    Solution 125 milliGRAM(s) Oral every 12 hours  vancomycin  IVPB 500 milliGRAM(s) IV Intermittent every 12 hours  vasopressin Infusion 0.1 Unit(s)/Min IV Continuous <Continuous>    PRN Inpatient Medications  HYDROmorphone PCA (1 mG/mL) Rescue Clinician Bolus 0.5 milliGRAM(s) IV Push every 15 minutes PRN  ketamine Injectable 25 milliGRAM(s) IV Push every 2 hours PRN  oxyCODONE    IR 10 milliGRAM(s) Oral every 4 hours PRN      REVIEW OF SYSTEMS  --------------------------------------------------------------------------------  Gen: No fever  Respiratory: No dyspnea  CV: No chest pain  GI: No diarrhea, nausea, or vomiting  : anuric  Neuro: No dizziness/lightheadedness    All other systems were reviewed and are negative, except as noted.    VITALS/PHYSICAL EXAM  --------------------------------------------------------------------------------  T(C): 37.3 (05-01-25 @ 12:15), Max: 37.4 (04-30-25 @ 17:00)  HR: 109 (05-01-25 @ 12:15) (97 - 109)  BP: 101/62 (05-01-25 @ 12:15) (87/52 - 107/57)  RR: 24 (05-01-25 @ 12:15) (10 - 28)  SpO2: 97% (05-01-25 @ 12:15) (92% - 100%)  Wt(kg): --        04-30-25 @ 07:01  -  05-01-25 @ 07:00  --------------------------------------------------------  IN: 3175.8 mL / OUT: 4292 mL / NET: -1116.2 mL    05-01-25 @ 07:01  -  05-01-25 @ 12:47  --------------------------------------------------------  IN: 335.8 mL / OUT: 789 mL / NET: -453.2 mL      PHYSICAL EXAM:  Gen: NAD  Neuro: non-focal  HEENT: +HFNC  Pulm: CTA B/L  CV: +S1S2  Abd: soft, non-distended, non-tender  : +indwelling nagy  Extremities: no edema  Skin: Warm  Dialysis access: R fem [facility]    LABS/STUDIES  --------------------------------------------------------------------------------              8.2    19.67 >-----------<  55       [05-01-25 @ 00:35]              24.6     134  |  100  |  25  ----------------------------<  138      [05-01-25 @ 00:36]  4.3   |  20  |  2.52        Ca     8.9     [05-01-25 @ 00:36]      Mg     2.2     [05-01-25 @ 00:36]      Phos  3.4     [05-01-25 @ 00:36]    TPro  6.2  /  Alb  4.4  /  TBili  1.9  /  DBili  x   /  AST  30  /  ALT  27  /  AlkPhos  47  [05-01-25 @ 00:36]    PT/INR: PT 14.4 , INR 1.27       [05-01-25 @ 00:35]  PTT: 26.9       [05-01-25 @ 00:35]    Creatinine Trend:  SCr 2.52 [05-01 @ 00:36]  SCr 2.80 [04-30 @ 13:24]  SCr 3.46 [04-30 @ 00:26]  SCr 3.24 [04-29 @ 17:30]  SCr 2.42 [04-29 @ 09:40]    Urine Creatinine 46      [04-29-25 @ 02:45]  Urine Protein 146      [04-29-25 @ 02:45]  Urine Sodium 29      [04-29-25 @ 02:45]  Urine Urea Nitrogen 81      [04-29-25 @ 02:45]  Urine Potassium 72      [04-29-25 @ 02:45]  Urine Osmolality 345      [04-29-25 @ 02:45]    Iron 48, TIBC 307, %sat 16      [03-28-25 @ 06:15]  Ferritin 184      [03-28-25 @ 06:15]  TSH 1.26      [03-22-25 @ 00:58]  Lipid: chol 147, TG 71, HDL 62, LDL --      [03-22-25 @ 00:58]    HBsAb Reactive      [04-17-25 @ 15:45]  HBsAg Nonreact      [04-17-25 @ 15:45]  HBcAb Nonreact      [04-17-25 @ 15:45]  HCV 0.05, Nonreact      [04-17-25 @ 15:45]  HIV Nonreact      [04-17-25 @ 15:45]

## 2025-05-01 NOTE — OCCUPATIONAL THERAPY INITIAL EVALUATION ADULT - GENERAL OBSERVATIONS, REHAB EVAL
Upon entry, patient semi-supine in bed +CTs x3, +Coronado +IVs +R fem adriane +Taswell +RIJ +tele +pulse ox +BP cuff, patient agreeable to OT eval, cleared for OT evaluation as per LISA Warren

## 2025-05-02 DIAGNOSIS — D69.6 THROMBOCYTOPENIA, UNSPECIFIED: ICD-10-CM

## 2025-05-02 LAB
ADD ON TEST-SPECIMEN IN LAB: SIGNIFICANT CHANGE UP
ALBUMIN SERPL ELPH-MCNC: 4.4 G/DL — SIGNIFICANT CHANGE UP (ref 3.3–5)
ALBUMIN SERPL ELPH-MCNC: 4.5 G/DL — SIGNIFICANT CHANGE UP (ref 3.3–5)
ALP SERPL-CCNC: 65 U/L — SIGNIFICANT CHANGE UP (ref 40–120)
ALP SERPL-CCNC: 71 U/L — SIGNIFICANT CHANGE UP (ref 40–120)
ALT FLD-CCNC: 26 U/L — SIGNIFICANT CHANGE UP (ref 10–45)
ALT FLD-CCNC: 27 U/L — SIGNIFICANT CHANGE UP (ref 10–45)
ANION GAP SERPL CALC-SCNC: 16 MMOL/L — SIGNIFICANT CHANGE UP (ref 5–17)
ANION GAP SERPL CALC-SCNC: 17 MMOL/L — SIGNIFICANT CHANGE UP (ref 5–17)
APTT BLD: 24.6 SEC — LOW (ref 26.1–36.8)
AST SERPL-CCNC: 18 U/L — SIGNIFICANT CHANGE UP (ref 10–40)
AST SERPL-CCNC: 22 U/L — SIGNIFICANT CHANGE UP (ref 10–40)
BASE EXCESS BLDMV CALC-SCNC: -2 MMOL/L — SIGNIFICANT CHANGE UP (ref -3–3)
BASE EXCESS BLDMV CALC-SCNC: -2.8 MMOL/L — SIGNIFICANT CHANGE UP (ref -3–3)
BASE EXCESS BLDMV CALC-SCNC: -2.8 MMOL/L — SIGNIFICANT CHANGE UP (ref -3–3)
BASE EXCESS BLDMV CALC-SCNC: -3.1 MMOL/L — LOW (ref -3–3)
BASE EXCESS BLDMV CALC-SCNC: -3.7 MMOL/L — LOW (ref -3–3)
BASE EXCESS BLDMV CALC-SCNC: -4 MMOL/L — LOW (ref -3–3)
BASE EXCESS BLDMV CALC-SCNC: -4.1 MMOL/L — LOW (ref -3–3)
BASOPHILS # BLD AUTO: 0.02 K/UL — SIGNIFICANT CHANGE UP (ref 0–0.2)
BASOPHILS # BLD AUTO: 0.02 K/UL — SIGNIFICANT CHANGE UP (ref 0–0.2)
BASOPHILS NFR BLD AUTO: 0.1 % — SIGNIFICANT CHANGE UP (ref 0–2)
BASOPHILS NFR BLD AUTO: 0.1 % — SIGNIFICANT CHANGE UP (ref 0–2)
BILIRUB SERPL-MCNC: 2 MG/DL — HIGH (ref 0.2–1.2)
BILIRUB SERPL-MCNC: 2.1 MG/DL — HIGH (ref 0.2–1.2)
BUN SERPL-MCNC: 21 MG/DL — SIGNIFICANT CHANGE UP (ref 7–23)
BUN SERPL-MCNC: 21 MG/DL — SIGNIFICANT CHANGE UP (ref 7–23)
CALCIUM SERPL-MCNC: 8.9 MG/DL — SIGNIFICANT CHANGE UP (ref 8.4–10.5)
CALCIUM SERPL-MCNC: 9 MG/DL — SIGNIFICANT CHANGE UP (ref 8.4–10.5)
CHLORIDE SERPL-SCNC: 100 MMOL/L — SIGNIFICANT CHANGE UP (ref 96–108)
CHLORIDE SERPL-SCNC: 100 MMOL/L — SIGNIFICANT CHANGE UP (ref 96–108)
CO2 BLDMV-SCNC: 23 MMOL/L — SIGNIFICANT CHANGE UP (ref 21–29)
CO2 BLDMV-SCNC: 23 MMOL/L — SIGNIFICANT CHANGE UP (ref 21–29)
CO2 BLDMV-SCNC: 24 MMOL/L — SIGNIFICANT CHANGE UP (ref 21–29)
CO2 BLDMV-SCNC: 24 MMOL/L — SIGNIFICANT CHANGE UP (ref 21–29)
CO2 BLDMV-SCNC: 25 MMOL/L — SIGNIFICANT CHANGE UP (ref 21–29)
CO2 SERPL-SCNC: 19 MMOL/L — LOW (ref 22–31)
CO2 SERPL-SCNC: 20 MMOL/L — LOW (ref 22–31)
CREAT SERPL-MCNC: 2.19 MG/DL — HIGH (ref 0.5–1.3)
CREAT SERPL-MCNC: 2.3 MG/DL — HIGH (ref 0.5–1.3)
EGFR: 30 ML/MIN/1.73M2 — LOW
EGFR: 30 ML/MIN/1.73M2 — LOW
EGFR: 32 ML/MIN/1.73M2 — LOW
EGFR: 32 ML/MIN/1.73M2 — LOW
EOSINOPHIL # BLD AUTO: 0 K/UL — SIGNIFICANT CHANGE UP (ref 0–0.5)
EOSINOPHIL # BLD AUTO: 0 K/UL — SIGNIFICANT CHANGE UP (ref 0–0.5)
EOSINOPHIL NFR BLD AUTO: 0 % — SIGNIFICANT CHANGE UP (ref 0–6)
EOSINOPHIL NFR BLD AUTO: 0 % — SIGNIFICANT CHANGE UP (ref 0–6)
FIBRINOGEN PPP-MCNC: 231 MG/DL — SIGNIFICANT CHANGE UP (ref 200–445)
GAS PNL BLDA: SIGNIFICANT CHANGE UP
GAS PNL BLDMV: SIGNIFICANT CHANGE UP
GLUCOSE BLDC GLUCOMTR-MCNC: 100 MG/DL — HIGH (ref 70–99)
GLUCOSE BLDC GLUCOMTR-MCNC: 103 MG/DL — HIGH (ref 70–99)
GLUCOSE BLDC GLUCOMTR-MCNC: 103 MG/DL — HIGH (ref 70–99)
GLUCOSE BLDC GLUCOMTR-MCNC: 105 MG/DL — HIGH (ref 70–99)
GLUCOSE BLDC GLUCOMTR-MCNC: 112 MG/DL — HIGH (ref 70–99)
GLUCOSE BLDC GLUCOMTR-MCNC: 112 MG/DL — HIGH (ref 70–99)
GLUCOSE BLDC GLUCOMTR-MCNC: 118 MG/DL — HIGH (ref 70–99)
GLUCOSE BLDC GLUCOMTR-MCNC: 119 MG/DL — HIGH (ref 70–99)
GLUCOSE BLDC GLUCOMTR-MCNC: 121 MG/DL — HIGH (ref 70–99)
GLUCOSE BLDC GLUCOMTR-MCNC: 131 MG/DL — HIGH (ref 70–99)
GLUCOSE BLDC GLUCOMTR-MCNC: 134 MG/DL — HIGH (ref 70–99)
GLUCOSE BLDC GLUCOMTR-MCNC: 134 MG/DL — HIGH (ref 70–99)
GLUCOSE BLDC GLUCOMTR-MCNC: 136 MG/DL — HIGH (ref 70–99)
GLUCOSE BLDC GLUCOMTR-MCNC: 136 MG/DL — HIGH (ref 70–99)
GLUCOSE BLDC GLUCOMTR-MCNC: 140 MG/DL — HIGH (ref 70–99)
GLUCOSE BLDC GLUCOMTR-MCNC: 146 MG/DL — HIGH (ref 70–99)
GLUCOSE BLDC GLUCOMTR-MCNC: 153 MG/DL — HIGH (ref 70–99)
GLUCOSE BLDC GLUCOMTR-MCNC: 157 MG/DL — HIGH (ref 70–99)
GLUCOSE BLDC GLUCOMTR-MCNC: 171 MG/DL — HIGH (ref 70–99)
GLUCOSE BLDC GLUCOMTR-MCNC: 186 MG/DL — HIGH (ref 70–99)
GLUCOSE BLDC GLUCOMTR-MCNC: 191 MG/DL — HIGH (ref 70–99)
GLUCOSE BLDC GLUCOMTR-MCNC: 96 MG/DL — SIGNIFICANT CHANGE UP (ref 70–99)
GLUCOSE SERPL-MCNC: 108 MG/DL — HIGH (ref 70–99)
GLUCOSE SERPL-MCNC: 118 MG/DL — HIGH (ref 70–99)
HCO3 BLDMV-SCNC: 22 MMOL/L — SIGNIFICANT CHANGE UP (ref 20–28)
HCO3 BLDMV-SCNC: 22 MMOL/L — SIGNIFICANT CHANGE UP (ref 20–28)
HCO3 BLDMV-SCNC: 23 MMOL/L — SIGNIFICANT CHANGE UP (ref 20–28)
HCO3 BLDMV-SCNC: 24 MMOL/L — SIGNIFICANT CHANGE UP (ref 20–28)
HCT VFR BLD CALC: 25.7 % — LOW (ref 39–50)
HCT VFR BLD CALC: 26.7 % — LOW (ref 39–50)
HEPARIN-PF4 AB RESULT: <0.6 U/ML — SIGNIFICANT CHANGE UP (ref 0–0.9)
HGB BLD-MCNC: 8.5 G/DL — LOW (ref 13–17)
HGB BLD-MCNC: 8.8 G/DL — LOW (ref 13–17)
HOROWITZ INDEX BLDMV+IHG-RTO: 40 — SIGNIFICANT CHANGE UP
IMM GRANULOCYTES NFR BLD AUTO: 1.1 % — HIGH (ref 0–0.9)
IMM GRANULOCYTES NFR BLD AUTO: 2.2 % — HIGH (ref 0–0.9)
INR BLD: 1.08 RATIO — SIGNIFICANT CHANGE UP (ref 0.85–1.16)
LYMPHOCYTES # BLD AUTO: 0.33 K/UL — LOW (ref 1–3.3)
LYMPHOCYTES # BLD AUTO: 0.36 K/UL — LOW (ref 1–3.3)
LYMPHOCYTES # BLD AUTO: 1.6 % — LOW (ref 13–44)
LYMPHOCYTES # BLD AUTO: 2 % — LOW (ref 13–44)
MAGNESIUM SERPL-MCNC: 2.5 MG/DL — SIGNIFICANT CHANGE UP (ref 1.6–2.6)
MAGNESIUM SERPL-MCNC: 2.7 MG/DL — HIGH (ref 1.6–2.6)
MCHC RBC-ENTMCNC: 28.7 PG — SIGNIFICANT CHANGE UP (ref 27–34)
MCHC RBC-ENTMCNC: 28.7 PG — SIGNIFICANT CHANGE UP (ref 27–34)
MCHC RBC-ENTMCNC: 33 G/DL — SIGNIFICANT CHANGE UP (ref 32–36)
MCHC RBC-ENTMCNC: 33.1 G/DL — SIGNIFICANT CHANGE UP (ref 32–36)
MCV RBC AUTO: 86.8 FL — SIGNIFICANT CHANGE UP (ref 80–100)
MCV RBC AUTO: 87 FL — SIGNIFICANT CHANGE UP (ref 80–100)
MONOCYTES # BLD AUTO: 1.03 K/UL — HIGH (ref 0–0.9)
MONOCYTES # BLD AUTO: 1.08 K/UL — HIGH (ref 0–0.9)
MONOCYTES NFR BLD AUTO: 5.2 % — SIGNIFICANT CHANGE UP (ref 2–14)
MONOCYTES NFR BLD AUTO: 5.7 % — SIGNIFICANT CHANGE UP (ref 2–14)
NEUTROPHILS # BLD AUTO: 16.48 K/UL — HIGH (ref 1.8–7.4)
NEUTROPHILS # BLD AUTO: 18.73 K/UL — HIGH (ref 1.8–7.4)
NEUTROPHILS NFR BLD AUTO: 90.9 % — HIGH (ref 43–77)
NEUTROPHILS NFR BLD AUTO: 91.1 % — HIGH (ref 43–77)
NRBC BLD AUTO-RTO: 0 /100 WBCS — SIGNIFICANT CHANGE UP (ref 0–0)
NRBC BLD AUTO-RTO: 0 /100 WBCS — SIGNIFICANT CHANGE UP (ref 0–0)
O2 CT VFR BLD CALC: 33 MMHG — SIGNIFICANT CHANGE UP (ref 30–65)
O2 CT VFR BLD CALC: 37 MMHG — SIGNIFICANT CHANGE UP (ref 30–65)
O2 CT VFR BLD CALC: 37 MMHG — SIGNIFICANT CHANGE UP (ref 30–65)
O2 CT VFR BLD CALC: 40 MMHG — SIGNIFICANT CHANGE UP (ref 30–65)
O2 CT VFR BLD CALC: 40 MMHG — SIGNIFICANT CHANGE UP (ref 30–65)
O2 CT VFR BLD CALC: 41 MMHG — SIGNIFICANT CHANGE UP (ref 30–65)
O2 CT VFR BLD CALC: 43 MMHG — SIGNIFICANT CHANGE UP (ref 30–65)
PCO2 BLDMV: 39 MMHG — SIGNIFICANT CHANGE UP (ref 30–65)
PCO2 BLDMV: 41 MMHG — SIGNIFICANT CHANGE UP (ref 30–65)
PCO2 BLDMV: 43 MMHG — SIGNIFICANT CHANGE UP (ref 30–65)
PCO2 BLDMV: 44 MMHG — SIGNIFICANT CHANGE UP (ref 30–65)
PCO2 BLDMV: 44 MMHG — SIGNIFICANT CHANGE UP (ref 30–65)
PCO2 BLDMV: 45 MMHG — SIGNIFICANT CHANGE UP (ref 30–65)
PCO2 BLDMV: 46 MMHG — SIGNIFICANT CHANGE UP (ref 30–65)
PF4 HEPARIN CMPLX AB SER-ACNC: NEGATIVE — SIGNIFICANT CHANGE UP
PH BLDMV: 7.3 — LOW (ref 7.32–7.45)
PH BLDMV: 7.31 — LOW (ref 7.32–7.45)
PH BLDMV: 7.33 — SIGNIFICANT CHANGE UP (ref 7.32–7.45)
PH BLDMV: 7.35 — SIGNIFICANT CHANGE UP (ref 7.32–7.45)
PH BLDMV: 7.36 — SIGNIFICANT CHANGE UP (ref 7.32–7.45)
PHOSPHATE SERPL-MCNC: 2.1 MG/DL — LOW (ref 2.5–4.5)
PHOSPHATE SERPL-MCNC: 3 MG/DL — SIGNIFICANT CHANGE UP (ref 2.5–4.5)
PLATELET # BLD AUTO: 49 K/UL — LOW (ref 150–400)
PLATELET # BLD AUTO: 53 K/UL — LOW (ref 150–400)
POTASSIUM SERPL-MCNC: 3.8 MMOL/L — SIGNIFICANT CHANGE UP (ref 3.5–5.3)
POTASSIUM SERPL-MCNC: 4.1 MMOL/L — SIGNIFICANT CHANGE UP (ref 3.5–5.3)
POTASSIUM SERPL-SCNC: 3.8 MMOL/L — SIGNIFICANT CHANGE UP (ref 3.5–5.3)
POTASSIUM SERPL-SCNC: 4.1 MMOL/L — SIGNIFICANT CHANGE UP (ref 3.5–5.3)
PROT SERPL-MCNC: 6.7 G/DL — SIGNIFICANT CHANGE UP (ref 6–8.3)
PROT SERPL-MCNC: 6.9 G/DL — SIGNIFICANT CHANGE UP (ref 6–8.3)
PROTHROM AB SERPL-ACNC: 12.4 SEC — SIGNIFICANT CHANGE UP (ref 9.9–13.4)
RBC # BLD: 2.96 M/UL — LOW (ref 4.2–5.8)
RBC # BLD: 3.07 M/UL — LOW (ref 4.2–5.8)
RBC # FLD: 16.1 % — HIGH (ref 10.3–14.5)
RBC # FLD: 16.5 % — HIGH (ref 10.3–14.5)
SAO2 % BLDMV: 61 — SIGNIFICANT CHANGE UP (ref 60–90)
SAO2 % BLDMV: 62.9 — SIGNIFICANT CHANGE UP (ref 60–90)
SAO2 % BLDMV: 64.4 — SIGNIFICANT CHANGE UP (ref 60–90)
SAO2 % BLDMV: 67.5 — SIGNIFICANT CHANGE UP (ref 60–90)
SAO2 % BLDMV: 67.9 — SIGNIFICANT CHANGE UP (ref 60–90)
SAO2 % BLDMV: 69 — SIGNIFICANT CHANGE UP (ref 60–90)
SAO2 % BLDMV: 69.1 — SIGNIFICANT CHANGE UP (ref 60–90)
SODIUM SERPL-SCNC: 136 MMOL/L — SIGNIFICANT CHANGE UP (ref 135–145)
SODIUM SERPL-SCNC: 136 MMOL/L — SIGNIFICANT CHANGE UP (ref 135–145)
SURGICAL PATHOLOGY STUDY: SIGNIFICANT CHANGE UP
TACROLIMUS SERPL-MCNC: 9.8 NG/ML — SIGNIFICANT CHANGE UP
WBC # BLD: 18.09 K/UL — HIGH (ref 3.8–10.5)
WBC # BLD: 20.62 K/UL — HIGH (ref 3.8–10.5)
WBC # FLD AUTO: 18.09 K/UL — HIGH (ref 3.8–10.5)
WBC # FLD AUTO: 20.62 K/UL — HIGH (ref 3.8–10.5)

## 2025-05-02 PROCEDURE — 99292 CRITICAL CARE ADDL 30 MIN: CPT

## 2025-05-02 PROCEDURE — 74018 RADEX ABDOMEN 1 VIEW: CPT | Mod: 26

## 2025-05-02 PROCEDURE — 99291 CRITICAL CARE FIRST HOUR: CPT

## 2025-05-02 PROCEDURE — G0545: CPT

## 2025-05-02 PROCEDURE — 99233 SBSQ HOSP IP/OBS HIGH 50: CPT | Mod: GC

## 2025-05-02 PROCEDURE — 99232 SBSQ HOSP IP/OBS MODERATE 35: CPT

## 2025-05-02 PROCEDURE — 71045 X-RAY EXAM CHEST 1 VIEW: CPT | Mod: 26

## 2025-05-02 PROCEDURE — 99232 SBSQ HOSP IP/OBS MODERATE 35: CPT | Mod: FS

## 2025-05-02 RX ORDER — DESVENLAFAXINE 25 MG/1
50 TABLET, EXTENDED RELEASE ORAL
Refills: 0 | Status: DISCONTINUED | OUTPATIENT
Start: 2025-05-02 | End: 2025-05-03

## 2025-05-02 RX ORDER — METOCLOPRAMIDE HCL 10 MG
10 TABLET ORAL ONCE
Refills: 0 | Status: DISCONTINUED | OUTPATIENT
Start: 2025-05-02 | End: 2025-05-02

## 2025-05-02 RX ORDER — HYDROXYZINE HYDROCHLORIDE 25 MG/1
10 TABLET, FILM COATED ORAL THREE TIMES A DAY
Refills: 0 | Status: DISCONTINUED | OUTPATIENT
Start: 2025-05-02 | End: 2025-05-04

## 2025-05-02 RX ORDER — TACROLIMUS 0.5 MG/1
0.5 CAPSULE ORAL ONCE
Refills: 0 | Status: COMPLETED | OUTPATIENT
Start: 2025-05-02 | End: 2025-05-02

## 2025-05-02 RX ORDER — HEPARIN SODIUM 1000 [USP'U]/ML
300 INJECTION INTRAVENOUS; SUBCUTANEOUS
Qty: 10000 | Refills: 0 | Status: DISCONTINUED | OUTPATIENT
Start: 2025-05-02 | End: 2025-05-02

## 2025-05-02 RX ORDER — ALBUMIN (HUMAN) 12.5 G/50ML
100 INJECTION, SOLUTION INTRAVENOUS ONCE
Refills: 0 | Status: COMPLETED | OUTPATIENT
Start: 2025-05-02 | End: 2025-05-02

## 2025-05-02 RX ORDER — ACETAMINOPHEN 500 MG/5ML
1000 LIQUID (ML) ORAL ONCE
Refills: 0 | Status: COMPLETED | OUTPATIENT
Start: 2025-05-02 | End: 2025-05-02

## 2025-05-02 RX ORDER — POTASSIUM PHOSPHATE, MONOBASIC POTASSIUM PHOSPHATE, DIBASIC INJECTION, 236; 224 MG/ML; MG/ML
15 SOLUTION, CONCENTRATE INTRAVENOUS ONCE
Refills: 0 | Status: COMPLETED | OUTPATIENT
Start: 2025-05-02 | End: 2025-05-02

## 2025-05-02 RX ORDER — ONDANSETRON HCL/PF 4 MG/2 ML
4 VIAL (ML) INJECTION ONCE
Refills: 0 | Status: COMPLETED | OUTPATIENT
Start: 2025-05-02 | End: 2025-05-02

## 2025-05-02 RX ORDER — ALBUMIN (HUMAN) 12.5 G/50ML
100 INJECTION, SOLUTION INTRAVENOUS ONCE
Refills: 0 | Status: DISCONTINUED | OUTPATIENT
Start: 2025-05-02 | End: 2025-05-02

## 2025-05-02 RX ADMIN — NALOXEGOL OXALATE 12.5 MILLIGRAM(S): 12.5 TABLET, FILM COATED ORAL at 13:11

## 2025-05-02 RX ADMIN — Medication 3 UNIT(S)/HR: at 19:30

## 2025-05-02 RX ADMIN — Medication 400 MILLIGRAM(S): at 13:11

## 2025-05-02 RX ADMIN — Medication 500000 UNIT(S): at 13:13

## 2025-05-02 RX ADMIN — Medication 1000 MILLIGRAM(S): at 13:30

## 2025-05-02 RX ADMIN — POTASSIUM PHOSPHATE, MONOBASIC POTASSIUM PHOSPHATE, DIBASIC INJECTION, 62.5 MILLIMOLE(S): 236; 224 SOLUTION, CONCENTRATE INTRAVENOUS at 03:10

## 2025-05-02 RX ADMIN — Medication 1 APPLICATION(S): at 13:00

## 2025-05-02 RX ADMIN — DESVENLAFAXINE 50 MILLIGRAM(S): 25 TABLET, EXTENDED RELEASE ORAL at 18:11

## 2025-05-02 RX ADMIN — PREGABALIN 25 MILLIGRAM(S): 75 CAPSULE ORAL at 05:22

## 2025-05-02 RX ADMIN — TRAMADOL HYDROCHLORIDE 25 MILLIGRAM(S): 50 TABLET, FILM COATED ORAL at 05:52

## 2025-05-02 RX ADMIN — MYCOPHENOLATE MOFETIL 83.34 MILLIGRAM(S): 500 TABLET, FILM COATED ORAL at 19:58

## 2025-05-02 RX ADMIN — Medication 2 TABLET(S): at 21:05

## 2025-05-02 RX ADMIN — Medication 500 MILLIGRAM(S): at 05:22

## 2025-05-02 RX ADMIN — Medication 10 MILLIGRAM(S): at 21:05

## 2025-05-02 RX ADMIN — Medication 4 MILLIGRAM(S): at 22:17

## 2025-05-02 RX ADMIN — CEFTRIAXONE 100 MILLIGRAM(S): 500 INJECTION, POWDER, FOR SOLUTION INTRAMUSCULAR; INTRAVENOUS at 13:11

## 2025-05-02 RX ADMIN — ALBUMIN (HUMAN) 50 MILLILITER(S): 12.5 INJECTION, SOLUTION INTRAVENOUS at 12:30

## 2025-05-02 RX ADMIN — Medication 3 UNIT(S)/HR: at 08:05

## 2025-05-02 RX ADMIN — Medication 30 MILLILITER(S): at 07:18

## 2025-05-02 RX ADMIN — DOBUTAMINE 12.3 MICROGRAM(S)/KG/MIN: 250 INJECTION INTRAVENOUS at 19:30

## 2025-05-02 RX ADMIN — Medication 125 MILLIGRAM(S): at 05:23

## 2025-05-02 RX ADMIN — Medication 500 MILLIGRAM(S): at 23:31

## 2025-05-02 RX ADMIN — METHYLPREDNISOLONE ACETATE 36 MILLIGRAM(S): 80 INJECTION, SUSPENSION INTRA-ARTICULAR; INTRALESIONAL; INTRAMUSCULAR; SOFT TISSUE at 05:23

## 2025-05-02 RX ADMIN — TACROLIMUS 2 MILLIGRAM(S): 0.5 CAPSULE ORAL at 08:03

## 2025-05-02 RX ADMIN — Medication 10 MILLILITER(S): at 19:29

## 2025-05-02 RX ADMIN — Medication 125 MILLIGRAM(S): at 18:10

## 2025-05-02 RX ADMIN — TRAMADOL HYDROCHLORIDE 25 MILLIGRAM(S): 50 TABLET, FILM COATED ORAL at 05:22

## 2025-05-02 RX ADMIN — METHYLPREDNISOLONE ACETATE 32 MILLIGRAM(S): 80 INJECTION, SUSPENSION INTRA-ARTICULAR; INTRALESIONAL; INTRAMUSCULAR; SOFT TISSUE at 18:10

## 2025-05-02 RX ADMIN — DOBUTAMINE 12.3 MICROGRAM(S)/KG/MIN: 250 INJECTION INTRAVENOUS at 08:04

## 2025-05-02 RX ADMIN — Medication 500 MILLIGRAM(S): at 05:52

## 2025-05-02 RX ADMIN — POLYETHYLENE GLYCOL 3350 17 GRAM(S): 17 POWDER, FOR SOLUTION ORAL at 18:11

## 2025-05-02 RX ADMIN — Medication 10 MILLIGRAM(S): at 13:12

## 2025-05-02 RX ADMIN — MYCOPHENOLATE MOFETIL 83.34 MILLIGRAM(S): 500 TABLET, FILM COATED ORAL at 08:03

## 2025-05-02 RX ADMIN — Medication 6.13 MICROGRAM(S)/KG/MIN: at 19:30

## 2025-05-02 RX ADMIN — Medication 500000 UNIT(S): at 17:36

## 2025-05-02 RX ADMIN — Medication 10 MILLIGRAM(S): at 05:23

## 2025-05-02 RX ADMIN — Medication 40 MILLIGRAM(S): at 13:13

## 2025-05-02 RX ADMIN — TACROLIMUS 0.5 MILLIGRAM(S): 0.5 CAPSULE ORAL at 19:58

## 2025-05-02 RX ADMIN — Medication 6.13 MICROGRAM(S)/KG/MIN: at 08:04

## 2025-05-02 RX ADMIN — ALBUMIN (HUMAN) 50 MILLILITER(S): 12.5 INJECTION, SOLUTION INTRAVENOUS at 20:15

## 2025-05-02 RX ADMIN — Medication 100 MILLIGRAM(S): at 21:05

## 2025-05-02 RX ADMIN — Medication 500000 UNIT(S): at 08:07

## 2025-05-02 NOTE — PROGRESS NOTE ADULT - SUBJECTIVE AND OBJECTIVE BOX
Patient seen and examined at bedside.    Overnight Events:   Appears depressed / hypoactive delirium  Lico being weaned off  Remains on Dobutamine 5, epi 0.02, CRRT  Otherwise doing well  Will need psych consult for depression/delirium    Current Meds:  acetaminophen     Tablet .. 500 milliGRAM(s) Oral every 6 hours  albumin human 25% IVPB 100 milliLiter(s) IV Intermittent once  cefTRIAXone   IVPB 2000 milliGRAM(s) IV Intermittent <User Schedule>  chlorhexidine 2% Cloths 1 Application(s) Topical daily  CRRT Treatment    <Continuous>  dextrose 50% Injectable 50 milliLiter(s) IV Push every 15 minutes  dextrose 50% Injectable 25 milliLiter(s) IV Push every 15 minutes  DOBUTamine Infusion 5 MICROgram(s)/kG/Min IV Continuous <Continuous>  EPINEPHrine    Infusion 0.02 MICROgram(s)/kG/Min IV Continuous <Continuous>  insulin regular Infusion 3 Unit(s)/Hr IV Continuous <Continuous>  lidocaine   4% Patch 3 Patch Transdermal daily  methylPREDNISolone sodium succinate Injectable 32 milliGRAM(s) IV Push every 12 hours  methylPREDNISolone sodium succinate Injectable   IV Push   metoclopramide Injectable 10 milliGRAM(s) IV Push every 8 hours  mycophenolate mofetil IVPB 1000 milliGRAM(s) IV Intermittent every 12 hours  naloxegol 12.5 milliGRAM(s) Oral daily  nystatin    Suspension [id number] Unit(s) Oral <User Schedule>  pantoprazole  Injectable 40 milliGRAM(s) IV Push daily  penicillin   G benzathine Injectable 2.4 Million Unit(s) IntraMuscular <User Schedule>  Phoxillum Filtration BK 4 / 2.5 5000 milliLiter(s) CRRT <Continuous>  Phoxillum Filtration BK 4 / 2.5 5000 milliLiter(s) CRRT <Continuous>  polyethylene glycol 3350 17 Gram(s) Oral two times a day  potassium chloride  10 mEq/50 mL IVPB 10 milliEquivalent(s) IV Intermittent once  potassium chloride  10 mEq/50 mL IVPB 10 milliEquivalent(s) IV Intermittent once  PrismaSOL Filtration BGK 4 / 2.5 5000 milliLiter(s) CRRT <Continuous>  senna 2 Tablet(s) Oral at bedtime  sodium chloride 0.9%. 1000 milliLiter(s) IV Continuous <Continuous>  tacrolimus 0.5 milliGRAM(s) Oral once  traZODone 100 milliGRAM(s) Oral at bedtime  vancomycin    Solution 125 milliGRAM(s) Oral every 12 hours      Vitals:  T(F): 99.3 (05-02), Max: 99.3 (05-02)  HR: 118 (05-02) (102 - 118)  BP: 116/68 (05-02) (93/61 - 139/56)  RR: 22 (05-02)  SpO2: 100% (05-02)  I&O's Summary    01 May 2025 07:01  -  02 May 2025 07:00  --------------------------------------------------------  IN: 2111.8 mL / OUT: 4590 mL / NET: -2478.2 mL    02 May 2025 07:01  -  02 May 2025 13:37  --------------------------------------------------------  IN: 378 mL / OUT: 754 mL / NET: -376 mL        Physical Exam:  Appearance: No acute distress; well appearing  Eyes: PERRL, EOMI, pink conjunctiva  HEENT: Normal oral mucosa  Cardiovascular: RRR, S1, S2, no murmurs, rubs, or gallops; no edema; no JVD  Respiratory: Clear to auscultation bilaterally  Gastrointestinal: soft, non-tender, non-distended with normal bowel sounds  Musculoskeletal: No clubbing; no joint deformity                             8.8    18.09 )-----------( 53       ( 02 May 2025 12:32 )             26.7     05-02    136  |  100  |  21  ----------------------------<  108[H]  4.1   |  20[L]  |  2.19[H]    Ca    9.0      02 May 2025 12:32  Phos  3.0     05-02  Mg     2.7     05-02    TPro  6.9  /  Alb  4.5  /  TBili  2.0[H]  /  DBili  x   /  AST  18  /  ALT  27  /  AlkPhos  71  05-02    PT/INR - ( 02 May 2025 00:56 )   PT: 12.4 sec;   INR: 1.08 ratio         PTT - ( 02 May 2025 00:56 )  PTT:24.6 sec              New ECG(s): Personally reviewed    Echo:    Stress Testing:     Cath:    New Imaging:    Interpretation of Telemetry:

## 2025-05-02 NOTE — PROGRESS NOTE ADULT - CRITICAL CARE ATTENDING COMMENT
Patient was seen and examined with the resident.      POD 2. Ongoing PGD.  Had PCA pump yesterday but no longer using it. Moving to intermittent PRN IV meds.  Please ask psych team to be involved.    Lico 5,  5, Epi 0.02.  Wean Lico today if able.   PAC: RA 11-14, PA 41/12,m Mvo2 69%, Co/CI 6.8/3.5.  Minimal UOP 0-15cc/hr.  On CRRT net -2.2L.  Aim for net -2L fluid balance today and single digit CVP.  Tac today 9.8.  Kinsey quickly from 4.5 the day prior.  Will decrease dose tonight to 0.5mg.  Will check AM level and the write dose for tomorrow to avoid over shooting in the setting of renal dysfunction.   Low plts at 49. HIT pending.

## 2025-05-02 NOTE — PROGRESS NOTE ADULT - SUBJECTIVE AND OBJECTIVE BOX
Patient seen and examined at the bedside.    Remains critically ill on continuous ICU monitoring, at risk for life threatening decompensation.  All Labs, data reviewed. Plan of care discussed in length during multi-disciplinary ICU rounds.       Brief Summary:  70-year-old male with history of NICM since 2011 HFrEF (LVEF 25-30%)   Now s/p OHT on 4/29/25     24 Hour events:  Tolerating volume removal.  Remains on Dobutamine and Epinephrine  Weaning nitric oxide.  Sleepier this morning.      Objective:  ICU Vital Signs Last 24 Hrs  T(C): 37.2 (02 May 2025 08:00), Max: 37.3 (01 May 2025 12:00)  T(F): 99 (02 May 2025 08:00), Max: 99.1 (01 May 2025 12:00)  HR: 112 (02 May 2025 09:00) (101 - 114)  BP: 112/63 (02 May 2025 09:00) (91/54 - 139/56)  BP(mean): 80 (02 May 2025 09:00) (62 - 94)  ABP: 131/65 (02 May 2025 09:00) (103/97 - 139/69)  ABP(mean): 84 (02 May 2025 09:00) (65 - 98)  RR: 12 (02 May 2025 09:00) (10 - 33)  SpO2: 100% (02 May 2025 09:00) (93% - 100%)    O2 Parameters below as of 02 May 2025 08:31  Patient On (Oxygen Delivery Method): nasal cannula, high flow  O2 Flow (L/min): 40  O2 Concentration (%): 40      Physical Exam:   General: Sleepier today but interactive when stimulated.  Neurology: Oriented, following commands  Respiratory: Bilateral breath sounds  CV: Paced at 110    Abdominal: Soft, Nontender  Extremities: Warm, well-perfused  Coronado    -------------------------------------------------------------------------------------------------------------------------------    Labs:                                   8.5    20.62 )-----------( 49       ( 02 May 2025 00:56 )             25.7     PT/INR - ( 02 May 2025 00:56 )   PT: 12.4 sec;   INR: 1.08 ratio        PTT - ( 02 May 2025 00:56 )  PTT:24.6 sec      136    |  100    |  21     ----------------------------<  118        ( 02 May 2025 00:56 )  3.8     |  19     |  2.30     Ca    8.9        ( 02 May 2025 00:56 )  Phos  2.1       ( 02 May 2025 00:56 )  Mg     2.5       ( 02 May 2025 00:56 )    TPro  6.7    /  Alb  4.4    /  TBili  2.1    /  DBili  x      /  AST  22     /  ALT  26     /  AlkPhos  65     ( 02 May 2025 00:56 )    LIVER FUNCTIONS - ( 02 May 2025 00:56 )  Alb: 4.4 g/dL / Pro: 6.7 g/dL / ALK PHOS: 65 U/L / ALT: 26 U/L / AST: 22 U/L / GGT: x           ABG - ( 02 May 2025 05:04 )  pH, Arterial: 7.37  pH, Blood: x     /  pCO2: 35    /  pO2: 122   / HCO3: 20    / Base Excess: -4.6  /  SaO2: 99.4        ------------------------------------------------------------------------------------------------------------------------------  Assessment:  71 yo NICM, s/p heart transplant     RV dysfunction  Postop acute pulmonary insufficiency  ZAHRA  Acute blood loss anemia  Thrombocytopenia  Hyperglycemia      Plan:   ***Neuro***  Maintain day/night cycle to prevent ICU delirium   Postoperative acute pain control with IV Tylenol, Tramadol, and prns.  PCA held this morning.    ***Cardiovascular***  Monitor for sign of hypoperfusion, trend lactate, SVo2  Remains on Dobutamine and Epinephrine  Wean nitric oxide to off.  Keep CVP <12  Monitor chest tube output    ***Pulmonary***  Postoperative acute pulmonary insufficiency  Deep breathing and coughing exercises, IS, Mobilization, nebs, Chest PT    ***GI***  Tolerating diet but poor intake.  Protonix for prophylaxis   Bowel regimen.   Trend LFTs    ***Renal***  ZAHRA / CKD - continue CRRT and fluid removal for CVP <12    ***ID***  Leukocytosis  Tacro (decreased), Cellcept, Steroid taper for immunosuppression.  Prophylaxis with PO Vanco.  PCN and Ceftriaxone for donor cultures (syphylis, S pneumo)    ***Endocrine***  Insulin infusion for Hyperglycemia     ***Hematology***  Acute blood loss anemia and thrombocytopenia - no transfusion indication currently.  CBC, Coags, TEG, monitor for bleeding  PF4 reordered today.  Heparin SQ for VTE prophylaxis on hold in setting of low platelets.    I, Chata Huntley MD, personally performed the services described in this documentation.  I have reviewed the chart and agree that the record reflects my personal performance and is accurate and complete.  ICU time: 65 minutes     Electronically signed:   Chata Huntley MD  CT ICU attending

## 2025-05-02 NOTE — PROGRESS NOTE ADULT - PROBLEM SELECTOR PLAN 2
- Immunosuppression:      - Cellcept 1000mg BID      - Solu-medrol taper      - Tarco: trough 9.8 today (5/2) after total 3mg yesterday, will give 0.5mg this PM and dose per level on 5/3, goal 8-10     - Antibiotics      - Nystatin for ppx      - s/p IV Vanc + Cefepime for post-op ppx (4/29 - 5/1)      - PO Vanco ppx (4/29 - )      - CTX 2000mg QD for donor strep pneumo+ in CSF      - Penicillin IM for donor syphilis + ab

## 2025-05-02 NOTE — PROGRESS NOTE ADULT - PROBLEM SELECTOR PLAN 1
Patient's baseline Scr is 1.0-1.2. On 4/28, SCr was 1.1 and on 4/29, Scr rubia to 3.2. UA-turbid, 300 protein, small leuks, large blood, 914 rbc's, UPCR-3.2. Pt had drop in hgb from 12.4 (4/28) to 9.0 (4/29). Pt was on IV vasopressor support. Pt noted to have elevated CVP of 20, and was not responding well enough to diuresis, so was started on CRRT for UF. Pt now only on Dobutamine and Epinephrine gtt. Remains oligo-anuric.    -Plan to continue CRRT, tolerating UF rate of 150 mL/hr. Goal CVP<12; this AM CVP 11.  -Filter was clotting. Increased BFR and added heparin to circuit. If platelets continue to drop, may need to stop Heparin and consider regional citrate anticoagulation. Only clotted once since above changes due to positioning of patient.  -Labs reviewed. Phos 2.1, will add Phoxillum.  -Dose meds for CrCl of ~ 30 mL/min  -Avoid nephrotoxins.

## 2025-05-02 NOTE — PROGRESS NOTE ADULT - SUBJECTIVE AND OBJECTIVE BOX
Day 1 of Anesthesia Pain Management Service    SUBJECTIVE: My throat and chest still hurt    Pain Scale Score:	[X] Refer to charted pain scores    THERAPY:    [ ] IV PCA Morphine		        [ ] 5 mg/mL	[ ] 1 mg/mL  [X] IV PCA Hydromorphone	[ ] 5 mg/mL	[X] 1 mg/mL  [ ] IV PCA Fentanyl		        [ ] 50 micrograms/mL    Demand dose: 0.2 mg     Lockout: 6 minutes   Continuous Rate: 0 mg/hr  4 Hour Limit: 4 mg    MEDICATIONS  (STANDING):  acetaminophen     Tablet .. 500 milliGRAM(s) Oral every 6 hours  cefTRIAXone   IVPB 2000 milliGRAM(s) IV Intermittent <User Schedule>  chlorhexidine 2% Cloths 1 Application(s) Topical daily  CRRT Treatment    <Continuous>  dextrose 50% Injectable 50 milliLiter(s) IV Push every 15 minutes  dextrose 50% Injectable 25 milliLiter(s) IV Push every 15 minutes  DOBUTamine Infusion 5 MICROgram(s)/kG/Min (12.3 mL/Hr) IV Continuous <Continuous>  EPINEPHrine    Infusion 0.02 MICROgram(s)/kG/Min (6.13 mL/Hr) IV Continuous <Continuous>  heparin  Infusion Syringe 300 Unit(s)/Hr (0.6 mL/Hr) CRRT <Continuous>  HYDROmorphone PCA (1 mG/mL) 30 milliLiter(s) PCA Continuous PCA Continuous  insulin regular Infusion 3 Unit(s)/Hr (3 mL/Hr) IV Continuous <Continuous>  lidocaine   4% Patch 3 Patch Transdermal daily  methylPREDNISolone sodium succinate Injectable 32 milliGRAM(s) IV Push every 12 hours  methylPREDNISolone sodium succinate Injectable   IV Push   metoclopramide Injectable 10 milliGRAM(s) IV Push every 8 hours  mycophenolate mofetil IVPB 1000 milliGRAM(s) IV Intermittent every 12 hours  naloxegol 12.5 milliGRAM(s) Oral daily  nystatin    Suspension [id number] Unit(s) Oral <User Schedule>  pantoprazole  Injectable 40 milliGRAM(s) IV Push daily  penicillin   G benzathine Injectable 2.4 Million Unit(s) IntraMuscular <User Schedule>  Phoxillum Filtration BK 4 / 2.5 5000 milliLiter(s) (200 mL/Hr) CRRT <Continuous>  Phoxillum Filtration BK 4 / 2.5 5000 milliLiter(s) (1400 mL/Hr) CRRT <Continuous>  polyethylene glycol 3350 17 Gram(s) Oral two times a day  potassium chloride  10 mEq/50 mL IVPB 10 milliEquivalent(s) IV Intermittent once  potassium chloride  10 mEq/50 mL IVPB 10 milliEquivalent(s) IV Intermittent once  PrismaSOL Filtration BGK 4 / 2.5 5000 milliLiter(s) (1000 mL/Hr) CRRT <Continuous>  senna 2 Tablet(s) Oral at bedtime  sodium chloride 0.9%. 1000 milliLiter(s) (10 mL/Hr) IV Continuous <Continuous>  tacrolimus 0.5 milliGRAM(s) Oral once  traMADol 25 milliGRAM(s) Oral every 8 hours  traZODone 100 milliGRAM(s) Oral at bedtime  vancomycin    Solution 125 milliGRAM(s) Oral every 12 hours    MEDICATIONS  (PRN):  HYDROmorphone PCA (1 mG/mL) Rescue Clinician Bolus 0.5 milliGRAM(s) IV Push every 15 minutes PRN for Pain Scale GREATER THAN 6  oxyCODONE    IR 10 milliGRAM(s) Oral every 4 hours PRN Moderate Pain (4 - 6)      OBJECTIVE:    Sedation Score:	[ X] Alert	 [ ] Drowsy 	[ ] Arousable	[ ] Asleep	[ ] Unresponsive    Side Effects:	[X ] None	[ ] Nausea	[ ] Vomiting	[ ] Pruritus  		[ ] Other:    Vital Signs Last 24 Hrs  T(C): 37.2 (02 May 2025 08:00), Max: 37.3 (01 May 2025 12:00)  T(F): 99 (02 May 2025 08:00), Max: 99.1 (01 May 2025 12:00)  HR: 112 (02 May 2025 09:00) (101 - 114)  BP: 112/63 (02 May 2025 09:00) (91/54 - 139/56)  BP(mean): 80 (02 May 2025 09:00) (62 - 94)  RR: 12 (02 May 2025 09:00) (10 - 33)  SpO2: 100% (02 May 2025 09:00) (93% - 100%)    Parameters below as of 02 May 2025 08:31  Patient On (Oxygen Delivery Method): nasal cannula, high flow  O2 Flow (L/min): 40  O2 Concentration (%): 40    ASSESSMENT/ PLAN    Therapy to  be:               [  ] Continued   [X ] Discontinued   [ X] Changed to PRN Analgesics    Documentation and Verification of current medications:   [X] Done	[ ] Not done, not eligible    Comments: OOB in chair. Endorsing throat and chest pain. Total PCA use 1.8 mg / 24 hours, (9 doses/24 hours)  Question appropriateness of PCA, pressing pulse ox finger monitor for dose although educated to PCA button.   Recommend D\C PCA and transition to prn analgesics.

## 2025-05-02 NOTE — PROGRESS NOTE ADULT - PROBLEM SELECTOR PLAN 1
ACC/AHA stage D systolic heart failure, s/p heart transplant 4/28, CMV +/+, Toxo -/+  - Retrospective crossmatch with class II DSA DR HILLIARD (preformed from pre transplant).    - Currently being supported on: Dobutamine 5, Epi 0.02  - Lico weaned off on 5/2  - Chest tubes, per CTS  - Diuresis: guided by CVP

## 2025-05-02 NOTE — PROGRESS NOTE ADULT - ASSESSMENT
70-year-old male ABO O past medical history of NICM since 2011 HFrEF (LVEF 25-30%) s/p MDT CRT-D with baseline CHB, VT/VF, AFs/p GIANFRANCO ligation/MAZE, MV/TV repair, PVC ablation 3/2024 intolerant to AADs in the past, recent admission 3/19/2025-3/20/2025 for AICD shocks initially presented to the [facility] ED on 3/21/2025, sent by HF specialist for ACID shock. Device reported successful ATP for VT 3/17/2025 at 6:52pm followed by one ATP & 40J shock. He reported feeling dizzy & SOB during the events. He follows with Dr. Luca Tamayo for HF, currently undergoing transplant workup, Dr John for CRTD and VT/VF and Dr. Chu for genetic counseling. While admitted to [facility], he was started on a lidocaine gtt. On 3/21 when lidocaine weaned off patient had another two episodes of VT requiring AICD shocks. He was transferred to [facility] for further management. He was initially maintained on lidocaine gtt from 3/21/2025-3/25/2025 & his listing status was upgraded to UNOS status 2e for heart transplant (ABO O) for the refractory VT. He was transitioned to oral antiarrhythmics (Toprol XL & quinidine) & ultimately transferred from CICU to tele floor on 3/27/2025. Hospital course was further c/b development of watery diarrhea & was found to have +norovirus on 3/31/2025 which prompted deactivation to status 7 listing for heart transplant. Status reinstated to 2E after diarrhea resolved.     He underwent successful OHT on 4/28 (CMV +/+, Toxo -/+,ischemic time 258min, intra op 2plts + 2 cryo). Admitted to CTU for close post-op care and extubated on 4/29, on dobutamine, levo, vaso, epi, Lico and CRRT for support.  Levo/Vaso weaned off overnight.      Bedside hemodynamics:  5/2/25: /59/75, , CVP 12, PA 44/15/24, CO/CI 6.8/3.5  5/1/25 BP 99/59/72, , CVP 11, PA 40/17/24, Gucci CO/CI 5.5/2.8  4/29/25 BP 94/57/67, , CVP 11, PA 28/15/20, Gucci CI 2.2  3/22/25 BP 97/76/83, HR 80, CVP 6, PA 58/23/38, PCWP 20, SVR 1100, Gucci CO/CI 5.1/2.6, TD 4/2.08    Cardiac Studies:   TTE 4/30/25: LVEF 71%, no RMWA, RV normal size, reduced RVSF  STACEY 4/28/25 intraop: LVEF 20%, global, dilated RV with mildly reduced RVSF  TTE 3/20/25 : LVEF 30%, segmental, LVIDd 5.3, walls normal, elevated LV filling pressures, mod RVE with mildly reduced RV function, TAPSE 1.7, severely dilated RA, annuloplasty in MV position, transvalvular gradients are elevated with severe prosthetic MS (peak gradient 15.7, mean gradient 8), trace intravalvular MR, TV annuloplasty ring noted, mild TR,  est PASP 36, no evidence of LV thrombus  TTE 10/2024: LVEF 25-30%, LVEDD 5.9cm, moderately reduced RV function, annuloplasty ring of mitral and tricuspid position, PASP 47 mmHg  [facility] 3/19/25- RA 11 PA 58/26 PCWP 32 CO/CI 5.43/2.77  [facility] 6/2023 Nonobstructive CAD

## 2025-05-02 NOTE — PROGRESS NOTE ADULT - SUBJECTIVE AND OBJECTIVE BOX
[facility] DIVISION OF KIDNEY DISEASES AND HYPERTENSION --    Reason for consult: Oliguric ZAHRA requiring CRRT    24 hour events/subjective: Patient seen and examined at bedside. On epi and  gtt. Oliguric and on CRRT tolerating  mL/hr. Still on HFNC.       PAST HISTORY  --------------------------------------------------------------------------------  No significant changes to PMH, PSH, FHx, SHx, unless otherwise noted    ALLERGIES & MEDICATIONS  --------------------------------------------------------------------------------  Allergies    No Known Allergies      Standing Inpatient Medications  acetaminophen     Tablet .. 500 milliGRAM(s) Oral every 6 hours  cefTRIAXone   IVPB 2000 milliGRAM(s) IV Intermittent <User Schedule>  chlorhexidine 2% Cloths 1 Application(s) Topical daily  CRRT Treatment    <Continuous>  dextrose 50% Injectable 50 milliLiter(s) IV Push every 15 minutes  dextrose 50% Injectable 25 milliLiter(s) IV Push every 15 minutes  DOBUTamine Infusion 5 MICROgram(s)/kG/Min IV Continuous <Continuous>  EPINEPHrine    Infusion 0.02 MICROgram(s)/kG/Min IV Continuous <Continuous>  heparin  Infusion Syringe 300 Unit(s)/Hr CRRT <Continuous>  HYDROmorphone PCA (1 mG/mL) 30 milliLiter(s) PCA Continuous PCA Continuous  insulin regular Infusion 3 Unit(s)/Hr IV Continuous <Continuous>  lidocaine   4% Patch 3 Patch Transdermal daily  methylPREDNISolone sodium succinate Injectable 32 milliGRAM(s) IV Push every 12 hours  methylPREDNISolone sodium succinate Injectable   IV Push   metoclopramide Injectable 10 milliGRAM(s) IV Push every 8 hours  mycophenolate mofetil IVPB 1000 milliGRAM(s) IV Intermittent every 12 hours  naloxegol 12.5 milliGRAM(s) Oral daily  nystatin    Suspension 526134 Unit(s) Oral <User Schedule>  pantoprazole  Injectable 40 milliGRAM(s) IV Push daily  penicillin   G benzathine Injectable 2.4 Million Unit(s) IntraMuscular <User Schedule>  Phoxillum Filtration BK 4 / 2.5 5000 milliLiter(s) CRRT <Continuous>  Phoxillum Filtration BK 4 / 2.5 5000 milliLiter(s) CRRT <Continuous>  polyethylene glycol 3350 17 Gram(s) Oral daily  potassium chloride  10 mEq/50 mL IVPB 10 milliEquivalent(s) IV Intermittent once  potassium chloride  10 mEq/50 mL IVPB 10 milliEquivalent(s) IV Intermittent once  PrismaSOL Filtration BGK 4 / 2.5 5000 milliLiter(s) CRRT <Continuous>  senna 2 Tablet(s) Oral at bedtime  sodium chloride 0.9%. 1000 milliLiter(s) IV Continuous <Continuous>  tacrolimus 2 milliGRAM(s) Oral <User Schedule>  traMADol 25 milliGRAM(s) Oral every 8 hours  traZODone 100 milliGRAM(s) Oral at bedtime  vancomycin    Solution 125 milliGRAM(s) Oral every 12 hours    PRN Inpatient Medications  HYDROmorphone PCA (1 mG/mL) Rescue Clinician Bolus 0.5 milliGRAM(s) IV Push every 15 minutes PRN  ketamine Injectable 25 milliGRAM(s) IV Push every 2 hours PRN  oxyCODONE    IR 10 milliGRAM(s) Oral every 4 hours PRN      REVIEW OF SYSTEMS  --------------------------------------------------------------------------------  Gen: no fever  Respiratory: No dyspnea  CV: No chest pain  GI: No diarrhea, nausea, or vomiting  Neuro: No dizziness/lightheadedness    All other systems were reviewed and are negative, except as noted.    VITALS/PHYSICAL EXAM  --------------------------------------------------------------------------------  T(C): 37 (25 @ 04:00), Max: 37.3 (25 @ 12:00)  HR: 112 (25 07:30) (101 - 114)  BP: 99/54 (25 @ 07:30) (91/51 - 139/56)  RR: 23 (25 07:30) (10 - 33)  SpO2: 98% (25 07:30) (93% - 100%)  Wt(kg): --        25 @ 07:01  -  25 @ 07:00  --------------------------------------------------------  IN: 2111.8 mL / OUT: 4393 mL / NET: -2281.2 mL      PHYSICAL EXAM:  Gen: ill-appearing  Neuro: non-focal  HEENT: +HFNC  Pulm: B/L breath sounds  CV: paced  Abd: soft, non-distended, non-tender  : +indwelling nagy  Extremities: no edema  Skin: Warm  Dialysis access: R fem [facility]    LABS/STUDIES  --------------------------------------------------------------------------------              8.5    20.62 >-----------<  49       [25 @ 00:56]              25.7     136  |  100  |  21  ----------------------------<  118      [25 @ 00:56]  3.8   |  19  |  2.30        Ca     8.9     [25 @ 00:56]      Mg     2.5     [25 00:56]      Phos  2.1     [25 00:56]    TPro  6.7  /  Alb  4.4  /  TBili  2.1  /  DBili  x   /  AST  22  /  ALT  26  /  AlkPhos  65  [25 00:56]    PT/INR: PT 12.4 , INR 1.08       [25 00:56]  PTT: 24.6       [25 00:56]      Creatinine Trend:  SCr 2.30 [:56]  SCr 2.67 [ 12:38]  SCr 2.52 [ 00:36]  SCr 2.80 [ 13:24]  SCr 3.46 [ 00:26]    Urine Creatinine 46      [25 02:45]  Urine Protein 146      [25 02:45]  Urine Sodium 29      [25 02:45]  Urine Urea Nitrogen 81      [25 02:45]  Urine Potassium 72      [25 02:45]  Urine Osmolality 345      [25 02:45]    Iron 48, TIBC 307, %sat 16      [25 @ 06:15]  Ferritin 184      [25 06:15]  TSH 1.26      [25 00:58]  Lipid: chol 147, TG 71, HDL 62, LDL --      [25 00:58]    HBsAb Reactive      [25 15:45]  HBsAg Nonreact      [25 15:45]  HBcAb Nonreact      [25 15:45]  HCV 0.05, Nonreact      [25 15:45]  HIV Nonreact      [25 15:45]

## 2025-05-02 NOTE — PROGRESS NOTE ADULT - ASSESSMENT
69-year-old male patient past medical history of NICM since 2011, HFrEF LVEF 25-30% s/p MDT CRT-D with baseline CHB, VT/VF, a.fib s/p GIANFRANCO ligation/MAZE, MV/TV repair, PVC ablation 3/2024 intolerant to AADs in the past, recent admission 3/19 - 3/20/25 for AICD shocks initially presented to the [facility] emergency department on 3/21/2025, sent by heart failure specialist for ACID shock. Device reported successful ATP for VT 3/17 at 6:52pm followed by one ATP and 40J shock. He reported feeling dizzy and SOB during the events. He follows with Dr paula abreu for HF, currently undergoing transplant workup, Dr John for CRTD and VT/VF and  for genetic counseling. While admitted to [facility], he was started on a lidocaine gtt. On 3/21 when lidocaine weaned off patient had another two episodes of VT requiring AICD shocks. He was transferred to [facility] for further management.     Status Post OHT 4/29/25 also with removal of ICD CRT, tricuspid valve repair (34 mm Physio ring)  Per report no evidence of vegetations on valves at time of transplant    Donor CSF culture (April 22) heavy growth Streptococcus pneumoniae.  Ceftriaxone <= 0.25 (susceptible) penicillin 0.12 (resistant for CSF) vancomycin 0.25 (susceptible)    Donor TTE without evidence of vegetations  Donor CT C/A/P with no acute abnormality of abdomen or pelvis.  Left lower lobe consolidation  Donor blood cultures were with no growth to date    #Heart transplant recipient (CMV +/+, EBV +/+, Toxo -/+)  s/p vancomycin and cefepime for 72 hours as perioperative coverage  --Will need initiation of Valcyte for CMV prophylaxis  --Will need initiation of Bactrim for PCP and toxoplasma prophylaxis    #Donor Syphilis Serology Positive  --Continue benzathine penicillin 2,400,000 units weekly x 3 doses total    #Donor Streptococcus pneumoniae meningitis  --Continue Ceftriaxone 2 g IV every 24 hours to complete 2 weeks of antibiotics post transplant. Will discuss potentially extending to 4 weeks post-op given unclear etiology of patients S. pneum meningitis (concern for drug overdose) and valvular dysfunction in recipient at time of implant.     Dr. Dhiraj Mathews will be covering the patient starting from tomorrow. Please reach out to her for further questions and follow up.     Julius Villa M.D.  [facility] Division of Infectious Disease  8AM-5PM Monday - Friday: Available on [employer] Teams  After Hours and Holidays (or if no response on [employer] Teams): Please contact the Infectious Diseases Office at [phone]

## 2025-05-02 NOTE — BH CONSULTATION LIAISON PROGRESS NOTE - NSBHASSESSMENTFT_PSY_ALL_CORE
71 y/o domiciled, employed, , , male with PPHx of PTSD, unspecified anxiety d/o, with no past psychiatric admissions, with a PMHx of NICM since 2011 HFrEF (LVEF 25-30%) s/p MDT CRT-D with baseline CHB, VT/VF, AFs/p GIANFRANCO ligation/MAZE, MV/TV repair, PVC ablation 3/2024 intolerant to AADs in the past, recent admission 3/19/2025-3/20/2025 for AICD shocks initially presented to the [facility] ED on 3/21/2025, sent by HF specialist for ACID shock. While admitted to [facility], his listing status was upgraded to UNOS status 2e for heart transplant (ABO O) for the refractory VT, with the patient being transferred to [facility] for further management. Patient seen by transplant psychiatry for concerns of depression.     4/2:Patient seen as f/u, denied any acute concerns, noting that his sleep has improved, and noted that since being reactivated on the transplant list, has helped his mood and anxiety. Educated regarding medication at bedside, including PRN medications available.    4/25:Patient seen as f/u, for concerns for low and frustration during prolonged hospitalization, discussed starting an antidepressant for his anxiety of which patient declined, will recommend to restart patient Klonopin at this time.   5/2:Patient seen as f/u s/p heart transplant, noted concerns of low energy secondary to low mood, amenable to start Pristiq at this time.    see attending attestation for plan  -------------------------------------------------------------    Plan  -Can consider starting Pristiq 25mg Po QAM (if patient can receive a dose in the next hour, it can given stat)  -Can consider starting Atarax 10mg PO TID PRN for acute anxiety alleviation  -C/W trazodone 100mg PO HS   -Hold Klonopin at this time     71 y/o domiciled, employed, , , male with PPHx of PTSD, unspecified anxiety d/o, with no past psychiatric admissions, with a PMHx of NICM since 2011 HFrEF (LVEF 25-30%) s/p MDT CRT-D with baseline CHB, VT/VF, AFs/p GIANFRANCO ligation/MAZE, MV/TV repair, PVC ablation 3/2024 intolerant to AADs in the past, recent admission 3/19/2025-3/20/2025 for AICD shocks initially presented to the [facility] ED on 3/21/2025, sent by HF specialist for ACID shock. While admitted to [facility], his listing status was upgraded to UNOS status 2e for heart transplant (ABO O) for the refractory VT, with the patient being transferred to [facility] for further management. Patient seen by transplant psychiatry for concerns of depression.     4/2:Patient seen as f/u, denied any acute concerns, noting that his sleep has improved, and noted that since being reactivated on the transplant list, has helped his mood and anxiety. Educated regarding medication at bedside, including PRN medications available.    4/25:Patient seen as f/u, for concerns for low and frustration during prolonged hospitalization, discussed starting an antidepressant for his anxiety of which patient declined, will recommend to restart patient Klonopin at this time.   5/2:Patient seen as f/u s/p heart transplant, noted concerns of low energy secondary to low mood, amenable to start Pristiq at this time.    see attending attestation for plan  -------------------------------------------------------------    Plan  -Can consider starting Pristiq 50mg Po QAM (if patient can receive a dose in the next hour, it can given stat)   -Can consider starting Atarax 10mg PO TID PRN for acute anxiety alleviation  -C/W trazodone 100mg PO HS   -Hold Klonopin at this time     69 y/o domiciled, employed, , , male with PPHx of PTSD, unspecified anxiety d/o, with no past psychiatric admissions, with a PMHx of NICM since 2011 HFrEF (LVEF 25-30%) s/p MDT CRT-D with baseline CHB, VT/VF, AFs/p GIANFRANCO ligation/MAZE, MV/TV repair, PVC ablation 3/2024 intolerant to AADs in the past, recent admission 3/19/2025-3/20/2025 for AICD shocks initially presented to the [facility] ED on 3/21/2025, sent by HF specialist for ACID shock. While admitted to [facility], his listing status was upgraded to UNOS status 2e for heart transplant (ABO O) for the refractory VT, with the patient being transferred to [facility] for further management. Patient seen by transplant psychiatry for concerns of depression.         see attending attestation for plan  -------------------------------------------------------------    Plan  -Can consider starting Pristiq 50mg Po QAM (if patient can receive a dose in the next hour, it can given stat)   -Can consider starting Atarax 10mg PO TID PRN for acute anxiety alleviation  -C/W trazodone 100mg PO HS   -Hold Klonopin at this time

## 2025-05-02 NOTE — PROGRESS NOTE ADULT - SUBJECTIVE AND OBJECTIVE BOX
Patient seen and examined at the bedside.    Remained critically ill on continuous ICU monitoring.    OBJECTIVE:  Vital Signs Last 24 Hrs  T(C): 37.3 (02 May 2025 16:00), Max: 37.4 (02 May 2025 12:00)  T(F): 99.1 (02 May 2025 16:00), Max: 99.3 (02 May 2025 12:00)  HR: 118 (02 May 2025 18:00) (102 - 118)  BP: 116/68 (02 May 2025 12:00) (95/62 - 139/56)  BP(mean): 86 (02 May 2025 12:00) (62 - 94)  RR: 17 (02 May 2025 18:00) (10 - 39)  SpO2: 98% (02 May 2025 18:00) (93% - 100%)    Parameters below as of 02 May 2025 16:01  Patient On (Oxygen Delivery Method): nasal cannula, high flow  O2 Flow (L/min): 40  O2 Concentration (%): 40      Physical Exam:   General: Normal body habitus, breathing comfortably at rest. Ambulated  Neurology: Awake, oriented x 3 and follows commands   Eyes: bilateral pupils equal and reactive  ENT/Neck: Neck supple, trachea midline, No JVD  Respiratory: low inspiratory effort, diminished in the bases, on HFNC & Lico   CV: S1S2, no murmurs        [x] Sternal dressing, [x] Mediastinal CT x2        [x] Sinus Tachycardia, [x] Temporary back-up pacing - VVI 50  Abdominal: Soft, NT, ND +BS  Extremities: 1+ pedal edema noted, + peripheral pulses  Skin: No Rashes, Hematoma, Ecchymosis      Assessment:  71 yo NICM, s/p heart transplant     RV dysfunction-remains on Lico and inotropic support   Postop acute pulmonary insufficiency-on HFNC & Lico   ZAHRA-on CVVHD  Acute blood loss anemia  Thrombocytopenia  Hyperglycemia  Leukocytosis      Plan:   ***Neuro***  [x] Nonfocal    Post operative neuro assessment   Pain management with Tylenol and prns  Standing pain meds d/dwayne  Atrax & Trazadone for anxiety   Continue on Desvenlafaxine    ***Cardiovascular***  Invasive hemodynamic monitoring, assess perfusion indices   ST / CVP 8 / MAP 70 / PAP 23 / Hct 26.7 / Lactate 1.0   [x] Dobutamine 5 mcg/kg/min   [x] Epinephrine 0.02 mcg/kg/min  Reassessment of hemodynamics post resuscitation   Monitor chest tube outputs   [x] AC therapy with Heparin - d/dwayne  Serial EKG and cardiac enzymes     ***Pulmonary***  [x] HFNC 40L/40%   [x] Lico - weaned off   Encourage incentive spirometry, continue pulse ox monitoring, follow ABGs               ***GI***  [x] CC Diet   [x] Protonix for stress ulcer prophylaxis   Reglan for gut motility   Bowel regimen with Miralax and senna    ***Renal***  CRRT Treatment   Goal net negative  Continue to monitor I/Os, BUN/Creatinine.   Replete lytes PRN  [place]on[place] present     ***ID***  Cefepime course completed 5/1   Penicillin & Ceftriaxone started for D positive CSF  Immunosuppression with Solu-Medrol, Cellcept and Tacro    ***Endocrine***  [x] Stress Hyperglycemia : HbA1c 6.1%                - [x] Insulin gtt             - Need tight glycemic control to prevent wound infection.          Patient requires continuous monitoring with bedside rhythm monitoring, pulse oximetry monitoring, and continuous central venous and arterial pressure monitoring; and intermittent blood gas analysis. Care plan discussed with the ICU care team.   Patient remained critical, at risk for life threatening decompensation.    I have spent 30 minutes providing critical care management to this patient.    By signing my name below, I, Ananda Jones, attest that this documentation has been prepared under the direction and in the presence of ARA Padilla   Electronically signed: aKsey Quintero, 05-02-25 @ 20:10    I, Jacob Cisse, personally performed the services described in this documentation. all medical record entries made by the scribe were at my direction and in my presence. I have reviewed the chart and agree that the record reflects my personal performance and is accurate and complete  Electronically signed: ARA Padilla  Patient seen and examined at the bedside.    Remained critically ill on continuous ICU monitoring.    Tonight:  - Continue Epinephrine and Dobutamine drips for postop cardiogenic shock  - Levophed drip for vasogenic shock, wean as tolerated  - CVVH for ZAHRA with volume removal for hypervolemia  - Insulin drip for stress hyperglycemia  - HFNC weaned off for acute postop pulmonary insufficiency    OBJECTIVE:  Vital Signs Last 24 Hrs  T(C): 37.3 (02 May 2025 16:00), Max: 37.4 (02 May 2025 12:00)  T(F): 99.1 (02 May 2025 16:00), Max: 99.3 (02 May 2025 12:00)  HR: 118 (02 May 2025 18:00) (102 - 118)  BP: 116/68 (02 May 2025 12:00) (95/62 - 139/56)  BP(mean): 86 (02 May 2025 12:00) (62 - 94)  RR: 17 (02 May 2025 18:00) (10 - 39)  SpO2: 98% (02 May 2025 18:00) (93% - 100%)    Parameters below as of 02 May 2025 16:01  Patient On (Oxygen Delivery Method): nasal cannula, high flow  O2 Flow (L/min): 40  O2 Concentration (%): 40      Physical Exam:   General: Normal body habitus, breathing comfortably at rest. Ambulated  Neurology: Awake, oriented x 3 and follows commands but seems more tired.  Eyes: bilateral pupils equal and reactive  ENT/Neck: Neck supple, trachea midline, No JVD  Respiratory: low inspiratory effort, diminished in the bases, on HFNC  CV: S1S2, no murmurs        [x] Sternal dressing, [x] Mediastinal CT x2        [x] Sinus Tachycardia, [x] Temporary back-up pacing - VVI 50  Abdominal: Soft, NT, ND +BS  Extremities: 1+ pedal edema noted, + peripheral pulses  Skin: No Rashes, Hematoma, Ecchymosis      Assessment:  69 yo NICM, s/p heart transplant     RV dysfunction-remains on Lico and inotropic support   Postop acute pulmonary insufficiency-on HFNC & Lico   ZAHRA-on CVVHD  Acute blood loss anemia  Thrombocytopenia  Stress Hyperglycemia  Hypervolemia  Vasogenic Shock  postop cardiogenic shock      Plan:   ***Neuro***  [x] Nonfocal    Post operative neuro assessment   Pain management with Tylenol and prns  Standing pain meds d/dwayne  Atrax & Trazadone for anxiety   Continue on Desvenlafaxine    ***Cardiovascular***  Invasive hemodynamic monitoring, assess perfusion indices   ST / CVP 8 / MAP 70 / PAP 23 / Hct 26.7 / Lactate 1.0   [x] Dobutamine 5 mcg/kg/min   [x] Epinephrine 0.02 mcg/kg/min  Reassessment of hemodynamics post resuscitation   Monitor chest tube outputs   [x] AC therapy with Heparin - d/dwayne  Serial EKG and cardiac enzymes     ***Pulmonary***  [x] HFNC 40L/40% weaned off  [x] Lico - weaned off   Encourage incentive spirometry, continue pulse ox monitoring, follow ABGs               ***GI***  [x] CC Diet   [x] Protonix for stress ulcer prophylaxis   Reglan for gut motility   Bowel regimen with Miralax and senna    ***Renal***  CRRT Treatment   Goal net negative  Continue to monitor I/Os, BUN/Creatinine.   Replete lytes PRN  [place] present     ***ID***  Cefepime course completed 5/1   Penicillin & Ceftriaxone started for D positive CSF  Immunosuppression with Solu-Medrol, Cellcept and Tacro    ***Endocrine***  [x] Stress Hyperglycemia : HbA1c 6.1%                - [x] Insulin gtt             - Need tight glycemic control to prevent wound infection.          Patient requires continuous monitoring with bedside rhythm monitoring, pulse oximetry monitoring, and continuous central venous and arterial pressure monitoring; and intermittent blood gas analysis. Care plan discussed with the ICU care team.   Patient remained critical, at risk for life threatening decompensation.    I have spent 60 minutes providing critical care management to this patient.    By signing my name below, I, Ananda Jones, attest that this documentation has been prepared under the direction and in the presence of ARA Padilla   Electronically signed: Kasey Quintero, 05-02-25 @ 20:10    I, Jacob Cisse, personally performed the services described in this documentation. all medical record entries made by the yaritzaibe were at my direction and in my presence. I have reviewed the chart and agree that the record reflects my personal performance and is accurate and complete  Electronically signed: ARA Padilla

## 2025-05-02 NOTE — PROGRESS NOTE ADULT - ATTENDING COMMENTS
ZAHRA- ATN post cardiac transplant in the setting of cardiogenic shock ( ? related to intra-op Tricuspid regurg repair/RV overload)  Pressor needs reducing   started CVVHDF for fluid overload     - continue CVVHDF with current settings  - Issues with filter clotting on 4/30- Increased blood flow to 250 ml/min and added heparin- now improved   - since his platelet count has been dropping, we will hold heparin   - discussed with CTU team-  in order to prevent clotting without AC-  1) attempt to increase blood flow to 300 ml/min 2) reduce fluid removal rate from 150cc/hr to 125 cc/hr. If he does indeed clot and there is concern for HIT, will use low dose systemic Argatroban. the ultimate fall back option would be Citrate AC.     jens calvillo  nephrology attending   please contact me on TEAMS   Office- [phone] ZAHRA- ATN post cardiac transplant in the setting of cardiogenic shock ( ? related to intra-op Tricuspid regurg repair/RV overload)  Pressor needs reducing   started CVVHDF for fluid overload     - continue CVVHDF   - Issues with filter clotting on 4/30- Increased blood flow to 250 ml/min and added heparin- now improved   - since his platelet count has been dropping, we will hold heparin   - discussed with CTU team-  in order to prevent clotting without AC-  1) attempt to increase blood flow to 300 ml/min 2) reduce fluid removal rate from 150cc/hr to 125 cc/hr. If he does indeed clot and there is concern for HIT, will use low dose systemic Argatroban. the ultimate fall back option would be Citrate AC.   - adding Phoxillum    jens calvillo  nephrology attending   please contact me on TEAMS   Office- [phone]

## 2025-05-02 NOTE — PROGRESS NOTE ADULT - SUBJECTIVE AND OBJECTIVE BOX
Follow Up:      Interval History:    REVIEW OF SYSTEMS  [  ] ROS unobtainable because:    [  ] All other systems negative except as noted below    Constitutional:  [ ] fever [ ] chills  [ ] weight loss  [ ] weakness  Skin:  [ ] rash [ ] phlebitis	  Eyes: [ ] icterus [ ] pain  [ ] discharge	  ENMT: [ ] sore throat  [ ] thrush [ ] ulcers [ ] exudates  Respiratory: [ ] dyspnea [ ] hemoptysis [ ] cough [ ] sputum	  Cardiovascular:  [ ] chest pain [ ] palpitations [ ] edema	  Gastrointestinal:  [ ] nausea [ ] vomiting [ ] diarrhea [ ] constipation [ ] pain	  Genitourinary:  [ ] dysuria [ ] frequency [ ] hematuria [ ] discharge [ ] flank pain  [ ] incontinence  Musculoskeletal:  [ ] myalgias [ ] arthralgias [ ] arthritis  [ ] back pain  Neurological:  [ ] headache [ ] seizures  [ ] confusion/altered mental status    Allergies  No Known Allergies        ANTIMICROBIALS:  cefTRIAXone   IVPB 2000 <User Schedule>  nystatin    Suspension 255517 <User Schedule>  penicillin   G benzathine Injectable 2.4 <User Schedule>  vancomycin    Solution 125 every 12 hours      OTHER MEDS:  MEDICATIONS  (STANDING):  acetaminophen     Tablet .. 500 every 6 hours  desvenlafaxine ER 50 <User Schedule>  dextrose 50% Injectable 50 every 15 minutes  dextrose 50% Injectable 25 every 15 minutes  DOBUTamine Infusion 5 <Continuous>  EPINEPHrine    Infusion 0.02 <Continuous>  hydrOXYzine hydrochloride 10 three times a day PRN  insulin regular Infusion 3 <Continuous>  methylPREDNISolone sodium succinate Injectable 32 every 12 hours  methylPREDNISolone sodium succinate Injectable    metoclopramide Injectable 10 every 8 hours  mycophenolate mofetil IVPB 1000 every 12 hours  naloxegol 12.5 daily  pantoprazole  Injectable 40 daily  polyethylene glycol 3350 17 two times a day  senna 2 at bedtime  tacrolimus 0.5 once  traZODone 100 at bedtime      Vital Signs Last 24 Hrs  T(C): 37.3 (02 May 2025 16:00), Max: 37.4 (02 May 2025 12:00)  T(F): 99.1 (02 May 2025 16:00), Max: 99.3 (02 May 2025 12:00)  HR: 118 (02 May 2025 17:00) (102 - 118)  BP: 116/68 (02 May 2025 12:00) (93/61 - 139/56)  BP(mean): 86 (02 May 2025 12:00) (62 - 94)  RR: 28 (02 May 2025 17:00) (10 - 39)  SpO2: 98% (02 May 2025 17:00) (93% - 100%)    Parameters below as of 02 May 2025 16:01  Patient On (Oxygen Delivery Method): nasal cannula, high flow  O2 Flow (L/min): 40  O2 Concentration (%): 40    PHYSICAL EXAMINATION:  General: Alert and Awake, NAD  HEENT: PERRL, EOMI  Neck: Supple  Cardiac: RRR, No M/R/G  Resp: CTAB, No Wh/Rh/Ra  Abdomen: NBS, NT/ND, No HSM, No rigidity or guarding  MSK: No LE edema. No Calf tenderness  : No nagy  Skin: No rashes or lesions. Skin is warm and dry to the touch.   Neuro: Alert and Awake. CN 2-12 Grossly intact. Moves all four extremities spontaneously.  Psych: Calm, Pleasant, Cooperative                          8.8    18.09 )-----------( 53       ( 02 May 2025 12:32 )             26.7       05-02    136  |  100  |  21  ----------------------------<  108[H]  4.1   |  20[L]  |  2.19[H]    Ca    9.0      02 May 2025 12:32  Phos  3.0     05-02  Mg     2.7     05-02    TPro  6.9  /  Alb  4.5  /  TBili  2.0[H]  /  DBili  x   /  AST  18  /  ALT  27  /  AlkPhos  71  05-02      Urinalysis Basic - ( 02 May 2025 12:32 )    Color: x / Appearance: x / SG: x / pH: x  Gluc: 108 mg/dL / Ketone: x  / Bili: x / Urobili: x   Blood: x / Protein: x / Nitrite: x   Leuk Esterase: x / RBC: x / WBC x   Sq Epi: x / Non Sq Epi: x / Bacteria: x        MICROBIOLOGY:  Vancomycin Level, Trough: 13.2 ug/mL (05-01-25 @ 17:45)  v    CMV IgG Antibody: >10.00 U/mL (04-17-25 @ 15:45)  HIV-1 RNA Quantitative, Viral Load Log: NOT DET. lg /mL (04-17-25 @ 15:45)          RADIOLOGY:    <The imaging below has been reviewed and visualized by me independently. Findings as detailed in report below> Follow Up:  Status post heart    Interval History: Afebrile.  No acute events    REVIEW OF SYSTEMS  [  ] ROS unobtainable because:    [ x ] All other systems negative except as noted below    Constitutional:  [ ] fever [ ] chills  [ ] weight loss  [ ] weakness  Skin:  [ ] rash [ ] phlebitis	  Eyes: [ ] icterus [ ] pain  [ ] discharge	  ENMT: [ ] sore throat  [ ] thrush [ ] ulcers [ ] exudates  Respiratory: [ ] dyspnea [ ] hemoptysis [ ] cough [ ] sputum	  Cardiovascular:  [ x] chest pain [ ] palpitations [ ] edema	  Gastrointestinal:  [ ] nausea [ ] vomiting [ ] diarrhea [ ] constipation [ ] pain	  Genitourinary:  [ ] dysuria [ ] frequency [ ] hematuria [ ] discharge [ ] flank pain  [ ] incontinence  Musculoskeletal:  [ ] myalgias [ ] arthralgias [ ] arthritis  [ ] back pain  Neurological:  [ ] headache [ ] seizures  [ ] confusion/altered mental status    Allergies  No Known Allergies        ANTIMICROBIALS:  cefTRIAXone   IVPB 2000 <User Schedule>  nystatin    Suspension 501946 <User Schedule>  penicillin   G benzathine Injectable 2.4 <User Schedule>  vancomycin    Solution 125 every 12 hours      OTHER MEDS:  MEDICATIONS  (STANDING):  acetaminophen     Tablet .. 500 every 6 hours  desvenlafaxine ER 50 <User Schedule>  dextrose 50% Injectable 50 every 15 minutes  dextrose 50% Injectable 25 every 15 minutes  DOBUTamine Infusion 5 <Continuous>  EPINEPHrine    Infusion 0.02 <Continuous>  hydrOXYzine hydrochloride 10 three times a day PRN  insulin regular Infusion 3 <Continuous>  methylPREDNISolone sodium succinate Injectable 32 every 12 hours  methylPREDNISolone sodium succinate Injectable    metoclopramide Injectable 10 every 8 hours  mycophenolate mofetil IVPB 1000 every 12 hours  naloxegol 12.5 daily  pantoprazole  Injectable 40 daily  polyethylene glycol 3350 17 two times a day  senna 2 at bedtime  tacrolimus 0.5 once  traZODone 100 at bedtime      Vital Signs Last 24 Hrs  T(C): 37.3 (02 May 2025 16:00), Max: 37.4 (02 May 2025 12:00)  T(F): 99.1 (02 May 2025 16:00), Max: 99.3 (02 May 2025 12:00)  HR: 118 (02 May 2025 17:00) (102 - 118)  BP: 116/68 (02 May 2025 12:00) (93/61 - 139/56)  BP(mean): 86 (02 May 2025 12:00) (62 - 94)  RR: 28 (02 May 2025 17:00) (10 - 39)  SpO2: 98% (02 May 2025 17:00) (93% - 100%)    Parameters below as of 02 May 2025 16:01  Patient On (Oxygen Delivery Method): nasal cannula, high flow  O2 Flow (L/min): 40  O2 Concentration (%): 40    PHYSICAL EXAMINATION:  General: Alert and Awake, NAD  Chest: +CT with s/s output  Cardiac: RRR, No M/R/G  Resp: CTAB, No Wh/Rh/Ra  Abdomen: NBS, NT/ND, No HSM, No rigidity or guarding  MSK: No LE edema. No Calf tenderness  Skin: No rashes or lesions. Skin is warm and dry to the touch.   Neuro: Alert and Awake. CN 2-12 Grossly intact. Moves all four extremities spontaneously.  Psych: Calm, Pleasant, Cooperative                        8.8    18.09 )-----------( 53       ( 02 May 2025 12:32 )             26.7       05-02    136  |  100  |  21  ----------------------------<  108[H]  4.1   |  20[L]  |  2.19[H]    Ca    9.0      02 May 2025 12:32  Phos  3.0     05-02  Mg     2.7     05-02    TPro  6.9  /  Alb  4.5  /  TBili  2.0[H]  /  DBili  x   /  AST  18  /  ALT  27  /  AlkPhos  71  05-02      Urinalysis Basic - ( 02 May 2025 12:32 )    Color: x / Appearance: x / SG: x / pH: x  Gluc: 108 mg/dL / Ketone: x  / Bili: x / Urobili: x   Blood: x / Protein: x / Nitrite: x   Leuk Esterase: x / RBC: x / WBC x   Sq Epi: x / Non Sq Epi: x / Bacteria: x        MICROBIOLOGY:  Vancomycin Level, Trough: 13.2 ug/mL (05-01-25 @ 17:45)  v    CMV IgG Antibody: >10.00 U/mL (04-17-25 @ 15:45)  HIV-1 RNA Quantitative, Viral Load Log: NOT DET. lg /mL (04-17-25 @ 15:45)          RADIOLOGY:    <The imaging below has been reviewed and visualized by me independently. Findings as detailed in report below>    < from: Xray Chest 1 View- PORTABLE-Routine (05.02.25 @ 07:19) >  IMPRESSION:  No pneumothorax after right chest tube removal.  Support devices as above.  Bibasilar atelectasis.    < end of copied text >

## 2025-05-03 DIAGNOSIS — R41.0 DISORIENTATION, UNSPECIFIED: ICD-10-CM

## 2025-05-03 LAB
ALBUMIN SERPL ELPH-MCNC: 4.6 G/DL — SIGNIFICANT CHANGE UP (ref 3.3–5)
ALP SERPL-CCNC: 83 U/L — SIGNIFICANT CHANGE UP (ref 40–120)
ALT FLD-CCNC: 30 U/L — SIGNIFICANT CHANGE UP (ref 10–45)
ANION GAP SERPL CALC-SCNC: 15 MMOL/L — SIGNIFICANT CHANGE UP (ref 5–17)
APTT BLD: 23.7 SEC — LOW (ref 26.1–36.8)
AST SERPL-CCNC: 28 U/L — SIGNIFICANT CHANGE UP (ref 10–40)
BASE EXCESS BLDMV CALC-SCNC: -1.5 MMOL/L — SIGNIFICANT CHANGE UP (ref -3–3)
BASE EXCESS BLDMV CALC-SCNC: -2.6 MMOL/L — SIGNIFICANT CHANGE UP (ref -3–3)
BASE EXCESS BLDMV CALC-SCNC: -2.8 MMOL/L — SIGNIFICANT CHANGE UP (ref -3–3)
BASE EXCESS BLDMV CALC-SCNC: -2.9 MMOL/L — SIGNIFICANT CHANGE UP (ref -3–3)
BASOPHILS # BLD AUTO: 0.04 K/UL — SIGNIFICANT CHANGE UP (ref 0–0.2)
BASOPHILS NFR BLD AUTO: 0.2 % — SIGNIFICANT CHANGE UP (ref 0–2)
BILIRUB SERPL-MCNC: 1.5 MG/DL — HIGH (ref 0.2–1.2)
BUN SERPL-MCNC: 29 MG/DL — HIGH (ref 7–23)
CALCIUM SERPL-MCNC: 8.7 MG/DL — SIGNIFICANT CHANGE UP (ref 8.4–10.5)
CHLORIDE SERPL-SCNC: 102 MMOL/L — SIGNIFICANT CHANGE UP (ref 96–108)
CO2 BLDMV-SCNC: 23 MMOL/L — SIGNIFICANT CHANGE UP (ref 21–29)
CO2 BLDMV-SCNC: 24 MMOL/L — SIGNIFICANT CHANGE UP (ref 21–29)
CO2 BLDMV-SCNC: 25 MMOL/L — SIGNIFICANT CHANGE UP (ref 21–29)
CO2 BLDMV-SCNC: 26 MMOL/L — SIGNIFICANT CHANGE UP (ref 21–29)
CO2 SERPL-SCNC: 20 MMOL/L — LOW (ref 22–31)
CREAT SERPL-MCNC: 2.18 MG/DL — HIGH (ref 0.5–1.3)
EGFR: 32 ML/MIN/1.73M2 — LOW
EGFR: 32 ML/MIN/1.73M2 — LOW
EOSINOPHIL # BLD AUTO: 0 K/UL — SIGNIFICANT CHANGE UP (ref 0–0.5)
EOSINOPHIL NFR BLD AUTO: 0 % — SIGNIFICANT CHANGE UP (ref 0–6)
FIBRINOGEN PPP-MCNC: 156 MG/DL — LOW (ref 200–445)
GAS PNL BLDA: SIGNIFICANT CHANGE UP
GAS PNL BLDMV: SIGNIFICANT CHANGE UP
GLUCOSE BLDC GLUCOMTR-MCNC: 100 MG/DL — HIGH (ref 70–99)
GLUCOSE BLDC GLUCOMTR-MCNC: 107 MG/DL — HIGH (ref 70–99)
GLUCOSE BLDC GLUCOMTR-MCNC: 114 MG/DL — HIGH (ref 70–99)
GLUCOSE BLDC GLUCOMTR-MCNC: 116 MG/DL — HIGH (ref 70–99)
GLUCOSE BLDC GLUCOMTR-MCNC: 117 MG/DL — HIGH (ref 70–99)
GLUCOSE BLDC GLUCOMTR-MCNC: 119 MG/DL — HIGH (ref 70–99)
GLUCOSE BLDC GLUCOMTR-MCNC: 122 MG/DL — HIGH (ref 70–99)
GLUCOSE BLDC GLUCOMTR-MCNC: 124 MG/DL — HIGH (ref 70–99)
GLUCOSE BLDC GLUCOMTR-MCNC: 127 MG/DL — HIGH (ref 70–99)
GLUCOSE BLDC GLUCOMTR-MCNC: 127 MG/DL — HIGH (ref 70–99)
GLUCOSE BLDC GLUCOMTR-MCNC: 135 MG/DL — HIGH (ref 70–99)
GLUCOSE BLDC GLUCOMTR-MCNC: 147 MG/DL — HIGH (ref 70–99)
GLUCOSE BLDC GLUCOMTR-MCNC: 154 MG/DL — HIGH (ref 70–99)
GLUCOSE BLDC GLUCOMTR-MCNC: 83 MG/DL — SIGNIFICANT CHANGE UP (ref 70–99)
GLUCOSE BLDC GLUCOMTR-MCNC: 88 MG/DL — SIGNIFICANT CHANGE UP (ref 70–99)
GLUCOSE BLDC GLUCOMTR-MCNC: 96 MG/DL — SIGNIFICANT CHANGE UP (ref 70–99)
GLUCOSE BLDC GLUCOMTR-MCNC: 97 MG/DL — SIGNIFICANT CHANGE UP (ref 70–99)
GLUCOSE SERPL-MCNC: 103 MG/DL — HIGH (ref 70–99)
HCO3 BLDMV-SCNC: 22 MMOL/L — SIGNIFICANT CHANGE UP (ref 20–28)
HCO3 BLDMV-SCNC: 23 MMOL/L — SIGNIFICANT CHANGE UP (ref 20–28)
HCO3 BLDMV-SCNC: 24 MMOL/L — SIGNIFICANT CHANGE UP (ref 20–28)
HCO3 BLDMV-SCNC: 25 MMOL/L — SIGNIFICANT CHANGE UP (ref 20–28)
HCT VFR BLD CALC: 24.7 % — LOW (ref 39–50)
HGB BLD-MCNC: 8.2 G/DL — LOW (ref 13–17)
HOROWITZ INDEX BLDMV+IHG-RTO: 28 — SIGNIFICANT CHANGE UP
HOROWITZ INDEX BLDMV+IHG-RTO: 28 — SIGNIFICANT CHANGE UP
HOROWITZ INDEX BLDMV+IHG-RTO: 40 — SIGNIFICANT CHANGE UP
HOROWITZ INDEX BLDMV+IHG-RTO: 44 — SIGNIFICANT CHANGE UP
IMM GRANULOCYTES NFR BLD AUTO: 1.9 % — HIGH (ref 0–0.9)
INR BLD: 1.27 RATIO — HIGH (ref 0.85–1.16)
LYMPHOCYTES # BLD AUTO: 0.37 K/UL — LOW (ref 1–3.3)
LYMPHOCYTES # BLD AUTO: 1.9 % — LOW (ref 13–44)
MAGNESIUM SERPL-MCNC: 2.7 MG/DL — HIGH (ref 1.6–2.6)
MCHC RBC-ENTMCNC: 28.7 PG — SIGNIFICANT CHANGE UP (ref 27–34)
MCHC RBC-ENTMCNC: 33.2 G/DL — SIGNIFICANT CHANGE UP (ref 32–36)
MCV RBC AUTO: 86.4 FL — SIGNIFICANT CHANGE UP (ref 80–100)
MONOCYTES # BLD AUTO: 1.15 K/UL — HIGH (ref 0–0.9)
MONOCYTES NFR BLD AUTO: 5.8 % — SIGNIFICANT CHANGE UP (ref 2–14)
NEUTROPHILS # BLD AUTO: 18.02 K/UL — HIGH (ref 1.8–7.4)
NEUTROPHILS NFR BLD AUTO: 90.2 % — HIGH (ref 43–77)
NRBC BLD AUTO-RTO: 0 /100 WBCS — SIGNIFICANT CHANGE UP (ref 0–0)
O2 CT VFR BLD CALC: 32 MMHG — SIGNIFICANT CHANGE UP (ref 30–65)
O2 CT VFR BLD CALC: 35 MMHG — SIGNIFICANT CHANGE UP (ref 30–65)
O2 CT VFR BLD CALC: 40 MMHG — SIGNIFICANT CHANGE UP (ref 30–65)
O2 CT VFR BLD CALC: 41 MMHG — SIGNIFICANT CHANGE UP (ref 30–65)
PCO2 BLDMV: 37 MMHG — SIGNIFICANT CHANGE UP (ref 30–65)
PCO2 BLDMV: 41 MMHG — SIGNIFICANT CHANGE UP (ref 30–65)
PCO2 BLDMV: 46 MMHG — SIGNIFICANT CHANGE UP (ref 30–65)
PCO2 BLDMV: 47 MMHG — SIGNIFICANT CHANGE UP (ref 30–65)
PH BLDMV: 7.32 — SIGNIFICANT CHANGE UP (ref 7.32–7.45)
PH BLDMV: 7.33 — SIGNIFICANT CHANGE UP (ref 7.32–7.45)
PH BLDMV: 7.35 — SIGNIFICANT CHANGE UP (ref 7.32–7.45)
PH BLDMV: 7.38 — SIGNIFICANT CHANGE UP (ref 7.32–7.45)
PHOSPHATE SERPL-MCNC: 2.6 MG/DL — SIGNIFICANT CHANGE UP (ref 2.5–4.5)
PLATELET # BLD AUTO: 56 K/UL — LOW (ref 150–400)
POTASSIUM SERPL-MCNC: 3.8 MMOL/L — SIGNIFICANT CHANGE UP (ref 3.5–5.3)
POTASSIUM SERPL-SCNC: 3.8 MMOL/L — SIGNIFICANT CHANGE UP (ref 3.5–5.3)
PROT SERPL-MCNC: 6.6 G/DL — SIGNIFICANT CHANGE UP (ref 6–8.3)
PROTHROM AB SERPL-ACNC: 14.5 SEC — HIGH (ref 9.9–13.4)
RBC # BLD: 2.86 M/UL — LOW (ref 4.2–5.8)
RBC # FLD: 16.1 % — HIGH (ref 10.3–14.5)
SAO2 % BLDMV: 56.1 — LOW (ref 60–90)
SAO2 % BLDMV: 59.1 — LOW (ref 60–90)
SAO2 % BLDMV: 64.5 — SIGNIFICANT CHANGE UP (ref 60–90)
SAO2 % BLDMV: 70.5 — SIGNIFICANT CHANGE UP (ref 60–90)
SODIUM SERPL-SCNC: 137 MMOL/L — SIGNIFICANT CHANGE UP (ref 135–145)
TACROLIMUS SERPL-MCNC: 6.7 NG/ML — SIGNIFICANT CHANGE UP
VANCOMYCIN TROUGH SERPL-MCNC: 8.7 UG/ML — LOW (ref 10–20)
WBC # BLD: 19.95 K/UL — HIGH (ref 3.8–10.5)
WBC # FLD AUTO: 19.95 K/UL — HIGH (ref 3.8–10.5)

## 2025-05-03 PROCEDURE — 99233 SBSQ HOSP IP/OBS HIGH 50: CPT | Mod: GC

## 2025-05-03 PROCEDURE — 71045 X-RAY EXAM CHEST 1 VIEW: CPT | Mod: 26,76

## 2025-05-03 PROCEDURE — 99232 SBSQ HOSP IP/OBS MODERATE 35: CPT

## 2025-05-03 PROCEDURE — 99292 CRITICAL CARE ADDL 30 MIN: CPT

## 2025-05-03 PROCEDURE — 99291 CRITICAL CARE FIRST HOUR: CPT

## 2025-05-03 PROCEDURE — G0545: CPT

## 2025-05-03 RX ORDER — DEXMEDETOMIDINE HYDROCHLORIDE IN SODIUM CHLORIDE 4 UG/ML
1.2 INJECTION INTRAVENOUS
Qty: 200 | Refills: 0 | Status: DISCONTINUED | OUTPATIENT
Start: 2025-05-03 | End: 2025-05-06

## 2025-05-03 RX ORDER — ALBUMIN (HUMAN) 12.5 G/50ML
100 INJECTION, SOLUTION INTRAVENOUS ONCE
Refills: 0 | Status: COMPLETED | OUTPATIENT
Start: 2025-05-03 | End: 2025-05-02

## 2025-05-03 RX ORDER — NOREPINEPHRINE BITARTRATE 8 MG
0.05 SOLUTION INTRAVENOUS
Qty: 8 | Refills: 0 | Status: DISCONTINUED | OUTPATIENT
Start: 2025-05-03 | End: 2025-05-06

## 2025-05-03 RX ORDER — NICARDIPINE HCL 30 MG
5 CAPSULE ORAL
Qty: 40 | Refills: 0 | Status: DISCONTINUED | OUTPATIENT
Start: 2025-05-03 | End: 2025-05-06

## 2025-05-03 RX ORDER — TACROLIMUS 0.5 MG/1
1.5 CAPSULE ORAL
Refills: 0 | Status: DISCONTINUED | OUTPATIENT
Start: 2025-05-03 | End: 2025-05-04

## 2025-05-03 RX ORDER — MYCOPHENOLATE MOFETIL 500 MG/1
1000 TABLET, FILM COATED ORAL EVERY 12 HOURS
Refills: 0 | Status: DISCONTINUED | OUTPATIENT
Start: 2025-05-03 | End: 2025-05-06

## 2025-05-03 RX ADMIN — Medication 2 SPRAY(S): at 15:26

## 2025-05-03 RX ADMIN — CEFTRIAXONE 100 MILLIGRAM(S): 500 INJECTION, POWDER, FOR SOLUTION INTRAMUSCULAR; INTRAVENOUS at 11:22

## 2025-05-03 RX ADMIN — NOREPINEPHRINE BITARTRATE 7.66 MICROGRAM(S)/KG/MIN: 8 SOLUTION at 21:00

## 2025-05-03 RX ADMIN — HYDROXYZINE HYDROCHLORIDE 10 MILLIGRAM(S): 25 TABLET, FILM COATED ORAL at 15:21

## 2025-05-03 RX ADMIN — TACROLIMUS 1.5 MILLIGRAM(S): 0.5 CAPSULE ORAL at 12:26

## 2025-05-03 RX ADMIN — Medication 100 MILLIGRAM(S): at 22:28

## 2025-05-03 RX ADMIN — Medication 125 MILLIGRAM(S): at 17:22

## 2025-05-03 RX ADMIN — Medication 500 MILLIGRAM(S): at 18:22

## 2025-05-03 RX ADMIN — Medication 500 MILLIGRAM(S): at 00:01

## 2025-05-03 RX ADMIN — Medication 6.13 MICROGRAM(S)/KG/MIN: at 21:00

## 2025-05-03 RX ADMIN — DOBUTAMINE 6.13 MICROGRAM(S)/KG/MIN: 250 INJECTION INTRAVENOUS at 20:59

## 2025-05-03 RX ADMIN — METHYLPREDNISOLONE ACETATE 32 MILLIGRAM(S): 80 INJECTION, SUSPENSION INTRA-ARTICULAR; INTRALESIONAL; INTRAMUSCULAR; SOFT TISSUE at 05:26

## 2025-05-03 RX ADMIN — Medication 3 UNIT(S)/HR: at 21:00

## 2025-05-03 RX ADMIN — Medication 1 APPLICATION(S): at 12:42

## 2025-05-03 RX ADMIN — Medication 3 UNIT(S)/HR: at 09:40

## 2025-05-03 RX ADMIN — DEXMEDETOMIDINE HYDROCHLORIDE IN SODIUM CHLORIDE 24.5 MICROGRAM(S)/KG/HR: 4 INJECTION INTRAVENOUS at 21:00

## 2025-05-03 RX ADMIN — Medication 10 MILLIGRAM(S): at 05:26

## 2025-05-03 RX ADMIN — Medication 10 MILLIGRAM(S): at 13:11

## 2025-05-03 RX ADMIN — METHYLPREDNISOLONE ACETATE 28 MILLIGRAM(S): 80 INJECTION, SUSPENSION INTRA-ARTICULAR; INTRALESIONAL; INTRAMUSCULAR; SOFT TISSUE at 17:23

## 2025-05-03 RX ADMIN — Medication 500 MILLIGRAM(S): at 12:28

## 2025-05-03 RX ADMIN — NOREPINEPHRINE BITARTRATE 7.66 MICROGRAM(S)/KG/MIN: 8 SOLUTION at 09:40

## 2025-05-03 RX ADMIN — Medication 10 MILLILITER(S): at 21:01

## 2025-05-03 RX ADMIN — DOBUTAMINE 9.19 MICROGRAM(S)/KG/MIN: 250 INJECTION INTRAVENOUS at 09:40

## 2025-05-03 RX ADMIN — Medication 40 MILLIGRAM(S): at 12:28

## 2025-05-03 RX ADMIN — MYCOPHENOLATE MOFETIL 1000 MILLIGRAM(S): 500 TABLET, FILM COATED ORAL at 20:59

## 2025-05-03 RX ADMIN — Medication 500 MILLIGRAM(S): at 13:28

## 2025-05-03 RX ADMIN — Medication 500 MILLIGRAM(S): at 17:22

## 2025-05-03 RX ADMIN — TACROLIMUS 1.5 MILLIGRAM(S): 0.5 CAPSULE ORAL at 20:59

## 2025-05-03 RX ADMIN — MYCOPHENOLATE MOFETIL 83.34 MILLIGRAM(S): 500 TABLET, FILM COATED ORAL at 07:48

## 2025-05-03 RX ADMIN — Medication 6.13 MICROGRAM(S)/KG/MIN: at 09:40

## 2025-05-03 NOTE — PROGRESS NOTE ADULT - SUBJECTIVE AND OBJECTIVE BOX
Patient seen and examined at the bedside.    Remained critically ill on continuous ICU monitoring.    Events Tonight:      OBJECTIVE:  Vital Signs Last 24 Hrs  T(C): 37.5 (03 May 2025 20:00), Max: 37.5 (03 May 2025 20:00)  T(F): 99.5 (03 May 2025 20:00), Max: 99.5 (03 May 2025 20:00)  HR: 116 (03 May 2025 19:00) (105 - 124)  BP: 114/71 (03 May 2025 18:00) (97/59 - 120/73)  BP(mean): 88 (03 May 2025 18:00) (70 - 92)  RR: 32 (03 May 2025 19:00) (10 - 33)  SpO2: 97% (03 May 2025 19:00) (96% - 100%)    Parameters below as of 03 May 2025 20:00  Patient On (Oxygen Delivery Method): nasal cannula  O2 Flow (L/min): 2      Physical Exam:   General: Normal body habitus, breathing comfortably at rest  Neurology: Awake, oriented x 3 and follows commands but more lethargic & unmotivated today  Eyes: bilateral pupils equal and reactive  ENT/Neck: Neck supple, trachea midline, No JVD  Respiratory: low inspiratory effort, diminished in the bases, on HFNC  CV: S1S2, no murmurs        [x] Sternal dressing, [x] Mediastinal CT x2        [x] Sinus Tachycardia, [x] Temporary back-up pacing - VVI 60  Abdominal: Soft, NT, ND +BS  Extremities: 1+ pedal edema noted, + peripheral pulses  Skin: No Rashes, Hematoma, Ecchymosis      Assessment:  71 yo NICM, s/p heart transplant     RV dysfunction-remains on Lico and inotropic support   Postop acute pulmonary insufficiency-on HFNC & Lico   ZAHRA-on CVVHD  Acute blood loss anemia  Thrombocytopenia  Stress Hyperglycemia  Hypervolemia  Vasogenic Shock  Postop cardiogenic shock      Plan:   ***Neuro***  [x] +/- Precedex for sedation  Post operative neuro assessment   Pain management with Tylenol and prns  Standing pain meds d/dwayne  Atrax & Trazadone for anxiety   Desvenlafaxine d/dwayne    ***Cardiovascular***  Invasive hemodynamic monitoring, assess perfusion indices   ST / CVP 2 / MAP 74 / PAP 14 / Hct 26.7 / Lactate 1.0   [x] Dobutamine 2.5 mcg/kg/min   [x] Epinephrine 0.02 mcg/kg/min  [x] Levophed 0.05 mcg/kg/min - off  Volume: [x] Albumin 100 ml  Reassessment of hemodynamics post resuscitation   Monitor chest tube outputs   [x] AC therapy with Heparin - d/dwayne  Serial EKG and cardiac enzymes     ***Pulmonary***  [x] NC 2L  Encourage incentive spirometry, continue pulse ox monitoring, follow ABGs               ***GI***  [x] NPO w/ Vital TF's at goal of 40 ccs/hr  [x] Protonix for stress ulcer prophylaxis   Bowel regimen with Miralax and senna    ***Renal***  [x] ZAHRA - on CRRT Treatment   Goal net negative  Continue to monitor I/Os, BUN/Creatinine.   Replete lytes PRN  [place]onado present     ***ID***  Cefepime course completed 5/1   PO Vanco for c.diff ppx  Penicillin & Ceftriaxone for D positive CSF  Immunosuppression with Solu-Medrol, Cellcept and Tacro    ***Endocrine***  [x] Stress Hyperglycemia : HbA1c 6.1%                - [x] Insulin gtt             - Need tight glycemic control to prevent wound infection.              Patient requires continuous monitoring with bedside rhythm monitoring, pulse oximetry monitoring, and continuous central venous and arterial pressure monitoring; and intermittent blood gas analysis. Care plan discussed with the ICU care team.   Patient remained critical, at risk for life threatening decompensation.    I have spent 55 minutes providing critical care management to this patient.    By signing my name below, I, Ananda Jones, attest that this documentation has been prepared under the direction and in the presence of ARA Padilal   Electronically signed: Kasey Quintero, 05-03-25 @ 20:01    I, Jacob Cisse, personally performed the services described in this documentation. all medical record entries made by the yaritzaibe were at my direction and in my presence. I have reviewed the chart and agree that the record reflects my personal performance and is accurate and complete  Electronically signed: ARA Padilla  Patient seen and examined at the bedside.    Remained critically ill on continuous ICU monitoring.    Events Tonight:  - Epinephrine and dobutamine drips continue for postop cardiogenic shock  - Dobutamine drip decreased to 1.5 with stable hemodynamics, lactate, mixed venous saturation and cardiac index, continue to trend  - CVVH keeping even as filling pressures are low, given some albumin for IVF for hypovolemia without shock  - Tube feeds titrate to goal for ongoing nutrition    OBJECTIVE:  Vital Signs Last 24 Hrs  T(C): 37.5 (03 May 2025 20:00), Max: 37.5 (03 May 2025 20:00)  T(F): 99.5 (03 May 2025 20:00), Max: 99.5 (03 May 2025 20:00)  HR: 116 (03 May 2025 19:00) (105 - 124)  BP: 114/71 (03 May 2025 18:00) (97/59 - 120/73)  BP(mean): 88 (03 May 2025 18:00) (70 - 92)  RR: 32 (03 May 2025 19:00) (10 - 33)  SpO2: 97% (03 May 2025 19:00) (96% - 100%)    Parameters below as of 03 May 2025 20:00  Patient On (Oxygen Delivery Method): nasal cannula  O2 Flow (L/min): 2      Physical Exam:   General: Normal body habitus, breathing comfortably at rest  Neurology: Awake, oriented x 3 and follows commands but more lethargic & unmotivated  Eyes: bilateral pupils equal and reactive  ENT/Neck: Neck supple, trachea midline, No JVD  Respiratory: low inspiratory effort, diminished in the bases, on HFNC  CV: S1S2, no murmurs        [x] Sternal dressing, [x] Mediastinal CT x2        [x] Sinus Tachycardia, [x] Temporary back-up pacing - VVI 60  Abdominal: Soft, NT, ND +BS  Extremities: 1+ pedal edema noted, + peripheral pulses  Skin: No Rashes, Hematoma, Ecchymosis      Assessment:  71 yo NICM, s/p heart transplant     RV dysfunction-remains on Lico and inotropic support   Postop acute pulmonary insufficiency-on HFNC & Lico   ZAHRA-on CVVHD  Acute blood loss anemia  Thrombocytopenia  Stress Hyperglycemia  Hypovolemia without shock  Postop cardiogenic shock, subsequent encounter      Plan:   ***Neuro***  [x] +/- Precedex for agitation  Post operative neuro assessment   Pain management with Tylenol and prns  Standing pain meds d/dwayne  Atrax & Trazadone for anxiety   Desvenlafaxine d/dwayne    ***Cardiovascular***  Invasive hemodynamic monitoring, assess perfusion indices   ST / CVP 2 / MAP 74 / PAP 14 / Hct 26.7 / Lactate 1.0   [x] Dobutamine 2.5 mcg/kg/min   [x] Epinephrine 0.02 mcg/kg/min  [x] Levophed 0.05 mcg/kg/min - off  Volume: [x] Albumin 100 ml  Reassessment of hemodynamics post resuscitation   Monitor chest tube outputs   [x] AC therapy with Heparin - d/dwayne  Serial EKG and cardiac enzymes     ***Pulmonary***  [x] NC 2L  Encourage incentive spirometry, continue pulse ox monitoring, follow ABGs               ***GI***  [x] NPO w/ Vital TF's at goal of 40 ccs/hr  [x] Protonix for stress ulcer prophylaxis   Bowel regimen with Miralax and senna    ***Renal***  [x] ZAHRA - on CRRT Treatment   Goal net negative  Continue to monitor I/Os, BUN/Creatinine.   Replete lytes PRN  [place] present     ***ID***  Cefepime course completed 5/1   PO Vanco for c.diff ppx  Penicillin & Ceftriaxone for D positive CSF  Immunosuppression with Solu-Medrol, Cellcept and Tacro    ***Endocrine***  [x] Stress Hyperglycemia : HbA1c 6.1%                - [x] Insulin gtt             - Need tight glycemic control to prevent wound infection.              Patient requires continuous monitoring with bedside rhythm monitoring, pulse oximetry monitoring, and continuous central venous and arterial pressure monitoring; and intermittent blood gas analysis. Care plan discussed with the ICU care team.   Patient remained critical, at risk for life threatening decompensation.    I have spent 62 minutes providing critical care management to this patient.    By signing my name below, I, Ananda Jones, attest that this documentation has been prepared under the direction and in the presence of ARA Padilla   Electronically signed: Kasey Quintero, 05-03-25 @ 20:01    I, Jacob Cisse, personally performed the services described in this documentation. all medical record entries made by the scribe were at my direction and in my presence. I have reviewed the chart and agree that the record reflects my personal performance and is accurate and complete  Electronically signed: ARA Padilla

## 2025-05-03 NOTE — PROGRESS NOTE ADULT - ASSESSMENT
69-year-old male patient past medical history of NICM since 2011, HFrEF LVEF 25-30% s/p MDT CRT-D with baseline CHB, VT/VF, a.fib s/p GIANFRANCO ligation/MAZE, MV/TV repair, PVC ablation 3/2024 intolerant to AADs in the past, recent admission 3/19 - 3/20/25 for AICD shocks initially presented to the [facility] emergency department on 3/21/2025, sent by heart failure specialist for ACID shock. Device reported successful ATP for VT 3/17 at 6:52pm followed by one ATP and 40J shock. He reported feeling dizzy and SOB during the events. He follows with Dr paula abreu for HF, currently undergoing transplant workup, Dr John for CRTD and VT/VF and  for genetic counseling. While admitted to [facility], he was started on a lidocaine gtt. On 3/21 when lidocaine weaned off patient had another two episodes of VT requiring AICD shocks. He was transferred to [facility] for further management.     Status Post OHT 4/29/25 also with removal of ICD CRT, tricuspid valve repair (34 mm Physio ring)  Per report no evidence of vegetations on valves at time of transplant    Donor CSF culture (April 22) heavy growth Streptococcus pneumoniae.  Ceftriaxone <= 0.25 (susceptible) penicillin 0.12 (resistant for CSF) vancomycin 0.25 (susceptible)    Donor TTE without evidence of vegetations  Donor CT C/A/P with no acute abnormality of abdomen or pelvis.  Left lower lobe consolidation  Donor blood cultures were with no growth to date    #Heart transplant recipient (CMV +/+, EBV +/+, Toxo -/+)  s/p vancomycin and cefepime for 72 hours as perioperative coverage  --Will need initiation of Valcyte for CMV prophylaxis  --Will need initiation of Bactrim for PCP and toxoplasma prophylaxis    #Donor Syphilis Serology Positive  --Continue benzathine penicillin 2,400,000 units weekly x 3 doses total    #Donor Streptococcus pneumoniae meningitis  --Continue Ceftriaxone 2 g IV every 24 hours to complete 2 weeks of antibiotics post transplant. Will discuss potentially extending to 4 weeks post-op given unclear etiology of patients S. pneum meningitis (concern for drug overdose) and valvular dysfunction in recipient at time of implant.   -would repeat TTE to assess    Dhiraj Mathews MD  Can be called via Teams  After 5pm/weekends [phone]

## 2025-05-03 NOTE — PROGRESS NOTE ADULT - PROBLEM SELECTOR PLAN 6
- with steroids as possible contributor  - Pyshc consulted. On trazadone   - Feeding tube to receive meds as above

## 2025-05-03 NOTE — PROGRESS NOTE ADULT - SUBJECTIVE AND OBJECTIVE BOX
Duane Hughes MD  Cardiology Fellow  [phone]  All Cardiology service information can be found 24/7 on [email or web address], password: [email or web address]    Patient seen and examined at bedside.    Overnight Events:     Review Of Systems: No chest pain, shortness of breath, or palpitations            Current Meds:  acetaminophen     Tablet .. 500 milliGRAM(s) Oral every 6 hours  cefTRIAXone   IVPB 2000 milliGRAM(s) IV Intermittent <User Schedule>  chlorhexidine 2% Cloths 1 Application(s) Topical daily  CRRT Treatment    <Continuous>  desvenlafaxine ER 50 milliGRAM(s) Oral <User Schedule>  dextrose 50% Injectable 50 milliLiter(s) IV Push every 15 minutes  dextrose 50% Injectable 25 milliLiter(s) IV Push every 15 minutes  DOBUTamine Infusion 5 MICROgram(s)/kG/Min IV Continuous <Continuous>  EPINEPHrine    Infusion 0.02 MICROgram(s)/kG/Min IV Continuous <Continuous>  hydrOXYzine hydrochloride 10 milliGRAM(s) Oral three times a day PRN  insulin regular Infusion 3 Unit(s)/Hr IV Continuous <Continuous>  lidocaine   4% Patch 3 Patch Transdermal daily  methylPREDNISolone sodium succinate Injectable 28 milliGRAM(s) IV Push every 12 hours  methylPREDNISolone sodium succinate Injectable   IV Push   metoclopramide Injectable 10 milliGRAM(s) IV Push every 8 hours  mycophenolate mofetil IVPB 1000 milliGRAM(s) IV Intermittent every 12 hours  naloxegol 12.5 milliGRAM(s) Oral daily  norepinephrine Infusion 0.05 MICROgram(s)/kG/Min IV Continuous <Continuous>  nystatin    Suspension 185941 Unit(s) Oral <User Schedule>  pantoprazole  Injectable 40 milliGRAM(s) IV Push daily  penicillin   G benzathine Injectable 2.4 Million Unit(s) IntraMuscular <User Schedule>  Phoxillum Filtration BK 4 / 2.5 5000 milliLiter(s) CRRT <Continuous>  Phoxillum Filtration BK 4 / 2.5 5000 milliLiter(s) CRRT <Continuous>  polyethylene glycol 3350 17 Gram(s) Oral two times a day  potassium chloride  10 mEq/50 mL IVPB 10 milliEquivalent(s) IV Intermittent once  potassium chloride  10 mEq/50 mL IVPB 10 milliEquivalent(s) IV Intermittent once  PrismaSOL Filtration BGK 4 / 2.5 5000 milliLiter(s) CRRT <Continuous>  senna 2 Tablet(s) Oral at bedtime  sodium chloride 0.9%. 1000 milliLiter(s) IV Continuous <Continuous>  traZODone 100 milliGRAM(s) Oral at bedtime  vancomycin    Solution 125 milliGRAM(s) Oral every 12 hours    Vitals:  T(F): 98.8 (05-03), Max: 99.9 (05-02)  HR: 114 (05-03) (112 - 124)  BP: 106/61 (05-03) (99/54 - 120/73)  RR: 20 (05-03)  SpO2: 99% (05-03)  I&O's Summary    02 May 2025 07:01  -  03 May 2025 07:00  --------------------------------------------------------  IN: 1871.2 mL / OUT: 2779 mL / NET: -907.8 mL      Appearance: No acute distress; well appearing  Eyes: PERRL, EOMI, pink conjunctiva  HEENT: Normal oral mucosa  Cardiovascular: RRR, S1, S2, no murmurs, rubs, or gallops; no edema; no JVD  Respiratory: Clear to auscultation bilaterally  Gastrointestinal: soft, non-tender, non-distended with normal bowel sounds  Musculoskeletal: No clubbing; no joint deformity                           8.2    19.95 )-----------( 56       ( 03 May 2025 00:37 )             24.7     05-03    137  |  102  |  29[H]  ----------------------------<  103[H]  3.8   |  20[L]  |  2.18[H]    Ca    8.7      03 May 2025 00:38  Phos  2.6     05-03  Mg     2.7     05-03    TPro  6.6  /  Alb  4.6  /  TBili  1.5[H]  /  DBili  x   /  AST  28  /  ALT  30  /  AlkPhos  83  05-03    PT/INR - ( 03 May 2025 00:37 )   PT: 14.5 sec;   INR: 1.27 ratio         PTT - ( 03 May 2025 00:37 )  PTT:23.7 sec   Duane Hughes MD  Cardiology Fellow  [phone]  All Cardiology service information can be found  on [email or web address], password: [email or web address]    Patient seen and examined at bedside.  POD 4 OHT   A-paced v-sensed. /61, on epi 0.02  5. Weaned off levo. TD CI 3.37  CVVH continues   Continues with agitation and delirium, refusing medications. Has 1:1 at bedside.     Review Of Systems: No chest pain, shortness of breath, or palpitations            Current Meds:  acetaminophen     Tablet .. 500 milliGRAM(s) Oral every 6 hours  cefTRIAXone   IVPB 2000 milliGRAM(s) IV Intermittent <User Schedule>  chlorhexidine 2% Cloths 1 Application(s) Topical daily  CRRT Treatment    <Continuous>  desvenlafaxine ER 50 milliGRAM(s) Oral <User Schedule>  dextrose 50% Injectable 50 milliLiter(s) IV Push every 15 minutes  dextrose 50% Injectable 25 milliLiter(s) IV Push every 15 minutes  DOBUTamine Infusion 5 MICROgram(s)/kG/Min IV Continuous <Continuous>  EPINEPHrine    Infusion 0.02 MICROgram(s)/kG/Min IV Continuous <Continuous>  hydrOXYzine hydrochloride 10 milliGRAM(s) Oral three times a day PRN  insulin regular Infusion 3 Unit(s)/Hr IV Continuous <Continuous>  lidocaine   4% Patch 3 Patch Transdermal daily  methylPREDNISolone sodium succinate Injectable 28 milliGRAM(s) IV Push every 12 hours  methylPREDNISolone sodium succinate Injectable   IV Push   metoclopramide Injectable 10 milliGRAM(s) IV Push every 8 hours  mycophenolate mofetil IVPB 1000 milliGRAM(s) IV Intermittent every 12 hours  naloxegol 12.5 milliGRAM(s) Oral daily  norepinephrine Infusion 0.05 MICROgram(s)/kG/Min IV Continuous <Continuous>  nystatin    Suspension 933428 Unit(s) Oral <User Schedule>  pantoprazole  Injectable 40 milliGRAM(s) IV Push daily  penicillin   G benzathine Injectable 2.4 Million Unit(s) IntraMuscular <User Schedule>  Phoxillum Filtration BK 4 / 2.5 5000 milliLiter(s) CRRT <Continuous>  Phoxillum Filtration BK 4 / 2.5 5000 milliLiter(s) CRRT <Continuous>  polyethylene glycol 3350 17 Gram(s) Oral two times a day  potassium chloride  10 mEq/50 mL IVPB 10 milliEquivalent(s) IV Intermittent once  potassium chloride  10 mEq/50 mL IVPB 10 milliEquivalent(s) IV Intermittent once  PrismaSOL Filtration BGK 4 / 2.5 5000 milliLiter(s) CRRT <Continuous>  senna 2 Tablet(s) Oral at bedtime  sodium chloride 0.9%. 1000 milliLiter(s) IV Continuous <Continuous>  traZODone 100 milliGRAM(s) Oral at bedtime  vancomycin    Solution 125 milliGRAM(s) Oral every 12 hours    Vitals:  T(F): 98.8 (), Max: 99.9 ()  HR: 114 () (112 - 124)  BP: 106/61 () (99/54 - 120/73)  RR: 20 ()  SpO2: 99% ()  I&O's Summary    02 May 2025 07:01  -  03 May 2025 07:00  --------------------------------------------------------  IN: 1871.2 mL / OUT: 2779 mL / NET: -907.8 mL      Appearance: Deliriuos   Eyes: PERRL, EOMI, pink conjunctiva  HEENT: Normal oral mucosa  Cardiovascular: RRR, S1, S2, no murmurs, rubs, or gallops; no edema; no JVD  Respiratory: Clear to auscultation bilaterally  Gastrointestinal: soft, non-tender, non-distended with normal bowel sounds  Musculoskeletal: No clubbing; no joint deformity                           8.2    19.95 )-----------( 56       ( 03 May 2025 00:37 )             24.7         137  |  102  |  29[H]  ----------------------------<  103[H]  3.8   |  20[L]  |  2.18[H]    Ca    8.7      03 May 2025 00:38  Phos  2.6       Mg     2.7         TPro  6.6  /  Alb  4.6  /  TBili  1.5[H]  /  DBili  x   /  AST  28  /  ALT  30  /  AlkPhos  83  05-03    PT/INR - ( 03 May 2025 00:37 )   PT: 14.5 sec;   INR: 1.27 ratio         PTT - ( 03 May 2025 00:37 )  PTT:23.7 sec

## 2025-05-03 NOTE — PROGRESS NOTE ADULT - PROBLEM SELECTOR PLAN 2
- Immunosuppression:      - Cellcept 1000mg BID      - Solu-medrol taper      - [place]: trough 9.8 today (5/2) after total 3mg yesterday, will give 0.5mg this PM and dose per level on 5/3, goal 8-10     - Antibiotics      - Nystatin for ppx      - s/p IV Vanc + Cefepime for post-op ppx (4/29 - 5/1)      - PO Vanco ppx (4/29 - )      - CTX 2000mg QD for donor strep pneumo+ in CSF      - Penicillin IM for donor syphilis + ab - Immunosuppression:      - Cellcept 1000mg BID      - Solu-medrol taper      - [place]: trough 9.8 today (5/2) after total 3mg yesterday, will give 0.5mg this PM and dose per level on 5/3, goal 8-10. 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

## 2025-05-03 NOTE — PROGRESS NOTE ADULT - ATTENDING COMMENTS
OHT  Cardiogenic shock   CRRT  Phox 4 k bath  UF as tolerated.    Letitia Lim MD  Off: [phone]  contact me on teams    (After 5 pm or on weekends please page the on-call fellow/attending, can check [email or web address] for schedule. Login is logan santana, schedule under [facility] medicine, psych, derm)

## 2025-05-03 NOTE — PROGRESS NOTE ADULT - CRITICAL CARE ATTENDING COMMENT
Patient was seen and examined with the fellow.  I agree with the above except for the following:    s/p OHT on 4/28.  Overnight, refusing medications and does not contribute to his overall care.  Now with one-to-one sitter.  Has some suicidal ideation.    Plan for NG tube placement today so that rejection medications can be given.  Tacrolimus level 6.7.  Will start on suspension dose 1.5 mg twice daily.  PA-C: CVP 6–11, PA 42/4(21), CO/CI 6.7/3.4 with MVO2 70.5%.  Aim for net event to - 500 fluid balance with single digit CVPs.   Now off of Lico.  Will plan to wean dobutamine down to 2.5 today.  Leave epi at 0.02.  Leave CRRT on for today.  Aim to transition to diuretic challenge tomorrow once inotrope wean ok.

## 2025-05-03 NOTE — PROGRESS NOTE ADULT - PROBLEM SELECTOR PLAN 3
Post-op c/b ZAHRA likely hemodynamic   -On CRRT  -Nephro consulted Post-op c/b ZAHRA likely hemodynamic   -On CRRT, goal net neg 500cc  -Nephro consulted

## 2025-05-03 NOTE — PROGRESS NOTE ADULT - SUBJECTIVE AND OBJECTIVE BOX
[facility] Division of Kidney Diseases & Hypertension  FOLLOW UP NOTE  [phone]--------------------------------------------------------------------------------  Chief Complaint:Cardiogenic shock    24 hour events/subjective:  Pt was seen and examined earlier today. Pt remained on CRRT and norepi + dobumatamine infusions.   Pt is on Supplemental oxygen via NC.   Pt denies any worsening of SOB/ Constipation/ Diarrhea/ Nausea/ Vomiting/ abdominal pain/ chest pain/ tingling/ numbness. CRRT filter clotted once earlier this morning.       PAST HISTORY  --------------------------------------------------------------------------------  No significant changes to PMH, PSH, FHx, SHx, unless otherwise noted    ALLERGIES & MEDICATIONS  --------------------------------------------------------------------------------  Allergies    No Known Allergies    Intolerances      Standing Inpatient Medications  acetaminophen     Tablet .. 500 milliGRAM(s) Oral every 6 hours  cefTRIAXone   IVPB 2000 milliGRAM(s) IV Intermittent <User Schedule>  chlorhexidine 2% Cloths 1 Application(s) Topical daily  CRRT Treatment    <Continuous>  desvenlafaxine ER 50 milliGRAM(s) Oral <User Schedule>  dexMEDEtomidine Infusion 1.2 MICROgram(s)/kG/Hr IV Continuous <Continuous>  dextrose 50% Injectable 50 milliLiter(s) IV Push every 15 minutes  dextrose 50% Injectable 25 milliLiter(s) IV Push every 15 minutes  DOBUTamine Infusion 3.75 MICROgram(s)/kG/Min IV Continuous <Continuous>  EPINEPHrine    Infusion 0.02 MICROgram(s)/kG/Min IV Continuous <Continuous>  insulin regular Infusion 3 Unit(s)/Hr IV Continuous <Continuous>  lidocaine   4% Patch 3 Patch Transdermal daily  methylPREDNISolone sodium succinate Injectable 28 milliGRAM(s) IV Push every 12 hours  methylPREDNISolone sodium succinate Injectable   IV Push   metoclopramide Injectable 10 milliGRAM(s) IV Push every 8 hours  mycophenolate mofetil Suspension 1000 milliGRAM(s) Oral every 12 hours  naloxegol 12.5 milliGRAM(s) Oral daily  norepinephrine Infusion 0.05 MICROgram(s)/kG/Min IV Continuous <Continuous>  nystatin    Suspension 732237 Unit(s) Oral <User Schedule>  pantoprazole  Injectable 40 milliGRAM(s) IV Push daily  penicillin   G benzathine Injectable 2.4 Million Unit(s) IntraMuscular <User Schedule>  Phoxillum Filtration BK 4 / 2.5 5000 milliLiter(s) CRRT <Continuous>  Phoxillum Filtration BK 4 / 2.5 5000 milliLiter(s) CRRT <Continuous>  polyethylene glycol 3350 17 Gram(s) Oral two times a day  potassium chloride  10 mEq/50 mL IVPB 10 milliEquivalent(s) IV Intermittent once  potassium chloride  10 mEq/50 mL IVPB 10 milliEquivalent(s) IV Intermittent once  PrismaSOL Filtration BGK 4 / 2.5 5000 milliLiter(s) CRRT <Continuous>  senna 2 Tablet(s) Oral at bedtime  sodium chloride 0.9%. 1000 milliLiter(s) IV Continuous <Continuous>  tacrolimus    0.5 mG/mL Suspension 1.5 milliGRAM(s) Oral <User Schedule>  traZODone 100 milliGRAM(s) Oral at bedtime  vancomycin    Solution 125 milliGRAM(s) Oral every 12 hours    PRN Inpatient Medications  hydrOXYzine hydrochloride 10 milliGRAM(s) Oral three times a day PRN      REVIEW OF SYSTEMS  --------------------------------------------------------------------------------  as noted above.     VITALS/PHYSICAL EXAM  --------------------------------------------------------------------------------  T(C): 37 (05-03-25 @ 12:00), Max: 37.7 (05-02-25 @ 20:00)  HR: 107 (05-03-25 @ 12:00) (107 - 124)  BP: 113/71 (05-03-25 @ 12:00) (97/59 - 120/73)  RR: 17 (05-03-25 @ 12:00) (10 - 30)  SpO2: 100% (05-03-25 @ 12:00) (96% - 100%)  Wt(kg): --        05-02-25 @ 07:01  -  05-03-25 @ 07:00  --------------------------------------------------------  IN: 1871.2 mL / OUT: 2779 mL / NET: -907.8 mL    05-03-25 @ 07:01  -  05-03-25 @ 13:01  --------------------------------------------------------  IN: 432.8 mL / OUT: 258 mL / NET: 174.8 mL      Physical Exam:  Gen: ill-appearing  Neuro: non-focal  HEENT: +HFNC  Pulm: B/L breath sounds  CV: paced  Abd: soft, non-distended, non-tender  : +indwelling nagy  Extremities: no edema  Skin: Warm  Dialysis access: R fem NTDC being used for CRRT.       LABS/STUDIES  --------------------------------------------------------------------------------              8.2    19.95 >-----------<  56       [05-03-25 @ 00:37]              24.7     137  |  102  |  29  ----------------------------<  103      [05-03-25 @ 00:38]  3.8   |  20  |  2.18        Ca     8.7     [05-03-25 @ 00:38]      Mg     2.7     [05-03-25 @ 00:38]      Phos  2.6     [05-03-25 @ 00:38]    TPro  6.6  /  Alb  4.6  /  TBili  1.5  /  DBili  x   /  AST  28  /  ALT  30  /  AlkPhos  83  [05-03-25 @ 00:38]    PT/INR: PT 14.5 , INR 1.27       [05-03-25 @ 00:37]  PTT: 23.7       [05-03-25 @ 00:37]      Creatinine Trend:  SCr 2.18 [05-03 @ 00:38]  SCr 2.19 [05-02 @ 12:32]  SCr 2.30 [05-02 @ 00:56]  SCr 2.67 [05-01 @ 12:38]  SCr 2.52 [05-01 @ 00:36]    Urine Creatinine 46      [04-29-25 @ 02:45]  Urine Protein 146      [04-29-25 @ 02:45]  Urine Sodium 29      [04-29-25 @ 02:45]  Urine Urea Nitrogen 81      [04-29-25 @ 02:45]  Urine Potassium 72      [04-29-25 @ 02:45]  Urine Osmolality 345      [04-29-25 @ 02:45]         [facility] Division of Kidney Diseases & Hypertension  FOLLOW UP NOTE  [phone]--------------------------------------------------------------------------------  Chief Complaint:Cardiogenic shock    24 hour events/subjective:  Pt was seen and examined earlier today. Pt remained on CRRT and norepi + dobutamine infusions.   Pt is on Supplemental oxygen via NC.   Pt denies any worsening of SOB/ Constipation/ Diarrhea/ Nausea/ Vomiting/ abdominal pain/ chest pain/ tingling/ numbness. CRRT filter clotted once earlier this morning.       PAST HISTORY  --------------------------------------------------------------------------------  No significant changes to PMH, PSH, FHx, SHx, unless otherwise noted    ALLERGIES & MEDICATIONS  --------------------------------------------------------------------------------  Allergies    No Known Allergies    Intolerances      Standing Inpatient Medications  acetaminophen     Tablet .. 500 milliGRAM(s) Oral every 6 hours  cefTRIAXone   IVPB 2000 milliGRAM(s) IV Intermittent <User Schedule>  chlorhexidine 2% Cloths 1 Application(s) Topical daily  CRRT Treatment    <Continuous>  desvenlafaxine ER 50 milliGRAM(s) Oral <User Schedule>  dexMEDEtomidine Infusion 1.2 MICROgram(s)/kG/Hr IV Continuous <Continuous>  dextrose 50% Injectable 50 milliLiter(s) IV Push every 15 minutes  dextrose 50% Injectable 25 milliLiter(s) IV Push every 15 minutes  DOBUTamine Infusion 3.75 MICROgram(s)/kG/Min IV Continuous <Continuous>  EPINEPHrine    Infusion 0.02 MICROgram(s)/kG/Min IV Continuous <Continuous>  insulin regular Infusion 3 Unit(s)/Hr IV Continuous <Continuous>  lidocaine   4% Patch 3 Patch Transdermal daily  methylPREDNISolone sodium succinate Injectable 28 milliGRAM(s) IV Push every 12 hours  methylPREDNISolone sodium succinate Injectable   IV Push   metoclopramide Injectable 10 milliGRAM(s) IV Push every 8 hours  mycophenolate mofetil Suspension 1000 milliGRAM(s) Oral every 12 hours  naloxegol 12.5 milliGRAM(s) Oral daily  norepinephrine Infusion 0.05 MICROgram(s)/kG/Min IV Continuous <Continuous>  nystatin    Suspension 276147 Unit(s) Oral <User Schedule>  pantoprazole  Injectable 40 milliGRAM(s) IV Push daily  penicillin   G benzathine Injectable 2.4 Million Unit(s) IntraMuscular <User Schedule>  Phoxillum Filtration BK 4 / 2.5 5000 milliLiter(s) CRRT <Continuous>  Phoxillum Filtration BK 4 / 2.5 5000 milliLiter(s) CRRT <Continuous>  polyethylene glycol 3350 17 Gram(s) Oral two times a day  potassium chloride  10 mEq/50 mL IVPB 10 milliEquivalent(s) IV Intermittent once  potassium chloride  10 mEq/50 mL IVPB 10 milliEquivalent(s) IV Intermittent once  PrismaSOL Filtration BGK 4 / 2.5 5000 milliLiter(s) CRRT <Continuous>  senna 2 Tablet(s) Oral at bedtime  sodium chloride 0.9%. 1000 milliLiter(s) IV Continuous <Continuous>  tacrolimus    0.5 mG/mL Suspension 1.5 milliGRAM(s) Oral <User Schedule>  traZODone 100 milliGRAM(s) Oral at bedtime  vancomycin    Solution 125 milliGRAM(s) Oral every 12 hours    PRN Inpatient Medications  hydrOXYzine hydrochloride 10 milliGRAM(s) Oral three times a day PRN      REVIEW OF SYSTEMS  --------------------------------------------------------------------------------  as noted above.     VITALS/PHYSICAL EXAM  --------------------------------------------------------------------------------  T(C): 37 (05-03-25 @ 12:00), Max: 37.7 (05-02-25 @ 20:00)  HR: 107 (05-03-25 @ 12:00) (107 - 124)  BP: 113/71 (05-03-25 @ 12:00) (97/59 - 120/73)  RR: 17 (05-03-25 @ 12:00) (10 - 30)  SpO2: 100% (05-03-25 @ 12:00) (96% - 100%)  Wt(kg): --        05-02-25 @ 07:01  -  05-03-25 @ 07:00  --------------------------------------------------------  IN: 1871.2 mL / OUT: 2779 mL / NET: -907.8 mL    05-03-25 @ 07:01  -  05-03-25 @ 13:01  --------------------------------------------------------  IN: 432.8 mL / OUT: 258 mL / NET: 174.8 mL      Physical Exam:  Gen: ill-appearing  Neuro: non-focal  HEENT: +HFNC  Pulm: B/L breath sounds  CV: paced  Abd: soft, non-distended, non-tender  : +indwelling nagy  Extremities: no edema  Skin: Warm  Dialysis access: R fem NTDC being used for CRRT.       LABS/STUDIES  --------------------------------------------------------------------------------              8.2    19.95 >-----------<  56       [05-03-25 @ 00:37]              24.7     137  |  102  |  29  ----------------------------<  103      [05-03-25 @ 00:38]  3.8   |  20  |  2.18        Ca     8.7     [05-03-25 @ 00:38]      Mg     2.7     [05-03-25 @ 00:38]      Phos  2.6     [05-03-25 @ 00:38]    TPro  6.6  /  Alb  4.6  /  TBili  1.5  /  DBili  x   /  AST  28  /  ALT  30  /  AlkPhos  83  [05-03-25 @ 00:38]    PT/INR: PT 14.5 , INR 1.27       [05-03-25 @ 00:37]  PTT: 23.7       [05-03-25 @ 00:37]      Creatinine Trend:  SCr 2.18 [05-03 @ 00:38]  SCr 2.19 [05-02 @ 12:32]  SCr 2.30 [05-02 @ 00:56]  SCr 2.67 [05-01 @ 12:38]  SCr 2.52 [05-01 @ 00:36]    Urine Creatinine 46      [04-29-25 @ 02:45]  Urine Protein 146      [04-29-25 @ 02:45]  Urine Sodium 29      [04-29-25 @ 02:45]  Urine Urea Nitrogen 81      [04-29-25 @ 02:45]  Urine Potassium 72      [04-29-25 @ 02:45]  Urine Osmolality 345      [04-29-25 @ 02:45]

## 2025-05-03 NOTE — PROGRESS NOTE ADULT - PROBLEM SELECTOR PLAN 1
Patient's baseline Scr is 1.0-1.2. On 4/28, SCr was 1.1 and on 4/29, Scr rubia to 3.2. UA-turbid, 300 protein, small leuks, large blood, 914 rbc's, UPCR-3.2. Pt had drop in hgb from 12.4 (4/28) to 9.0 (4/29). Pt was on IV vasopressor support. Pt noted to have elevated CVP of 20, and was not responding well enough to diuresis, so was started on CRRT for UF. Pt now only on Dobutamine and Epinephrine gtt. Remains oliguric.     -Plan to continue CRRT, tolerating UF rate of 150 mL/hr. Goal CVP<12  -Filter was clotting. Increased BFR and added heparin to circuit. If platelets continue to drop, may need to stop Heparin and consider regional citrate anticoagulation. Only clotted once since above changes due to positioning of patient.  -Labs reviewed. CRRT bags adjusted as per the latest labs.   -Dose meds for CrCl of ~ 30 mL/min  -Avoid nephrotoxins.    Wait for final reccs from the attending.

## 2025-05-03 NOTE — PROGRESS NOTE ADULT - ASSESSMENT
70-year-old male ABO O past medical history of NICM since 2011 HFrEF (LVEF 25-30%) s/p MDT CRT-D with baseline CHB, VT/VF, AFs/p GIANFRANCO ligation/MAZE, MV/TV repair, PVC ablation 3/2024 intolerant to AADs in the past, recent admission 3/19/2025-3/20/2025 for AICD shocks initially presented to the [facility] ED on 3/21/2025, sent by HF specialist for ACID shock. Device reported successful ATP for VT 3/17/2025 at 6:52pm followed by one ATP & 40J shock. He reported feeling dizzy & SOB during the events. He follows with Dr. Luca Tamayo for HF, currently undergoing transplant workup, Dr John for CRTD and VT/VF and Dr. Chu for genetic counseling. While admitted to [facility], he was started on a lidocaine gtt. On 3/21 when lidocaine weaned off patient had another two episodes of VT requiring AICD shocks. He was transferred to [facility] for further management. He was initially maintained on lidocaine gtt from 3/21/2025-3/25/2025 & his listing status was upgraded to UNOS status 2e for heart transplant (ABO O) for the refractory VT. He was transitioned to oral antiarrhythmics (Toprol XL & quinidine) & ultimately transferred from CICU to tele floor on 3/27/2025. Hospital course was further c/b development of watery diarrhea & was found to have +norovirus on 3/31/2025 which prompted deactivation to status 7 listing for heart transplant. Status reinstated to 2E after diarrhea resolved.     He underwent successful OHT on 4/28 (CMV +/+, Toxo -/+,ischemic time 258min, intra op 2plts + 2 cryo). Admitted to CTU for close post-op care and extubated on 4/29, on dobutamine, levo, vaso, epi, Lico and CRRT for support.  Levo/Vaso weaned off overnight.      Bedside hemodynamics:  5/2/25: /59/75, , CVP 12, PA 44/15/24, CO/CI 6.8/3.5  5/1/25 BP 99/59/72, , CVP 11, PA 40/17/24, Gucci CO/CI 5.5/2.8  4/29/25 BP 94/57/67, , CVP 11, PA 28/15/20, Gucci CI 2.2  3/22/25 BP 97/76/83, HR 80, CVP 6, PA 58/23/38, PCWP 20, SVR 1100, Gucci CO/CI 5.1/2.6, TD 4/2.08    Cardiac Studies:   TTE 4/30/25: LVEF 71%, no RMWA, RV normal size, reduced RVSF  STACEY 4/28/25 intraop: LVEF 20%, global, dilated RV with mildly reduced RVSF  TTE 3/20/25 : LVEF 30%, segmental, LVIDd 5.3, walls normal, elevated LV filling pressures, mod RVE with mildly reduced RV function, TAPSE 1.7, severely dilated RA, annuloplasty in MV position, transvalvular gradients are elevated with severe prosthetic MS (peak gradient 15.7, mean gradient 8), trace intravalvular MR, TV annuloplasty ring noted, mild TR,  est PASP 36, no evidence of LV thrombus  TTE 10/2024: LVEF 25-30%, LVEDD 5.9cm, moderately reduced RV function, annuloplasty ring of mitral and tricuspid position, PASP 47 mmHg  [facility] 3/19/25- RA 11 PA 58/26 PCWP 32 CO/CI 5.43/2.77  [facility] 6/2023 Nonobstructive CAD 70-year-old male ABO O past medical history of NICM since 2011 HFrEF (LVEF 25-30%) s/p MDT CRT-D with baseline CHB, VT/VF, AFs/p GIANFRANCO ligation/MAZE, MV/TV repair, PVC ablation 3/2024 intolerant to AADs in the past, recent admission 3/19/2025-3/20/2025 for AICD shocks initially presented to the [facility] ED on 3/21/2025, sent by HF specialist for ACID shock. Device reported successful ATP for VT 3/17/2025 at 6:52pm followed by one ATP & 40J shock. He reported feeling dizzy & SOB during the events. He follows with Dr. Luca Taamyo for HF, currently undergoing transplant workup, Dr John for CRTD and VT/VF and Dr. Chu for genetic counseling. While admitted to [facility], he was started on a lidocaine gtt. On 3/21 when lidocaine weaned off patient had another two episodes of VT requiring AICD shocks. He was transferred to [facility] for further management. He was initially maintained on lidocaine gtt from 3/21/2025-3/25/2025 & his listing status was upgraded to UNOS status 2e for heart transplant (ABO O) for the refractory VT. He was transitioned to oral antiarrhythmics (Toprol XL & quinidine) & ultimately transferred from CICU to tele floor on 3/27/2025. Hospital course was further c/b development of watery diarrhea & was found to have +norovirus on 3/31/2025 which prompted deactivation to status 7 listing for heart transplant. Status reinstated to 2E after diarrhea resolved.     He underwent successful OHT on 4/28 (CMV +/+, Toxo -/+,ischemic time 258min, intra op 2plts + 2 cryo). Admitted to CTU for close post-op care and extubated on 4/29, on dobutamine, levo, vaso, epi, Lico and CRRT for support.  Levo/Vaso, Lico weaned off.     Bedside hemodynamics:  5/2/25: /59/75, , CVP 12, PA 44/15/24, CO/CI 6.8/3.5  5/1/25 BP 99/59/72, , CVP 11, PA 40/17/24, Gucci CO/CI 5.5/2.8  4/29/25 BP 94/57/67, , CVP 11, PA 28/15/20, Gucci CI 2.2  3/22/25 BP 97/76/83, HR 80, CVP 6, PA 58/23/38, PCWP 20, SVR 1100, Gucci CO/CI 5.1/2.6, TD 4/2.08    Cardiac Studies:   TTE 4/30/25: LVEF 71%, no RMWA, RV normal size, reduced RVSF  STACEY 4/28/25 intraop: LVEF 20%, global, dilated RV with mildly reduced RVSF  TTE 3/20/25 : LVEF 30%, segmental, LVIDd 5.3, walls normal, elevated LV filling pressures, mod RVE with mildly reduced RV function, TAPSE 1.7, severely dilated RA, annuloplasty in MV position, transvalvular gradients are elevated with severe prosthetic MS (peak gradient 15.7, mean gradient 8), trace intravalvular MR, TV annuloplasty ring noted, mild TR,  est PASP 36, no evidence of LV thrombus  TTE 10/2024: LVEF 25-30%, LVEDD 5.9cm, moderately reduced RV function, annuloplasty ring of mitral and tricuspid position, PASP 47 mmHg  [facility] 3/19/25- RA 11 PA 58/26 PCWP 32 CO/CI 5.43/2.77  [facility] 6/2023 Nonobstructive CAD 70-year-old male, ABO O, with NICM since 2011 HFrEF (LVEF 25-30%) s/p MDT CRT-D with baseline CHB, VT/VF, AFs/p GIANFRANCO ligation/MAZE, MV/TV repair, PVC ablation 3/2024 intolerant to AADs in the past, recent admission 3/19/2025-3/20/2025 for AICD shocks initially presented to the [facility] ED on 3/21/2025, sent by HF specialist for ACID shock. Device reported successful ATP for VT 3/17/2025 at 6:52pm followed by one ATP & 40J shock. He reported feeling dizzy & SOB during the events. He follows with Dr. Luca Tamayo for HF, currently undergoing transplant workup, Dr John for CRTD and VT/VF and Dr. Chu for genetic counseling. While admitted to [facility], he was started on a lidocaine gtt. On 3/21 when lidocaine weaned off patient had another two episodes of VT requiring AICD shocks. He was transferred to [facility] for further management. He was initially maintained on lidocaine gtt from 3/21/2025-3/25/2025 & his listing status was upgraded to UNOS status 2e for heart transplant (ABO O) for the refractory VT. He was transitioned to oral antiarrhythmics (Toprol XL & quinidine) & ultimately transferred from CICU to [facility] floor on 3/27/2025. Hospital course was further c/b development of watery diarrhea & was found to have +norovirus on 3/31/2025 which prompted deactivation to status 7 listing for heart transplant. Status reinstated to 2E after diarrhea resolved.     He underwent successful OHT on 4/28 + TV ring repair 34mm (CMV +/+, Toxo -/+,ischemic time 258min, intra op 2plts + 2 cryo). Intra op STACEY with LVEF 20% and mild RV dysfunction.  Extubated on 4/29.  Had some initial RV failure/PGD (requiring dobutamine 7.5, epi 0.02, levo, vaso, and Lico.  CRRT started to aid volume removal.  Echo from 4/30 with LVEF 70% and mild RV dysfunction.  Levo/Vaso weaned off on 5/1 and Lico weaned off 5/2. He requiring large amounts of pain medication despite 2 chest tubes being removed POD 1 (ketamine + PCA pump).  Now with ?delerium and non-cooporation with care. Seen by psych 5/2 and started on trazodone + Pristiq.  Requiring 1:1 sitter.  NG tube to be placed today.      Bedside hemodynamics:  5/2/25: /59/75, , CVP 12, PA 44/15/24, CO/CI 6.8/3.5  5/1/25 BP 99/59/72, , CVP 11, PA 40/17/24, Gucci CO/CI 5.5/2.8  4/29/25 BP 94/57/67, , CVP 11, PA 28/15/20, Gucci CI 2.2  3/22/25 BP 97/76/83, HR 80, CVP 6, PA 58/23/38, PCWP 20, SVR 1100, Gucci CO/CI 5.1/2.6, TD 4/2.08    Cardiac Studies:   TTE 4/30/25: LVEF 71%, no RMWA, RV normal size, reduced RVSF  STACEY 4/28/25 intraop: LVEF 20%, global, dilated RV with mildly reduced RVSF  TTE 3/20/25 : LVEF 30%, segmental, LVIDd 5.3, walls normal, elevated LV filling pressures, mod RVE with mildly reduced RV function, TAPSE 1.7, severely dilated RA, annuloplasty in MV position, transvalvular gradients are elevated with severe prosthetic MS (peak gradient 15.7, mean gradient 8), trace intravalvular MR, TV annuloplasty ring noted, mild TR,  est PASP 36, no evidence of LV thrombus  TTE 10/2024: LVEF 25-30%, LVEDD 5.9cm, moderately reduced RV function, annuloplasty ring of mitral and tricuspid position, PASP 47 mmHg  [facility] 3/19/25- RA 11 PA 58/26 PCWP 32 CO/CI 5.43/2.77  [facility] 6/2023 Nonobstructive CAD

## 2025-05-03 NOTE — PROGRESS NOTE ADULT - PROBLEM SELECTOR PLAN 1
ACC/AHA stage D systolic heart failure, s/p heart transplant 4/28, CMV +/+, Toxo -/+  - Retrospective crossmatch with class II DSA DR HILLIARD (preformed from pre transplant).    - Currently being supported on: Dobutamine 5, Epi 0.02  - Lico weaned off on 5/2  - Chest tubes, per CTS  - Diuresis: guided by CVP ACC/AHA stage D systolic heart failure, s/p heart transplant , CMV +/+, Toxo -/+  - Retrospective crossmatch with class II DSA DR HILLIARD (preformed from pre transplant).    - Currently being supported on: Dobutamine 5, Epi 0.02. Wean  as able (borderline MAPs still)  - Lico weaned off on   - Chest tubes, per CTS  - Diuresis: guided by CVP - ACC/AHA stage D systolic heart failure, s/p heart transplant , CMV +/+, Toxo -/+  - Retrospective crossmatch with class II DSA DR HILLIARD (preformed from pre transplant).    - Currently being supported on: Dobutamine 5, Epi 0.02. Wean  as able (borderline MAPs still)  - Lico weaned off on   - Chest tubes, per CTS  - Diuresis: guided by CVP

## 2025-05-03 NOTE — PROGRESS NOTE ADULT - SUBJECTIVE AND OBJECTIVE BOX
INFECTIOUS DISEASES FOLLOW UP-- Debbie Mathews  [phone]    This is a follow up note for this  70yMale with  Cardiogenic shock    remains on 1:1 for safety      ROS:  CONSTITUTIONAL:  No fever, good appetite  CARDIOVASCULAR:  No chest pain or palpitations  RESPIRATORY:  No dyspnea  GASTROINTESTINAL:  No nausea, vomiting, diarrhea, or abdominal pain  GENITOURINARY:  No dysuria  NEUROLOGIC:  No headache,     Allergies    No Known Allergies    Intolerances        ANTIBIOTICS/RELEVANT:  antimicrobials  cefTRIAXone   IVPB 2000 milliGRAM(s) IV Intermittent <User Schedule>  nystatin    Suspension 239877 Unit(s) Oral <User Schedule>  penicillin   G benzathine Injectable 2.4 Million Unit(s) IntraMuscular <User Schedule>  vancomycin    Solution 125 milliGRAM(s) Oral every 12 hours    immunologic:  mycophenolate mofetil Suspension 1000 milliGRAM(s) Oral every 12 hours  tacrolimus    0.5 mG/mL Suspension 1.5 milliGRAM(s) Oral <User Schedule>    OTHER:  acetaminophen     Tablet .. 500 milliGRAM(s) Oral every 6 hours  chlorhexidine 2% Cloths 1 Application(s) Topical daily  CRRT Treatment    <Continuous>  desvenlafaxine ER 50 milliGRAM(s) Oral <User Schedule>  dexMEDEtomidine Infusion 1.2 MICROgram(s)/kG/Hr IV Continuous <Continuous>  dextrose 50% Injectable 50 milliLiter(s) IV Push every 15 minutes  dextrose 50% Injectable 25 milliLiter(s) IV Push every 15 minutes  DOBUTamine Infusion 2.5 MICROgram(s)/kG/Min IV Continuous <Continuous>  EPINEPHrine    Infusion 0.02 MICROgram(s)/kG/Min IV Continuous <Continuous>  hydrOXYzine hydrochloride 10 milliGRAM(s) Oral three times a day PRN  insulin regular Infusion 3 Unit(s)/Hr IV Continuous <Continuous>  lidocaine   4% Patch 3 Patch Transdermal daily  methylPREDNISolone sodium succinate Injectable 28 milliGRAM(s) IV Push every 12 hours  methylPREDNISolone sodium succinate Injectable   IV Push   naloxegol 12.5 milliGRAM(s) Oral daily  norepinephrine Infusion 0.05 MICROgram(s)/kG/Min IV Continuous <Continuous>  oxymetazoline 0.05% Nasal Spray 2 Spray(s) Both Nostrils two times a day  pantoprazole  Injectable 40 milliGRAM(s) IV Push daily  Phoxillum Filtration BK 4 / 2.5 5000 milliLiter(s) CRRT <Continuous>  Phoxillum Filtration BK 4 / 2.5 5000 milliLiter(s) CRRT <Continuous>  polyethylene glycol 3350 17 Gram(s) Oral two times a day  potassium chloride  10 mEq/50 mL IVPB 10 milliEquivalent(s) IV Intermittent once  potassium chloride  10 mEq/50 mL IVPB 10 milliEquivalent(s) IV Intermittent once  PrismaSOL Filtration BGK 4 / 2.5 5000 milliLiter(s) CRRT <Continuous>  senna 2 Tablet(s) Oral at bedtime  sodium chloride 0.9%. 1000 milliLiter(s) IV Continuous <Continuous>  traZODone 100 milliGRAM(s) Oral at bedtime      Objective:  Vital Signs Last 24 Hrs  T(C): 37 (03 May 2025 16:00), Max: 37.7 (02 May 2025 20:00)  T(F): 98.6 (03 May 2025 16:00), Max: 99.9 (02 May 2025 20:00)  HR: 109 (03 May 2025 15:00) (105 - 124)  BP: 108/65 (03 May 2025 15:00) (97/59 - 120/73)  BP(mean): 80 (03 May 2025 15:00) (70 - 90)  RR: 25 (03 May 2025 15:00) (10 - 30)  SpO2: 97% (03 May 2025 15:00) (96% - 100%)    Parameters below as of 03 May 2025 16:00  Patient On (Oxygen Delivery Method): nasal cannula  O2 Flow (L/min): 2      PHYSICAL EXAM:  Constitutional:no acute distress  Eyes:ELAINE, EOMI  Ear/Nose/Throat: no oral lesions, 	  Respiratory: clear BL  Cardiovascular: S1S2 sternotomy dressing CDI  2 chest tubes  Gastrointestinal:soft, (+) BS, no tenderness  Extremities:no e/e/c  No Lymphadenopathy  IV sites not inflammed.    LABS:                        8.2    19.95 )-----------( 56       ( 03 May 2025 00:37 )             24.7     05-03    137  |  102  |  29[H]  ----------------------------<  103[H]  3.8   |  20[L]  |  2.18[H]    Ca    8.7      03 May 2025 00:38  Phos  2.6     05-03  Mg     2.7     05-03    TPro  6.6  /  Alb  4.6  /  TBili  1.5[H]  /  DBili  x   /  AST  28  /  ALT  30  /  AlkPhos  83  05-03    PT/INR - ( 03 May 2025 00:37 )   PT: 14.5 sec;   INR: 1.27 ratio         PTT - ( 03 May 2025 00:37 )  PTT:23.7 sec  Urinalysis Basic - ( 03 May 2025 00:38 )    Color: x / Appearance: x / SG: x / pH: x  Gluc: 103 mg/dL / Ketone: x  / Bili: x / Urobili: x   Blood: x / Protein: x / Nitrite: x   Leuk Esterase: x / RBC: x / WBC x   Sq Epi: x / Non Sq Epi: x / Bacteria: x        MICROBIOLOGY:    Urine Microscopic-Add On (NC) (04.29.25 @ 02:45)    White Blood Cell - Urine: 3 /HPF   Red Blood Cell - Urine: 914 /HPF   Bacteria: Negative /HPF   Cast: 1 /LPF   Epithelial Cells: 1 /HPF   Amorphous Salts Crystals: Present   Review: Reviewed            RECENT CULTURES:  HIV-1/2 Combo Result: Nonreact: Nonreactive: HIV-1 p24 antigen and HIV-1/HIV-2 antibodies were not  detected. Nonreactive results may be due to antigen and/or antibody  levels that are below the limit of detection of this assay.  Reactive: Presumptive evidence of HIV-1 p24 antigen and/or HIV-1/HIV-2  antibodies. This is a preliminary result. Further confirmatory testing  according to the CDC/[facility] HIV testing algorithm will follow, and such  confirmatory results must be considered in making a diagnosis related to  HIV infection. Further HIV tests include HIV-1/HIV-2 antibody  differentiation immunoassay and subsequent nucleic acid testing if needed.  This result should be interpreted in conjunction with the patient’s  clinical presentation, history, and other laboratory results. If the  result is inconsistent with clinical evidence, additional testing is  suggested to confirm the result. (04.17.25 @ 15:45)        RADIOLOGY & ADDITIONAL STUDIES:    < from: Xray Chest 1 View- PORTABLE-Urgent (Xray Chest 1 View- PORTABLE-Urgent .) (05.03.25 @ 13:18) >  IMPRESSION:    Interval placement of an enteric tube with tip in the distal   stomach/proximal duodenum. Additional tubes and lines as above.    < end of copied text >

## 2025-05-03 NOTE — PROVIDER CONTACT NOTE (OTHER) - ACTION/TREATMENT ORDERED:
pt educated on potential risks of remaining soiled and pt responded "I don't care, let me die." ANM and MD at bedside, assessed pt and deemed 1 to 1 not necessary due to delirium.

## 2025-05-03 NOTE — PROGRESS NOTE ADULT - SUBJECTIVE AND OBJECTIVE BOX
Patient seen and examined at the bedside.    Remains critically ill on continuous ICU monitoring.      Brief Summary:  70-year-old male with history of NICM since 2011 HFrEF (LVEF 25-30%)   Now s/p OHT on 4/29/25     24 Hour events:  Tolerating volume removal.  Remains on Dobutamine and Epinephrine  Weaning nitric oxide.  Sleepier this morning.        Objective:  Vital Signs Last 24 Hrs  T(C): 37.1 (03 May 2025 04:00), Max: 37.7 (02 May 2025 20:00)  T(F): 98.8 (03 May 2025 04:00), Max: 99.9 (02 May 2025 20:00)  HR: 114 (03 May 2025 07:00) (112 - 124)  BP: 97/59 (03 May 2025 07:00) (97/59 - 120/73)  BP(mean): 71 (03 May 2025 07:00) (71 - 90)  RR: 16 (03 May 2025 07:00) (10 - 39)  SpO2: 99% (03 May 2025 07:00) (96% - 100%)    Parameters below as of 03 May 2025 04:00  Patient On (Oxygen Delivery Method): nasal cannula  O2 Flow (L/min): 6    Physical Exam:   General: Sleepier today but interactive when stimulated.  Neurology: Oriented, following commands  Respiratory: Bilateral breath sounds  CV: Sinus Tach  Abdominal: Soft, Nontender  Extremities: Warm, well-perfused  [place]  -------------------------------------------------------------------------------------------------------------------------------    Labs:                        8.2    19.95 )-----------( 56       ( 03 May 2025 00:37 )             24.7     05-03    137  |  102  |  29[H]  ----------------------------<  103[H]  3.8   |  20[L]  |  2.18[H]    Ca    8.7      03 May 2025 00:38  Phos  2.6     05-03  Mg     2.7     05-03    TPro  6.6  /  Alb  4.6  /  TBili  1.5[H]  /  DBili  x   /  AST  28  /  ALT  30  /  AlkPhos  83  05-03    LIVER FUNCTIONS - ( 03 May 2025 00:38 )  Alb: 4.6 g/dL / Pro: 6.6 g/dL / ALK PHOS: 83 U/L / ALT: 30 U/L / AST: 28 U/L / GGT: x           PT/INR - ( 03 May 2025 00:37 )   PT: 14.5 sec;   INR: 1.27 ratio         PTT - ( 03 May 2025 00:37 )  PTT:23.7 sec  ABG - ( 03 May 2025 07:55 )  pH, Arterial: 7.36  pH, Blood: x     /  pCO2: 39    /  pO2: 152   / HCO3: 22    / Base Excess: -3.2  /  SaO2: 98.9    ------------------------------------------------------------------------------------------------------------------------------  Assessment:  69 yo NICM, s/p heart transplant     RV dysfunction  Postop acute pulmonary insufficiency  ZAHRA  Acute blood loss anemia  Thrombocytopenia  Hyperglycemia      Plan:   ***Neuro***  Maintain day/night cycle to prevent ICU delirium   Postoperative acute pain control with IV Tylenol, Tramadol, and prns.  PCA held this morning.    ***Cardiovascular***  Monitor for sign of hypoperfusion, trend lactate, SVo2  Remains on Dobutamine and Epinephrine  Maintain MAPs>65 mm HG. Titrate Norepi  Wean nitric oxide to off.  Keep CVP <12  Monitor chest tube output    ***Pulmonary***  Postoperative acute pulmonary insufficiency  Deep breathing and coughing exercises, IS, Mobilization, nebs, Chest PT    ***GI***  Tolerating diet but poor intake.  Protonix for prophylaxis   Bowel regimen.   Trend LFTs    ***Renal***  ZAHRA / CKD - continue CRRT and fluid removal for CVP <12    ***ID***  Leukocytosis  Tacro (decreased), Cellcept, Steroid taper for immunosuppression.  Prophylaxis with PO Vanco.  PCN and Ceftriaxone for donor cultures (syphylis, S pneumo)    ***Endocrine***  Insulin infusion for Hyperglycemia     ***Hematology***  Acute blood loss anemia and thrombocytopenia - no transfusion indication currently.  CBC, Coags, TEG, monitor for bleeding  PF4 reordered today.  Heparin SQ for VTE prophylaxis on hold in setting of low platelets.        Care plan discussed with the ICU care team.   Patient remains critical, at risk for life threatening decompensation.    I have spent 30 minutes providing critical care management to this patient.    By signing my name below, I, Tavares You, attest that this documentation has been prepared under the direction and in the presence of Chata Huntley MD  Electronically signed: Tavares You.    I, Chata Huntley MD, personally performed the services described in this documentation. I have reviewed the chart and agree that the record reflects my personal performance and is accurate and complete.  Electronically signed: Chata Huntley MD Patient seen and examined at the bedside.    Remains critically ill on continuous ICU monitoring.      Brief Summary:  70-year-old male with history of NICM since 2011 HFrEF (LVEF 25-30%)   Now s/p OHT on 4/29/25       24 Hour events:  Nitric oxide weaned to off.  Remains on Dobutamine and low dose Epinephrine.  Tolerating volume removal with CRRT.  Pristiq started for depression per Psychiatry      Objective:  Vital Signs Last 24 Hrs  T(C): 37.1 (03 May 2025 04:00), Max: 37.7 (02 May 2025 20:00)  T(F): 98.8 (03 May 2025 04:00), Max: 99.9 (02 May 2025 20:00)  HR: 114 (03 May 2025 07:00) (112 - 124)  BP: 97/59 (03 May 2025 07:00) (97/59 - 120/73)  BP(mean): 71 (03 May 2025 07:00) (71 - 90)  RR: 16 (03 May 2025 07:00) (10 - 39)  SpO2: 99% (03 May 2025 07:00) (96% - 100%)    Parameters below as of 03 May 2025 04:00  Patient On (Oxygen Delivery Method): nasal cannula  O2 Flow (L/min): 6      Physical Exam:   General: Sleepy again today but interactive when stimulated.  Neurology: Oriented, following commands  Respiratory: Bilateral breath sounds  CV: Sinus Tach  Abdominal: Soft, Nontender  Extremities: Warm, well-perfused  [place]  -------------------------------------------------------------------------------------------------------------------------------    Labs:                        8.2    19.95 )-----------( 56       ( 03 May 2025 00:37 )             24.7     05-03    137  |  102  |  29[H]  ----------------------------<  103[H]  3.8   |  20[L]  |  2.18[H]    Ca    8.7      03 May 2025 00:38  Phos  2.6     05-03  Mg     2.7     05-03    TPro  6.6  /  Alb  4.6  /  TBili  1.5[H]  /  DBili  x   /  AST  28  /  ALT  30  /  AlkPhos  83  05-03    LIVER FUNCTIONS - ( 03 May 2025 00:38 )  Alb: 4.6 g/dL / Pro: 6.6 g/dL / ALK PHOS: 83 U/L / ALT: 30 U/L / AST: 28 U/L / GGT: x           PT/INR - ( 03 May 2025 00:37 )   PT: 14.5 sec;   INR: 1.27 ratio         PTT - ( 03 May 2025 00:37 )  PTT:23.7 sec  ABG - ( 03 May 2025 07:55 )  pH, Arterial: 7.36  pH, Blood: x     /  pCO2: 39    /  pO2: 152   / HCO3: 22    / Base Excess: -3.2  /  SaO2: 98.9      ------------------------------------------------------------------------------------------------------------------------------  Assessment:  71 yo NICM, s/p heart transplant     RV dysfunction  Postop acute pulmonary insufficiency  ZAHRA  Acute blood loss anemia  Thrombocytopenia  Hyperglycemia      Plan:   ***Neuro***  Maintain day/night cycle to prevent ICU delirium   Postoperative acute pain control with IV Tylenol, Tramadol, and prns.    ***Cardiovascular***  Monitor for sign of hypoperfusion, trend lactate, SVo2  Remains on Dobutamine and Epinephrine  Maintain MAPs>65 mm HG.   Goal CVP <12  Monitor chest tube output    ***Pulmonary***  Postoperative acute pulmonary insufficiency  Deep breathing and coughing exercises, IS, Mobilization, nebs, Chest PT    ***GI***  Tolerating diet but poor intake.  Will place a feeding tube today.  Protonix for prophylaxis   Bowel regimen.   Trend LFTs    ***Renal***  ZAHRA / CKD - continue CRRT   Goal even, trend filling pressures.  Likely can transition to nocturnal CRRT in coming days.    ***ID***  Leukocytosis  Tacro, Cellcept, Steroid taper for immunosuppression.  Prophylaxis with Nystatin, PO Vanco.  PCN and Ceftriaxone for donor cultures (syphylis, S pneumo)    ***Endocrine***  Insulin infusion for Hyperglycemia     ***Hematology***  Acute blood loss anemia and thrombocytopenia - no transfusion indication currently.  CBC, Coags, monitor for bleeding  PF4 negative.  Heparin SQ for VTE prophylaxis on hold in setting of low platelets.      Care plan discussed with the ICU care team.   Patient remains critical, at risk for life threatening decompensation.    I have spent 50 minutes providing critical care management to this patient.    By signing my name below, I, Tavares You, attest that this documentation has been prepared under the direction and in the presence of Chata Huntley MD  Electronically signed: Tavares You.    JIN, Chata Huntley MD, personally performed the services described in this documentation. I have reviewed the chart and agree that the record reflects my personal performance and is accurate and complete.  Electronically signed: Chaat Huntley MD

## 2025-05-04 LAB
ALBUMIN SERPL ELPH-MCNC: 4.1 G/DL — SIGNIFICANT CHANGE UP (ref 3.3–5)
ALBUMIN SERPL ELPH-MCNC: 4.2 G/DL — SIGNIFICANT CHANGE UP (ref 3.3–5)
ALP SERPL-CCNC: 79 U/L — SIGNIFICANT CHANGE UP (ref 40–120)
ALP SERPL-CCNC: 85 U/L — SIGNIFICANT CHANGE UP (ref 40–120)
ALT FLD-CCNC: 37 U/L — SIGNIFICANT CHANGE UP (ref 10–45)
ALT FLD-CCNC: 53 U/L — HIGH (ref 10–45)
ANION GAP SERPL CALC-SCNC: 17 MMOL/L — SIGNIFICANT CHANGE UP (ref 5–17)
ANION GAP SERPL CALC-SCNC: 17 MMOL/L — SIGNIFICANT CHANGE UP (ref 5–17)
APTT BLD: 24.3 SEC — LOW (ref 26.1–36.8)
AST SERPL-CCNC: 28 U/L — SIGNIFICANT CHANGE UP (ref 10–40)
AST SERPL-CCNC: 33 U/L — SIGNIFICANT CHANGE UP (ref 10–40)
BASE EXCESS BLDMV CALC-SCNC: -0.8 MMOL/L — SIGNIFICANT CHANGE UP (ref -3–3)
BASE EXCESS BLDMV CALC-SCNC: -1.1 MMOL/L — SIGNIFICANT CHANGE UP (ref -3–3)
BASE EXCESS BLDMV CALC-SCNC: -2 MMOL/L — SIGNIFICANT CHANGE UP (ref -3–3)
BASE EXCESS BLDMV CALC-SCNC: -2.6 MMOL/L — SIGNIFICANT CHANGE UP (ref -3–3)
BASE EXCESS BLDMV CALC-SCNC: -3.9 MMOL/L — LOW (ref -3–3)
BASE EXCESS BLDMV CALC-SCNC: -4.3 MMOL/L — LOW (ref -3–3)
BASE EXCESS BLDV CALC-SCNC: -1.7 MMOL/L — SIGNIFICANT CHANGE UP (ref -2–3)
BASOPHILS # BLD AUTO: 0.01 K/UL — SIGNIFICANT CHANGE UP (ref 0–0.2)
BASOPHILS # BLD AUTO: 0.03 K/UL — SIGNIFICANT CHANGE UP (ref 0–0.2)
BASOPHILS NFR BLD AUTO: 0.1 % — SIGNIFICANT CHANGE UP (ref 0–2)
BASOPHILS NFR BLD AUTO: 0.2 % — SIGNIFICANT CHANGE UP (ref 0–2)
BILIRUB SERPL-MCNC: 1 MG/DL — SIGNIFICANT CHANGE UP (ref 0.2–1.2)
BILIRUB SERPL-MCNC: 1.2 MG/DL — SIGNIFICANT CHANGE UP (ref 0.2–1.2)
BUN SERPL-MCNC: 30 MG/DL — HIGH (ref 7–23)
BUN SERPL-MCNC: 38 MG/DL — HIGH (ref 7–23)
CALCIUM SERPL-MCNC: 8.4 MG/DL — SIGNIFICANT CHANGE UP (ref 8.4–10.5)
CALCIUM SERPL-MCNC: 8.5 MG/DL — SIGNIFICANT CHANGE UP (ref 8.4–10.5)
CHLORIDE SERPL-SCNC: 102 MMOL/L — SIGNIFICANT CHANGE UP (ref 96–108)
CHLORIDE SERPL-SCNC: 98 MMOL/L — SIGNIFICANT CHANGE UP (ref 96–108)
CO2 BLDMV-SCNC: 23 MMOL/L — SIGNIFICANT CHANGE UP (ref 21–29)
CO2 BLDMV-SCNC: 24 MMOL/L — SIGNIFICANT CHANGE UP (ref 21–29)
CO2 BLDMV-SCNC: 24 MMOL/L — SIGNIFICANT CHANGE UP (ref 21–29)
CO2 BLDMV-SCNC: 25 MMOL/L — SIGNIFICANT CHANGE UP (ref 21–29)
CO2 BLDMV-SCNC: 26 MMOL/L — SIGNIFICANT CHANGE UP (ref 21–29)
CO2 BLDMV-SCNC: 26 MMOL/L — SIGNIFICANT CHANGE UP (ref 21–29)
CO2 BLDV-SCNC: 26 MMOL/L — SIGNIFICANT CHANGE UP (ref 22–26)
CO2 SERPL-SCNC: 19 MMOL/L — LOW (ref 22–31)
CO2 SERPL-SCNC: 20 MMOL/L — LOW (ref 22–31)
CREAT SERPL-MCNC: 2.17 MG/DL — HIGH (ref 0.5–1.3)
CREAT SERPL-MCNC: 2.72 MG/DL — HIGH (ref 0.5–1.3)
EGFR: 24 ML/MIN/1.73M2 — LOW
EGFR: 24 ML/MIN/1.73M2 — LOW
EGFR: 32 ML/MIN/1.73M2 — LOW
EGFR: 32 ML/MIN/1.73M2 — LOW
EOSINOPHIL # BLD AUTO: 0 K/UL — SIGNIFICANT CHANGE UP (ref 0–0.5)
EOSINOPHIL # BLD AUTO: 0 K/UL — SIGNIFICANT CHANGE UP (ref 0–0.5)
EOSINOPHIL NFR BLD AUTO: 0 % — SIGNIFICANT CHANGE UP (ref 0–6)
EOSINOPHIL NFR BLD AUTO: 0 % — SIGNIFICANT CHANGE UP (ref 0–6)
FIBRINOGEN PPP-MCNC: 156 MG/DL — LOW (ref 200–445)
GAS PNL BLDA: SIGNIFICANT CHANGE UP
GAS PNL BLDMV: SIGNIFICANT CHANGE UP
GLUCOSE BLDC GLUCOMTR-MCNC: 114 MG/DL — HIGH (ref 70–99)
GLUCOSE BLDC GLUCOMTR-MCNC: 118 MG/DL — HIGH (ref 70–99)
GLUCOSE BLDC GLUCOMTR-MCNC: 119 MG/DL — HIGH (ref 70–99)
GLUCOSE BLDC GLUCOMTR-MCNC: 122 MG/DL — HIGH (ref 70–99)
GLUCOSE BLDC GLUCOMTR-MCNC: 127 MG/DL — HIGH (ref 70–99)
GLUCOSE BLDC GLUCOMTR-MCNC: 134 MG/DL — HIGH (ref 70–99)
GLUCOSE BLDC GLUCOMTR-MCNC: 136 MG/DL — HIGH (ref 70–99)
GLUCOSE BLDC GLUCOMTR-MCNC: 137 MG/DL — HIGH (ref 70–99)
GLUCOSE BLDC GLUCOMTR-MCNC: 139 MG/DL — HIGH (ref 70–99)
GLUCOSE BLDC GLUCOMTR-MCNC: 140 MG/DL — HIGH (ref 70–99)
GLUCOSE BLDC GLUCOMTR-MCNC: 141 MG/DL — HIGH (ref 70–99)
GLUCOSE BLDC GLUCOMTR-MCNC: 148 MG/DL — HIGH (ref 70–99)
GLUCOSE BLDC GLUCOMTR-MCNC: 161 MG/DL — HIGH (ref 70–99)
GLUCOSE BLDC GLUCOMTR-MCNC: 164 MG/DL — HIGH (ref 70–99)
GLUCOSE BLDC GLUCOMTR-MCNC: 169 MG/DL — HIGH (ref 70–99)
GLUCOSE BLDC GLUCOMTR-MCNC: 189 MG/DL — HIGH (ref 70–99)
GLUCOSE BLDC GLUCOMTR-MCNC: 84 MG/DL — SIGNIFICANT CHANGE UP (ref 70–99)
GLUCOSE BLDC GLUCOMTR-MCNC: 90 MG/DL — SIGNIFICANT CHANGE UP (ref 70–99)
GLUCOSE SERPL-MCNC: 137 MG/DL — HIGH (ref 70–99)
GLUCOSE SERPL-MCNC: 149 MG/DL — HIGH (ref 70–99)
HCO3 BLDMV-SCNC: 22 MMOL/L — SIGNIFICANT CHANGE UP (ref 20–28)
HCO3 BLDMV-SCNC: 22 MMOL/L — SIGNIFICANT CHANGE UP (ref 20–28)
HCO3 BLDMV-SCNC: 23 MMOL/L — SIGNIFICANT CHANGE UP (ref 20–28)
HCO3 BLDMV-SCNC: 24 MMOL/L — SIGNIFICANT CHANGE UP (ref 20–28)
HCO3 BLDMV-SCNC: 25 MMOL/L — SIGNIFICANT CHANGE UP (ref 20–28)
HCO3 BLDMV-SCNC: 25 MMOL/L — SIGNIFICANT CHANGE UP (ref 20–28)
HCO3 BLDV-SCNC: 24 MMOL/L — SIGNIFICANT CHANGE UP (ref 22–29)
HCT VFR BLD CALC: 22.2 % — LOW (ref 39–50)
HCT VFR BLD CALC: 22.5 % — LOW (ref 39–50)
HGB BLD-MCNC: 7.4 G/DL — LOW (ref 13–17)
HGB BLD-MCNC: 7.6 G/DL — LOW (ref 13–17)
HOROWITZ INDEX BLDMV+IHG-RTO: 24 — SIGNIFICANT CHANGE UP
HOROWITZ INDEX BLDMV+IHG-RTO: 24 — SIGNIFICANT CHANGE UP
HOROWITZ INDEX BLDMV+IHG-RTO: 28 — SIGNIFICANT CHANGE UP
IMM GRANULOCYTES NFR BLD AUTO: 1.9 % — HIGH (ref 0–0.9)
IMM GRANULOCYTES NFR BLD AUTO: 2.5 % — HIGH (ref 0–0.9)
INR BLD: 1.19 RATIO — HIGH (ref 0.85–1.16)
LYMPHOCYTES # BLD AUTO: 0.35 K/UL — LOW (ref 1–3.3)
LYMPHOCYTES # BLD AUTO: 0.38 K/UL — LOW (ref 1–3.3)
LYMPHOCYTES # BLD AUTO: 2.1 % — LOW (ref 13–44)
LYMPHOCYTES # BLD AUTO: 2.3 % — LOW (ref 13–44)
MAGNESIUM SERPL-MCNC: 2.8 MG/DL — HIGH (ref 1.6–2.6)
MAGNESIUM SERPL-MCNC: 2.8 MG/DL — HIGH (ref 1.6–2.6)
MCHC RBC-ENTMCNC: 28.6 PG — SIGNIFICANT CHANGE UP (ref 27–34)
MCHC RBC-ENTMCNC: 29.3 PG — SIGNIFICANT CHANGE UP (ref 27–34)
MCHC RBC-ENTMCNC: 33.3 G/DL — SIGNIFICANT CHANGE UP (ref 32–36)
MCHC RBC-ENTMCNC: 33.8 G/DL — SIGNIFICANT CHANGE UP (ref 32–36)
MCV RBC AUTO: 85.7 FL — SIGNIFICANT CHANGE UP (ref 80–100)
MCV RBC AUTO: 86.9 FL — SIGNIFICANT CHANGE UP (ref 80–100)
MONOCYTES # BLD AUTO: 0.89 K/UL — SIGNIFICANT CHANGE UP (ref 0–0.9)
MONOCYTES # BLD AUTO: 1 K/UL — HIGH (ref 0–0.9)
MONOCYTES NFR BLD AUTO: 5 % — SIGNIFICANT CHANGE UP (ref 2–14)
MONOCYTES NFR BLD AUTO: 6.6 % — SIGNIFICANT CHANGE UP (ref 2–14)
NEUTROPHILS # BLD AUTO: 13.38 K/UL — HIGH (ref 1.8–7.4)
NEUTROPHILS # BLD AUTO: 16.14 K/UL — HIGH (ref 1.8–7.4)
NEUTROPHILS NFR BLD AUTO: 88.4 % — HIGH (ref 43–77)
NEUTROPHILS NFR BLD AUTO: 90.9 % — HIGH (ref 43–77)
NRBC BLD AUTO-RTO: 0 /100 WBCS — SIGNIFICANT CHANGE UP (ref 0–0)
NRBC BLD AUTO-RTO: 0 /100 WBCS — SIGNIFICANT CHANGE UP (ref 0–0)
O2 CT VFR BLD CALC: 30 MMHG — SIGNIFICANT CHANGE UP (ref 30–65)
O2 CT VFR BLD CALC: 35 MMHG — SIGNIFICANT CHANGE UP (ref 30–65)
O2 CT VFR BLD CALC: 35 MMHG — SIGNIFICANT CHANGE UP (ref 30–65)
O2 CT VFR BLD CALC: 36 MMHG — SIGNIFICANT CHANGE UP (ref 30–65)
O2 CT VFR BLD CALC: 42 MMHG — SIGNIFICANT CHANGE UP (ref 30–65)
O2 CT VFR BLD CALC: 43 MMHG — SIGNIFICANT CHANGE UP (ref 30–65)
PCO2 BLDMV: 42 MMHG — SIGNIFICANT CHANGE UP (ref 30–65)
PCO2 BLDMV: 43 MMHG — SIGNIFICANT CHANGE UP (ref 30–65)
PCO2 BLDMV: 44 MMHG — SIGNIFICANT CHANGE UP (ref 30–65)
PCO2 BLDMV: 45 MMHG — SIGNIFICANT CHANGE UP (ref 30–65)
PCO2 BLDV: 45 MMHG — SIGNIFICANT CHANGE UP (ref 42–55)
PH BLDMV: 7.32 — SIGNIFICANT CHANGE UP (ref 7.32–7.45)
PH BLDMV: 7.32 — SIGNIFICANT CHANGE UP (ref 7.32–7.45)
PH BLDMV: 7.34 — SIGNIFICANT CHANGE UP (ref 7.32–7.45)
PH BLDMV: 7.35 — SIGNIFICANT CHANGE UP (ref 7.32–7.45)
PH BLDMV: 7.35 — SIGNIFICANT CHANGE UP (ref 7.32–7.45)
PH BLDMV: 7.36 — SIGNIFICANT CHANGE UP (ref 7.32–7.45)
PH BLDV: 7.34 — SIGNIFICANT CHANGE UP (ref 7.32–7.43)
PHOSPHATE SERPL-MCNC: 4.6 MG/DL — HIGH (ref 2.5–4.5)
PHOSPHATE SERPL-MCNC: 4.7 MG/DL — HIGH (ref 2.5–4.5)
PLATELET # BLD AUTO: 62 K/UL — LOW (ref 150–400)
PLATELET # BLD AUTO: 64 K/UL — LOW (ref 150–400)
PO2 BLDV: 42 MMHG — SIGNIFICANT CHANGE UP (ref 25–45)
POTASSIUM SERPL-MCNC: 4.3 MMOL/L — SIGNIFICANT CHANGE UP (ref 3.5–5.3)
POTASSIUM SERPL-MCNC: 4.7 MMOL/L — SIGNIFICANT CHANGE UP (ref 3.5–5.3)
POTASSIUM SERPL-SCNC: 4.3 MMOL/L — SIGNIFICANT CHANGE UP (ref 3.5–5.3)
POTASSIUM SERPL-SCNC: 4.7 MMOL/L — SIGNIFICANT CHANGE UP (ref 3.5–5.3)
PROT SERPL-MCNC: 5.9 G/DL — LOW (ref 6–8.3)
PROT SERPL-MCNC: 6 G/DL — SIGNIFICANT CHANGE UP (ref 6–8.3)
PROTHROM AB SERPL-ACNC: 13.7 SEC — HIGH (ref 9.9–13.4)
RBC # BLD: 2.59 M/UL — LOW (ref 4.2–5.8)
RBC # BLD: 2.59 M/UL — LOW (ref 4.2–5.8)
RBC # FLD: 16.1 % — HIGH (ref 10.3–14.5)
RBC # FLD: 16.4 % — HIGH (ref 10.3–14.5)
SAO2 % BLDMV: 57.6 — LOW (ref 60–90)
SAO2 % BLDMV: 58.2 — LOW (ref 60–90)
SAO2 % BLDMV: 62.1 — SIGNIFICANT CHANGE UP (ref 60–90)
SAO2 % BLDMV: 62.3 — SIGNIFICANT CHANGE UP (ref 60–90)
SAO2 % BLDMV: 67.3 — SIGNIFICANT CHANGE UP (ref 60–90)
SAO2 % BLDMV: 68.2 — SIGNIFICANT CHANGE UP (ref 60–90)
SAO2 % BLDV: 65.5 % — LOW (ref 67–88)
SODIUM SERPL-SCNC: 135 MMOL/L — SIGNIFICANT CHANGE UP (ref 135–145)
SODIUM SERPL-SCNC: 138 MMOL/L — SIGNIFICANT CHANGE UP (ref 135–145)
TACROLIMUS SERPL-MCNC: 5.2 NG/ML — SIGNIFICANT CHANGE UP
WBC # BLD: 15.14 K/UL — HIGH (ref 3.8–10.5)
WBC # BLD: 17.75 K/UL — HIGH (ref 3.8–10.5)
WBC # FLD AUTO: 15.14 K/UL — HIGH (ref 3.8–10.5)
WBC # FLD AUTO: 17.75 K/UL — HIGH (ref 3.8–10.5)

## 2025-05-04 PROCEDURE — 71045 X-RAY EXAM CHEST 1 VIEW: CPT | Mod: 26

## 2025-05-04 PROCEDURE — 99233 SBSQ HOSP IP/OBS HIGH 50: CPT | Mod: GC

## 2025-05-04 PROCEDURE — 99292 CRITICAL CARE ADDL 30 MIN: CPT

## 2025-05-04 PROCEDURE — 99291 CRITICAL CARE FIRST HOUR: CPT

## 2025-05-04 RX ORDER — ALBUMIN (HUMAN) 12.5 G/50ML
100 INJECTION, SOLUTION INTRAVENOUS ONCE
Refills: 0 | Status: COMPLETED | OUTPATIENT
Start: 2025-05-04 | End: 2025-05-04

## 2025-05-04 RX ORDER — DESVENLAFAXINE 25 MG/1
50 TABLET, EXTENDED RELEASE ORAL DAILY
Refills: 0 | Status: DISCONTINUED | OUTPATIENT
Start: 2025-05-04 | End: 2025-05-14

## 2025-05-04 RX ORDER — BUMETANIDE 1 MG/1
1 TABLET ORAL
Qty: 20 | Refills: 0 | Status: DISCONTINUED | OUTPATIENT
Start: 2025-05-04 | End: 2025-05-06

## 2025-05-04 RX ORDER — ERYTHROMYCIN 5 MG/G
1 OINTMENT OPHTHALMIC EVERY 4 HOURS
Refills: 0 | Status: DISCONTINUED | OUTPATIENT
Start: 2025-05-04 | End: 2025-05-14

## 2025-05-04 RX ORDER — SODIUM BICARBONATE 1 MEQ/ML
50 SYRINGE (ML) INTRAVENOUS ONCE
Refills: 0 | Status: COMPLETED | OUTPATIENT
Start: 2025-05-04 | End: 2025-05-04

## 2025-05-04 RX ORDER — TACROLIMUS 0.5 MG/1
3 CAPSULE ORAL
Refills: 0 | Status: DISCONTINUED | OUTPATIENT
Start: 2025-05-04 | End: 2025-05-05

## 2025-05-04 RX ORDER — BUMETANIDE 1 MG/1
2 TABLET ORAL ONCE
Refills: 0 | Status: COMPLETED | OUTPATIENT
Start: 2025-05-04 | End: 2025-05-04

## 2025-05-04 RX ORDER — CHLOROTHIAZIDE 250 MG/1
1000 TABLET ORAL ONCE
Refills: 0 | Status: COMPLETED | OUTPATIENT
Start: 2025-05-04 | End: 2025-05-04

## 2025-05-04 RX ORDER — HYPROMELLOSE 0.4 %
1 DROPS OPHTHALMIC (EYE)
Refills: 0 | Status: DISCONTINUED | OUTPATIENT
Start: 2025-05-04 | End: 2025-05-06

## 2025-05-04 RX ORDER — HYDROXYZINE HYDROCHLORIDE 25 MG/1
10 TABLET, FILM COATED ORAL AT BEDTIME
Refills: 0 | Status: DISCONTINUED | OUTPATIENT
Start: 2025-05-04 | End: 2025-05-14

## 2025-05-04 RX ADMIN — METHYLPREDNISOLONE ACETATE 24 MILLIGRAM(S): 80 INJECTION, SUSPENSION INTRA-ARTICULAR; INTRALESIONAL; INTRAMUSCULAR; SOFT TISSUE at 17:02

## 2025-05-04 RX ADMIN — Medication 500000 UNIT(S): at 11:03

## 2025-05-04 RX ADMIN — Medication 3 UNIT(S)/HR: at 20:54

## 2025-05-04 RX ADMIN — Medication 500 MILLIGRAM(S): at 01:33

## 2025-05-04 RX ADMIN — Medication 500 MILLIGRAM(S): at 17:20

## 2025-05-04 RX ADMIN — Medication 10 MILLILITER(S): at 20:54

## 2025-05-04 RX ADMIN — Medication 500 MILLIGRAM(S): at 06:36

## 2025-05-04 RX ADMIN — Medication 3 UNIT(S)/HR: at 10:57

## 2025-05-04 RX ADMIN — BUMETANIDE 10 MG/HR: 1 TABLET ORAL at 07:34

## 2025-05-04 RX ADMIN — Medication 1 DROP(S): at 16:24

## 2025-05-04 RX ADMIN — ERYTHROMYCIN 1 APPLICATION(S): 5 OINTMENT OPHTHALMIC at 21:34

## 2025-05-04 RX ADMIN — BUMETANIDE 10 MG/HR: 1 TABLET ORAL at 10:57

## 2025-05-04 RX ADMIN — Medication 1 DROP(S): at 17:03

## 2025-05-04 RX ADMIN — Medication 1 DROP(S): at 13:07

## 2025-05-04 RX ADMIN — DEXMEDETOMIDINE HYDROCHLORIDE IN SODIUM CHLORIDE 24.5 MICROGRAM(S)/KG/HR: 4 INJECTION INTRAVENOUS at 20:53

## 2025-05-04 RX ADMIN — MYCOPHENOLATE MOFETIL 1000 MILLIGRAM(S): 500 TABLET, FILM COATED ORAL at 08:02

## 2025-05-04 RX ADMIN — Medication 6.13 MICROGRAM(S)/KG/MIN: at 10:57

## 2025-05-04 RX ADMIN — Medication 1 DROP(S): at 21:34

## 2025-05-04 RX ADMIN — DESVENLAFAXINE 50 MILLIGRAM(S): 25 TABLET, EXTENDED RELEASE ORAL at 10:56

## 2025-05-04 RX ADMIN — ERYTHROMYCIN 1 APPLICATION(S): 5 OINTMENT OPHTHALMIC at 17:03

## 2025-05-04 RX ADMIN — MYCOPHENOLATE MOFETIL 1000 MILLIGRAM(S): 500 TABLET, FILM COATED ORAL at 19:43

## 2025-05-04 RX ADMIN — NOREPINEPHRINE BITARTRATE 7.66 MICROGRAM(S)/KG/MIN: 8 SOLUTION at 20:53

## 2025-05-04 RX ADMIN — Medication 500 MILLIGRAM(S): at 00:33

## 2025-05-04 RX ADMIN — Medication 125 MILLIGRAM(S): at 05:36

## 2025-05-04 RX ADMIN — NALOXEGOL OXALATE 12.5 MILLIGRAM(S): 12.5 TABLET, FILM COATED ORAL at 11:08

## 2025-05-04 RX ADMIN — Medication 125 MILLIGRAM(S): at 17:02

## 2025-05-04 RX ADMIN — ERYTHROMYCIN 1 APPLICATION(S): 5 OINTMENT OPHTHALMIC at 13:07

## 2025-05-04 RX ADMIN — DOBUTAMINE 3.68 MICROGRAM(S)/KG/MIN: 250 INJECTION INTRAVENOUS at 10:57

## 2025-05-04 RX ADMIN — CHLOROTHIAZIDE 200 MILLIGRAM(S): 250 TABLET ORAL at 14:45

## 2025-05-04 RX ADMIN — Medication 1 APPLICATION(S): at 12:06

## 2025-05-04 RX ADMIN — DOBUTAMINE 3.68 MICROGRAM(S)/KG/MIN: 250 INJECTION INTRAVENOUS at 20:53

## 2025-05-04 RX ADMIN — Medication 500 MILLIGRAM(S): at 05:36

## 2025-05-04 RX ADMIN — Medication 25 MG/HR: at 20:54

## 2025-05-04 RX ADMIN — Medication 1 DROP(S): at 12:05

## 2025-05-04 RX ADMIN — Medication 40 MILLIGRAM(S): at 11:02

## 2025-05-04 RX ADMIN — TACROLIMUS 1.5 MILLIGRAM(S): 0.5 CAPSULE ORAL at 08:02

## 2025-05-04 RX ADMIN — ERYTHROMYCIN 1 APPLICATION(S): 5 OINTMENT OPHTHALMIC at 12:05

## 2025-05-04 RX ADMIN — Medication 50 MILLIEQUIVALENT(S): at 04:29

## 2025-05-04 RX ADMIN — NOREPINEPHRINE BITARTRATE 7.66 MICROGRAM(S)/KG/MIN: 8 SOLUTION at 10:57

## 2025-05-04 RX ADMIN — Medication 500 MILLIGRAM(S): at 11:02

## 2025-05-04 RX ADMIN — Medication 100 MILLIGRAM(S): at 21:34

## 2025-05-04 RX ADMIN — CEFTRIAXONE 100 MILLIGRAM(S): 500 INJECTION, POWDER, FOR SOLUTION INTRAMUSCULAR; INTRAVENOUS at 11:03

## 2025-05-04 RX ADMIN — Medication 500 MILLIGRAM(S): at 17:03

## 2025-05-04 RX ADMIN — BUMETANIDE 10 MG/HR: 1 TABLET ORAL at 20:54

## 2025-05-04 RX ADMIN — ALBUMIN (HUMAN) 50 MILLILITER(S): 12.5 INJECTION, SOLUTION INTRAVENOUS at 00:33

## 2025-05-04 RX ADMIN — BUMETANIDE 2 MILLIGRAM(S): 1 TABLET ORAL at 07:33

## 2025-05-04 RX ADMIN — TACROLIMUS 3 MILLIGRAM(S): 0.5 CAPSULE ORAL at 20:53

## 2025-05-04 RX ADMIN — Medication 500 MILLIGRAM(S): at 12:02

## 2025-05-04 RX ADMIN — METHYLPREDNISOLONE ACETATE 28 MILLIGRAM(S): 80 INJECTION, SUSPENSION INTRA-ARTICULAR; INTRALESIONAL; INTRAMUSCULAR; SOFT TISSUE at 05:37

## 2025-05-04 NOTE — PROGRESS NOTE ADULT - PROBLEM SELECTOR PLAN 2
- Immunosuppression:      - Cellcept 1000mg BID      - Solu-medrol taper      - Tacro: 6.7 today, on 1.5mg suspension dose bid     - Antibiotics      - Nystatin for ppx      - s/p IV Vanc + Cefepime for post-op ppx (4/29 - 5/1)      - PO Vanco ppx (4/29 - )      - CTX 2000mg QD for donor strep pneumo+ in CSF      - Penicillin IM for donor syphilis + ab - Immunosuppression:      - Cellcept 1000mg BID      - Solu-medrol taper      - Tacro: 6.7 today, on 1.5mg suspension dose bid (assure receiving through feeding tube)     - Antibiotics      - Nystatin for ppx      - s/p IV Vanc + Cefepime for post-op ppx (4/29 - 5/1)      - PO Vanco ppx (4/29 - )      - CTX 2000mg QD for donor strep pneumo+ in CSF      - Penicillin IM for donor syphilis + ab - Immunosuppression:      - Cellcept 1000mg BID      - Solu-medrol taper      - Tacro: 5.2 today, increase to 3mg suspension dose bid (assure receiving through feeding tube)     - Antibiotics      - Nystatin for ppx      - s/p IV Vanc + Cefepime for post-op ppx (4/29 - 5/1)      - PO Vanco ppx (4/29 - )      - CTX 2000mg QD for donor strep pneumo+ in CSF      - Penicillin IM for donor syphilis + ab - Immunosuppression:      - Cellcept 1000mg BID      - Solu-medrol taper      - Tacro: 5.2 today, increase to 3mg suspension dose bid (assure receiving through feeding tube)       - First survillence biopsy planned fro 5/13 (2nd week post txp).    - Antibiotics      - Nystatin for ppx      - s/p IV Vanc + Cefepime for post-op ppx (4/29 - 5/1)      - PO Vanco ppx (4/29 - )      - CTX 2000mg QD for donor strep pneumo+ in CSF      - Penicillin IM for donor syphilis + ab

## 2025-05-04 NOTE — PROGRESS NOTE ADULT - PROBLEM SELECTOR PLAN 1
- ACC/AHA stage D systolic heart failure, s/p heart transplant 4/28, CMV +/+, Toxo -/+  - Retrospective crossmatch with class II DSA DR HILLIARD (preformed from pre transplant).    - Currently being supported on: Dobutamine weaned to 1.5, Epi 0.02.   - Lcio weaned off on 5/2  - Chest tubes, per CTS  - Diuresis: guided by CVP - ACC/AHA stage D systolic heart failure, s/p heart transplant 4/28, CMV +/+, Toxo -/+  - Retrospective crossmatch with class II DSA DR HILLIARD (preformed from pre transplant).    - Currently being supported on: Dobutamine weaned to 1.5, Epi 0.01. Wean off epi preferentially   - Lico weaned off on 5/2  - Chest tubes, per CTS  - Diuresis: guided by CVP  - TTE tomorrow when off inotropes, can consider SVG out then with stability of hemodynamic markers

## 2025-05-04 NOTE — PROGRESS NOTE ADULT - SUBJECTIVE AND OBJECTIVE BOX
Patient seen and examined at the bedside.    Remained critically ill on continuous ICU monitoring.    Events Tonight:      OBJECTIVE:  Vital Signs Last 24 Hrs  T(C): 37.1 (04 May 2025 16:00), Max: 37.5 (03 May 2025 20:00)  T(F): 98.8 (04 May 2025 16:00), Max: 99.5 (03 May 2025 20:00)  HR: 109 (04 May 2025 17:00) (100 - 119)  BP: 114/71 (03 May 2025 18:00) (114/71 - 114/71)  BP(mean): 88 (03 May 2025 18:00) (88 - 88)  RR: 27 (04 May 2025 17:00) (10 - 63)  SpO2: 97% (04 May 2025 17:00) (94% - 99%)    Parameters below as of 04 May 2025 16:00  Patient On (Oxygen Delivery Method): nasal cannula  O2 Flow (L/min): 2        Physical Exam:   General: Normal body habitus, breathing comfortably at rest  Neurology: Awake, oriented x 3 and follows commands but more lethargic & unmotivated today  Eyes: bilateral pupils equal and reactive  ENT/Neck: Neck supple, trachea midline, No JVD  Respiratory: low inspiratory effort, diminished in the bases, on HFNC  CV: S1S2, no murmurs        [x] Sternal dressing, [x] Mediastinal CT x2        [x] Sinus Tachycardia, [x] Temporary back-up pacing - VVI 50  Abdominal: Soft, NT, ND +BS  Extremities: 1+ pedal edema noted, + peripheral pulses  Skin: No Rashes, Hematoma, Ecchymosis      Assessment:  69 yo NICM, s/p heart transplant     RV dysfunction-remains on Lico and inotropic support   Postop acute pulmonary insufficiency-on HFNC & Lico   ZAHRA-on CVVHD  Acute blood loss anemia  Thrombocytopenia  Stress Hyperglycemia  Hypervolemia  Vasogenic Shock  Postop cardiogenic shock      Plan:   ***Neuro***  [x] +/- Precedex for sedation  Post operative neuro assessment   Pain management with Tylenol and prns  Standing pain meds d/dwayne  Atrax & Trazadone for anxiety   Desvenlafaxine d/dwayne    ***Cardiovascular***  Invasive hemodynamic monitoring, assess perfusion indices   ST / CVP 4 / MAP 83 / PAP -33 / Hct 22.2 / Lactate 0.8   [x] Dobutamine 1.5 mcg/kg/min   [x] Epinephrine 0.02 mcg/kg/min  [x] Levophed 0.05 mcg/kg/min   [x] Cardene 5 mg/hr   Volume: [x] Albumin 100 ml  Reassessment of hemodynamics post resuscitation   Monitor chest tube outputs   Serial EKG and cardiac enzymes     ***Pulmonary***  [x] NC 2L  Encourage incentive spirometry, continue pulse ox monitoring, follow ABGs               ***GI***  [x] CC Diet   [x] Protonix for stress ulcer prophylaxis   Bowel regimen with Miralax and senna    ***Renal***  [x] ZAHRA - on CRRT Treatment   Goal net negative  Continue to monitor I/Os, BUN/Creatinine.   Replete lytes PRN  Diuresis with Bumex   [place] present     ***ID***  Cefepime course completed 5/1   PO Vanco for c.diff prophylaxis  Penicillin injectable x1   Penicillin & Ceftriaxone for D positive CSF  Immunosuppression with Solu-Medrol, Cellcept and Tacro    ***Endocrine***  [x] Stress Hyperglycemia : HbA1c 6.1%                - [x] Insulin gtt             - Need tight glycemic control to prevent wound infection.          Patient requires continuous monitoring with bedside rhythm monitoring, pulse oximetry monitoring, and continuous central venous and arterial pressure monitoring; and intermittent blood gas analysis. Care plan discussed with the ICU care team.   Patient remained critical, at risk for life threatening decompensation.    I have spent 50 minutes providing critical care management to this patient.    By signing my name below, I, Raquel Lara, attest that this documentation has been prepared under the direction and in the presence of ARA Padilla.   Electronically signed: Kasey Smith, 05-04-25 @ 17:13    I, Jacob Cisse , personally performed the services described in this documentation. all medical record entries made by the yaritzaibjewell were at my direction and in my presence. I have reviewed the chart and agree that the record reflects my personal performance and is accurate and complete  Electronically signed: ARA Padilla     Patient seen and examined at the bedside.    Remained critically ill on continuous ICU monitoring. Patient seen and examined at the bedside.    Remained critically ill on continuous ICU monitoring.    Events Tonight:      OBJECTIVE:  Vital Signs Last 24 Hrs  T(C): 37.1 (04 May 2025 16:00), Max: 37.5 (03 May 2025 20:00)  T(F): 98.8 (04 May 2025 16:00), Max: 99.5 (03 May 2025 20:00)  HR: 109 (04 May 2025 17:00) (100 - 119)  BP: 114/71 (03 May 2025 18:00) (114/71 - 114/71)  BP(mean): 88 (03 May 2025 18:00) (88 - 88)  RR: 27 (04 May 2025 17:00) (10 - 63)  SpO2: 97% (04 May 2025 17:00) (94% - 99%)    Parameters below as of 04 May 2025 16:00  Patient On (Oxygen Delivery Method): nasal cannula  O2 Flow (L/min): 2        Physical Exam:   General: Normal body habitus, breathing comfortably at rest  Neurology: Awake, oriented x 3 and follows commands but more lethargic & unmotivated today  Eyes: bilateral pupils equal and reactive  ENT/Neck: Neck supple, trachea midline, No JVD  Respiratory: low inspiratory effort, diminished in the bases, on HFNC  CV: S1S2, no murmurs        [x] Sternal dressing, [x] Mediastinal CT x2        [x] Sinus Tachycardia, [x] Temporary back-up pacing - VVI 50  Abdominal: Soft, NT, ND +BS  Extremities: 1+ pedal edema noted, + peripheral pulses  Skin: No Rashes, Hematoma, Ecchymosis      Assessment:  71 yo NICM, s/p heart transplant     RV dysfunction-remains on Lico and inotropic support   Postop acute pulmonary insufficiency-on HFNC & Lico   ZAHRA-on CVVHD  Acute blood loss anemia  Thrombocytopenia  Stress Hyperglycemia  Hypervolemia  Vasogenic Shock  Postop cardiogenic shock      Plan:   ***Neuro***  [x] +/- Precedex for sedation  Post operative neuro assessment   Pain management with Tylenol and prns  Standing pain meds d/dwayne  Atrax & Trazadone for anxiety   Desvenlafaxine d/dwayne    ***Cardiovascular***  Invasive hemodynamic monitoring, assess perfusion indices   ST / CVP 4 / MAP 83 / PAP -33 / Hct 22.2 / Lactate 0.8   [x] Dobutamine 1.5 mcg/kg/min   [x] Epinephrine 0.02 mcg/kg/min  [x] Levophed 0.05 mcg/kg/min   [x] Cardene 5 mg/hr   Volume: [x] Albumin 100 ml [x] 1 PRBC today  Reassessment of hemodynamics post resuscitation   Monitor chest tube outputs   Serial EKG and cardiac enzymes     ***Pulmonary***  [x] NC 2L  Encourage incentive spirometry, continue pulse ox monitoring, follow ABGs               ***GI***  [x] CC Diet   [x] Protonix for stress ulcer prophylaxis   Bowel regimen with Miralax and senna    ***Renal***  [x] ZAHRA - on CRRT Treatment   Goal net negative  Continue to monitor I/Os, BUN/Creatinine.   Replete lytes PRN  Diuresis with Bumex   [place] present     ***ID***  Cefepime course completed 5/1   PO Vanco for c.diff prophylaxis  Penicillin injectable x1   Penicillin & Ceftriaxone for D positive CSF  Immunosuppression with Solu-Medrol, Cellcept and Tacro    ***Endocrine***  [x] Stress Hyperglycemia : HbA1c 6.1%                - [x] Insulin gtt             - Need tight glycemic control to prevent wound infection.          Patient requires continuous monitoring with bedside rhythm monitoring, pulse oximetry monitoring, and continuous central venous and arterial pressure monitoring; and intermittent blood gas analysis. Care plan discussed with the ICU care team.   Patient remained critical, at risk for life threatening decompensation.    I have spent 50 minutes providing critical care management to this patient.    By signing my name below, I, Raquel Lara, attest that this documentation has been prepared under the direction and in the presence of ARA Padilla.   Electronically signed: Elsy Smith, 05-04-25 @ 17:13    I, Jacob Cisse , personally performed the services described in this documentation. all medical record entries made by the elsy were at my direction and in my presence. I have reviewed the chart and agree that the record reflects my personal performance and is accurate and complete  Electronically signed: ARA Padilla     Patient seen and examined at the bedside.    Remained critically ill on continuous ICU monitoring. Patient seen and examined at the bedside.    Remained critically ill on continuous ICU monitoring.    Events Tonight:  - Epinephrine weaned off  - Remains on Dobutamine for postop cardiogenic shock  - Patient making more urine, CRRT stopped for now, continue Bumex drip for ZAHRA  - Cardene drip for HTN    OBJECTIVE:  Vital Signs Last 24 Hrs  T(C): 37.1 (04 May 2025 16:00), Max: 37.5 (03 May 2025 20:00)  T(F): 98.8 (04 May 2025 16:00), Max: 99.5 (03 May 2025 20:00)  HR: 109 (04 May 2025 17:00) (100 - 119)  BP: 114/71 (03 May 2025 18:00) (114/71 - 114/71)  BP(mean): 88 (03 May 2025 18:00) (88 - 88)  RR: 27 (04 May 2025 17:00) (10 - 63)  SpO2: 97% (04 May 2025 17:00) (94% - 99%)    Parameters below as of 04 May 2025 16:00  Patient On (Oxygen Delivery Method): nasal cannula  O2 Flow (L/min): 2        Physical Exam:   General: Normal body habitus, breathing comfortably at rest  Neurology: Awake, oriented x 3 and follows commands. In a little better spirits  Eyes: bilateral pupils equal and reactive  ENT/Neck: Neck supple, trachea midline, No JVD  Respiratory: low inspiratory effort, diminished in the bases  CV: S1S2, no murmurs        [x] Sternal dressing, [x] Mediastinal CT x2        [x] Sinus Tachycardia, [x] Temporary back-up pacing - VVI 50  Abdominal: Soft, NT, ND +BS  Extremities: 1+ pedal edema noted, + peripheral pulses  Skin: No Rashes, Hematoma, Ecchymosis      Assessment:  71 yo NICM, s/p heart transplant     RV dysfunction-remains on Lico and inotropic support   Postop acute pulmonary insufficiency-on HFNC & Lico   ZAHRA  Acute blood loss anemia  Thrombocytopenia  Hypertension  Postop cardiogenic shock      Plan:   ***Neuro***  [x] +/- Precedex for agitation  Post operative neuro assessment   Pain management with Tylenol and prns  Standing pain meds d/dwayne  Atrax & Trazadone for anxiety   Desvenlafaxine d/dwayne    ***Cardiovascular***  Invasive hemodynamic monitoring, assess perfusion indices   ST / CVP 4 / MAP 83 / PAP -33 / Hct 22.2 / Lactate 0.8   [x] Dobutamine 1.5 mcg/kg/min   [x] Epinephrine 0.02 mcg/kg/min  [x] Levophed 0.05 mcg/kg/min   [x] Cardene 5 mg/hr   Volume: [x] Albumin 100 ml [x] 1 PRBC today  Reassessment of hemodynamics post resuscitation   Monitor chest tube outputs   Serial EKG and cardiac enzymes     ***Pulmonary***  [x] NC 2L  Encourage incentive spirometry, continue pulse ox monitoring, follow ABGs               ***GI***  [x] CC Diet   [x] Protonix for stress ulcer prophylaxis   Bowel regimen with Miralax and senna    ***Renal***  [x] ZAHRA  Goal net negative  Continue to monitor I/Os, BUN/Creatinine.   Replete lytes PRN  Diuresis with Bumex   [place] present     ***ID***  Cefepime course completed 5/1   PO Vanco for c.diff prophylaxis  Penicillin injectable x1   Penicillin & Ceftriaxone for D positive CSF  Immunosuppression with Solu-Medrol, Cellcept and Tacro    ***Endocrine***  [x] Stress Hyperglycemia : HbA1c 6.1%                - [x] Insulin gtt             - Need tight glycemic control to prevent wound infection.          Patient requires continuous monitoring with bedside rhythm monitoring, pulse oximetry monitoring, and continuous central venous and arterial pressure monitoring; and intermittent blood gas analysis. Care plan discussed with the ICU care team.   Patient remained critical, at risk for life threatening decompensation.    I have spent 54 minutes providing critical care management to this patient.    By signing my name below, I, Raquel Lara, attest that this documentation has been prepared under the direction and in the presence of ARA Padilla.   Electronically signed: Kasey Smith, 05-04-25 @ 17:13    I, Jacob Cisse , personally performed the services described in this documentation. all medical record entries made by the yaritzaibjewell were at my direction and in my presence. I have reviewed the chart and agree that the record reflects my personal performance and is accurate and complete  Electronically signed: ARA Padilla     Patient seen and examined at the bedside.    Remained critically ill on continuous ICU monitoring.

## 2025-05-04 NOTE — PROGRESS NOTE ADULT - SUBJECTIVE AND OBJECTIVE BOX
Patient seen and examined at the bedside.    Remains critically ill on continuous ICU monitoring, at risk for life threatening decompensation.  All Labs, data reviewed. Plan of care discussed in length during multi-disciplinary ICU rounds.     Brief Summary:  70-year-old male with history of NICM since 2011 HFrEF (LVEF 25-30%)   Now s/p OHT on 4/29/25     24 Hour events:        Objective:  Vital Signs Last 24 Hrs  T(C): 37.3 (04 May 2025 04:00), Max: 37.5 (03 May 2025 20:00)  T(F): 99.1 (04 May 2025 04:00), Max: 99.5 (03 May 2025 20:00)  HR: 107 (04 May 2025 06:45) (105 - 116)  BP: 114/71 (03 May 2025 18:00) (98/64 - 119/75)  BP(mean): 88 (03 May 2025 18:00) (70 - 92)  RR: 18 (04 May 2025 06:45) (15 - 34)  SpO2: 99% (04 May 2025 06:45) (96% - 100%)    Parameters below as of 04 May 2025 04:00  Patient On (Oxygen Delivery Method): nasal cannula  O2 Flow (L/min): 2      Physical Exam:   General: Sleepy again today but interactive when stimulated.  Neurology: Oriented, following commands  Respiratory: Bilateral breath sounds  CV: Sinus Tach  Abdominal: Soft, Nontender  Extremities: Warm, well-perfused  [place]    -------------------------------------------------------------------------------------------------------------------------------  Labs:                        7.6    17.75 )-----------( 62       ( 04 May 2025 00:35 )             22.5     05-04    138  |  102  |  30[H]  ----------------------------<  137[H]  4.7   |  19[L]  |  2.17[H]    Ca    8.5      04 May 2025 00:35  Phos  4.6     05-04  Mg     2.8     05-04    TPro  5.9[L]  /  Alb  4.1  /  TBili  1.2  /  DBili  x   /  AST  28  /  ALT  37  /  AlkPhos  79  05-04    LIVER FUNCTIONS - ( 04 May 2025 00:35 )  Alb: 4.1 g/dL / Pro: 5.9 g/dL / ALK PHOS: 79 U/L / ALT: 37 U/L / AST: 28 U/L / GGT: x           PT/INR - ( 04 May 2025 00:35 )   PT: 13.7 sec;   INR: 1.19 ratio         PTT - ( 04 May 2025 00:35 )  PTT:24.3 sec  ABG - ( 04 May 2025 04:00 )  pH, Arterial: 7.36  pH, Blood: x     /  pCO2: 36    /  pO2: 120   / HCO3: 20    / Base Excess: -4.7  /  SaO2: 98.0        ALL MEDICATIONS   MEDICATIONS  (STANDING):  acetaminophen     Tablet .. 500 milliGRAM(s) Oral every 6 hours  buMETAnide Infusion 2 mG/Hr (10 mL/Hr) IV Continuous <Continuous>  buMETAnide Injectable 2 milliGRAM(s) IV Push once  cefTRIAXone   IVPB 2000 milliGRAM(s) IV Intermittent <User Schedule>  chlorhexidine 2% Cloths 1 Application(s) Topical daily  CRRT Treatment    <Continuous>  dexMEDEtomidine Infusion 1.2 MICROgram(s)/kG/Hr (24.5 mL/Hr) IV Continuous <Continuous>  dextrose 50% Injectable 50 milliLiter(s) IV Push every 15 minutes  dextrose 50% Injectable 25 milliLiter(s) IV Push every 15 minutes  DOBUTamine Infusion 1.5 MICROgram(s)/kG/Min (3.68 mL/Hr) IV Continuous <Continuous>  EPINEPHrine    Infusion 0.02 MICROgram(s)/kG/Min (6.13 mL/Hr) IV Continuous <Continuous>  insulin regular Infusion 3 Unit(s)/Hr (3 mL/Hr) IV Continuous <Continuous>  lidocaine   4% Patch 3 Patch Transdermal daily  methylPREDNISolone sodium succinate Injectable 24 milliGRAM(s) IV Push every 12 hours  methylPREDNISolone sodium succinate Injectable   IV Push   mycophenolate mofetil Suspension 1000 milliGRAM(s) Oral every 12 hours  naloxegol 12.5 milliGRAM(s) Oral daily  niCARdipine Infusion 5 mG/Hr (25 mL/Hr) IV Continuous <Continuous>  norepinephrine Infusion 0.05 MICROgram(s)/kG/Min (7.66 mL/Hr) IV Continuous <Continuous>  nystatin    Suspension 185238 Unit(s) Oral <User Schedule>  oxymetazoline 0.05% Nasal Spray 2 Spray(s) Both Nostrils two times a day  pantoprazole  Injectable 40 milliGRAM(s) IV Push daily  penicillin   G benzathine Injectable 2.4 Million Unit(s) IntraMuscular <User Schedule>  Phoxillum Filtration BK 4 / 2.5 5000 milliLiter(s) (200 mL/Hr) CRRT <Continuous>  Phoxillum Filtration BK 4 / 2.5 5000 milliLiter(s) (1400 mL/Hr) CRRT <Continuous>  polyethylene glycol 3350 17 Gram(s) Oral two times a day  potassium chloride  10 mEq/50 mL IVPB 10 milliEquivalent(s) IV Intermittent once  potassium chloride  10 mEq/50 mL IVPB 10 milliEquivalent(s) IV Intermittent once  PrismaSOL Filtration BGK 4 / 2.5 5000 milliLiter(s) (1000 mL/Hr) CRRT <Continuous>  senna 2 Tablet(s) Oral at bedtime  sodium chloride 0.9%. 1000 milliLiter(s) (10 mL/Hr) IV Continuous <Continuous>  tacrolimus    0.5 mG/mL Suspension 1.5 milliGRAM(s) Oral <User Schedule>  traZODone 100 milliGRAM(s) Oral at bedtime  vancomycin    Solution 125 milliGRAM(s) Oral every 12 hours    MEDICATIONS  (PRN):  hydrOXYzine hydrochloride 10 milliGRAM(s) Oral three times a day PRN Anxiety    ------------------------------------------------------------------------------------------------------------------------------  Assessment:  69 yo NICM, s/p heart transplant     RV dysfunction  Postop acute pulmonary insufficiency  AZHRA  Acute blood loss anemia  Thrombocytopenia  Hyperglycemia    Plan:   ***Neuro***  Maintain day/night cycle to prevent ICU delirium   Postoperative acute pain control with IV Tylenol, Tramadol, and prns.    ***Cardiovascular***  Monitor for sign of hypoperfusion, trend lactate, SVo2  Remains on Dobutamine and Epinephrine  Maintain MAPs>65 mm HG.   Goal CVP <12  Monitor chest tube output    ***Pulmonary***  Postoperative acute pulmonary insufficiency  Deep breathing and coughing exercises, IS, Mobilization, nebs, Chest PT    ***GI***  Tolerating diet but poor intake.  Will place a feeding tube today.  Protonix for prophylaxis   Bowel regimen.   Trend LFTs    ***Renal***  ZAHRA / CKD - continue CRRT   Goal even, trend filling pressures.  Likely can transition to nocturnal CRRT in coming days.    ***ID***  Leukocytosis  Tacro, Cellcept, Steroid taper for immunosuppression.  Prophylaxis with Nystatin, PO Vanco.  PCN and Ceftriaxone for donor cultures (syphylis, S pneumo)    ***Endocrine***  Insulin infusion for Hyperglycemia     ***Hematology***  Acute blood loss anemia and thrombocytopenia - no transfusion indication currently.  CBC, Coags, monitor for bleeding  PF4 negative.  Heparin SQ for VTE prophylaxis on hold in setting of low platelets.      I, Alexandra Fierro MD, personally performed the services described in this documentation. All medical record entries made by the scribe were at my direction and in my presence. I have reviewed the chart and agree that the record reflects my personal performance and is accurate and complete  Electronically signed:   Alexandra Fierro MD  CT ICU attending     ICU time: ** mins     By signing my name below, I, Omi Capone, attest that this documentation has been prepared under the direction and in the presence of Alexandra Fierro MD  Electronically signed: Omi Capone, 05-04-25 @ 07:28             Patient seen and examined at the bedside.    Remains critically ill on continuous ICU monitoring, at risk for life threatening decompensation.  All Labs, data reviewed. Plan of care discussed in length during multi-disciplinary ICU rounds.     Brief Summary:  70-year-old male with history of NICM since  HFrEF (LVEF 25-30%)   Now s/p OHT on 25     24 Hour events:  on CRRT , will hold during day time  Ambulated with PT  Epi gtt off, will keep  on low dose  Continue Bumex, will d/c PA cathter  Repeat CBC in afternoon , monitor Hg closely     Objective:  Vital Signs Last 24 Hrs  T(C): 37.3 (04 May 2025 04:00), Max: 37.5 (03 May 2025 20:00)  T(F): 99.1 (04 May 2025 04:00), Max: 99.5 (03 May 2025 20:00)  HR: 107 (04 May 2025 06:45) (105 - 116)  BP: 114/71 (03 May 2025 18:00) (98/64 - 119/75)  BP(mean): 88 (03 May 2025 18:00) (70 - 92)  RR: 18 (04 May 2025 06:45) (15 - 34)  SpO2: 99% (04 May 2025 06:45) (96% - 100%)    Parameters below as of 04 May 2025 04:00  Patient On (Oxygen Delivery Method): nasal cannula  O2 Flow (L/min): 2      Physical Exam:   General: NAD, ambulates  Neurology: ambulated  Respiratory: Bilateral breath sounds  CV: Sinus Tach  Abdominal: Soft, Nontender  Extremities: Warm, well-perfused  [place]    -------------------------------------------------------------------------------------------------------------------------------  Labs:                        7.6    17.75 )-----------( 62       ( 04 May 2025 00:35 )             22.5     05-04    138  |  102  |  30[H]  ----------------------------<  137[H]  4.7   |  19[L]  |  2.17[H]    Ca    8.5      04 May 2025 00:35  Phos  4.6     05-  Mg     2.8     05-    TPro  5.9[L]  /  Alb  4.1  /  TBili  1.2  /  DBili  x   /  AST  28  /  ALT  37  /  AlkPhos  79  05-04    LIVER FUNCTIONS - ( 04 May 2025 00:35 )  Alb: 4.1 g/dL / Pro: 5.9 g/dL / ALK PHOS: 79 U/L / ALT: 37 U/L / AST: 28 U/L / GGT: x           PT/INR - ( 04 May 2025 00:35 )   PT: 13.7 sec;   INR: 1.19 ratio     PTT - ( 04 May 2025 00:35 )  PTT:24.3 sec  ABG - ( 04 May 2025 04:00 )  pH, Arterial: 7.36  pH, Blood: x     /  pCO2: 36    /  pO2: 120   / HCO3: 20    / Base Excess: -4.7  /  SaO2: 98.0        ALL MEDICATIONS   MEDICATIONS  (STANDING):  acetaminophen     Tablet .. 500 milliGRAM(s) Oral every 6 hours  buMETAnide Infusion 2 mG/Hr (10 mL/Hr) IV Continuous <Continuous>  buMETAnide Injectable 2 milliGRAM(s) IV Push once  cefTRIAXone   IVPB 2000 milliGRAM(s) IV Intermittent <User Schedule>  chlorhexidine 2% Cloths 1 Application(s) Topical daily  CRRT Treatment    <Continuous>  dexMEDEtomidine Infusion 1.2 MICROgram(s)/kG/Hr (24.5 mL/Hr) IV Continuous <Continuous>  dextrose 50% Injectable 50 milliLiter(s) IV Push every 15 minutes  dextrose 50% Injectable 25 milliLiter(s) IV Push every 15 minutes  DOBUTamine Infusion 1.5 MICROgram(s)/kG/Min (3.68 mL/Hr) IV Continuous <Continuous>  EPINEPHrine    Infusion 0.02 MICROgram(s)/kG/Min (6.13 mL/Hr) IV Continuous <Continuous>  insulin regular Infusion 3 Unit(s)/Hr (3 mL/Hr) IV Continuous <Continuous>  lidocaine   4% Patch 3 Patch Transdermal daily  methylPREDNISolone sodium succinate Injectable 24 milliGRAM(s) IV Push every 12 hours  methylPREDNISolone sodium succinate Injectable   IV Push   mycophenolate mofetil Suspension 1000 milliGRAM(s) Oral every 12 hours  naloxegol 12.5 milliGRAM(s) Oral daily  niCARdipine Infusion 5 mG/Hr (25 mL/Hr) IV Continuous <Continuous>  norepinephrine Infusion 0.05 MICROgram(s)/kG/Min (7.66 mL/Hr) IV Continuous <Continuous>  nystatin    Suspension 999828 Unit(s) Oral <User Schedule>  oxymetazoline 0.05% Nasal Spray 2 Spray(s) Both Nostrils two times a day  pantoprazole  Injectable 40 milliGRAM(s) IV Push daily  penicillin   G benzathine Injectable 2.4 Million Unit(s) IntraMuscular <User Schedule>  Phoxillum Filtration BK 4 / 2.5 5000 milliLiter(s) (200 mL/Hr) CRRT <Continuous>  Phoxillum Filtration BK 4 / 2.5 5000 milliLiter(s) (1400 mL/Hr) CRRT <Continuous>  polyethylene glycol 3350 17 Gram(s) Oral two times a day  potassium chloride  10 mEq/50 mL IVPB 10 milliEquivalent(s) IV Intermittent once  potassium chloride  10 mEq/50 mL IVPB 10 milliEquivalent(s) IV Intermittent once  PrismaSOL Filtration BGK 4 / 2.5 5000 milliLiter(s) (1000 mL/Hr) CRRT <Continuous>  senna 2 Tablet(s) Oral at bedtime  sodium chloride 0.9%. 1000 milliLiter(s) (10 mL/Hr) IV Continuous <Continuous>  tacrolimus    0.5 mG/mL Suspension 1.5 milliGRAM(s) Oral <User Schedule>  traZODone 100 milliGRAM(s) Oral at bedtime  vancomycin    Solution 125 milliGRAM(s) Oral every 12 hours    MEDICATIONS  (PRN):  hydrOXYzine hydrochloride 10 milliGRAM(s) Oral three times a day PRN Anxiety    ------------------------------------------------------------------------------------------------------------------------------  Assessment:  71 yo NICM, s/p heart transplant     RV dysfunction  Postop acute pulmonary insufficiency  ZAHRA  Acute blood loss anemia  Thrombocytopenia  Hyperglycemia    Plan:   ***Neuro***  Maintain day/night cycle to prevent ICU delirium   Postoperative acute pain control with IV Tylenol, Tramadol, and prns.  Trazadone at night for sleep   Precedex as needed for agitation    ***Cardiovascular***  Monitor for sign of hypoperfusion, trend lactate, SVo2  Remains on Dobutamine , off Epi  Maintain MAPs>65 mm HG.   Goal CVP <10, d/c PA cathter  Monitor chest tube output    ***Pulmonary***  Postoperative acute pulmonary insufficiency  Deep breathing and coughing exercises, IS, Mobilization, nebs, Chest PT    ***GI***  Tolerating diet but poor intake.  Will place a feeding tube today.  Protonix for prophylaxis   Bowel regimen.   Trend LFTs    ***Renal***  ZAHRA / CKD - continue CRRT   Goal even, trend filling pressures.  Keep nocturnal CRRT, day time Bumex gtt    ***ID***  Leukocytosis  Tacro, Cellcept, Steroid taper for immunosuppression.  Prophylaxis with Nystatin, PO Vanco.  PCN and Ceftriaxone for donor cultures (syphylis, /S pneumo)    ***Endocrine***  Insulin infusion for Hyperglycemia     ***Hematology***  Acute blood loss anemia and thrombocytopenia - no transfusion indication currently.  CBC, Coags, monitor for bleeding  PF4 negative.  Heparin SQ for VTE prophylaxis on hold in setting of low platelets.    I, Alexandra Fierro MD, personally performed the services described in this documentation. All medical record entries made by the scribe were at my direction and in my presence. I have reviewed the chart and agree that the record reflects my personal performance and is accurate and complete  Electronically signed:   Alexandra Fierro MD  CT ICU attending     ICU time: 55 mins     By signing my name below, I, Omi Capone, attest that this documentation has been prepared under the direction and in the presence of Alexandra Fierro MD  Electronically signed: Omi Capone, 25 @ 07:28

## 2025-05-04 NOTE — PROGRESS NOTE ADULT - PROBLEM SELECTOR PLAN 1
Patient's baseline Scr is 1.0-1.2. On 4/28, SCr was 1.1 and on 4/29, Scr rubia to 3.2. UA-turbid, 300 protein, small leuks, large blood, 914 rbc's, UPCR-3.2. Pt had drop in hgb from 12.4 (4/28) to 9.0 (4/29). Pt was on IV vasopressor support. Pt noted to have elevated CVP of 20, and was not responding well enough to diuresis, so was started on CRRT for UF. Pt now only on Dobutamine and Epinephrine gtt. Remains oliguric.   Pt is off CRRT now. On bumex now. Monitor for the renal response.   -Labs reviewed.  -Dose meds for CrCl of ~ 30 mL/min  -Avoid nephrotoxins.    Wait for final reccs from the attending.

## 2025-05-04 NOTE — PROGRESS NOTE ADULT - SUBJECTIVE AND OBJECTIVE BOX
[facility] Division of Kidney Diseases & Hypertension  FOLLOW UP NOTE  [phone]--------------------------------------------------------------------------------  Chief Complaint:Cardiogenic shock    24 hour events/subjective:  Pt was seen and examined earlier today. No acute overnight events. No fresh complaints. Pt is off CRRT now.   Pt denies any worsening of SOB/ Constipation/ Diarrhea/ Nausea/ Vomiting/ abdominal pain/ chest pain/ tingling/ numbness.         PAST HISTORY  --------------------------------------------------------------------------------  No significant changes to PMH, PSH, FHx, SHx, unless otherwise noted    ALLERGIES & MEDICATIONS  --------------------------------------------------------------------------------  Allergies    No Known Allergies    Intolerances      Standing Inpatient Medications  acetaminophen     Tablet .. 500 milliGRAM(s) Oral every 6 hours  buMETAnide Infusion 2 mG/Hr IV Continuous <Continuous>  cefTRIAXone   IVPB 2000 milliGRAM(s) IV Intermittent <User Schedule>  chlorhexidine 2% Cloths 1 Application(s) Topical daily  CRRT Treatment    <Continuous>  desvenlafaxine ER 50 milliGRAM(s) Oral daily  dexMEDEtomidine Infusion 1.2 MICROgram(s)/kG/Hr IV Continuous <Continuous>  dextrose 50% Injectable 50 milliLiter(s) IV Push every 15 minutes  dextrose 50% Injectable 25 milliLiter(s) IV Push every 15 minutes  DOBUTamine Infusion 1.5 MICROgram(s)/kG/Min IV Continuous <Continuous>  EPINEPHrine    Infusion 0.02 MICROgram(s)/kG/Min IV Continuous <Continuous>  insulin regular Infusion 3 Unit(s)/Hr IV Continuous <Continuous>  lidocaine   4% Patch 3 Patch Transdermal daily  methylPREDNISolone sodium succinate Injectable 24 milliGRAM(s) IV Push every 12 hours  methylPREDNISolone sodium succinate Injectable   IV Push   mycophenolate mofetil Suspension 1000 milliGRAM(s) Oral every 12 hours  naloxegol 12.5 milliGRAM(s) Oral daily  niCARdipine Infusion 5 mG/Hr IV Continuous <Continuous>  norepinephrine Infusion 0.05 MICROgram(s)/kG/Min IV Continuous <Continuous>  nystatin    Suspension 564558 Unit(s) Oral <User Schedule>  oxymetazoline 0.05% Nasal Spray 2 Spray(s) Both Nostrils two times a day  pantoprazole  Injectable 40 milliGRAM(s) IV Push daily  penicillin   G benzathine Injectable 2.4 Million Unit(s) IntraMuscular <User Schedule>  Phoxillum Filtration BK 4 / 2.5 5000 milliLiter(s) CRRT <Continuous>  Phoxillum Filtration BK 4 / 2.5 5000 milliLiter(s) CRRT <Continuous>  polyethylene glycol 3350 17 Gram(s) Oral two times a day  potassium chloride  10 mEq/50 mL IVPB 10 milliEquivalent(s) IV Intermittent once  potassium chloride  10 mEq/50 mL IVPB 10 milliEquivalent(s) IV Intermittent once  PrismaSOL Filtration BGK 4 / 2.5 5000 milliLiter(s) CRRT <Continuous>  senna 2 Tablet(s) Oral at bedtime  sodium chloride 0.9%. 1000 milliLiter(s) IV Continuous <Continuous>  tacrolimus    0.5 mG/mL Suspension 1.5 milliGRAM(s) Oral <User Schedule>  traZODone 100 milliGRAM(s) Oral at bedtime  vancomycin    Solution 125 milliGRAM(s) Oral every 12 hours    PRN Inpatient Medications  hydrOXYzine hydrochloride 10 milliGRAM(s) Oral at bedtime PRN      REVIEW OF SYSTEMS  --------------------------------------------------------------------------------  as noted above.     VITALS/PHYSICAL EXAM  --------------------------------------------------------------------------------  T(C): 37.3 (05-04-25 @ 08:00), Max: 37.5 (05-03-25 @ 20:00)  HR: 102 (05-04-25 @ 10:00) (100 - 116)  BP: 114/71 (05-03-25 @ 18:00) (98/64 - 119/75)  RR: 21 (05-04-25 @ 10:00) (15 - 34)  SpO2: 99% (05-04-25 @ 10:00) (96% - 100%)  Wt(kg): --        05-03-25 @ 07:01  -  05-04-25 @ 07:00  --------------------------------------------------------  IN: 1464.3 mL / OUT: 2119 mL / NET: -654.7 mL    05-04-25 @ 07:01  -  05-04-25 @ 10:33  --------------------------------------------------------  IN: 216.3 mL / OUT: 45 mL / NET: 171.3 mL      Physical Exam:  Gen: ill-appearing  Neuro: non-focal  HEENT: NC O2   Pulm: B/L breath sounds  CV: S1 S2 +  Abd: soft, non-distended, non-tender  : +indwelling nagy  Extremities: no edema  Skin: Warm  Dialysis access: R fem [facility], [place], operated by [facility]     LABS/STUDIES  --------------------------------------------------------------------------------              7.6    17.75 >-----------<  62       [05-04-25 @ 00:35]              22.5     138  |  102  |  30  ----------------------------<  137      [05-04-25 @ 00:35]  4.7   |  19  |  2.17        Ca     8.5     [05-04-25 @ 00:35]      Mg     2.8     [05-04-25 @ 00:35]      Phos  4.6     [05-04-25 @ 00:35]    TPro  5.9  /  Alb  4.1  /  TBili  1.2  /  DBili  x   /  AST  28  /  ALT  37  /  AlkPhos  79  [05-04-25 @ 00:35]    PT/INR: PT 13.7 , INR 1.19       [05-04-25 @ 00:35]  PTT: 24.3       [05-04-25 @ 00:35]      Creatinine Trend:  SCr 2.17 [05-04 @ 00:35]  SCr 2.18 [05-03 @ 00:38]  SCr 2.19 [05-02 @ 12:32]  SCr 2.30 [05-02 @ 00:56]  SCr 2.67 [05-01 @ 12:38]      Urine Creatinine 46      [04-29-25 @ 02:45]  Urine Protein 146      [04-29-25 @ 02:45]  Urine Sodium 29      [04-29-25 @ 02:45]  Urine Urea Nitrogen 81      [04-29-25 @ 02:45]  Urine Potassium 72      [04-29-25 @ 02:45]  Urine Osmolality 345      [04-29-25 @ 02:45]

## 2025-05-04 NOTE — PROGRESS NOTE ADULT - ATTENDING COMMENTS
OHT  Cardiogenic shock   CRRT now discontinued  Diuretic challenge by CTU  Otherwise if no response will need to restart CRRT if on pressors    Letitia Lim MD  Off: [phone]  contact me on teams    (After 5 pm or on weekends please page the on-call fellow/attending, can check [email or web address] for schedule. Login is logan santana, schedule under [facility] medicine, psych, derm)

## 2025-05-04 NOTE — PROGRESS NOTE ADULT - PROBLEM SELECTOR PROBLEM 6
Atrial fibrillation
HLD (hyperlipidemia)
Atrial fibrillation
HLD (hyperlipidemia)
Atrial fibrillation
HLD (hyperlipidemia)
Atrial fibrillation
HLD (hyperlipidemia)
Atrial fibrillation
Delirium
Atrial fibrillation
HLD (hyperlipidemia)
Atrial fibrillation
Delirium
Atrial fibrillation

## 2025-05-04 NOTE — PROGRESS NOTE ADULT - ASSESSMENT
70-year-old male, ABO O, with NICM since 2011 HFrEF (LVEF 25-30%) s/p MDT CRT-D with baseline CHB, VT/VF, AFs/p GIANFRANCO ligation/MAZE, MV/TV repair, PVC ablation 3/2024 intolerant to AADs in the past, recent admission 3/19/2025-3/20/2025 for AICD shocks initially presented to the [facility] ED on 3/21/2025, sent by HF specialist for ACID shock. Device reported successful ATP for VT 3/17/2025 at 6:52pm followed by one ATP & 40J shock. He reported feeling dizzy & SOB during the events. He follows with Dr. Luca Tamayo for HF, currently undergoing transplant workup, Dr John for CRTD and VT/VF and Dr. Chu for genetic counseling. While admitted to [facility], he was started on a lidocaine gtt. On 3/21 when lidocaine weaned off patient had another two episodes of VT requiring AICD shocks. He was transferred to [facility] for further management. He was initially maintained on lidocaine gtt from 3/21/2025-3/25/2025 & his listing status was upgraded to UNOS status 2e for heart transplant (ABO O) for the refractory VT. He was transitioned to oral antiarrhythmics (Toprol XL & quinidine) & ultimately transferred from CICU to [facility] floor on 3/27/2025. Hospital course was further c/b development of watery diarrhea & was found to have +norovirus on 3/31/2025 which prompted deactivation to status 7 listing for heart transplant. Status reinstated to 2E after diarrhea resolved.     He underwent successful OHT on 4/28 + TV ring repair 34mm (CMV +/+, Toxo -/+,ischemic time 258min, intra op 2plts + 2 cryo). Intra op STACEY with LVEF 20% and mild RV dysfunction.  Extubated on 4/29.  Had some initial RV failure/PGD (requiring dobutamine 7.5, epi 0.02, levo, vaso, and Lico.  CRRT started to aid volume removal.  Echo from 4/30 with LVEF 70% and mild RV dysfunction.  Levo/Vaso weaned off on 5/1 and Lico weaned off 5/2. He requiring large amounts of pain medication despite 2 chest tubes being removed POD 1 (ketamine + PCA pump).  Now with ?delerium and non-cooporation with care, SI. Seen by psych 5/2 and started on trazodone + Pristiq.  Requiring 1:1 sitter.  NG tube to be placed today.      Bedside hemodynamics:  5/2/25: /59/75, , CVP 12, PA 44/15/24, CO/CI 6.8/3.5  5/1/25 BP 99/59/72, , CVP 11, PA 40/17/24, Gucci CO/CI 5.5/2.8  4/29/25 BP 94/57/67, , CVP 11, PA 28/15/20, Gucci CI 2.2  3/22/25 BP 97/76/83, HR 80, CVP 6, PA 58/23/38, PCWP 20, SVR 1100, Gucci CO/CI 5.1/2.6, TD 4/2.08    Cardiac Studies:   TTE 4/30/25: LVEF 71%, no RMWA, RV normal size, reduced RVSF  STACEY 4/28/25 intraop: LVEF 20%, global, dilated RV with mildly reduced RVSF  TTE 3/20/25 : LVEF 30%, segmental, LVIDd 5.3, walls normal, elevated LV filling pressures, mod RVE with mildly reduced RV function, TAPSE 1.7, severely dilated RA, annuloplasty in MV position, transvalvular gradients are elevated with severe prosthetic MS (peak gradient 15.7, mean gradient 8), trace intravalvular MR, TV annuloplasty ring noted, mild TR,  est PASP 36, no evidence of LV thrombus  TTE 10/2024: LVEF 25-30%, LVEDD 5.9cm, moderately reduced RV function, annuloplasty ring of mitral and tricuspid position, PASP 47 mmHg  RHC 3/19/25- RA 11 PA 58/26 PCWP 32 CO/CI 5.43/2.77  [facility] 6/2023 Nonobstructive CAD 70-year-old male, ABO O, with NICM since 2011 HFrEF (LVEF 25-30%) s/p MDT CRT-D with baseline CHB, VT/VF, AFs/p GIANFRANCO ligation/MAZE, MV/TV repair, PVC ablation 3/2024 intolerant to AADs in the past, recent admission 3/19/2025-3/20/2025 for AICD shocks initially presented to the [facility] ED on 3/21/2025, sent by HF specialist for ACID shock. Device reported successful ATP for VT 3/17/2025 at 6:52pm followed by one ATP & 40J shock. He reported feeling dizzy & SOB during the events. He follows with Dr. Luca Tamayo for HF, currently undergoing transplant workup, Dr John for CRTD and VT/VF and Dr. Chu for genetic counseling. While admitted to [facility], he was started on a lidocaine gtt. On 3/21 when lidocaine weaned off patient had another two episodes of VT requiring AICD shocks. He was transferred to [facility] for further management. He was initially maintained on lidocaine gtt from 3/21/2025-3/25/2025 & his listing status was upgraded to UNOS status 2e for heart transplant (ABO O) for the refractory VT. He was transitioned to oral antiarrhythmics (Toprol XL & quinidine) & ultimately transferred from CICU to [facility] floor on 3/27/2025. Hospital course was further c/b development of watery diarrhea & was found to have +norovirus on 3/31/2025 which prompted deactivation to status 7 listing for heart transplant. Status reinstated to 2E after diarrhea resolved.     He underwent successful OHT on 4/28 + TV ring repair 34mm (CMV +/+, Toxo -/+,ischemic time 258min, intra op 2plts + 2 cryo). Intra op STACEY with LVEF 20% and mild RV dysfunction.  Extubated on 4/29.  Had some initial RV failure/PGD (requiring dobutamine 7.5, epi 0.02, levo, vaso, and Lico.  CRRT started to aid volume removal.  Echo from 4/30 with LVEF 70% and mild RV dysfunction.  Levo/Vaso weaned off on 5/1 and Lico weaned off 5/2. He requiring large amounts of pain medication despite 2 chest tubes being removed POD 1 (ketamine + PCA pump).  Now with ?delerium and non-cooporation with care, SI. Seen by psych 5/2 and started on trazodone + Pristiq.  Requiring 1:1 sitter.  NG tube to be placed 5/3.      Bedside hemodynamics:  5/2/25: /59/75, , CVP 12, PA 44/15/24, CO/CI 6.8/3.5  5/1/25 BP 99/59/72, , CVP 11, PA 40/17/24, Gucci CO/CI 5.5/2.8  4/29/25 BP 94/57/67, , CVP 11, PA 28/15/20, Gucci CI 2.2  3/22/25 BP 97/76/83, HR 80, CVP 6, PA 58/23/38, PCWP 20, SVR 1100, Gucci CO/CI 5.1/2.6, TD 4/2.08    Cardiac Studies:   TTE 4/30/25: LVEF 71%, no RMWA, RV normal size, reduced RVSF  STACEY 4/28/25 intraop: LVEF 20%, global, dilated RV with mildly reduced RVSF  TTE 3/20/25 : LVEF 30%, segmental, LVIDd 5.3, walls normal, elevated LV filling pressures, mod RVE with mildly reduced RV function, TAPSE 1.7, severely dilated RA, annuloplasty in MV position, transvalvular gradients are elevated with severe prosthetic MS (peak gradient 15.7, mean gradient 8), trace intravalvular MR, TV annuloplasty ring noted, mild TR,  est PASP 36, no evidence of LV thrombus  TTE 10/2024: LVEF 25-30%, LVEDD 5.9cm, moderately reduced RV function, annuloplasty ring of mitral and tricuspid position, PASP 47 mmHg  RHC 3/19/25- RA 11 PA 58/26 PCWP 32 CO/CI 5.43/2.77  [facility] 6/2023 Nonobstructive CAD

## 2025-05-04 NOTE — PROGRESS NOTE ADULT - PROBLEM SELECTOR PLAN 3
Post-op c/b ZAHRA likely hemodynamic   -On CRRT, goal net neg 500cc  -Diuretic challenge today   -Nephro consulted Post-op c/b ZAHRA likely hemodynamic   -CRRT on hold this AM at start of shift  -Diuretic challenge today w/ bumex  -Nephro consulted

## 2025-05-04 NOTE — PROGRESS NOTE ADULT - CRITICAL CARE ATTENDING COMMENT
Patient was seen and examined with the fellow.  I agree with the above except for the following:    s/p OHT on 4/28 + TV ring repair.    NG tube placed   For ongoing delirium and inability to cooperate with care.  Patient still able to take p.o.  However, because he is intermittently refusing, all immunosuppression pills are going down the NG tube.    Tac level today low at 5.8.  Will increase to 3 mg twice daily.  Continue on MMF 1 g twice daily and methylprednisolone taper.    PAC: CVP 7–9, PA 42/6 (22),  MVO2 68%, CO/CI 6.5/3.3 on top 1.5 and epi 0.02.  Would plan to wean off epi followed by dobutamine today.  Once off all inotropes, can remove [place].  CRRT stopped Sunday a.m.  Bumex trial today.  Will check afternoon H&H.  Hemoglobin this morning 7.6, low threshold to transfuse if falls further. Patient was seen and examined with the fellow.  I agree with the above except for the following:    s/p OHT on 4/28 + TV ring repair.    NG tube placed   For ongoing delirium and inability to cooperate with care.  Patient still able to take p.o.  However, because he is intermittently refusing, all immunosuppression pills are going down the NG tube.    Tac level today low at 5.2.  Will increase to 3 mg twice daily.  Continue on MMF 1 g twice daily and methylprednisolone taper.    PAC: CVP 7–9, PA 42/6 (22),  MVO2 68%, CO/CI 6.5/3.3 on top 1.5 and epi 0.02.  Would plan to wean off epi followed by dobutamine today.  Once off all inotropes, can remove [place].  CRRT stopped Sunday a.m.  Bumex trial today.  Will check afternoon H&H.  Hemoglobin this morning 7.6, low threshold to transfuse if falls further.

## 2025-05-04 NOTE — PROGRESS NOTE ADULT - SUBJECTIVE AND OBJECTIVE BOX
Duane Hughes MD  Cardiology Fellow  [phone]  All Cardiology service information can be found  on [email or web address], password: [email or web address]    Patient seen and examined at bedside.  Tacro lvl 6.7, suspension dose 1.5mg bid   Yesterday  weaned down to 1.5, epi still at 0.02  Diuretic challenge today     Review Of Systems: No chest pain, shortness of breath, or palpitations            Current Meds:  acetaminophen     Tablet .. 500 milliGRAM(s) Oral every 6 hours  buMETAnide Infusion 2 mG/Hr IV Continuous <Continuous>  buMETAnide Injectable 2 milliGRAM(s) IV Push once  cefTRIAXone   IVPB 2000 milliGRAM(s) IV Intermittent <User Schedule>  chlorhexidine 2% Cloths 1 Application(s) Topical daily  CRRT Treatment    <Continuous>  dexMEDEtomidine Infusion 1.2 MICROgram(s)/kG/Hr IV Continuous <Continuous>  dextrose 50% Injectable 50 milliLiter(s) IV Push every 15 minutes  dextrose 50% Injectable 25 milliLiter(s) IV Push every 15 minutes  DOBUTamine Infusion 1.5 MICROgram(s)/kG/Min IV Continuous <Continuous>  EPINEPHrine    Infusion 0.02 MICROgram(s)/kG/Min IV Continuous <Continuous>  hydrOXYzine hydrochloride 10 milliGRAM(s) Oral three times a day PRN  insulin regular Infusion 3 Unit(s)/Hr IV Continuous <Continuous>  lidocaine   4% Patch 3 Patch Transdermal daily  methylPREDNISolone sodium succinate Injectable 24 milliGRAM(s) IV Push every 12 hours  methylPREDNISolone sodium succinate Injectable   IV Push   mycophenolate mofetil Suspension 1000 milliGRAM(s) Oral every 12 hours  naloxegol 12.5 milliGRAM(s) Oral daily  niCARdipine Infusion 5 mG/Hr IV Continuous <Continuous>  norepinephrine Infusion 0.05 MICROgram(s)/kG/Min IV Continuous <Continuous>  nystatin    Suspension 388881 Unit(s) Oral <User Schedule>  oxymetazoline 0.05% Nasal Spray 2 Spray(s) Both Nostrils two times a day  pantoprazole  Injectable 40 milliGRAM(s) IV Push daily  penicillin   G benzathine Injectable 2.4 Million Unit(s) IntraMuscular <User Schedule>  Phoxillum Filtration BK 4 / 2.5 5000 milliLiter(s) CRRT <Continuous>  Phoxillum Filtration BK 4 / 2.5 5000 milliLiter(s) CRRT <Continuous>  polyethylene glycol 3350 17 Gram(s) Oral two times a day  potassium chloride  10 mEq/50 mL IVPB 10 milliEquivalent(s) IV Intermittent once  potassium chloride  10 mEq/50 mL IVPB 10 milliEquivalent(s) IV Intermittent once  PrismaSOL Filtration BGK 4 / 2.5 5000 milliLiter(s) CRRT <Continuous>  senna 2 Tablet(s) Oral at bedtime  sodium chloride 0.9%. 1000 milliLiter(s) IV Continuous <Continuous>  tacrolimus    0.5 mG/mL Suspension 1.5 milliGRAM(s) Oral <User Schedule>  traZODone 100 milliGRAM(s) Oral at bedtime  vancomycin    Solution 125 milliGRAM(s) Oral every 12 hours    Vitals:  T(F): 99.1 (), Max: 99.5 ()  HR: 107 () (105 - 116)  BP: 114/71 () (98/64 - 119/75)  RR: 18 ()  SpO2: 99% ()  I&O's Summary    03 May 2025 07:01  -  04 May 2025 07:00  --------------------------------------------------------  IN: 1464.3 mL / OUT: 2119 mL / NET: -654.7 mL      Appearance: Delirious  Eyes: PERRL, EOMI, pink conjunctiva  HEENT: Normal oral mucosa  Cardiovascular: RRR, S1, S2, no murmurs, rubs, or gallops; no edema; no JVD  Respiratory: Clear to auscultation bilaterally  Gastrointestinal: soft, non-tender, non-distended with normal bowel sounds  Musculoskeletal: No clubbing; no joint deformity                           7.6    17.75 )-----------( 62       ( 04 May 2025 00:35 )             22.5         138  |  102  |  30[H]  ----------------------------<  137[H]  4.7   |  19[L]  |  2.17[H]    Ca    8.5      04 May 2025 00:35  Phos  4.6     05-04  Mg     2.8     05-04    TPro  5.9[L]  /  Alb  4.1  /  TBili  1.2  /  DBili  x   /  AST  28  /  ALT  37  /  AlkPhos  79  05-04    PT/INR - ( 04 May 2025 00:35 )   PT: 13.7 sec;   INR: 1.19 ratio         PTT - ( 04 May 2025 00:35 )  PTT:24.3 sec     Duane Hughes MD  Cardiology Fellow  [phone]  All Cardiology service information can be found  on [email or web address], password: [email or web address]    Patient seen and examined at bedside.  Tacro lvl 6.7, suspension dose 1.5mg bid   Yesterday  weaned down to 1.5, epi still at 0.01.  insulin @3, bumex trial. CRRT was discontinued this AM   Plan for OOBTC today     Review Of Systems: No chest pain, shortness of breath, or palpitations            Current Meds:  acetaminophen     Tablet .. 500 milliGRAM(s) Oral every 6 hours  buMETAnide Infusion 2 mG/Hr IV Continuous <Continuous>  buMETAnide Injectable 2 milliGRAM(s) IV Push once  cefTRIAXone   IVPB 2000 milliGRAM(s) IV Intermittent <User Schedule>  chlorhexidine 2% Cloths 1 Application(s) Topical daily  CRRT Treatment    <Continuous>  dexMEDEtomidine Infusion 1.2 MICROgram(s)/kG/Hr IV Continuous <Continuous>  dextrose 50% Injectable 50 milliLiter(s) IV Push every 15 minutes  dextrose 50% Injectable 25 milliLiter(s) IV Push every 15 minutes  DOBUTamine Infusion 1.5 MICROgram(s)/kG/Min IV Continuous <Continuous>  EPINEPHrine    Infusion 0.02 MICROgram(s)/kG/Min IV Continuous <Continuous>  hydrOXYzine hydrochloride 10 milliGRAM(s) Oral three times a day PRN  insulin regular Infusion 3 Unit(s)/Hr IV Continuous <Continuous>  lidocaine   4% Patch 3 Patch Transdermal daily  methylPREDNISolone sodium succinate Injectable 24 milliGRAM(s) IV Push every 12 hours  methylPREDNISolone sodium succinate Injectable   IV Push   mycophenolate mofetil Suspension 1000 milliGRAM(s) Oral every 12 hours  naloxegol 12.5 milliGRAM(s) Oral daily  niCARdipine Infusion 5 mG/Hr IV Continuous <Continuous>  norepinephrine Infusion 0.05 MICROgram(s)/kG/Min IV Continuous <Continuous>  nystatin    Suspension 268818 Unit(s) Oral <User Schedule>  oxymetazoline 0.05% Nasal Spray 2 Spray(s) Both Nostrils two times a day  pantoprazole  Injectable 40 milliGRAM(s) IV Push daily  penicillin   G benzathine Injectable 2.4 Million Unit(s) IntraMuscular <User Schedule>  Phoxillum Filtration BK 4 / 2.5 5000 milliLiter(s) CRRT <Continuous>  Phoxillum Filtration BK 4 / 2.5 5000 milliLiter(s) CRRT <Continuous>  polyethylene glycol 3350 17 Gram(s) Oral two times a day  potassium chloride  10 mEq/50 mL IVPB 10 milliEquivalent(s) IV Intermittent once  potassium chloride  10 mEq/50 mL IVPB 10 milliEquivalent(s) IV Intermittent once  PrismaSOL Filtration BGK 4 / 2.5 5000 milliLiter(s) CRRT <Continuous>  senna 2 Tablet(s) Oral at bedtime  sodium chloride 0.9%. 1000 milliLiter(s) IV Continuous <Continuous>  tacrolimus    0.5 mG/mL Suspension 1.5 milliGRAM(s) Oral <User Schedule>  traZODone 100 milliGRAM(s) Oral at bedtime  vancomycin    Solution 125 milliGRAM(s) Oral every 12 hours    Vitals:  T(F): 99.1 (), Max: 99.5 ()  HR: 107 () (105 - 116)  BP: 114/71 () (98/64 - 119/75)  RR: 18 ()  SpO2: 99% ()  I&O's Summary    03 May 2025 07:01  -  04 May 2025 07:00  --------------------------------------------------------  IN: 1464.3 mL / OUT: 2119 mL / NET: -654.7 mL      Appearance: Delirious  Eyes: PERRL, EOMI, pink conjunctiva  HEENT: Normal oral mucosa  Cardiovascular: RRR, S1, S2, no murmurs, rubs, or gallops; no edema; no JVD  Respiratory: Clear to auscultation bilaterally  Gastrointestinal: soft, non-tender, non-distended with normal bowel sounds  Musculoskeletal: No clubbing; no joint deformity                           7.6    17.75 )-----------( 62       ( 04 May 2025 00:35 )             22.5         138  |  102  |  30[H]  ----------------------------<  137[H]  4.7   |  19[L]  |  2.17[H]    Ca    8.5      04 May 2025 00:35  Phos  4.6     05-04  Mg     2.8     05-04    TPro  5.9[L]  /  Alb  4.1  /  TBili  1.2  /  DBili  x   /  AST  28  /  ALT  37  /  AlkPhos  79  05-04    PT/INR - ( 04 May 2025 00:35 )   PT: 13.7 sec;   INR: 1.19 ratio         PTT - ( 04 May 2025 00:35 )  PTT:24.3 sec     Duane Hughes MD  Cardiology Fellow  [phone]  All Cardiology service information can be found  on [email or web address], password: [email or web address]    Patient seen and examined at bedside.  ST 100s /53 CVP 8 PAP 41/6/22   Tacro lvl 6.7, suspension dose 1.5mg bid   Yesterday  weaned down to 1.5, epi still at 0.01.  insulin @3, bumex trial. CRRT was discontinued this AM   Plan for OOBTC today     Review Of Systems: No chest pain, shortness of breath, or palpitations            Current Meds:  acetaminophen     Tablet .. 500 milliGRAM(s) Oral every 6 hours  buMETAnide Infusion 2 mG/Hr IV Continuous <Continuous>  buMETAnide Injectable 2 milliGRAM(s) IV Push once  cefTRIAXone   IVPB 2000 milliGRAM(s) IV Intermittent <User Schedule>  chlorhexidine 2% Cloths 1 Application(s) Topical daily  CRRT Treatment    <Continuous>  dexMEDEtomidine Infusion 1.2 MICROgram(s)/kG/Hr IV Continuous <Continuous>  dextrose 50% Injectable 50 milliLiter(s) IV Push every 15 minutes  dextrose 50% Injectable 25 milliLiter(s) IV Push every 15 minutes  DOBUTamine Infusion 1.5 MICROgram(s)/kG/Min IV Continuous <Continuous>  EPINEPHrine    Infusion 0.02 MICROgram(s)/kG/Min IV Continuous <Continuous>  hydrOXYzine hydrochloride 10 milliGRAM(s) Oral three times a day PRN  insulin regular Infusion 3 Unit(s)/Hr IV Continuous <Continuous>  lidocaine   4% Patch 3 Patch Transdermal daily  methylPREDNISolone sodium succinate Injectable 24 milliGRAM(s) IV Push every 12 hours  methylPREDNISolone sodium succinate Injectable   IV Push   mycophenolate mofetil Suspension 1000 milliGRAM(s) Oral every 12 hours  naloxegol 12.5 milliGRAM(s) Oral daily  niCARdipine Infusion 5 mG/Hr IV Continuous <Continuous>  norepinephrine Infusion 0.05 MICROgram(s)/kG/Min IV Continuous <Continuous>  nystatin    Suspension 102877 Unit(s) Oral <User Schedule>  oxymetazoline 0.05% Nasal Spray 2 Spray(s) Both Nostrils two times a day  pantoprazole  Injectable 40 milliGRAM(s) IV Push daily  penicillin   G benzathine Injectable 2.4 Million Unit(s) IntraMuscular <User Schedule>  Phoxillum Filtration BK 4 / 2.5 5000 milliLiter(s) CRRT <Continuous>  Phoxillum Filtration BK 4 / 2.5 5000 milliLiter(s) CRRT <Continuous>  polyethylene glycol 3350 17 Gram(s) Oral two times a day  potassium chloride  10 mEq/50 mL IVPB 10 milliEquivalent(s) IV Intermittent once  potassium chloride  10 mEq/50 mL IVPB 10 milliEquivalent(s) IV Intermittent once  PrismaSOL Filtration BGK 4 / 2.5 5000 milliLiter(s) CRRT <Continuous>  senna 2 Tablet(s) Oral at bedtime  sodium chloride 0.9%. 1000 milliLiter(s) IV Continuous <Continuous>  tacrolimus    0.5 mG/mL Suspension 1.5 milliGRAM(s) Oral <User Schedule>  traZODone 100 milliGRAM(s) Oral at bedtime  vancomycin    Solution 125 milliGRAM(s) Oral every 12 hours    Vitals:  T(F): 99.1 (), Max: 99.5 ()  HR: 107 () (105 - 116)  BP: 114/71 () (98/64 - 119/75)  RR: 18 ()  SpO2: 99% ()  I&O's Summary    03 May 2025 07:01  -  04 May 2025 07:00  --------------------------------------------------------  IN: 1464.3 mL / OUT: 2119 mL / NET: -654.7 mL      Appearance: Delirious  Eyes: PERRL, EOMI, pink conjunctiva  HEENT: Normal oral mucosa  Cardiovascular: RRR, S1, S2, no murmurs, rubs, or gallops; no edema; no JVD  Respiratory: Clear to auscultation bilaterally  Gastrointestinal: soft, non-tender, non-distended with normal bowel sounds  Musculoskeletal: No clubbing; no joint deformity                           7.6    17.75 )-----------( 62       ( 04 May 2025 00:35 )             22.5         138  |  102  |  30[H]  ----------------------------<  137[H]  4.7   |  19[L]  |  2.17[H]    Ca    8.5      04 May 2025 00:35  Phos  4.6     05-  Mg     2.8     05-04    TPro  5.9[L]  /  Alb  4.1  /  TBili  1.2  /  DBili  x   /  AST  28  /  ALT  37  /  AlkPhos  79  05-04    PT/INR - ( 04 May 2025 00:35 )   PT: 13.7 sec;   INR: 1.19 ratio         PTT - ( 04 May 2025 00:35 )  PTT:24.3 sec

## 2025-05-05 LAB
ALBUMIN SERPL ELPH-MCNC: 3.9 G/DL — SIGNIFICANT CHANGE UP (ref 3.3–5)
ALBUMIN SERPL ELPH-MCNC: 4.2 G/DL — SIGNIFICANT CHANGE UP (ref 3.3–5)
ALP SERPL-CCNC: 83 U/L — SIGNIFICANT CHANGE UP (ref 40–120)
ALP SERPL-CCNC: 96 U/L — SIGNIFICANT CHANGE UP (ref 40–120)
ALT FLD-CCNC: 66 U/L — HIGH (ref 10–45)
ALT FLD-CCNC: 84 U/L — HIGH (ref 10–45)
ANION GAP SERPL CALC-SCNC: 15 MMOL/L — SIGNIFICANT CHANGE UP (ref 5–17)
ANION GAP SERPL CALC-SCNC: 16 MMOL/L — SIGNIFICANT CHANGE UP (ref 5–17)
APTT BLD: 23.1 SEC — LOW (ref 26.1–36.8)
AST SERPL-CCNC: 31 U/L — SIGNIFICANT CHANGE UP (ref 10–40)
AST SERPL-CCNC: 34 U/L — SIGNIFICANT CHANGE UP (ref 10–40)
BASE EXCESS BLDV CALC-SCNC: -1.2 MMOL/L — SIGNIFICANT CHANGE UP (ref -2–3)
BASE EXCESS BLDV CALC-SCNC: -4 MMOL/L — LOW (ref -2–3)
BASOPHILS # BLD AUTO: 0.02 K/UL — SIGNIFICANT CHANGE UP (ref 0–0.2)
BASOPHILS NFR BLD AUTO: 0.1 % — SIGNIFICANT CHANGE UP (ref 0–2)
BILIRUB SERPL-MCNC: 0.8 MG/DL — SIGNIFICANT CHANGE UP (ref 0.2–1.2)
BILIRUB SERPL-MCNC: 0.9 MG/DL — SIGNIFICANT CHANGE UP (ref 0.2–1.2)
BLD GP AB SCN SERPL QL: NEGATIVE — SIGNIFICANT CHANGE UP
BUN SERPL-MCNC: 58 MG/DL — HIGH (ref 7–23)
BUN SERPL-MCNC: 74 MG/DL — HIGH (ref 7–23)
CALCIUM SERPL-MCNC: 8.3 MG/DL — LOW (ref 8.4–10.5)
CALCIUM SERPL-MCNC: 8.7 MG/DL — SIGNIFICANT CHANGE UP (ref 8.4–10.5)
CHLORIDE SERPL-SCNC: 100 MMOL/L — SIGNIFICANT CHANGE UP (ref 96–108)
CHLORIDE SERPL-SCNC: 97 MMOL/L — SIGNIFICANT CHANGE UP (ref 96–108)
CO2 BLDV-SCNC: 24 MMOL/L — SIGNIFICANT CHANGE UP (ref 22–26)
CO2 BLDV-SCNC: 27 MMOL/L — HIGH (ref 22–26)
CO2 SERPL-SCNC: 20 MMOL/L — LOW (ref 22–31)
CO2 SERPL-SCNC: 21 MMOL/L — LOW (ref 22–31)
CREAT SERPL-MCNC: 3.54 MG/DL — HIGH (ref 0.5–1.3)
CREAT SERPL-MCNC: 4.01 MG/DL — HIGH (ref 0.5–1.3)
EGFR: 15 ML/MIN/1.73M2 — LOW
EGFR: 15 ML/MIN/1.73M2 — LOW
EGFR: 18 ML/MIN/1.73M2 — LOW
EGFR: 18 ML/MIN/1.73M2 — LOW
EOSINOPHIL # BLD AUTO: 0 K/UL — SIGNIFICANT CHANGE UP (ref 0–0.5)
EOSINOPHIL NFR BLD AUTO: 0 % — SIGNIFICANT CHANGE UP (ref 0–6)
GAS PNL BLDA: SIGNIFICANT CHANGE UP
GAS PNL BLDV: SIGNIFICANT CHANGE UP
GLUCOSE BLDC GLUCOMTR-MCNC: 108 MG/DL — HIGH (ref 70–99)
GLUCOSE BLDC GLUCOMTR-MCNC: 110 MG/DL — HIGH (ref 70–99)
GLUCOSE BLDC GLUCOMTR-MCNC: 126 MG/DL — HIGH (ref 70–99)
GLUCOSE BLDC GLUCOMTR-MCNC: 130 MG/DL — HIGH (ref 70–99)
GLUCOSE BLDC GLUCOMTR-MCNC: 133 MG/DL — HIGH (ref 70–99)
GLUCOSE BLDC GLUCOMTR-MCNC: 139 MG/DL — HIGH (ref 70–99)
GLUCOSE BLDC GLUCOMTR-MCNC: 146 MG/DL — HIGH (ref 70–99)
GLUCOSE BLDC GLUCOMTR-MCNC: 146 MG/DL — HIGH (ref 70–99)
GLUCOSE BLDC GLUCOMTR-MCNC: 148 MG/DL — HIGH (ref 70–99)
GLUCOSE BLDC GLUCOMTR-MCNC: 156 MG/DL — HIGH (ref 70–99)
GLUCOSE BLDC GLUCOMTR-MCNC: 159 MG/DL — HIGH (ref 70–99)
GLUCOSE BLDC GLUCOMTR-MCNC: 160 MG/DL — HIGH (ref 70–99)
GLUCOSE BLDC GLUCOMTR-MCNC: 170 MG/DL — HIGH (ref 70–99)
GLUCOSE BLDC GLUCOMTR-MCNC: 177 MG/DL — HIGH (ref 70–99)
GLUCOSE BLDC GLUCOMTR-MCNC: 78 MG/DL — SIGNIFICANT CHANGE UP (ref 70–99)
GLUCOSE BLDC GLUCOMTR-MCNC: 93 MG/DL — SIGNIFICANT CHANGE UP (ref 70–99)
GLUCOSE BLDC GLUCOMTR-MCNC: 95 MG/DL — SIGNIFICANT CHANGE UP (ref 70–99)
GLUCOSE BLDC GLUCOMTR-MCNC: 96 MG/DL — SIGNIFICANT CHANGE UP (ref 70–99)
GLUCOSE SERPL-MCNC: 110 MG/DL — HIGH (ref 70–99)
GLUCOSE SERPL-MCNC: 111 MG/DL — HIGH (ref 70–99)
HCO3 BLDV-SCNC: 23 MMOL/L — SIGNIFICANT CHANGE UP (ref 22–29)
HCO3 BLDV-SCNC: 26 MMOL/L — SIGNIFICANT CHANGE UP (ref 22–29)
HCT VFR BLD CALC: 25.7 % — LOW (ref 39–50)
HGB BLD-MCNC: 8.5 G/DL — LOW (ref 13–17)
HOROWITZ INDEX BLDV+IHG-RTO: 28 — SIGNIFICANT CHANGE UP
HOROWITZ INDEX BLDV+IHG-RTO: 28 — SIGNIFICANT CHANGE UP
IMM GRANULOCYTES NFR BLD AUTO: 4.3 % — HIGH (ref 0–0.9)
INR BLD: 1.31 RATIO — HIGH (ref 0.85–1.16)
LYMPHOCYTES # BLD AUTO: 0.37 K/UL — LOW (ref 1–3.3)
LYMPHOCYTES # BLD AUTO: 2.1 % — LOW (ref 13–44)
MAGNESIUM SERPL-MCNC: 2.8 MG/DL — HIGH (ref 1.6–2.6)
MAGNESIUM SERPL-MCNC: 2.9 MG/DL — HIGH (ref 1.6–2.6)
MCHC RBC-ENTMCNC: 28.1 PG — SIGNIFICANT CHANGE UP (ref 27–34)
MCHC RBC-ENTMCNC: 33.1 G/DL — SIGNIFICANT CHANGE UP (ref 32–36)
MCV RBC AUTO: 84.8 FL — SIGNIFICANT CHANGE UP (ref 80–100)
MONOCYTES # BLD AUTO: 0.95 K/UL — HIGH (ref 0–0.9)
MONOCYTES NFR BLD AUTO: 5.4 % — SIGNIFICANT CHANGE UP (ref 2–14)
NEUTROPHILS # BLD AUTO: 15.6 K/UL — HIGH (ref 1.8–7.4)
NEUTROPHILS NFR BLD AUTO: 88.1 % — HIGH (ref 43–77)
NRBC BLD AUTO-RTO: 0 /100 WBCS — SIGNIFICANT CHANGE UP (ref 0–0)
PCO2 BLDV: 46 MMHG — SIGNIFICANT CHANGE UP (ref 42–55)
PCO2 BLDV: 51 MMHG — SIGNIFICANT CHANGE UP (ref 42–55)
PH BLDV: 7.3 — LOW (ref 7.32–7.43)
PH BLDV: 7.31 — LOW (ref 7.32–7.43)
PHOSPHATE SERPL-MCNC: 4.8 MG/DL — HIGH (ref 2.5–4.5)
PHOSPHATE SERPL-MCNC: 5.1 MG/DL — HIGH (ref 2.5–4.5)
PLATELET # BLD AUTO: 74 K/UL — LOW (ref 150–400)
PO2 BLDV: 40 MMHG — SIGNIFICANT CHANGE UP (ref 25–45)
PO2 BLDV: 44 MMHG — SIGNIFICANT CHANGE UP (ref 25–45)
POTASSIUM SERPL-MCNC: 4.7 MMOL/L — SIGNIFICANT CHANGE UP (ref 3.5–5.3)
POTASSIUM SERPL-MCNC: 4.7 MMOL/L — SIGNIFICANT CHANGE UP (ref 3.5–5.3)
POTASSIUM SERPL-SCNC: 4.7 MMOL/L — SIGNIFICANT CHANGE UP (ref 3.5–5.3)
POTASSIUM SERPL-SCNC: 4.7 MMOL/L — SIGNIFICANT CHANGE UP (ref 3.5–5.3)
PROT SERPL-MCNC: 5.7 G/DL — LOW (ref 6–8.3)
PROT SERPL-MCNC: 6.1 G/DL — SIGNIFICANT CHANGE UP (ref 6–8.3)
PROTHROM AB SERPL-ACNC: 14.9 SEC — HIGH (ref 9.9–13.4)
RBC # BLD: 3.03 M/UL — LOW (ref 4.2–5.8)
RBC # FLD: 15.9 % — HIGH (ref 10.3–14.5)
RH IG SCN BLD-IMP: POSITIVE — SIGNIFICANT CHANGE UP
SAO2 % BLDV: 63.9 % — LOW (ref 67–88)
SAO2 % BLDV: 72.8 % — SIGNIFICANT CHANGE UP (ref 67–88)
SODIUM SERPL-SCNC: 134 MMOL/L — LOW (ref 135–145)
SODIUM SERPL-SCNC: 135 MMOL/L — SIGNIFICANT CHANGE UP (ref 135–145)
TACROLIMUS SERPL-MCNC: 11.4 NG/ML — SIGNIFICANT CHANGE UP
WBC # BLD: 17.7 K/UL — HIGH (ref 3.8–10.5)
WBC # FLD AUTO: 17.7 K/UL — HIGH (ref 3.8–10.5)

## 2025-05-05 PROCEDURE — 99291 CRITICAL CARE FIRST HOUR: CPT

## 2025-05-05 PROCEDURE — G0545: CPT

## 2025-05-05 PROCEDURE — 99291 CRITICAL CARE FIRST HOUR: CPT | Mod: FS

## 2025-05-05 PROCEDURE — 99233 SBSQ HOSP IP/OBS HIGH 50: CPT

## 2025-05-05 PROCEDURE — 99232 SBSQ HOSP IP/OBS MODERATE 35: CPT | Mod: FS

## 2025-05-05 PROCEDURE — 99232 SBSQ HOSP IP/OBS MODERATE 35: CPT | Mod: GC

## 2025-05-05 PROCEDURE — 71045 X-RAY EXAM CHEST 1 VIEW: CPT | Mod: 26

## 2025-05-05 RX ORDER — B1/B2/B3/B5/B6/B12/VIT C/FOLIC 500-0.5 MG
1 TABLET ORAL DAILY
Refills: 0 | Status: DISCONTINUED | OUTPATIENT
Start: 2025-05-05 | End: 2025-05-14

## 2025-05-05 RX ORDER — HYDROMORPHONE/SOD CHLOR,ISO/PF 2 MG/10 ML
0.5 SYRINGE (ML) INJECTION ONCE
Refills: 0 | Status: DISCONTINUED | OUTPATIENT
Start: 2025-05-05 | End: 2025-05-05

## 2025-05-05 RX ORDER — SODIUM BICARBONATE 1 MEQ/ML
50 SYRINGE (ML) INTRAVENOUS ONCE
Refills: 0 | Status: COMPLETED | OUTPATIENT
Start: 2025-05-05 | End: 2025-05-05

## 2025-05-05 RX ORDER — TACROLIMUS 0.5 MG/1
1 CAPSULE ORAL ONCE
Refills: 0 | Status: COMPLETED | OUTPATIENT
Start: 2025-05-05 | End: 2025-05-05

## 2025-05-05 RX ORDER — TRAZODONE HCL 100 MG
150 TABLET ORAL AT BEDTIME
Refills: 0 | Status: DISCONTINUED | OUTPATIENT
Start: 2025-05-05 | End: 2025-05-14

## 2025-05-05 RX ORDER — TACROLIMUS 0.5 MG/1
2 CAPSULE ORAL
Refills: 0 | Status: DISCONTINUED | OUTPATIENT
Start: 2025-05-06 | End: 2025-05-06

## 2025-05-05 RX ADMIN — Medication 2 SPRAY(S): at 18:43

## 2025-05-05 RX ADMIN — Medication 500000 UNIT(S): at 19:53

## 2025-05-05 RX ADMIN — Medication 1 DROP(S): at 05:35

## 2025-05-05 RX ADMIN — MYCOPHENOLATE MOFETIL 1000 MILLIGRAM(S): 500 TABLET, FILM COATED ORAL at 19:48

## 2025-05-05 RX ADMIN — Medication 150 MILLIGRAM(S): at 21:03

## 2025-05-05 RX ADMIN — Medication 500 MILLIGRAM(S): at 05:35

## 2025-05-05 RX ADMIN — ERYTHROMYCIN 1 APPLICATION(S): 5 OINTMENT OPHTHALMIC at 14:19

## 2025-05-05 RX ADMIN — Medication 1 DROP(S): at 13:20

## 2025-05-05 RX ADMIN — Medication 0.5 MILLIGRAM(S): at 16:46

## 2025-05-05 RX ADMIN — ERYTHROMYCIN 1 APPLICATION(S): 5 OINTMENT OPHTHALMIC at 10:19

## 2025-05-05 RX ADMIN — Medication 1 TABLET(S): at 19:53

## 2025-05-05 RX ADMIN — Medication 50 MILLIEQUIVALENT(S): at 08:46

## 2025-05-05 RX ADMIN — DESVENLAFAXINE 50 MILLIGRAM(S): 25 TABLET, EXTENDED RELEASE ORAL at 16:32

## 2025-05-05 RX ADMIN — Medication 1 DROP(S): at 10:00

## 2025-05-05 RX ADMIN — Medication 500000 UNIT(S): at 16:31

## 2025-05-05 RX ADMIN — Medication 50 MILLIEQUIVALENT(S): at 01:07

## 2025-05-05 RX ADMIN — TACROLIMUS 3 MILLIGRAM(S): 0.5 CAPSULE ORAL at 08:44

## 2025-05-05 RX ADMIN — Medication 500000 UNIT(S): at 13:20

## 2025-05-05 RX ADMIN — Medication 500000 UNIT(S): at 08:45

## 2025-05-05 RX ADMIN — Medication 1 APPLICATION(S): at 12:59

## 2025-05-05 RX ADMIN — Medication 500 MILLIGRAM(S): at 19:12

## 2025-05-05 RX ADMIN — Medication 125 MILLIGRAM(S): at 05:34

## 2025-05-05 RX ADMIN — Medication 500 MILLIGRAM(S): at 13:50

## 2025-05-05 RX ADMIN — MYCOPHENOLATE MOFETIL 1000 MILLIGRAM(S): 500 TABLET, FILM COATED ORAL at 08:45

## 2025-05-05 RX ADMIN — Medication 500 MILLIGRAM(S): at 13:20

## 2025-05-05 RX ADMIN — Medication 3 UNIT(S)/HR: at 19:53

## 2025-05-05 RX ADMIN — METHYLPREDNISOLONE ACETATE 24 MILLIGRAM(S): 80 INJECTION, SUSPENSION INTRA-ARTICULAR; INTRALESIONAL; INTRAMUSCULAR; SOFT TISSUE at 05:34

## 2025-05-05 RX ADMIN — Medication 125 MILLIGRAM(S): at 18:42

## 2025-05-05 RX ADMIN — NALOXEGOL OXALATE 12.5 MILLIGRAM(S): 12.5 TABLET, FILM COATED ORAL at 13:19

## 2025-05-05 RX ADMIN — TACROLIMUS 1 MILLIGRAM(S): 0.5 CAPSULE ORAL at 19:53

## 2025-05-05 RX ADMIN — Medication 0.5 MILLIGRAM(S): at 16:31

## 2025-05-05 RX ADMIN — Medication 1 DROP(S): at 16:48

## 2025-05-05 RX ADMIN — Medication 500 MILLIGRAM(S): at 18:42

## 2025-05-05 RX ADMIN — Medication 1 DROP(S): at 19:53

## 2025-05-05 RX ADMIN — BUMETANIDE 5 MG/HR: 1 TABLET ORAL at 19:53

## 2025-05-05 RX ADMIN — CEFTRIAXONE 100 MILLIGRAM(S): 500 INJECTION, POWDER, FOR SOLUTION INTRAMUSCULAR; INTRAVENOUS at 13:19

## 2025-05-05 RX ADMIN — METHYLPREDNISOLONE ACETATE 20 MILLIGRAM(S): 80 INJECTION, SUSPENSION INTRA-ARTICULAR; INTRALESIONAL; INTRAMUSCULAR; SOFT TISSUE at 18:42

## 2025-05-05 RX ADMIN — Medication 1 DROP(S): at 09:02

## 2025-05-05 RX ADMIN — Medication 1 DROP(S): at 12:59

## 2025-05-05 RX ADMIN — ERYTHROMYCIN 1 APPLICATION(S): 5 OINTMENT OPHTHALMIC at 18:43

## 2025-05-05 RX ADMIN — ERYTHROMYCIN 1 APPLICATION(S): 5 OINTMENT OPHTHALMIC at 05:35

## 2025-05-05 RX ADMIN — Medication 500 MILLIGRAM(S): at 05:54

## 2025-05-05 RX ADMIN — Medication 1 DROP(S): at 18:43

## 2025-05-05 RX ADMIN — Medication 50 MILLIEQUIVALENT(S): at 08:04

## 2025-05-05 RX ADMIN — Medication 40 MILLIGRAM(S): at 13:20

## 2025-05-05 NOTE — PROGRESS NOTE ADULT - PROBLEM SELECTOR PLAN 2
- with steroids as possible contributor  - Pyshc consulted. On trazadone   - Feeding tube to receive meds as above, pt ate breakfast this AM, so NGT can be removed. Start calorie count tomorrow to assess nutritional status

## 2025-05-05 NOTE — PROGRESS NOTE ADULT - PROBLEM SELECTOR PLAN 1
Patient's baseline Scr is 1.0-1.2. On 4/28, SCr was 1.1 and on 4/29, Scr rubia to 3.2. UA-turbid, 300 protein, small leuks, large blood, 914 rbc's, UPCR-3.2. Pt had drop in hgb from 12.4 (4/28) to 9.0 (4/29). Pt was on IV vasopressor support. Pt noted to have elevated CVP of 20, and was not responding well enough to diuresis, so was started on CRRT for UF. Pt had required multiple pressors and inotropes, gradually weaned off. CRRT stopped AM of 5/4 and pt responding well to Bumex infusion.  -UOP ~2.4L in last 24 hr, net negative 266cc.  -Tacrolimus started on 4/29. Monitor daily trough levels and try to maintain at lower end of therapeutic range to help renal recovery; last trough 5.2. Immunosuppression dosing per Transplant Cardiology.  -Labs reviewed. Cr continues to rise since stopping CRRT; Cr 3.5 today, but electrolytes within range.  -Plan to continue to hold CRRT and continue Bumex infusion for goal CVP<12  -Dose meds for CrCl < 30 mL/min  -Avoid nephrotoxins. Patient's baseline Scr is 1.0-1.2. On 4/28, SCr was 1.1 and on 4/29, Scr rubia to 3.2. UA-turbid, 300 protein, small leuks, large blood, 914 rbc's, UPCR-3.2. Pt had drop in hgb from 12.4 (4/28) to 9.0 (4/29). Pt was on IV vasopressor support. Pt noted to have elevated CVP of 20, and was not responding well enough to diuresis, so was started on CRRT for UF. Pt had required multiple pressors and inotropes, gradually weaned off. CRRT stopped AM of 5/4 and pt responding well to Bumex infusion.  -UOP ~2.4L in last 24 hr, net negative 266cc.  -Tacrolimus started on 4/29. Monitor daily trough levels and try to maintain at lower end of therapeutic range to help renal recovery; last trough 5.2. Immunosuppression dosing per Transplant Cardiology.  -Labs reviewed. Cr continues to rise since stopping CRRT; Cr 3.5 today, but electrolytes within range.  -Plan to continue holding CRRT. Can remove femoral dialysis cath. Continue Bumex infusion for goal CVP<12  -Dose meds for CrCl ~15 mL/min  -Avoid nephrotoxins.

## 2025-05-05 NOTE — PROGRESS NOTE ADULT - ASSESSMENT
70-year-old male, ABO O, with NICM since 2011 HFrEF (LVEF 25-30%) s/p MDT CRT-D with baseline CHB, VT/VF, AFs/p GIANFRANCO ligation/MAZE, MV/TV repair, PVC ablation 3/2024 intolerant to AADs in the past, recent admission 3/19/2025-3/20/2025 for AICD shocks initially presented to the [facility] ED on 3/21/2025, sent by HF specialist for ACID shock. Device reported successful ATP for VT 3/17/2025 at 6:52pm followed by one ATP & 40J shock. He reported feeling dizzy & SOB during the events. He follows with Dr. Luca Tamayo for HF, currently undergoing transplant workup, Dr John for CRTD and VT/VF and Dr. Chu for genetic counseling. While admitted to [facility], he was started on a lidocaine gtt. On 3/21 when lidocaine weaned off patient had another two episodes of VT requiring AICD shocks. He was transferred to [facility] for further management. He was initially maintained on lidocaine gtt from 3/21/2025-3/25/2025 & his listing status was upgraded to UNOS status 2e for heart transplant (ABO O) for the refractory VT. He was transitioned to oral antiarrhythmics (Toprol XL & quinidine) & ultimately transferred from CICU to [facility] floor on 3/27/2025. Hospital course was further c/b development of watery diarrhea & was found to have +norovirus on 3/31/2025 which prompted deactivation to status 7 listing for heart transplant. Status reinstated to 2E after diarrhea resolved.     He underwent successful OHT on 4/28 + TV ring repair 34mm (CMV +/+, Toxo -/+,ischemic time 258min, intra op 2plts + 2 cryo). Intra op STACEY with LVEF 20% and mild RV dysfunction.  Extubated on 4/29.  Had some initial RV failure/PGD (requiring dobutamine 7.5, epi 0.02, levo, vaso, and Lico.  CRRT started to aid volume removal.  Echo from 4/30 with LVEF 70% and mild RV dysfunction.  Levo/Vaso weaned off on 5/1 and Lico weaned off 5/2. He requiring large amounts of pain medication despite 2 chest tubes being removed POD 1 (ketamine + PCA pump).  Now with ?delerium and non-cooporation with care, SI. Seen by psych 5/2 and started on trazodone + Pristiq.  Requiring 1:1 sitter.  NG tube to be placed 5/3.      Bedside hemodynamics:  5/2/25: /59/75, , CVP 12, PA 44/15/24, CO/CI 6.8/3.5  5/1/25 BP 99/59/72, , CVP 11, PA 40/17/24, Gucci CO/CI 5.5/2.8  4/29/25 BP 94/57/67, , CVP 11, PA 28/15/20, Gucci CI 2.2  3/22/25 BP 97/76/83, HR 80, CVP 6, PA 58/23/38, PCWP 20, SVR 1100, Gucci CO/CI 5.1/2.6, TD 4/2.08    Cardiac Studies:   TTE 4/30/25: LVEF 71%, no RMWA, RV normal size, reduced RVSF  STACEY 4/28/25 intraop: LVEF 20%, global, dilated RV with mildly reduced RVSF  TTE 3/20/25 : LVEF 30%, segmental, LVIDd 5.3, walls normal, elevated LV filling pressures, mod RVE with mildly reduced RV function, TAPSE 1.7, severely dilated RA, annuloplasty in MV position, transvalvular gradients are elevated with severe prosthetic MS (peak gradient 15.7, mean gradient 8), trace intravalvular MR, TV annuloplasty ring noted, mild TR,  est PASP 36, no evidence of LV thrombus  TTE 10/2024: LVEF 25-30%, LVEDD 5.9cm, moderately reduced RV function, annuloplasty ring of mitral and tricuspid position, PASP 47 mmHg  RHC 3/19/25- RA 11 PA 58/26 PCWP 32 CO/CI 5.43/2.77  [facility] 6/2023 Nonobstructive CAD

## 2025-05-05 NOTE — PROGRESS NOTE ADULT - ASSESSMENT
69-year-old male patient past medical history of NICM since 2011, HFrEF LVEF 25-30% s/p MDT CRT-D with baseline CHB, VT/VF, a.fib s/p GIANFRANCO ligation/MAZE, MV/TV repair, PVC ablation 3/2024 intolerant to AADs in the past, recent admission 3/19 - 3/20/25 for AICD shocks initially presented to the [facility] emergency department on 3/21/2025, sent by heart failure specialist for ACID shock. Device reported successful ATP for VT 3/17 at 6:52pm followed by one ATP and 40J shock. He reported feeling dizzy and SOB during the events. He follows with Dr paula abreu for HF, currently undergoing transplant workup, Dr John for CRTD and VT/VF and  for genetic counseling. While admitted to [facility], he was started on a lidocaine gtt. On 3/21 when lidocaine weaned off patient had another two episodes of VT requiring AICD shocks. He was transferred to [facility] for further management.     Status Post OHT 4/29/25 also with removal of ICD CRT, tricuspid valve repair (34 mm Physio ring)  Per report no evidence of vegetations on valves at time of transplant    Donor CSF culture (April 22) heavy growth Streptococcus pneumoniae.  Ceftriaxone <= 0.25 (susceptible) penicillin 0.12 (resistant for CSF) vancomycin 0.25 (susceptible)    Donor TTE without evidence of vegetations  Donor CT C/A/P with no acute abnormality of abdomen or pelvis.  Left lower lobe consolidation  Donor blood cultures were with no growth to date    #Heart transplant recipient (CMV +/+, EBV +/+, Toxo -/+)  s/p vancomycin and cefepime for 72 hours as perioperative coverage  --Will need initiation of Valcyte for CMV prophylaxis  --Will need initiation of Bactrim for PCP and toxoplasma prophylaxis    #Donor Syphilis Serology Positive  --Continue benzathine penicillin 2,400,000 units weekly x 3 doses total    #Donor Streptococcus pneumoniae meningitis  --Continue Ceftriaxone 2 g IV every 24 hours. Inclined to extend duration  to 4 weeks post-op given unclear etiology of patients S. pneum meningitis (concern for drug overdose) and valvular dysfunction in recipient at time of implant.   -would repeat TTE to assess        Thank you for involving us in the care of this patient  Transplant ID will continue to follow  Please call or page with additional questions  Pager; #[phone]  Teams: from 8 am to 5 pm  Leigh Ann Britton MD

## 2025-05-05 NOTE — PROGRESS NOTE ADULT - ATTENDING COMMENTS
Heart transplant with post op cardiogenic shock and ZAHRA - Likely ATN needing CVVHDF     - Off CVVHDF since 5/3  - urinating well on Bumex gtt   - creatinine going up but rate of rise has slowed down   - OKAY to d/c femoral dialysis catheter for now   - we will evaluate daily for dialysis needs   - IF getting antibiotics, can be dosed for an eGFR of 15 ml/min for now. We will be using kinetic GFR for measurements in rapidly changing kidney function     jens calvillo  nephrology attending   please contact me on TEAMS   Office- [phone]

## 2025-05-05 NOTE — CHART NOTE - NSCHARTNOTEFT_GEN_A_CORE
NUTRITION FOLLOW UP NOTE    PATIENT SEEN FOR: nutrition follow up    SOURCE: [x] Patient  [x] Current Medical Record  [] RN  [x] Family/support person at bedside  [] Patient unavailable/inappropriate  [] Other:    CHART REVIEWED/EVENTS NOTED.  [] No changes to nutrition care plan to note  [x] Nutrition Status:  - S/p OHT 4/29/25, extubated 4/29/25  - ZAHRA. Continuous renal replacement therapy held on 5/4  - NGT placed 5/3 due to intermittently refusing medications and poor po intake     DIET ORDER:   Diet, Consistent Carbohydrate/No Snacks:   Tube Feeding Modality: Nasogastric  Nepro with Carb Steady (NEPRORTH)  Total Volume for 24 Hours (mL): 960  Continuous  Starting Tube Feed Rate {mL per Hour}: 10  Until Goal Tube Feed Rate (mL per Hour): 40  Tube Feed Duration (in Hours): 24  Tube Feed Start Time: 14:00 (05-04-25 @ 13:51) [Active]    CURRENT DIET ORDER IS:  [] Appropriate:  [] Inadequate:  [x] Other:  see recommendations    NUTRITION INTAKE/PROVISION:  [x] PO: Pt states today is the first day he began to eat, had some eggs and potatoes this AM. Pt states he drank a Nepro shake the other day and would like to receive them again to help support his nutritional intake.  [x] Enteral Nutrition:   EN Order Provides:  960 ml formula, 1699 kcal, 78 g protein, 698 ml free water; meets 20 kcal/kg and 0.9 g protein/kg, based on 84.4 kg (04-26, standing)   Current Pump Rate: held per MD Fierro  EN provision: Pt received 640 ml formula x past 24 hrs 5/4-5/5   [] Parenteral Nutrition:    ANTHROPOMETRICS:  Drug Dosing Weight  Height (cm): 170.2 (21 Mar 2025 22:24)  Weight (kg): 81.7 (21 Mar 2025 22:24)  BMI (kg/m2): 28.2 (21 Mar 2025 22:24)    Weights:   Daily/weekly Weight in kG (standing): 87.5 (05-05, bed), 86.6 (04-29, bed), 84.4 (04-26), 84.6 (04-22), 84.6 (04-15), 83.1 (04-08), 83.9 (03-30), 84.7 (03-23)  Weight fluctuating likely in setting of fluid shifts. RD to continue to monitor weight trends as able/available.     MEDICATIONS:  MEDICATIONS  (STANDING):  acetaminophen     Tablet .. 500 milliGRAM(s) Oral every 6 hours  artificial  tears Solution 1 Drop(s) Both EYES every 2 hours  buMETAnide Infusion 1 mG/Hr (5 mL/Hr) IV Continuous <Continuous>  cefTRIAXone   IVPB 2000 milliGRAM(s) IV Intermittent <User Schedule>  chlorhexidine 2% Cloths 1 Application(s) Topical daily  desvenlafaxine ER 50 milliGRAM(s) Oral daily  dexMEDEtomidine Infusion 1.2 MICROgram(s)/kG/Hr (24.5 mL/Hr) IV Continuous <Continuous>  dextrose 50% Injectable 50 milliLiter(s) IV Push every 15 minutes  dextrose 50% Injectable 25 milliLiter(s) IV Push every 15 minutes  DOBUTamine Infusion 1.5 MICROgram(s)/kG/Min (3.68 mL/Hr) IV Continuous <Continuous>  EPINEPHrine    Infusion 0.02 MICROgram(s)/kG/Min (6.13 mL/Hr) IV Continuous <Continuous>  erythromycin   Ointment 1 Application(s) Left EYE every 4 hours  insulin regular Infusion 3 Unit(s)/Hr (3 mL/Hr) IV Continuous <Continuous>  lidocaine   4% Patch 3 Patch Transdermal daily  methylPREDNISolone sodium succinate Injectable   IV Push   methylPREDNISolone sodium succinate Injectable 20 milliGRAM(s) IV Push every 12 hours  mycophenolate mofetil Suspension 1000 milliGRAM(s) Oral every 12 hours  naloxegol 12.5 milliGRAM(s) Oral daily  niCARdipine Infusion 5 mG/Hr (25 mL/Hr) IV Continuous <Continuous>  norepinephrine Infusion 0.05 MICROgram(s)/kG/Min (7.66 mL/Hr) IV Continuous <Continuous>  nystatin    Suspension 060857 Unit(s) Oral <User Schedule>  oxymetazoline 0.05% Nasal Spray 2 Spray(s) Both Nostrils two times a day  pantoprazole  Injectable 40 milliGRAM(s) IV Push daily  penicillin   G benzathine Injectable 2.4 Million Unit(s) IntraMuscular <User Schedule>  polyethylene glycol 3350 17 Gram(s) Oral two times a day  potassium chloride  10 mEq/50 mL IVPB 10 milliEquivalent(s) IV Intermittent once  potassium chloride  10 mEq/50 mL IVPB 10 milliEquivalent(s) IV Intermittent once  senna 2 Tablet(s) Oral at bedtime  sodium chloride 0.9%. 1000 milliLiter(s) (10 mL/Hr) IV Continuous <Continuous>  tacrolimus    0.5 mG/mL Suspension 3 milliGRAM(s) Oral <User Schedule>  traZODone 100 milliGRAM(s) Oral at bedtime  vancomycin    Solution 125 milliGRAM(s) Oral every 12 hours    MEDICATIONS  (PRN):  hydrOXYzine hydrochloride 10 milliGRAM(s) Oral at bedtime PRN Anxiety      NUTRITIONALLY PERTINENT LABS:  05-05 @ 00:23: Na 135, BUN 58[H], Cr 3.54[H], [H], K+ 4.7, Phos 4.8[H], Mg 2.9[H], Alk Phos 83, ALT/SGPT 66[H], AST/SGOT 31  05-04 @ 12:03: Na 135, BUN 38[H], Cr 2.72[H], [H], K+ 4.3, Phos 4.7[H], Mg 2.8[H], Alk Phos 85, ALT/SGPT 53[H], AST/SGOT 33    A1C with Estimated Average Glucose Result: 6.1 % (03-22-25 @ 00:58)      NUTRITIONALLY PERTINENT MEDICATIONS/LABS:  [x] Reviewed  [x] Relevant notes on medications/labs:  - Insulin drip for glycemic control. BG trending  mg/dl x last 24 hrs  - Solu-medrol taper, Tacrolimus, and Cellcept ordered for post-transplant immunosuppressive regimen   - Bumex infusion ordered  - Phos trending elevated   - A1c 6.1% (03/22/25), within pre-DM range    EDEMA:  [x] Reviewed  [x] Relevant notes:  - 1+ generalized as per flowsheets    GI/ I&O:  [x] Reviewed  [x] Relevant notes:  - Endorses diarrhea/loose stool, last BM 5/5 per pt  - Movantik, miralax, senna ordered  [] Other:    SKIN:   [] No pressure injuries documented, per nursing flowsheet  [] Pressure injury previously noted  [x] Change in pressure injury documentation: suspected deep tissue injury to bilateral ears as per flowsheets   [x] Other: +midline sternal incision     ESTIMATED NEEDS:  [] No change:  [x] Updated:  Energy:  7227-3649 kcal/day (28-33 Kcal/kg)  Protein:  101-135 g/day (1.2-1.6 g/kg)  Fluid:   ml/day or [x] defer to team  Based on: recent weight 84.4 kg (04-26, standing)    NUTRITION DIAGNOSIS:  [x] Prior Dx: Food & Nutrition Related Knowledge Deficit  [x] New Dx: Increased nutrient needs (protein, energy) related to increased physiological demand for nutrients as evidenced by pt s/p OHT, midline sternal incision, ZAHRA.  Goal: Pt will meet >80% estimated nutrient needs.     EDUCATION:  [x] Yes: Reinforced importance of adequate protein-energy consumption to meet estimated nutrient needs. Encouraged continued adequate intake of meals as tolerated. Pt noted with good comprehension and made aware RD remains available for further questions/concerns.   [] Not appropriate/warranted    NUTRITION CARE PLAN:  1. Diet: Continue Consistent Carbohydrate diet.  2. Add Nepro 2x/day to help support optimal protein-energy intake.  3. Add Nephro-Prieto, pending no medical contraindications, to aid in wound healing.  4. Monitor po intake, and if pt consuming <50% of meals, can consider restarting Nepro for nutritional support at 40 ml/hr x 24 hrs. Free water flushes per team.  - Total regimen will provide 1699 kcal, 78 g protein, 698 ml free water; meets 20 kcal/kg and 0.9 g protein/kg, based on 84.4 kg (04-26, standing).    [] Achieved - Continue current nutrition intervention(s)  [] Current medical condition precludes nutrition intervention at this time.    MONITORING AND EVALUATION:   RD remains available upon request and will follow up per protocol.    Naida Garcia, SAJANN, CDN (Available via [employer] TEAMS)

## 2025-05-05 NOTE — PROGRESS NOTE ADULT - PROVIDER SPECIALTY LIST ADULT
Patient with no complaints. Denies vaginal bleeding or contractions. Good FM. GTT/CBC/indirect ingrid ordered today.   labor precautions discussed with patient. RTC in 2 weeks.     Coffective counseling sheet Nourish discussed with mother. Reinforced basic breastfeeding position and latch as well as proper hand expression technique and avoidance of artificial nipples and formula unless medically indicated. Encouraged mother to download Coffective mobile casie if she has not already done so.  Mother verbalizes understanding.     Nephrology-Cardiorenal

## 2025-05-05 NOTE — PROGRESS NOTE ADULT - SUBJECTIVE AND OBJECTIVE BOX
Patient seen and examined at the bedside.    Remains critically ill on continuous ICU monitoring, at risk for life threatening decompensation.  All Labs, data reviewed. Plan of care discussed in length during multi-disciplinary ICU rounds.     Brief Summary:  70-year-old male with history of NICM since  HFrEF (LVEF 25-30%)   Now s/p OHT on 25     24 Hour events:  on CRRT , will hold during day time  Ambulated with PT  Epi gtt off, will keep  on low dose  Continue Bumex, will d/c PA cathter    Objective:  Vital Signs Last 24 Hrs  T(C): 36.6 (05 May 2025 08:00), Max: 37.4 (04 May 2025 20:00)  T(F): 97.9 (05 May 2025 08:00), Max: 99.3 (04 May 2025 20:00)  HR: 92 (05 May 2025 08:00) (82 - 119)  BP: --  BP(mean): --  RR: 24 (05 May 2025 08:00) (6 - 63)  SpO2: 98% (05 May 2025 08:00) (94% - 100%)    Parameters below as of 05 May 2025 08:00  Patient On (Oxygen Delivery Method): nasal cannula  O2 Flow (L/min): 2          Physical Exam:   General: NAD, ambulates  Neurology: ambulated  Respiratory: Bilateral breath sounds  CV: Sinus Tach  Abdominal: Soft, Nontender  Extremities: Warm, well-perfused  [place]  -------------------------------------------------------------------------------------------------------------------------------    Labs:                        8.5    17.70 )-----------( 74       ( 05 May 2025 00:23 )             25.7     05-    135  |  100  |  58[H]  ----------------------------<  110[H]  4.7   |  20[L]  |  3.54[H]    Ca    8.3[L]      05 May 2025 00:23  Phos  4.8       Mg     2.9         TPro  5.7[L]  /  Alb  3.9  /  TBili  0.8  /  DBili  x   /  AST  31  /  ALT  66[H]  /  AlkPhos  83      LIVER FUNCTIONS - ( 05 May 2025 00:23 )  Alb: 3.9 g/dL / Pro: 5.7 g/dL / ALK PHOS: 83 U/L / ALT: 66 U/L / AST: 31 U/L / GGT: x           PT/INR - ( 05 May 2025 00:23 )   PT: 14.9 sec;   INR: 1.31 ratio         PTT - ( 05 May 2025 00:23 )  PTT:23.1 sec  ABG - ( 05 May 2025 06:38 )  pH, Arterial: 7.35  pH, Blood: x     /  pCO2: 37    /  pO2: 152   / HCO3: 20    / Base Excess: -4.8  /  SaO2: 97.9                  ALL MEDICATIONS   MEDICATIONS  (STANDING):  acetaminophen     Tablet .. 500 milliGRAM(s) Oral every 6 hours  artificial  tears Solution 1 Drop(s) Both EYES every 2 hours  buMETAnide Infusion 1 mG/Hr (5 mL/Hr) IV Continuous <Continuous>  cefTRIAXone   IVPB 2000 milliGRAM(s) IV Intermittent <User Schedule>  chlorhexidine 2% Cloths 1 Application(s) Topical daily  desvenlafaxine ER 50 milliGRAM(s) Oral daily  dexMEDEtomidine Infusion 1.2 MICROgram(s)/kG/Hr (24.5 mL/Hr) IV Continuous <Continuous>  dextrose 50% Injectable 50 milliLiter(s) IV Push every 15 minutes  dextrose 50% Injectable 25 milliLiter(s) IV Push every 15 minutes  DOBUTamine Infusion 1.5 MICROgram(s)/kG/Min (3.68 mL/Hr) IV Continuous <Continuous>  EPINEPHrine    Infusion 0.02 MICROgram(s)/kG/Min (6.13 mL/Hr) IV Continuous <Continuous>  erythromycin   Ointment 1 Application(s) Left EYE every 4 hours  insulin regular Infusion 3 Unit(s)/Hr (3 mL/Hr) IV Continuous <Continuous>  lidocaine   4% Patch 3 Patch Transdermal daily  methylPREDNISolone sodium succinate Injectable 20 milliGRAM(s) IV Push every 12 hours  methylPREDNISolone sodium succinate Injectable   IV Push   mycophenolate mofetil Suspension 1000 milliGRAM(s) Oral every 12 hours  naloxegol 12.5 milliGRAM(s) Oral daily  niCARdipine Infusion 5 mG/Hr (25 mL/Hr) IV Continuous <Continuous>  norepinephrine Infusion 0.05 MICROgram(s)/kG/Min (7.66 mL/Hr) IV Continuous <Continuous>  nystatin    Suspension 560639 Unit(s) Oral <User Schedule>  oxymetazoline 0.05% Nasal Spray 2 Spray(s) Both Nostrils two times a day  pantoprazole  Injectable 40 milliGRAM(s) IV Push daily  penicillin   G benzathine Injectable 2.4 Million Unit(s) IntraMuscular <User Schedule>  polyethylene glycol 3350 17 Gram(s) Oral two times a day  potassium chloride  10 mEq/50 mL IVPB 10 milliEquivalent(s) IV Intermittent once  potassium chloride  10 mEq/50 mL IVPB 10 milliEquivalent(s) IV Intermittent once  potassium chloride  10 mEq/50 mL IVPB 10 milliEquivalent(s) IV Intermittent every 1 hour  senna 2 Tablet(s) Oral at bedtime  sodium chloride 0.9%. 1000 milliLiter(s) (10 mL/Hr) IV Continuous <Continuous>  tacrolimus    0.5 mG/mL Suspension 3 milliGRAM(s) Oral <User Schedule>  traZODone 100 milliGRAM(s) Oral at bedtime  vancomycin    Solution 125 milliGRAM(s) Oral every 12 hours    MEDICATIONS  (PRN):  hydrOXYzine hydrochloride 10 milliGRAM(s) Oral at bedtime PRN Anxiety    ------------------------------------------------------------------------------------------------------------------------------  Assessment:  69 yo NICM, s/p heart transplant     RV dysfunction  Postop acute pulmonary insufficiency  ZAHRA  Acute blood loss anemia  Thrombocytopenia  Hyperglycemia    Plan:   ***Neuro***  Maintain day/night cycle to prevent ICU delirium   Postoperative acute pain control with IV Tylenol, Tramadol, and prns.  Trazadone at night for sleep   Precedex as needed for agitation    ***Cardiovascular***  Monitor for sign of hypoperfusion, trend lactate, SVo2  Remains on Dobutamine , off Epi  Maintain MAPs>65 mm HG.   Goal CVP <10, d/c PA cathter  Monitor chest tube output    ***Pulmonary***  Postoperative acute pulmonary insufficiency  Deep breathing and coughing exercises, IS, Mobilization, nebs, Chest PT    ***GI***  Tolerating diet but poor intake.  Will place a feeding tube today.  Protonix for prophylaxis   Bowel regimen.   Trend LFTs    ***Renal***  ZAHRA / CKD - continue CRRT   Goal even, trend filling pressures.  Keep nocturnal CRRT, day time Bumex gtt    ***ID***  Leukocytosis  Tacro, Cellcept, Steroid taper for immunosuppression.  Prophylaxis with Nystatin, PO Vanco.  PCN and Ceftriaxone for donor cultures (syphylis, /S pneumo)    ***Endocrine***  Insulin infusion for Hyperglycemia     ***Hematology***  Acute blood loss anemia and thrombocytopenia - no transfusion indication currently.  CBC, Coags, monitor for bleeding  PF4 negative.  Heparin SQ for VTE prophylaxis on hold in setting of low platelets.    ICU time: 55 mins     I, Alexandra Fierro MD, personally performed the services described in this documentation. all medical record entries made by the scribe were at my direction and in my presence. I have reviewed the chart and agree that the record reflects my personal performance and is accurate and complete  Electronically signed:   Alexandra Fierro MD  CT ICU attending              Patient seen and examined at the bedside.    Remains critically ill on continuous ICU monitoring, at risk for life threatening decompensation.  All Labs, data reviewed. Plan of care discussed in length during multi-disciplinary ICU rounds.     Brief Summary:  70-year-old male with history of NICM since 2011 HFrEF (LVEF 25-30%)   Now s/p OHT on 4/29/25     24 Hour events:  Ambulated with PT  Improved PO intake  Off inotrops  Continue Bumex gtt  will hold CRRT    Objective:  Vital Signs Last 24 Hrs  T(C): 36.6 (05 May 2025 08:00), Max: 37.4 (04 May 2025 20:00)  T(F): 97.9 (05 May 2025 08:00), Max: 99.3 (04 May 2025 20:00)  HR: 92 (05 May 2025 08:00) (82 - 119)  BP: --  BP(mean): --  RR: 24 (05 May 2025 08:00) (6 - 63)  SpO2: 98% (05 May 2025 08:00) (94% - 100%)    Parameters below as of 05 May 2025 08:00  Patient On (Oxygen Delivery Method): nasal cannula  O2 Flow (L/min): 2          Physical Exam:   General: NAD, ambulates  Neurology: intact, mood improving   Respiratory: Bilateral breath sounds  CV: Sinus Tach  Abdominal: Soft, Nontender  Extremities: Warm, well-perfused  [place]  -------------------------------------------------------------------------------------------------------------------------------    Labs:                        8.5    17.70 )-----------( 74       ( 05 May 2025 00:23 )             25.7     05-05    135  |  100  |  58[H]  ----------------------------<  110[H]  4.7   |  20[L]  |  3.54[H]    Ca    8.3[L]      05 May 2025 00:23  Phos  4.8     05-05  Mg     2.9     05-05    TPro  5.7[L]  /  Alb  3.9  /  TBili  0.8  /  DBili  x   /  AST  31  /  ALT  66[H]  /  AlkPhos  83  05-05    LIVER FUNCTIONS - ( 05 May 2025 00:23 )  Alb: 3.9 g/dL / Pro: 5.7 g/dL / ALK PHOS: 83 U/L / ALT: 66 U/L / AST: 31 U/L / GGT: x           PT/INR - ( 05 May 2025 00:23 )   PT: 14.9 sec;   INR: 1.31 ratio     PTT - ( 05 May 2025 00:23 )  PTT:23.1 sec  ABG - ( 05 May 2025 06:38 )  pH, Arterial: 7.35  pH, Blood: x     /  pCO2: 37    /  pO2: 152   / HCO3: 20    / Base Excess: -4.8  /  SaO2: 97.9      ALL MEDICATIONS   MEDICATIONS  (STANDING):  acetaminophen     Tablet .. 500 milliGRAM(s) Oral every 6 hours  artificial  tears Solution 1 Drop(s) Both EYES every 2 hours  buMETAnide Infusion 1 mG/Hr (5 mL/Hr) IV Continuous <Continuous>  cefTRIAXone   IVPB 2000 milliGRAM(s) IV Intermittent <User Schedule>  chlorhexidine 2% Cloths 1 Application(s) Topical daily  desvenlafaxine ER 50 milliGRAM(s) Oral daily  dexMEDEtomidine Infusion 1.2 MICROgram(s)/kG/Hr (24.5 mL/Hr) IV Continuous <Continuous>  dextrose 50% Injectable 50 milliLiter(s) IV Push every 15 minutes  dextrose 50% Injectable 25 milliLiter(s) IV Push every 15 minutes  DOBUTamine Infusion 1.5 MICROgram(s)/kG/Min (3.68 mL/Hr) IV Continuous <Continuous>  EPINEPHrine    Infusion 0.02 MICROgram(s)/kG/Min (6.13 mL/Hr) IV Continuous <Continuous>  erythromycin   Ointment 1 Application(s) Left EYE every 4 hours  insulin regular Infusion 3 Unit(s)/Hr (3 mL/Hr) IV Continuous <Continuous>  lidocaine   4% Patch 3 Patch Transdermal daily  methylPREDNISolone sodium succinate Injectable 20 milliGRAM(s) IV Push every 12 hours  methylPREDNISolone sodium succinate Injectable   IV Push   mycophenolate mofetil Suspension 1000 milliGRAM(s) Oral every 12 hours  naloxegol 12.5 milliGRAM(s) Oral daily  niCARdipine Infusion 5 mG/Hr (25 mL/Hr) IV Continuous <Continuous>  norepinephrine Infusion 0.05 MICROgram(s)/kG/Min (7.66 mL/Hr) IV Continuous <Continuous>  nystatin    Suspension 383055 Unit(s) Oral <User Schedule>  oxymetazoline 0.05% Nasal Spray 2 Spray(s) Both Nostrils two times a day  pantoprazole  Injectable 40 milliGRAM(s) IV Push daily  penicillin   G benzathine Injectable 2.4 Million Unit(s) IntraMuscular <User Schedule>  polyethylene glycol 3350 17 Gram(s) Oral two times a day  potassium chloride  10 mEq/50 mL IVPB 10 milliEquivalent(s) IV Intermittent once  potassium chloride  10 mEq/50 mL IVPB 10 milliEquivalent(s) IV Intermittent once  potassium chloride  10 mEq/50 mL IVPB 10 milliEquivalent(s) IV Intermittent every 1 hour  senna 2 Tablet(s) Oral at bedtime  sodium chloride 0.9%. 1000 milliLiter(s) (10 mL/Hr) IV Continuous <Continuous>  tacrolimus    0.5 mG/mL Suspension 3 milliGRAM(s) Oral <User Schedule>  traZODone 100 milliGRAM(s) Oral at bedtime  vancomycin    Solution 125 milliGRAM(s) Oral every 12 hours    MEDICATIONS  (PRN):  hydrOXYzine hydrochloride 10 milliGRAM(s) Oral at bedtime PRN Anxiety    ------------------------------------------------------------------------------------------------------------------------------  Assessment:  69 yo NICM, s/p heart transplant     RV dysfunction  Postop acute pulmonary insufficiency  ZAHRA  Acute blood loss anemia  Thrombocytopenia  Hyperglycemia    Plan:   ***Neuro***  Maintain day/night cycle to prevent ICU delirium   Postoperative acute pain control with IV Tylenol, Tramadol, and prns.  Trazadone at night for sleep     ***Cardiovascular***  Monitor for sign of hypoperfusion, trend lactate, SVo2  off inotrope ok to remove CVL  Maintain MAPs>65 mm HG.   Monitor chest tube output    ***Pulmonary***  Postoperative acute pulmonary insufficiency  Deep breathing and coughing exercises, IS, Mobilization, nebs, Chest PT    ***GI***  Tolerating diet but poor intake.  On nocturnal tube feeds,   Protonix for prophylaxis   Bowel regimen.   Trend LFTs    ***Renal***  ZAHRA , continue Bumex gtt  Goal even, trend filling pressures.  Hold CRRT    ***ID***  Leukocytosis  Tacro, Cellcept, Steroid taper for immunosuppression.  Prophylaxis with Nystatin, PO Vanco.  PCN and Ceftriaxone for donor cultures (syphylis, /S pneumo)    ***Endocrine***  Insulin infusion for Hyperglycemia     ***Hematology***  Acute blood loss anemia and thrombocytopenia   CBC, Coags, monitor for bleeding  PF4 negative.  Heparin SQ for VTE prophylax    ICU time: 55 mins     I, Alexandra Fierro MD, personally performed the services described in this documentation. all medical record entries made by the scribe were at my direction and in my presence. I have reviewed the chart and agree that the record reflects my personal performance and is accurate and complete  Electronically signed:   Alexandra Fierro MD  CT ICU attending

## 2025-05-05 NOTE — PROGRESS NOTE ADULT - SUBJECTIVE AND OBJECTIVE BOX
Transplant ID follow up   Afebrile, WBC still 17   off inotropes/pressors  Ambulating with PT      PHYSICAL EXAM:  Constitutional:no acute distress  Eyes:ELAINE, EOMI  Ear/Nose/Throat: no oral lesions, 	  Respiratory: clear BL  Cardiovascular: S1S2 sternotomy dressing CDI  2 chest tubes  Gastrointestinal:soft, (+) BS, no tenderness  Extremities:no e/e/c  No Lymphadenopathy  IV sites not inflammed.  1 View- PORTABLE-Urgent .) (05.03.25 @ 13:18) >          ____________________________________________________  ROS  GENERAL: denies chills, , night sweats, weight loss.   PSYCH: denies depression, anxiety, suicidal ideation, hallucination, and delusions  SKIN: no rash or lesions; no color changes, no abnormal nevi,no  dryness, and nojaundice    EYES: denies visual changes, floaters, pain, inflammation, blurred vision, and discharge  ENT: denies tinnitus, vertigo, epistaxis, oral lesion, and decreased acuity  PULM: denies, hemoptysis, pleurisy  CVS: denies angina, palpitations,+ orthopnea, no syncope, or heart murmur  GI: denies constipation, diarrhea, melena, abdominal pain, nausea.   : denies dysuria, frequency, discharge, incontinence, stones or macroscopic hematuria  MS: no arthralgias, no erythema or swelling, no myalgias, noedema, or lower back pain.   CNS: denies numbness, dizziness, seizure, or tremor  ENDO: denies heat/cold intolerance, polyuria, polydipsia, malaise.    HEME: denies bruising, bleeding, lymphadenopathy, anemia, and calf pain    Allergies  No Known Allergies    __________________________________________________  MEDS:  MEDICATIONS  (STANDING):  acetaminophen     Tablet .. 500 every 6 hours  buMETAnide Infusion 1 <Continuous>  desvenlafaxine ER 50 daily  dexMEDEtomidine Infusion 1.2 <Continuous>  dextrose 50% Injectable 50 every 15 minutes  dextrose 50% Injectable 25 every 15 minutes  DOBUTamine Infusion 1.5 <Continuous>  EPINEPHrine    Infusion 0.02 <Continuous>  hydrOXYzine hydrochloride 10 at bedtime PRN  insulin regular Infusion 3 <Continuous>  methylPREDNISolone sodium succinate Injectable 20 every 12 hours  methylPREDNISolone sodium succinate Injectable    mycophenolate mofetil Suspension 1000 every 12 hours  naloxegol 12.5 daily  niCARdipine Infusion 5 <Continuous>  norepinephrine Infusion 0.05 <Continuous>  pantoprazole  Injectable 40 daily  polyethylene glycol 3350 17 two times a day  senna 2 at bedtime  tacrolimus    0.5 mG/mL Suspension 3 <User Schedule>  traZODone 100 at bedtime    _________________________________________________  ANTIMICOBIALS  cefTRIAXone   IVPB 2000 <User Schedule>  mycophenolate mofetil Suspension 1000 every 12 hours  nystatin    Suspension [id number] <User Schedule>  penicillin   G benzathine Injectable 2.4 <User Schedule>  tacrolimus    0.5 mG/mL Suspension 3 <User Schedule>  vancomycin    Solution 125 every 12 hours      GENERAL LABS              x                    134  | 21   | 74           x     >-----------< x       ------------------------< 111                   x                    4.7  | 97   | 4.01                                         Ca 8.7   Mg 2.8   Ph 5.1      Vancomycin Level, Trough: 8.7 (05-03 @ 06:03)  Vancomycin Level, Trough: 13.2 (05-01 @ 17:45)  Vancomycin Level, Trough: 12.6 (05-01 @ 05:50)  Vancomycin Level, Trough: 11.6 (04-30 @ 16:59)  Vancomycin Level, Trough: 13.6 (04-30 @ 04:31)  Vancomycin Level, Trough: 11.7 (04-29 @ 17:30)  Vancomycin Level, Trough: 10.4 (04-29 @ 00:33)    Urinalysis Basic - ( 05 May 2025 12:18 )    Color: x / Appearance: x / SG: x / pH: x  Gluc: 111 mg/dL / Ketone: x  / Bili: x / Urobili: x   Blood: x / Protein: x / Nitrite: x   Leuk Esterase: x / RBC: x / WBC x   Sq Epi: x / Non Sq Epi: x / Bacteria: x        _________________________________________________  [facility]  -----------                  Fungitell:   _______________________________________________  [facility]

## 2025-05-05 NOTE — PROGRESS NOTE ADULT - PROBLEM SELECTOR PLAN 1
- ACC/AHA stage D systolic heart failure, s/p heart transplant 4/28, CMV +/+, Toxo -/+     - Retrospective crossmatch with class II DSA DR HILLIARD (preformed from pre transplant).    - Support:     - Inotropes: Epi weaned off 5/4, dobutamine weaned off this AM     - Lico weaned off on 5/2  - Chest tubes, per CTS  - Diuresis: c/w Bumex 2 mg BID  - TTE tomorrow 24 hours off inotropes  - Immunosuppression:      - Cellcept 1000mg BID      - Solu-medrol taper      - Tacro: 11.4. Removal of NGT planned for today so will need to transition to 2 mg PO BID      - First surveillance biopsy planned fro 5/13 (2nd week post txp).  - Antibiotics      - Nystatin for ppx      - s/p IV Vanc + Cefepime for post-op ppx (4/29 - 5/1)      - PO Vanco ppx (4/29 - )      - CTX 2000mg QD for donor strep pneumo+ in CSF      - Penicillin IM for donor syphilis + ab

## 2025-05-05 NOTE — PROGRESS NOTE ADULT - PROBLEM SELECTOR PLAN 3
- Post-op c/b ZAHRA likely hemodynamic , no acute needs for CRRT/HD  - CRRT on HOLD x24 hours > diuretic challenge today w/ bumex  - Cardiorenal recs consulted  - Can dc Femoral HD line for line holiday

## 2025-05-05 NOTE — PROGRESS NOTE ADULT - SUBJECTIVE AND OBJECTIVE BOX
[facility] DIVISION OF KIDNEY DISEASES AND HYPERTENSION --    Reason for consult: ZAHRA    24 hour events/subjective: Patient seen and examined at bedside. CRRT stopped AM of  and pt responding well to Bumex infusion. Weaned off  and Epi gtt in the last 24 hr. Complains of weakness and some labored breathing. States his appetite has improved.      PAST HISTORY  --------------------------------------------------------------------------------  No significant changes to PMH, PSH, FHx, SHx, unless otherwise noted    ALLERGIES & MEDICATIONS  --------------------------------------------------------------------------------  Allergies    No Known Allergies      Standing Inpatient Medications  acetaminophen     Tablet .. 500 milliGRAM(s) Oral every 6 hours  artificial  tears Solution 1 Drop(s) Both EYES every 2 hours  buMETAnide Infusion 1 mG/Hr IV Continuous <Continuous>  cefTRIAXone   IVPB 2000 milliGRAM(s) IV Intermittent <User Schedule>  chlorhexidine 2% Cloths 1 Application(s) Topical daily  desvenlafaxine ER 50 milliGRAM(s) Oral daily  dexMEDEtomidine Infusion 1.2 MICROgram(s)/kG/Hr IV Continuous <Continuous>  dextrose 50% Injectable 50 milliLiter(s) IV Push every 15 minutes  dextrose 50% Injectable 25 milliLiter(s) IV Push every 15 minutes  DOBUTamine Infusion 1.5 MICROgram(s)/kG/Min IV Continuous <Continuous>  EPINEPHrine    Infusion 0.02 MICROgram(s)/kG/Min IV Continuous <Continuous>  erythromycin   Ointment 1 Application(s) Left EYE every 4 hours  insulin regular Infusion 3 Unit(s)/Hr IV Continuous <Continuous>  lidocaine   4% Patch 3 Patch Transdermal daily  methylPREDNISolone sodium succinate Injectable 20 milliGRAM(s) IV Push every 12 hours  methylPREDNISolone sodium succinate Injectable   IV Push   mycophenolate mofetil Suspension 1000 milliGRAM(s) Oral every 12 hours  naloxegol 12.5 milliGRAM(s) Oral daily  niCARdipine Infusion 5 mG/Hr IV Continuous <Continuous>  norepinephrine Infusion 0.05 MICROgram(s)/kG/Min IV Continuous <Continuous>  nystatin    Suspension 203802 Unit(s) Oral <User Schedule>  oxymetazoline 0.05% Nasal Spray 2 Spray(s) Both Nostrils two times a day  pantoprazole  Injectable 40 milliGRAM(s) IV Push daily  penicillin   G benzathine Injectable 2.4 Million Unit(s) IntraMuscular <User Schedule>  polyethylene glycol 3350 17 Gram(s) Oral two times a day  potassium chloride  10 mEq/50 mL IVPB 10 milliEquivalent(s) IV Intermittent once  potassium chloride  10 mEq/50 mL IVPB 10 milliEquivalent(s) IV Intermittent once  potassium chloride  10 mEq/50 mL IVPB 10 milliEquivalent(s) IV Intermittent every 1 hour  senna 2 Tablet(s) Oral at bedtime  sodium chloride 0.9%. 1000 milliLiter(s) IV Continuous <Continuous>  tacrolimus    0.5 mG/mL Suspension 3 milliGRAM(s) Oral <User Schedule>  traZODone 100 milliGRAM(s) Oral at bedtime  vancomycin    Solution 125 milliGRAM(s) Oral every 12 hours    PRN Inpatient Medications  hydrOXYzine hydrochloride 10 milliGRAM(s) Oral at bedtime PRN      REVIEW OF SYSTEMS  --------------------------------------------------------------------------------  Gen: No fever, +weakness  Respiratory: breathing "labored"  CV: No chest pain  GI: No diarrhea, nausea, or vomiting  : No dysuria or hematuria  Skin: No rashes  MSK: No edema  Neuro: No dizziness/lightheadedness    All other systems were reviewed and are negative, except as noted.    VITALS/PHYSICAL EXAM  --------------------------------------------------------------------------------  T(C): 36.6 (25 @ 08:00), Max: 37.4 (25 @ 20:00)  HR: 92 (25 @ 08:00) (82 - 119)  BP: --  RR: 24 (25 @ 08:00) (6 - 63)  SpO2: 98% (25 @ 08:00) (94% - 100%)  Wt(kg): --        25 @ 07:01  -  25 @ 07:00  --------------------------------------------------------  IN: 2273.5 mL / OUT: 2540 mL / NET: -266.5 mL    25 @ 07:01  -  25 @ 08:07  --------------------------------------------------------  IN: 10 mL / OUT: 150 mL / NET: -140 mL      PHYSICAL EXAM:  Gen: ill-appearing, but NAD  Neuro: non-focal  HEENT: O2 via NC   Pulm: B/L breath sounds  CV: +S1 S2  Abd: soft, non-distended, non-tender  : +indwelling nagy  Extremities: no edema  Skin: Warm  Dialysis access: R [facility]     LABS/STUDIES  --------------------------------------------------------------------------------              8.5    17.70 >-----------<  74       [25 00:23]              25.7     135  |  100  |  58  ----------------------------<  110      [25 00:23]  4.7   |  20  |  3.54        Ca     8.3     [25 00:23]      Mg     2.9     [25:23]      Phos  4.8     [25:23]    TPro  5.7  /  Alb  3.9  /  TBili  0.8  /  DBili  x   /  AST  31  /  ALT  66  /  AlkPhos  83  [25 00:23]    PT/INR: PT 14.9 , INR 1.31       [25:23]  PTT: 23.1       [25:23]      Creatinine Trend:  SCr 3.54 [:23]  SCr 2.72 [ 12:03]  SCr 2.17 [ 00:35]  SCr 2.18 [ 00:38]  SCr 2.19 [ 12:32]    Urine Creatinine 46      [25 02:45]  Urine Protein 146      [25 02:45]  Urine Sodium 29      [25 02:45]  Urine Urea Nitrogen 81      [25 02:45]  Urine Potassium 72      [25 02:45]  Urine Osmolality 345      [25 02:45]    Iron 48, TIBC 307, %sat 16      [25 06:15]  Ferritin 184      [25 06:15]  TSH 1.26      [25 00:58]  Lipid: chol 147, TG 71, HDL 62, LDL --      [25 00:58]    HBsAb Reactive      [25 15:45]  HBsAg Nonreact      [25 15:45]  HBcAb Nonreact      [25 15:45]  HCV 0.05, Nonreact      [25 15:45]  HIV Nonreact      [25 15:45]

## 2025-05-05 NOTE — PROGRESS NOTE ADULT - CRITICAL CARE ATTENDING COMMENT
Feels well. Having trouble sleeping. Off HD since 5/3 and urinating well. On exam, JVP approx 6-8 with mild HJR, RRR, no m/r/g, dec BS b/l, nontender abdomen, no edema. Labs reviewed - WBC 17, Hb 8.5, BUN/Cr 58/3.59 (uptrending). Tac 11.4 (from 5.4).   - pull wires; repeat TTE  - maintain goal net even; goal CVP 6-8  - hold HD  - reduce tac to 1 mg tonight then 2 mg twice/day; goal 10   - c/w Cellcept 1 gram q12  - possible biopsy Thursday

## 2025-05-05 NOTE — BH CONSULTATION LIAISON PROGRESS NOTE - NSBHASSESSMENTFT_PSY_ALL_CORE
69 y/o domiciled, employed, , , male with PPHx of PTSD, unspecified anxiety d/o, with no past psychiatric admissions, with a PMHx of NICM since 2011 HFrEF (LVEF 25-30%) s/p MDT CRT-D with baseline CHB, VT/VF, AFs/p GIANFRANCO ligation/MAZE, MV/TV repair, PVC ablation 3/2024 intolerant to AADs in the past, recent admission 3/19/2025-3/20/2025 for AICD shocks initially presented to the [facility] ED on 3/21/2025, sent by HF specialist for ACID shock. While admitted to [facility], his listing status was upgraded to UNOS status 2e for heart transplant (ABO O) for the refractory VT, with the patient being transferred to [facility] for further management. Patient seen by transplant psychiatry for concerns of depression.     5/5:Patient seen as f/u, endorsed difficulty sleeping despite use of Trazodone, denying any ah/vh/hi/si, intent or plan, noted tolerating Pristiq at this time.    see attending attestation for plan  -------------------------------------------------------------    Plan  -C/W Pristiq 50mg Po QAM   -Can consider starting Atarax 10mg PO TID PRN for acute anxiety alleviation  -Increase his trazodone to 150mg PO HS   -Hold Klonopin at this time     69 y/o domiciled, employed, , , male with PPHx of PTSD, unspecified anxiety d/o, with no past psychiatric admissions, with a PMHx of NICM since 2011 HFrEF (LVEF 25-30%) s/p MDT CRT-D with baseline CHB, VT/VF, AFs/p GIANFRANCO ligation/MAZE, MV/TV repair, PVC ablation 3/2024 intolerant to AADs in the past, recent admission 3/19/2025-3/20/2025 for AICD shocks initially presented to the [facility] ED on 3/21/2025, sent by HF specialist for ACID shock. While admitted to [facility], his listing status was upgraded to UNOS status 2e for heart transplant (ABO O) for the refractory VT, with the patient being transferred to [facility] for further management. Patient seen by transplant psychiatry for concerns of depression.       see attending attestation for plan  -------------------------------------------------------------

## 2025-05-05 NOTE — PROGRESS NOTE ADULT - SUBJECTIVE AND OBJECTIVE BOX
Weekend Events  - CRRT transitioned to diuretic challenge (Bumex & Diuril)  - Epi off 5/4,  weaned off this AM, Bumex gtt transitioned from 2 to 1/hr  - Delirium & uncooperative with care requiring 1:1 & NGT for immunosuppression & nutrition    Subjective: Pt states he has not been sleeping well & feels stressed with post-op course thus far. He notes  came to see him a little earlier & will be adjusting his meds which he is open to. He was able to eat breakfast this AM. He otherwise denies CP, SOB, H/A, N/V/D, abdominal pain, f/c, dizziness.    Medications:  acetaminophen     Tablet .. 500 milliGRAM(s) Oral every 6 hours  artificial  tears Solution 1 Drop(s) Both EYES every 2 hours  buMETAnide Infusion 1 mG/Hr IV Continuous <Continuous>  cefTRIAXone   IVPB 2000 milliGRAM(s) IV Intermittent <User Schedule>  chlorhexidine 2% Cloths 1 Application(s) Topical daily  desvenlafaxine ER 50 milliGRAM(s) Oral daily  dexMEDEtomidine Infusion 1.2 MICROgram(s)/kG/Hr IV Continuous <Continuous>  dextrose 50% Injectable 50 milliLiter(s) IV Push every 15 minutes  dextrose 50% Injectable 25 milliLiter(s) IV Push every 15 minutes  DOBUTamine Infusion 1.5 MICROgram(s)/kG/Min IV Continuous <Continuous>  EPINEPHrine    Infusion 0.02 MICROgram(s)/kG/Min IV Continuous <Continuous>  erythromycin   Ointment 1 Application(s) Left EYE every 4 hours  hydrOXYzine hydrochloride 10 milliGRAM(s) Oral at bedtime PRN  insulin regular Infusion 3 Unit(s)/Hr IV Continuous <Continuous>  methylPREDNISolone sodium succinate Injectable   IV Push   methylPREDNISolone sodium succinate Injectable 20 milliGRAM(s) IV Push every 12 hours  mycophenolate mofetil Suspension 1000 milliGRAM(s) Oral every 12 hours  naloxegol 12.5 milliGRAM(s) Oral daily  Nephro-piotr 2 Tablet(s) Oral daily  niCARdipine Infusion 5 mG/Hr IV Continuous <Continuous>  norepinephrine Infusion 0.05 MICROgram(s)/kG/Min IV Continuous <Continuous>  nystatin    Suspension 581172 Unit(s) Oral <User Schedule>  oxymetazoline 0.05% Nasal Spray 2 Spray(s) Both Nostrils two times a day  pantoprazole  Injectable 40 milliGRAM(s) IV Push daily  penicillin   G benzathine Injectable 2.4 Million Unit(s) IntraMuscular <User Schedule>  polyethylene glycol 3350 17 Gram(s) Oral two times a day  potassium chloride  10 mEq/50 mL IVPB 10 milliEquivalent(s) IV Intermittent once  potassium chloride  10 mEq/50 mL IVPB 10 milliEquivalent(s) IV Intermittent once  senna 2 Tablet(s) Oral at bedtime  sodium chloride 0.9%. 1000 milliLiter(s) IV Continuous <Continuous>  tacrolimus 1 milliGRAM(s) Oral once  traZODone 100 milliGRAM(s) Oral at bedtime  vancomycin    Solution 125 milliGRAM(s) Oral every 12 hours      Physical Exam:  General: No distress. Comfortable.  HEENT: EOM intact.  Neck: Neck supple. JVP not elevated. No masses  Chest: Clear to auscultation bilaterally  CV: Normal S1 and S2. No murmurs, rub, or gallops. Radial pulses normal.  Abdomen: Soft, non-distended, non-tender  Skin: No rashes or skin breakdown  Neurology: Alert and oriented times three. Sensation intact  Psych: Affect normal    ICU Vital Signs Last 24 Hrs  T(C): 37.1 (05 May 2025 16:00), Max: 37.4 (04 May 2025 20:00)  T(F): 98.8 (05 May 2025 16:00), Max: 99.3 (04 May 2025 20:00)  HR: 101 (05 May 2025 16:00) (82 - 109)  BP: --  BP(mean): --  ABP: 125/66 (05 May 2025 16:00) (95/51 - 125/66)  ABP(mean): 82 (05 May 2025 16:00) (64 - 86)  RR: 26 (05 May 2025 16:00) (6 - 48)  SpO2: 97% (05 May 2025 16:00) (95% - 100%)    O2 Parameters below as of 05 May 2025 16:00  Patient On (Oxygen Delivery Method): nasal cannula  O2 Flow (L/min): 2    Adult Advanced Hemodynamics Last 24 Hrs  CVP(mm Hg): 7 (05 May 2025 15:00) (-2 - 175)  CVP(cm H2O): --  CO: --  CI: --  PA: --  PA(mean): --  PCWP: --  SVR: --  SVRI: --  PVR: --  PVRI: --      Weight in k.5 ( @ 00:00)    I&O's Summary    04 May 2025 07:01  -  05 May 2025 07:00  --------------------------------------------------------  IN: 2273.5 mL / OUT: 2540 mL / NET: -266.5 mL    05 May 2025 07:01  -  05 May 2025 17:34  --------------------------------------------------------  IN: 127 mL / OUT: 1440 mL / NET: -1313 mL    Tele: SR 80s    Labs:                        8.5    17.70 )-----------( 74       ( 05 May 2025 00:23 )             25.7     05    134[L]  |  97  |  74[H]  ----------------------------<  111[H]  4.7   |  21[L]  |  4.01[H]    Ca    8.7      05 May 2025 12:18  Phos  5.1       Mg     2.8         TPro  6.1  /  Alb  4.2  /  TBili  0.9  /  DBili  x   /  AST  34  /  ALT  84[H]  /  AlkPhos  96  0505    PT/INR - ( 05 May 2025 00:23 )   PT: 14.9 sec;   INR: 1.31 ratio         PTT - ( 05 May 2025 00:23 )  PTT:23.1 sec        Oxygen Saturation, Mixed: 62.3 ( @ 13:55)  Oxygen Saturation, Mixed: 57.6 ( @ 12:49)  Oxygen Saturation, Mixed: 58.2 ( @ 10:51)  Oxygen Saturation, Mixed: 62.1 ( @ 09:15)  Oxygen Saturation, Mixed: 68.2 ( @ 04:00)  Oxygen Saturation, Mixed: 67.3 ( @ 00:01)  Oxygen Saturation, Mixed: 56.1 ( @ 18:00)  Oxygen Saturation, Mixed: 59.1 ( @ 16:00)  Oxygen Saturation, Mixed: 64.5 ( @ 10:41)  Oxygen Saturation, Mixed: 70.5 ( @ 23:50)  Oxygen Saturation, Mixed: 62.9 ( @ 20:08)  Oxygen Saturation, Mixed: 64.4 ( @ 18:22)

## 2025-05-06 LAB
ACANTHOCYTES BLD QL SMEAR: SLIGHT — SIGNIFICANT CHANGE UP
ALBUMIN SERPL ELPH-MCNC: 3.9 G/DL — SIGNIFICANT CHANGE UP (ref 3.3–5)
ALP SERPL-CCNC: 99 U/L — SIGNIFICANT CHANGE UP (ref 40–120)
ALT FLD-CCNC: 103 U/L — HIGH (ref 10–45)
ANION GAP SERPL CALC-SCNC: 18 MMOL/L — HIGH (ref 5–17)
ANION GAP SERPL CALC-SCNC: 19 MMOL/L — HIGH (ref 5–17)
ANISOCYTOSIS BLD QL: SLIGHT — SIGNIFICANT CHANGE UP
APTT BLD: 22.6 SEC — LOW (ref 26.1–36.8)
AST SERPL-CCNC: 41 U/L — HIGH (ref 10–40)
BASOPHILS # BLD AUTO: 0 K/UL — SIGNIFICANT CHANGE UP (ref 0–0.2)
BASOPHILS NFR BLD AUTO: 0 % — SIGNIFICANT CHANGE UP (ref 0–2)
BILIRUB SERPL-MCNC: 0.7 MG/DL — SIGNIFICANT CHANGE UP (ref 0.2–1.2)
BUN SERPL-MCNC: 85 MG/DL — HIGH (ref 7–23)
BUN SERPL-MCNC: 96 MG/DL — HIGH (ref 7–23)
CALCIUM SERPL-MCNC: 8.4 MG/DL — SIGNIFICANT CHANGE UP (ref 8.4–10.5)
CALCIUM SERPL-MCNC: 8.9 MG/DL — SIGNIFICANT CHANGE UP (ref 8.4–10.5)
CHLORIDE SERPL-SCNC: 94 MMOL/L — LOW (ref 96–108)
CHLORIDE SERPL-SCNC: 94 MMOL/L — LOW (ref 96–108)
CO2 SERPL-SCNC: 21 MMOL/L — LOW (ref 22–31)
CO2 SERPL-SCNC: 22 MMOL/L — SIGNIFICANT CHANGE UP (ref 22–31)
CREAT SERPL-MCNC: 4.33 MG/DL — HIGH (ref 0.5–1.3)
CREAT SERPL-MCNC: 4.43 MG/DL — HIGH (ref 0.5–1.3)
EGFR: 14 ML/MIN/1.73M2 — LOW
ELLIPTOCYTES BLD QL SMEAR: SLIGHT — SIGNIFICANT CHANGE UP
EOSINOPHIL # BLD AUTO: 0 K/UL — SIGNIFICANT CHANGE UP (ref 0–0.5)
EOSINOPHIL NFR BLD AUTO: 0 % — SIGNIFICANT CHANGE UP (ref 0–6)
GAS PNL BLDA: SIGNIFICANT CHANGE UP
GLUCOSE BLDC GLUCOMTR-MCNC: 101 MG/DL — HIGH (ref 70–99)
GLUCOSE BLDC GLUCOMTR-MCNC: 110 MG/DL — HIGH (ref 70–99)
GLUCOSE BLDC GLUCOMTR-MCNC: 119 MG/DL — HIGH (ref 70–99)
GLUCOSE BLDC GLUCOMTR-MCNC: 120 MG/DL — HIGH (ref 70–99)
GLUCOSE BLDC GLUCOMTR-MCNC: 130 MG/DL — HIGH (ref 70–99)
GLUCOSE BLDC GLUCOMTR-MCNC: 155 MG/DL — HIGH (ref 70–99)
GLUCOSE BLDC GLUCOMTR-MCNC: 158 MG/DL — HIGH (ref 70–99)
GLUCOSE BLDC GLUCOMTR-MCNC: 161 MG/DL — HIGH (ref 70–99)
GLUCOSE BLDC GLUCOMTR-MCNC: 161 MG/DL — HIGH (ref 70–99)
GLUCOSE BLDC GLUCOMTR-MCNC: 174 MG/DL — HIGH (ref 70–99)
GLUCOSE BLDC GLUCOMTR-MCNC: 177 MG/DL — HIGH (ref 70–99)
GLUCOSE BLDC GLUCOMTR-MCNC: 178 MG/DL — HIGH (ref 70–99)
GLUCOSE BLDC GLUCOMTR-MCNC: 183 MG/DL — HIGH (ref 70–99)
GLUCOSE BLDC GLUCOMTR-MCNC: 189 MG/DL — HIGH (ref 70–99)
GLUCOSE BLDC GLUCOMTR-MCNC: 241 MG/DL — HIGH (ref 70–99)
GLUCOSE BLDC GLUCOMTR-MCNC: 99 MG/DL — SIGNIFICANT CHANGE UP (ref 70–99)
GLUCOSE SERPL-MCNC: 155 MG/DL — HIGH (ref 70–99)
GLUCOSE SERPL-MCNC: 168 MG/DL — HIGH (ref 70–99)
HCT VFR BLD CALC: 27.4 % — LOW (ref 39–50)
HGB BLD-MCNC: 9.5 G/DL — LOW (ref 13–17)
INR BLD: 1.17 RATIO — HIGH (ref 0.85–1.16)
LYMPHOCYTES # BLD AUTO: 0.2 K/UL — LOW (ref 1–3.3)
LYMPHOCYTES # BLD AUTO: 0.9 % — LOW (ref 13–44)
MAGNESIUM SERPL-MCNC: 2.4 MG/DL — SIGNIFICANT CHANGE UP (ref 1.6–2.6)
MAGNESIUM SERPL-MCNC: 2.6 MG/DL — SIGNIFICANT CHANGE UP (ref 1.6–2.6)
MANUAL SMEAR VERIFICATION: SIGNIFICANT CHANGE UP
MCHC RBC-ENTMCNC: 28.7 PG — SIGNIFICANT CHANGE UP (ref 27–34)
MCHC RBC-ENTMCNC: 34.7 G/DL — SIGNIFICANT CHANGE UP (ref 32–36)
MCV RBC AUTO: 82.8 FL — SIGNIFICANT CHANGE UP (ref 80–100)
MONOCYTES # BLD AUTO: 0.94 K/UL — HIGH (ref 0–0.9)
MONOCYTES NFR BLD AUTO: 4.3 % — SIGNIFICANT CHANGE UP (ref 2–14)
MYELOCYTES NFR BLD: 0.9 % — HIGH (ref 0–0)
NEUTROPHILS # BLD AUTO: 20.58 K/UL — HIGH (ref 1.8–7.4)
NEUTROPHILS NFR BLD AUTO: 93.9 % — HIGH (ref 43–77)
OVALOCYTES BLD QL SMEAR: SLIGHT — SIGNIFICANT CHANGE UP
PHOSPHATE SERPL-MCNC: 5 MG/DL — HIGH (ref 2.5–4.5)
PHOSPHATE SERPL-MCNC: 5.8 MG/DL — HIGH (ref 2.5–4.5)
PLAT MORPH BLD: NORMAL — SIGNIFICANT CHANGE UP
PLATELET # BLD AUTO: 115 K/UL — LOW (ref 150–400)
POIKILOCYTOSIS BLD QL AUTO: SIGNIFICANT CHANGE UP
POTASSIUM SERPL-MCNC: 4 MMOL/L — SIGNIFICANT CHANGE UP (ref 3.5–5.3)
POTASSIUM SERPL-MCNC: 4.2 MMOL/L — SIGNIFICANT CHANGE UP (ref 3.5–5.3)
POTASSIUM SERPL-SCNC: 4 MMOL/L — SIGNIFICANT CHANGE UP (ref 3.5–5.3)
POTASSIUM SERPL-SCNC: 4.2 MMOL/L — SIGNIFICANT CHANGE UP (ref 3.5–5.3)
PROT SERPL-MCNC: 5.8 G/DL — LOW (ref 6–8.3)
PROTHROM AB SERPL-ACNC: 13.4 SEC — SIGNIFICANT CHANGE UP (ref 9.9–13.4)
RBC # BLD: 3.31 M/UL — LOW (ref 4.2–5.8)
RBC # FLD: 15.7 % — HIGH (ref 10.3–14.5)
RBC BLD AUTO: ABNORMAL
SCHISTOCYTES BLD QL AUTO: SLIGHT — SIGNIFICANT CHANGE UP
SODIUM SERPL-SCNC: 134 MMOL/L — LOW (ref 135–145)
SODIUM SERPL-SCNC: 134 MMOL/L — LOW (ref 135–145)
TACROLIMUS SERPL-MCNC: 19.7 NG/ML — SIGNIFICANT CHANGE UP
WBC # BLD: 21.92 K/UL — HIGH (ref 3.8–10.5)
WBC # FLD AUTO: 21.92 K/UL — HIGH (ref 3.8–10.5)

## 2025-05-06 PROCEDURE — 99232 SBSQ HOSP IP/OBS MODERATE 35: CPT | Mod: GC

## 2025-05-06 PROCEDURE — 99291 CRITICAL CARE FIRST HOUR: CPT

## 2025-05-06 PROCEDURE — 99232 SBSQ HOSP IP/OBS MODERATE 35: CPT | Mod: FS

## 2025-05-06 PROCEDURE — 71045 X-RAY EXAM CHEST 1 VIEW: CPT | Mod: 26

## 2025-05-06 PROCEDURE — 99291 CRITICAL CARE FIRST HOUR: CPT | Mod: FS

## 2025-05-06 RX ORDER — PREDNISONE 20 MG/1
15 TABLET ORAL EVERY 12 HOURS
Refills: 0 | Status: DISCONTINUED | OUTPATIENT
Start: 2025-05-07 | End: 2025-05-14

## 2025-05-06 RX ORDER — ATOVAQUONE 750 MG/5ML
1500 SUSPENSION ORAL DAILY
Refills: 0 | Status: DISCONTINUED | OUTPATIENT
Start: 2025-05-07 | End: 2025-05-14

## 2025-05-06 RX ORDER — CALCIUM GLUCONATE 20 MG/ML
2 INJECTION, SOLUTION INTRAVENOUS ONCE
Refills: 0 | Status: COMPLETED | OUTPATIENT
Start: 2025-05-06 | End: 2025-05-06

## 2025-05-06 RX ORDER — MYCOPHENOLATE MOFETIL 500 MG/1
1000 TABLET, FILM COATED ORAL
Refills: 0 | Status: DISCONTINUED | OUTPATIENT
Start: 2025-05-06 | End: 2025-05-14

## 2025-05-06 RX ORDER — HEPARIN SODIUM 1000 [USP'U]/ML
5000 INJECTION INTRAVENOUS; SUBCUTANEOUS EVERY 8 HOURS
Refills: 0 | Status: DISCONTINUED | OUTPATIENT
Start: 2025-05-06 | End: 2025-05-07

## 2025-05-06 RX ORDER — HYPROMELLOSE 0.4 %
1 DROPS OPHTHALMIC (EYE) EVERY 12 HOURS
Refills: 0 | Status: DISCONTINUED | OUTPATIENT
Start: 2025-05-06 | End: 2025-05-14

## 2025-05-06 RX ORDER — INSULIN LISPRO 100 U/ML
INJECTION, SOLUTION INTRAVENOUS; SUBCUTANEOUS
Refills: 0 | Status: DISCONTINUED | OUTPATIENT
Start: 2025-05-06 | End: 2025-05-14

## 2025-05-06 RX ORDER — TAMSULOSIN HYDROCHLORIDE 0.4 MG/1
0.4 CAPSULE ORAL AT BEDTIME
Refills: 0 | Status: DISCONTINUED | OUTPATIENT
Start: 2025-05-06 | End: 2025-05-14

## 2025-05-06 RX ORDER — METHOCARBAMOL 500 MG/1
500 TABLET, FILM COATED ORAL EVERY 8 HOURS
Refills: 0 | Status: DISCONTINUED | OUTPATIENT
Start: 2025-05-06 | End: 2025-05-14

## 2025-05-06 RX ADMIN — Medication 3 UNIT(S)/HR: at 20:24

## 2025-05-06 RX ADMIN — CEFTRIAXONE 100 MILLIGRAM(S): 500 INJECTION, POWDER, FOR SOLUTION INTRAMUSCULAR; INTRAVENOUS at 12:29

## 2025-05-06 RX ADMIN — Medication 125 MILLIGRAM(S): at 17:37

## 2025-05-06 RX ADMIN — Medication 500000 UNIT(S): at 12:24

## 2025-05-06 RX ADMIN — Medication 500000 UNIT(S): at 20:24

## 2025-05-06 RX ADMIN — Medication 3 UNIT(S)/HR: at 07:45

## 2025-05-06 RX ADMIN — ERYTHROMYCIN 1 APPLICATION(S): 5 OINTMENT OPHTHALMIC at 00:19

## 2025-05-06 RX ADMIN — METHYLPREDNISOLONE ACETATE 20 MILLIGRAM(S): 80 INJECTION, SUSPENSION INTRA-ARTICULAR; INTRALESIONAL; INTRAMUSCULAR; SOFT TISSUE at 05:54

## 2025-05-06 RX ADMIN — Medication 500 MILLIGRAM(S): at 00:19

## 2025-05-06 RX ADMIN — INSULIN LISPRO 2: 100 INJECTION, SOLUTION INTRAVENOUS; SUBCUTANEOUS at 22:26

## 2025-05-06 RX ADMIN — MYCOPHENOLATE MOFETIL 1000 MILLIGRAM(S): 500 TABLET, FILM COATED ORAL at 07:39

## 2025-05-06 RX ADMIN — CALCIUM GLUCONATE 200 GRAM(S): 20 INJECTION, SOLUTION INTRAVENOUS at 00:20

## 2025-05-06 RX ADMIN — MYCOPHENOLATE MOFETIL 1000 MILLIGRAM(S): 500 TABLET, FILM COATED ORAL at 20:24

## 2025-05-06 RX ADMIN — TACROLIMUS 2 MILLIGRAM(S): 0.5 CAPSULE ORAL at 07:43

## 2025-05-06 RX ADMIN — Medication 1 DROP(S): at 05:54

## 2025-05-06 RX ADMIN — Medication 1 DROP(S): at 07:39

## 2025-05-06 RX ADMIN — Medication 150 MILLIGRAM(S): at 22:24

## 2025-05-06 RX ADMIN — Medication 125 MILLIGRAM(S): at 05:54

## 2025-05-06 RX ADMIN — Medication 500 MILLIGRAM(S): at 00:49

## 2025-05-06 RX ADMIN — Medication 1 APPLICATION(S): at 12:24

## 2025-05-06 RX ADMIN — Medication 10 MILLILITER(S): at 20:23

## 2025-05-06 RX ADMIN — Medication 25 MG/HR: at 07:45

## 2025-05-06 RX ADMIN — Medication 1 TABLET(S): at 12:24

## 2025-05-06 RX ADMIN — Medication 500000 UNIT(S): at 07:39

## 2025-05-06 RX ADMIN — DESVENLAFAXINE 50 MILLIGRAM(S): 25 TABLET, EXTENDED RELEASE ORAL at 13:58

## 2025-05-06 RX ADMIN — Medication 1 DROP(S): at 00:19

## 2025-05-06 RX ADMIN — Medication 40 MILLIGRAM(S): at 12:24

## 2025-05-06 RX ADMIN — Medication 500000 UNIT(S): at 16:32

## 2025-05-06 RX ADMIN — ERYTHROMYCIN 1 APPLICATION(S): 5 OINTMENT OPHTHALMIC at 05:55

## 2025-05-06 RX ADMIN — BUMETANIDE 5 MG/HR: 1 TABLET ORAL at 07:44

## 2025-05-06 RX ADMIN — TAMSULOSIN HYDROCHLORIDE 0.4 MILLIGRAM(S): 0.4 CAPSULE ORAL at 22:24

## 2025-05-06 RX ADMIN — HEPARIN SODIUM 5000 UNIT(S): 1000 INJECTION INTRAVENOUS; SUBCUTANEOUS at 20:24

## 2025-05-06 RX ADMIN — METHOCARBAMOL 500 MILLIGRAM(S): 500 TABLET, FILM COATED ORAL at 12:24

## 2025-05-06 NOTE — PROGRESS NOTE ADULT - ASSESSMENT
70-year-old male, ABO O, with NICM since 2011 HFrEF (LVEF 25-30%) s/p MDT CRT-D with baseline CHB, VT/VF, AFs/p GIANFRANCO ligation/MAZE, MV/TV repair, PVC ablation 3/2024 intolerant to AADs in the past, recent admission 3/19/2025-3/20/2025 for AICD shocks initially presented to the [facility] ED on 3/21/2025, sent by HF specialist for ACID shock. Device reported successful ATP for VT 3/17/2025 at 6:52pm followed by one ATP & 40J shock. He reported feeling dizzy & SOB during the events. He follows with Dr. Luca Tamayo for HF, currently undergoing transplant workup, Dr John for CRTD and VT/VF and Dr. Chu for genetic counseling. While admitted to [facility], he was started on a lidocaine gtt. On 3/21 when lidocaine weaned off patient had another two episodes of VT requiring AICD shocks. He was transferred to [facility] for further management. He was initially maintained on lidocaine gtt from 3/21/2025-3/25/2025 & his listing status was upgraded to UNOS status 2e for heart transplant (ABO O) for the refractory VT. He was transitioned to oral antiarrhythmics (Toprol XL & quinidine) & ultimately transferred from CICU to [facility] floor on 3/27/2025. Hospital course was further c/b development of watery diarrhea & was found to have +norovirus on 3/31/2025 which prompted deactivation to status 7 listing for heart transplant. Status reinstated to 2E after diarrhea resolved.     He underwent successful OHT on 4/28 + TV ring repair 34mm (CMV +/+, Toxo -/+,ischemic time 258min, intra op 2plts + 2 cryo). Intra op STACEY with LVEF 20% and mild RV dysfunction.  Extubated on 4/29.  Had some initial RV failure/PGD (requiring dobutamine 7.5, epi 0.02, levo, vaso, and Lico.  CRRT started to aid volume removal.  Echo from 4/30 with LVEF 70% and mild RV dysfunction.  Levo/Vaso weaned off on 5/1 and Lico weaned off 5/2. He requiring large amounts of pain medication despite 2 chest tubes being removed POD 1 (ketamine + PCA pump).  Now with ?delerium and non-cooporation with care, SI. Seen by psych 5/2 and started on trazodone + Pristiq.  Requiring 1:1 sitter.  NG tube to be placed 5/3.      Will continue to adjust his immunosuppression medications and plan for biopsy this Thursday 5/8    Bedside hemodynamics:  5/2/25: /59/75, , CVP 12, PA 44/15/24, CO/CI 6.8/3.5  5/1/25 BP 99/59/72, , CVP 11, PA 40/17/24, Gucci CO/CI 5.5/2.8  4/29/25 BP 94/57/67, , CVP 11, PA 28/15/20, Gucci CI 2.2  3/22/25 BP 97/76/83, HR 80, CVP 6, PA 58/23/38, PCWP 20, SVR 1100, Gucci CO/CI 5.1/2.6, TD 4/2.08    Cardiac Studies:   TTE 4/30/25: LVEF 71%, no RMWA, RV normal size, reduced RVSF  STACEY 4/28/25 intraop: LVEF 20%, global, dilated RV with mildly reduced RVSF  TTE 3/20/25 : LVEF 30%, segmental, LVIDd 5.3, walls normal, elevated LV filling pressures, mod RVE with mildly reduced RV function, TAPSE 1.7, severely dilated RA, annuloplasty in MV position, transvalvular gradients are elevated with severe prosthetic MS (peak gradient 15.7, mean gradient 8), trace intravalvular MR, TV annuloplasty ring noted, mild TR,  est PASP 36, no evidence of LV thrombus  TTE 10/2024: LVEF 25-30%, LVEDD 5.9cm, moderately reduced RV function, annuloplasty ring of mitral and tricuspid position, PASP 47 mmHg  RHC 3/19/25- RA 11 PA 58/26 PCWP 32 CO/CI 5.43/2.77  [facility] 6/2023 Nonobstructive CAD 70-year-old male, ABO O, with NICM since 2011 HFrEF (LVEF 25-30%) s/p MDT CRT-D with baseline CHB, VT/VF, AFs/p GIANFRANCO ligation/MAZE, MV/TV repair, PVC ablation 3/2024 intolerant to AADs in the past, recent admission 3/19/2025-3/20/2025 for AICD shocks initially presented to the [facility] ED on 3/21/2025, sent by HF specialist for ACID shock. Device reported successful ATP for VT 3/17/2025 at 6:52pm followed by one ATP & 40J shock. He reported feeling dizzy & SOB during the events. He follows with Dr. Luca Tamayo for HF, currently undergoing transplant workup, Dr John for CRTD and VT/VF and Dr. Chu for genetic counseling. While admitted to [facility], he was started on a lidocaine gtt. On 3/21 when lidocaine weaned off patient had another two episodes of VT requiring AICD shocks. He was transferred to [facility] for further management. He was initially maintained on lidocaine gtt from 3/21/2025-3/25/2025 & his listing status was upgraded to UNOS status 2e for heart transplant (ABO O) for the refractory VT. He was transitioned to oral antiarrhythmics (Toprol XL & quinidine) & ultimately transferred from CICU to [facility] floor on 3/27/2025. Hospital course was further c/b development of watery diarrhea & was found to have +norovirus on 3/31/2025 which prompted deactivation to status 7 listing for heart transplant. Status reinstated to 2E after diarrhea resolved.     He underwent successful OHT on 4/28 + TV ring repair 34mm (CMV +/+, Toxo -/+,ischemic time 258min, intra op 2plts + 2 cryo). Intra op STACEY with LVEF 20% and mild RV dysfunction.  Extubated on 4/29.  Had some initial RV failure/PGD (requiring dobutamine 7.5, epi 0.02, levo, vaso, and Lico.  CRRT started to aid volume removal.  Echo from 4/30 with LVEF 70% and mild RV dysfunction.  Levo/Vaso weaned off on 5/1 and Lico weaned off 5/2. He requiring large amounts of pain medication despite 2 chest tubes being removed POD 1 (ketamine + PCA pump).  Now with ?delerium and non-cooporation with care, SI. Seen by psych 5/2 and started on trazodone + Pristiq.  Requiring 1:1 sitter now improved.  NG tube placed 5/3 but now eating.     Will continue to adjust his immunosuppression medications and plan for biopsy this Thursday 5/8    Bedside hemodynamics:  5/2/25: /59/75, , CVP 12, PA 44/15/24, CO/CI 6.8/3.5  5/1/25 BP 99/59/72, , CVP 11, PA 40/17/24, Gucci CO/CI 5.5/2.8  4/29/25 BP 94/57/67, , CVP 11, PA 28/15/20, Gucci CI 2.2  3/22/25 BP 97/76/83, HR 80, CVP 6, PA 58/23/38, PCWP 20, SVR 1100, Gucci CO/CI 5.1/2.6, TD 4/2.08    Cardiac Studies:   TTE 4/30/25: LVEF 71%, no RMWA, RV normal size, reduced RVSF  STACEY 4/28/25 intraop: LVEF 20%, global, dilated RV with mildly reduced RVSF  TTE 3/20/25 : LVEF 30%, segmental, LVIDd 5.3, walls normal, elevated LV filling pressures, mod RVE with mildly reduced RV function, TAPSE 1.7, severely dilated RA, annuloplasty in MV position, transvalvular gradients are elevated with severe prosthetic MS (peak gradient 15.7, mean gradient 8), trace intravalvular MR, TV annuloplasty ring noted, mild TR,  est PASP 36, no evidence of LV thrombus  TTE 10/2024: LVEF 25-30%, LVEDD 5.9cm, moderately reduced RV function, annuloplasty ring of mitral and tricuspid position, PASP 47 mmHg  RHC 3/19/25- RA 11 PA 58/26 PCWP 32 CO/CI 5.43/2.77  [facility] 6/2023 Nonobstructive CAD

## 2025-05-06 NOTE — PROGRESS NOTE ADULT - PROBLEM SELECTOR PLAN 1
Patient's baseline Scr is 1.0-1.2. On 4/28, SCr was 1.1 and on 4/29, Scr rubia to 3.2. UA-turbid, 300 protein, small leuks, large blood, 914 rbc's, UPCR-3.2. Pt had drop in hgb from 12.4 (4/28) to 9.0 (4/29). Pt was on IV vasopressor support. Pt noted to have elevated CVP of 20, and was not responding well enough to diuresis, so was started on CRRT for UF. Pt had required multiple pressors and inotropes, gradually weaned off. CRRT stopped AM of 5/4 and pt responding well to Bumex infusion. Femoral NTDC removed on 5/5.  -UOP ~3.9L in last 24 hr, net negative 3.4L. Pt on 2L O2 via NC. BP stable.  -Tacrolimus started on 4/29. Monitor daily trough levels and try to maintain at lower end of therapeutic range to help renal recovery; last trough on 5/5 was 11.4. Immunosuppression dosing per Transplant Cardiology.  -Labs reviewed. Cr continues to rise since stopping CRRT; Cr 4.4 today, but electrolytes within range. BUN 84 (of note, pt is receiving steroids).  -Can continue holding CRRT for now. Continue Bumex infusion for goal CVP<12  -Dose meds per CrCl ~10 mL/min - will be using kinetic eGFR given rapid changes in renal function.  -Avoid nephrotoxins.

## 2025-05-06 NOTE — PROGRESS NOTE ADULT - CRITICAL CARE ATTENDING COMMENT
Feels well. Off HD since 5/3 and urinating well. On exam, JVP approx 6-8 with mild HJR and v waves, RRR, no m/r/g, dec BS b/l, nontender abdomen, no edema. Labs reviewed - WBC 21 (uptrend), Hb 8.5, BUN/Cr 85/4.43(uptrending). Tac 19.7  - plan for biopsy Thursday  - maintain goal net even; goal CVP 6-8; d/c diuretics  - hold HD  - hold tac as suptratherapeutic  - c/w Cellcept 1 gram q12  - c/w CFTX for S. pneumo meningitis PPx (seen in donor CSF)  - on PCN for syphilis PPx  - c/w nystatin  - start mepron 1500 mg daily

## 2025-05-06 NOTE — BH CONSULTATION LIAISON PROGRESS NOTE - NSBHASSESSMENTFT_PSY_ALL_CORE
71 y/o domiciled, employed, , , male with PPHx of PTSD, unspecified anxiety d/o, with no past psychiatric admissions, with a PMHx of NICM since 2011 HFrEF (LVEF 25-30%) s/p MDT CRT-D with baseline CHB, VT/VF, AFs/p GIANFRANCO ligation/MAZE, MV/TV repair, PVC ablation 3/2024 intolerant to AADs in the past, recent admission 3/19/2025-3/20/2025 for AICD shocks initially presented to the [facility] ED on 3/21/2025, sent by HF specialist for ACID shock. While admitted to [facility], his listing status was upgraded to UNOS status 2e for heart transplant (ABO O) for the refractory VT, with the patient being transferred to [facility] for further management. Patient seen by transplant psychiatry for concerns of depression.     5/6:Patient on f/u endorsed improvement in sleep with increase dose of Trazodone, with patient endorsing feeling better overall, currently tolerating medication regiment, with no side effects reported on exam.     see attending attestation for plan  -------------------------------------------------------------    Plan  -C/W Atarax 10mg PO TID PRN for acute anxiety alleviation  -C/W Pristiq 50mg Po QAM	  -C/W Trazodone to 150mg PO HS    71 y/o domiciled, employed, , , male with PPHx of PTSD, unspecified anxiety d/o, with no past psychiatric admissions, with a PMHx of NICM since 2011 HFrEF (LVEF 25-30%) s/p MDT CRT-D with baseline CHB, VT/VF, AFs/p GIANFRANCO ligation/MAZE, MV/TV repair, PVC ablation 3/2024 intolerant to AADs in the past, recent admission 3/19/2025-3/20/2025 for AICD shocks initially presented to the [facility] ED on 3/21/2025, sent by HF specialist for ACID shock. While admitted to [facility], his listing status was upgraded to UNOS status 2e for heart transplant (ABO O) for the refractory VT, with the patient being transferred to [facility] for further management. Patient seen by transplant psychiatry for concerns of depression.     see attending attestation for plan  -------------------------------------------------------------

## 2025-05-06 NOTE — PROGRESS NOTE ADULT - SUBJECTIVE AND OBJECTIVE BOX
[facility] DIVISION OF KIDNEY DISEASES AND HYPERTENSION --    Reason for consult: ZAHRA    24 hour events/subjective: Patient seen and examined at bedside. States his breathing is good. Denies fever, chest pain, n/v/d. Reports constipation and abdominal pressure. Appetite is good. Remains on Bumex gtt. No overnight events.      PAST HISTORY  --------------------------------------------------------------------------------  No significant changes to PMH, PSH, FHx, SHx, unless otherwise noted    ALLERGIES & MEDICATIONS  --------------------------------------------------------------------------------  Allergies    No Known Allergies      Standing Inpatient Medications  artificial  tears Solution 1 Drop(s) Both EYES every 2 hours  buMETAnide Infusion 1 mG/Hr IV Continuous <Continuous>  cefTRIAXone   IVPB 2000 milliGRAM(s) IV Intermittent <User Schedule>  chlorhexidine 2% Cloths 1 Application(s) Topical daily  desvenlafaxine ER 50 milliGRAM(s) Oral daily  dexMEDEtomidine Infusion 1.2 MICROgram(s)/kG/Hr IV Continuous <Continuous>  dextrose 50% Injectable 50 milliLiter(s) IV Push every 15 minutes  dextrose 50% Injectable 25 milliLiter(s) IV Push every 15 minutes  DOBUTamine Infusion 1.5 MICROgram(s)/kG/Min IV Continuous <Continuous>  EPINEPHrine    Infusion 0.02 MICROgram(s)/kG/Min IV Continuous <Continuous>  erythromycin   Ointment 1 Application(s) Left EYE every 4 hours  insulin regular Infusion 3 Unit(s)/Hr IV Continuous <Continuous>  methylPREDNISolone sodium succinate Injectable 16 milliGRAM(s) IV Push every 12 hours  methylPREDNISolone sodium succinate Injectable   IV Push   mycophenolate mofetil Suspension 1000 milliGRAM(s) Oral every 12 hours  naloxegol 12.5 milliGRAM(s) Oral daily  Nephro-piotr 1 Tablet(s) Oral daily  niCARdipine Infusion 5 mG/Hr IV Continuous <Continuous>  norepinephrine Infusion 0.05 MICROgram(s)/kG/Min IV Continuous <Continuous>  nystatin    Suspension 630192 Unit(s) Oral <User Schedule>  oxymetazoline 0.05% Nasal Spray 2 Spray(s) Both Nostrils two times a day  pantoprazole  Injectable 40 milliGRAM(s) IV Push daily  penicillin   G benzathine Injectable 2.4 Million Unit(s) IntraMuscular <User Schedule>  polyethylene glycol 3350 17 Gram(s) Oral two times a day  potassium chloride  10 mEq/50 mL IVPB 10 milliEquivalent(s) IV Intermittent once  potassium chloride  10 mEq/50 mL IVPB 10 milliEquivalent(s) IV Intermittent once  senna 2 Tablet(s) Oral at bedtime  sodium chloride 0.9%. 1000 milliLiter(s) IV Continuous <Continuous>  tacrolimus 2 milliGRAM(s) Oral <User Schedule>  traZODone 150 milliGRAM(s) Oral at bedtime  vancomycin    Solution 125 milliGRAM(s) Oral every 12 hours    PRN Inpatient Medications  hydrOXYzine hydrochloride 10 milliGRAM(s) Oral at bedtime PRN      REVIEW OF SYSTEMS  --------------------------------------------------------------------------------  Gen: No fever  Respiratory: No dyspnea  CV: No chest pain  GI: No nausea or vomiting, +constipation  : No dysuria or hematuria  Skin: No rashes  MSK: No edema  Neuro: No dizziness/lightheadedness    All other systems were reviewed and are negative, except as noted.    VITALS/PHYSICAL EXAM  --------------------------------------------------------------------------------  T(C): 36.8 (05-06-25 @ 04:00), Max: 37.1 (05-05-25 @ 12:00)  HR: 107 (05-06-25 @ 07:00) (92 - 114)  BP: --  RR: 21 (05-06-25 @ 07:00) (10 - 41)  SpO2: 97% (05-06-25 @ 07:00) (90% - 100%)  Wt(kg): --        05-05-25 @ 07:01  -  05-06-25 @ 07:00  --------------------------------------------------------  IN: 722 mL / OUT: 4075 mL / NET: -3353 mL      PHYSICAL EXAM:  Gen: NAD sitting in chair  Neuro: non-focal  HEENT: O2 via NC   Pulm: B/L breath sounds  CV: +S1 S2  Abd: soft, non-distended, non-tender  : +indwelling nagy  Extremities: no edema  Skin: Warm  Dialysis access: none    LABS/STUDIES  --------------------------------------------------------------------------------              9.5    21.92 >-----------<  115      [05-06-25 @ 00:30]              27.4     134  |  94  |  85  ----------------------------<  155      [05-06-25 @ 00:30]  4.2   |  22  |  4.43        Ca     8.4     [05-06-25 @ 00:30]      Mg     2.6     [05-06-25 @ 00:30]      Phos  5.8     [05-06-25 @ 00:30]    TPro  5.8  /  Alb  3.9  /  TBili  0.7  /  DBili  x   /  AST  41  /  ALT  103  /  AlkPhos  99  [05-06-25 @ 00:30]    PT/INR: PT 13.4 , INR 1.17       [05-06-25 @ 00:30]  PTT: 22.6       [05-06-25 @ 00:30]      Creatinine Trend:  SCr 4.43 [05-06 @ 00:30]  SCr 4.01 [05-05 @ 12:18]  SCr 3.54 [05-05 @ 00:23]  SCr 2.72 [05-04 @ 12:03]  SCr 2.17 [05-04 @ 00:35]    Iron 48, TIBC 307, %sat 16      [03-28-25 @ 06:15]  Ferritin 184      [03-28-25 @ 06:15]  TSH 1.26      [03-22-25 @ 00:58]  Lipid: chol 147, TG 71, HDL 62, LDL --      [03-22-25 @ 00:58]    HBsAb Reactive      [04-17-25 @ 15:45]  HBsAg Nonreact      [04-17-25 @ 15:45]  HBcAb Nonreact      [04-17-25 @ 15:45]  HCV 0.05, Nonreact      [04-17-25 @ 15:45]  HIV Nonreact      [04-17-25 @ 15:45]

## 2025-05-06 NOTE — PROGRESS NOTE ADULT - PROBLEM SELECTOR PLAN 1
- ACC/AHA stage D systolic heart failure, s/p heart transplant 4/28, CMV +/+, Toxo -/+     - Retrospective crossmatch with class II DSA DR HILLIARD (preformed from pre transplant).    - Support:     - Inotropes: Epi weaned off 5/4, dobutamine weaned off 5/5     - Lico weaned off on 5/2  - Chest tubes, per CTS  - Diuresis: off bumex gtt --> give IV as needed   - TTE tomorrow 24 hours off inotropes  - Immunosuppression:      - Cellcept 1000mg BID      - Solu-medrol taper -> switch to prednisone today      - Tacro: 19.7 --> will hold tacro today and reassess tomorrow AM      - First surveillance biopsy planned for 5/8 (2nd week post txp).  - Antibiotics      - Nystatin for ppx      - s/p IV Vanc + Cefepime for post-op ppx (4/29 - 5/1)      - PO Vanco ppx (4/29 - )      - CTX 2000mg QD for donor strep pneumo+ in CSF      - Penicillin IM for donor syphilis + ab - ACC/AHA stage D systolic heart failure, s/p heart transplant 4/28, CMV +/+, Toxo -/+     - Retrospective crossmatch with class II DSA DR HILLIARD (preformed from pre transplant).    - Support:     - Inotropes: Epi weaned off 5/4, dobutamine weaned off 5/5     - Lico weaned off on 5/2  - Chest tubes, per CTS  - Diuresis: off bumex gtt --> give IV as needed; keep net negative   - TTE tomorrow 24 hours off inotropes  - Immunosuppression:      - Cellcept 1000mg BID      - Solu-medrol taper -> switch to prednisone today      - Tacro: 19.7 --> will hold tacro today and reassess tomorrow AM      - First surveillance biopsy planned for 5/8 (2nd week post txp).  - Antibiotics      - Nystatin for ppx      - s/p IV Vanc + Cefepime for post-op ppx (4/29 - 5/1)      - PO Vanco ppx (4/29 - )      - CTX 2000mg QD for donor strep pneumo+ in CSF      - Penicillin IM for donor syphilis + ab

## 2025-05-06 NOTE — PROGRESS NOTE ADULT - SUBJECTIVE AND OBJECTIVE BOX
Subjective:  -sitting in chair  -no acute events overnight  -off the  and bumex     Medications:  artificial  tears Solution 1 Drop(s) Both EYES every 12 hours PRN  cefTRIAXone   IVPB 2000 milliGRAM(s) IV Intermittent <User Schedule>  chlorhexidine 2% Cloths 1 Application(s) Topical daily  desvenlafaxine ER 50 milliGRAM(s) Oral daily  dextrose 50% Injectable 50 milliLiter(s) IV Push every 15 minutes  dextrose 50% Injectable 25 milliLiter(s) IV Push every 15 minutes  erythromycin   Ointment 1 Application(s) Left EYE every 4 hours  hydrOXYzine hydrochloride 10 milliGRAM(s) Oral at bedtime PRN  insulin regular Infusion 3 Unit(s)/Hr IV Continuous <Continuous>  methocarbamol 500 milliGRAM(s) Oral every 8 hours PRN  methylPREDNISolone sodium succinate Injectable 16 milliGRAM(s) IV Push every 12 hours  methylPREDNISolone sodium succinate Injectable   IV Push   mycophenolate mofetil Suspension 1000 milliGRAM(s) Oral every 12 hours  naloxegol 12.5 milliGRAM(s) Oral daily  Nephro-piotr 1 Tablet(s) Oral daily  nystatin    Suspension 530753 Unit(s) Oral <User Schedule>  oxymetazoline 0.05% Nasal Spray 2 Spray(s) Both Nostrils two times a day  pantoprazole  Injectable 40 milliGRAM(s) IV Push daily  penicillin   G benzathine Injectable 2.4 Million Unit(s) IntraMuscular <User Schedule>  polyethylene glycol 3350 17 Gram(s) Oral two times a day  potassium chloride  10 mEq/50 mL IVPB 10 milliEquivalent(s) IV Intermittent once  potassium chloride  10 mEq/50 mL IVPB 10 milliEquivalent(s) IV Intermittent once  senna 2 Tablet(s) Oral at bedtime  sodium chloride 0.9%. 1000 milliLiter(s) IV Continuous <Continuous>  tamsulosin 0.4 milliGRAM(s) Oral at bedtime  traZODone 150 milliGRAM(s) Oral at bedtime  vancomycin    Solution 125 milliGRAM(s) Oral every 12 hours      Physical Exam:    Vitals:  Vital Signs Last 24 Hours  T(C): 37.3 (25 @ 12:00), Max: 37.3 (25 @ 12:00)  HR: 107 (25 @ 12:00) (94 - 114)  BP: 86/54 (25 @ 12:00) (86/54 - 98/58)  RR: 28 (25 @ 12:00) (10 - 41)  SpO2: 94% (25 @ 12:00) (90% - 100%)    Weight in k ( @ 00:00)    I&O's Summary    05 May 2025 07:  -  06 May 2025 07:00  --------------------------------------------------------  IN: 722 mL / OUT: 4075 mL / NET: -3353 mL    06 May 2025 07:01  -  06 May 2025 12:41  --------------------------------------------------------  IN: 631.5 mL / OUT: 515 mL / NET: 116.5 mL        Tele: SR 90s    General: No distress. Comfortable.  HEENT: EOM intact.  Neck: Neck supple. JVP not elevated. No masses  Chest: Clear to auscultation bilaterally  CV: Normal S1 and S2. No murmurs, rub, or gallops. Radial pulses normal.  Abdomen: Soft, non-distended, non-tender  Skin: No rashes or skin breakdown  Neurology: Alert and oriented times three. Sensation intact  Psych: Affect normal    Labs:                        9.5    21.92 )-----------( 115      ( 06 May 2025 00:30 )             27.4         134[L]  |  94[L]  |  85[H]  ----------------------------<  155[H]  4.2   |  22  |  4.43[H]    Ca    8.4      06 May 2025 00:30  Phos  5.8     05-  Mg     2.6     -    TPro  5.8[L]  /  Alb  3.9  /  TBili  0.7  /  DBili  x   /  AST  41[H]  /  ALT  103[H]  /  AlkPhos  99  -    PT/INR - ( 06 May 2025 00:30 )   PT: 13.4 sec;   INR: 1.17 ratio         PTT - ( 06 May 2025 00:30 )  PTT:22.6 sec        Oxygen Saturation, Mixed: 62.3 ( @ 13:55)  Oxygen Saturation, Mixed: 57.6 ( @ 12:49)  Oxygen Saturation, Mixed: 58.2 ( @ 10:51)  Oxygen Saturation, Mixed: 62.1 ( @ 09:15)  Oxygen Saturation, Mixed: 68.2 ( @ 04:00)  Oxygen Saturation, Mixed: 67.3 ( @ 00:01)  Oxygen Saturation, Mixed: 56.1 ( @ 18:00)  Oxygen Saturation, Mixed: 59.1 ( @ 16:00)

## 2025-05-06 NOTE — BH CONSULTATION LIAISON PROGRESS NOTE - NSBHTIMEACTIVITIESPERFORMED_PSY_A_CORE
I attest my time as an attending is greater than 50% of the total combined time spent on qualifying patient care activities by NP. I have spent 40 minutes in the care of this patient, including: obtaining/reviewing labs, interviewing, providing supportive therapy, medication management in addition to documentation of the above. Time spent excludes time spent on separately reportable services/teaching during the encounter. 
I attest my time as an attending is greater than 50% of the total combined time spent on qualifying patient care activities by NP. I have spent 35 minutes in the care of this patient, including: obtaining/reviewing labs, interviewing, medication management in addition to documentation of the above. Time spent excludes time spent on separately reportable services/teaching during the encounter. 
I attest my time as an attending is greater than 50% of the total combined time spent on qualifying patient care activities by NP. I have spent 35 minutes in the care of this patient, including: obtaining/reviewing labs, interviewing, medication management in addition to documentation of the above. Time spent excludes time spent on separately reportable services/teaching during the encounter. 
I attest my time as an attending is greater than 50% of the total combined time spent on qualifying patient care activities by NP. I have spent 30 minutes in the care of this patient, including: obtaining/reviewing labs, interviewing, providing supportive therapy, medication management in addition to documentation of the above. Time spent excludes time spent on separately reportable services/teaching during the encounter. 
I attest my time as an attending is greater than 50% of the total combined time spent on qualifying patient care activities by NP. I have spent 35 minutes in the care of this patient, including: obtaining/reviewing labs, interviewing, medication management in addition to documentation of the above. Time spent excludes time spent on separately reportable services/teaching during the encounter.

## 2025-05-06 NOTE — PROGRESS NOTE ADULT - SUBJECTIVE AND OBJECTIVE BOX
Patient seen and examined at the bedside.    Remains critically ill on continuous ICU monitoring, at risk for life threatening decompensation.  All Labs, data reviewed. Plan of care discussed in length during multi-disciplinary ICU rounds.       Brief Summary:  *XX yo M/F with pre op dx s/p surgery on XX/YY*      24 Hour events:      Objective:  Vital Signs Last 24 Hrs  T(C): 37.2 (06 May 2025 08:00), Max: 37.2 (06 May 2025 08:00)  T(F): 99 (06 May 2025 08:00), Max: 99 (06 May 2025 08:00)  HR: 109 (06 May 2025 09:00) (94 - 114)  BP: 91/63 (06 May 2025 09:00) (91/63 - 91/63)  BP(mean): 73 (06 May 2025 09:00) (73 - 73)  RR: 26 (06 May 2025 09:00) (10 - 41)  SpO2: 94% (06 May 2025 09:00) (90% - 100%)    Parameters below as of 06 May 2025 08:00  Patient On (Oxygen Delivery Method): room air                    Physical Exam:   General: Alert and interactive   Neurology: Oriented, following commands  Respiratory: Clear bilaterally   CV: Sinus   *If on ECMO/LVAD Add settings+ AC Therapy*  Abdominal: Soft, Nontender  Extremities: Warm, well-perfused  -------------------------------------------------------------------------------------------------------------------------------    Labs:                        9.5    21.92 )-----------( 115      ( 06 May 2025 00:30 )             27.4     05-06    134[L]  |  94[L]  |  85[H]  ----------------------------<  155[H]  4.2   |  22  |  4.43[H]    Ca    8.4      06 May 2025 00:30  Phos  5.8     05-06  Mg     2.6     05-06    TPro  5.8[L]  /  Alb  3.9  /  TBili  0.7  /  DBili  x   /  AST  41[H]  /  ALT  103[H]  /  AlkPhos  99  05-06    LIVER FUNCTIONS - ( 06 May 2025 00:30 )  Alb: 3.9 g/dL / Pro: 5.8 g/dL / ALK PHOS: 99 U/L / ALT: 103 U/L / AST: 41 U/L / GGT: x           PT/INR - ( 06 May 2025 00:30 )   PT: 13.4 sec;   INR: 1.17 ratio         PTT - ( 06 May 2025 00:30 )  PTT:22.6 sec  ABG - ( 06 May 2025 06:41 )  pH, Arterial: 7.36  pH, Blood: x     /  pCO2: 40    /  pO2: 111   / HCO3: 23    / Base Excess: -2.7  /  SaO2: 99.1                  ALL MEDICATIONS   MEDICATIONS  (STANDING):  artificial  tears Solution 1 Drop(s) Both EYES every 2 hours  buMETAnide Infusion 1 mG/Hr (5 mL/Hr) IV Continuous <Continuous>  cefTRIAXone   IVPB 2000 milliGRAM(s) IV Intermittent <User Schedule>  chlorhexidine 2% Cloths 1 Application(s) Topical daily  desvenlafaxine ER 50 milliGRAM(s) Oral daily  dexMEDEtomidine Infusion 1.2 MICROgram(s)/kG/Hr (24.5 mL/Hr) IV Continuous <Continuous>  dextrose 50% Injectable 50 milliLiter(s) IV Push every 15 minutes  dextrose 50% Injectable 25 milliLiter(s) IV Push every 15 minutes  DOBUTamine Infusion 1.5 MICROgram(s)/kG/Min (3.68 mL/Hr) IV Continuous <Continuous>  EPINEPHrine    Infusion 0.02 MICROgram(s)/kG/Min (6.13 mL/Hr) IV Continuous <Continuous>  erythromycin   Ointment 1 Application(s) Left EYE every 4 hours  insulin regular Infusion 3 Unit(s)/Hr (3 mL/Hr) IV Continuous <Continuous>  methylPREDNISolone sodium succinate Injectable 16 milliGRAM(s) IV Push every 12 hours  methylPREDNISolone sodium succinate Injectable   IV Push   mycophenolate mofetil Suspension 1000 milliGRAM(s) Oral every 12 hours  naloxegol 12.5 milliGRAM(s) Oral daily  Nephro-piotr 1 Tablet(s) Oral daily  niCARdipine Infusion 5 mG/Hr (25 mL/Hr) IV Continuous <Continuous>  norepinephrine Infusion 0.05 MICROgram(s)/kG/Min (7.66 mL/Hr) IV Continuous <Continuous>  nystatin    Suspension 484919 Unit(s) Oral <User Schedule>  oxymetazoline 0.05% Nasal Spray 2 Spray(s) Both Nostrils two times a day  pantoprazole  Injectable 40 milliGRAM(s) IV Push daily  penicillin   G benzathine Injectable 2.4 Million Unit(s) IntraMuscular <User Schedule>  polyethylene glycol 3350 17 Gram(s) Oral two times a day  potassium chloride  10 mEq/50 mL IVPB 10 milliEquivalent(s) IV Intermittent once  potassium chloride  10 mEq/50 mL IVPB 10 milliEquivalent(s) IV Intermittent once  senna 2 Tablet(s) Oral at bedtime  sodium chloride 0.9%. 1000 milliLiter(s) (10 mL/Hr) IV Continuous <Continuous>  tacrolimus 2 milliGRAM(s) Oral <User Schedule>  traZODone 150 milliGRAM(s) Oral at bedtime  vancomycin    Solution 125 milliGRAM(s) Oral every 12 hours    MEDICATIONS  (PRN):  hydrOXYzine hydrochloride 10 milliGRAM(s) Oral at bedtime PRN Anxiety    ------------------------------------------------------------------------------------------------------------------------------  Assessment:    At risk for delirium   At risk for hemodynamic decompensation  At high risk for cardiac arrythmias   At high risk for respiratory complications        Plan:   ***Neuro***  Maintain day/night cycle to prevenct ICU delerium   Postoperative acute pain control with Tylenol, Gabapentin, and prns.  Meds for sleep at bedtime     ***Cardiovascular***  At high risk for hemodynamic instability and cardiac arrhythmias.  Monitor for sign of hypoperfusion, trend lactate  *If on ECMO/LVAD/Impella/IABP* Mechanical circulatory support   Maintain MAPs > 65 mm Hg. Titrate *pressor name*    Titrate *inotrope name* Keep CVP<10.  *Add other cardiac meds*  Monitor chest tube output.      ***Pulmonary***  *Leave black if supplemental O2*  Postoperative acute respiratory insufficiency/failure  Wean ventilator as tolerated. Lung protective strategy  *If extubated* Deep breathing and coughing exercises, IS, Mobilization, nebs, Chest PT  Wean oxygen as able. *Remove if not on vent*      ***GI***  FEES Eval *Only if consulted*  Keep NPO for now.  Continue Tube feeds *if on Tfs*  Protonix/Pepcid for stress ulcer prophylaxis   Bowel regimen.   Trend LFTs    ***Renal***  ZAHRA / CKD/ ESRD on HD ***Remove if not applicable***  Trend Creatinine/BUN  [place] catheter for strict I/O measurements. *remove if not present*  Replete electrolytes as indicated.  Diuresis *Specifiy med*      ***ID***  Leukocytosis *above 11*  Leukopenia *Below 3*  *If no abxs*  Monitor off antibiotics   *summarize abx/indication*  Secondary prophylaxis *Leave blank*     ***Endocrine***  Insulin per protocol for Hyperglycemia     ***Hematology***  Acute blood loss anemia and thrombocytopenia   no current transfusion indication *change if recieved products, or if no thrombocytopenia*  Heparin/Argatroban/Lovenox for anticoagulation/ DVT prophylaxis         IAlexandra MD, personally performed the services described in this documentation. all medical record entries made by the scribe were at my direction and in my presence. I have reviewed the chart and agree that the record reflects my personal performance and is accurate and complete  Electronically signed:   Alexandra Fierro MD  CT ICU attending     ICU time: ** mins     By signing my name below, I, Marc Meza, attest that this documentation has been prepared under the direction and in the presence of Alexandra Fierro MD  Electronically signed: Marc Meza, 05-06-25 @ 09:24             Patient seen and examined at the bedside.    Remains critically ill on continuous ICU monitoring, at risk for life threatening decompensation.  All Labs, data reviewed. Plan of care discussed in length during multi-disciplinary ICU rounds.     Brief Summary:  70-year-old male with history of NICM since 2011 HFrEF (LVEF 25-30%)   Now s/p OHT on 4/29/25     24 Hour events:  Ambulated with PT  Improved PO intake  Off inotrops  Continue Bumex gtt  will hold CRRT        Objective:  Vital Signs Last 24 Hrs  T(C): 37.2 (06 May 2025 08:00), Max: 37.2 (06 May 2025 08:00)  T(F): 99 (06 May 2025 08:00), Max: 99 (06 May 2025 08:00)  HR: 109 (06 May 2025 09:00) (94 - 114)  BP: 91/63 (06 May 2025 09:00) (91/63 - 91/63)  BP(mean): 73 (06 May 2025 09:00) (73 - 73)  RR: 26 (06 May 2025 09:00) (10 - 41)  SpO2: 94% (06 May 2025 09:00) (90% - 100%)    Parameters below as of 06 May 2025 08:00  Patient On (Oxygen Delivery Method): room air               Physical Exam:   General: NAD, ambulates  Neurology: intact, mood improving   Respiratory: Bilateral breath sounds  CV: Sinus Tach  Abdominal: Soft, Nontender  Extremities: Warm, well-perfused  [place]        -------------------------------------------------------------------------------------------------------------------------------    Labs:                        9.5    21.92 )-----------( 115      ( 06 May 2025 00:30 )             27.4     05-06    134[L]  |  94[L]  |  85[H]  ----------------------------<  155[H]  4.2   |  22  |  4.43[H]    Ca    8.4      06 May 2025 00:30  Phos  5.8     05-06  Mg     2.6     05-06    TPro  5.8[L]  /  Alb  3.9  /  TBili  0.7  /  DBili  x   /  AST  41[H]  /  ALT  103[H]  /  AlkPhos  99  05-06    LIVER FUNCTIONS - ( 06 May 2025 00:30 )  Alb: 3.9 g/dL / Pro: 5.8 g/dL / ALK PHOS: 99 U/L / ALT: 103 U/L / AST: 41 U/L / GGT: x           PT/INR - ( 06 May 2025 00:30 )   PT: 13.4 sec;   INR: 1.17 ratio         PTT - ( 06 May 2025 00:30 )  PTT:22.6 sec  ABG - ( 06 May 2025 06:41 )  pH, Arterial: 7.36  pH, Blood: x     /  pCO2: 40    /  pO2: 111   / HCO3: 23    / Base Excess: -2.7  /  SaO2: 99.1                  ALL MEDICATIONS   MEDICATIONS  (STANDING):  artificial  tears Solution 1 Drop(s) Both EYES every 2 hours  buMETAnide Infusion 1 mG/Hr (5 mL/Hr) IV Continuous <Continuous>  cefTRIAXone   IVPB 2000 milliGRAM(s) IV Intermittent <User Schedule>  chlorhexidine 2% Cloths 1 Application(s) Topical daily  desvenlafaxine ER 50 milliGRAM(s) Oral daily  dexMEDEtomidine Infusion 1.2 MICROgram(s)/kG/Hr (24.5 mL/Hr) IV Continuous <Continuous>  dextrose 50% Injectable 50 milliLiter(s) IV Push every 15 minutes  dextrose 50% Injectable 25 milliLiter(s) IV Push every 15 minutes  DOBUTamine Infusion 1.5 MICROgram(s)/kG/Min (3.68 mL/Hr) IV Continuous <Continuous>  EPINEPHrine    Infusion 0.02 MICROgram(s)/kG/Min (6.13 mL/Hr) IV Continuous <Continuous>  erythromycin   Ointment 1 Application(s) Left EYE every 4 hours  insulin regular Infusion 3 Unit(s)/Hr (3 mL/Hr) IV Continuous <Continuous>  methylPREDNISolone sodium succinate Injectable 16 milliGRAM(s) IV Push every 12 hours  methylPREDNISolone sodium succinate Injectable   IV Push   mycophenolate mofetil Suspension 1000 milliGRAM(s) Oral every 12 hours  naloxegol 12.5 milliGRAM(s) Oral daily  Nephro-piotr 1 Tablet(s) Oral daily  niCARdipine Infusion 5 mG/Hr (25 mL/Hr) IV Continuous <Continuous>  norepinephrine Infusion 0.05 MICROgram(s)/kG/Min (7.66 mL/Hr) IV Continuous <Continuous>  nystatin    Suspension 468274 Unit(s) Oral <User Schedule>  oxymetazoline 0.05% Nasal Spray 2 Spray(s) Both Nostrils two times a day  pantoprazole  Injectable 40 milliGRAM(s) IV Push daily  penicillin   G benzathine Injectable 2.4 Million Unit(s) IntraMuscular <User Schedule>  polyethylene glycol 3350 17 Gram(s) Oral two times a day  potassium chloride  10 mEq/50 mL IVPB 10 milliEquivalent(s) IV Intermittent once  potassium chloride  10 mEq/50 mL IVPB 10 milliEquivalent(s) IV Intermittent once  senna 2 Tablet(s) Oral at bedtime  sodium chloride 0.9%. 1000 milliLiter(s) (10 mL/Hr) IV Continuous <Continuous>  tacrolimus 2 milliGRAM(s) Oral <User Schedule>  traZODone 150 milliGRAM(s) Oral at bedtime  vancomycin    Solution 125 milliGRAM(s) Oral every 12 hours    MEDICATIONS  (PRN):  hydrOXYzine hydrochloride 10 milliGRAM(s) Oral at bedtime PRN Anxiety    ------------------------------------------------------------------------------------------------------------------------------  Assessment:  69 yo NICM, s/p heart transplant     RV dysfunction  Postop acute pulmonary insufficiency  ZAHRA  Acute blood loss anemia  Thrombocytopenia  Hyperglycemia    Plan:   ***Neuro***  Maintain day/night cycle to prevent ICU delirium   Postoperative acute pain control with IV Tylenol, Tramadol, and prns.  Trazadone at night for sleep     ***Cardiovascular***  Monitor for sign of hypoperfusion, trend lactate, SVo2  off inotrope ok to remove CVL  Maintain MAPs>65 mm HG.   Monitor chest tube output    ***Pulmonary***  Postoperative acute pulmonary insufficiency  Deep breathing and coughing exercises, IS, Mobilization, nebs, Chest PT    ***GI***  Tolerating diet but poor intake.  On nocturnal tube feeds,   Protonix for prophylaxis   Bowel regimen.   Trend LFTs    ***Renal***  ZAHRA , continue Bumex gtt  Goal even, trend filling pressures.  Hold CRRT    ***ID***  Leukocytosis  Tacro, Cellcept, Steroid taper for immunosuppression.  Prophylaxis with Nystatin, PO Vanco.  PCN and Ceftriaxone for donor cultures (syphylis, /S pneumo)    ***Endocrine***  Insulin infusion for Hyperglycemia     ***Hematology***  Acute blood loss anemia and thrombocytopenia   CBC, Coags, monitor for bleeding  PF4 negative.  Heparin SQ for VTE prophylax    ICU time: 55 mins       I, Alexandra Fierro MD, personally performed the services described in this documentation. all medical record entries made by the scribe were at my direction and in my presence. I have reviewed the chart and agree that the record reflects my personal performance and is accurate and complete  Electronically signed:   Alexandra Fierro MD  CT ICU attending              Patient seen and examined at the bedside.    Remains critically ill on continuous ICU monitoring, at risk for life threatening decompensation.  All Labs, data reviewed. Plan of care discussed in length during multi-disciplinary ICU rounds.     Brief Summary:  70-year-old male with history of NICM since 2011 HFrEF (LVEF 25-30%)   Now s/p OHT on 4/29/25     24 Hour events:  Ambulated with PT  On a diet, appetite improved  Off CRRT, off diuretics, trend creatining   Scheduled for Biopsy on Thursday  Continue Flomax ok to d/c [place] tomorrow    Objective:  Vital Signs Last 24 Hrs  T(C): 37.2 (06 May 2025 08:00), Max: 37.2 (06 May 2025 08:00)  T(F): 99 (06 May 2025 08:00), Max: 99 (06 May 2025 08:00)  HR: 109 (06 May 2025 09:00) (94 - 114)  BP: 91/63 (06 May 2025 09:00) (91/63 - 91/63)  BP(mean): 73 (06 May 2025 09:00) (73 - 73)  RR: 26 (06 May 2025 09:00) (10 - 41)  SpO2: 94% (06 May 2025 09:00) (90% - 100%)    Parameters below as of 06 May 2025 08:00  Patient On (Oxygen Delivery Method): room air        Physical Exam:   General: NAD, ambulates  Neurology: intact, mood improving   Respiratory: Bilateral breath sounds  CV: Sinus Tach  Abdominal: Soft, Nontender  Extremities: Warm, well-perfused  [place]        -------------------------------------------------------------------------------------------------------------------------------    Labs:                        9.5    21.92 )-----------( 115      ( 06 May 2025 00:30 )             27.4     05-06    134[L]  |  94[L]  |  85[H]  ----------------------------<  155[H]  4.2   |  22  |  4.43[H]    Ca    8.4      06 May 2025 00:30  Phos  5.8     05-06  Mg     2.6     05-06    TPro  5.8[L]  /  Alb  3.9  /  TBili  0.7  /  DBili  x   /  AST  41[H]  /  ALT  103[H]  /  AlkPhos  99  05-06    LIVER FUNCTIONS - ( 06 May 2025 00:30 )  Alb: 3.9 g/dL / Pro: 5.8 g/dL / ALK PHOS: 99 U/L / ALT: 103 U/L / AST: 41 U/L / GGT: x           PT/INR - ( 06 May 2025 00:30 )   PT: 13.4 sec;   INR: 1.17 ratio         PTT - ( 06 May 2025 00:30 )  PTT:22.6 sec  ABG - ( 06 May 2025 06:41 )  pH, Arterial: 7.36  pH, Blood: x     /  pCO2: 40    /  pO2: 111   / HCO3: 23    / Base Excess: -2.7  /  SaO2: 99.1                  ALL MEDICATIONS   MEDICATIONS  (STANDING):  artificial  tears Solution 1 Drop(s) Both EYES every 2 hours  buMETAnide Infusion 1 mG/Hr (5 mL/Hr) IV Continuous <Continuous>  cefTRIAXone   IVPB 2000 milliGRAM(s) IV Intermittent <User Schedule>  chlorhexidine 2% Cloths 1 Application(s) Topical daily  desvenlafaxine ER 50 milliGRAM(s) Oral daily  dexMEDEtomidine Infusion 1.2 MICROgram(s)/kG/Hr (24.5 mL/Hr) IV Continuous <Continuous>  dextrose 50% Injectable 50 milliLiter(s) IV Push every 15 minutes  dextrose 50% Injectable 25 milliLiter(s) IV Push every 15 minutes  DOBUTamine Infusion 1.5 MICROgram(s)/kG/Min (3.68 mL/Hr) IV Continuous <Continuous>  EPINEPHrine    Infusion 0.02 MICROgram(s)/kG/Min (6.13 mL/Hr) IV Continuous <Continuous>  erythromycin   Ointment 1 Application(s) Left EYE every 4 hours  insulin regular Infusion 3 Unit(s)/Hr (3 mL/Hr) IV Continuous <Continuous>  methylPREDNISolone sodium succinate Injectable 16 milliGRAM(s) IV Push every 12 hours  methylPREDNISolone sodium succinate Injectable   IV Push   mycophenolate mofetil Suspension 1000 milliGRAM(s) Oral every 12 hours  naloxegol 12.5 milliGRAM(s) Oral daily  Nephro-piotr 1 Tablet(s) Oral daily  niCARdipine Infusion 5 mG/Hr (25 mL/Hr) IV Continuous <Continuous>  norepinephrine Infusion 0.05 MICROgram(s)/kG/Min (7.66 mL/Hr) IV Continuous <Continuous>  nystatin    Suspension 519558 Unit(s) Oral <User Schedule>  oxymetazoline 0.05% Nasal Spray 2 Spray(s) Both Nostrils two times a day  pantoprazole  Injectable 40 milliGRAM(s) IV Push daily  penicillin   G benzathine Injectable 2.4 Million Unit(s) IntraMuscular <User Schedule>  polyethylene glycol 3350 17 Gram(s) Oral two times a day  potassium chloride  10 mEq/50 mL IVPB 10 milliEquivalent(s) IV Intermittent once  potassium chloride  10 mEq/50 mL IVPB 10 milliEquivalent(s) IV Intermittent once  senna 2 Tablet(s) Oral at bedtime  sodium chloride 0.9%. 1000 milliLiter(s) (10 mL/Hr) IV Continuous <Continuous>  tacrolimus 2 milliGRAM(s) Oral <User Schedule>  traZODone 150 milliGRAM(s) Oral at bedtime  vancomycin    Solution 125 milliGRAM(s) Oral every 12 hours    MEDICATIONS  (PRN):  hydrOXYzine hydrochloride 10 milliGRAM(s) Oral at bedtime PRN Anxiety    ------------------------------------------------------------------------------------------------------------------------------  Assessment:  69 yo NICM, s/p heart transplant     RV dysfunction  Postop acute pulmonary insufficiency  ZAHRA  Acute blood loss anemia  Thrombocytopenia  Hyperglycemia    Plan:   ***Neuro***  Maintain day/night cycle to prevent ICU delirium   Postoperative acute pain control with IV Tylenol, Tramadol, and prns.  Trazadone at night for sleep     ***Cardiovascular***  Monitor for sign of hypoperfusion, trend lactate, SVo2  off inotrope ok to remove CVL  Maintain MAPs>65 mm HG.   Monitor chest tube output    ***Pulmonary***  Postoperative acute pulmonary insufficiency  Deep breathing and coughing exercises, IS, Mobilization, nebs, Chest PT    ***GI***  Tolerating diet but poor intake.  On nocturnal tube feeds,   Protonix for prophylaxis   Bowel regimen.   Trend LFTs    ***Renal***  ZAHRA , continue Bumex gtt  Goal even, trend filling pressures.  Hold CRRT    ***ID***  Leukocytosis  Tacro, Cellcept, Steroid taper for immunosuppression.  Prophylaxis with Nystatin, PO Vanco.  PCN and Ceftriaxone for donor cultures (syphylis, /S pneumo)    ***Endocrine***  Insulin infusion for Hyperglycemia     ***Hematology***  Acute blood loss anemia and thrombocytopenia   CBC, Coags, monitor for bleeding  PF4 negative.  Heparin SQ for VTE prophylax    ICU time: 55 mins       I, Alexandra Fierro MD, personally performed the services described in this documentation. all medical record entries made by the scribe were at my direction and in my presence. I have reviewed the chart and agree that the record reflects my personal performance and is accurate and complete  Electronically signed:   Alexandra Fierro MD  CT ICU attending              Patient seen and examined at the bedside.    Remains critically ill on continuous ICU monitoring, at risk for life threatening decompensation.  All Labs, data reviewed. Plan of care discussed in length during multi-disciplinary ICU rounds.     Brief Summary:  70-year-old male with history of NICM since 2011 HFrEF (LVEF 25-30%)   Now s/p OHT on 4/29/25     24 Hour events:  Ambulats with PT  On a diet, appetite improved  Off CRRT, off diuretics, trend creatinine   Scheduled for Biopsy on Thursday  Continue Flomax ok to d/c [place] tomorrow, wires    Objective:  Vital Signs Last 24 Hrs  T(C): 37.2 (06 May 2025 08:00), Max: 37.2 (06 May 2025 08:00)  T(F): 99 (06 May 2025 08:00), Max: 99 (06 May 2025 08:00)  HR: 109 (06 May 2025 09:00) (94 - 114)  BP: 91/63 (06 May 2025 09:00) (91/63 - 91/63)  BP(mean): 73 (06 May 2025 09:00) (73 - 73)  RR: 26 (06 May 2025 09:00) (10 - 41)  SpO2: 94% (06 May 2025 09:00) (90% - 100%)    Parameters below as of 06 May 2025 08:00  Patient On (Oxygen Delivery Method): room air        Physical Exam:   General: NAD, ambulates  Neurology: intact, mood improving   Respiratory: Bilateral breath sounds  CV: Sinus Tach  Abdominal: Soft, Nontender  Extremities: Warm, well-perfused  [place]        -------------------------------------------------------------------------------------------------------------------------------    Labs:                        9.5    21.92 )-----------( 115      ( 06 May 2025 00:30 )             27.4     05-06    134[L]  |  94[L]  |  85[H]  ----------------------------<  155[H]  4.2   |  22  |  4.43[H]    Ca    8.4      06 May 2025 00:30  Phos  5.8     05-06  Mg     2.6     05-06    TPro  5.8[L]  /  Alb  3.9  /  TBili  0.7  /  DBili  x   /  AST  41[H]  /  ALT  103[H]  /  AlkPhos  99  05-06    LIVER FUNCTIONS - ( 06 May 2025 00:30 )  Alb: 3.9 g/dL / Pro: 5.8 g/dL / ALK PHOS: 99 U/L / ALT: 103 U/L / AST: 41 U/L / GGT: x           PT/INR - ( 06 May 2025 00:30 )   PT: 13.4 sec;   INR: 1.17 ratio     PTT - ( 06 May 2025 00:30 )  PTT:22.6 sec  ABG - ( 06 May 2025 06:41 )  pH, Arterial: 7.36  pH, Blood: x     /  pCO2: 40    /  pO2: 111   / HCO3: 23    / Base Excess: -2.7  /  SaO2: 99.1      ALL MEDICATIONS   MEDICATIONS  (STANDING):  artificial  tears Solution 1 Drop(s) Both EYES every 2 hours  buMETAnide Infusion 1 mG/Hr (5 mL/Hr) IV Continuous <Continuous>  cefTRIAXone   IVPB 2000 milliGRAM(s) IV Intermittent <User Schedule>  chlorhexidine 2% Cloths 1 Application(s) Topical daily  desvenlafaxine ER 50 milliGRAM(s) Oral daily  dexMEDEtomidine Infusion 1.2 MICROgram(s)/kG/Hr (24.5 mL/Hr) IV Continuous <Continuous>  dextrose 50% Injectable 50 milliLiter(s) IV Push every 15 minutes  dextrose 50% Injectable 25 milliLiter(s) IV Push every 15 minutes  DOBUTamine Infusion 1.5 MICROgram(s)/kG/Min (3.68 mL/Hr) IV Continuous <Continuous>  EPINEPHrine    Infusion 0.02 MICROgram(s)/kG/Min (6.13 mL/Hr) IV Continuous <Continuous>  erythromycin   Ointment 1 Application(s) Left EYE every 4 hours  insulin regular Infusion 3 Unit(s)/Hr (3 mL/Hr) IV Continuous <Continuous>  methylPREDNISolone sodium succinate Injectable 16 milliGRAM(s) IV Push every 12 hours  methylPREDNISolone sodium succinate Injectable   IV Push   mycophenolate mofetil Suspension 1000 milliGRAM(s) Oral every 12 hours  naloxegol 12.5 milliGRAM(s) Oral daily  Nephro-piotr 1 Tablet(s) Oral daily  niCARdipine Infusion 5 mG/Hr (25 mL/Hr) IV Continuous <Continuous>  norepinephrine Infusion 0.05 MICROgram(s)/kG/Min (7.66 mL/Hr) IV Continuous <Continuous>  nystatin    Suspension 894723 Unit(s) Oral <User Schedule>  oxymetazoline 0.05% Nasal Spray 2 Spray(s) Both Nostrils two times a day  pantoprazole  Injectable 40 milliGRAM(s) IV Push daily  penicillin   G benzathine Injectable 2.4 Million Unit(s) IntraMuscular <User Schedule>  polyethylene glycol 3350 17 Gram(s) Oral two times a day  potassium chloride  10 mEq/50 mL IVPB 10 milliEquivalent(s) IV Intermittent once  potassium chloride  10 mEq/50 mL IVPB 10 milliEquivalent(s) IV Intermittent once  senna 2 Tablet(s) Oral at bedtime  sodium chloride 0.9%. 1000 milliLiter(s) (10 mL/Hr) IV Continuous <Continuous>  tacrolimus 2 milliGRAM(s) Oral <User Schedule>  traZODone 150 milliGRAM(s) Oral at bedtime  vancomycin    Solution 125 milliGRAM(s) Oral every 12 hours    MEDICATIONS  (PRN):  hydrOXYzine hydrochloride 10 milliGRAM(s) Oral at bedtime PRN Anxiety    ------------------------------------------------------------------------------------------------------------------------------  Assessment:  71 yo NICM, s/p heart transplant     RV dysfunction  Postop acute pulmonary insufficiency  ZAHRA  Acute blood loss anemia  Thrombocytopenia  Hyperglycemia    Plan:   ***Neuro***  Maintain day/night cycle to prevent ICU delirium   Postoperative acute pain control with IV Tylenol, Tramadol  Trazadone at night for sleep     ***Cardiovascular***  s/p heart trasplant  Monitor for sign of hypoperfusion   Maintain MAPs>65 mm HG.   Monitor chest tube output    ***Pulmonary***  Postoperative acute pulmonary insufficiency  Deep breathing and coughing exercises,   IS, Mobilization, nebs, Chest PT    ***GI***  Tolerating diet    Protonix for prophylaxis   Bowel regimen, having BMs   Trend LFTs    ***Renal***  ZAHRA , now off Bumex, made > 4500 l of urine  Hold CRRT  Monitor Cr/BUN    ***ID***  Leukocytosis  Tacro, Cellcept, Steroid taper for immunosuppression.  Prophylaxis with Nystatin, PO Vanco.  PCN and Ceftriaxone for donor cultures (syphylis, /S pneumo)    ***Endocrine***  Insulin infusion for Hyperglycemia     ***Hematology***  Acute blood loss anemia and thrombocytopenia   CBC, Coags, monitor for bleeding  Heparin SQ for VTE prophylax    ICU time: 55 mins       I, Alexandra Fierro MD, personally performed the services described in this documentation. all medical record entries made by the scribe were at my direction and in my presence. I have reviewed the chart and agree that the record reflects my personal performance and is accurate and complete  Electronically signed:   Alexandra Fierro MD  CT ICU attending

## 2025-05-06 NOTE — PROGRESS NOTE ADULT - PROBLEM SELECTOR PLAN 2
- with steroids as possible contributor  - Psych f/u. On trazadone   - Feeding tube to receive meds as above, pt ate breakfast this AM, so NGT can be removed. Start calorie count tomorrow to assess nutritional status

## 2025-05-06 NOTE — PROGRESS NOTE ADULT - ATTENDING COMMENTS
Heart transplant with post op cardiogenic shock and ZAHRA - Likely ATN needing CVVHDF     - Off CVVHDF since 5/3  - urinating well on Bumex gtt   - creatinine going up but that is to be expected. it will plateau soon  - Femoral catheter d/dwayne 5/5  - we will evaluate daily for dialysis needs - NONE today   - IF getting antibiotics, can be dosed for an eGFR of 15 ml/min for now. We will be using kinetic GFR for measurements in rapidly changing kidney function     jens calvillo  nephrology attending   please contact me on TEAMS   Office- [phone]

## 2025-05-06 NOTE — BH CONSULTATION LIAISON PROGRESS NOTE - ATTENDING COMMENTS
69 y/o domiciled, employed, , , male with PPHx of PTSD, unspecified anxiety d/o, with no past psychiatric admissions, with a PMHx of NICM since 2011 HFrEF (LVEF 25-30%) s/p MDT CRT-D with baseline CHB, VT/VF, AFs/p GIANFRANCO ligation/MAZE, MV/TV repair, PVC ablation 3/2024 intolerant to AADs in the past, recent admission 3/19/2025-3/20/2025 for AICD shocks initially presented to the [facility] ED on 3/21/2025, sent by HF specialist for ACID shock. While admitted to [facility], his listing status was upgraded to UNOS status 2e for heart transplant (ABO O) for the refractory VT, with the patient being transferred to [facility] for further management. Patient seen by transplant psychiatry for concerns of depression.   ---  Patient seen in setting of Adjustment disorder with depressed mood given decline in health and the anticipation of a transplant. Notes psychological distress in setting of awaiting transplantation and concerned about being in a setting which makes him vulnerable to additional hospital-acquired infections. Discussed he is most upset at recent Norovirus diagnosis. Declined need for psychotropic medications; known previously to writer by his psychosocial assessment at which time we had discussed initiation of SSRI for hx of PTSD. Patient continues to decline need right now. Amenable to ongoing supportive therapy as well as appreciates visits from Holistic RN and  services. On 4/2 no interval events, agree with above assessment, Klonopin resumed as PRN. 4/25: Ongoing anxiety would like home dose of Klonopin resumed, discussed use of Remeron in lieu of Trazodone, patient deferred at this time.         Recommendations:    -HFrEF- management as per primary team; MAKEDA deems patient to be good candidate;  -Insomnia: C/W trazodone 100mg PO HS   -Unspecified anxiety d/o: can consider Klonopin 0.5mg Po QD PRN for acute anxiety alleviation   -Labs/studies: **Please repeat EKG last QTC 590ms, TSH w/ FT4  -Adjustment d/o w/ depressed mood & PTSD: Recommended Sertraline once again; patient declined need for standing SSRI at this time  -Supportive therapy provided; C/W  and Holistic RN services         
69 y/o domiciled, employed, , , male with PPHx of PTSD, unspecified anxiety d/o, with no past psychiatric admissions, with a PMHx of NICM since 2011 HFrEF (LVEF 25-30%) s/p MDT CRT-D with baseline CHB, VT/VF, AFs/p GIANFRANCO ligation/MAZE, MV/TV repair, PVC ablation 3/2024 intolerant to AADs in the past, recent admission 3/19/2025-3/20/2025 for AICD shocks initially presented to the [facility] ED on 3/21/2025, sent by HF specialist for ACID shock. While admitted to [facility], his listing status was upgraded to UNOS status 2e for heart transplant (ABO O) for the refractory VT, with the patient being transferred to [facility] for further management. Patient seen by transplant psychiatry for concerns of depression.   ---  Patient seen in setting of Adjustment disorder with depressed mood given decline in health and the anticipation of a transplant. Notes psychological distress in setting of awaiting transplantation and concerned about being in a setting which makes him vulnerable to additional hospital-acquired infections. Discussed he is most upset at recent Norovirus diagnosis. Declined need for psychotropic medications; known previously to writer by his psychosocial assessment at which time we had discussed initiation of SSRI for hx of PTSD. Patient continues to decline need right now. Amenable to ongoing supportive therapy as well as appreciates visits from Holistic RN and  services. On 4/2 no interval events, agree with above assessment, Klonopin resumed as PRN. 4/25: Ongoing anxiety would like home dose of Klonopin resumed, discussed use of Remeron in lieu of Trazodone, patient deferred at this time.       5/6:Tolerating Pristiq and Trazodone well.  Notes some improvement in sleep architecture since optimization of Trazodone.  Supportive therapy provided with the discussion of behavioral activation techniques.  Denies SI/HI/AVH.      Recommendations:    -Unspecified anxiety d/o: Can consider starting Atarax 10mg PO TID PRN for acute anxiety alleviation  	-HOLD  Klonopin 0.5mg Po QD PRN for acute anxiety alleviation   Adjustment d/o w/ depressed mood & PTSD:  	-Can consider starting Pristiq 50mg Po QAM (if patient can receive a dose in the next hour, it can given stat) 	  -Insomnia:- consider INCREASE of Trazodone to 150mg PO HS   -Supportive therapy provided; C/W  and Holistic RN services   --HFrEF s/p OHT- management as per primary team; SIPAT deems patient to be good candidate; encourage compliance w/ aftercare recommendations including but not limited to medications   
71 y/o domiciled, employed, , , male with PPHx of PTSD, unspecified anxiety d/o, with no past psychiatric admissions, with a PMHx of NICM since 2011 HFrEF (LVEF 25-30%) s/p MDT CRT-D with baseline CHB, VT/VF, AFs/p GIANFRANCO ligation/MAZE, MV/TV repair, PVC ablation 3/2024 intolerant to AADs in the past, recent admission 3/19/2025-3/20/2025 for AICD shocks initially presented to the [facility] ED on 3/21/2025, sent by HF specialist for ACID shock. While admitted to [facility], his listing status was upgraded to UNOS status 2e for heart transplant (ABO O) for the refractory VT, with the patient being transferred to [facility] for further management. Patient seen by transplant psychiatry for concerns of depression.   ---  Patient seen in setting of Adjustment disorder with depressed mood given decline in health and the anticipation of a transplant. Notes psychological distress in setting of awaiting transplantation and concerned about being in a setting which makes him vulnerable to additional hospital-acquired infections. Discussed he is most upset at recent Norovirus diagnosis. Declined need for psychotropic medications; known previously to writer by his psychosocial assessment at which time we had discussed initiation of SSRI for hx of PTSD. Patient continues to decline need right now. Amenable to ongoing supportive therapy as well as appreciates visits from Holistic RN and  services. On 4/2 no interval events, agree with above assessment, Klonopin resumed as PRN.          Recommendations:    -HFrEF- management as per primary tram; MAKEDA deems patient to be good candidate; listed status 2e  -Insomnia: C/W trazodone 100mg PO HS   -Unspecified anxiety d/o: Can consider starting Klonopin 0.25mg Po BID PRN for acute anxiety alleviation   -Labs/studies: **Please repeat EKG last QTC 590ms, TSH w/ FT4  -Adjustment d/o w/ depressed mood & PTSD: Recommended Sertraline once again; patient declined need for standing SSRI at this time  -Supportive therapy provided; C/W  and Holistic RN services         
69 y/o domiciled, employed, , , male with PPHx of PTSD, unspecified anxiety d/o, with no past psychiatric admissions, with a PMHx of NICM since 2011 HFrEF (LVEF 25-30%) s/p MDT CRT-D with baseline CHB, VT/VF, AFs/p GIANFRANCO ligation/MAZE, MV/TV repair, PVC ablation 3/2024 intolerant to AADs in the past, recent admission 3/19/2025-3/20/2025 for AICD shocks initially presented to the [facility] ED on 3/21/2025, sent by HF specialist for ACID shock. While admitted to [facility], his listing status was upgraded to UNOS status 2e for heart transplant (ABO O) for the refractory VT, with the patient being transferred to [facility] for further management. Patient seen by transplant psychiatry for concerns of depression.   ---  Patient seen in setting of Adjustment disorder with depressed mood given decline in health and the anticipation of a transplant. Notes psychological distress in setting of awaiting transplantation and concerned about being in a setting which makes him vulnerable to additional hospital-acquired infections. Discussed he is most upset at recent Norovirus diagnosis. Declined need for psychotropic medications; known previously to writer by his psychosocial assessment at which time we had discussed initiation of SSRI for hx of PTSD. Patient continues to decline need right now. Amenable to ongoing supportive therapy as well as appreciates visits from Holistic RN and  services. On 4/2 no interval events, agree with above assessment, Klonopin resumed as PRN. 4/25: Ongoing anxiety would like home dose of Klonopin resumed, discussed use of Remeron in lieu of Trazodone, patient deferred at this time.     5/5:Patient seen as f/u, endorsed difficulty sleeping despite use of Trazodone, denying any ah/vh/hi/si, intent or plan, noted tolerating Pristiq at this time.      Recommendations:    -Unspecified anxiety d/o: Can consider starting Atarax 10mg PO TID PRN for acute anxiety alleviation  	-HOLD  Klonopin 0.5mg Po QD PRN for acute anxiety alleviation   Adjustment d/o w/ depressed mood & PTSD:  	-Can consider starting Pristiq 50mg Po QAM (if patient can receive a dose in the next hour, it can given stat) 	  -Insomnia:- consider INCREASE of Trazodone to 150mg PO HS   -Supportive therapy provided; C/W  and Holistic RN services   --HFrEF s/p OHT- management as per primary team; NEDAT deems patient to be good candidate; encourage compliance w/ aftercare recommendations including but not limited to medications   
71 y/o domiciled, employed, , , male with PPHx of PTSD, unspecified anxiety d/o, with no past psychiatric admissions, with a PMHx of NICM since 2011 HFrEF (LVEF 25-30%) s/p MDT CRT-D with baseline CHB, VT/VF, AFs/p GIANFRANCO ligation/MAZE, MV/TV repair, PVC ablation 3/2024 intolerant to AADs in the past, recent admission 3/19/2025-3/20/2025 for AICD shocks initially presented to the [facility] ED on 3/21/2025, sent by HF specialist for ACID shock. While admitted to [facility], his listing status was upgraded to UNOS status 2e for heart transplant (ABO O) for the refractory VT, with the patient being transferred to [facility] for further management. Patient seen by transplant psychiatry for concerns of depression.   ---  Patient seen in setting of Adjustment disorder with depressed mood given decline in health and the anticipation of a transplant. Notes psychological distress in setting of awaiting transplantation and concerned about being in a setting which makes him vulnerable to additional hospital-acquired infections. Discussed he is most upset at recent Norovirus diagnosis. Declined need for psychotropic medications; known previously to writer by his psychosocial assessment at which time we had discussed initiation of SSRI for hx of PTSD. Patient continues to decline need right now. Amenable to ongoing supportive therapy as well as appreciates visits from Holistic RN and  services. On 4/2 no interval events, agree with above assessment, Klonopin resumed as PRN. 4/25: Ongoing anxiety would like home dose of Klonopin resumed, discussed use of Remeron in lieu of Trazodone, patient deferred at this time.     5/2: Given concerns of low mood and energy discussed initiation of Pristiq; Will maintain on Pristiq if amenable with additional use of PRN Atarax.       Recommendations:        -Unspecified anxiety d/o: Can consider starting Atarax 10mg PO TID PRN for acute anxiety alleviation  	-HOLD  Klonopin 0.5mg Po QD PRN for acute anxiety alleviation   Adjustment d/o w/ depressed mood & PTSD:  	-Can consider starting Pristiq 50mg Po QAM (if patient can receive a dose in the next hour, it can given stat)   	  -Insomnia: C/W trazodone 100mg PO HS   -Supportive therapy provided; C/W  and Holistic RN services   --HFrEF s/p OHT- management as per primary team; MAKEDA georgeems patient to be good candidate;

## 2025-05-07 DIAGNOSIS — R49.0 DYSPHONIA: ICD-10-CM

## 2025-05-07 LAB
ALBUMIN SERPL ELPH-MCNC: 1.1 G/DL — LOW (ref 3.3–5)
ALBUMIN SERPL ELPH-MCNC: 3.9 G/DL — SIGNIFICANT CHANGE UP (ref 3.3–5)
ALBUMIN SERPL ELPH-MCNC: 4 G/DL — SIGNIFICANT CHANGE UP (ref 3.3–5)
ALP SERPL-CCNC: 23 U/L — LOW (ref 40–120)
ALP SERPL-CCNC: 89 U/L — SIGNIFICANT CHANGE UP (ref 40–120)
ALP SERPL-CCNC: 90 U/L — SIGNIFICANT CHANGE UP (ref 40–120)
ALT FLD-CCNC: 108 U/L — HIGH (ref 10–45)
ALT FLD-CCNC: 111 U/L — HIGH (ref 10–45)
ALT FLD-CCNC: 29 U/L — SIGNIFICANT CHANGE UP (ref 10–45)
ANION GAP SERPL CALC-SCNC: 10 MMOL/L — SIGNIFICANT CHANGE UP (ref 5–17)
ANION GAP SERPL CALC-SCNC: 17 MMOL/L — SIGNIFICANT CHANGE UP (ref 5–17)
ANION GAP SERPL CALC-SCNC: 19 MMOL/L — HIGH (ref 5–17)
AST SERPL-CCNC: 29 U/L — SIGNIFICANT CHANGE UP (ref 10–40)
AST SERPL-CCNC: 36 U/L — SIGNIFICANT CHANGE UP (ref 10–40)
AST SERPL-CCNC: 6 U/L — LOW (ref 10–40)
BILIRUB SERPL-MCNC: 0.2 MG/DL — SIGNIFICANT CHANGE UP (ref 0.2–1.2)
BILIRUB SERPL-MCNC: 0.7 MG/DL — SIGNIFICANT CHANGE UP (ref 0.2–1.2)
BILIRUB SERPL-MCNC: 0.7 MG/DL — SIGNIFICANT CHANGE UP (ref 0.2–1.2)
BUN SERPL-MCNC: 100 MG/DL — HIGH (ref 7–23)
BUN SERPL-MCNC: 102 MG/DL — HIGH (ref 7–23)
BUN SERPL-MCNC: 34 MG/DL — HIGH (ref 7–23)
CALCIUM SERPL-MCNC: 8.5 MG/DL — SIGNIFICANT CHANGE UP (ref 8.4–10.5)
CALCIUM SERPL-MCNC: 8.7 MG/DL — SIGNIFICANT CHANGE UP (ref 8.4–10.5)
CALCIUM SERPL-MCNC: <3 MG/DL — CRITICAL LOW (ref 8.4–10.5)
CHLORIDE SERPL-SCNC: 130 MMOL/L — HIGH (ref 96–108)
CHLORIDE SERPL-SCNC: 95 MMOL/L — LOW (ref 96–108)
CHLORIDE SERPL-SCNC: 95 MMOL/L — LOW (ref 96–108)
CO2 SERPL-SCNC: 22 MMOL/L — SIGNIFICANT CHANGE UP (ref 22–31)
CO2 SERPL-SCNC: 23 MMOL/L — SIGNIFICANT CHANGE UP (ref 22–31)
CO2 SERPL-SCNC: <10 MMOL/L — CRITICAL LOW (ref 22–31)
CREAT SERPL-MCNC: 1.05 MG/DL — SIGNIFICANT CHANGE UP (ref 0.5–1.3)
CREAT SERPL-MCNC: 4.09 MG/DL — HIGH (ref 0.5–1.3)
CREAT SERPL-MCNC: 4.34 MG/DL — HIGH (ref 0.5–1.3)
EGFR: 14 ML/MIN/1.73M2 — LOW
EGFR: 14 ML/MIN/1.73M2 — LOW
EGFR: 15 ML/MIN/1.73M2 — LOW
EGFR: 15 ML/MIN/1.73M2 — LOW
EGFR: 76 ML/MIN/1.73M2 — SIGNIFICANT CHANGE UP
EGFR: 76 ML/MIN/1.73M2 — SIGNIFICANT CHANGE UP
GLUCOSE BLDC GLUCOMTR-MCNC: 114 MG/DL — HIGH (ref 70–99)
GLUCOSE BLDC GLUCOMTR-MCNC: 158 MG/DL — HIGH (ref 70–99)
GLUCOSE BLDC GLUCOMTR-MCNC: 171 MG/DL — HIGH (ref 70–99)
GLUCOSE BLDC GLUCOMTR-MCNC: 208 MG/DL — HIGH (ref 70–99)
GLUCOSE SERPL-MCNC: 115 MG/DL — HIGH (ref 70–99)
GLUCOSE SERPL-MCNC: 159 MG/DL — HIGH (ref 70–99)
GLUCOSE SERPL-MCNC: 181 MG/DL — HIGH (ref 70–99)
HCT VFR BLD CALC: 27.7 % — LOW (ref 39–50)
HGB BLD-MCNC: 9.6 G/DL — LOW (ref 13–17)
MAGNESIUM SERPL-MCNC: 2.1 MG/DL — SIGNIFICANT CHANGE UP (ref 1.6–2.6)
MAGNESIUM SERPL-MCNC: 2.2 MG/DL — SIGNIFICANT CHANGE UP (ref 1.6–2.6)
MAGNESIUM SERPL-MCNC: <.6 MG/DL — CRITICAL LOW (ref 1.6–2.6)
MCHC RBC-ENTMCNC: 28.4 PG — SIGNIFICANT CHANGE UP (ref 27–34)
MCHC RBC-ENTMCNC: 34.7 G/DL — SIGNIFICANT CHANGE UP (ref 32–36)
MCV RBC AUTO: 82 FL — SIGNIFICANT CHANGE UP (ref 80–100)
NRBC BLD AUTO-RTO: 0 /100 WBCS — SIGNIFICANT CHANGE UP (ref 0–0)
PHOSPHATE SERPL-MCNC: 1.2 MG/DL — LOW (ref 2.5–4.5)
PHOSPHATE SERPL-MCNC: 3.7 MG/DL — SIGNIFICANT CHANGE UP (ref 2.5–4.5)
PHOSPHATE SERPL-MCNC: 4.5 MG/DL — SIGNIFICANT CHANGE UP (ref 2.5–4.5)
PLATELET # BLD AUTO: 155 K/UL — SIGNIFICANT CHANGE UP (ref 150–400)
POTASSIUM SERPL-MCNC: 3.5 MMOL/L — SIGNIFICANT CHANGE UP (ref 3.5–5.3)
POTASSIUM SERPL-MCNC: 3.8 MMOL/L — SIGNIFICANT CHANGE UP (ref 3.5–5.3)
POTASSIUM SERPL-MCNC: <2 MMOL/L — CRITICAL LOW (ref 3.5–5.3)
POTASSIUM SERPL-SCNC: 3.5 MMOL/L — SIGNIFICANT CHANGE UP (ref 3.5–5.3)
POTASSIUM SERPL-SCNC: 3.8 MMOL/L — SIGNIFICANT CHANGE UP (ref 3.5–5.3)
POTASSIUM SERPL-SCNC: <2 MMOL/L — CRITICAL LOW (ref 3.5–5.3)
PROT SERPL-MCNC: 1.8 G/DL — LOW (ref 6–8.3)
PROT SERPL-MCNC: 5.8 G/DL — LOW (ref 6–8.3)
PROT SERPL-MCNC: 6.1 G/DL — SIGNIFICANT CHANGE UP (ref 6–8.3)
RBC # BLD: 3.38 M/UL — LOW (ref 4.2–5.8)
RBC # FLD: 15.8 % — HIGH (ref 10.3–14.5)
SODIUM SERPL-SCNC: 135 MMOL/L — SIGNIFICANT CHANGE UP (ref 135–145)
SODIUM SERPL-SCNC: 136 MMOL/L — SIGNIFICANT CHANGE UP (ref 135–145)
SODIUM SERPL-SCNC: 148 MMOL/L — HIGH (ref 135–145)
TACROLIMUS SERPL-MCNC: 12.2 NG/ML — SIGNIFICANT CHANGE UP
WBC # BLD: 19.52 K/UL — HIGH (ref 3.8–10.5)
WBC # FLD AUTO: 19.52 K/UL — HIGH (ref 3.8–10.5)

## 2025-05-07 PROCEDURE — 31575 DIAGNOSTIC LARYNGOSCOPY: CPT

## 2025-05-07 PROCEDURE — 99221 1ST HOSP IP/OBS SF/LOW 40: CPT | Mod: 25

## 2025-05-07 PROCEDURE — 71045 X-RAY EXAM CHEST 1 VIEW: CPT | Mod: 26

## 2025-05-07 PROCEDURE — 93010 ELECTROCARDIOGRAM REPORT: CPT

## 2025-05-07 PROCEDURE — 99232 SBSQ HOSP IP/OBS MODERATE 35: CPT | Mod: GC

## 2025-05-07 PROCEDURE — 99291 CRITICAL CARE FIRST HOUR: CPT

## 2025-05-07 PROCEDURE — 99291 CRITICAL CARE FIRST HOUR: CPT | Mod: FS

## 2025-05-07 PROCEDURE — 99292 CRITICAL CARE ADDL 30 MIN: CPT | Mod: FS

## 2025-05-07 RX ORDER — TACROLIMUS 0.5 MG/1
1 CAPSULE ORAL
Refills: 0 | Status: DISCONTINUED | OUTPATIENT
Start: 2025-05-07 | End: 2025-05-08

## 2025-05-07 RX ORDER — VALGANCICLOVIR 450 MG/1
450 TABLET, FILM COATED ORAL
Refills: 0 | Status: DISCONTINUED | OUTPATIENT
Start: 2025-05-07 | End: 2025-05-14

## 2025-05-07 RX ORDER — INSULIN LISPRO 100 U/ML
INJECTION, SOLUTION INTRAVENOUS; SUBCUTANEOUS AT BEDTIME
Refills: 0 | Status: DISCONTINUED | OUTPATIENT
Start: 2025-05-07 | End: 2025-05-14

## 2025-05-07 RX ORDER — HEPARIN SODIUM 1000 [USP'U]/ML
5000 INJECTION INTRAVENOUS; SUBCUTANEOUS ONCE
Refills: 0 | Status: COMPLETED | OUTPATIENT
Start: 2025-05-07 | End: 2025-05-07

## 2025-05-07 RX ADMIN — Medication 125 MILLIGRAM(S): at 18:00

## 2025-05-07 RX ADMIN — CEFTRIAXONE 100 MILLIGRAM(S): 500 INJECTION, POWDER, FOR SOLUTION INTRAMUSCULAR; INTRAVENOUS at 13:16

## 2025-05-07 RX ADMIN — Medication 40 MILLIGRAM(S): at 11:28

## 2025-05-07 RX ADMIN — PREDNISONE 15 MILLIGRAM(S): 20 TABLET ORAL at 18:00

## 2025-05-07 RX ADMIN — HEPARIN SODIUM 5000 UNIT(S): 1000 INJECTION INTRAVENOUS; SUBCUTANEOUS at 11:28

## 2025-05-07 RX ADMIN — HEPARIN SODIUM 5000 UNIT(S): 1000 INJECTION INTRAVENOUS; SUBCUTANEOUS at 04:04

## 2025-05-07 RX ADMIN — INSULIN LISPRO 2: 100 INJECTION, SOLUTION INTRAVENOUS; SUBCUTANEOUS at 18:00

## 2025-05-07 RX ADMIN — Medication 500000 UNIT(S): at 11:28

## 2025-05-07 RX ADMIN — Medication 500000 UNIT(S): at 08:01

## 2025-05-07 RX ADMIN — Medication 2 SPRAY(S): at 18:01

## 2025-05-07 RX ADMIN — Medication 500000 UNIT(S): at 20:08

## 2025-05-07 RX ADMIN — MYCOPHENOLATE MOFETIL 1000 MILLIGRAM(S): 500 TABLET, FILM COATED ORAL at 20:08

## 2025-05-07 RX ADMIN — TAMSULOSIN HYDROCHLORIDE 0.4 MILLIGRAM(S): 0.4 CAPSULE ORAL at 21:48

## 2025-05-07 RX ADMIN — DESVENLAFAXINE 50 MILLIGRAM(S): 25 TABLET, EXTENDED RELEASE ORAL at 11:28

## 2025-05-07 RX ADMIN — MYCOPHENOLATE MOFETIL 1000 MILLIGRAM(S): 500 TABLET, FILM COATED ORAL at 08:01

## 2025-05-07 RX ADMIN — ATOVAQUONE 1500 MILLIGRAM(S): 750 SUSPENSION ORAL at 11:27

## 2025-05-07 RX ADMIN — Medication 150 MILLIGRAM(S): at 21:48

## 2025-05-07 RX ADMIN — PREDNISONE 15 MILLIGRAM(S): 20 TABLET ORAL at 05:34

## 2025-05-07 RX ADMIN — Medication 20 MILLIEQUIVALENT(S): at 18:00

## 2025-05-07 RX ADMIN — Medication 500000 UNIT(S): at 17:43

## 2025-05-07 RX ADMIN — Medication 20 MILLIEQUIVALENT(S): at 06:26

## 2025-05-07 RX ADMIN — TACROLIMUS 1 MILLIGRAM(S): 0.5 CAPSULE ORAL at 20:09

## 2025-05-07 RX ADMIN — Medication 1 TABLET(S): at 11:28

## 2025-05-07 RX ADMIN — Medication 125 MILLIGRAM(S): at 05:34

## 2025-05-07 RX ADMIN — INSULIN LISPRO 4: 100 INJECTION, SOLUTION INTRAVENOUS; SUBCUTANEOUS at 11:33

## 2025-05-07 RX ADMIN — HEPARIN SODIUM 5000 UNIT(S): 1000 INJECTION INTRAVENOUS; SUBCUTANEOUS at 20:09

## 2025-05-07 RX ADMIN — Medication 1 APPLICATION(S): at 11:28

## 2025-05-07 NOTE — PROVIDER CONTACT NOTE (OTHER) - ACTION/TREATMENT ORDERED:
continue to Flomax tonight and rescan in AM post void if concerns for retention. Escalade if post residual vois is > 300ml.

## 2025-05-07 NOTE — PROVIDER CONTACT NOTE (OTHER) - REASON
Passive Suicidal ideation
Patient refusing PM/AM dose of Mexiletine 150mg
Post residual Void
Pt refused heparin injectable
pt had 18 beats wct on tele
Lightheadedness.
GI PCR positive for norovirus

## 2025-05-07 NOTE — PROGRESS NOTE ADULT - PROBLEM SELECTOR PLAN 3
- with steroids as possible contributor  - Psych f/u. On trazadone   - Feeding tube to receive meds as above, pt ate breakfast this AM, so NGT can be removed. Start calorie count tomorrow to assess nutritional status - Post-op c/b ZAHRA likely hemodynamic  - CRRT on HOLD since 5/4, no acute CRRT/HD needs  - Daily BMP, lytes  - Cardiorenal recs appreciated

## 2025-05-07 NOTE — PROVIDER CONTACT NOTE (OTHER) - BACKGROUND
We discussed the following topics: immunosuppression medications, signs and symptoms of infection and rejection, lifestyle changes post - heart transplant.
Patient is 68 y/o male admitted for AICD shocks r/t VT.
Patient with Hx of BPH. Flomax started 5/6. [place] removed today. Patient with small urine output all day (75-100ml per void). Concern for urinary retention.
69 y/o male admit for cardiogenic shock
69yo M admitted with cardiogenic shock. hx of NICM since 2011, HFrEF (25-30%) s/p MDT CRT-D with baseline CHB, VT/VF, afib s/p GIANFRANCO ligation/ MAZE. MV/TV repair, PVC ablation (3/2024).
Heart transplant on CVVHDF on  and epi
We discussed the following topics: immunosuppression medications, signs and symptoms of infection and rejection, lifestyle changes post - heart transplant.
pt adm with heart failure,hx pe,htn,afib
Patient admitted for Cardiogenic Shock, Norovirus, Hyperlipidemia, Hypertension, Atrial Fibrillation, Heart Failure, Awaiting Heart Transplant.
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
## 2025-05-07 NOTE — PROGRESS NOTE ADULT - ATTENDING COMMENTS
ATN in the setting of cardiogenic shock post heart transplant   now improving   off CVVHDF since am of 5/4   femoral catheter has been removed as of 5/5  no dialysis need. will monitor     jens calvillo  nephrology attending   please contact me on TEAMS   Office- [phone]

## 2025-05-07 NOTE — CONSULT NOTE ADULT - PROBLEM SELECTOR RECOMMENDATION 9
- Recommend nasal saline, 2 sprays to b/l nares QID   - Humidified face tent   - Oral care, biotene   - No further ENT intervention at this time  - Call with questions or concerns - Recommend nasal saline, 2 sprays to b/l nares QID   - Humidified face tent   - Oral care, biotene   - follow up on discharge with ENT   - Call with questions or concerns

## 2025-05-07 NOTE — PROGRESS NOTE ADULT - SUBJECTIVE AND OBJECTIVE BOX
Subjective: Pt feeling much better overall, including mentally & emotionally. He notes some improvement in his appetite.     Medications:  artificial  tears Solution 1 Drop(s) Both EYES every 12 hours PRN  atovaquone  Suspension 1500 milliGRAM(s) Oral daily  cefTRIAXone   IVPB 2000 milliGRAM(s) IV Intermittent <User Schedule>  chlorhexidine 2% Cloths 1 Application(s) Topical daily  desvenlafaxine ER 50 milliGRAM(s) Oral daily  dextrose 50% Injectable 50 milliLiter(s) IV Push every 15 minutes  dextrose 50% Injectable 25 milliLiter(s) IV Push every 15 minutes  erythromycin   Ointment 1 Application(s) Left EYE every 4 hours  heparin   Injectable 5000 Unit(s) SubCutaneous every 8 hours  hydrOXYzine hydrochloride 10 milliGRAM(s) Oral at bedtime PRN  insulin lispro (ADMELOG) corrective regimen sliding scale   SubCutaneous three times a day before meals  insulin regular Infusion 3 Unit(s)/Hr IV Continuous <Continuous>  methocarbamol 500 milliGRAM(s) Oral every 8 hours PRN  mycophenolate mofetil 1000 milliGRAM(s) Oral <User Schedule>  naloxegol 12.5 milliGRAM(s) Oral daily  Nephro-piotr 1 Tablet(s) Oral daily  nystatin    Suspension 414744 Unit(s) Oral <User Schedule>  oxymetazoline 0.05% Nasal Spray 2 Spray(s) Both Nostrils two times a day  pantoprazole  Injectable 40 milliGRAM(s) IV Push daily  penicillin   G benzathine Injectable 2.4 Million Unit(s) IntraMuscular <User Schedule>  polyethylene glycol 3350 17 Gram(s) Oral two times a day  potassium chloride  10 mEq/50 mL IVPB 10 milliEquivalent(s) IV Intermittent once  potassium chloride  10 mEq/50 mL IVPB 10 milliEquivalent(s) IV Intermittent once  predniSONE   Tablet 15 milliGRAM(s) Oral every 12 hours  senna 2 Tablet(s) Oral at bedtime  sodium chloride 0.9%. 1000 milliLiter(s) IV Continuous <Continuous>  tacrolimus 1 milliGRAM(s) Oral <User Schedule>  tamsulosin 0.4 milliGRAM(s) Oral at bedtime  traZODone 150 milliGRAM(s) Oral at bedtime  valGANciclovir 450 milliGRAM(s) Oral <User Schedule>  vancomycin    Solution 125 milliGRAM(s) Oral every 12 hours      Physical Exam:    Vitals:  Vital Signs Last 24 Hours  T(C): 36.6 (25 @ 12:00), Max: 37.5 (25 @ 20:00)  HR: 107 (25 @ 14:00) (101 - 109)  BP: 100/70 (25 @ 14:00) (90/66 - 120/77)  RR: 26 (25 @ 14:00) (12 - 34)  SpO2: 95% (25 @ 14:00) (92% - 96%)    Weight in k.6 ( @ 08:00)    I&O's Summary    06 May 2025 07:  -  07 May 2025 07:00  --------------------------------------------------------  IN: 992.5 mL / OUT: 2300 mL / NET: -1307.5 mL    07 May 2025 07:01  -  07 May 2025 15:23  --------------------------------------------------------  IN: 550 mL / OUT: 395 mL / NET: 155 mL        Tele:    General: No distress. Comfortable.  HEENT: EOM intact.  Neck: Neck supple. JVP not elevated. No masses  Chest: Clear to auscultation bilaterally  CV: Normal S1 and S2. No murmurs, rub, or gallops. Radial pulses normal.  Abdomen: Soft, non-distended, non-tender  Skin: No rashes or skin breakdown  Neurology: Alert and oriented times three. Sensation intact  Psych: Affect normal    Labs:                        9.6    19.52 )-----------( 155      ( 07 May 2025 01:17 )             27.7     -    148[H]  |  130[H]  |  34[H]  ----------------------------<  159[H]  <2.0[LL]   |  <10[LL]  |  1.05    Ca    <3.0[LL]      07 May 2025 14:25  Phos  1.2       Mg     <.6         TPro  1.8[L]  /  Alb  1.1[L]  /  TBili  0.2  /  DBili  x   /  AST  6[L]  /  ALT  29  /  AlkPhos  23[L]      PT/INR - ( 06 May 2025 00:30 )   PT: 13.4 sec;   INR: 1.17 ratio         PTT - ( 06 May 2025 00:30 )  PTT:22.6 sec               24-Hour Events  - Tacro held yesterday for supratherapeutic level    Subjective: Pt feeling much better overall, including mentally & emotionally. He notes some improvement in his appetite. He ambulates daily with no issues. Denies CP, SOB, H/A, N/V/D, abdominal pain, f/c, dizziness, orthopnea, PND.    Medications:  artificial  tears Solution 1 Drop(s) Both EYES every 12 hours PRN  atovaquone  Suspension 1500 milliGRAM(s) Oral daily  cefTRIAXone   IVPB 2000 milliGRAM(s) IV Intermittent <User Schedule>  chlorhexidine 2% Cloths 1 Application(s) Topical daily  desvenlafaxine ER 50 milliGRAM(s) Oral daily  dextrose 50% Injectable 50 milliLiter(s) IV Push every 15 minutes  dextrose 50% Injectable 25 milliLiter(s) IV Push every 15 minutes  erythromycin   Ointment 1 Application(s) Left EYE every 4 hours  heparin   Injectable 5000 Unit(s) SubCutaneous every 8 hours  hydrOXYzine hydrochloride 10 milliGRAM(s) Oral at bedtime PRN  insulin lispro (ADMELOG) corrective regimen sliding scale   SubCutaneous three times a day before meals  insulin regular Infusion 3 Unit(s)/Hr IV Continuous <Continuous>  methocarbamol 500 milliGRAM(s) Oral every 8 hours PRN  mycophenolate mofetil 1000 milliGRAM(s) Oral <User Schedule>  naloxegol 12.5 milliGRAM(s) Oral daily  Nephro-piotr 1 Tablet(s) Oral daily  nystatin    Suspension 229491 Unit(s) Oral <User Schedule>  oxymetazoline 0.05% Nasal Spray 2 Spray(s) Both Nostrils two times a day  pantoprazole  Injectable 40 milliGRAM(s) IV Push daily  penicillin   G benzathine Injectable 2.4 Million Unit(s) IntraMuscular <User Schedule>  polyethylene glycol 3350 17 Gram(s) Oral two times a day  potassium chloride  10 mEq/50 mL IVPB 10 milliEquivalent(s) IV Intermittent once  potassium chloride  10 mEq/50 mL IVPB 10 milliEquivalent(s) IV Intermittent once  predniSONE   Tablet 15 milliGRAM(s) Oral every 12 hours  senna 2 Tablet(s) Oral at bedtime  sodium chloride 0.9%. 1000 milliLiter(s) IV Continuous <Continuous>  tacrolimus 1 milliGRAM(s) Oral <User Schedule>  tamsulosin 0.4 milliGRAM(s) Oral at bedtime  traZODone 150 milliGRAM(s) Oral at bedtime  valGANciclovir 450 milliGRAM(s) Oral <User Schedule>  vancomycin    Solution 125 milliGRAM(s) Oral every 12 hours      Physical Exam:  General: No distress. Comfortable.  HEENT: EOM intact.  Neck: Neck supple. JVP not elevated. No masses  Chest: Clear to auscultation bilaterally  CV: Normal S1 and S2. No murmurs, rub, or gallops. Radial pulses normal.  Abdomen: Soft, non-distended, non-tender  Skin: No rashes or skin breakdown  Neurology: Alert and oriented times three. Sensation intact  Psych: Affect normal    Vitals:  Vital Signs Last 24 Hours  T(C): 36.6 (25 @ 12:00), Max: 37.5 (25 @ 20:00)  HR: 107 (25 @ 14:00) (101 - 109)  BP: 100/70 (25 @ 14:00) (90/66 - 120/77)  RR: 26 (25 @ 14:00) (12 - 34)  SpO2: 95% (25 @ 14:00) (92% - 96%)    Weight in k.6 ( @ 08:00)    I&O's Summary    06 May 2025 07:01  -  07 May 2025 07:00  --------------------------------------------------------  IN: 992.5 mL / OUT: 2300 mL / NET: -1307.5 mL    07 May 2025 07:01  -  07 May 2025 15:23  --------------------------------------------------------  IN: 550 mL / OUT: 395 mL / NET: 155 mL    Tele: ST 100s    Labs:                        9.6    19.52 )-----------( 155      ( 07 May 2025 01:17 )             27.7         136  |  95 [L] |  102[H]  ----------------------------<  115[H]  3.5   |  22  |  4.34 [H]    Ca    8.7      07 May 2025 0120  Phos  4.5       Mg     2.2        TPro  5.8 [L]  /  Alb  3.9  /  TBili  0.7  /  DBili  x   /  AST  36  /  ALT  108 /  AlkPhos  90  -07    PT/INR - ( 06 May 2025 00:30 )   PT: 13.4 sec;   INR: 1.17 ratio         PTT - ( 06 May 2025 00:30 )  PTT:22.6 sec               24-Hour Events  - Tacro held yesterday for supratherapeutic level    Subjective: Pt feeling much better overall, including mentally & emotionally. He notes some improvement in his appetite. He ambulates daily with no issues. Denies CP, SOB, H/A, N/V/D, abdominal pain, f/c, dizziness, orthopnea, PND.    Medications:  artificial  tears Solution 1 Drop(s) Both EYES every 12 hours PRN  atovaquone  Suspension 1500 milliGRAM(s) Oral daily  cefTRIAXone   IVPB 2000 milliGRAM(s) IV Intermittent <User Schedule>  chlorhexidine 2% Cloths 1 Application(s) Topical daily  desvenlafaxine ER 50 milliGRAM(s) Oral daily  dextrose 50% Injectable 50 milliLiter(s) IV Push every 15 minutes  dextrose 50% Injectable 25 milliLiter(s) IV Push every 15 minutes  erythromycin   Ointment 1 Application(s) Left EYE every 4 hours  heparin   Injectable 5000 Unit(s) SubCutaneous every 8 hours  hydrOXYzine hydrochloride 10 milliGRAM(s) Oral at bedtime PRN  insulin lispro (ADMELOG) corrective regimen sliding scale   SubCutaneous three times a day before meals  insulin regular Infusion 3 Unit(s)/Hr IV Continuous <Continuous>  methocarbamol 500 milliGRAM(s) Oral every 8 hours PRN  mycophenolate mofetil 1000 milliGRAM(s) Oral <User Schedule>  naloxegol 12.5 milliGRAM(s) Oral daily  Nephro-piotr 1 Tablet(s) Oral daily  nystatin    Suspension 695445 Unit(s) Oral <User Schedule>  oxymetazoline 0.05% Nasal Spray 2 Spray(s) Both Nostrils two times a day  pantoprazole  Injectable 40 milliGRAM(s) IV Push daily  penicillin   G benzathine Injectable 2.4 Million Unit(s) IntraMuscular <User Schedule>  polyethylene glycol 3350 17 Gram(s) Oral two times a day  potassium chloride  10 mEq/50 mL IVPB 10 milliEquivalent(s) IV Intermittent once  potassium chloride  10 mEq/50 mL IVPB 10 milliEquivalent(s) IV Intermittent once  predniSONE   Tablet 15 milliGRAM(s) Oral every 12 hours  senna 2 Tablet(s) Oral at bedtime  sodium chloride 0.9%. 1000 milliLiter(s) IV Continuous <Continuous>  tacrolimus 1 milliGRAM(s) Oral <User Schedule>  tamsulosin 0.4 milliGRAM(s) Oral at bedtime  traZODone 150 milliGRAM(s) Oral at bedtime  valGANciclovir 450 milliGRAM(s) Oral <User Schedule>  vancomycin    Solution 125 milliGRAM(s) Oral every 12 hours      Physical Exam:  General: No distress. Comfortable in the chair.  HEENT: EOM intact.  Neck: Neck supple. JVP not elevated. No masses  Chest: Respirations unalbored. Clear to auscultation bilaterally  CV: Normal S1 and S2. No murmurs, rub, or gallops. Radial pulses normal. No BLE edema.  Abdomen: Soft, non-distended, non-tender  Skin: No rashes or skin breakdown. Sternotomy op-site c/d/i.  Neurology: Alert and oriented times three. Sensation intact  Psych: Affect normal    Vitals:  Vital Signs Last 24 Hours  T(C): 36.6 (25 @ 12:00), Max: 37.5 (25 @ 20:00)  HR: 107 (25 @ 14:00) (101 - 109)  BP: 100/70 (25 @ 14:00) (90/66 - 120/77)  RR: 26 (25 @ 14:00) (12 - 34)  SpO2: 95% (25 @ 14:00) (92% - 96%)    Weight in k.6 ( @ 08:00)    I&O's Summary    06 May 2025 07:  -  07 May 2025 07:00  --------------------------------------------------------  IN: 992.5 mL / OUT: 2300 mL / NET: -1307.5 mL    07 May 2025 07:  -  07 May 2025 15:23  --------------------------------------------------------  IN: 550 mL / OUT: 395 mL / NET: 155 mL    Tele: ST 100s    Labs:                        9.6    19.52 )-----------( 155      ( 07 May 2025 01:17 )             27.7         136  |  95 [L] |  102[H]  ----------------------------<  115[H]  3.5   |  22  |  4.34 [H]    Ca    8.7      07 May 2025 0120  Phos  4.5       Mg     2.2        TPro  5.8 [L]  /  Alb  3.9  /  TBili  0.7  /  DBili  x   /  AST  36  /  ALT  108 /  AlkPhos  90  -    PT/INR - ( 06 May 2025 00:30 )   PT: 13.4 sec;   INR: 1.17 ratio         PTT - ( 06 May 2025 00:30 )  PTT:22.6 sec

## 2025-05-07 NOTE — PROGRESS NOTE ADULT - PROBLEM SELECTOR PLAN 1
- ACC/AHA stage D systolic heart failure, s/p heart transplant 4/28, CMV +/+, Toxo -/+     - Retrospective crossmatch with class II DSA DR HILLIARD (preformed from pre transplant).    - Support:     - Inotropes: Epi weaned off 5/4, dobutamine weaned off 5/5     - Lico weaned off on 5/2  - Diuresis: Euvolemic off loop diuretics. Fluid goal even  - Chest tubes, per CTS  - First surveillance biopsy tomorrow 5/8 (2nd week post txp).     - NPO at [facility] [place].     - Coags with AM labs - ACC/AHA stage D systolic heart failure, s/p heart transplant & TV ring repair 4/28, CMV +/+, Toxo -/+     - Retrospective crossmatch with class II DSA DR HILLIARD (preformed from pre transplant).    - Support:     - Inotropes: Epi weaned off 5/4, dobutamine weaned off 5/5     - Lico weaned off on 5/2  - Diuresis: Euvolemic off loop diuretics. Fluid goal even  - Chest tubes, per CTS  - First surveillance biopsy tomorrow 5/8 (2nd week post txp).     - NPO at [facility].     - Coags with AM labs

## 2025-05-07 NOTE — PROGRESS NOTE ADULT - PROBLEM SELECTOR PLAN 4
- Post-op c/b ZAHRA likely hemodynamic , no acute needs for CRRT/HD  - CRRT on HOLD - d/c bumex  - Cardiorenal recs consulted RESOLVED  - Plt 50s post-op, etiology unclear, now improving, today 155  - restarted HSQ 5/6 per CTS

## 2025-05-07 NOTE — PROGRESS NOTE ADULT - CRITICAL CARE ATTENDING COMMENT
Feels well. Off HD since 5/3 and urinating well. On exam, JVP approx 6-8 with mild HJR and v waves, RRR, no m/r/g, dec BS b/l, nontender abdomen, no edema. Labs reviewed - WBC 19 (improving), Hb 8.5, BUN/Cr 100/4.0 (stabilizing). Tac 12.2 (from 19)  - plan for biopsy Tomorrow  - maintain goal net even; goal CVP 6-8; encouraged hydration  - resume tac at 1 mg twice/day  - c/w Cellcept 1 gram q12  - c/w CFTX for S. pneumo meningitis PPx (seen in donor CSF)  - on PCN for syphilis PPx  - c/w nystatin  - c/w mepron 1500 mg daily  - start Valcyte for CMV +/+

## 2025-05-07 NOTE — PROGRESS NOTE ADULT - SUBJECTIVE AND OBJECTIVE BOX
Patient seen and examined at the bedside.    Remains critically ill on continuous ICU monitoring, at risk for life threatening decompensation.  All Labs, data reviewed. Plan of care discussed in length during multi-disciplinary ICU rounds.     Brief Summary:  70-year-old male with history of NICM since 2011 HFrEF (LVEF 25-30%)   Now s/p OHT on 4/29/25     24 Hour events:  Ambulating.  Cardene weaned to off.  Diuresing.    Objective:  ICU Vital Signs Last 24 Hrs  T(C): 36.9 (07 May 2025 04:00), Max: 37.5 (06 May 2025 20:00)  T(F): 98.4 (07 May 2025 04:00), Max: 99.5 (06 May 2025 20:00)  HR: 104 (07 May 2025 05:00) (101 - 112)  BP: 90/66 (07 May 2025 05:00) (86/54 - 120/77)  BP(mean): 72 (07 May 2025 05:00) (66 - 95)  ABP: 116/57 (06 May 2025 14:00) (98/47 - 119/49)  ABP(mean): 73 (06 May 2025 14:00) (60 - 73)  RR: 18 (07 May 2025 05:00) (12 - 33)  SpO2: 92% (07 May 2025 05:00) (92% - 95%)    O2 Parameters below as of 07 May 2025 04:00  Patient On (Oxygen Delivery Method): room air          Physical Exam:   General: Alert, interactive  Neurology: Oriented, following commands.  Respiratory: Bilateral breath sounds  CV: Sinus Tach  Abdominal: Soft, Nontender  Extremities: Warm, well-perfused  [place]        -------------------------------------------------------------------------------------------------------------------------------    Labs:                          9.6    19.52 )-----------( 155      ( 07 May 2025 01:17 )             27.7     PT/INR - ( 06 May 2025 00:30 )   PT: 13.4 sec;   INR: 1.17 ratio      PTT - ( 06 May 2025 00:30 )  PTT:22.6 sec      136    |  95     |  102    ----------------------------<  115        ( 07 May 2025 01:20 )  3.5     |  22     |  4.34     Ca    8.7        ( 07 May 2025 01:20 )  Phos  4.5       ( 07 May 2025 01:20 )  Mg     2.2       ( 07 May 2025 01:20 )    TPro  5.8    /  Alb  3.9    /  TBili  0.7    /  DBili  x      /  AST  36     /  ALT  108    /  AlkPhos  90     ( 07 May 2025 01:20 )    LIVER FUNCTIONS - ( 07 May 2025 01:20 )  Alb: 3.9 g/dL / Pro: 5.8 g/dL / ALK PHOS: 90 U/L / ALT: 108 U/L / AST: 36 U/L / GGT: x           ABG - ( 06 May 2025 10:55 )  pH, Arterial: 7.40  pH, Blood: x     /  pCO2: 37    /  pO2: 82    / HCO3: 23    / Base Excess: -1.6  /  SaO2: 98.1        ------------------------------------------------------------------------------------------------------------------------------  Assessment:  71 yo NICM, s/p heart transplant     ZAHRA  Hypokalemia  Acute blood loss anemia  Thrombocytopenia  Hyperglycemia      Plan:   ***Neuro***  Maintain day/night cycle to prevent ICU delirium   Postoperative acute pain control with IV Tylenol, Tramadol  Trazadone at night for sleep     ***Cardiovascular***  s/p heart trasplant  Monitor for sign of hypoperfusion   Maintain MAPs>65 mm HG.   Monitor chest tube output    ***Pulmonary***  Postoperative acute pulmonary insufficiency  Deep breathing and coughing exercises,   IS, Mobilization, nebs, Chest PT    ***GI***  Tolerating diet    Protonix for prophylaxis   Bowel regimen, having BMs   Trend LFTs    ***Renal***  ZAHRA, Trend creatinine  Now off Bumex, made > 4500 of urine  Hold RRT.    ***ID***  Leukocytosis  Tacro, Cellcept, Steroid taper for immunosuppression.  Prophylaxis with Nystatin, PO Vanco.  PCN and Ceftriaxone for donor cultures (syphylis, /S pneumo)    ***Endocrine***  Insulin sliding scale for Hyperglycemia     ***Hematology***  Acute blood loss anemia and thrombocytopenia   Trend CBC and coags, monitor for bleeding.  No transfusion indication currently  Heparin SQ for VTE prophylaxis      Critical care time: 55 mins       I, Chata Huntley MD, personally performed the services described in this documentation.  I have reviewed the chart and agree that the record reflects my personal performance and is accurate and complete.    Electronically signed:   Chata Huntley MD  CT ICU attending              Patient seen and examined at the bedside.    Remains critically ill on continuous ICU monitoring, at risk for life threatening decompensation.  All Labs, data reviewed. Plan of care discussed in length during multi-disciplinary ICU rounds.     Brief Summary:  70-year-old male with history of NICM since 2011 HFrEF (LVEF 25-30%)   Now s/p OHT on 4/29/25     24 Hour events:  Ambulating.  Cardene weaned to off.  Diuresing.    Objective:  ICU Vital Signs Last 24 Hrs  T(C): 36.9 (07 May 2025 04:00), Max: 37.5 (06 May 2025 20:00)  T(F): 98.4 (07 May 2025 04:00), Max: 99.5 (06 May 2025 20:00)  HR: 104 (07 May 2025 05:00) (101 - 112)  BP: 90/66 (07 May 2025 05:00) (86/54 - 120/77)  BP(mean): 72 (07 May 2025 05:00) (66 - 95)  ABP: 116/57 (06 May 2025 14:00) (98/47 - 119/49)  ABP(mean): 73 (06 May 2025 14:00) (60 - 73)  RR: 18 (07 May 2025 05:00) (12 - 33)  SpO2: 92% (07 May 2025 05:00) (92% - 95%)    O2 Parameters below as of 07 May 2025 04:00  Patient On (Oxygen Delivery Method): room air          Physical Exam:   General: Alert, interactive  Neurology: Oriented, following commands.  Respiratory: Bilateral breath sounds  CV: Sinus Tach  Abdominal: Soft, Nontender  Extremities: Warm, well-perfused  [place]        -------------------------------------------------------------------------------------------------------------------------------    Labs:                          9.6    19.52 )-----------( 155      ( 07 May 2025 01:17 )             27.7     PT/INR - ( 06 May 2025 00:30 )   PT: 13.4 sec;   INR: 1.17 ratio      PTT - ( 06 May 2025 00:30 )  PTT:22.6 sec      136    |  95     |  102    ----------------------------<  115        ( 07 May 2025 01:20 )  3.5     |  22     |  4.34     Ca    8.7        ( 07 May 2025 01:20 )  Phos  4.5       ( 07 May 2025 01:20 )  Mg     2.2       ( 07 May 2025 01:20 )    TPro  5.8    /  Alb  3.9    /  TBili  0.7    /  DBili  x      /  AST  36     /  ALT  108    /  AlkPhos  90     ( 07 May 2025 01:20 )    LIVER FUNCTIONS - ( 07 May 2025 01:20 )  Alb: 3.9 g/dL / Pro: 5.8 g/dL / ALK PHOS: 90 U/L / ALT: 108 U/L / AST: 36 U/L / GGT: x           ABG - ( 06 May 2025 10:55 )  pH, Arterial: 7.40  pH, Blood: x     /  pCO2: 37    /  pO2: 82    / HCO3: 23    / Base Excess: -1.6  /  SaO2: 98.1        ------------------------------------------------------------------------------------------------------------------------------  Assessment:  69 yo NICM, s/p heart transplant     ZAHRA  Hypokalemia  Acute blood loss anemia  Hyperglycemia      Plan:   ***Neuro***  Maintain day/night cycle to prevent ICU delirium   Postoperative acute pain control with IV Tylenol, Tramadol  Trazadone at night for sleep     ***Cardiovascular***  s/p heart transplant  Monitor for sign of hypoperfusion   Maintain MAPs>65 mm HG.   Monitor chest tube output    ***Pulmonary***  Tolerating room air.  Deep breathing and coughing exercises    ***GI***  Tolerating diet, monitor po intake.    Protonix for prophylaxis   Bowel regimen, having BMs   Trend LFTs    ***Renal***  ZAHRA, Trend creatinine  Hold off on RRT today.  Now off Bumex, will redose as needed.  Hypokalemia - repleted.    ***ID***  Trend Leukocytosis  PCN and Ceftriaxone for donor cultures (syphilis, /S pneumo)  Prophylaxis with Nystatin, PO Vanco, Mepron  Tacro, Cellcept, Steroid taper for immunosuppression.    ***Endocrine***  Insulin sliding scale for Hyperglycemia     ***Hematology***  Acute blood loss anemia   Trend CBC and coags, monitor for bleeding.  No transfusion indication currently  Heparin SQ for VTE prophylaxis      Critical care time: 55 minutes      I, Chata Huntley MD, personally performed the services described in this documentation.  I have reviewed the chart and agree that the record reflects my personal performance and is accurate and complete.    Electronically signed:   Chata Huntley MD  CT ICU attending

## 2025-05-07 NOTE — CONSULT NOTE ADULT - NS ATTEND AMEND GEN_ALL_CORE FT
Pt seen and examined with team at time of service, I was physically present for the key portions for evaluation and management (E/M) service provided, and preformed key portions of the procedure. Agree with above. Plan discussed with primary team.
Mr. Huerta is a 69 year old  male with history of long standing history of NICM (LVEF 30%, LVEDD 5.3 cm) since 2011, history of VT/VF receiving shock multiple times over the last year (intolerant to amiodarone, mexilitine and sotalol because of lightheadedness and hypotension), PVC ablation in 3/2024, s/p CRT-D, CHB, VT/VF, AF s/p GIANFRANCO closure, MV/TV repair with moderate to severe mitral stenosis who is currently listed as status 6 (cPRA 0) for cardiac transplant presented to our transplant center ([facility]) initially after 2 ICD shocks on the evening of 3/17/2025. His electrolytes were stable and a right heart catheterization showed RA 11, PA 58/26, PCWP 32, PA sat - 65%, KEVIN/longterm - 5.43/2.78?. Electrophysiology consulted who wanted to escalate oral diuretics as filling pressures were increased as he was intolerant to AAD in the past. He was discharged home with GDMT titration. He presented again on 3/20/2025 evening with ICD shock and was admitted to [facility]. Electrolytes were stable and he was started on lidocaine. Lidocaine was briefly held which was followed by an ICD shock on 3/21/2025. He had a second shock despite the lidocaine being resumed. He was transferred to our transplant center at that time for further management. He remains on the lidocaine drip. Unfortunately, weaning IV anti-arrhythmic is leading to more VT and he is intolerant to oral anti-arrhythmics. His advanced age presents a higher risk if he were to need MCS with [facility] ECMO. Hence we have listed him as a status 2e on UNOS for a heart transplant.       Bedside hemodynamics  3/22/25 BP 97/76/83, HR 80, CVP 6, PA 58/23/38, PCWP 20, SVR 1100, Gucci CO/CI 5.1/2.6, TD 4/2.08    Echo:    3/20/25 TTE: LVEF 30%, segmental, LVIDd 5.3, walls normal, elevated LV filling pressures, mod RVE with mildly reduced RV function, TAPSE 1.7, severely dilated RA, annuloplasty in MV position, transvalvular gradients are elevated with severe prosthetic MS (peak gradient 15.7, mean gradient 8), trace intravalvular MR, TV annuloplasty ring noted, mild TR,  est PASP 36, no evidence of LV thrombus    [facility] 9/2024: RA 6, PA 60/25/40, PAWP 24/35/25, CO/CI 5.12/2.61  [facility] 3/2025: RA 11, PA 58/26, PCWP 32, PA sat - 65%, KEVIN/longterm - 5.43/2.78?    [facility] 6/2023 Nonobstructive CAD    ICD interrogation 3/19/2025: AP 87.7%, BiV pace 100%, AT/AF burden 0.4%  Stored data revealed two episodes of ventricular tachycardia with CL (260ms and 210ms) on 3/17/25, one occurred at 6:52pm successfully treated and terminated with one round of ATP, the 2nd episode occurred at 7:30pm with one round of ATP and ICD shock x 1 at 40J. Also noted PAF with controlled ventricular rate on 2/24/25 lasting 2 hours and atrial lead noise noted on 2/28/25.       Assessment:  Incessant VT s/p multiple ICD shocks  Intolerance to oral anti-arrhythmics 2/2 hypotension and lightheadedness  Non-ischemic cardiomyopathy  Chronic severe LV systolic dysfunction s/p ICD  CHB - AV pacing  Status post mitral and tricuspid ring repair with residual severe mitral stenosis  s/p GIANFRANCO closure  Status 2e for heart transplant      Plan:  Continue lidocaine drip at 1.   Continue metoprolol 50 mg daily, losartan 25 mg daily. BP soft.  End organ function stable.   Torsemide for diuresis.   Appreciate EP recommendations.   Replete electrolytes for K>4 and Mg >2.

## 2025-05-07 NOTE — PROGRESS NOTE ADULT - PROBLEM SELECTOR PLAN 5
- Plt 50s post-op, etiology unclear, now improving. Today 115  - Holding heparin RESOLVED  - Post-op delirium  - with steroids as possible contributor  - Psych f/u. On trazadone   - s/p NGT 5/3-5/5 while pt was refusing to eat. C/w calorie count

## 2025-05-07 NOTE — PROGRESS NOTE ADULT - PROBLEM SELECTOR PLAN 1
Patient's baseline Scr is 1.0-1.2. On 4/28, SCr was 1.1 and on 4/29, Scr rubia to 3.2. UA-turbid, 300 protein, small leuks, large blood, 914 rbc's, UPCR-3.2. Pt had drop in hgb from 12.4 (4/28) to 9.0 (4/29). Pt was on IV vasopressor support. Pt noted to have elevated CVP of 20, and was not responding well enough to diuresis, so was started on CRRT for UF. Pt had required multiple pressors and inotropes, gradually weaned off. CRRT stopped AM of 5/4 and pt responding well to Bumex infusion. Femoral NTDC removed on 5/5.  -Remains non-oliguric, BP stable, off O2  -Tacrolimus started on 4/29. Monitor daily trough levels and try to maintain at lower end of therapeutic range to help renal recovery. Immunosuppression dosing per Transplant Cardiology.  -Labs reviewed. Cr appears to have plateaued at ~4.4,  (of note, pt is receiving steroids). Serum K low - repleted.  -Continue off CRRT. Diuresis prn for goal CVP<12  -Dose meds per CrCl ~10 mL/min - will be using kinetic eGFR given rapid changes in renal function.  -Avoid nephrotoxins.

## 2025-05-07 NOTE — PHARMACOTHERAPY INTERVENTION NOTE - COMMENTS
The patient received heart  transplant on 4/28/2025.     The patient was not immune to hepatitis B based on 4/17/2025 serologies. We were able to obtain the records of the vaccines history.     [facility] labs were sent on 4/17/2025.     ID:    Recipient - heavy growth of strep pneumo on CSF. Toxo-/CMV+    Recipient - Norovirus + on 3/31/2025. Strongy + but received ivermectin on 10/2024.     Weight: Sunrise 81.7 kG (3/21/2025); Standing weight 84.4 kG (4/27/2025).    Surgical prophylaxis:  Cefepime 1,000 mG IVPB   Vancomycin 1,000 mG IVPB     Induction:  Methylprednisolone 1,000 mG IVPB once   Mycophenolate mofetil 1,000 mG IVPB once     CTU Post-Op:    Surgical prophylaxis:  Cefepime 1,000 mG IVPB q8h for 3 days then ceftriaxone 2,000 mG IVPB daily for 2 weeks (donor's culture with strep pneumo)   Vancomycin 500 mG IVPB q12h for 3 days     Immunosuppression:  Steroids per heart transplant protocol   Mycophenolate mofetil 1,000 mG IVPB q12h   Tacrolimus to be initiated as per heart transplant cardiologist     Anti-infective prophylaxis:  Vancomycin 125 mG PO/NGT q12h while on systemic antibiotics for c.diff.   Siphilis D+ PCN IM weekly for 3 doses   CMV +/+   Toxoplasmosis -/+     Pain/bowel regiment:  Gabapentin 100 mG NGT q8h for 7 days   Methocarbamol 500 mG PO q8h for 7 days   Acetaminophen 500 mG PO q6h for 7 days   Lidocaine patches 3 daily for 7 days   Add senna/Miralax when feasible   Tramadol 25 mG PO q8h for 7 days     Anxiety/Sleep:  Trazodone 100 mG PO at bedtime   Clonazepam 0.25 mG PO twice a day as needed    Other pre-op medications:  Tamsulosin 0.4 mG PO at bedtime   Metamucil powder PO daily     Gabby Hayes PHarm.D.  [phone]    
XENA DENSON, 70y Male with NICM, HFrEF, now s/p OHT 4/29/25, CMV +/+    Recommendation(s):  1) Recommended initiating CMV prophylaxis or at minimum CMV monitoring as the patient is at intermediate CMV risk     Thanks,  Esther Kanner, PharmD  PGY-2 Infectious Disease Pharmacy Resident  I can be reached on MS TEAMS with any questions

## 2025-05-07 NOTE — PROGRESS NOTE ADULT - SUBJECTIVE AND OBJECTIVE BOX
Patient seen and examined at the bedside.    Remained critically ill on continuous ICU monitoring.    OBJECTIVE:  Vital Signs Last 24 Hrs  T(C): 36.7 (07 May 2025 16:00), Max: 37.5 (06 May 2025 20:00)  T(F): 98 (07 May 2025 16:00), Max: 99.5 (06 May 2025 20:00)  HR: 109 (07 May 2025 19:00) (101 - 116)  BP: 110/67 (07 May 2025 19:00) (90/66 - 118/78)  BP(mean): 83 (07 May 2025 19:00) (69 - 95)  RR: 46 (07 May 2025 19:00) (12 - 46)  SpO2: 95% (07 May 2025 19:00) (92% - 97%)    Parameters below as of 07 May 2025 16:51  Patient On (Oxygen Delivery Method): room air    Physical Exam:  General: NAD  Neurology: Nonfocal  Eyes: Bilateral pupils equal and reactive  ENT/Neck: Neck supple, trachea midline, No JVD  Respiratory: Clear bilaterally  CV: S1S2, no murmurs        [x] Sternal dressing, [x] Mediastinal CT x2        [x] Sinus Tachycardia  Abdominal: Soft, NT, ND +BS  Extremities: 1-2+ pedal edema noted, + peripheral pulses  Skin: No Rashes, Hematoma, Ecchymosis    Assessment:  70-year-old male with history of NICM since 2011 HFrEF (LVEF 25-30%)   Now s/p OHT on 4/29/25     ZAHRA  Hypokalemia  Acute blood loss anemia  Hyperglycemia    Plan:  ***Neuro***  [x] Nonfocal  Acute pain management with Robaxin prn  Post operative neuro assessment  Pristiq home med  Trazodone nightly    ***Cardiovascular***  Invasive hemodynamic monitoring, assess perfusion indices  ST (101 - 116) / Hct 27.7% / Lactate 1.6  Reassessment of hemodynamics post resuscitation  Monitor chest tube outputs  Serial EKG and cardiac enzymes  [x] VTE ppx with Heparin SQ - off @ 8pm for OR tomorrow  Plan for biopsy tomorrow    ***Pulmonary***  [x] Room air  Encourage incentive spirometry, continue pulse ox monitoring, follow ABGs     ***GI***  [x] Diet: Consistent Carbohydrate - NPO at midnight for OR tomorrow  [x] Protonix  [x] Movantik  Bowel regimen with Miralax and Senna    ***Renal***  [x] ZAHRA - iHD held today  Continue to monitor I/Os, BUN/Creatinine.  Replete lytes PRN  Flomax for BPH    ***ID***  Ceftriaxone, Penicillin, Valcyte, and PO Vancomycin for prophylaxis   Monitor for fevers and leukocytosis  Immunosuppression with Cellcept, Prednisone, and Tacro    ***Endocrine***  [x] Stress Hyperglycemia : HbA1c 6.1 %             - [x] Insulin gtt [x] ISS             - Need tight glycemic control to prevent wound infection.        Patient requires continuous monitoring with bedside rhythm monitoring, pulse oximetry monitoring, and continuous central venous and arterial pressure monitoring; and intermittent blood gas analysis. Care plan discussed with the ICU care team.  Patient remained critical, at risk for life threatening decompensation.    I have spent 50 minutes providing critical care management to this patient.    By signing my name below, I, Smooth Solomon, attest that this documentation has been prepared under the direction and in the presence of Katie Gonzales NP.  Electronically signed: Kasey Abad, 05-07-25 @ 19:37    I, Katie Gonzales, personally performed the services described in this documentation. All medical record entries made by the scribe were at my direction and in my presence. I have reviewed the chart and agree that the record reflects my personal performance and is accurate and complete.  Electronically signed: Katie Gonzales NP.

## 2025-05-07 NOTE — PROVIDER CONTACT NOTE (OTHER) - SITUATION
s/p heart transplant.
Patient complaining of lightheadedness.
pt had 18 beats wct on tele
Educated the patient and patient wife on post-transplant care topics including medication, food, and lifestyle.
Educated the patient and the patients wife on post-transplant care topics including medication, food, and lifestyle.
GI PCR positive for norovirus
Pt refused heparin injectable
Pt is soiled and refusing care and interventions
Patient refusing PM/AM dose of Mexiletine 150mg

## 2025-05-07 NOTE — PROGRESS NOTE ADULT - ASSESSMENT
70-year-old male, ABO O, with NICM since 2011 HFrEF (LVEF 25-30%) s/p MDT CRT-D with baseline CHB, VT/VF, AFs/p GIANFRANCO ligation/MAZE, MV/TV repair, PVC ablation 3/2024 intolerant to AADs in the past, recent admission 3/19/2025-3/20/2025 for AICD shocks initially presented to the [facility] ED on 3/21/2025, sent by HF specialist for ACID shock. Device reported successful ATP for VT 3/17/2025 at 6:52pm followed by one ATP & 40J shock. He reported feeling dizzy & SOB during the events. He follows with Dr. Luca Tamayo for HF, currently undergoing transplant workup, Dr John for CRTD and VT/VF and Dr. Chu for genetic counseling. While admitted to [facility], he was started on a lidocaine gtt. On 3/21 when lidocaine weaned off patient had another two episodes of VT requiring AICD shocks. He was transferred to [facility] for further management. He was initially maintained on lidocaine gtt from 3/21/2025-3/25/2025 & his listing status was upgraded to UNOS status 2e for heart transplant (ABO O) for the refractory VT. He was transitioned to oral antiarrhythmics (Toprol XL & quinidine) & ultimately transferred from CICU to [facility] floor on 3/27/2025. Hospital course was further c/b development of watery diarrhea & was found to have +norovirus on 3/31/2025 which prompted deactivation to status 7 listing for heart transplant. Status reinstated to 2E after diarrhea resolved.     He underwent successful OHT on 4/28 + TV ring repair 34mm (CMV +/+, Toxo -/+,ischemic time 258min, intra op 2plts + 2 cryo). Intra op STACEY with LVEF 20% and mild RV dysfunction.  Extubated on 4/29.  Had some initial RV failure/PGD (requiring dobutamine 7.5, epi 0.02, levo, vaso, and Lico.  CRRT started to aid volume removal.  Echo from 4/30 with LVEF 70% and mild RV dysfunction.  Levo/Vaso weaned off on 5/1 and Lico weaned off 5/2. He requiring large amounts of pain medication despite 2 chest tubes being removed POD 1 (ketamine + PCA pump).  Now with ?delerium and non-cooporation with care, CARRIE, Seen by psych 5/2 and started on trazodone + Pristiq. Psychiatric status improved.  Requiring 1:1 sitter now resolved.  NG tube placed 5/3 but now eating so removed 5/5.    He appears euvolemic on exam, progressing well. Will continue to adjust his immunosuppression medications & plan for biopsy this Thursday 5/8.    Bedside hemodynamics:  5/2/25: /59/75, , CVP 12, PA 44/15/24, CO/CI 6.8/3.5  5/1/25 BP 99/59/72, , CVP 11, PA 40/17/24, Gucci CO/CI 5.5/2.8  4/29/25 BP 94/57/67, , CVP 11, PA 28/15/20, Gucci CI 2.2  3/22/25 BP 97/76/83, HR 80, CVP 6, PA 58/23/38, PCWP 20, SVR 1100, Gucci CO/CI 5.1/2.6, TD 4/2.08    Cardiac Studies:   TTE 4/30/25: LVEF 71%, no RMWA, RV normal size, reduced RVSF  STACEY 4/28/25 intraop: LVEF 20%, global, dilated RV with mildly reduced RVSF  TTE 3/20/25 : LVEF 30%, segmental, LVIDd 5.3, walls normal, elevated LV filling pressures, mod RVE with mildly reduced RV function, TAPSE 1.7, severely dilated RA, annuloplasty in MV position, transvalvular gradients are elevated with severe prosthetic MS (peak gradient 15.7, mean gradient 8), trace intravalvular MR, TV annuloplasty ring noted, mild TR,  est PASP 36, no evidence of LV thrombus  TTE 10/2024: LVEF 25-30%, LVEDD 5.9cm, moderately reduced RV function, annuloplasty ring of mitral and tricuspid position, PASP 47 mmHg  RH 3/19/25- RA 11 PA 58/26 PCWP 32 CO/CI 5.43/2.77  [facility] 6/2023 Nonobstructive CAD

## 2025-05-07 NOTE — PROGRESS NOTE ADULT - PROBLEM SELECTOR PLAN 2
- Immunosuppression:      - Cellcept 1000mg BID      - Solu-medrol taper -> switch to prednisone today      - Tacro: Trough 12.2. Plan to resume tacro 1/1 PO BID starting with tonight's dose  - Antibiotics      - s/p IV Vanc + Cefepime for post-op ppx (4/29 - 5/1)      - PO Vanco ppx (4/29 - )      - CTX 2000mg QD for donor strep pneumo+ in CSF      - Penicillin IM for donor syphilis + ab  - PPX      - Nystatin for thrush ppx - Immunosuppression:      - Cellcept 1000mg BID      - c/w steroid taper      - Tacro: Trough 12.2. Plan to resume tacro 1/1 PO BID starting with tonight's dose  - Antibiotics      - s/p IV Vanc + Cefepime for post-op ppx (4/29 - 5/1)      - PO Vanco ppx (4/29 - )      - CTX 2000mg QD for donor strep pneumo+ in CSF      - Penicillin IM for donor syphilis + ab  - PPX     - Nystatin for thrush ppx     - Start Valcyte 450 twice/week for high-risk CMV

## 2025-05-07 NOTE — PROGRESS NOTE ADULT - SUBJECTIVE AND OBJECTIVE BOX
[facility] DIVISION OF KIDNEY DISEASES AND HYPERTENSION --    Reason for consult: ZAHRA    24 hour events/subjective: Patient seen and examined at bedside. Denies fever, SOB. chest pain, n/v/d. Appetite is good.       PAST HISTORY  --------------------------------------------------------------------------------  No significant changes to PMH, PSH, FHx, SHx, unless otherwise noted    ALLERGIES & MEDICATIONS  --------------------------------------------------------------------------------  Allergies    No Known Allergies      Standing Inpatient Medications  atovaquone  Suspension 1500 milliGRAM(s) Oral daily  cefTRIAXone   IVPB 2000 milliGRAM(s) IV Intermittent <User Schedule>  chlorhexidine 2% Cloths 1 Application(s) Topical daily  desvenlafaxine ER 50 milliGRAM(s) Oral daily  dextrose 50% Injectable 50 milliLiter(s) IV Push every 15 minutes  dextrose 50% Injectable 25 milliLiter(s) IV Push every 15 minutes  erythromycin   Ointment 1 Application(s) Left EYE every 4 hours  heparin   Injectable 5000 Unit(s) SubCutaneous every 8 hours  insulin lispro (ADMELOG) corrective regimen sliding scale   SubCutaneous three times a day before meals  insulin regular Infusion 3 Unit(s)/Hr IV Continuous <Continuous>  mycophenolate mofetil 1000 milliGRAM(s) Oral <User Schedule>  naloxegol 12.5 milliGRAM(s) Oral daily  Nephro-piotr 1 Tablet(s) Oral daily  nystatin    Suspension 274387 Unit(s) Oral <User Schedule>  oxymetazoline 0.05% Nasal Spray 2 Spray(s) Both Nostrils two times a day  pantoprazole  Injectable 40 milliGRAM(s) IV Push daily  penicillin   G benzathine Injectable 2.4 Million Unit(s) IntraMuscular <User Schedule>  polyethylene glycol 3350 17 Gram(s) Oral two times a day  potassium chloride  10 mEq/50 mL IVPB 10 milliEquivalent(s) IV Intermittent once  potassium chloride  10 mEq/50 mL IVPB 10 milliEquivalent(s) IV Intermittent once  predniSONE   Tablet 15 milliGRAM(s) Oral every 12 hours  senna 2 Tablet(s) Oral at bedtime  sodium chloride 0.9%. 1000 milliLiter(s) IV Continuous <Continuous>  tamsulosin 0.4 milliGRAM(s) Oral at bedtime  traZODone 150 milliGRAM(s) Oral at bedtime  vancomycin    Solution 125 milliGRAM(s) Oral every 12 hours    PRN Inpatient Medications  artificial  tears Solution 1 Drop(s) Both EYES every 12 hours PRN  hydrOXYzine hydrochloride 10 milliGRAM(s) Oral at bedtime PRN  methocarbamol 500 milliGRAM(s) Oral every 8 hours PRN      REVIEW OF SYSTEMS  --------------------------------------------------------------------------------  Gen: No fever  Respiratory: No dyspnea  CV: No chest pain  GI: No diarrhea, nausea, or vomiting  : No dysuria or hematuria  Skin: No rashes  MSK: No edema  Neuro: No dizziness/lightheadedness    All other systems were reviewed and are negative, except as noted.    VITALS/PHYSICAL EXAM  --------------------------------------------------------------------------------  T(C): 36.7 (05-07-25 @ 08:00), Max: 37.5 (05-06-25 @ 20:00)  HR: 102 (05-07-25 @ 10:00) (101 - 112)  BP: 98/66 (05-07-25 @ 10:00) (86/54 - 120/77)  RR: 26 (05-07-25 @ 10:00) (12 - 34)  SpO2: 93% (05-07-25 @ 10:00) (92% - 95%)  Wt(kg): --        05-06-25 @ 07:01  -  05-07-25 @ 07:00  --------------------------------------------------------  IN: 992.5 mL / OUT: 2300 mL / NET: -1307.5 mL    05-07-25 @ 07:01  -  05-07-25 @ 10:30  --------------------------------------------------------  IN: 200 mL / OUT: 210 mL / NET: -10 mL      PHYSICAL EXAM:  Gen: NAD sitting in chair  Neuro: non-focal  HEENT: anicteric  Pulm: B/L breath sounds  CV: +S1 S2  Abd: soft, non-distended, non-tender  : +indwelling nagy  Extremities: no edema  Skin: Warm  Dialysis access: none    LABS/STUDIES  --------------------------------------------------------------------------------              9.6    19.52 >-----------<  155      [05-07-25 @ 01:17]              27.7     136  |  95  |  102  ----------------------------<  115      [05-07-25 @ 01:20]  3.5   |  22  |  4.34        Ca     8.7     [05-07-25 @ 01:20]      Mg     2.2     [05-07-25 @ 01:20]      Phos  4.5     [05-07-25 @ 01:20]    TPro  5.8  /  Alb  3.9  /  TBili  0.7  /  DBili  x   /  AST  36  /  ALT  108  /  AlkPhos  90  [05-07-25 @ 01:20]    PT/INR: PT 13.4 , INR 1.17       [05-06-25 @ 00:30]  PTT: 22.6       [05-06-25 @ 00:30]      Creatinine Trend:  SCr 4.34 [05-07 @ 01:20]  SCr 4.33 [05-06 @ 13:27]  SCr 4.43 [05-06 @ 00:30]  SCr 4.01 [05-05 @ 12:18]  SCr 3.54 [05-05 @ 00:23      Iron 48, TIBC 307, %sat 16      [03-28-25 @ 06:15]  Ferritin 184      [03-28-25 @ 06:15]  TSH 1.26      [03-22-25 @ 00:58]  Lipid: chol 147, TG 71, HDL 62, LDL --      [03-22-25 @ 00:58]    HBsAb Reactive      [04-17-25 @ 15:45]  HBsAg Nonreact      [04-17-25 @ 15:45]  HBcAb Nonreact      [04-17-25 @ 15:45]  HCV 0.05, Nonreact      [04-17-25 @ 15:45]  HIV Nonreact      [04-17-25 @ 15:45]

## 2025-05-07 NOTE — CONSULT NOTE ADULT - ASSESSMENT
70-year-old male, ABO O, with NICM since 2011 HFrEF (LVEF 25-30%) s/p MDT CRT-D with baseline CHB, VT/VF, AFs/p GIANFRANCO ligation/MAZE, MV/TV repair, PVC ablation 3/2024 intolerant to AADs in the past, recent admission 3/19/2025-3/20/2025 for AICD shocks initially presented to the [facility] ED on 3/21/2025, sent by HF specialist for ACID shock. Now s/p heart transplant on 4/29. Complaining of hoarseness which started prior to heart transplant however has been worsening over the past few days. Laryngoscopy performed which showed very dry nasopharynx and oropharynx, small glottic gap likely 2/2 deconditioning, b/l vocal cords mobile.

## 2025-05-08 ENCOUNTER — APPOINTMENT (OUTPATIENT)
Dept: CV DIAGNOSITCS | Facility: HOSPITAL | Age: 70
End: 2025-05-08

## 2025-05-08 ENCOUNTER — RESULT REVIEW (OUTPATIENT)
Age: 70
End: 2025-05-08

## 2025-05-08 LAB
ALBUMIN SERPL ELPH-MCNC: 4 G/DL — SIGNIFICANT CHANGE UP (ref 3.3–5)
ALP SERPL-CCNC: 92 U/L — SIGNIFICANT CHANGE UP (ref 40–120)
ALT FLD-CCNC: 103 U/L — HIGH (ref 10–45)
ANION GAP SERPL CALC-SCNC: 19 MMOL/L — HIGH (ref 5–17)
AST SERPL-CCNC: 26 U/L — SIGNIFICANT CHANGE UP (ref 10–40)
BASOPHILS # BLD AUTO: 0.05 K/UL — SIGNIFICANT CHANGE UP (ref 0–0.2)
BASOPHILS NFR BLD AUTO: 0.3 % — SIGNIFICANT CHANGE UP (ref 0–2)
BILIRUB SERPL-MCNC: 0.7 MG/DL — SIGNIFICANT CHANGE UP (ref 0.2–1.2)
BUN SERPL-MCNC: 103 MG/DL — HIGH (ref 7–23)
CALCIUM SERPL-MCNC: 9 MG/DL — SIGNIFICANT CHANGE UP (ref 8.4–10.5)
CHLORIDE SERPL-SCNC: 96 MMOL/L — SIGNIFICANT CHANGE UP (ref 96–108)
CMV DNA CSF QL NAA+PROBE: ABNORMAL IU/ML
CMV DNA SPEC NAA+PROBE-LOG#: ABNORMAL LOG10IU/ML
CO2 SERPL-SCNC: 22 MMOL/L — SIGNIFICANT CHANGE UP (ref 22–31)
CREAT SERPL-MCNC: 3.66 MG/DL — HIGH (ref 0.5–1.3)
EGFR: 17 ML/MIN/1.73M2 — LOW
EGFR: 17 ML/MIN/1.73M2 — LOW
EOSINOPHIL # BLD AUTO: 0 K/UL — SIGNIFICANT CHANGE UP (ref 0–0.5)
EOSINOPHIL NFR BLD AUTO: 0 % — SIGNIFICANT CHANGE UP (ref 0–6)
GLUCOSE BLDC GLUCOMTR-MCNC: 137 MG/DL — HIGH (ref 70–99)
GLUCOSE BLDC GLUCOMTR-MCNC: 145 MG/DL — HIGH (ref 70–99)
GLUCOSE BLDC GLUCOMTR-MCNC: 186 MG/DL — HIGH (ref 70–99)
GLUCOSE BLDC GLUCOMTR-MCNC: 220 MG/DL — HIGH (ref 70–99)
GLUCOSE SERPL-MCNC: 178 MG/DL — HIGH (ref 70–99)
HCT VFR BLD CALC: 26.9 % — LOW (ref 39–50)
HGB BLD-MCNC: 9.4 G/DL — LOW (ref 13–17)
HGB FLD-MCNC: 9.5 G/DL — LOW (ref 12.6–17.4)
IMM GRANULOCYTES NFR BLD AUTO: 3.1 % — HIGH (ref 0–0.9)
LYMPHOCYTES # BLD AUTO: 0.38 K/UL — LOW (ref 1–3.3)
LYMPHOCYTES # BLD AUTO: 2 % — LOW (ref 13–44)
MAGNESIUM SERPL-MCNC: 1.9 MG/DL — SIGNIFICANT CHANGE UP (ref 1.6–2.6)
MCHC RBC-ENTMCNC: 28.7 PG — SIGNIFICANT CHANGE UP (ref 27–34)
MCHC RBC-ENTMCNC: 34.9 G/DL — SIGNIFICANT CHANGE UP (ref 32–36)
MCV RBC AUTO: 82 FL — SIGNIFICANT CHANGE UP (ref 80–100)
MONOCYTES # BLD AUTO: 1.37 K/UL — HIGH (ref 0–0.9)
MONOCYTES NFR BLD AUTO: 7.1 % — SIGNIFICANT CHANGE UP (ref 2–14)
NEUTROPHILS # BLD AUTO: 17 K/UL — HIGH (ref 1.8–7.4)
NEUTROPHILS NFR BLD AUTO: 87.5 % — HIGH (ref 43–77)
NRBC BLD AUTO-RTO: 0 /100 WBCS — SIGNIFICANT CHANGE UP (ref 0–0)
OXYHGB MFR BLDMV: 61 % — SIGNIFICANT CHANGE UP
PHOSPHATE SERPL-MCNC: 4 MG/DL — SIGNIFICANT CHANGE UP (ref 2.5–4.5)
PLATELET # BLD AUTO: 135 K/UL — LOW (ref 150–400)
POTASSIUM SERPL-MCNC: 4 MMOL/L — SIGNIFICANT CHANGE UP (ref 3.5–5.3)
POTASSIUM SERPL-SCNC: 4 MMOL/L — SIGNIFICANT CHANGE UP (ref 3.5–5.3)
PROT SERPL-MCNC: 6 G/DL — SIGNIFICANT CHANGE UP (ref 6–8.3)
RBC # BLD: 3.28 M/UL — LOW (ref 4.2–5.8)
RBC # FLD: 15.7 % — HIGH (ref 10.3–14.5)
SAO2 % BLD: 62.9 % — SIGNIFICANT CHANGE UP (ref 60–90)
SODIUM SERPL-SCNC: 137 MMOL/L — SIGNIFICANT CHANGE UP (ref 135–145)
TACROLIMUS SERPL-MCNC: 8.1 NG/ML — SIGNIFICANT CHANGE UP
WBC # BLD: 19.41 K/UL — HIGH (ref 3.8–10.5)
WBC # FLD AUTO: 19.41 K/UL — HIGH (ref 3.8–10.5)

## 2025-05-08 PROCEDURE — 99292 CRITICAL CARE ADDL 30 MIN: CPT

## 2025-05-08 PROCEDURE — 99291 CRITICAL CARE FIRST HOUR: CPT | Mod: FS

## 2025-05-08 PROCEDURE — 99291 CRITICAL CARE FIRST HOUR: CPT

## 2025-05-08 PROCEDURE — 93308 TTE F-UP OR LMTD: CPT | Mod: 26,76

## 2025-05-08 PROCEDURE — G0545: CPT

## 2025-05-08 PROCEDURE — 99232 SBSQ HOSP IP/OBS MODERATE 35: CPT | Mod: GC

## 2025-05-08 PROCEDURE — 71045 X-RAY EXAM CHEST 1 VIEW: CPT | Mod: 26,76

## 2025-05-08 PROCEDURE — 99233 SBSQ HOSP IP/OBS HIGH 50: CPT

## 2025-05-08 PROCEDURE — 88342 IMHCHEM/IMCYTCHM 1ST ANTB: CPT | Mod: 26

## 2025-05-08 PROCEDURE — 88307 TISSUE EXAM BY PATHOLOGIST: CPT | Mod: 26

## 2025-05-08 RX ORDER — TACROLIMUS 0.5 MG/1
1.5 CAPSULE ORAL
Refills: 0 | Status: DISCONTINUED | OUTPATIENT
Start: 2025-05-08 | End: 2025-05-11

## 2025-05-08 RX ORDER — TACROLIMUS 0.5 MG/1
0.5 CAPSULE ORAL ONCE
Refills: 0 | Status: COMPLETED | OUTPATIENT
Start: 2025-05-08 | End: 2025-05-08

## 2025-05-08 RX ORDER — MAGNESIUM SULFATE 500 MG/ML
2 SYRINGE (ML) INJECTION ONCE
Refills: 0 | Status: COMPLETED | OUTPATIENT
Start: 2025-05-08 | End: 2025-05-08

## 2025-05-08 RX ADMIN — ATOVAQUONE 1500 MILLIGRAM(S): 750 SUSPENSION ORAL at 13:30

## 2025-05-08 RX ADMIN — INSULIN LISPRO 4: 100 INJECTION, SOLUTION INTRAVENOUS; SUBCUTANEOUS at 16:33

## 2025-05-08 RX ADMIN — PREDNISONE 15 MILLIGRAM(S): 20 TABLET ORAL at 05:17

## 2025-05-08 RX ADMIN — MYCOPHENOLATE MOFETIL 1000 MILLIGRAM(S): 500 TABLET, FILM COATED ORAL at 07:48

## 2025-05-08 RX ADMIN — VALGANCICLOVIR 450 MILLIGRAM(S): 450 TABLET, FILM COATED ORAL at 13:41

## 2025-05-08 RX ADMIN — DESVENLAFAXINE 50 MILLIGRAM(S): 25 TABLET, EXTENDED RELEASE ORAL at 13:32

## 2025-05-08 RX ADMIN — Medication 500000 UNIT(S): at 20:25

## 2025-05-08 RX ADMIN — Medication 2.4 MILLION UNIT(S): at 13:59

## 2025-05-08 RX ADMIN — TACROLIMUS 1 MILLIGRAM(S): 0.5 CAPSULE ORAL at 07:48

## 2025-05-08 RX ADMIN — Medication 25 GRAM(S): at 06:59

## 2025-05-08 RX ADMIN — Medication 125 MILLIGRAM(S): at 17:54

## 2025-05-08 RX ADMIN — Medication 500000 UNIT(S): at 16:28

## 2025-05-08 RX ADMIN — CEFTRIAXONE 100 MILLIGRAM(S): 500 INJECTION, POWDER, FOR SOLUTION INTRAMUSCULAR; INTRAVENOUS at 13:33

## 2025-05-08 RX ADMIN — TAMSULOSIN HYDROCHLORIDE 0.4 MILLIGRAM(S): 0.4 CAPSULE ORAL at 21:01

## 2025-05-08 RX ADMIN — Medication 2 SPRAY(S): at 17:53

## 2025-05-08 RX ADMIN — Medication 2 SPRAY(S): at 05:17

## 2025-05-08 RX ADMIN — Medication 500000 UNIT(S): at 13:41

## 2025-05-08 RX ADMIN — TACROLIMUS 1.5 MILLIGRAM(S): 0.5 CAPSULE ORAL at 20:25

## 2025-05-08 RX ADMIN — Medication 500000 UNIT(S): at 07:47

## 2025-05-08 RX ADMIN — NALOXEGOL OXALATE 12.5 MILLIGRAM(S): 12.5 TABLET, FILM COATED ORAL at 13:44

## 2025-05-08 RX ADMIN — ERYTHROMYCIN 1 APPLICATION(S): 5 OINTMENT OPHTHALMIC at 21:01

## 2025-05-08 RX ADMIN — MYCOPHENOLATE MOFETIL 1000 MILLIGRAM(S): 500 TABLET, FILM COATED ORAL at 20:25

## 2025-05-08 RX ADMIN — Medication 1 TABLET(S): at 13:30

## 2025-05-08 RX ADMIN — TACROLIMUS 0.5 MILLIGRAM(S): 0.5 CAPSULE ORAL at 14:42

## 2025-05-08 RX ADMIN — Medication 10 MILLILITER(S): at 07:51

## 2025-05-08 RX ADMIN — Medication 125 MILLIGRAM(S): at 05:17

## 2025-05-08 RX ADMIN — Medication 1 APPLICATION(S): at 13:26

## 2025-05-08 RX ADMIN — PREDNISONE 15 MILLIGRAM(S): 20 TABLET ORAL at 17:52

## 2025-05-08 RX ADMIN — POLYETHYLENE GLYCOL 3350 17 GRAM(S): 17 POWDER, FOR SOLUTION ORAL at 17:52

## 2025-05-08 RX ADMIN — Medication 150 MILLIGRAM(S): at 21:00

## 2025-05-08 NOTE — PROGRESS NOTE ADULT - ATTENDING COMMENTS
ATN in the setting of cardiogenic shock post heart transplant   now improving   off CVVHDF since am of 5/4   femoral catheter has been removed as of 5/5  no dialysis need.     jens calvillo  nephrology attending   please contact me on TEAMS   Office- [phone]

## 2025-05-08 NOTE — PROVIDER CONTACT NOTE (OTHER) - DATE AND TIME:
07-May-2025 15:04
08-May-2025 14:27
28-Mar-2025 15:11
06-Apr-2025 11:36
23-Apr-2025 11:30
03-May-2025 04:30
23-Mar-2025 21:30
01-Apr-2025 14:00
07-May-2025 20:00
20-Apr-2025 22:14

## 2025-05-08 NOTE — PROGRESS NOTE ADULT - PROBLEM SELECTOR PLAN 1
- ACC/AHA stage D systolic heart failure, s/p heart transplant & TV ring repair 4/28, CMV +/+, Toxo -/+     - Retrospective crossmatch with class II DSA DR HILLIARD (preformed from pre transplant).    - Support:     - Inotropes: Epi weaned off 5/4, dobutamine weaned off 5/5     - Lico weaned off on 5/2  - Diuresis: Euvolemic off loop diuretics. Fluid goal even  - Chest tubes, per CTS  - First surveillance biopsy tomorrow 5/8 (2nd week post txp).     - NPO at [facility] [place].     - Coags with AM labs - ACC/AHA stage D systolic heart failure, s/p heart transplant & TV ring repair 4/28, CMV +/+, Toxo -/+     - Retrospective crossmatch with class II DSA DR HILLIARD (preformed from pre transplant).    - Support:     - Inotropes: Epi weaned off 5/4, dobutamine weaned off 5/5     - Lico weaned off on 5/2  - Diuresis: Euvolemic off loop diuretics. Fluid goal even  - Chest tubes, per CTS  - First surveillance biopsy 5/8 (2nd week post txp), pending results     - Some resistance getting access > venous Dopplers to evaluate SVC thrombosis vs. anastamosis  - Multidisciplinary rounds: conducted today at 9am. Members of the team include Transplant Surgeon, Transplant Cardiologist, ICU team (or step down team or floor team), Transplant ACP, Transplant Pharmacist, Registered Dietician,  and RN’s. Discussion included review of pt history, systematic review of interval events and plan of care. Disciplines not in attendance were notified of the plan of care.  All questions and concerns were addressed. - ACC/AHA stage D systolic heart failure, s/p heart transplant & TV ring repair 4/28, CMV +/+, Toxo -/+     - Retrospective crossmatch with class II DSA DR HILLIARD (preformed from pre transplant).    - Support:     - Inotropes: Epi weaned off 5/4, dobutamine weaned off 5/5     - Lico weaned off on 5/2  - Diuresis: Euvolemic off loop diuretics. Fluid goal even  - Chest tubes, per CTS  - Plan to dc epicardial wires per CTS with repeat TTE a few hours after  - First surveillance biopsy 5/8 (2nd week post txp), pending results     - Some resistance getting access > venous Dopplers to evaluate SVC thrombosis vs. anastamosis  - Multidisciplinary rounds: conducted today at 9am. Members of the team include Transplant Surgeon, Transplant Cardiologist, ICU team (or step down team or floor team), Transplant ACP, Transplant Pharmacist, Registered Dietician,  and RN’s. Discussion included review of pt history, systematic review of interval events and plan of care. Disciplines not in attendance were notified of the plan of care.  All questions and concerns were addressed.

## 2025-05-08 NOTE — PROVIDER CONTACT NOTE (OTHER) - ASSESSMENT
A&Ox4, VSS, isolation precautions  maintained
pt a/ox4,vitals taken
Patient AOx4, verbalizes understanding of medication but is refusing secondary to feelings of dizziness.
Post Transplant Quiz completed by patient, Quiz reviewed, patient answered all answers correctly.
We discussed the following topics: immunosuppression medications, signs and symptoms of infection and rejection, lifestyle changes post - heart transplant, and checking vital signs and monitoring blood sugars (if necessary). Reviewed the medications, clinic/biopsy appointments and nutrition. Teach back confirmed, both the patient and family member have good understanding of topics discussed.
We discussed the following topics: immunosuppression medications, signs and symptoms of infection and rejection, lifestyle changes post - heart transplant, and checking vital signs and monitoring blood sugars (if necessary). Reviewed the medications, clinic/biopsy appointments and nutrition. Teach back confirmed, both the patient and family member have good understanding of topics discussed.
Patient is Alert and Oriented times Four. Patient states that he ambulated to laps around the unit which he usually does every day and today when he got back to his room he was lightheaded and it never happened before. Patient is sitting up in the chair and resting and states that his lightheadedness resolved. Patient denies chest pain, discomfort, shortness of breath, and dizziness. Patient's Heart Rhythm is Normal Sinus Rhythm on the cardiac monitor. Patient's Vital Signs: Temperature 97.8 F Oral, Heart Rate 75, Blood Pressure 105/76, Respiratory Rate 18 and O2 Saturation 96% Room Air.
Post void bladder scan: 275ml  Patient urinated: 75ml
Pt answered orientation questions appropriately pt started to endorse statements of wanting to die. pt stated, "No more, do not clean me I just want to die."
Patient AOX4. VSS. Pt denies any complaints of chest pain, palpitations, headache, dizziness/ lightheadedness or SOB. O2 stable on RA. Pt Paced on tele. Pt educated on medication. Pt states "if im walking around the doctor said im okay without it". Pt been refusing heparin injectable for a while now.

## 2025-05-08 NOTE — PROGRESS NOTE ADULT - PROBLEM SELECTOR PLAN 2
- Immunosuppression:      - Cellcept 1000mg BID      - c/w steroid taper      - Tacro: Trough 12.2. Plan to resume tacro 1/1 PO BID starting with tonight's dose  - Antibiotics      - s/p IV Vanc + Cefepime for post-op ppx (4/29 - 5/1)      - PO Vanco ppx (4/29 - )      - CTX 2000mg QD for donor strep pneumo+ in CSF      - Penicillin IM for donor syphilis + ab  - PPX     - Nystatin for thrush ppx     - Start Valcyte 450 twice/week for high-risk CMV - Immunosuppression:      - Cellcept 1000mg BID      - c/w steroid taper      - Tacro: Trough 8.1. Bolus tac 0.5 mg STAT & increase to 1.5 mg BID starting with tonight's dose given continual renal improvement.  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

## 2025-05-08 NOTE — PROGRESS NOTE ADULT - SUBJECTIVE AND OBJECTIVE BOX
Patient seen and examined at the bedside.    Remains critically ill on continuous ICU monitoring, at risk for life threatening decompensation.  All Labs, data reviewed. Plan of care discussed in length during multi-disciplinary ICU rounds.     Brief Summary:  70-year-old male with history of NICM since 2011 HFrEF (LVEF 25-30%)   Now s/p OHT on 4/29/25     24 Hour events:  Ambulating well.  Voiding though some concern for retention overnight.  NPO for biopsy today.      Objective:  ICU Vital Signs Last 24 Hrs  T(C): 37.1 (08 May 2025 06:42), Max: 37.1 (08 May 2025 06:42)  T(F): 98.7 (08 May 2025 06:42), Max: 98.7 (08 May 2025 06:42)  HR: 107 (08 May 2025 09:00) (97 - 116)  BP: 109/74 (08 May 2025 09:00) (98/66 - 124/82)  BP(mean): 86 (08 May 2025 09:00) (76 - 97)  ABP: --  ABP(mean): --  RR: 19 (08 May 2025 09:00) (13 - 46)  SpO2: 95% (08 May 2025 09:00) (93% - 98%)    O2 Parameters below as of 08 May 2025 08:00  Patient On (Oxygen Delivery Method): room air      Physical Exam:   General: Alert, interactive  Neurology: Oriented, following commands.  Respiratory: Bilateral breath sounds  CV: Sinus Tachycardia  Abdominal: Soft, Nontender  Extremities: Warm, well-perfused          -------------------------------------------------------------------------------------------------------------------------------    Labs:                          9.4    19.41 )-----------( 135      ( 08 May 2025 00:23 )             26.9     137    |  96     |  103    ----------------------------<  178        ( 08 May 2025 00:23 )  4.0     |  22     |  3.66     Ca    9.0        ( 08 May 2025 00:23 )  Phos  4.0       ( 08 May 2025 00:23 )  Mg     1.9       ( 08 May 2025 00:23 )    TPro  6.0    /  Alb  4.0    /  TBili  0.7    /  DBili  x      /  AST  26     /  ALT  103    /  AlkPhos  92     ( 08 May 2025 00:23 )    LIVER FUNCTIONS - ( 08 May 2025 00:23 )  Alb: 4.0 g/dL / Pro: 6.0 g/dL / ALK PHOS: 92 U/L / ALT: 103 U/L / AST: 26 U/L / GGT: x           ABG - ( 06 May 2025 10:55 )  pH, Arterial: 7.40  pH, Blood: x     /  pCO2: 37    /  pO2: 82    / HCO3: 23    / Base Excess: -1.6  /  SaO2: 98.1        ------------------------------------------------------------------------------------------------------------------------------  Assessment:  71 yo NICM, s/p heart transplant     ZAHRA  Hypomagnesemia  Acute blood loss anemia  Thrombocytopenia  Hyperglycemia      Plan:   ***Neuro***  Maintain day/night cycle to prevent ICU delirium   Postoperative acute pain control with IV Tylenol, Tramadol  Trazadone at night for sleep     ***Cardiovascular***  s/p heart transplant  Biopsy today.  Remove wire and chest tubes after biopsy/TTE    ***Pulmonary***  Tolerating room air.  Deep breathing and coughing exercises    ***GI***  Tolerating diet, monitor po intake.    Protonix for prophylaxis   Bowel regimen, having BMs   Trend LFTs    ***Renal***  ZAHRA, Trend creatinine  Hypomagnesemia - repleted.    ***ID***  Trend Leukocytosis  PCN and Ceftriaxone for donor cultures (syphilis /S pneumo)  Prophylaxis with Nystatin, PO Vanco, Mepron  Tacro, Cellcept, Steroid taper for immunosuppression.    ***Endocrine***  Insulin sliding scale for Hyperglycemia     ***Hematology***  Acute blood loss anemia and thrombocytopenia  Trend CBC and coags, monitor for bleeding.  No transfusion indication currently  Heparin SQ for VTE prophylaxis      Critical care time: 50 minutes      I, Chata Huntley MD, personally performed the services described in this documentation.  I have reviewed the chart and agree that the record reflects my personal performance and is accurate and complete.    Electronically signed:   Chata Huntley MD  CT ICU attending

## 2025-05-08 NOTE — PROGRESS NOTE ADULT - SUBJECTIVE AND OBJECTIVE BOX
Patient seen and examined at the bedside.    Remained critically ill on continuous ICU monitoring.    OBJECTIVE:  Vital Signs Last 24 Hrs  T(C): 36.4 (08 May 2025 16:00), Max: 37.1 (08 May 2025 06:42)  T(F): 97.5 (08 May 2025 16:00), Max: 98.8 (08 May 2025 08:00)  HR: 104 (08 May 2025 19:00) (97 - 110)  BP: 111/70 (08 May 2025 19:00) (101/63 - 124/82)  BP(mean): 86 (08 May 2025 19:00) (76 - 98)  RR: 23 (08 May 2025 19:00) (13 - 32)  SpO2: 97% (08 May 2025 19:00) (93% - 98%)    Parameters below as of 08 May 2025 16:00  Patient On (Oxygen Delivery Method): room air          Physical Exam:   General: NAD  Neurology: Nonfocal  Eyes: Bilateral pupils equal and reactive  ENT/Neck: Neck supple, trachea midline, No JVD  Respiratory: Clear bilaterally  CV: S1S2, no murmurs        [x] Sternal dressing, [x] Mediastinal CT x2        [x] Sinus Tachycardia  Abdominal: Soft, NT, ND +BS  Extremities: 1-2+ pedal edema noted, + peripheral pulses  Skin: No Rashes, Hematoma, Ecchymosis    Assessment:  70-year-old male with history of NICM since 2011 HFrEF (LVEF 25-30%)   Now s/p OHT on 4/29/25     ZAHRA  Hypokalemia  Acute blood loss anemia  Hyperglycemia    Plan:  ***Neuro***  [x] Nonfocal  Acute pain management with Robaxin prn  Post operative neuro assessment  Pristiq home med  Trazodone nightly    ***Cardiovascular***  Invasive hemodynamic monitoring, assess perfusion indices  ST  / Hct 26.9% / Lactate 1.6  Reassessment of hemodynamics post resuscitation  Monitor chest tube outputs  Serial EKG and cardiac enzymes  Plan for biopsy tomorrow    ***Pulmonary***  [x] Room air  Encourage incentive spirometry, continue pulse ox monitoring, follow ABGs     ***GI***  [x] Diet: Consistent Carbohydrate - NPO at midnight for OR tomorrow  [x] Protonix  [x] Movantik  Bowel regimen with Miralax and Senna    ***Renal***  [x] ZAHRA - iHD held today  Continue to monitor I/Os, BUN/Creatinine.  Replete lytes PRN  Flomax for BPH    ***ID***  Ceftriaxone, Penicillin, Valcyte, and PO Vancomycin for prophylaxis   Monitor for fevers and leukocytosis  Immunosuppression with Cellcept, Prednisone, Mepron, and Tacro    ***Endocrine***  [x] Stress Hyperglycemia : HbA1c 6.1 %             - [x] Insulin gtt [x] ISS             - Need tight glycemic control to prevent wound infection.        Patient requires continuous monitoring with bedside rhythm monitoring, pulse oximetry monitoring, and continuous central venous and arterial pressure monitoring; and intermittent blood gas analysis. Care plan discussed with the ICU care team.   Patient remained critical, at risk for life threatening decompensation.    I have spent 55 minutes providing critical care management to this patient.    By signing my name below, I, Raquel Lara, attest that this documentation has been prepared under the direction and in the presence of ARA Johnson   Electronically signed: Elsy Smith, 05-08-25 @ 19:18    I, Darrion Cannon , personally performed the services described in this documentation. all medical record entries made by the elsy were at my direction and in my presence. I have reviewed the chart and agree that the record reflects my personal performance and is accurate and complete  Electronically signed: ARA Johnson.  Patient seen and examined at the bedside.    Remained critically ill on continuous ICU monitoring.    OBJECTIVE:  Vital Signs Last 24 Hrs  T(C): 36.4 (08 May 2025 16:00), Max: 37.1 (08 May 2025 06:42)  T(F): 97.5 (08 May 2025 16:00), Max: 98.8 (08 May 2025 08:00)  HR: 104 (08 May 2025 19:00) (97 - 110)  BP: 111/70 (08 May 2025 19:00) (101/63 - 124/82)  BP(mean): 86 (08 May 2025 19:00) (76 - 98)  RR: 23 (08 May 2025 19:00) (13 - 32)  SpO2: 97% (08 May 2025 19:00) (93% - 98%)    Parameters below as of 08 May 2025 16:00  Patient On (Oxygen Delivery Method): room air/NRB      Physical Exam:   General: NAD  Neurology: Nonfocal  Eyes: Bilateral pupils equal and reactive  ENT/Neck: Neck supple, trachea midline, No JVD  Respiratory: Clear/decreased bilaterally   CV: S1S2, no murmurs        [x] Sternal dressing        [x] Sinus Tachycardia  Abdominal: Soft, NT, ND +BS  Extremities: 1-2+ pedal edema noted, + peripheral pulses  Skin: No Rashes, Hematoma, Ecchymosis    Assessment:  70-year-old male with history of NICM since 2011 HFrEF (LVEF 25-30%)   Now s/p OHT on 4/29/25     ZAHRA  Hypokalemia  Acute blood loss anemia  Hyperglycemia    Plan:  ***Neuro***  [x] Nonfocal  Acute pain management with Robaxin prn  Post operative neuro assessment  Pristiq home med  Trazodone nightly  Atarax PRN     ***Cardiovascular***  Invasive hemodynamic monitoring, assess perfusion indices  ST  / Hct 26.9% / Lactate 1.6  Reassessment of hemodynamics post resuscitation  Remaining CT removed today   Serial EKG and cardiac enzymes  Biopsy today, PW removed today, post procedure TTE without pericardial effusion, SQH held    ***Pulmonary***  [x] Room air/NRB  Encourage incentive spirometry, continue pulse ox monitoring, follow ABGs   L PTX s/p CT removal, no resp distress, on NRB, will recheck CXR in 4hr     ***GI***  [x] Diet: Consistent Carbohydrate   [x] Protonix  [x] Movantik  Bowel regimen with Miralax and Senna    ***Renal***  [x] ZAHRA   Continue to monitor I/Os, BUN/Creatinine.  Replete lytes PRN  Flomax for BPH    ***ID***  Ceftriaxone, Penicillin, Valcyte, and PO Vancomycin for prophylaxis   Monitor for fevers and leukocytosis  Immunosuppression with Cellcept, Prednisone, Mepron, and Tacro    ***Endocrine***  [x] Stress Hyperglycemia : HbA1c 6.1 %             - [x] ISS             - Need tight glycemic control to prevent wound infection.        Patient requires continuous monitoring with bedside rhythm monitoring, pulse oximetry monitoring, and continuous central venous and arterial pressure monitoring; and intermittent blood gas analysis. Care plan discussed with the ICU care team.   Patient remained critical, at risk for life threatening decompensation.    I have spent 55 minutes providing critical care management to this patient.    By signing my name below, I, Raquel Lara, attest that this documentation has been prepared under the direction and in the presence of ARA Johnson   Electronically signed: Kasey Smith, 05-08-25 @ 19:18    I, Darrion Cannon , personally performed the services described in this documentation. all medical record entries made by the yaritzaibjewell were at my direction and in my presence. I have reviewed the chart and agree that the record reflects my personal performance and is accurate and complete  Electronically signed: ARA Johnson.

## 2025-05-08 NOTE — PROGRESS NOTE ADULT - PROBLEM SELECTOR PLAN 1
Patient's baseline Scr is 1.0-1.2. On 4/28, SCr was 1.1 and on 4/29, Scr rubia to 3.2. UA-turbid, 300 protein, small leuks, large blood, 914 rbc's, UPCR-3.2. Pt had drop in hgb from 12.4 (4/28) to 9.0 (4/29). Pt was on IV vasopressor support. Pt noted to have elevated CVP of 20, and was not responding well enough to diuresis, so was started on CRRT for UF. Pt had required multiple pressors and inotropes, gradually weaned off. CRRT stopped AM of 5/4 and pt responding well to Bumex infusion. Femoral NTDC removed on 5/5. Transplant heart bx on 5/8.  - [place] removed, passed TOV although retaining a bit so Flomax started. Continue serial bladder scans. Remains non-oliguric, BP stable, off O2  -Tacrolimus started on 4/29. Monitor daily trough levels and try to maintain at lower end of therapeutic range to help renal recovery. Immunosuppression dosing per Transplant Cardiology.  -Labs reviewed. Cr decreased to 3.66, BUN remains elevated (of note, pt is receiving steroids). Electrolytes within range.  -Continue off CRRT. Diuresis prn for goal CVP<12  -Dose meds per CrCl ~20 mL/min - will be using kinetic eGFR given rapid changes in renal function.  -Avoid nephrotoxins.

## 2025-05-08 NOTE — PROGRESS NOTE ADULT - SUBJECTIVE AND OBJECTIVE BOX
[facility] DIVISION OF KIDNEY DISEASES AND HYPERTENSION --    Reason for consult: ZAHRA    24 hour events/subjective: Patient seen and examined at bedside. Denies fever, SOB. chest pain, n/v/d. Appetite is good.       PAST HISTORY  --------------------------------------------------------------------------------  No significant changes to PMH, PSH, FHx, SHx, unless otherwise noted    ALLERGIES & MEDICATIONS  --------------------------------------------------------------------------------  Allergies    No Known Allergies      Standing Inpatient Medications  atovaquone  Suspension 1500 milliGRAM(s) Oral daily  cefTRIAXone   IVPB 2000 milliGRAM(s) IV Intermittent <User Schedule>  chlorhexidine 2% Cloths 1 Application(s) Topical daily  desvenlafaxine ER 50 milliGRAM(s) Oral daily  dextrose 50% Injectable 50 milliLiter(s) IV Push every 15 minutes  dextrose 50% Injectable 25 milliLiter(s) IV Push every 15 minutes  erythromycin   Ointment 1 Application(s) Left EYE every 4 hours  insulin lispro (ADMELOG) corrective regimen sliding scale   SubCutaneous three times a day before meals  insulin lispro (ADMELOG) corrective regimen sliding scale   SubCutaneous at bedtime  mycophenolate mofetil 1000 milliGRAM(s) Oral <User Schedule>  naloxegol 12.5 milliGRAM(s) Oral daily  Nephro-piotr 1 Tablet(s) Oral daily  nystatin    Suspension 751996 Unit(s) Oral <User Schedule>  oxymetazoline 0.05% Nasal Spray 2 Spray(s) Both Nostrils two times a day  pantoprazole    Tablet 40 milliGRAM(s) Oral before breakfast  penicillin   G benzathine Injectable 2.4 Million Unit(s) IntraMuscular <User Schedule>  polyethylene glycol 3350 17 Gram(s) Oral two times a day  potassium chloride  10 mEq/50 mL IVPB 10 milliEquivalent(s) IV Intermittent once  potassium chloride  10 mEq/50 mL IVPB 10 milliEquivalent(s) IV Intermittent once  predniSONE   Tablet 15 milliGRAM(s) Oral every 12 hours  senna 2 Tablet(s) Oral at bedtime  sodium chloride 0.9%. 1000 milliLiter(s) IV Continuous <Continuous>  tacrolimus 1 milliGRAM(s) Oral <User Schedule>  tamsulosin 0.4 milliGRAM(s) Oral at bedtime  traZODone 150 milliGRAM(s) Oral at bedtime  valGANciclovir 450 milliGRAM(s) Oral <User Schedule>  vancomycin    Solution 125 milliGRAM(s) Oral every 12 hours    PRN Inpatient Medications  artificial  tears Solution 1 Drop(s) Both EYES every 12 hours PRN  hydrOXYzine hydrochloride 10 milliGRAM(s) Oral at bedtime PRN  methocarbamol 500 milliGRAM(s) Oral every 8 hours PRN      REVIEW OF SYSTEMS  --------------------------------------------------------------------------------  Gen: No fever  Respiratory: No dyspnea  CV: No chest pain  GI: No diarrhea, nausea, or vomiting  : No dysuria or hematuria  Skin: No rashes  MSK: No edema  Neuro: No dizziness/lightheadedness    All other systems were reviewed and are negative, except as noted.    VITALS/PHYSICAL EXAM  --------------------------------------------------------------------------------  T(C): 37.1 (05-08-25 @ 06:42), Max: 37.1 (05-08-25 @ 06:42)  HR: 104 (05-08-25 @ 07:00) (97 - 116)  BP: 107/71 (05-08-25 @ 07:00) (92/56 - 124/82)  RR: 32 (05-08-25 @ 07:00) (13 - 46)  SpO2: 95% (05-08-25 @ 07:00) (92% - 98%)  Wt(kg): --  Height (cm): 170.2 (05-08-25 @ 06:42)  Weight (kg): 81.7 (05-08-25 @ 06:42)  BMI (kg/m2): 28.2 (05-08-25 @ 06:42)  BSA (m2): 1.93 (05-08-25 @ 06:42)      05-07-25 @ 07:01  -  05-08-25 @ 07:00  --------------------------------------------------------  IN: 1075 mL / OUT: 1430 mL / NET: -355 mL      PHYSICAL EXAM:  Gen: NAD  Neuro: non-focal  HEENT: anicteric  Pulm: B/L breath sounds  CV: +S1 S2  Abd: soft, non-distended, non-tender  Extremities: no edema  Skin: Warm    LABS/STUDIES  --------------------------------------------------------------------------------              9.4    19.41 >-----------<  135      [05-08-25 @ 00:23]              26.9     137  |  96  |  103  ----------------------------<  178      [05-08-25 @ 00:23]  4.0   |  22  |  3.66        Ca     9.0     [05-08-25 @ 00:23]      Mg     1.9     [05-08-25 @ 00:23]      Phos  4.0     [05-08-25 @ 00:23]    TPro  6.0  /  Alb  4.0  /  TBili  0.7  /  DBili  x   /  AST  26  /  ALT  103  /  AlkPhos  92  [05-08-25 @ 00:23]    Creatinine Trend:  SCr 3.66 [05-08 @ 00:23]  SCr 4.09 [05-07 @ 15:13]  SCr 1.05 [05-07 @ 14:25]  SCr 4.34 [05-07 @ 01:20]  SCr 4.33 [05-06 @ 13:27]      Iron 48, TIBC 307, %sat 16      [03-28-25 @ 06:15]  Ferritin 184      [03-28-25 @ 06:15]  TSH 1.26      [03-22-25 @ 00:58]  Lipid: chol 147, TG 71, HDL 62, LDL --      [03-22-25 @ 00:58]    HBsAb Reactive      [04-17-25 @ 15:45]  HBsAg Nonreact      [04-17-25 @ 15:45]  HBcAb Nonreact      [04-17-25 @ 15:45]  HCV 0.05, Nonreact      [04-17-25 @ 15:45]  HIV Nonreact      [04-17-25 @ 15:45]

## 2025-05-08 NOTE — PROGRESS NOTE ADULT - PROBLEM SELECTOR PLAN 4
RESOLVED  - Plt 50s post-op, etiology unclear, now improving, today 155  - restarted HSQ 5/6 per CTS

## 2025-05-08 NOTE — PROGRESS NOTE ADULT - CRITICAL CARE ATTENDING COMMENT
Feels well. Off HD since 5/3 and urinating well. Underwent RHC/Bx today (2 pieces obtained) with normal hemos. On exam, JVP approx 6-8 with mild HJR and midl v waves, RRR, no m/r/g, dec BS b/l, nontender abdomen, no edema. Labs reviewed - WBC 19 (stable), Hb 8.5, BUN/Cr 103/3.66 (improving), Tac 8.1 (from 19)  - maintain goal net even; goal CVP 6-8  - increase tac to 1.5 mg q12 after stat 0.5 mg   - c/w Cellcept 1 gram q12  - c/w CFTX for S. pneumo meningitis PPx (seen in donor CSF)  - on PCN for syphilis PPx  - c/w nystatin  - c/w mepron 1500 mg daily  - c/w Valcyte for CMV +/+

## 2025-05-08 NOTE — PROGRESS NOTE ADULT - PROBLEM SELECTOR PLAN 3
- Post-op c/b ZAHRA likely hemodynamic  - CRRT on HOLD since 5/4, no acute CRRT/HD needs  - Daily BMP, lytes  - Cardiorenal recs appreciated

## 2025-05-08 NOTE — PROGRESS NOTE ADULT - SUBJECTIVE AND OBJECTIVE BOX
Subjective: Pt feels much better overall, notes continued improvement in appetite but concerned that food isn't tasting the same. Denies CP, SOB, H/A, N/V/D, abdominal pain, f/c, dizziness, orthopnea, PND.    Medications:  artificial  tears Solution 1 Drop(s) Both EYES every 12 hours PRN  atovaquone  Suspension 1500 milliGRAM(s) Oral daily  cefTRIAXone   IVPB 2000 milliGRAM(s) IV Intermittent <User Schedule>  chlorhexidine 2% Cloths 1 Application(s) Topical daily  desvenlafaxine ER 50 milliGRAM(s) Oral daily  dextrose 50% Injectable 50 milliLiter(s) IV Push every 15 minutes  dextrose 50% Injectable 25 milliLiter(s) IV Push every 15 minutes  erythromycin   Ointment 1 Application(s) Left EYE every 4 hours  hydrOXYzine hydrochloride 10 milliGRAM(s) Oral at bedtime PRN  insulin lispro (ADMELOG) corrective regimen sliding scale   SubCutaneous three times a day before meals  insulin lispro (ADMELOG) corrective regimen sliding scale   SubCutaneous at bedtime  methocarbamol 500 milliGRAM(s) Oral every 8 hours PRN  mycophenolate mofetil 1000 milliGRAM(s) Oral <User Schedule>  naloxegol 12.5 milliGRAM(s) Oral daily  Nephro-piotr 1 Tablet(s) Oral daily  nystatin    Suspension 812917 Unit(s) Oral <User Schedule>  oxymetazoline 0.05% Nasal Spray 2 Spray(s) Both Nostrils two times a day  pantoprazole    Tablet 40 milliGRAM(s) Oral before breakfast  penicillin   G benzathine Injectable 2.4 Million Unit(s) IntraMuscular <User Schedule>  polyethylene glycol 3350 17 Gram(s) Oral two times a day  potassium chloride  10 mEq/50 mL IVPB 10 milliEquivalent(s) IV Intermittent once  potassium chloride  10 mEq/50 mL IVPB 10 milliEquivalent(s) IV Intermittent once  predniSONE   Tablet 15 milliGRAM(s) Oral every 12 hours  senna 2 Tablet(s) Oral at bedtime  sodium chloride 0.9%. 1000 milliLiter(s) IV Continuous <Continuous>  tacrolimus 1 milliGRAM(s) Oral <User Schedule>  tamsulosin 0.4 milliGRAM(s) Oral at bedtime  traZODone 150 milliGRAM(s) Oral at bedtime  valGANciclovir 450 milliGRAM(s) Oral <User Schedule>  vancomycin    Solution 125 milliGRAM(s) Oral every 12 hours      Physical Exam:  General: No distress. Comfortable in the chair.  HEENT: EOM intact.  Neck: Neck supple. JVP ~8-10. No masses  Chest: Respirations unlabored. Clear to auscultation bilaterally  CV: Normal S1 and S2. No murmurs, rub, or gallops. Radial pulses normal. No BLE edema.  Abdomen: Soft, non-distended, non-tender  Skin: No rashes or skin breakdown.  Sternotomy op-site c/d/i.  Neurology: A&Ox3, non-focal.  Psych: Affect normal    Vitals:  Vital Signs Last 24 Hours  T(C): 37.1 (25 @ 08:00), Max: 37.1 (25 @ 06:42)  HR: 101 (25 @ 11:00) (97 - 116)  BP: 105/75 (25 @ 11:00) (98/74 - 124/82)  RR: 15 (25 @ 11:00) (13 - 46)  SpO2: 94% (25 @ 11:00) (93% - 98%)    Weight in k.5 ( 05:00)    I&O's Summary    07 May 2025 07:  -  08 May 2025 07:00  --------------------------------------------------------  IN: 1075 mL / OUT: 1430 mL / NET: -355 mL    08 May 2025 07:  -  08 May 2025 13:43  --------------------------------------------------------  IN: 0 mL / OUT: 140 mL / NET: -140 mL    Tele:    Labs:                        9.4    19.41 )-----------( 135      ( 08 May 2025 00:23 )             26.9         137  |  96  |  103[H]  ----------------------------<  178[H]  4.0   |  22  |  3.66[H]    Ca    9.0      08 May 2025 00:23  Phos  4.0       Mg     1.9         TPro  6.0  /  Alb  4.0  /  TBili  0.7  /  DBili  x   /  AST  26  /  ALT  103[H]  /  AlkPhos  92

## 2025-05-08 NOTE — PROGRESS NOTE ADULT - ASSESSMENT
70-year-old male, ABO O, with NICM since 2011 HFrEF (LVEF 25-30%) s/p MDT CRT-D with baseline CHB, VT/VF, AFs/p GIANFRANCO ligation/MAZE, MV/TV repair, PVC ablation 3/2024 intolerant to AADs in the past, recent admission 3/19/2025-3/20/2025 for AICD shocks initially presented to the [facility] ED on 3/21/2025, sent by HF specialist for ACID shock. Device reported successful ATP for VT 3/17/2025 at 6:52pm followed by one ATP & 40J shock. He reported feeling dizzy & SOB during the events. He follows with Dr. Luca Tamayo for HF, currently undergoing transplant workup, Dr John for CRTD and VT/VF and Dr. Chu for genetic counseling. While admitted to [facility], he was started on a lidocaine gtt. On 3/21 when lidocaine weaned off patient had another two episodes of VT requiring AICD shocks. He was transferred to [facility] for further management. He was initially maintained on lidocaine gtt from 3/21/2025-3/25/2025 & his listing status was upgraded to UNOS status 2e for heart transplant (ABO O) for the refractory VT. He was transitioned to oral antiarrhythmics (Toprol XL & quinidine) & ultimately transferred from CICU to [facility] floor on 3/27/2025. Hospital course was further c/b development of watery diarrhea & was found to have +norovirus on 3/31/2025 which prompted deactivation to status 7 listing for heart transplant. Status reinstated to 2E after diarrhea resolved.     He underwent successful OHT on 4/28 + TV ring repair 34mm (CMV +/+, Toxo -/+,ischemic time 258min, intra op 2plts + 2 cryo). Intra op STACEY with LVEF 20% and mild RV dysfunction.  Extubated on 4/29.  Had some initial RV failure/PGD (requiring dobutamine 7.5, epi 0.02, levo, vaso, and Lico.  CRRT started to aid volume removal.  Echo from 4/30 with LVEF 70% and mild RV dysfunction.  Levo/Vaso weaned off on 5/1 and Lico weaned off 5/2. He requiring large amounts of pain medication despite 2 chest tubes being removed POD 1 (ketamine + PCA pump).  Now with ?delerium and non-cooporation with care, CARRIE, Seen by psych 5/2 and started on trazodone + Pristiq. Psychiatric status improved.  Requiring 1:1 sitter now resolved.  NG tube placed 5/3 but now eating so removed 5/5.    He appears euvolemic on exam, progressing well. Will continue to adjust his immunosuppression medications & plan for biopsy this Thursday 5/8.    Bedside hemodynamics:  5/2/25: /59/75, , CVP 12, PA 44/15/24, CO/CI 6.8/3.5  5/1/25 BP 99/59/72, , CVP 11, PA 40/17/24, Gucci CO/CI 5.5/2.8  4/29/25 BP 94/57/67, , CVP 11, PA 28/15/20, Gucci CI 2.2  3/22/25 BP 97/76/83, HR 80, CVP 6, PA 58/23/38, PCWP 20, SVR 1100, Gucci CO/CI 5.1/2.6, TD 4/2.08    Cardiac Studies:   TTE 4/30/25: LVEF 71%, no RMWA, RV normal size, reduced RVSF  STACEY 4/28/25 intraop: LVEF 20%, global, dilated RV with mildly reduced RVSF  TTE 3/20/25 : LVEF 30%, segmental, LVIDd 5.3, walls normal, elevated LV filling pressures, mod RVE with mildly reduced RV function, TAPSE 1.7, severely dilated RA, annuloplasty in MV position, transvalvular gradients are elevated with severe prosthetic MS (peak gradient 15.7, mean gradient 8), trace intravalvular MR, TV annuloplasty ring noted, mild TR,  est PASP 36, no evidence of LV thrombus  TTE 10/2024: LVEF 25-30%, LVEDD 5.9cm, moderately reduced RV function, annuloplasty ring of mitral and tricuspid position, PASP 47 mmHg  RH 3/19/25- RA 11 PA 58/26 PCWP 32 CO/CI 5.43/2.77  [facility] 6/2023 Nonobstructive CAD 70-year-old male, ABO O, with NICM since 2011 HFrEF (LVEF 25-30%) s/p MDT CRT-D with baseline CHB, VT/VF, AFs/p GIANFRANCO ligation/MAZE, MV/TV repair, PVC ablation 3/2024 intolerant to AADs in the past, recent admission 3/19/2025-3/20/2025 for AICD shocks initially presented to the [facility] ED on 3/21/2025, sent by HF specialist for ACID shock. Device reported successful ATP for VT 3/17/2025 at 6:52pm followed by one ATP & 40J shock. He reported feeling dizzy & SOB during the events. He follows with Dr. Luca Tamayo for HF, currently undergoing transplant workup, Dr John for CRTD and VT/VF and Dr. Chu for genetic counseling. While admitted to [facility], he was started on a lidocaine gtt. On 3/21 when lidocaine weaned off patient had another two episodes of VT requiring AICD shocks. He was transferred to [facility] for further management. He was initially maintained on lidocaine gtt from 3/21/2025-3/25/2025 & his listing status was upgraded to UNOS status 2e for heart transplant (ABO O) for the refractory VT. He was transitioned to oral antiarrhythmics (Toprol XL & quinidine) & ultimately transferred from CICU to [facility] floor on 3/27/2025. Hospital course was further c/b development of watery diarrhea & was found to have +norovirus on 3/31/2025 which prompted deactivation to status 7 listing for heart transplant. Status reinstated to 2E after diarrhea resolved.     He underwent successful OHT on 4/28 + TV ring repair 34mm (CMV +/+, Toxo -/+,ischemic time 258min, intra op 2plts + 2 cryo). Intra op STACEY with LVEF 20% and mild RV dysfunction.  Extubated on 4/29.  Had some initial RV failure/PGD (requiring dobutamine 7.5, epi 0.02, levo, vaso, and Lico.  CRRT started to aid volume removal.  Echo from 4/30 with LVEF 70% and mild RV dysfunction.  Levo/Vaso weaned off on 5/1 and Lico weaned off 5/2. He requiring large amounts of pain medication despite 2 chest tubes being removed POD 1 (ketamine + PCA pump).  Now with ?delerium and non-cooporation with care, CARRIE, Seen by psych 5/2 and started on trazodone + Pristiq. Psychiatric status improved.  Requiring 1:1 sitter now resolved.  NG tube placed 5/3 but now eating so removed 5/5.    He appears euvolemic on exam, progressing well. Will continue to adjust his immunosuppression medications & plan for biopsy this Thursday 5/8.    Bedside hemodynamics:  5/2/25: /59/75, , CVP 12, PA 44/15/24, CO/CI 6.8/3.5  5/1/25 BP 99/59/72, , CVP 11, PA 40/17/24, Gucci CO/CI 5.5/2.8  4/29/25 BP 94/57/67, , CVP 11, PA 28/15/20, Gucci CI 2.2  3/22/25 BP 97/76/83, HR 80, CVP 6, PA 58/23/38, PCWP 20, SVR 1100, Gucci CO/CI 5.1/2.6, TD 4/2.08    Cardiac Studies:   RHC/Bx 5/8/2025: _________________________  TTE 4/30/25: LVEF 71%, no RMWA, RV normal size, reduced RVSF  STACEY 4/28/25 intraop: LVEF 20%, global, dilated RV with mildly reduced RVSF  TTE 3/20/25 : LVEF 30%, segmental, LVIDd 5.3, walls normal, elevated LV filling pressures, mod RVE with mildly reduced RV function, TAPSE 1.7, severely dilated RA, annuloplasty in MV position, transvalvular gradients are elevated with severe prosthetic MS (peak gradient 15.7, mean gradient 8), trace intravalvular MR, TV annuloplasty ring noted, mild TR,  est PASP 36, no evidence of LV thrombus  TTE 10/2024: LVEF 25-30%, LVEDD 5.9cm, moderately reduced RV function, annuloplasty ring of mitral and tricuspid position, PASP 47 mmHg  RHC 3/19/25- RA 11 PA 58/26 PCWP 32 CO/CI 5.43/2.77  [facility] 6/2023 Nonobstructive CAD 70-year-old male, ABO O, with NICM since 2011 HFrEF (LVEF 25-30%) s/p MDT CRT-D with baseline CHB, VT/VF, AFs/p GIANFRANCO ligation/MAZE, MV/TV repair, PVC ablation 3/2024 intolerant to AADs in the past, recent admission 3/19/2025-3/20/2025 for AICD shocks initially presented to the [facility] ED on 3/21/2025, sent by HF specialist for ACID shock. Device reported successful ATP for VT 3/17/2025 at 6:52pm followed by one ATP & 40J shock. He reported feeling dizzy & SOB during the events. He follows with Dr. Luca Tamayo for HF, currently undergoing transplant workup, Dr John for CRTD and VT/VF and Dr. Chu for genetic counseling. While admitted to [facility], he was started on a lidocaine gtt. On 3/21 when lidocaine weaned off patient had another two episodes of VT requiring AICD shocks. He was transferred to [facility] for further management. He was initially maintained on lidocaine gtt from 3/21/2025-3/25/2025 & his listing status was upgraded to UNOS status 2e for heart transplant (ABO O) for the refractory VT. He was transitioned to oral antiarrhythmics (Toprol XL & quinidine) & ultimately transferred from CICU to [facility] floor on 3/27/2025. Hospital course was further c/b development of watery diarrhea & was found to have +norovirus on 3/31/2025 which prompted deactivation to status 7 listing for heart transplant. Status reinstated to 2E after diarrhea resolved.     He underwent successful OHT on 4/28 + TV ring repair 34mm (CMV +/+, Toxo -/+,ischemic time 258min, intra op 2plts + 2 cryo). Intra op STACEY with LVEF 20% and mild RV dysfunction.  Extubated on 4/29.  Had some initial RV failure/PGD (requiring dobutamine 7.5, epi 0.02, levo, vaso, and Lico.  CRRT started to aid volume removal.  Echo from 4/30 with LVEF 70% and mild RV dysfunction.  Levo/Vaso weaned off on 5/1 and Lico weaned off 5/2. He requiring large amounts of pain medication despite 2 chest tubes being removed POD 1 (ketamine + PCA pump).  Now with ?delerium and non-cooporation with care, CARRIE, Seen by psych 5/2 and started on trazodone + Pristiq. Psychiatric status improved.  Requiring 1:1 sitter now resolved.  NG tube placed 5/3 but now eating so removed 5/5. He is s/p first surveillance RHC/Bx 5/8.    He appears euvolemic on exam, progressing well. Will continue to adjust his immunosuppression medications.    Bedside hemodynamics:  5/2/25: /59/75, , CVP 12, PA 44/15/24, CO/CI 6.8/3.5  5/1/25 BP 99/59/72, , CVP 11, PA 40/17/24, Gucci CO/CI 5.5/2.8  4/29/25 BP 94/57/67, , CVP 11, PA 28/15/20, Gucci CI 2.2  3/22/25 BP 97/76/83, HR 80, CVP 6, PA 58/23/38, PCWP 20, SVR 1100, Gucci CO/CI 5.1/2.6, TD 4/2.08    Cardiac Studies:   RHC/Bx 5/8/2025: _________________________  TTE 4/30/25: LVEF 71%, no RMWA, RV normal size, reduced RVSF  STACEY 4/28/25 intraop: LVEF 20%, global, dilated RV with mildly reduced RVSF  TTE 3/20/25 : LVEF 30%, segmental, LVIDd 5.3, walls normal, elevated LV filling pressures, mod RVE with mildly reduced RV function, TAPSE 1.7, severely dilated RA, annuloplasty in MV position, transvalvular gradients are elevated with severe prosthetic MS (peak gradient 15.7, mean gradient 8), trace intravalvular MR, TV annuloplasty ring noted, mild TR,  est PASP 36, no evidence of LV thrombus  TTE 10/2024: LVEF 25-30%, LVEDD 5.9cm, moderately reduced RV function, annuloplasty ring of mitral and tricuspid position, PASP 47 mmHg  RHC 3/19/25- RA 11 PA 58/26 PCWP 32 CO/CI 5.43/2.77  [facility] 6/2023 Nonobstructive CAD

## 2025-05-08 NOTE — PROGRESS NOTE ADULT - SUBJECTIVE AND OBJECTIVE BOX
Transplant ID follow up   Afebrile, WBC still 19-20  Feeling well   off inotropes/pressors  Ambulating with PT    Vital Signs Last 24 Hrs  T(C): 37.1 (08 May 2025 08:00), Max: 37.1 (08 May 2025 06:42)  T(F): 98.8 (08 May 2025 08:00), Max: 98.8 (08 May 2025 08:00)  HR: 101 (08 May 2025 11:00) (97 - 116)  BP: 105/75 (08 May 2025 11:00) (98/74 - 124/82)  BP(mean): 85 (08 May 2025 11:00) (76 - 97)  RR: 15 (08 May 2025 11:00) (13 - 46)  SpO2: 94% (08 May 2025 11:00) (93% - 98%)    Parameters below as of 08 May 2025 08:00  Patient On (Oxygen Delivery Method): room air          PHYSICAL EXAM:  Constitutional:no acute distress  Eyes:ELAINE, EOMI  Ear/Nose/Throat: no oral lesions, 	  Respiratory: clear BL  Cardiovascular: S1S2 sternotomy dressing CDI  2 chest tubes  Gastrointestinal:soft, (+) BS, no tenderness  Extremities:no e/e/c  No Lymphadenopathy  IV sites not inflammed.  1 View- PORTABLE-Urgent .) (05.03.25 @ 13:18) >            ____________________________________________________  ROS  GENERAL: denies chills, , night sweats, weight loss.   PSYCH: denies depression, anxiety, suicidal ideation, hallucination, and delusions  SKIN: no rash or lesions; no color changes, no abnormal nevi,no  dryness, and nojaundice    EYES: denies visual changes, floaters, pain, inflammation, blurred vision, and discharge  ENT: denies tinnitus, vertigo, epistaxis, oral lesion, and decreased acuity  PULM: denies, hemoptysis, pleurisy  CVS: denies angina, palpitations,+ orthopnea, no syncope, or heart murmur  GI: denies constipation, diarrhea, melena, abdominal pain, nausea.   : denies dysuria, frequency, discharge, incontinence, stones or macroscopic hematuria  MS: no arthralgias, no erythema or swelling, no myalgias, noedema, or lower back pain.   CNS: denies numbness, dizziness, seizure, or tremor  ENDO: denies heat/cold intolerance, polyuria, polydipsia, malaise.    HEME: denies bruising, bleeding, lymphadenopathy, anemia, and calf pain    Allergies  No Known Allergies    __________________________________________________  MEDS:  MEDICATIONS  (STANDING):  desvenlafaxine ER 50 daily  dextrose 50% Injectable 50 every 15 minutes  dextrose 50% Injectable 25 every 15 minutes  hydrOXYzine hydrochloride 10 at bedtime PRN  insulin lispro (ADMELOG) corrective regimen sliding scale  three times a day before meals  insulin lispro (ADMELOG) corrective regimen sliding scale  at bedtime  methocarbamol 500 every 8 hours PRN  mycophenolate mofetil 1000 <User Schedule>  naloxegol 12.5 daily  pantoprazole    Tablet 40 before breakfast  polyethylene glycol 3350 17 two times a day  predniSONE   Tablet 15 every 12 hours  senna 2 at bedtime  tacrolimus 1.5 <User Schedule>  tamsulosin 0.4 at bedtime  traZODone 150 at bedtime    _________________________________________________  ANTIMICOBIALS  atovaquone  Suspension 1500 daily  cefTRIAXone   IVPB 2000 <User Schedule>  mycophenolate mofetil 1000 <User Schedule>  nystatin    Suspension 145343 <User Schedule>  penicillin   G benzathine Injectable 2.4 <User Schedule>  tacrolimus 1.5 <User Schedule>  valGANciclovir 450 <User Schedule>  vancomycin    Solution 125 every 12 hours      GENERAL LABS              9.4                  137  | 22   | 103          19.41 >-----------< 135     ------------------------< 178                   26.9                 4.0  | 96   | 3.66                                         Ca 9.0   Mg 1.9   Ph 4.0      Vancomycin Level, Trough: 8.7 (05-03 @ 06:03)  Vancomycin Level, Trough: 13.2 (05-01 @ 17:45)  Vancomycin Level, Trough: 12.6 (05-01 @ 05:50)  Vancomycin Level, Trough: 11.6 (04-30 @ 16:59)  Vancomycin Level, Trough: 13.6 (04-30 @ 04:31)  Vancomycin Level, Trough: 11.7 (04-29 @ 17:30)  Vancomycin Level, Trough: 10.4 (04-29 @ 00:33)    Urinalysis Basic - ( 08 May 2025 00:23 )    Color: x / Appearance: x / SG: x / pH: x  Gluc: 178 mg/dL / Ketone: x  / Bili: x / Urobili: x   Blood: x / Protein: x / Nitrite: x   Leuk Esterase: x / RBC: x / WBC x   Sq Epi: x / Non Sq Epi: x / Bacteria: x        _________________________________________________  MICROBIOLOGY  -----------          CMVPCR Log: Det <1.54 Assay Dynamic Range: 34.5 to 1.0E+07 IU/mL (1.54 to 7.00 Log10 IU/mL)  Assay lower limit of quantification (LLOQ) is 34.5 IU/mL (1.54 Log10  IU/mL)  CMV DNA detected below the LLOQ will be reported as Detected < 34.5 IU/mL  (<1.54 Log10 IU/mL)  Beba Cytomegalovirus (CMV) is an FDA-cleared quantitative test that  enables the detection and quantitation of CMV DNA in EDTA plasma of  infected transplant patients on the beba 8800 system. This test was  verified by [employer]. Results should be interpreted  with consideration of all clinical findings and laboratory findings and  should not form the sole basis for a diagnosis or treatment decision. Zli82DM/mL (05-08 @ 00:23)          Fungitell:   _______________________________________________  PERTINENT IMAGING          Fungitell:   _______________________________________________  PERTINENT IMAGING

## 2025-05-08 NOTE — PROGRESS NOTE ADULT - PROBLEM SELECTOR PLAN 5
RESOLVED  - Post-op delirium  - with steroids as possible contributor  - Psych f/u. On trazadone   - s/p NGT 5/3-5/5 while pt was refusing to eat. C/w calorie count

## 2025-05-08 NOTE — PROVIDER CONTACT NOTE (OTHER) - RECOMMENDATIONS
Assess patient. Review patient's orders, labs, history & plan. Address patient's lightheadedness.
Continue course of treatment
Notify ACP
Put patient on 1 to 1 for suicidal ideation.
Patient and family were able to provide teach back. Will continue to reinforce post-transplant care education.
EP consult in AM
continue to Flomax tonight and rescan in AM post void if concerns for retention.

## 2025-05-08 NOTE — PROGRESS NOTE ADULT - ASSESSMENT
69-year-old male patient past medical history of NICM since 2011, HFrEF LVEF 25-30% s/p MDT CRT-D with baseline CHB, VT/VF, a.fib s/p GIANFRANCO ligation/MAZE, MV/TV repair, PVC ablation 3/2024 intolerant to AADs in the past, recent admission 3/19 - 3/20/25 for AICD shocks initially presented to the [facility] emergency department on 3/21/2025, sent by heart failure specialist for ACID shock. Device reported successful ATP for VT 3/17 at 6:52pm followed by one ATP and 40J shock. He reported feeling dizzy and SOB during the events. He follows with Dr paula abreu for HF, currently undergoing transplant workup, Dr John for CRTD and VT/VF and  for genetic counseling. While admitted to [facility], he was started on a lidocaine gtt. On 3/21 when lidocaine weaned off patient had another two episodes of VT requiring AICD shocks. He was transferred to [facility] for further management.     Status Post OHT 4/29/25 also with removal of ICD CRT, tricuspid valve repair (34 mm Physio ring)  Per report no evidence of vegetations on valves at time of transplant    Donor CSF culture (April 22) heavy growth Streptococcus pneumoniae.  Ceftriaxone <= 0.25 (susceptible) penicillin 0.12 (resistant for CSF) vancomycin 0.25 (susceptible)    Donor TTE without evidence of vegetations  Donor CT C/A/P with no acute abnormality of abdomen or pelvis.  Left lower lobe consolidation  Donor blood cultures were with no growth to date    #Heart transplant recipient (CMV +/+, EBV +/+, Toxo -/+)  s/p vancomycin and cefepime for 72 hours as perioperative coverage  --Will need initiation of Valcyte for CMV prophylaxis  --Will need initiation of Bactrim for PCP and toxoplasma prophylaxis    #Donor Syphilis Serology Positive  --Continue benzathine penicillin 2,400,000 units weekly x 3 doses total    #Donor Streptococcus pneumoniae meningitis  --Continue Ceftriaxone 2 g IV every 24 hours. Inclined to extend duration  to 4 weeks post-op given unclear etiology of patients S. pneum meningitis (concern for drug overdose) and valvular dysfunction in recipient at time of implant. main challenge is [place] so iv therapy with OPAT will need to be done with local provider with [place] license, else will need to stay locally until therapy completed.   -would repeat TTE to assess        Thank you for involving us in the care of this patient  Transplant ID will continue to follow  Please call or page with additional questions  Pager; #[phone]  Teams: from 8 am to 5 pm  Leigh Ann Britton MD

## 2025-05-09 LAB
ACANTHOCYTES BLD QL SMEAR: SLIGHT — SIGNIFICANT CHANGE UP
ALBUMIN SERPL ELPH-MCNC: 3.8 G/DL — SIGNIFICANT CHANGE UP (ref 3.3–5)
ALBUMIN SERPL ELPH-MCNC: 3.9 G/DL — SIGNIFICANT CHANGE UP (ref 3.3–5)
ALP SERPL-CCNC: 112 U/L — SIGNIFICANT CHANGE UP (ref 40–120)
ALP SERPL-CCNC: 115 U/L — SIGNIFICANT CHANGE UP (ref 40–120)
ALT FLD-CCNC: 128 U/L — HIGH (ref 10–45)
ALT FLD-CCNC: 133 U/L — HIGH (ref 10–45)
ANION GAP SERPL CALC-SCNC: 15 MMOL/L — SIGNIFICANT CHANGE UP (ref 5–17)
ANION GAP SERPL CALC-SCNC: 17 MMOL/L — SIGNIFICANT CHANGE UP (ref 5–17)
ANISOCYTOSIS BLD QL: SLIGHT — SIGNIFICANT CHANGE UP
AST SERPL-CCNC: 44 U/L — HIGH (ref 10–40)
AST SERPL-CCNC: 58 U/L — HIGH (ref 10–40)
BASOPHILS # BLD AUTO: 0 K/UL — SIGNIFICANT CHANGE UP (ref 0–0.2)
BASOPHILS NFR BLD AUTO: 0 % — SIGNIFICANT CHANGE UP (ref 0–2)
BILIRUB SERPL-MCNC: 0.6 MG/DL — SIGNIFICANT CHANGE UP (ref 0.2–1.2)
BILIRUB SERPL-MCNC: 0.6 MG/DL — SIGNIFICANT CHANGE UP (ref 0.2–1.2)
BLD GP AB SCN SERPL QL: NEGATIVE — SIGNIFICANT CHANGE UP
BUN SERPL-MCNC: 98 MG/DL — HIGH (ref 7–23)
BUN SERPL-MCNC: 98 MG/DL — HIGH (ref 7–23)
BURR CELLS BLD QL SMEAR: PRESENT — SIGNIFICANT CHANGE UP
CALCIUM SERPL-MCNC: 8.7 MG/DL — SIGNIFICANT CHANGE UP (ref 8.4–10.5)
CALCIUM SERPL-MCNC: 8.8 MG/DL — SIGNIFICANT CHANGE UP (ref 8.4–10.5)
CHLORIDE SERPL-SCNC: 97 MMOL/L — SIGNIFICANT CHANGE UP (ref 96–108)
CHLORIDE SERPL-SCNC: 99 MMOL/L — SIGNIFICANT CHANGE UP (ref 96–108)
CO2 SERPL-SCNC: 23 MMOL/L — SIGNIFICANT CHANGE UP (ref 22–31)
CO2 SERPL-SCNC: 25 MMOL/L — SIGNIFICANT CHANGE UP (ref 22–31)
CREAT SERPL-MCNC: 2.56 MG/DL — HIGH (ref 0.5–1.3)
CREAT SERPL-MCNC: 2.85 MG/DL — HIGH (ref 0.5–1.3)
DACRYOCYTES BLD QL SMEAR: SLIGHT — SIGNIFICANT CHANGE UP
EGFR: 23 ML/MIN/1.73M2 — LOW
EGFR: 23 ML/MIN/1.73M2 — LOW
EGFR: 26 ML/MIN/1.73M2 — LOW
EGFR: 26 ML/MIN/1.73M2 — LOW
EOSINOPHIL # BLD AUTO: 0 K/UL — SIGNIFICANT CHANGE UP (ref 0–0.5)
EOSINOPHIL NFR BLD AUTO: 0 % — SIGNIFICANT CHANGE UP (ref 0–6)
GLUCOSE BLDC GLUCOMTR-MCNC: 139 MG/DL — HIGH (ref 70–99)
GLUCOSE BLDC GLUCOMTR-MCNC: 146 MG/DL — HIGH (ref 70–99)
GLUCOSE BLDC GLUCOMTR-MCNC: 170 MG/DL — HIGH (ref 70–99)
GLUCOSE BLDC GLUCOMTR-MCNC: 246 MG/DL — HIGH (ref 70–99)
GLUCOSE SERPL-MCNC: 169 MG/DL — HIGH (ref 70–99)
GLUCOSE SERPL-MCNC: 181 MG/DL — HIGH (ref 70–99)
HCT VFR BLD CALC: 27.1 % — LOW (ref 39–50)
HGB BLD-MCNC: 9.3 G/DL — LOW (ref 13–17)
LYMPHOCYTES # BLD AUTO: 0.19 K/UL — LOW (ref 1–3.3)
LYMPHOCYTES # BLD AUTO: 0.9 % — LOW (ref 13–44)
MACROCYTES BLD QL: SLIGHT — SIGNIFICANT CHANGE UP
MAGNESIUM SERPL-MCNC: 2 MG/DL — SIGNIFICANT CHANGE UP (ref 1.6–2.6)
MAGNESIUM SERPL-MCNC: 2.2 MG/DL — SIGNIFICANT CHANGE UP (ref 1.6–2.6)
MANUAL SMEAR VERIFICATION: SIGNIFICANT CHANGE UP
MCHC RBC-ENTMCNC: 28.5 PG — SIGNIFICANT CHANGE UP (ref 27–34)
MCHC RBC-ENTMCNC: 34.3 G/DL — SIGNIFICANT CHANGE UP (ref 32–36)
MCV RBC AUTO: 83.1 FL — SIGNIFICANT CHANGE UP (ref 80–100)
MICROCYTES BLD QL: SLIGHT — SIGNIFICANT CHANGE UP
MONOCYTES # BLD AUTO: 0.74 K/UL — SIGNIFICANT CHANGE UP (ref 0–0.9)
MONOCYTES NFR BLD AUTO: 3.5 % — SIGNIFICANT CHANGE UP (ref 2–14)
NEUTROPHILS # BLD AUTO: 20.14 K/UL — HIGH (ref 1.8–7.4)
NEUTROPHILS NFR BLD AUTO: 95.6 % — HIGH (ref 43–77)
OVALOCYTES BLD QL SMEAR: SLIGHT — SIGNIFICANT CHANGE UP
PHOSPHATE SERPL-MCNC: 3.4 MG/DL — SIGNIFICANT CHANGE UP (ref 2.5–4.5)
PHOSPHATE SERPL-MCNC: 3.8 MG/DL — SIGNIFICANT CHANGE UP (ref 2.5–4.5)
PLAT MORPH BLD: NORMAL — SIGNIFICANT CHANGE UP
PLATELET # BLD AUTO: 160 K/UL — SIGNIFICANT CHANGE UP (ref 150–400)
POTASSIUM SERPL-MCNC: 3.8 MMOL/L — SIGNIFICANT CHANGE UP (ref 3.5–5.3)
POTASSIUM SERPL-MCNC: 4.2 MMOL/L — SIGNIFICANT CHANGE UP (ref 3.5–5.3)
POTASSIUM SERPL-SCNC: 3.8 MMOL/L — SIGNIFICANT CHANGE UP (ref 3.5–5.3)
POTASSIUM SERPL-SCNC: 4.2 MMOL/L — SIGNIFICANT CHANGE UP (ref 3.5–5.3)
PROT SERPL-MCNC: 6 G/DL — SIGNIFICANT CHANGE UP (ref 6–8.3)
PROT SERPL-MCNC: 6.4 G/DL — SIGNIFICANT CHANGE UP (ref 6–8.3)
RBC # BLD: 3.26 M/UL — LOW (ref 4.2–5.8)
RBC # FLD: 16 % — HIGH (ref 10.3–14.5)
RBC BLD AUTO: ABNORMAL
RH IG SCN BLD-IMP: POSITIVE — SIGNIFICANT CHANGE UP
SODIUM SERPL-SCNC: 137 MMOL/L — SIGNIFICANT CHANGE UP (ref 135–145)
SODIUM SERPL-SCNC: 139 MMOL/L — SIGNIFICANT CHANGE UP (ref 135–145)
WBC # BLD: 21.07 K/UL — HIGH (ref 3.8–10.5)
WBC # FLD AUTO: 21.07 K/UL — HIGH (ref 3.8–10.5)

## 2025-05-09 PROCEDURE — 93970 EXTREMITY STUDY: CPT | Mod: 26

## 2025-05-09 PROCEDURE — 71045 X-RAY EXAM CHEST 1 VIEW: CPT | Mod: 26,76

## 2025-05-09 PROCEDURE — 99291 CRITICAL CARE FIRST HOUR: CPT | Mod: FS

## 2025-05-09 PROCEDURE — G0545: CPT

## 2025-05-09 PROCEDURE — 99233 SBSQ HOSP IP/OBS HIGH 50: CPT

## 2025-05-09 PROCEDURE — 99232 SBSQ HOSP IP/OBS MODERATE 35: CPT | Mod: GC

## 2025-05-09 RX ORDER — MAGNESIUM SULFATE 500 MG/ML
2 SYRINGE (ML) INJECTION ONCE
Refills: 0 | Status: COMPLETED | OUTPATIENT
Start: 2025-05-09 | End: 2025-05-09

## 2025-05-09 RX ORDER — HEPARIN SODIUM 1000 [USP'U]/ML
5000 INJECTION INTRAVENOUS; SUBCUTANEOUS EVERY 8 HOURS
Refills: 0 | Status: DISCONTINUED | OUTPATIENT
Start: 2025-05-09 | End: 2025-05-14

## 2025-05-09 RX ADMIN — ATOVAQUONE 1500 MILLIGRAM(S): 750 SUSPENSION ORAL at 11:12

## 2025-05-09 RX ADMIN — Medication 500000 UNIT(S): at 16:41

## 2025-05-09 RX ADMIN — Medication 1 DROP(S): at 11:13

## 2025-05-09 RX ADMIN — Medication 2 SPRAY(S): at 05:22

## 2025-05-09 RX ADMIN — Medication 150 MILLIGRAM(S): at 21:58

## 2025-05-09 RX ADMIN — CEFTRIAXONE 100 MILLIGRAM(S): 500 INJECTION, POWDER, FOR SOLUTION INTRAMUSCULAR; INTRAVENOUS at 12:08

## 2025-05-09 RX ADMIN — MYCOPHENOLATE MOFETIL 1000 MILLIGRAM(S): 500 TABLET, FILM COATED ORAL at 07:48

## 2025-05-09 RX ADMIN — TAMSULOSIN HYDROCHLORIDE 0.4 MILLIGRAM(S): 0.4 CAPSULE ORAL at 19:57

## 2025-05-09 RX ADMIN — Medication 500000 UNIT(S): at 08:51

## 2025-05-09 RX ADMIN — ERYTHROMYCIN 1 APPLICATION(S): 5 OINTMENT OPHTHALMIC at 11:11

## 2025-05-09 RX ADMIN — PREDNISONE 15 MILLIGRAM(S): 20 TABLET ORAL at 17:03

## 2025-05-09 RX ADMIN — MYCOPHENOLATE MOFETIL 1000 MILLIGRAM(S): 500 TABLET, FILM COATED ORAL at 19:56

## 2025-05-09 RX ADMIN — Medication 25 GRAM(S): at 16:25

## 2025-05-09 RX ADMIN — Medication 40 MILLIGRAM(S): at 07:48

## 2025-05-09 RX ADMIN — TACROLIMUS 1.5 MILLIGRAM(S): 0.5 CAPSULE ORAL at 19:56

## 2025-05-09 RX ADMIN — Medication 125 MILLIGRAM(S): at 17:03

## 2025-05-09 RX ADMIN — DESVENLAFAXINE 50 MILLIGRAM(S): 25 TABLET, EXTENDED RELEASE ORAL at 11:12

## 2025-05-09 RX ADMIN — Medication 3 MILLILITER(S): at 22:08

## 2025-05-09 RX ADMIN — PREDNISONE 15 MILLIGRAM(S): 20 TABLET ORAL at 05:22

## 2025-05-09 RX ADMIN — NALOXEGOL OXALATE 12.5 MILLIGRAM(S): 12.5 TABLET, FILM COATED ORAL at 11:12

## 2025-05-09 RX ADMIN — Medication 500000 UNIT(S): at 11:12

## 2025-05-09 RX ADMIN — INSULIN LISPRO 4: 100 INJECTION, SOLUTION INTRAVENOUS; SUBCUTANEOUS at 11:13

## 2025-05-09 RX ADMIN — Medication 125 MILLIGRAM(S): at 05:22

## 2025-05-09 RX ADMIN — TACROLIMUS 1.5 MILLIGRAM(S): 0.5 CAPSULE ORAL at 07:48

## 2025-05-09 RX ADMIN — Medication 1 TABLET(S): at 11:12

## 2025-05-09 RX ADMIN — HEPARIN SODIUM 5000 UNIT(S): 1000 INJECTION INTRAVENOUS; SUBCUTANEOUS at 12:08

## 2025-05-09 RX ADMIN — ERYTHROMYCIN 1 APPLICATION(S): 5 OINTMENT OPHTHALMIC at 05:21

## 2025-05-09 RX ADMIN — Medication 2 SPRAY(S): at 17:04

## 2025-05-09 RX ADMIN — ERYTHROMYCIN 1 APPLICATION(S): 5 OINTMENT OPHTHALMIC at 13:43

## 2025-05-09 RX ADMIN — Medication 500000 UNIT(S): at 19:56

## 2025-05-09 RX ADMIN — HEPARIN SODIUM 5000 UNIT(S): 1000 INJECTION INTRAVENOUS; SUBCUTANEOUS at 19:57

## 2025-05-09 NOTE — PROGRESS NOTE ADULT - ASSESSMENT
70-year-old male, with reported Hx of NICM since 2011 HFrEF (LVEF 25-30%) s/p MDT CRT-D with baseline CHB, VT/VF, AFs/p GIANFRANCO ligation/MAZE, MV/TV repair, PVC ablation 3/2024 intolerant to AADs in the past, recent admission 3/19/2025-3/20/2025 for AICD shocks initially presented to the [facility] ED on 3/21/2025, sent by HF specialist for ACID shock. Device reported successful ATP for VT 3/17/2025 at 6:52pm followed by one ATP & 40J shock. He reported feeling dizzy & SOB during the events. He follows with Dr. Luca Tamayo for HF, currently undergoing transplant workup, Dr John for CRTD and VT/VF and Dr. Chu for genetic counseling. While admitted to [facility], he was started on a lidocaine gtt. On 3/21 when lidocaine weaned off patient had another two episodes of VT requiring AICD shocks. He was transferred to [facility] for further management. He was initially maintained on lidocaine gtt from 3/21/2025-3/25/2025 & his listing status was upgraded to UNOS status 2e for heart transplant (ABO O) for the refractory VT. He was transitioned to oral antiarrhythmics (Toprol XL & quinidine) & ultimately transferred from CICU to [facility] floor on 3/27/2025. Hospital course was further c/b development of watery diarrhea & was found to have +norovirus on 3/31/2025 which prompted deactivation to status 7 listing for heart transplant. Status reinstated to 2E after diarrhea resolved.   4/29: s/p  OHT, TV repair, PPM removal   Intra op STACEY with LVEF 20% and mild RV dysfunction.  Extubated on 4/29.    Post Op: Had some initial RV failure/PGD (requiring dobutamine 7.5, epi 0.02, levo, vaso, and Lico.  CRRT started to aid volume removal.  Echo from 4/30 with LVEF 70% and mild RV dysfunction.  Levo/Vaso weaned off on 5/1 and Lico weaned off 5/2. He requiring large amounts of pain medication despite 2 chest tubes being removed POD 1 (ketamine + PCA pump).  Hospital course c/b ?delerium and non-cooporation with care, , Seen by psych 5/2 and started on trazodone + Pristiq. Psychiatric status improved.  Requiring 1:1 sitter now resolved.  NG tube placed 5/3 but now eating/ NGT removed 5/5. He is s/p first surveillance RHC/Bx 5/8. Chest tubes removed in CTU, no PTX on CXR.   5/9: TRANSFERRED TO [facility]. Management per transplant cardiology team. Transplant ID following on regimen per ID and Tx Cardiology. Per report: Donor Syphilis Serology Positive. Per Transplant ID: Continue benzathine penicillin 2,400,000 units weekly x 3 doses total, second dose 5/8, next due 5/15.

## 2025-05-09 NOTE — PROGRESS NOTE ADULT - SUBJECTIVE AND OBJECTIVE BOX
Transplant ID follow up   Afebrile, WBC 21 today - has been 18-20  off inotropes/pressors  s/p EMB on 5/8         Vital Signs Last 24 Hrs  T(C): 36.2 (09 May 2025 12:00), Max: 36.9 (09 May 2025 00:00)  T(F): 97.2 (09 May 2025 12:00), Max: 98.5 (09 May 2025 00:00)  HR: 104 (09 May 2025 13:00) (93 - 113)  BP: 118/78 (09 May 2025 13:00) (93/56 - 131/77)  BP(mean): 85 (09 May 2025 10:00) (70 - 99)  RR: 18 (09 May 2025 13:00) (13 - 25)  SpO2: 97% (09 May 2025 13:00) (92% - 97%)    Parameters below as of 09 May 2025 12:00  Patient On (Oxygen Delivery Method): room air            PHYSICAL EXAM:  Constitutional:no acute distress  Eyes:ELAINE, EOMI  Ear/Nose/Throat: no oral lesions, 	  Respiratory: clear BL  Cardiovascular: S1S2 sternotomy dressing CDI  2 chest tubes  Gastrointestinal:soft, (+) BS, no tenderness  Extremities:no e/e/c  No Lymphadenopathy  IV sites not inflammed.  1 View- PORTABLE-Urgent .) (05.03.25 @ 13:18) >              ____________________________________________________  ROS  GENERAL: denies chills, , night sweats, weight loss.   PSYCH: denies depression, anxiety, suicidal ideation, hallucination, and delusions  SKIN: no rash or lesions; no color changes, no abnormal nevi,no  dryness, and nojaundice    EYES: denies visual changes, floaters, pain, inflammation, blurred vision, and discharge  ENT: denies tinnitus, vertigo, epistaxis, oral lesion, and decreased acuity  PULM: denies, hemoptysis, pleurisy  CVS: denies angina, palpitations,+ orthopnea, no syncope, or heart murmur  GI: denies constipation, diarrhea, melena, abdominal pain, nausea.   : denies dysuria, frequency, discharge, incontinence, stones or macroscopic hematuria  MS: no arthralgias, no erythema or swelling, no myalgias, noedema, or lower back pain.   CNS: denies numbness, dizziness, seizure, or tremor  ENDO: denies heat/cold intolerance, polyuria, polydipsia, malaise.    HEME: denies bruising, bleeding, lymphadenopathy, anemia, and calf pain    Allergies  No Known Allergies    __________________________________________________  MEDS:  MEDICATIONS  (STANDING):  desvenlafaxine ER 50 daily  dextrose 50% Injectable 50 every 15 minutes  dextrose 50% Injectable 25 every 15 minutes  heparin   Injectable 5000 every 8 hours  hydrOXYzine hydrochloride 10 at bedtime PRN  insulin lispro (ADMELOG) corrective regimen sliding scale  three times a day before meals  insulin lispro (ADMELOG) corrective regimen sliding scale  at bedtime  methocarbamol 500 every 8 hours PRN  mycophenolate mofetil 1000 <User Schedule>  naloxegol 12.5 daily  pantoprazole    Tablet 40 before breakfast  polyethylene glycol 3350 17 two times a day  predniSONE   Tablet 15 every 12 hours  senna 2 at bedtime  tacrolimus 1.5 <User Schedule>  tamsulosin 0.4 at bedtime  traZODone 150 at bedtime    _________________________________________________  ANTIMICOBIALS  atovaquone  Suspension 1500 daily  cefTRIAXone   IVPB 2000 <User Schedule>  mycophenolate mofetil 1000 <User Schedule>  nystatin    Suspension 232317 <User Schedule>  penicillin   G benzathine Injectable 2.4 <User Schedule>  tacrolimus 1.5 <User Schedule>  valGANciclovir 450 <User Schedule>  vancomycin    Solution 125 every 12 hours      GENERAL LABS              x                    139  | 23   | 98           x     >-----------< x       ------------------------< 169                   x                    4.2  | 99   | 2.85                                         Ca 8.7   Mg 2.2   Ph 3.8      Vancomycin Level, Trough: 8.7 (05-03 @ 06:03)  Vancomycin Level, Trough: 13.2 (05-01 @ 17:45)  Vancomycin Level, Trough: 12.6 (05-01 @ 05:50)  Vancomycin Level, Trough: 11.6 (04-30 @ 16:59)  Vancomycin Level, Trough: 13.6 (04-30 @ 04:31)  Vancomycin Level, Trough: 11.7 (04-29 @ 17:30)  Vancomycin Level, Trough: 10.4 (04-29 @ 00:33)    Urinalysis Basic - ( 09 May 2025 00:35 )    Color: x / Appearance: x / SG: x / pH: x  Gluc: 169 mg/dL / Ketone: x  / Bili: x / Urobili: x   Blood: x / Protein: x / Nitrite: x   Leuk Esterase: x / RBC: x / WBC x   Sq Epi: x / Non Sq Epi: x / Bacteria: x        _________________________________________________  MICROBIOLOGY  -----------          CMVPCR Log: Det <1.54 Assay Dynamic Range: 34.5 to 1.0E+07 IU/mL (1.54 to 7.00 Log10 IU/mL)  Assay lower limit of quantification (LLOQ) is 34.5 IU/mL (1.54 Log10  IU/mL)  CMV DNA detected below the LLOQ will be reported as Detected < 34.5 IU/mL  (<1.54 Log10 IU/mL)  Beba Cytomegalovirus (CMV) is an FDA-cleared quantitative test that  enables the detection and quantitation of CMV DNA in EDTA plasma of  infected transplant patients on the beba 8800 system. This test was  verified by [employer]. Results should be interpreted  with consideration of all clinical findings and laboratory findings and  should not form the sole basis for a diagnosis or treatment decision. Why93LV/mL (05-08 @ 00:23)          Fungitell:   _______________________________________________  PERTINENT IMAGING

## 2025-05-09 NOTE — PROGRESS NOTE ADULT - PROBLEM SELECTOR PLAN 2
- Immunosuppression:      - Cellcept 1000mg BID      - c/w steroid taper      - Tacro: Trough level NOT sent 5/9. C/w 1.5 mg BID for now but suspect it may be lower given continual renal improvement. Please check level 1-hr prior to AM dose every day.  - Antibiotics      - s/p IV Vanc + Cefepime for post-op ppx (4/29 - 5/1)      - PO Vanco ppx (4/29- )      - CTX 2000mg QD for donor strep pneumo+ in CSF. Per transplant ID, will need PICC prior to dc for 4 weeks of abx.      - Penicillin IM for donor syphilis + ab  - PPX     - Nystatin for thrush ppx     - C/w Valcyte 450 twice/week for high-risk CMV

## 2025-05-09 NOTE — PROGRESS NOTE ADULT - PROBLEM SELECTOR PLAN 1
Patient's baseline Scr is 1.0-1.2. On 4/28, SCr was 1.1 and on 4/29, Scr rubia to 3.2. UA-turbid, 300 protein, small leuks, large blood, 914 rbc's, UPCR-3.2. Pt had drop in hgb from 12.4 (4/28) to 9.0 (4/29). Pt was on IV vasopressor support. Pt noted to have elevated CVP of 20, and was not responding well enough to diuresis, so was started on CRRT for UF. Pt had required multiple pressors and inotropes, gradually weaned off. CRRT stopped AM of 5/4 and pt responding well to Bumex infusion. Femoral NTDC removed on 5/5. Transplant heart bx on 5/8.  - [place] removed, passed TOV although retaining a bit so Flomax started. Continue serial bladder scans. Remains non-oliguric, BP stable, off O2  -Tacrolimus started on 4/29. Monitor daily trough levels and try to maintain at lower end of therapeutic range to help renal recovery. Immunosuppression dosing per Transplant Cardiology.  -Labs reviewed. Cr decreased to 2.85, BUN remains elevated (of note, pt is receiving steroids). Electrolytes within range.  -Continue off CRRT. Diuresis prn for goal CVP<12  -Dose meds per CrCl ~24 mL/min - will be using kinetic eGFR given rapid changes in renal function.

## 2025-05-09 NOTE — PROGRESS NOTE ADULT - SUBJECTIVE AND OBJECTIVE BOX
Subjective: Pt ambulating daily, reporting stable appetite. Denies CP, SOB, H/A, N/V/D, abdominal pain, f/c, dizziness, orthopnea, PND.    Medications:  artificial  tears Solution 1 Drop(s) Both EYES every 12 hours PRN  atovaquone  Suspension 1500 milliGRAM(s) Oral daily  cefTRIAXone   IVPB 2000 milliGRAM(s) IV Intermittent <User Schedule>  chlorhexidine 2% Cloths 1 Application(s) Topical daily  desvenlafaxine ER 50 milliGRAM(s) Oral daily  dextrose 50% Injectable 50 milliLiter(s) IV Push every 15 minutes  dextrose 50% Injectable 25 milliLiter(s) IV Push every 15 minutes  erythromycin   Ointment 1 Application(s) Left EYE every 4 hours  heparin   Injectable 5000 Unit(s) SubCutaneous every 8 hours  hydrOXYzine hydrochloride 10 milliGRAM(s) Oral at bedtime PRN  insulin lispro (ADMELOG) corrective regimen sliding scale   SubCutaneous three times a day before meals  insulin lispro (ADMELOG) corrective regimen sliding scale   SubCutaneous at bedtime  magnesium sulfate  IVPB 2 Gram(s) IV Intermittent once  methocarbamol 500 milliGRAM(s) Oral every 8 hours PRN  mycophenolate mofetil 1000 milliGRAM(s) Oral <User Schedule>  naloxegol 12.5 milliGRAM(s) Oral daily  Nephro-piotr 1 Tablet(s) Oral daily  nystatin    Suspension 240713 Unit(s) Oral <User Schedule>  oxymetazoline 0.05% Nasal Spray 2 Spray(s) Both Nostrils two times a day  pantoprazole    Tablet 40 milliGRAM(s) Oral before breakfast  penicillin   G benzathine Injectable 2.4 Million Unit(s) IntraMuscular <User Schedule>  polyethylene glycol 3350 17 Gram(s) Oral two times a day  potassium chloride  10 mEq/50 mL IVPB 10 milliEquivalent(s) IV Intermittent once  potassium chloride  10 mEq/50 mL IVPB 10 milliEquivalent(s) IV Intermittent once  predniSONE   Tablet 15 milliGRAM(s) Oral every 12 hours  senna 2 Tablet(s) Oral at bedtime  sodium chloride 0.9% lock flush 3 milliLiter(s) IV Push every 8 hours  sodium chloride 0.9%. 1000 milliLiter(s) IV Continuous <Continuous>  tacrolimus 1.5 milliGRAM(s) Oral <User Schedule>  tamsulosin 0.4 milliGRAM(s) Oral at bedtime  traZODone 150 milliGRAM(s) Oral at bedtime  valGANciclovir 450 milliGRAM(s) Oral <User Schedule>  vancomycin    Solution 125 milliGRAM(s) Oral every 12 hours      Physical Exam:  General: No distress. Comfortable in the chair.  HEENT: EOM intact.  Neck: Neck supple. JVP mildly elevated ~8-10. No masses  Chest: Respirations unlabored. Clear to auscultation bilaterally  CV: Normal S1 and S2. No murmurs, rub, or gallops. Radial pulses normal. No BLE edema.  Abdomen: Soft, non-distended, non-tender  Skin: No rashes or skin breakdown  Neurology: A&Ox3, non-focal.  Psych: Affect normal    Vitals:  Vital Signs Last 24 Hours  T(C): 36.2 (25 @ 12:00), Max: 36.9 (25 @ 00:00)  HR: 102 (25 @ 14:00) (93 - 113)  BP: 115/75 (25 @ 14:00) (93/56 - 131/77)  RR: 22 (25 @ 14:00) (13 - 23)  SpO2: 97% (25 @ 14:00) (92% - 97%)    Weight in k.7 ( @ 05:00)    I&O's Summary    08 May 2025 07:  -  09 May 2025 07:00  --------------------------------------------------------  IN: 770 mL / OUT: 970 mL / NET: -200 mL    09 May 2025 07:  -  09 May 2025 15:06  --------------------------------------------------------  IN: 50 mL / OUT: 450 mL / NET: -400 mL     Tele: ST 100s    Labs:                        9.3    21.07 )-----------( 160      ( 09 May 2025 00:34 )             27.1         137  |  97  |  98[H]  ----------------------------<  181[H]  3.8   |  25  |  2.56[H]    Ca    8.8      09 May 2025 13:44  Phos  3.4       Mg     2.0         TPro  6.4  /  Alb  3.9  /  TBili  0.6  /  DBili  x   /  AST  44[H]  /  ALT  128[H]  /  AlkPhos  115

## 2025-05-09 NOTE — PROGRESS NOTE ADULT - ASSESSMENT
70-year-old male, ABO O, with NICM since 2011 HFrEF (LVEF 25-30%) s/p MDT CRT-D with baseline CHB, VT/VF, AFs/p GIANFRANCO ligation/MAZE, MV/TV repair, PVC ablation 3/2024 intolerant to AADs in the past, recent admission 3/19/2025-3/20/2025 for AICD shocks initially presented to the [facility] ED on 3/21/2025, sent by HF specialist for ACID shock. Device reported successful ATP for VT 3/17/2025 at 6:52pm followed by one ATP & 40J shock. He reported feeling dizzy & SOB during the events. He follows with Dr. Luca Tamayo for HF, currently undergoing transplant workup, Dr John for CRTD and VT/VF and Dr. Chu for genetic counseling. While admitted to [facility], he was started on a lidocaine gtt. On 3/21 when lidocaine weaned off patient had another two episodes of VT requiring AICD shocks. He was transferred to [facility] for further management. He was initially maintained on lidocaine gtt from 3/21/2025-3/25/2025 & his listing status was upgraded to UNOS status 2e for heart transplant (ABO O) for the refractory VT. He was transitioned to oral antiarrhythmics (Toprol XL & quinidine) & ultimately transferred from CICU to [facility] floor on 3/27/2025. Hospital course was further c/b development of watery diarrhea & was found to have +norovirus on 3/31/2025 which prompted deactivation to status 7 listing for heart transplant. Status reinstated to 2E after diarrhea resolved.     He underwent successful OHT on 4/28 + TV ring repair 34mm (CMV +/+, Toxo -/+,ischemic time 258min, intra op 2plts + 2 cryo). Intra op STACEY with LVEF 20% and mild RV dysfunction.  Extubated on 4/29.  Had some initial RV failure/PGD (requiring dobutamine 7.5, epi 0.02, levo, vaso, and Lico.  CRRT started to aid volume removal.  Echo from 4/30 with LVEF 70% and mild RV dysfunction.  Levo/Vaso weaned off on 5/1 and Lico weaned off 5/2. He requiring large amounts of pain medication despite 2 chest tubes being removed POD 1 (ketamine + PCA pump).  Now with ?delerium and non-cooporation with care, CARRIE, Seen by psych 5/2 and started on trazodone + Pristiq. Psychiatric status improved.  Requiring 1:1 sitter now resolved.  NG tube placed 5/3 but now eating so removed 5/5. He is s/p first surveillance RHC/Bx 5/8.    He mildly hypervolemic on exam but overall progressing well. Will continue to optimize his immunosuppression medications.    Bedside hemodynamics:  5/2/25: /59/75, , CVP 12, PA 44/15/24, CO/CI 6.8/3.5  5/1/25 BP 99/59/72, , CVP 11, PA 40/17/24, Gucci CO/CI 5.5/2.8  4/29/25 BP 94/57/67, , CVP 11, PA 28/15/20, Gucci CI 2.2  3/22/25 BP 97/76/83, HR 80, CVP 6, PA 58/23/38, PCWP 20, SVR 1100, Gucci CO/CI 5.1/2.6, TD 4/2.08    Cardiac Studies:   RHC/Bx 5/8/2025: _________________________  TTE 4/30/25: LVEF 71%, no RMWA, RV normal size, reduced RVSF  STACEY 4/28/25 intraop: LVEF 20%, global, dilated RV with mildly reduced RVSF  TTE 3/20/25 : LVEF 30%, segmental, LVIDd 5.3, walls normal, elevated LV filling pressures, mod RVE with mildly reduced RV function, TAPSE 1.7, severely dilated RA, annuloplasty in MV position, transvalvular gradients are elevated with severe prosthetic MS (peak gradient 15.7, mean gradient 8), trace intravalvular MR, TV annuloplasty ring noted, mild TR,  est PASP 36, no evidence of LV thrombus  TTE 10/2024: LVEF 25-30%, LVEDD 5.9cm, moderately reduced RV function, annuloplasty ring of mitral and tricuspid position, PASP 47 mmHg  RHC 3/19/25- RA 11 PA 58/26 PCWP 32 CO/CI 5.43/2.77  [facility] 6/2023 Nonobstructive CAD

## 2025-05-09 NOTE — PROGRESS NOTE ADULT - PROBLEM SELECTOR PLAN 1
s/p heart transplant & TV ring repair, PPM removal  transplant cardiology and ID following closely  Diuresis: on Bumex 1 mg PO daily (Fluid goal -500-1L) per Tx card team  First surveillance biopsy 5/8: no s/s rejection per report  immunosuppression per transplant cardiology team  on IV abx per ID  will need PICC prior to discharge  ZAHRA. Cardio-renal following. CRRT on hold since 5/4. monitor BMP, lytes  encourage OOB/increase ambulation as tolerated  dc planning HOME, once medically cleared  Plan per d/w CTS and Transplant Cardiology team and Attendings

## 2025-05-09 NOTE — PROGRESS NOTE ADULT - SUBJECTIVE AND OBJECTIVE BOX
Patient seen and examined at the bedside.    Remains critically ill on continuous ICU monitoring, at risk for life threatening decompensation.  All Labs, data reviewed. Plan of care discussed in length during multi-disciplinary ICU rounds.     Brief Summary:  70-year-old male with history of NICM since 2011 HFrEF (LVEF 25-30%)   Now s/p OHT on 4/29/25     24 Hour events:  Biopsy yesterday with normal filling pressures.      Objective:  ICU Vital Signs Last 24 Hrs  T(C): 36.5 (09 May 2025 08:00), Max: 36.9 (09 May 2025 00:00)  T(F): 97.7 (09 May 2025 08:00), Max: 98.5 (09 May 2025 00:00)  HR: 105 (09 May 2025 09:00) (93 - 110)  BP: 112/75 (09 May 2025 09:00) (93/56 - 131/77)  BP(mean): 89 (09 May 2025 09:00) (70 - 99)  ABP: --  ABP(mean): --  RR: 18 (09 May 2025 09:00) (13 - 25)  SpO2: 96% (09 May 2025 09:00) (92% - 97%)    O2 Parameters below as of 09 May 2025 08:00  Patient On (Oxygen Delivery Method): room air      Physical Exam:   General: Alert, interactive  Neurology: Oriented, following commands.  Respiratory: Bilateral breath sounds  CV: Sinus Tachycardia  Abdominal: Soft, Nontender  Extremities: Warm, well-perfused          -------------------------------------------------------------------------------------------------------------------------------    Labs:                          9.3    21.07 )-----------( 160      ( 09 May 2025 00:34 )             27.1       139    |  99     |  98     ----------------------------<  169        ( 09 May 2025 00:35 )  4.2     |  23     |  2.85     Ca    8.7        ( 09 May 2025 00:35 )  Phos  3.8       ( 09 May 2025 00:35 )  Mg     2.2       ( 09 May 2025 00:35 )    TPro  6.0    /  Alb  3.8    /  TBili  0.6    /  DBili  x      /  AST  58     /  ALT  133    /  AlkPhos  112    ( 09 May 2025 00:35 )    LIVER FUNCTIONS - ( 09 May 2025 00:35 )  Alb: 3.8 g/dL / Pro: 6.0 g/dL / ALK PHOS: 112 U/L / ALT: 133 U/L / AST: 58 U/L / GGT: x             ------------------------------------------------------------------------------------------------------------------------------  Assessment:  71 yo NICM, s/p heart transplant     ZAHRA  Acute blood loss anemia  Hyperglycemia      Plan:   ***Neuro***  Maintain day/night cycle to prevent ICU delirium   Postoperative acute pain control with IV Tylenol, Tramadol  Trazadone at night for sleep     ***Cardiovascular***  s/p heart transplant  Biopsy today.  Remove wire and chest tubes after biopsy/TTE    ***Pulmonary***  Tolerating room air.  Deep breathing and coughing exercises    ***GI***  Tolerating diet, monitor po intake.    Protonix for prophylaxis   Bowel regimen, having BMs   Trend LFTs    ***Renal***  ZAHRA, Trend creatinine  Hypomagnesemia - repleted.    ***ID***  Trend Leukocytosis  PCN and Ceftriaxone for donor cultures (syphilis /S pneumo)  Prophylaxis with Nystatin, PO Vanco, Mepron  Tacro, Cellcept, Steroid taper for immunosuppression.    ***Endocrine***  Insulin sliding scale for Hyperglycemia     ***Hematology***  Acute blood loss anemia and thrombocytopenia  Trend CBC and coags, monitor for bleeding.  No transfusion indication currently  Heparin SQ for VTE prophylaxis      Critical care time: 50 minutes      I, Chata Huntley MD, personally performed the services described in this documentation.  I have reviewed the chart and agree that the record reflects my personal performance and is accurate and complete.    Electronically signed:   Chata Huntley MD  CT ICU attending              Patient seen and examined at the bedside.    All Labs, data reviewed. Plan of care discussed in length during multi-disciplinary ICU rounds.     Brief Summary:  70-year-old male with history of NICM since 2011 HFrEF (LVEF 25-30%)   Now s/p OHT on 4/29/25     24 Hour events:  Biopsy yesterday with normal filling pressures.      Objective:  ICU Vital Signs Last 24 Hrs  T(C): 36.5 (09 May 2025 08:00), Max: 36.9 (09 May 2025 00:00)  T(F): 97.7 (09 May 2025 08:00), Max: 98.5 (09 May 2025 00:00)  HR: 105 (09 May 2025 09:00) (93 - 110)  BP: 112/75 (09 May 2025 09:00) (93/56 - 131/77)  BP(mean): 89 (09 May 2025 09:00) (70 - 99)  ABP: --  ABP(mean): --  RR: 18 (09 May 2025 09:00) (13 - 25)  SpO2: 96% (09 May 2025 09:00) (92% - 97%)    O2 Parameters below as of 09 May 2025 08:00  Patient On (Oxygen Delivery Method): room air      Physical Exam:   General: Alert, interactive  Neurology: Oriented, following commands.  Respiratory: Bilateral breath sounds  CV: Sinus Tachycardia  Abdominal: Soft, Nontender  Extremities: Warm, well-perfused          -------------------------------------------------------------------------------------------------------------------------------    Labs:                          9.3    21.07 )-----------( 160      ( 09 May 2025 00:34 )             27.1       139    |  99     |  98     ----------------------------<  169        ( 09 May 2025 00:35 )  4.2     |  23     |  2.85     Ca    8.7        ( 09 May 2025 00:35 )  Phos  3.8       ( 09 May 2025 00:35 )  Mg     2.2       ( 09 May 2025 00:35 )    TPro  6.0    /  Alb  3.8    /  TBili  0.6    /  DBili  x      /  AST  58     /  ALT  133    /  AlkPhos  112    ( 09 May 2025 00:35 )    LIVER FUNCTIONS - ( 09 May 2025 00:35 )  Alb: 3.8 g/dL / Pro: 6.0 g/dL / ALK PHOS: 112 U/L / ALT: 133 U/L / AST: 58 U/L / GGT: x             ------------------------------------------------------------------------------------------------------------------------------  Assessment:  71 yo NICM, s/p heart transplant on 4/29/25    NICM  ZAHRA  Acute blood loss anemia  Hyperglycemia      Plan:   ***Neuro***  Maintain day/night cycle to prevent ICU delirium   Postoperative acute pain control with IV Tylenol, Tramadol  Trazadone at night for sleep     ***Cardiovascular***  s/p heart transplant  Follow up Biopsy results.    ***Pulmonary***  Tolerating room air.  Deep breathing and coughing exercises    ***GI***  Tolerating diet, monitor po intake.    Protonix for prophylaxis   Bowel regimen, having BMs   Trend LFTs    ***Renal***  ZAHRA, Trend creatinine  No indication for RRT today.  Nephrology following.    ***ID***  Trend Leukocytosis - will resend Urinalysis and RVP  PCN and Ceftriaxone for donor cultures (syphilis /S pneumo)  Prophylaxis with Nystatin (transition to Diflucan), PO Vanco, Mepron  Tacro, Cellcept, Steroid taper for immunosuppression.    ***Endocrine***  Insulin sliding scale for Hyperglycemia     ***Hematology***  Acute blood loss anemia  Trend CBC and coags, monitor for bleeding.  No transfusion indication currently  Heparin SQ for VTE prophylaxis      I, Chata Huntley MD, personally performed the services described in this documentation.  I have reviewed the chart and agree that the record reflects my personal performance and is accurate and complete.    Electronically signed:   Chata Huntley MD  CT ICU attending

## 2025-05-09 NOTE — PROGRESS NOTE ADULT - PROBLEM SELECTOR PLAN 3
- Peaked at ~4.4, downtrending to ~2.5  - Post-op c/b ZAHRA likely hemodynamic  - CRRT on HOLD since 5/4, no acute CRRT/HD needs  - Daily BMP, lytes  - Cardiorenal recs appreciated

## 2025-05-09 NOTE — PROGRESS NOTE ADULT - CRITICAL CARE ATTENDING COMMENT
Feels well. Off HD since 5/3 and urinating well. Underwent RHC/Bx yesterday (2 pieces obtained) with normal hemos; 0R. On exam, JVP approx 8-10 with mild HJR and mild v waves, RRR, no m/r/g, dec BS b/l, nontender abdomen, no edema. Labs reviewed - WBC 21 (stable), Hb 8.5, BUN/Cr 93/2.85 (improving), Tac last 8.1   - maintain goal net -500; give bumex 1mg PO x 1  - c/w tac 1.5 mg q12; please check tac level daily   - c/w Cellcept 1 gram q12  - c/w CFTX for S. pneumo meningitis PPx (seen in donor CSF)  - on PCN for syphilis PPx  - c/w nystatin  - c/w mepron 1500 mg daily  - c/w Valcyte for CMV +/+

## 2025-05-09 NOTE — CHART NOTE - NSCHARTNOTEFT_GEN_A_CORE
NUTRITION FOLLOW UP NOTE    PATIENT SEEN FOR: nutrition follow up    SOURCE: [x] Patient  [x] Current Medical Record  [] RN  [x] Family/support person at bedside  [] Patient unavailable/inappropriate  [] Other:    CHART REVIEWED/EVENTS NOTED.  [] No changes to nutrition care plan to note  [x] Nutrition Status:  - S/p OHT 4/29/25, extubated 4/29/25  - ZAHRA. S/p continuous renal replacement therapy   - NGT removed 5/5    DIET ORDER:   Diet, Consistent Carbohydrate/No Snacks (05-08-25 @ 16:29) [Active]    CURRENT DIET ORDER IS:  [] Appropriate:  [] Inadequate:  [x] Other:  see recommendations    NUTRITION INTAKE/PROVISION:  [x] PO: Pt reports improving appetite/po intake. States he tried the Nepro shake and didn't like it. Amenable to trial Day Godwin Renal Support.  [] Enteral Nutrition:   [] Parenteral Nutrition:    ANTHROPOMETRICS:  Drug Dosing Weight  Height (cm): 170.2 (21 Mar 2025 22:24)  Weight (kg): 81.7 (21 Mar 2025 22:24)  BMI (kg/m2): 28.2 (21 Mar 2025 22:24)    Weights:   Daily/weekly Weight in kG (standing): 79.7 (05-08), 87.5 (05-05, bed), 86.6 (04-29, bed), 84.4 (04-26), 84.6 (04-22), 84.6 (04-15), 83.1 (04-08), 83.9 (03-30), 84.7 (03-23)  Weight fluctuating likely in setting of fluid shifts. Overall decreased since before transplant, possibly combination of dry weight changes and fluid shifts. Overall 4.5% x 1 month. RD to continue to monitor weight trends as able/available.     MEDICATIONS:  MEDICATIONS  (STANDING):  atovaquone  Suspension 1500 milliGRAM(s) Oral daily  cefTRIAXone   IVPB 2000 milliGRAM(s) IV Intermittent <User Schedule>  chlorhexidine 2% Cloths 1 Application(s) Topical daily  desvenlafaxine ER 50 milliGRAM(s) Oral daily  dextrose 50% Injectable 50 milliLiter(s) IV Push every 15 minutes  dextrose 50% Injectable 25 milliLiter(s) IV Push every 15 minutes  erythromycin   Ointment 1 Application(s) Left EYE every 4 hours  heparin   Injectable 5000 Unit(s) SubCutaneous every 8 hours  insulin lispro (ADMELOG) corrective regimen sliding scale   SubCutaneous three times a day before meals  insulin lispro (ADMELOG) corrective regimen sliding scale   SubCutaneous at bedtime  mycophenolate mofetil 1000 milliGRAM(s) Oral <User Schedule>  naloxegol 12.5 milliGRAM(s) Oral daily  Nephro-piotr 1 Tablet(s) Oral daily  nystatin    Suspension 302517 Unit(s) Oral <User Schedule>  oxymetazoline 0.05% Nasal Spray 2 Spray(s) Both Nostrils two times a day  pantoprazole    Tablet 40 milliGRAM(s) Oral before breakfast  penicillin   G benzathine Injectable 2.4 Million Unit(s) IntraMuscular <User Schedule>  polyethylene glycol 3350 17 Gram(s) Oral two times a day  potassium chloride  10 mEq/50 mL IVPB 10 milliEquivalent(s) IV Intermittent once  potassium chloride  10 mEq/50 mL IVPB 10 milliEquivalent(s) IV Intermittent once  predniSONE   Tablet 15 milliGRAM(s) Oral every 12 hours  senna 2 Tablet(s) Oral at bedtime  sodium chloride 0.9%. 1000 milliLiter(s) (10 mL/Hr) IV Continuous <Continuous>  tacrolimus 1.5 milliGRAM(s) Oral <User Schedule>  tamsulosin 0.4 milliGRAM(s) Oral at bedtime  traZODone 150 milliGRAM(s) Oral at bedtime  valGANciclovir 450 milliGRAM(s) Oral <User Schedule>  vancomycin    Solution 125 milliGRAM(s) Oral every 12 hours    MEDICATIONS  (PRN):  artificial  tears Solution 1 Drop(s) Both EYES every 12 hours PRN Dry Eyes  hydrOXYzine hydrochloride 10 milliGRAM(s) Oral at bedtime PRN Anxiety  methocarbamol 500 milliGRAM(s) Oral every 8 hours PRN Muscle Spasm      NUTRITIONALLY PERTINENT LABS:  05-09 @ 00:35: Na 139, BUN 98[H], Cr 2.85[H], [H], K+ 4.2, Phos 3.8, Mg 2.2, Alk Phos 112, ALT/SGPT 133[H], AST/SGOT 58[H]    A1C with Estimated Average Glucose Result: 6.1 % (03-22-25 @ 00:58)      NUTRITIONALLY PERTINENT MEDICATIONS/LABS:  [x] Reviewed  [x] Relevant notes on medications/labs:  - Insulin sliding scale ordered for glycemic control. BG trending 114-246 mg/dl x last 48 hrs  - Prednisone, Tacrolimus, and Cellcept ordered for post-transplant immunosuppressive regimen   - Nephro-Piotr ordered  - K+ and phosphorus improving and WNL at this time despite ongoing ZAHRA  - A1c 6.1% (03/22/25), within pre-DM range    EDEMA:  [x] Reviewed  [x] Relevant notes:  - 1+ generalized as per flowsheets    GI/ I&O:  [x] Reviewed  [x] Relevant notes:  - Denies any GI distress, last BM 5/9 per pt  - Movantik, miralax, senna ordered  [] Other:    SKIN:   [x] No pressure injuries documented, per nursing flowsheet  [] Pressure injury previously noted  [] Change in pressure injury documentation:   [x] Other: +midline sternal incision     ESTIMATED NEEDS:  [x] No change:  [] Updated:  Energy:  4634-1909 kcal/day (28-33 Kcal/kg)  Protein:  101-135 g/day (1.2-1.6 g/kg)  Fluid:   ml/day or [x] defer to team  Based on: recent weight 84.4 kg (04-26, standing)    NUTRITION DIAGNOSIS:  [x] Prior Dx: Food & Nutrition Related Knowledge Deficit; Increased nutrient needs (protein, energy)  [] New Dx:     EDUCATION:  [x] Yes: Reinforced importance of adequate protein-energy consumption to meet estimated nutrient needs. Encouraged intake of meals and oral nutrition supplements as tolerated. Encouraged intake of protein-rich foods first at mealtimes to help preserve lean muscle mass. Reviewed food choices that are high in protein. Pt noted with good comprehension and made aware RD remains available for further questions/concerns.   [] Not appropriate/warranted    NUTRITION CARE PLAN:  1. Diet: Continue Consistent Carbohydrate diet.  2. Add Dayjewell Godwin Renal Support 2x/day to help optimize protein-energy intake.  3. Continue Nephro-Piotr, pending no medical contraindications, to aid in wound healing.    [] Achieved - Continue current nutrition intervention(s)  [] Current medical condition precludes nutrition intervention at this time.    MONITORING AND EVALUATION:   RD remains available upon request and will follow up per protocol.    Naida Garcia, SAJANN, CDN (Available via [employer] TEAMS)

## 2025-05-09 NOTE — PROGRESS NOTE ADULT - ASSESSMENT
69-year-old male patient past medical history of NICM since 2011, HFrEF LVEF 25-30% s/p MDT CRT-D with baseline CHB, VT/VF, a.fib s/p GIANFRANCO ligation/MAZE, MV/TV repair, PVC ablation 3/2024 intolerant to AADs in the past, recent admission 3/19 - 3/20/25 for AICD shocks initially presented to the [facility] emergency department on 3/21/2025, sent by heart failure specialist for ACID shock. Device reported successful ATP for VT 3/17 at 6:52pm followed by one ATP and 40J shock. He reported feeling dizzy and SOB during the events. He follows with Dr paula abreu for HF, currently undergoing transplant workup, Dr John for CRTD and VT/VF and  for genetic counseling. While admitted to [facility], he was started on a lidocaine gtt. On 3/21 when lidocaine weaned off patient had another two episodes of VT requiring AICD shocks. He was transferred to [facility] for further management.     Status Post OHT 4/29/25 also with removal of ICD CRT, tricuspid valve repair (34 mm Physio ring)  Per report no evidence of vegetations on valves at time of transplant    Donor CSF culture (April 22) heavy growth Streptococcus pneumoniae.  Ceftriaxone <= 0.25 (susceptible) penicillin 0.12 (resistant for CSF) vancomycin 0.25 (susceptible)    Donor TTE without evidence of vegetations  Donor CT C/A/P with no acute abnormality of abdomen or pelvis.  Left lower lobe consolidation  Donor blood cultures were with no growth to date    #Heart transplant recipient (CMV +/+, EBV +/+, Toxo -/+)  s/p vancomycin and cefepime for 72 hours as perioperative coverage  --Will need initiation of Valcyte for CMV prophylaxis  --Will need initiation of Bactrim for PCP and toxoplasma prophylaxis    # leucocytosis  suspect in c/o steroids,   No focal findings  if continues to rise, Send surveillance BC, UA, RVP and consider imaging    #Donor Syphilis Serology Positive  --Continue benzathine penicillin 2,400,000 units weekly x 3 doses total, second dose 5/8, next due 5/15, if discharged before that, will need to arrange dose outpatient    #Donor Streptococcus pneumoniae meningitis  --Continue Ceftriaxone 2 g IV every 24 hours. Inclined to extend duration  to 4 weeks post-op given unclear etiology of patients S. pneum meningitis (concern for drug overdose) and valvular dysfunction in recipient at time of implant. main challenge is [place] so iv therapy with OPAT will need to be done with local provider with [place] license, else will need to stay locally until therapy completed.   -would repeat TTE to assess        Thank you for involving us in the care of this patient  Transplant ID will continue to follow  Please call or page with additional questions  Pager; #[phone]  Teams: from 8 am to 5 pm  Leigh Ann Britton MD

## 2025-05-09 NOTE — PROGRESS NOTE ADULT - SUBJECTIVE AND OBJECTIVE BOX
[facility] DIVISION OF KIDNEY DISEASES AND HYPERTENSION --    Reason for consult: ZAHRA    24 hour events/subjective: Patient seen and examined at bedside. Had transplant heart biopsy yesterday. Denies fever, SOB. chest pain, n/v/d. Appetite is good.       PAST HISTORY  --------------------------------------------------------------------------------  No significant changes to PMH, PSH, FHx, SHx, unless otherwise noted    ALLERGIES & MEDICATIONS  --------------------------------------------------------------------------------  Allergies    No Known Allergies      Standing Inpatient Medications  atovaquone  Suspension 1500 milliGRAM(s) Oral daily  cefTRIAXone   IVPB 2000 milliGRAM(s) IV Intermittent <User Schedule>  chlorhexidine 2% Cloths 1 Application(s) Topical daily  desvenlafaxine ER 50 milliGRAM(s) Oral daily  dextrose 50% Injectable 50 milliLiter(s) IV Push every 15 minutes  dextrose 50% Injectable 25 milliLiter(s) IV Push every 15 minutes  erythromycin   Ointment 1 Application(s) Left EYE every 4 hours  insulin lispro (ADMELOG) corrective regimen sliding scale   SubCutaneous three times a day before meals  insulin lispro (ADMELOG) corrective regimen sliding scale   SubCutaneous at bedtime  mycophenolate mofetil 1000 milliGRAM(s) Oral <User Schedule>  naloxegol 12.5 milliGRAM(s) Oral daily  Nephro-piotr 1 Tablet(s) Oral daily  nystatin    Suspension 902243 Unit(s) Oral <User Schedule>  oxymetazoline 0.05% Nasal Spray 2 Spray(s) Both Nostrils two times a day  pantoprazole    Tablet 40 milliGRAM(s) Oral before breakfast  penicillin   G benzathine Injectable 2.4 Million Unit(s) IntraMuscular <User Schedule>  polyethylene glycol 3350 17 Gram(s) Oral two times a day  potassium chloride  10 mEq/50 mL IVPB 10 milliEquivalent(s) IV Intermittent once  potassium chloride  10 mEq/50 mL IVPB 10 milliEquivalent(s) IV Intermittent once  predniSONE   Tablet 15 milliGRAM(s) Oral every 12 hours  senna 2 Tablet(s) Oral at bedtime  sodium chloride 0.9%. 1000 milliLiter(s) IV Continuous <Continuous>  tacrolimus 1.5 milliGRAM(s) Oral <User Schedule>  tamsulosin 0.4 milliGRAM(s) Oral at bedtime  traZODone 150 milliGRAM(s) Oral at bedtime  valGANciclovir 450 milliGRAM(s) Oral <User Schedule>  vancomycin    Solution 125 milliGRAM(s) Oral every 12 hours    PRN Inpatient Medications  artificial  tears Solution 1 Drop(s) Both EYES every 12 hours PRN  hydrOXYzine hydrochloride 10 milliGRAM(s) Oral at bedtime PRN  methocarbamol 500 milliGRAM(s) Oral every 8 hours PRN      REVIEW OF SYSTEMS  --------------------------------------------------------------------------------  Gen: No fever  Respiratory: No dyspnea  CV: No chest pain  GI: No diarrhea, nausea, or vomiting  : No dysuria or hematuria  Skin: No rashes  MSK: No edema  Neuro: No dizziness/lightheadedness    All other systems were reviewed and are negative, except as noted.    VITALS/PHYSICAL EXAM  --------------------------------------------------------------------------------  T(C): 36.5 (05-09-25 @ 04:00), Max: 36.9 (05-09-25 @ 00:00)  HR: 101 (05-09-25 @ 06:00) (93 - 110)  BP: 123/80 (05-09-25 @ 06:00) (93/56 - 131/77)  RR: 17 (05-09-25 @ 06:00) (13 - 25)  SpO2: 96% (05-09-25 @ 06:00) (92% - 97%)  Wt(kg): --  Height (cm): 170.2 (05-08-25 @ 06:42)  Weight (kg): 81.7 (05-08-25 @ 06:42)  BMI (kg/m2): 28.2 (05-08-25 @ 06:42)  BSA (m2): 1.93 (05-08-25 @ 06:42)      05-08-25 @ 07:01  -  05-09-25 @ 07:00  --------------------------------------------------------  IN: 770 mL / OUT: 970 mL / NET: -200 mL      PHYSICAL EXAM:  Gen: NAD  Neuro: non-focal  HEENT: anicteric  Pulm: B/L breath sounds  CV: +S1 S2  Abd: soft, non-distended, non-tender  Extremities: no edema  Skin: Warm    LABS/STUDIES  --------------------------------------------------------------------------------              9.3    21.07 >-----------<  160      [05-09-25 @ 00:34]              27.1     139  |  99  |  98  ----------------------------<  169      [05-09-25 @ 00:35]  4.2   |  23  |  2.85        Ca     8.7     [05-09-25 @ 00:35]      Mg     2.2     [05-09-25 @ 00:35]      Phos  3.8     [05-09-25 @ 00:35]    TPro  6.0  /  Alb  3.8  /  TBili  0.6  /  DBili  x   /  AST  58  /  ALT  133  /  AlkPhos  112  [05-09-25 @ 00:35]    Creatinine Trend:  SCr 2.85 [05-09 @ 00:35]  SCr 3.66 [05-08 @ 00:23]  SCr 4.09 [05-07 @ 15:13]  SCr 1.05 [05-07 @ 14:25]  SCr 4.34 [05-07 @ 01:20]      Iron 48, TIBC 307, %sat 16      [03-28-25 @ 06:15]  Ferritin 184      [03-28-25 @ 06:15]  TSH 1.26      [03-22-25 @ 00:58]  Lipid: chol 147, TG 71, HDL 62, LDL --      [03-22-25 @ 00:58]    HBsAb Reactive      [04-17-25 @ 15:45]  HBsAg Nonreact      [04-17-25 @ 15:45]  HBcAb Nonreact      [04-17-25 @ 15:45]  HCV 0.05, Nonreact      [04-17-25 @ 15:45]  HIV Nonreact      [04-17-25 @ 15:45]

## 2025-05-09 NOTE — PROGRESS NOTE ADULT - PROBLEM SELECTOR PLAN 1
- ACC/AHA stage D systolic heart failure, s/p heart transplant & TV ring repair 4/28, CMV +/+, Toxo -/+     - Retrospective crossmatch with class II DSA DR HILLIARD (preformed from pre transplant).    - Support:     - Inotropes: Epi weaned off 5/4, dobutamine weaned off 5/5     - Lico weaned off on 5/2  - Diuresis: Euvolemic off loop diuretics. Fluid goal even  - Chest tubes, per CTS  - Plan to dc epicardial wires per CTS with repeat TTE a few hours after  - First surveillance biopsy 5/8 (2nd week post txp), pending results     - Some resistance getting access > venous Dopplers negative for SVC thrombosis - ACC/AHA stage D systolic heart failure, s/p heart transplant & TV ring repair 4/28, CMV +/+, Toxo -/+     - Retrospective crossmatch with class II DSA DR HILLIARD (preformed from pre transplant).    - Support:     - Inotropes: Epi weaned off 5/4, dobutamine weaned off 5/5     - Lico weaned off on 5/2  - Diuresis: Please start Bumex 1 mg PO daily (Fluid goal -500-1L)  - First surveillance biopsy 5/8 (2nd week post txp), adequate filling pressures & normal BiV function with no s/s rejection per verbal report.     - Some resistance getting access > venous Dopplers negative for SVC thrombosis  - Lines     - Intro/swan dc     - [place] dc     - All chest tubes removed per CTS

## 2025-05-09 NOTE — PROGRESS NOTE ADULT - SUBJECTIVE AND OBJECTIVE BOX
PATIENT TRANSFERRED FROM CTU TO [address] [place] [address]. POD# 10    SUBJECTIVE: "Hi." Denies acute chest pain, palpitations, or shortness of breath    TELEMETRY:  SR     Vital Signs Last 24 Hrs  T(C): 36.6 (25 @ 15:13), Max: 36.9 (25 @ 00:00)  T(F): 97.8 (25 @ 15:13), Max: 98.5 (25 @ 00:00)  HR: 104 (25 @ 15:13) (93 - 113)  BP: 112/79 (25 @ 15:13) (93/56 - 131/77)  RR: 18 (25 @ 15:13) (13 - 23)  SpO2: 97% (25 @ 15:13) (92% - 97%)           INPUT/OUTPUT:   @ 07:01  -   @ 07:00  --------------------------------------------------------  IN: 770 mL / OUT: 970 mL / NET: -200 mL      LABS:    137  |  97  |  98[H]  ----------------------------<  181[H]  3.8   |  25  |  2.56[H]  Ca    8.8      09 May 2025 13:44  Phos  3.4       Mg     2.0       TPro  6.4  /  Alb  3.9  /  TBili  0.6  /  DBili  x   /  AST  44[H]  /  ALT  128[H]  /  AlkPhos  115                 9.3    21.07 )-----------( 160      ( 09 May 2025 00:34 )             27.1            Daily Weight in k.7 (09 May 2025 05:00)      CAPILLARY BLOOD GLUCOSE  186 (08 May 2025 21:00)  POCT Blood Glucose.: 246 mg/dL (09 May 2025 11:10)  POCT Blood Glucose.: 139 mg/dL (09 May 2025 07:41)  POCT Blood Glucose.: 186 mg/dL (08 May 2025 21:04)  POCT Blood Glucose.: 220 mg/dL (08 May 2025 16:30)          PHYSICAL EXAM:  NEURO: alert and oriented; no gross, focal neurological deficits appreciated at time of evaluation  HEART: s1, s2  STERNAL WOUND: MSI -->CDI, sternum stable  LUNG: non-labored breathing with no use of accessory muscles; on room air  ABD: soft, (+) bowel sounds in all quadrants, +flatus  EXT: range of motion intact, peripheral pulses intact bilaterally, no edema      ACTIVE MEDICATIONS:  artificial  tears Solution 1 Drop(s) Both EYES every 12 hours PRN  atovaquone  Suspension 1500 milliGRAM(s) Oral daily  cefTRIAXone   IVPB 2000 milliGRAM(s) IV Intermittent <User Schedule>  chlorhexidine 2% Cloths 1 Application(s) Topical daily  desvenlafaxine ER 50 milliGRAM(s) Oral daily  dextrose 50% Injectable 50 milliLiter(s) IV Push every 15 minutes  dextrose 50% Injectable 25 milliLiter(s) IV Push every 15 minutes  erythromycin   Ointment 1 Application(s) Left EYE every 4 hours  heparin   Injectable 5000 Unit(s) SubCutaneous every 8 hours  hydrOXYzine hydrochloride 10 milliGRAM(s) Oral at bedtime PRN  insulin lispro (ADMELOG) corrective regimen sliding scale   SubCutaneous three times a day before meals  insulin lispro (ADMELOG) corrective regimen sliding scale   SubCutaneous at bedtime  magnesium sulfate  IVPB 2 Gram(s) IV Intermittent once  methocarbamol 500 milliGRAM(s) Oral every 8 hours PRN  mycophenolate mofetil 1000 milliGRAM(s) Oral <User Schedule>  naloxegol 12.5 milliGRAM(s) Oral daily  Nephro-piotr 1 Tablet(s) Oral daily  nystatin    Suspension 297598 Unit(s) Oral <User Schedule>  oxymetazoline 0.05% Nasal Spray 2 Spray(s) Both Nostrils two times a day  pantoprazole    Tablet 40 milliGRAM(s) Oral before breakfast  penicillin   G benzathine Injectable 2.4 Million Unit(s) IntraMuscular <User Schedule>  polyethylene glycol 3350 17 Gram(s) Oral two times a day  potassium chloride  10 mEq/50 mL IVPB 10 milliEquivalent(s) IV Intermittent once  potassium chloride  10 mEq/50 mL IVPB 10 milliEquivalent(s) IV Intermittent once  predniSONE   Tablet 15 milliGRAM(s) Oral every 12 hours  senna 2 Tablet(s) Oral at bedtime  sodium chloride 0.9% lock flush 3 milliLiter(s) IV Push every 8 hours  sodium chloride 0.9%. 1000 milliLiter(s) IV Continuous <Continuous>  tacrolimus 1.5 milliGRAM(s) Oral <User Schedule>  tamsulosin 0.4 milliGRAM(s) Oral at bedtime  traZODone 150 milliGRAM(s) Oral at bedtime  valGANciclovir 450 milliGRAM(s) Oral <User Schedule>  vancomycin    Solution 125 milliGRAM(s) Oral every 12 hours    Case discussed in detail with CTS Team and Attending. Plan below as per discussion.

## 2025-05-09 NOTE — PROGRESS NOTE ADULT - ATTENDING COMMENTS
ATN in the setting of cardiogenic shock post heart transplant needing CVVHDF  now improving with excellent urine output   off CVVHDF since am of 5/4   femoral catheter has been removed as of 5/5  no dialysis need. will monitor     jens calvillo  nephrology attending   please contact me on TEAMS   Office- [phone]

## 2025-05-10 LAB
ANION GAP SERPL CALC-SCNC: 14 MMOL/L — SIGNIFICANT CHANGE UP (ref 5–17)
BASOPHILS # BLD AUTO: 0.03 K/UL — SIGNIFICANT CHANGE UP (ref 0–0.2)
BASOPHILS NFR BLD AUTO: 0.2 % — SIGNIFICANT CHANGE UP (ref 0–2)
BUN SERPL-MCNC: 90 MG/DL — HIGH (ref 7–23)
CALCIUM SERPL-MCNC: 8.8 MG/DL — SIGNIFICANT CHANGE UP (ref 8.4–10.5)
CHLORIDE SERPL-SCNC: 101 MMOL/L — SIGNIFICANT CHANGE UP (ref 96–108)
CO2 SERPL-SCNC: 23 MMOL/L — SIGNIFICANT CHANGE UP (ref 22–31)
CREAT SERPL-MCNC: 2.38 MG/DL — HIGH (ref 0.5–1.3)
EGFR: 29 ML/MIN/1.73M2 — LOW
EGFR: 29 ML/MIN/1.73M2 — LOW
EOSINOPHIL # BLD AUTO: 0.02 K/UL — SIGNIFICANT CHANGE UP (ref 0–0.5)
EOSINOPHIL NFR BLD AUTO: 0.1 % — SIGNIFICANT CHANGE UP (ref 0–6)
GLUCOSE BLDC GLUCOMTR-MCNC: 140 MG/DL — HIGH (ref 70–99)
GLUCOSE BLDC GLUCOMTR-MCNC: 142 MG/DL — HIGH (ref 70–99)
GLUCOSE BLDC GLUCOMTR-MCNC: 172 MG/DL — HIGH (ref 70–99)
GLUCOSE BLDC GLUCOMTR-MCNC: 214 MG/DL — HIGH (ref 70–99)
GLUCOSE SERPL-MCNC: 135 MG/DL — HIGH (ref 70–99)
HCT VFR BLD CALC: 24.2 % — LOW (ref 39–50)
HGB BLD-MCNC: 8.1 G/DL — LOW (ref 13–17)
IMM GRANULOCYTES NFR BLD AUTO: 1.8 % — HIGH (ref 0–0.9)
LYMPHOCYTES # BLD AUTO: 0.55 K/UL — LOW (ref 1–3.3)
LYMPHOCYTES # BLD AUTO: 2.9 % — LOW (ref 13–44)
MAGNESIUM SERPL-MCNC: 2.1 MG/DL — SIGNIFICANT CHANGE UP (ref 1.6–2.6)
MCHC RBC-ENTMCNC: 28.6 PG — SIGNIFICANT CHANGE UP (ref 27–34)
MCHC RBC-ENTMCNC: 33.5 G/DL — SIGNIFICANT CHANGE UP (ref 32–36)
MCV RBC AUTO: 85.5 FL — SIGNIFICANT CHANGE UP (ref 80–100)
MONOCYTES # BLD AUTO: 1.21 K/UL — HIGH (ref 0–0.9)
MONOCYTES NFR BLD AUTO: 6.4 % — SIGNIFICANT CHANGE UP (ref 2–14)
NEUTROPHILS # BLD AUTO: 16.76 K/UL — HIGH (ref 1.8–7.4)
NEUTROPHILS NFR BLD AUTO: 88.6 % — HIGH (ref 43–77)
NRBC BLD AUTO-RTO: 0 /100 WBCS — SIGNIFICANT CHANGE UP (ref 0–0)
PHOSPHATE SERPL-MCNC: 3.6 MG/DL — SIGNIFICANT CHANGE UP (ref 2.5–4.5)
PLATELET # BLD AUTO: 184 K/UL — SIGNIFICANT CHANGE UP (ref 150–400)
POTASSIUM SERPL-MCNC: 4.2 MMOL/L — SIGNIFICANT CHANGE UP (ref 3.5–5.3)
POTASSIUM SERPL-SCNC: 4.2 MMOL/L — SIGNIFICANT CHANGE UP (ref 3.5–5.3)
RBC # BLD: 2.83 M/UL — LOW (ref 4.2–5.8)
RBC # FLD: 16.2 % — HIGH (ref 10.3–14.5)
SODIUM SERPL-SCNC: 138 MMOL/L — SIGNIFICANT CHANGE UP (ref 135–145)
TACROLIMUS SERPL-MCNC: 10.9 NG/ML — SIGNIFICANT CHANGE UP
WBC # BLD: 18.92 K/UL — HIGH (ref 3.8–10.5)
WBC # FLD AUTO: 18.92 K/UL — HIGH (ref 3.8–10.5)

## 2025-05-10 PROCEDURE — 99232 SBSQ HOSP IP/OBS MODERATE 35: CPT | Mod: FS

## 2025-05-10 PROCEDURE — 99233 SBSQ HOSP IP/OBS HIGH 50: CPT

## 2025-05-10 RX ORDER — BUMETANIDE 1 MG/1
1 TABLET ORAL ONCE
Refills: 0 | Status: COMPLETED | OUTPATIENT
Start: 2025-05-10 | End: 2025-05-10

## 2025-05-10 RX ADMIN — Medication 1 APPLICATION(S): at 11:45

## 2025-05-10 RX ADMIN — MYCOPHENOLATE MOFETIL 1000 MILLIGRAM(S): 500 TABLET, FILM COATED ORAL at 21:02

## 2025-05-10 RX ADMIN — Medication 500000 UNIT(S): at 08:09

## 2025-05-10 RX ADMIN — HYDROXYZINE HYDROCHLORIDE 10 MILLIGRAM(S): 25 TABLET, FILM COATED ORAL at 21:04

## 2025-05-10 RX ADMIN — DESVENLAFAXINE 50 MILLIGRAM(S): 25 TABLET, EXTENDED RELEASE ORAL at 11:46

## 2025-05-10 RX ADMIN — Medication 2 SPRAY(S): at 17:05

## 2025-05-10 RX ADMIN — HEPARIN SODIUM 5000 UNIT(S): 1000 INJECTION INTRAVENOUS; SUBCUTANEOUS at 11:46

## 2025-05-10 RX ADMIN — TAMSULOSIN HYDROCHLORIDE 0.4 MILLIGRAM(S): 0.4 CAPSULE ORAL at 21:04

## 2025-05-10 RX ADMIN — TACROLIMUS 1.5 MILLIGRAM(S): 0.5 CAPSULE ORAL at 08:09

## 2025-05-10 RX ADMIN — HEPARIN SODIUM 5000 UNIT(S): 1000 INJECTION INTRAVENOUS; SUBCUTANEOUS at 05:18

## 2025-05-10 RX ADMIN — Medication 2 SPRAY(S): at 05:18

## 2025-05-10 RX ADMIN — NALOXEGOL OXALATE 12.5 MILLIGRAM(S): 12.5 TABLET, FILM COATED ORAL at 11:46

## 2025-05-10 RX ADMIN — Medication 150 MILLIGRAM(S): at 21:04

## 2025-05-10 RX ADMIN — Medication 3 MILLILITER(S): at 21:13

## 2025-05-10 RX ADMIN — Medication 500000 UNIT(S): at 11:45

## 2025-05-10 RX ADMIN — PREDNISONE 15 MILLIGRAM(S): 20 TABLET ORAL at 17:07

## 2025-05-10 RX ADMIN — ATOVAQUONE 1500 MILLIGRAM(S): 750 SUSPENSION ORAL at 11:47

## 2025-05-10 RX ADMIN — Medication 125 MILLIGRAM(S): at 05:18

## 2025-05-10 RX ADMIN — BUMETANIDE 1 MILLIGRAM(S): 1 TABLET ORAL at 11:43

## 2025-05-10 RX ADMIN — Medication 3 MILLILITER(S): at 05:21

## 2025-05-10 RX ADMIN — Medication 3 MILLILITER(S): at 12:03

## 2025-05-10 RX ADMIN — TACROLIMUS 1.5 MILLIGRAM(S): 0.5 CAPSULE ORAL at 21:02

## 2025-05-10 RX ADMIN — PREDNISONE 15 MILLIGRAM(S): 20 TABLET ORAL at 05:18

## 2025-05-10 RX ADMIN — INSULIN LISPRO 2: 100 INJECTION, SOLUTION INTRAVENOUS; SUBCUTANEOUS at 11:43

## 2025-05-10 RX ADMIN — Medication 500000 UNIT(S): at 21:02

## 2025-05-10 RX ADMIN — Medication 40 MILLIGRAM(S): at 05:18

## 2025-05-10 RX ADMIN — Medication 500000 UNIT(S): at 17:05

## 2025-05-10 RX ADMIN — HEPARIN SODIUM 5000 UNIT(S): 1000 INJECTION INTRAVENOUS; SUBCUTANEOUS at 21:05

## 2025-05-10 RX ADMIN — Medication 1 TABLET(S): at 11:46

## 2025-05-10 RX ADMIN — CEFTRIAXONE 100 MILLIGRAM(S): 500 INJECTION, POWDER, FOR SOLUTION INTRAMUSCULAR; INTRAVENOUS at 11:47

## 2025-05-10 RX ADMIN — MYCOPHENOLATE MOFETIL 1000 MILLIGRAM(S): 500 TABLET, FILM COATED ORAL at 08:08

## 2025-05-10 RX ADMIN — Medication 125 MILLIGRAM(S): at 17:05

## 2025-05-10 NOTE — PROGRESS NOTE ADULT - ASSESSMENT
70-year-old male, ABO O, with NICM since 2011 HFrEF (LVEF 25-30%) s/p MDT CRT-D with baseline CHB, VT/VF, AFs/p GIANFRANCO ligation/MAZE, MV/TV repair, PVC ablation 3/2024 intolerant to AADs in the past, recent admission 3/19/2025-3/20/2025 for AICD shocks initially presented to the [facility] ED on 3/21/2025, sent by HF specialist for ACID shock. Device reported successful ATP for VT 3/17/2025 at  ATP & 40J shock. Started on lido gtt with further VT requiring AICD shocks when off lido. He was transferred to [facility] for further management. Upgraded to UNOS status 2e for heart transplant (ABO O) for the refractory VT. He was transitioned to oral antiarrhythmics (Toprol XL & quinidine). Hospital course c/b development Norovirus on 3/31/2025 which prompted deactivation to status 7 listing for heart transplant. Status reinstated to 2E after diarrhea resolved.     He underwent successful OHT on 4/28 + TV ring repair 34mm (CMV +/+, Toxo -/+,ischemic time 258min, intra op 2plts + 2 cryo). Intra op STACEY with LVEF 20% and mild RV dysfunction.  Extubated on 4/29.  Had some initial RV failure/PGD requiring inotropes.  CRRT started to aid volume removal.  Repeat TTE 4/30 with LVEF 70% and mild RV dysfunction. He requiring large amounts of pain medication despite 2 chest tubes being removed POD 1 (ketamine + PCA pump). Had transient delirium which improved. Underwent first surveillance RHC/Bx 5/8 which was negative for rejection    He is mildly hypervolemic on exam but overall progressing well. Will continue to optimize his immunosuppression medications.    Bedside hemodynamics:  5/2/25: /59/75, , CVP 12, PA 44/15/24, CO/CI 6.8/3.5  5/1/25 BP 99/59/72, , CVP 11, PA 40/17/24, Gucci CO/CI 5.5/2.8  4/29/25 BP 94/57/67, , CVP 11, PA 28/15/20, Gucci CI 2.2  3/22/25 BP 97/76/83, HR 80, CVP 6, PA 58/23/38, PCWP 20, SVR 1100, Gucci CO/CI 5.1/2.6, TD 4/2.08    Cardiac Studies:   RHC/Bx 5/8/2025: RA 6 (v 10), RV 28/10, PA 32/11/19, PCWP 11 (v 15), CO/CI 5.93/3.0; ISHLT 0R  TTE 4/30/25: LVEF 71%, no RMWA, RV normal size, reduced RVSF  STACEY 4/28/25 intraop: LVEF 20%, global, dilated RV with mildly reduced RVSF  TTE 3/20/25 : LVEF 30%, segmental, LVIDd 5.3, walls normal, elevated LV filling pressures, mod RVE with mildly reduced RV function, TAPSE 1.7, severely dilated RA, annuloplasty in MV position, transvalvular gradients are elevated with severe prosthetic MS (peak gradient 15.7, mean gradient 8), trace intravalvular MR, TV annuloplasty ring noted, mild TR,  est PASP 36, no evidence of LV thrombus  TTE 10/2024: LVEF 25-30%, LVEDD 5.9cm, moderately reduced RV function, annuloplasty ring of mitral and tricuspid position, PASP 47 mmHg  [facility] 3/19/25- RA 11 PA 58/26 PCWP 32 CO/CI 5.43/2.77  [facility] 6/2023 Nonobstructive CAD

## 2025-05-10 NOTE — PROGRESS NOTE ADULT - PROBLEM SELECTOR PLAN 1
- ACC/AHA stage D systolic heart failure, s/p heart transplant & TV ring repair 4/28, CMV +/+, Toxo -/+     - Retrospective crossmatch with class II DSA DR HILLIARD (preformed from pre transplant). Will trend  - Diuresis: give bumex 1 mg IV x1     - Some resistance getting access > venous Dopplers negative for SVC thrombosis

## 2025-05-10 NOTE — PROGRESS NOTE ADULT - SUBJECTIVE AND OBJECTIVE BOX
Interval History:  feels well  denies complaints    Medications:  artificial  tears Solution 1 Drop(s) Both EYES every 12 hours PRN  atovaquone  Suspension 1500 milliGRAM(s) Oral daily  cefTRIAXone   IVPB 2000 milliGRAM(s) IV Intermittent <User Schedule>  chlorhexidine 2% Cloths 1 Application(s) Topical daily  desvenlafaxine ER 50 milliGRAM(s) Oral daily  dextrose 50% Injectable 50 milliLiter(s) IV Push every 15 minutes  dextrose 50% Injectable 25 milliLiter(s) IV Push every 15 minutes  erythromycin   Ointment 1 Application(s) Left EYE every 4 hours  heparin   Injectable 5000 Unit(s) SubCutaneous every 8 hours  hydrOXYzine hydrochloride 10 milliGRAM(s) Oral at bedtime PRN  insulin lispro (ADMELOG) corrective regimen sliding scale   SubCutaneous three times a day before meals  insulin lispro (ADMELOG) corrective regimen sliding scale   SubCutaneous at bedtime  methocarbamol 500 milliGRAM(s) Oral every 8 hours PRN  mycophenolate mofetil 1000 milliGRAM(s) Oral <User Schedule>  naloxegol 12.5 milliGRAM(s) Oral daily  Nephro-piotr 1 Tablet(s) Oral daily  nystatin    Suspension 907395 Unit(s) Oral <User Schedule>  oxymetazoline 0.05% Nasal Spray 2 Spray(s) Both Nostrils two times a day  pantoprazole    Tablet 40 milliGRAM(s) Oral before breakfast  penicillin   G benzathine Injectable 2.4 Million Unit(s) IntraMuscular <User Schedule>  polyethylene glycol 3350 17 Gram(s) Oral two times a day  potassium chloride  10 mEq/50 mL IVPB 10 milliEquivalent(s) IV Intermittent once  potassium chloride  10 mEq/50 mL IVPB 10 milliEquivalent(s) IV Intermittent once  predniSONE   Tablet 15 milliGRAM(s) Oral every 12 hours  senna 2 Tablet(s) Oral at bedtime  sodium chloride 0.9% lock flush 3 milliLiter(s) IV Push every 8 hours  sodium chloride 0.9%. 1000 milliLiter(s) IV Continuous <Continuous>  tacrolimus 1.5 milliGRAM(s) Oral <User Schedule>  tamsulosin 0.4 milliGRAM(s) Oral at bedtime  traZODone 150 milliGRAM(s) Oral at bedtime  valGANciclovir 450 milliGRAM(s) Oral <User Schedule>  vancomycin    Solution 125 milliGRAM(s) Oral every 12 hours      Vitals:  T(C): 36.6 (05-10-25 @ 12:02), Max: 36.6 (25 @ 18:33)  HR: 106 (05-10-25 @ 12:02) (92 - 106)  BP: 127/81 (05-10-25 @ 12:02) (101/66 - 128/87)  BP(mean): 77 (05-10-25 @ 04:06) (77 - 77)  RR: 18 (05-10-25 @ 12:02) (18 - 18)  SpO2: 94% (05-10-25 @ 12:02) (93% - 96%)    Daily     Daily Weight in k.1 (10 May 2025 10:51)        I&O's Summary    09 May 2025 07:01  -  10 May 2025 07:00  --------------------------------------------------------  IN: 890 mL / OUT: 1100 mL / NET: -210 mL    10 May 2025 07:01  -  10 May 2025 15:18  --------------------------------------------------------  IN: 240 mL / OUT: 300 mL / NET: -60 mL    Physical Exam:  Appearance: No Acute Distress  HEENT: PERRL  Neck: JVD approx 8-10 with mild HJR  Cardiovascular: Normal S1 S2, No murmurs/rubs/gallops  Respiratory: Clear to auscultation bilaterally  Gastrointestinal: Soft, Non-tender	  Skin: No cyanosis	  Neurologic: Non-focal  Extremities: No LE edema  Psychiatry: A & O x 3, Mood & affect appropriate    Labs:                        8.1    18.92 )-----------( 184      ( 10 May 2025 05:37 )             24.2     05-10    138  |  101  |  90[H]  ----------------------------<  135[H]  4.2   |  23  |  2.38[H]    Ca    8.8      10 May 2025 05:37  Phos  3.6     05-10  Mg     2.1     05-10    TPro  6.4  /  Alb  3.9  /  TBili  0.6  /  DBili  x   /  AST  44[H]  /  ALT  128[H]  /  AlkPhos  115

## 2025-05-10 NOTE — PROGRESS NOTE ADULT - PROBLEM SELECTOR PLAN 3
- Peaked at ~4.4, downtrending to ~2.0  - Post-op c/b ZAHRA likely hemodynamic  - last HD 5/4  - Daily BMP, lytes  - Cardiorenal recs appreciated

## 2025-05-10 NOTE — PROGRESS NOTE ADULT - ASSESSMENT
70-year-old male, with reported Hx of NICM since 2011 HFrEF (LVEF 25-30%) s/p MDT CRT-D with baseline CHB, VT/VF, AFs/p GIANFRANCO ligation/MAZE, MV/TV repair, PVC ablation 3/2024 intolerant to AADs in the past, recent admission 3/19/2025-3/20/2025 for AICD shocks initially presented to the [facility] ED on 3/21/2025, sent by HF specialist for ACID shock. Device reported successful ATP for VT 3/17/2025 at 6:52pm followed by one ATP & 40J shock. He reported feeling dizzy & SOB during the events. He follows with Dr. Luca Tamayo for HF, currently undergoing transplant workup, Dr John for CRTD and VT/VF and Dr. Chu for genetic counseling. While admitted to [facility], he was started on a lidocaine gtt. On 3/21 when lidocaine weaned off patient had another two episodes of VT requiring AICD shocks. He was transferred to [facility] for further management. He was initially maintained on lidocaine gtt from 3/21/2025-3/25/2025 & his listing status was upgraded to UNOS status 2e for heart transplant (ABO O) for the refractory VT. He was transitioned to oral antiarrhythmics (Toprol XL & quinidine) & ultimately transferred from CICU to [facility] floor on 3/27/2025. Hospital course was further c/b development of watery diarrhea & was found to have +norovirus on 3/31/2025 which prompted deactivation to status 7 listing for heart transplant. Status reinstated to 2E after diarrhea resolved.   4/29: s/p  OHT, TV repair, PPM removal   Intra op STACEY with LVEF 20% and mild RV dysfunction.  Extubated on 4/29.    Post Op: Had some initial RV failure/PGD (requiring dobutamine 7.5, epi 0.02, levo, vaso, and Lico.  CRRT started to aid volume removal.  Echo from 4/30 with LVEF 70% and mild RV dysfunction.  Levo/Vaso weaned off on 5/1 and Lico weaned off 5/2. He requiring large amounts of pain medication despite 2 chest tubes being removed POD 1 (ketamine + PCA pump).  Hospital course c/b ?delerium and non-cooporation with care, , Seen by psych 5/2 and started on trazodone + Pristiq. Psychiatric status improved.  Requiring 1:1 sitter now resolved.  NG tube placed 5/3 but now eating/ NGT removed 5/5. He is s/p first surveillance RHC/Bx 5/8. Chest tubes removed in CTU, no PTX on CXR.   5/9: TRANSFERRED TO [facility]. Management per transplant cardiology team. Transplant ID following on regimen per ID and Tx Cardiology. Per report: Donor Syphilis Serology Positive. Per Transplant ID: Continue benzathine penicillin 2,400,000 units weekly x 3 doses total, second dose 5/8, next due 5/15.  5/10 VSS rounds made w/ Dr. Barrientos - pt offers no complaints at this time.  d/c planning  70-year-old male, with reported Hx of NICM since 2011 HFrEF (LVEF 25-30%) s/p MDT CRT-D with baseline CHB, VT/VF, AFs/p GIANFRANCO ligation/MAZE, MV/TV repair, PVC ablation 3/2024 intolerant to AADs in the past, recent admission 3/19/2025-3/20/2025 for AICD shocks initially presented to the [facility] ED on 3/21/2025, sent by HF specialist for ACID shock. Device reported successful ATP for VT 3/17/2025 at 6:52pm followed by one ATP & 40J shock. He reported feeling dizzy & SOB during the events. He follows with Dr. Luca Tamayo for HF, currently undergoing transplant workup, Dr John for CRTD and VT/VF and Dr. Chu for genetic counseling. While admitted to [facility], he was started on a lidocaine gtt. On 3/21 when lidocaine weaned off patient had another two episodes of VT requiring AICD shocks. He was transferred to [facility] for further management. He was initially maintained on lidocaine gtt from 3/21/2025-3/25/2025 & his listing status was upgraded to UNOS status 2e for heart transplant (ABO O) for the refractory VT. He was transitioned to oral antiarrhythmics (Toprol XL & quinidine) & ultimately transferred from CICU to [facility] floor on 3/27/2025. Hospital course was further c/b development of watery diarrhea & was found to have +norovirus on 3/31/2025 which prompted deactivation to status 7 listing for heart transplant. Status reinstated to 2E after diarrhea resolved.   4/29: s/p  OHT, TV repair, PPM removal   Intra op STACEY with LVEF 20% and mild RV dysfunction.  Extubated on 4/29.    Post Op: Had some initial RV failure/PGD (requiring dobutamine 7.5, epi 0.02, levo, vaso, and Lico.  CRRT started to aid volume removal.  Echo from 4/30 with LVEF 70% and mild RV dysfunction.  Levo/Vaso weaned off on 5/1 and Lico weaned off 5/2. He requiring large amounts of pain medication despite 2 chest tubes being removed POD 1 (ketamine + PCA pump).  Hospital course c/b ?delerium and non-cooporation with care, , Seen by psych 5/2 and started on trazodone + Pristiq. Psychiatric status improved.  Requiring 1:1 sitter now resolved.  NG tube placed 5/3 but now eating/ NGT removed 5/5. He is s/p first surveillance RHC/Bx 5/8. Chest tubes removed in CTU, no PTX on CXR.   5/9: TRANSFERRED TO [facility]. Management per transplant cardiology team. Transplant ID following on regimen per ID and Tx Cardiology. Per report: Donor Syphilis Serology Positive. Per Transplant ID: Continue benzathine penicillin 2,400,000 units weekly x 3 doses total, second dose 5/8, next due 5/15.  5/10 VSS rounds made w/ Dr. Barrientos -& HF team.  will need PICC line for 4 weeks of Ceftriaxone 2g iv qd (strp pneum meningitis in donor) & PCN for syphilis  in donor- d/c plan home this week.

## 2025-05-10 NOTE — PROGRESS NOTE ADULT - PROBLEM SELECTOR PLAN 2
- Immunosuppression:      - Cellcept 1000mg BID      - c/w steroid taper per protocol      - Tacro: c/w tac 1.5 mg twice/day  - Antibiotics      - CTX 2000mg QD for donor strep pneumo+ in CSF. Per transplant ID, will need PICC prior to dc for 4 weeks of abx.      - Penicillin IM for donor syphilis + ab  - PPX     - Nystatin for thrush ppx     - C/w Valcyte 450 twice/week for high-risk CMV

## 2025-05-10 NOTE — PROGRESS NOTE ADULT - SUBJECTIVE AND OBJECTIVE BOX
VITAL SIGNS-Telemetry:  SR 70-90  Vital Signs Last 24 Hrs  T(C): 36.6 (05-10-25 @ 04:06), Max: 36.6 (05-09-25 @ 15:13)  T(F): 97.9 (05-10-25 @ 04:06), Max: 97.9 (05-09-25 @ 18:33)  HR: 103 (05-10-25 @ 07:44) (92 - 113)  BP: 101/66 (05-10-25 @ 04:06) (101/66 - 128/87)  RR: 18 (05-10-25 @ 04:06) (18 - 22)  SpO2: 93% (05-10-25 @ 04:06) (93% - 97%)         05-09 @ 07:01  -  05-10 @ 07:00  --------------------------------------------------------  IN: 890 mL / OUT: 1100 mL / NET: -210 mL    05-10 @ 07:01  -  05-10 @ 08:46  --------------------------------------------------------  IN: 240 mL / OUT: 0 mL / NET: 240 mL    Daily     Daily     CAPILLARY BLOOD GLUCOSE  POCT Blood Glucose.: 140 mg/dL (10 May 2025 07:31)  POCT Blood Glucose.: 170 mg/dL (09 May 2025 21:06)  POCT Blood Glucose.: 146 mg/dL (09 May 2025 16:25)  POCT Blood Glucose.: 246 mg/dL (09 May 2025 11:10)        PHYSICAL EXAM:  Neurology: alert and oriented x 3, nonfocal, no gross deficits  CV : S1S2  Sternal Wound :  CDI , Stable  Lungs: cta  Abdomen: soft, nontender, nondistended, positive bowel sounds, last bowel movement 5/9        Extremities:     no edema no calf tenderness    artificial  tears Solution 1 Drop(s) Both EYES every 12 hours PRN  atovaquone  Suspension 1500 milliGRAM(s) Oral daily  cefTRIAXone   IVPB 2000 milliGRAM(s) IV Intermittent <User Schedule>  chlorhexidine 2% Cloths 1 Application(s) Topical daily  desvenlafaxine ER 50 milliGRAM(s) Oral daily  dextrose 50% Injectable 50 milliLiter(s) IV Push every 15 minutes  dextrose 50% Injectable 25 milliLiter(s) IV Push every 15 minutes  erythromycin   Ointment 1 Application(s) Left EYE every 4 hours  heparin   Injectable 5000 Unit(s) SubCutaneous every 8 hours  hydrOXYzine hydrochloride 10 milliGRAM(s) Oral at bedtime PRN  insulin lispro (ADMELOG) corrective regimen sliding scale   SubCutaneous three times a day before meals  insulin lispro (ADMELOG) corrective regimen sliding scale   SubCutaneous at bedtime  methocarbamol 500 milliGRAM(s) Oral every 8 hours PRN  mycophenolate mofetil 1000 milliGRAM(s) Oral <User Schedule>  naloxegol 12.5 milliGRAM(s) Oral daily  Nephro-piotr 1 Tablet(s) Oral daily  nystatin    Suspension 381228 Unit(s) Oral <User Schedule>  oxymetazoline 0.05% Nasal Spray 2 Spray(s) Both Nostrils two times a day  pantoprazole    Tablet 40 milliGRAM(s) Oral before breakfast  penicillin   G benzathine Injectable 2.4 Million Unit(s) IntraMuscular <User Schedule>  polyethylene glycol 3350 17 Gram(s) Oral two times a day  potassium chloride  10 mEq/50 mL IVPB 10 milliEquivalent(s) IV Intermittent once  potassium chloride  10 mEq/50 mL IVPB 10 milliEquivalent(s) IV Intermittent once  predniSONE   Tablet 15 milliGRAM(s) Oral every 12 hours  senna 2 Tablet(s) Oral at bedtime  sodium chloride 0.9% lock flush 3 milliLiter(s) IV Push every 8 hours  sodium chloride 0.9%. 1000 milliLiter(s) IV Continuous <Continuous>  tacrolimus 1.5 milliGRAM(s) Oral <User Schedule>  tamsulosin 0.4 milliGRAM(s) Oral at bedtime  traZODone 150 milliGRAM(s) Oral at bedtime  valGANciclovir 450 milliGRAM(s) Oral <User Schedule>  vancomycin    Solution 125 milliGRAM(s) Oral every 12 hours      Physical Therapy Rec:   Home  [ x ]   Home w/ PT  [  ]  Rehab  [  ]  Discussed with Cardiothoracic Team at AM rounds.

## 2025-05-11 LAB
ANION GAP SERPL CALC-SCNC: 16 MMOL/L — SIGNIFICANT CHANGE UP (ref 5–17)
BUN SERPL-MCNC: 84 MG/DL — HIGH (ref 7–23)
CALCIUM SERPL-MCNC: 8.8 MG/DL — SIGNIFICANT CHANGE UP (ref 8.4–10.5)
CHLORIDE SERPL-SCNC: 101 MMOL/L — SIGNIFICANT CHANGE UP (ref 96–108)
CO2 SERPL-SCNC: 22 MMOL/L — SIGNIFICANT CHANGE UP (ref 22–31)
CREAT SERPL-MCNC: 1.95 MG/DL — HIGH (ref 0.5–1.3)
EGFR: 36 ML/MIN/1.73M2 — LOW
EGFR: 36 ML/MIN/1.73M2 — LOW
GLUCOSE BLDC GLUCOMTR-MCNC: 126 MG/DL — HIGH (ref 70–99)
GLUCOSE BLDC GLUCOMTR-MCNC: 141 MG/DL — HIGH (ref 70–99)
GLUCOSE BLDC GLUCOMTR-MCNC: 160 MG/DL — HIGH (ref 70–99)
GLUCOSE BLDC GLUCOMTR-MCNC: 172 MG/DL — HIGH (ref 70–99)
GLUCOSE BLDC GLUCOMTR-MCNC: 174 MG/DL — HIGH (ref 70–99)
GLUCOSE SERPL-MCNC: 130 MG/DL — HIGH (ref 70–99)
HCT VFR BLD CALC: 24.6 % — LOW (ref 39–50)
HGB BLD-MCNC: 8.2 G/DL — LOW (ref 13–17)
MAGNESIUM SERPL-MCNC: 1.7 MG/DL — SIGNIFICANT CHANGE UP (ref 1.6–2.6)
MCHC RBC-ENTMCNC: 29 PG — SIGNIFICANT CHANGE UP (ref 27–34)
MCHC RBC-ENTMCNC: 33.3 G/DL — SIGNIFICANT CHANGE UP (ref 32–36)
MCV RBC AUTO: 86.9 FL — SIGNIFICANT CHANGE UP (ref 80–100)
NRBC BLD AUTO-RTO: 0 /100 WBCS — SIGNIFICANT CHANGE UP (ref 0–0)
PHOSPHATE SERPL-MCNC: 3.2 MG/DL — SIGNIFICANT CHANGE UP (ref 2.5–4.5)
PLATELET # BLD AUTO: 204 K/UL — SIGNIFICANT CHANGE UP (ref 150–400)
POTASSIUM SERPL-MCNC: 4.4 MMOL/L — SIGNIFICANT CHANGE UP (ref 3.5–5.3)
POTASSIUM SERPL-SCNC: 4.4 MMOL/L — SIGNIFICANT CHANGE UP (ref 3.5–5.3)
RBC # BLD: 2.83 M/UL — LOW (ref 4.2–5.8)
RBC # FLD: 16.9 % — HIGH (ref 10.3–14.5)
SODIUM SERPL-SCNC: 139 MMOL/L — SIGNIFICANT CHANGE UP (ref 135–145)
TACROLIMUS SERPL-MCNC: 8.6 NG/ML — SIGNIFICANT CHANGE UP
WBC # BLD: 14.03 K/UL — HIGH (ref 3.8–10.5)
WBC # FLD AUTO: 14.03 K/UL — HIGH (ref 3.8–10.5)

## 2025-05-11 PROCEDURE — 99233 SBSQ HOSP IP/OBS HIGH 50: CPT

## 2025-05-11 PROCEDURE — 99232 SBSQ HOSP IP/OBS MODERATE 35: CPT

## 2025-05-11 RX ORDER — MAGNESIUM SULFATE 500 MG/ML
2 SYRINGE (ML) INJECTION ONCE
Refills: 0 | Status: COMPLETED | OUTPATIENT
Start: 2025-05-11 | End: 2025-05-11

## 2025-05-11 RX ORDER — SULFAMETHOXAZOLE/TRIMETHOPRIM 800-160 MG
1 TABLET ORAL
Refills: 0 | Status: DISCONTINUED | OUTPATIENT
Start: 2025-05-11 | End: 2025-05-12

## 2025-05-11 RX ORDER — TACROLIMUS 0.5 MG/1
1 CAPSULE ORAL ONCE
Refills: 0 | Status: COMPLETED | OUTPATIENT
Start: 2025-05-11 | End: 2025-05-11

## 2025-05-11 RX ORDER — BUMETANIDE 1 MG/1
1 TABLET ORAL DAILY
Refills: 0 | Status: DISCONTINUED | OUTPATIENT
Start: 2025-05-11 | End: 2025-05-14

## 2025-05-11 RX ORDER — TACROLIMUS 0.5 MG/1
2.5 CAPSULE ORAL
Refills: 0 | Status: DISCONTINUED | OUTPATIENT
Start: 2025-05-11 | End: 2025-05-14

## 2025-05-11 RX ADMIN — MYCOPHENOLATE MOFETIL 1000 MILLIGRAM(S): 500 TABLET, FILM COATED ORAL at 07:52

## 2025-05-11 RX ADMIN — HEPARIN SODIUM 5000 UNIT(S): 1000 INJECTION INTRAVENOUS; SUBCUTANEOUS at 12:07

## 2025-05-11 RX ADMIN — HYDROXYZINE HYDROCHLORIDE 10 MILLIGRAM(S): 25 TABLET, FILM COATED ORAL at 21:09

## 2025-05-11 RX ADMIN — Medication 2 SPRAY(S): at 06:20

## 2025-05-11 RX ADMIN — HEPARIN SODIUM 5000 UNIT(S): 1000 INJECTION INTRAVENOUS; SUBCUTANEOUS at 17:42

## 2025-05-11 RX ADMIN — PREDNISONE 15 MILLIGRAM(S): 20 TABLET ORAL at 06:18

## 2025-05-11 RX ADMIN — PREDNISONE 15 MILLIGRAM(S): 20 TABLET ORAL at 17:42

## 2025-05-11 RX ADMIN — Medication 1 TABLET(S): at 12:06

## 2025-05-11 RX ADMIN — Medication 150 MILLIGRAM(S): at 21:09

## 2025-05-11 RX ADMIN — Medication 2 SPRAY(S): at 17:42

## 2025-05-11 RX ADMIN — Medication 125 MILLIGRAM(S): at 17:41

## 2025-05-11 RX ADMIN — BUMETANIDE 1 MILLIGRAM(S): 1 TABLET ORAL at 11:31

## 2025-05-11 RX ADMIN — Medication 3 MILLILITER(S): at 22:17

## 2025-05-11 RX ADMIN — INSULIN LISPRO 2: 100 INJECTION, SOLUTION INTRAVENOUS; SUBCUTANEOUS at 12:40

## 2025-05-11 RX ADMIN — Medication 500000 UNIT(S): at 12:07

## 2025-05-11 RX ADMIN — HEPARIN SODIUM 5000 UNIT(S): 1000 INJECTION INTRAVENOUS; SUBCUTANEOUS at 06:15

## 2025-05-11 RX ADMIN — TACROLIMUS 2.5 MILLIGRAM(S): 0.5 CAPSULE ORAL at 21:07

## 2025-05-11 RX ADMIN — TAMSULOSIN HYDROCHLORIDE 0.4 MILLIGRAM(S): 0.4 CAPSULE ORAL at 21:06

## 2025-05-11 RX ADMIN — TACROLIMUS 1.5 MILLIGRAM(S): 0.5 CAPSULE ORAL at 07:52

## 2025-05-11 RX ADMIN — Medication 3 MILLILITER(S): at 13:49

## 2025-05-11 RX ADMIN — Medication 500000 UNIT(S): at 07:52

## 2025-05-11 RX ADMIN — Medication 3 MILLILITER(S): at 06:32

## 2025-05-11 RX ADMIN — Medication 25 GRAM(S): at 09:01

## 2025-05-11 RX ADMIN — DESVENLAFAXINE 50 MILLIGRAM(S): 25 TABLET, EXTENDED RELEASE ORAL at 12:06

## 2025-05-11 RX ADMIN — Medication 40 MILLIGRAM(S): at 06:20

## 2025-05-11 RX ADMIN — ATOVAQUONE 1500 MILLIGRAM(S): 750 SUSPENSION ORAL at 12:07

## 2025-05-11 RX ADMIN — Medication 125 MILLIGRAM(S): at 06:16

## 2025-05-11 RX ADMIN — MYCOPHENOLATE MOFETIL 1000 MILLIGRAM(S): 500 TABLET, FILM COATED ORAL at 21:07

## 2025-05-11 RX ADMIN — INSULIN LISPRO 2: 100 INJECTION, SOLUTION INTRAVENOUS; SUBCUTANEOUS at 17:37

## 2025-05-11 RX ADMIN — CEFTRIAXONE 100 MILLIGRAM(S): 500 INJECTION, POWDER, FOR SOLUTION INTRAMUSCULAR; INTRAVENOUS at 12:07

## 2025-05-11 RX ADMIN — Medication 500000 UNIT(S): at 21:10

## 2025-05-11 RX ADMIN — Medication 500000 UNIT(S): at 17:18

## 2025-05-11 RX ADMIN — TACROLIMUS 1 MILLIGRAM(S): 0.5 CAPSULE ORAL at 11:31

## 2025-05-11 NOTE — PROGRESS NOTE ADULT - PROBLEM SELECTOR PLAN 2
- Immunosuppression:      - Cellcept 1000mg BID      - c/w steroid taper per protocol      - Tacro: c/w tac 1.5 mg twice/day; give 1 now then increase to 2.5/2.5  - Antibiotics      - CTX 2000mg QD for donor strep pneumo+ in CSF. 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

## 2025-05-11 NOTE — PROGRESS NOTE ADULT - PROBLEM SELECTOR PLAN 3
- Peaked at ~4.4, downtrending to ~2.0  - Post-op c/b ZAHRA likely hemodynamic  - last HD 5/4  - Daily BMP, lytes  - Cardiorenal recs appreciated - Peaked at ~4.4, downtrending to ~2.0  - Post-op c/b ZAHRA likely hemodynamic  - last HD 5/4  - Daily BMP, lytes  - Cardiorenal recs appreciated  - start bumex 1 mg daily

## 2025-05-11 NOTE — PROGRESS NOTE ADULT - TIME BILLING
Preparation to see the patient including review of labs and prior notes, performing a physical exam, counseling and educating the patient, ordering medications and tests, documenting the note, and coordinating care.
face-to-face encounter, review of extensive medical records in this and prior charts, laboratory findings, radiographic and microbiology results; documentation as noted above and discussion of diagnostic impressions and plan with the patient and team
- Review of chart and consultation notes  - Exam and discussion with patient  - Review of laboratory and imaging   - Establishing a care plan for patient  - Communication with other providers
face-to-face encounter, review of extensive medical records in this and prior charts, laboratory findings, radiographic and microbiology results; documentation as noted above and discussion of diagnostic impressions and plan with the patient and team
- Ordering, reviewing, and interpreting labs, testing, and imaging.  - Independently obtaining a review of systems and performing a physical exam  - Reviewing consultant documentation/recommendations in addition to discussing plan of care with consultants.  - Counselling and educating patient and family regarding interpretation of aforementioned items and plan of care.
- Ordering, reviewing, and interpreting labs, testing, and imaging.  - Independently obtaining a review of systems and performing a physical exam  - Reviewing consultant documentation/recommendations in addition to discussing plan of care with consultants.  - Counselling and educating patient and family regarding interpretation of aforementioned items and plan of care.
face-to-face encounter, review of extensive medical records in this and prior charts, laboratory findings, radiographic and microbiology results; documentation as noted above and discussion of diagnostic impressions and plan with the patient and team
- Ordering, reviewing, and interpreting labs, testing, and imaging.  - Independently obtaining a review of systems and performing a physical exam  - Reviewing consultant documentation/recommendations in addition to discussing plan of care with consultants.  - Counselling and educating patient and family regarding interpretation of aforementioned items and plan of care.
- Ordering, reviewing, and interpreting labs, testing, and imaging.  - Independently obtaining a review of systems and performing a physical exam  - Reviewing consultant documentation/recommendations.  - Counselling and educating patient regarding interpretation of aforementioned items and plan of care.
- Ordering, reviewing, and/or interpreting labs, testing, and/or imaging  - Independently obtaining a review of systems and performing a physical exam  - Reviewing prior records and where necessary, outpatient records and/or consultant documentation  - Counselling and educating patient and/or family regarding interpretation of aforementioned items and plan of care
Time-based billing (NON-critical care).     The necessity of the time spent during the encounter on this date of service was due to:     - Ordering, reviewing, and interpreting labs, testing, and imaging.  - Independently obtaining a review of systems and performing a physical exam  - Reviewing prior hospitalization and where necessary, outpatient records.  - Counselling and educating patient and family regarding interpretation of aforementioned items and plan of care.
- Ordering, reviewing, and interpreting labs, testing, and imaging.  - Independently obtaining a review of systems and performing a physical exam  - Reviewing consultant documentation/recommendations.  - Counselling and educating patient regarding interpretation of aforementioned items and plan of care.
conducting history and physical examination, reviewing and ordering diagnostic workup as above, discussing with consultants, determining acute diagnoses / plan for continued monitoring and management, and communication with patient and caregivers.
conducting history and physical examination, reviewing and ordering diagnostic workup as above, discussing with consultants, determining acute diagnoses / plan for continued monitoring and management, and communication with patient and caregivers.
- Ordering, reviewing, and interpreting labs, testing, and imaging.  - Independently obtaining a review of systems and performing a physical exam  - Reviewing consultant documentation/recommendations.  - Counselling and educating patient regarding interpretation of aforementioned items and plan of care.
Time-based billing (NON-critical care).     The necessity of the time spent during the encounter on this date of service was due to:     - Ordering, reviewing, and interpreting labs, testing, and imaging.  - Independently obtaining a review of systems and performing a physical exam  - Reviewing prior hospitalization and where necessary, outpatient records.  - Counselling and educating patient and family regarding interpretation of aforementioned items and plan of care.
Time-based billing (NON-critical care).     The necessity of the time spent during the encounter on this date of service was due to:     - Ordering, reviewing, and interpreting labs, testing, and imaging.  - Independently obtaining a review of systems and performing a physical exam  - Reviewing prior hospitalization and where necessary, outpatient records.  - Counselling and educating patient and family regarding interpretation of aforementioned items and plan of care.
- Ordering, reviewing, and/or interpreting labs, testing, and/or imaging  - Independently obtaining a review of systems and performing a physical exam  - Reviewing prior records and where necessary, outpatient records and/or consultant documentation  - Counselling and educating patient and/or family regarding interpretation of aforementioned items and plan of care
Time-based billing (NON-critical care).     The necessity of the time spent during the encounter on this date of service was due to:     - Ordering, reviewing, and interpreting labs, testing, and imaging.  - Independently obtaining a review of systems and performing a physical exam  - Reviewing prior hospitalization and where necessary, outpatient records.  - Counselling and educating patient and family regarding interpretation of aforementioned items and plan of care.
conducting history and physical examination, reviewing and ordering diagnostic workup as above, discussing with consultants, determining acute diagnoses / plan for continued monitoring and management, and communication with patient and caregivers.
- Review of chart and consultation notes  - Exam and discussion with patient  - Review of laboratory and imaging   - Establishing a care plan for patient  - Communication with other providers
- Ordering, reviewing, and interpreting labs, testing, and imaging.  - Independently obtaining a review of systems and performing a physical exam  - Reviewing consultant documentation/recommendations in addition to discussing plan of care with consultants.  - Counselling and educating patient and family regarding interpretation of aforementioned items and plan of care.
conducting history and physical examination, reviewing and ordering diagnostic workup as above, discussing with consultants, determining acute diagnoses / plan for continued monitoring and management, and communication with patient.
Preparation to see the patient including review of labs and prior notes, performing a physical exam, counseling and educating the patient, ordering medications and tests, communicating with cardiology about VT episodes, documenting the note, and coordinating care.
review records, personally evaluate pt , review and order labs, and educate patient
- Ordering, reviewing, and interpreting labs, testing, and imaging.  - Independently obtaining a review of systems and performing a physical exam  - Reviewing consultant documentation/recommendations.  - Counselling and educating patient regarding interpretation of aforementioned items and plan of care.
Time-based billing (NON-critical care).     The necessity of the time spent during the encounter on this date of service was due to:     - Ordering, reviewing, and interpreting labs, testing, and imaging.  - Independently obtaining a review of systems and performing a physical exam  - Reviewing prior hospitalization and where necessary, outpatient records.  - Counselling and educating patient and family regarding interpretation of aforementioned items and plan of care.

## 2025-05-11 NOTE — PROGRESS NOTE ADULT - ASSESSMENT
70-year-old male, with reported Hx of NICM since 2011 HFrEF (LVEF 25-30%) s/p MDT CRT-D with baseline CHB, VT/VF, AFs/p GIANFRANCO ligation/MAZE, MV/TV repair, PVC ablation 3/2024 intolerant to AADs in the past, recent admission 3/19/2025-3/20/2025 for AICD shocks initially presented to the [facility] ED on 3/21/2025, sent by HF specialist for ACID shock. Device reported successful ATP for VT 3/17/2025 at 6:52pm followed by one ATP & 40J shock. He reported feeling dizzy & SOB during the events. He follows with Dr. Luca Tamayo for HF, currently undergoing transplant workup, Dr John for CRTD and VT/VF and Dr. Chu for genetic counseling. While admitted to [facility], he was started on a lidocaine gtt. On 3/21 when lidocaine weaned off patient had another two episodes of VT requiring AICD shocks. He was transferred to [facility] for further management. He was initially maintained on lidocaine gtt from 3/21/2025-3/25/2025 & his listing status was upgraded to UNOS status 2e for heart transplant (ABO O) for the refractory VT. He was transitioned to oral antiarrhythmics (Toprol XL & quinidine) & ultimately transferred from CICU to [facility] floor on 3/27/2025. Hospital course was further c/b development of watery diarrhea & was found to have +norovirus on 3/31/2025 which prompted deactivation to status 7 listing for heart transplant. Status reinstated to 2E after diarrhea resolved.   4/29: s/p  OHT, TV repair, PPM removal   Intra op STACEY with LVEF 20% and mild RV dysfunction.  Extubated on 4/29.    Post Op: Had some initial RV failure/PGD (requiring dobutamine 7.5, epi 0.02, levo, vaso, and Lico.  CRRT started to aid volume removal.  Echo from 4/30 with LVEF 70% and mild RV dysfunction.  Levo/Vaso weaned off on 5/1 and Lico weaned off 5/2. He requiring large amounts of pain medication despite 2 chest tubes being removed POD 1 (ketamine + PCA pump).  Hospital course c/b ?delerium and non-cooporation with care, , Seen by psych 5/2 and started on trazodone + Pristiq. Psychiatric status improved.  Requiring 1:1 sitter now resolved.  NG tube placed 5/3 but now eating/ NGT removed 5/5. He is s/p first surveillance RHC/Bx 5/8. Chest tubes removed in CTU, no PTX on CXR.   5/9: TRANSFERRED TO [facility]. Management per transplant cardiology team. Transplant ID following on regimen per ID and Tx Cardiology. Per report: Donor Syphilis Serology Positive. Per Transplant ID: Continue benzathine penicillin 2,400,000 units weekly x 3 doses total, second dose 5/8, next due 5/15.  5/10 VSS rounds made w/ Dr. Barrientos -& HF team.  will need PICC line for 4 weeks of Ceftriaxone 2g iv qd (strp pneum meningitis in donor) & PCN for syphilis  in donor- d/c plan home this week.  5/11 VSS c/w IV abx. PICC eval for IV abx. Transplant cardiology for diuretic regimen.  70-year-old male, with reported Hx of NICM since 2011 HFrEF (LVEF 25-30%) s/p MDT CRT-D with baseline CHB, VT/VF, AFs/p GIANFRANCO ligation/MAZE, MV/TV repair, PVC ablation 3/2024 intolerant to AADs in the past, recent admission 3/19/2025-3/20/2025 for AICD shocks initially presented to the [facility] ED on 3/21/2025, sent by HF specialist for ACID shock. Device reported successful ATP for VT 3/17/2025 at 6:52pm followed by one ATP & 40J shock. He reported feeling dizzy & SOB during the events. He follows with Dr. Luca Tamayo for HF, currently undergoing transplant workup, Dr John for CRTD and VT/VF and Dr. Chu for genetic counseling. While admitted to [facility], he was started on a lidocaine gtt. On 3/21 when lidocaine weaned off patient had another two episodes of VT requiring AICD shocks. He was transferred to [facility] for further management. He was initially maintained on lidocaine gtt from 3/21/2025-3/25/2025 & his listing status was upgraded to UNOS status 2e for heart transplant (ABO O) for the refractory VT. He was transitioned to oral antiarrhythmics (Toprol XL & quinidine) & ultimately transferred from CICU to [facility] floor on 3/27/2025. Hospital course was further c/b development of watery diarrhea & was found to have +norovirus on 3/31/2025 which prompted deactivation to status 7 listing for heart transplant. Status reinstated to 2E after diarrhea resolved.   4/29: s/p  OHT, TV repair, PPM removal   Intra op STACEY with LVEF 20% and mild RV dysfunction.  Extubated on 4/29.    Post Op: Had some initial RV failure/PGD (requiring dobutamine 7.5, epi 0.02, levo, vaso, and Lico.  CRRT started to aid volume removal.  Echo from 4/30 with LVEF 70% and mild RV dysfunction.  Levo/Vaso weaned off on 5/1 and Lico weaned off 5/2. He requiring large amounts of pain medication despite 2 chest tubes being removed POD 1 (ketamine + PCA pump).  Hospital course c/b ?delerium and non-cooporation with care, , Seen by psych 5/2 and started on trazodone + Pristiq. Psychiatric status improved.  Requiring 1:1 sitter now resolved.  NG tube placed 5/3 but now eating/ NGT removed 5/5. He is s/p first surveillance RHC/Bx 5/8. Chest tubes removed in CTU, no PTX on CXR.   5/9: TRANSFERRED TO [facility]. Management per transplant cardiology team. Transplant ID following on regimen per ID and Tx Cardiology. Per report: Donor Syphilis Serology Positive. Per Transplant ID: Continue benzathine penicillin 2,400,000 units weekly x 3 doses total, second dose 5/8, next due 5/15.  5/10 VSS rounds made w/ Dr. Barrientos -& HF team.  will need PICC line for 4 weeks of Ceftriaxone 2g iv qd (strp pneum meningitis in donor) & PCN for syphilis  in donor- d/c plan home this week.  5/11 VSS c/w IV abx. PICC eval for IV abx. Transplant cardiology for diuretic regimen.  70-year-old male, with reported Hx of NICM since 2011 HFrEF (LVEF 25-30%) s/p MDT CRT-D with baseline CHB, VT/VF, AFs/p GIANFRANCO ligation/MAZE, MV/TV repair, PVC ablation 3/2024 intolerant to AADs in the past, recent admission 3/19/2025-3/20/2025 for AICD shocks initially presented to the [facility] ED on 3/21/2025, sent by HF specialist for ACID shock. Device reported successful ATP for VT 3/17/2025 at 6:52pm followed by one ATP & 40J shock. He reported feeling dizzy & SOB during the events. He follows with Dr. Luca Tamayo for HF, currently undergoing transplant workup, Dr John for CRTD and VT/VF and Dr. Chu for genetic counseling. While admitted to [facility], he was started on a lidocaine gtt. On 3/21 when lidocaine weaned off patient had another two episodes of VT requiring AICD shocks. He was transferred to [facility] for further management. He was initially maintained on lidocaine gtt from 3/21/2025-3/25/2025 & his listing status was upgraded to UNOS status 2e for heart transplant (ABO O) for the refractory VT. He was transitioned to oral antiarrhythmics (Toprol XL & quinidine) & ultimately transferred from CICU to [facility] floor on 3/27/2025. Hospital course was further c/b development of watery diarrhea & was found to have +norovirus on 3/31/2025 which prompted deactivation to status 7 listing for heart transplant. Status reinstated to 2E after diarrhea resolved.   4/29: s/p  OHT, TV repair, PPM removal   Intra op STACEY with LVEF 20% and mild RV dysfunction.  Extubated on 4/29.    Post Op: Had some initial RV failure/PGD (requiring dobutamine 7.5, epi 0.02, levo, vaso, and Lico.  CRRT started to aid volume removal.  Echo from 4/30 with LVEF 70% and mild RV dysfunction.  Levo/Vaso weaned off on 5/1 and Lico weaned off 5/2. He requiring large amounts of pain medication despite 2 chest tubes being removed POD 1 (ketamine + PCA pump).  Hospital course c/b ?delerium and non-cooporation with care, , Seen by psych 5/2 and started on trazodone + Pristiq. Psychiatric status improved.  Requiring 1:1 sitter now resolved.  NG tube placed 5/3 but now eating/ NGT removed 5/5. He is s/p first surveillance RHC/Bx 5/8. Chest tubes removed in CTU, no PTX on CXR.   5/9: TRANSFERRED TO [facility]. Management per transplant cardiology team. Transplant ID following on regimen per ID and Tx Cardiology. Per report: Donor Syphilis Serology Positive. Per Transplant ID: Continue benzathine penicillin 2,400,000 units weekly x 3 doses total, second dose 5/8, next due 5/15.  5/10 VSS rounds made w/ Dr. Barrientos -& HF team.  will need PICC line for 4 weeks of Ceftriaxone 2g iv qd (strp pneum meningitis in donor) & PCN for syphilis  in donor- d/c plan home this week.  5/11 VSS c/w IV abx. PICC eval for IV abx. Transplant cardiology for diuretic regimen --> started on PO Bumex 1mg qD. Tacro lvl 8.6, for 2.5mg BID per Transplant cardiology team.

## 2025-05-11 NOTE — PROGRESS NOTE ADULT - ASSESSMENT
70-year-old male, ABO O, with NICM since 2011 HFrEF (LVEF 25-30%) s/p MDT CRT-D with baseline CHB, VT/VF, AFs/p GIANFRANCO ligation/MAZE, MV/TV repair, PVC ablation 3/2024 intolerant to AADs in the past, recent admission 3/19/2025-3/20/2025 for AICD shocks initially presented to the [facility] ED on 3/21/2025, sent by HF specialist for ACID shock. Device reported successful ATP for VT 3/17/2025 at  ATP & 40J shock. Started on lido gtt with further VT requiring AICD shocks when off lido. He was transferred to [facility] for further management. Upgraded to UNOS status 2e for heart transplant (ABO O) for the refractory VT. He was transitioned to oral antiarrhythmics (Toprol XL & quinidine). Hospital course c/b development Norovirus on 3/31/2025 which prompted deactivation to status 7 listing for heart transplant. Status reinstated to 2E after diarrhea resolved.     He underwent successful OHT on 4/28 + TV ring repair 34mm (CMV +/+, Toxo -/+,ischemic time 258min, intra op 2plts + 2 cryo). Intra op STACEY with LVEF 20% and mild RV dysfunction.  Extubated on 4/29.  Had some initial RV failure/PGD requiring inotropes.  CRRT started to aid volume removal.  Repeat TTE 4/30 with LVEF 70% and mild RV dysfunction. He requiring large amounts of pain medication despite 2 chest tubes being removed POD 1 (ketamine + PCA pump). Had transient delirium which improved. Underwent first surveillance RHC/Bx 5/8 which was negative for rejection.    He is mildly hypervolemic on exam but overall progressing well.     Bedside hemodynamics:  5/2/25: /59/75, , CVP 12, PA 44/15/24, CO/CI 6.8/3.5  5/1/25 BP 99/59/72, , CVP 11, PA 40/17/24, Gucci CO/CI 5.5/2.8  4/29/25 BP 94/57/67, , CVP 11, PA 28/15/20, Gucci CI 2.2  3/22/25 BP 97/76/83, HR 80, CVP 6, PA 58/23/38, PCWP 20, SVR 1100, Gucci CO/CI 5.1/2.6, TD 4/2.08    Cardiac Studies:   RH/Bx 5/8/2025: RA 6 (v 10), RV 28/10, PA 32/11/19, PCWP 11 (v 15), CO/CI 5.93/3.0; ISHLT 0R  TTE 4/30/25: LVEF 71%, no RMWA, RV normal size, reduced RVSF  STACEY 4/28/25 intraop: LVEF 20%, global, dilated RV with mildly reduced RVSF  TTE 3/20/25 : LVEF 30%, segmental, LVIDd 5.3, walls normal, elevated LV filling pressures, mod RVE with mildly reduced RV function, TAPSE 1.7, severely dilated RA, annuloplasty in MV position, transvalvular gradients are elevated with severe prosthetic MS (peak gradient 15.7, mean gradient 8), trace intravalvular MR, TV annuloplasty ring noted, mild TR,  est PASP 36, no evidence of LV thrombus  TTE 10/2024: LVEF 25-30%, LVEDD 5.9cm, moderately reduced RV function, annuloplasty ring of mitral and tricuspid position, PASP 47 mmHg  [facility] 3/19/25- RA 11 PA 58/26 PCWP 32 CO/CI 5.43/2.77  [facility] 6/2023 Nonobstructive CAD

## 2025-05-11 NOTE — PROGRESS NOTE ADULT - SUBJECTIVE AND OBJECTIVE BOX
Interval History:  feels well  denies complaints    Medications:  artificial  tears Solution 1 Drop(s) Both EYES every 12 hours PRN  atovaquone  Suspension 1500 milliGRAM(s) Oral daily  buMETAnide 1 milliGRAM(s) Oral daily  cefTRIAXone   IVPB 2000 milliGRAM(s) IV Intermittent <User Schedule>  chlorhexidine 2% Cloths 1 Application(s) Topical daily  desvenlafaxine ER 50 milliGRAM(s) Oral daily  dextrose 50% Injectable 50 milliLiter(s) IV Push every 15 minutes  dextrose 50% Injectable 25 milliLiter(s) IV Push every 15 minutes  erythromycin   Ointment 1 Application(s) Left EYE every 4 hours  heparin   Injectable 5000 Unit(s) SubCutaneous every 8 hours  hydrOXYzine hydrochloride 10 milliGRAM(s) Oral at bedtime PRN  insulin lispro (ADMELOG) corrective regimen sliding scale   SubCutaneous three times a day before meals  insulin lispro (ADMELOG) corrective regimen sliding scale   SubCutaneous at bedtime  methocarbamol 500 milliGRAM(s) Oral every 8 hours PRN  mycophenolate mofetil 1000 milliGRAM(s) Oral <User Schedule>  naloxegol 12.5 milliGRAM(s) Oral daily  Nephro-piotr 1 Tablet(s) Oral daily  nystatin    Suspension 460997 Unit(s) Oral <User Schedule>  oxymetazoline 0.05% Nasal Spray 2 Spray(s) Both Nostrils two times a day  pantoprazole    Tablet 40 milliGRAM(s) Oral before breakfast  penicillin   G benzathine Injectable 2.4 Million Unit(s) IntraMuscular <User Schedule>  polyethylene glycol 3350 17 Gram(s) Oral two times a day  potassium chloride  10 mEq/50 mL IVPB 10 milliEquivalent(s) IV Intermittent once  potassium chloride  10 mEq/50 mL IVPB 10 milliEquivalent(s) IV Intermittent once  predniSONE   Tablet 15 milliGRAM(s) Oral every 12 hours  senna 2 Tablet(s) Oral at bedtime  sodium chloride 0.9% lock flush 3 milliLiter(s) IV Push every 8 hours  tacrolimus 2.5 milliGRAM(s) Oral <User Schedule>  tamsulosin 0.4 milliGRAM(s) Oral at bedtime  traZODone 150 milliGRAM(s) Oral at bedtime  trimethoprim   80 mG/sulfamethoxazole 400 mG 1 Tablet(s) Oral <User Schedule>  valGANciclovir 450 milliGRAM(s) Oral <User Schedule>  vancomycin    Solution 125 milliGRAM(s) Oral every 12 hours      Vitals:  T(C): 36.4 (25 @ 12:08), Max: 36.6 (25 @ 05:51)  HR: 100 (25 @ 12:08) (88 - 100)  BP: 122/78 (25 @ 12:08) (109/72 - 130/83)  BP(mean): 84 (25 @ 05:51) (84 - 99)  RR: 18 (25 @ 12:08) (18 - 19)  SpO2: 99% (25 @ 12:08) (94% - 99%)    Daily     Daily Weight in k.8 (11 May 2025 07:00)        I&O's Summary    10 May 2025 07:  -  11 May 2025 07:00  --------------------------------------------------------  IN: 480 mL / OUT: 1025 mL / NET: -545 mL    11 May 2025 07:01  -  11 May 2025 15:09  --------------------------------------------------------  IN: 440 mL / OUT: 0 mL / NET: 440 mL        Physical Exam:  Appearance: No Acute Distress  HEENT: PERRL  Neck: JVD approx 8-10 with mild HJR  Cardiovascular: Normal S1 S2, No murmurs/rubs/gallops  Respiratory: Clear to auscultation bilaterally  Gastrointestinal: Soft, Non-tender	  Skin: No cyanosis	  Neurologic: Non-focal  Extremities: No LE edema  Psychiatry: A & O x 3, Mood & affect appropriate    Labs:                        8.2    14.03 )-----------( 204      ( 11 May 2025 06:51 )             24.6         139  |  101  |  84[H]  ----------------------------<  130[H]  4.4   |  22  |  1.95[H]    Ca    8.8      11 May 2025 06:51  Phos  3.2     05-11  Mg     1.7     05-11

## 2025-05-11 NOTE — PROGRESS NOTE ADULT - PROBLEM SELECTOR PLAN 1
s/p heart transplant & TV ring repair, PPM removal  transplant cardiology and ID following closely  Diuresis regimen per transplant cardiology team; Fluid goal -500-1L  First surveillance biopsy 5/8: no s/s rejection per report  immunosuppression per transplant cardiology team  on IV abx per ID  will need PICC prior to discharge  ZAHRA. Cardio-renal following. CRRT on hold since 5/4. monitor BMP, lytes  encourage OOB/increase ambulation as tolerated  dc planning HOME, once medically cleared  Plan per d/w CTS and Transplant Cardiology team and Attendings

## 2025-05-11 NOTE — PROGRESS NOTE ADULT - SUBJECTIVE AND OBJECTIVE BOX
SUBJECTIVE:  "Hi." Denies acute chest pain, palpitations, or shortness of breath    TELEMETRY:  SR    Vital Signs Last 24 Hrs  T(C): 36.6 (25 @ 05:51), Max: 36.6 (05-10-25 @ 12:02)  T(F): 97.9 (25 @ 05:51), Max: 97.9 (25 @ 05:51)  HR: 88 (25 @ 05:51) (88 - 106)  BP: 109/72 (25 @ 05:51) (109/72 - 130/83)  RR: 19 (25 @ 05:51) (18 - 19)  SpO2: 94% (25 @ 05:51) (94% - 97%)           INPUT/OUTPUT:  05-10 @ 07:01  -   @ 07:00  --------------------------------------------------------  IN: 480 mL / OUT: 1025 mL / NET: -545 mL      LABS:    139  |  101  |  84[H]  ----------------------------<  130[H]  4.4   |  22  |  1.95[H]  Ca    8.8      11 May 2025 06:51  Phos  3.2       Mg     1.7                  8.2    14.03 )-----------( 204      ( 11 May 2025 06:51 )             24.6            Daily Weight in k.1 (10 May 2025 10:51)      CAPILLARY BLOOD GLUCOSE  POCT Blood Glucose.: 126 mg/dL (11 May 2025 07:18)  POCT Blood Glucose.: 214 mg/dL (10 May 2025 21:19)  POCT Blood Glucose.: 142 mg/dL (10 May 2025 16:31)  POCT Blood Glucose.: 172 mg/dL (10 May 2025 11:36)      PHYSICAL EXAM:  Neurology: alert and oriented x 3, nonfocal, no gross deficits  CV : S1S2  Sternal Wound :  CDI , Stable  Lungs: nonlabored respirations with no use of accessory muscles  Abdomen: soft, nontender, nondistended, positive bowel sounds, +BM    Extremities:     no edema no calf tenderness      ACTIVE MEDICATIONS:  artificial  tears Solution 1 Drop(s) Both EYES every 12 hours PRN  atovaquone  Suspension 1500 milliGRAM(s) Oral daily  cefTRIAXone   IVPB 2000 milliGRAM(s) IV Intermittent <User Schedule>  chlorhexidine 2% Cloths 1 Application(s) Topical daily  desvenlafaxine ER 50 milliGRAM(s) Oral daily  dextrose 50% Injectable 50 milliLiter(s) IV Push every 15 minutes  dextrose 50% Injectable 25 milliLiter(s) IV Push every 15 minutes  erythromycin   Ointment 1 Application(s) Left EYE every 4 hours  heparin   Injectable 5000 Unit(s) SubCutaneous every 8 hours  hydrOXYzine hydrochloride 10 milliGRAM(s) Oral at bedtime PRN  insulin lispro (ADMELOG) corrective regimen sliding scale   SubCutaneous three times a day before meals  insulin lispro (ADMELOG) corrective regimen sliding scale   SubCutaneous at bedtime  methocarbamol 500 milliGRAM(s) Oral every 8 hours PRN  mycophenolate mofetil 1000 milliGRAM(s) Oral <User Schedule>  naloxegol 12.5 milliGRAM(s) Oral daily  Nephro-piotr 1 Tablet(s) Oral daily  nystatin    Suspension 912741 Unit(s) Oral <User Schedule>  oxymetazoline 0.05% Nasal Spray 2 Spray(s) Both Nostrils two times a day  pantoprazole    Tablet 40 milliGRAM(s) Oral before breakfast  penicillin   G benzathine Injectable 2.4 Million Unit(s) IntraMuscular <User Schedule>  polyethylene glycol 3350 17 Gram(s) Oral two times a day  potassium chloride  10 mEq/50 mL IVPB 10 milliEquivalent(s) IV Intermittent once  potassium chloride  10 mEq/50 mL IVPB 10 milliEquivalent(s) IV Intermittent once  predniSONE   Tablet 15 milliGRAM(s) Oral every 12 hours  senna 2 Tablet(s) Oral at bedtime  sodium chloride 0.9% lock flush 3 milliLiter(s) IV Push every 8 hours  tacrolimus 1.5 milliGRAM(s) Oral <User Schedule>  tamsulosin 0.4 milliGRAM(s) Oral at bedtime  traZODone 150 milliGRAM(s) Oral at bedtime  valGANciclovir 450 milliGRAM(s) Oral <User Schedule>  vancomycin    Solution 125 milliGRAM(s) Oral every 12 hours      Case discussed in detail with Cardiothoracic Team and Attending Dr. Barrientos. Plan below as per discussion.

## 2025-05-12 ENCOUNTER — TRANSCRIPTION ENCOUNTER (OUTPATIENT)
Age: 70
End: 2025-05-12

## 2025-05-12 DIAGNOSIS — D84.9 IMMUNODEFICIENCY, UNSPECIFIED: ICD-10-CM

## 2025-05-12 DIAGNOSIS — E87.5 HYPERKALEMIA: ICD-10-CM

## 2025-05-12 LAB
ALBUMIN SERPL ELPH-MCNC: 3.7 G/DL — SIGNIFICANT CHANGE UP (ref 3.3–5)
ALP SERPL-CCNC: 98 U/L — SIGNIFICANT CHANGE UP (ref 40–120)
ALT FLD-CCNC: 102 U/L — HIGH (ref 10–45)
ANION GAP SERPL CALC-SCNC: 14 MMOL/L — SIGNIFICANT CHANGE UP (ref 5–17)
AST SERPL-CCNC: 24 U/L — SIGNIFICANT CHANGE UP (ref 10–40)
BASOPHILS # BLD AUTO: 0.02 K/UL — SIGNIFICANT CHANGE UP (ref 0–0.2)
BASOPHILS NFR BLD AUTO: 0.1 % — SIGNIFICANT CHANGE UP (ref 0–2)
BILIRUB SERPL-MCNC: 0.6 MG/DL — SIGNIFICANT CHANGE UP (ref 0.2–1.2)
BUN SERPL-MCNC: 73 MG/DL — HIGH (ref 7–23)
CALCIUM SERPL-MCNC: 9.1 MG/DL — SIGNIFICANT CHANGE UP (ref 8.4–10.5)
CHLORIDE SERPL-SCNC: 102 MMOL/L — SIGNIFICANT CHANGE UP (ref 96–108)
CO2 SERPL-SCNC: 23 MMOL/L — SIGNIFICANT CHANGE UP (ref 22–31)
CREAT SERPL-MCNC: 2.04 MG/DL — HIGH (ref 0.5–1.3)
EGFR: 34 ML/MIN/1.73M2 — LOW
EGFR: 34 ML/MIN/1.73M2 — LOW
EOSINOPHIL # BLD AUTO: 0.01 K/UL — SIGNIFICANT CHANGE UP (ref 0–0.5)
EOSINOPHIL NFR BLD AUTO: 0.1 % — SIGNIFICANT CHANGE UP (ref 0–6)
GLUCOSE BLDC GLUCOMTR-MCNC: 127 MG/DL — HIGH (ref 70–99)
GLUCOSE BLDC GLUCOMTR-MCNC: 129 MG/DL — HIGH (ref 70–99)
GLUCOSE BLDC GLUCOMTR-MCNC: 131 MG/DL — HIGH (ref 70–99)
GLUCOSE BLDC GLUCOMTR-MCNC: 204 MG/DL — HIGH (ref 70–99)
GLUCOSE SERPL-MCNC: 140 MG/DL — HIGH (ref 70–99)
HCT VFR BLD CALC: 25.2 % — LOW (ref 39–50)
HGB BLD-MCNC: 8.3 G/DL — LOW (ref 13–17)
IMM GRANULOCYTES NFR BLD AUTO: 1.7 % — HIGH (ref 0–0.9)
LYMPHOCYTES # BLD AUTO: 0.65 K/UL — LOW (ref 1–3.3)
LYMPHOCYTES # BLD AUTO: 4.3 % — LOW (ref 13–44)
MAGNESIUM SERPL-MCNC: 1.7 MG/DL — SIGNIFICANT CHANGE UP (ref 1.6–2.6)
MCHC RBC-ENTMCNC: 28.4 PG — SIGNIFICANT CHANGE UP (ref 27–34)
MCHC RBC-ENTMCNC: 32.9 G/DL — SIGNIFICANT CHANGE UP (ref 32–36)
MCV RBC AUTO: 86.3 FL — SIGNIFICANT CHANGE UP (ref 80–100)
MONOCYTES # BLD AUTO: 0.97 K/UL — HIGH (ref 0–0.9)
MONOCYTES NFR BLD AUTO: 6.4 % — SIGNIFICANT CHANGE UP (ref 2–14)
NEUTROPHILS # BLD AUTO: 13.22 K/UL — HIGH (ref 1.8–7.4)
NEUTROPHILS NFR BLD AUTO: 87.4 % — HIGH (ref 43–77)
NRBC BLD AUTO-RTO: 0 /100 WBCS — SIGNIFICANT CHANGE UP (ref 0–0)
PHOSPHATE SERPL-MCNC: 3.2 MG/DL — SIGNIFICANT CHANGE UP (ref 2.5–4.5)
PLATELET # BLD AUTO: 237 K/UL — SIGNIFICANT CHANGE UP (ref 150–400)
POTASSIUM SERPL-MCNC: 4.4 MMOL/L — SIGNIFICANT CHANGE UP (ref 3.5–5.3)
POTASSIUM SERPL-SCNC: 4.4 MMOL/L — SIGNIFICANT CHANGE UP (ref 3.5–5.3)
PROT SERPL-MCNC: 5.9 G/DL — LOW (ref 6–8.3)
RBC # BLD: 2.92 M/UL — LOW (ref 4.2–5.8)
RBC # FLD: 17.2 % — HIGH (ref 10.3–14.5)
SODIUM SERPL-SCNC: 139 MMOL/L — SIGNIFICANT CHANGE UP (ref 135–145)
TACROLIMUS SERPL-MCNC: 9.1 NG/ML — SIGNIFICANT CHANGE UP
WBC # BLD: 15.13 K/UL — HIGH (ref 3.8–10.5)
WBC # FLD AUTO: 15.13 K/UL — HIGH (ref 3.8–10.5)

## 2025-05-12 PROCEDURE — 99232 SBSQ HOSP IP/OBS MODERATE 35: CPT

## 2025-05-12 PROCEDURE — 99233 SBSQ HOSP IP/OBS HIGH 50: CPT

## 2025-05-12 PROCEDURE — 71045 X-RAY EXAM CHEST 1 VIEW: CPT | Mod: 26

## 2025-05-12 PROCEDURE — G0545: CPT

## 2025-05-12 RX ORDER — CEFTRIAXONE 500 MG/1
2000 INJECTION, POWDER, FOR SOLUTION INTRAMUSCULAR; INTRAVENOUS
Refills: 0 | Status: DISCONTINUED | OUTPATIENT
Start: 2025-05-13 | End: 2025-05-14

## 2025-05-12 RX ORDER — ASPIRIN 325 MG
1 TABLET ORAL
Qty: 30 | Refills: 0
Start: 2025-05-12 | End: 2025-06-10

## 2025-05-12 RX ORDER — MAGNESIUM SULFATE 500 MG/ML
2 SYRINGE (ML) INJECTION ONCE
Refills: 0 | Status: COMPLETED | OUTPATIENT
Start: 2025-05-12 | End: 2025-05-12

## 2025-05-12 RX ORDER — BUMETANIDE 1 MG/1
1 TABLET ORAL
Qty: 30 | Refills: 5 | Status: ACTIVE | COMMUNITY
Start: 2025-05-12 | End: 1900-01-01

## 2025-05-12 RX ORDER — SODIUM ZIRCONIUM CYCLOSILICATE 10 G/10G
10 POWDER, FOR SUSPENSION ORAL DAILY
Qty: 60 | Refills: 5 | Status: ACTIVE | COMMUNITY
Start: 2025-05-12 | End: 1900-01-01

## 2025-05-12 RX ORDER — TAMSULOSIN HYDROCHLORIDE 0.4 MG/1
1 CAPSULE ORAL
Qty: 30 | Refills: 0
Start: 2025-05-12 | End: 2025-06-10

## 2025-05-12 RX ORDER — TACROLIMUS 0.5 MG/1
4 CAPSULE ORAL
Qty: 240 | Refills: 0
Start: 2025-05-12 | End: 2025-06-10

## 2025-05-12 RX ORDER — PANTOPRAZOLE 40 MG/1
40 TABLET, DELAYED RELEASE ORAL
Qty: 30 | Refills: 5 | Status: ACTIVE | COMMUNITY
Start: 2025-05-12 | End: 1900-01-01

## 2025-05-12 RX ORDER — B1/B2/B3/B5/B6/B12/VIT C/FOLIC 500-0.5 MG
1 TABLET ORAL
Qty: 30 | Refills: 0
Start: 2025-05-12 | End: 2025-06-10

## 2025-05-12 RX ORDER — ATOVAQUONE 750 MG/5ML
750 SUSPENSION ORAL DAILY
Qty: 300 | Refills: 5 | Status: ACTIVE | COMMUNITY
Start: 2025-05-12 | End: 1900-01-01

## 2025-05-12 RX ORDER — PREDNISONE 20 MG/1
1 TABLET ORAL
Qty: 60 | Refills: 0
Start: 2025-05-12 | End: 2025-06-10

## 2025-05-12 RX ORDER — ASCORBIC ACID, THIAMINE, RIBOFLAVIN, NIACINAMIDE, PYRIDOXINE, FOLIC ACID, COBALAMIN, BIOTIN, PANTOTHENIC ACID 100; 1.5; 1.7; 20; 10; 1; 6; 300; 1 MG/1; MG/1; MG/1; MG/1; MG/1; MG/1; UG/1; UG/1; MG/1
TABLET, COATED ORAL
Qty: 30 | Refills: 5 | Status: ACTIVE | COMMUNITY
Start: 2025-05-12 | End: 1900-01-01

## 2025-05-12 RX ORDER — DESVENLAFAXINE 50 MG/1
50 TABLET, EXTENDED RELEASE ORAL DAILY
Qty: 30 | Refills: 5 | Status: ACTIVE | COMMUNITY
Start: 2025-05-12 | End: 1900-01-01

## 2025-05-12 RX ORDER — TRAZODONE HCL 100 MG
1 TABLET ORAL
Qty: 30 | Refills: 0
Start: 2025-05-12 | End: 2025-06-10

## 2025-05-12 RX ORDER — VALGANCICLOVIR 450 MG/1
2 TABLET, FILM COATED ORAL
Qty: 60 | Refills: 0
Start: 2025-05-12 | End: 2025-06-10

## 2025-05-12 RX ORDER — DESVENLAFAXINE 25 MG/1
50 TABLET, EXTENDED RELEASE ORAL
Qty: 30 | Refills: 0
Start: 2025-05-12 | End: 2025-06-10

## 2025-05-12 RX ORDER — NYSTATIN 100000 [USP'U]/ML
100000 SUSPENSION ORAL 4 TIMES DAILY
Qty: 600 | Refills: 5 | Status: ACTIVE | COMMUNITY
Start: 2025-05-12 | End: 1900-01-01

## 2025-05-12 RX ORDER — MYCOPHENOLATE MOFETIL 500 MG/1
4 TABLET, FILM COATED ORAL
Qty: 240 | Refills: 0
Start: 2025-05-12 | End: 2025-06-10

## 2025-05-12 RX ORDER — ATOVAQUONE 750 MG/5ML
10 SUSPENSION ORAL
Qty: 300 | Refills: 0
Start: 2025-05-12 | End: 2025-06-10

## 2025-05-12 RX ORDER — PRAVASTATIN SODIUM 40 MG/1
40 TABLET ORAL
Qty: 30 | Refills: 5 | Status: ACTIVE | COMMUNITY
Start: 2025-05-12 | End: 1900-01-01

## 2025-05-12 RX ORDER — MYCOPHENOLATE MOFETIL 250 MG/1
250 CAPSULE ORAL TWICE DAILY
Qty: 240 | Refills: 5 | Status: ACTIVE | COMMUNITY
Start: 2025-05-12 | End: 1900-01-01

## 2025-05-12 RX ORDER — PREDNISONE 10 MG/1
10 TABLET ORAL
Qty: 60 | Refills: 5 | Status: ACTIVE | COMMUNITY
Start: 2025-05-12 | End: 1900-01-01

## 2025-05-12 RX ORDER — SODIUM ZIRCONIUM CYCLOSILICATE 5 G/5G
1 POWDER, FOR SUSPENSION ORAL
Qty: 30 | Refills: 0
Start: 2025-05-12 | End: 2025-06-10

## 2025-05-12 RX ORDER — TAMSULOSIN HYDROCHLORIDE 0.4 MG/1
0.4 CAPSULE ORAL
Qty: 30 | Refills: 5 | Status: ACTIVE | COMMUNITY
Start: 2025-05-12 | End: 1900-01-01

## 2025-05-12 RX ORDER — NALOXEGOL OXALATE 12.5 MG/1
12.5 TABLET, FILM COATED ORAL DAILY
Qty: 30 | Refills: 5 | Status: ACTIVE | COMMUNITY
Start: 2025-05-12 | End: 1900-01-01

## 2025-05-12 RX ORDER — CALCIUM CARBONATE/VITAMIN D3 500MG-5MCG
1 TABLET ORAL
Qty: 60 | Refills: 0
Start: 2025-05-12 | End: 2025-06-10

## 2025-05-12 RX ORDER — TACROLIMUS 0.5 MG/1
1 CAPSULE ORAL
Qty: 60 | Refills: 0
Start: 2025-05-12 | End: 2025-06-10

## 2025-05-12 RX ORDER — TRAZODONE HYDROCHLORIDE 150 MG/1
150 TABLET ORAL
Qty: 30 | Refills: 5 | Status: ACTIVE | COMMUNITY
Start: 2025-05-12 | End: 1900-01-01

## 2025-05-12 RX ORDER — NALOXEGOL OXALATE 12.5 MG/1
1 TABLET, FILM COATED ORAL
Qty: 30 | Refills: 0
Start: 2025-05-12 | End: 2025-06-10

## 2025-05-12 RX ADMIN — Medication 500000 UNIT(S): at 17:32

## 2025-05-12 RX ADMIN — BUMETANIDE 1 MILLIGRAM(S): 1 TABLET ORAL at 05:49

## 2025-05-12 RX ADMIN — HYDROXYZINE HYDROCHLORIDE 10 MILLIGRAM(S): 25 TABLET, FILM COATED ORAL at 20:43

## 2025-05-12 RX ADMIN — HEPARIN SODIUM 5000 UNIT(S): 1000 INJECTION INTRAVENOUS; SUBCUTANEOUS at 05:50

## 2025-05-12 RX ADMIN — Medication 500000 UNIT(S): at 20:41

## 2025-05-12 RX ADMIN — INSULIN LISPRO 4: 100 INJECTION, SOLUTION INTRAVENOUS; SUBCUTANEOUS at 12:29

## 2025-05-12 RX ADMIN — ATOVAQUONE 1500 MILLIGRAM(S): 750 SUSPENSION ORAL at 11:48

## 2025-05-12 RX ADMIN — HEPARIN SODIUM 5000 UNIT(S): 1000 INJECTION INTRAVENOUS; SUBCUTANEOUS at 18:00

## 2025-05-12 RX ADMIN — VALGANCICLOVIR 450 MILLIGRAM(S): 450 TABLET, FILM COATED ORAL at 11:48

## 2025-05-12 RX ADMIN — Medication 40 MILLIGRAM(S): at 05:49

## 2025-05-12 RX ADMIN — Medication 500000 UNIT(S): at 08:00

## 2025-05-12 RX ADMIN — Medication 125 MILLIGRAM(S): at 18:00

## 2025-05-12 RX ADMIN — MYCOPHENOLATE MOFETIL 1000 MILLIGRAM(S): 500 TABLET, FILM COATED ORAL at 07:59

## 2025-05-12 RX ADMIN — Medication 3 MILLILITER(S): at 06:19

## 2025-05-12 RX ADMIN — Medication 2 SPRAY(S): at 05:51

## 2025-05-12 RX ADMIN — PREDNISONE 15 MILLIGRAM(S): 20 TABLET ORAL at 05:50

## 2025-05-12 RX ADMIN — TACROLIMUS 2.5 MILLIGRAM(S): 0.5 CAPSULE ORAL at 08:00

## 2025-05-12 RX ADMIN — Medication 3 MILLILITER(S): at 21:41

## 2025-05-12 RX ADMIN — DESVENLAFAXINE 50 MILLIGRAM(S): 25 TABLET, EXTENDED RELEASE ORAL at 11:48

## 2025-05-12 RX ADMIN — Medication 500000 UNIT(S): at 11:49

## 2025-05-12 RX ADMIN — Medication 25 GRAM(S): at 08:00

## 2025-05-12 RX ADMIN — TAMSULOSIN HYDROCHLORIDE 0.4 MILLIGRAM(S): 0.4 CAPSULE ORAL at 20:41

## 2025-05-12 RX ADMIN — PREDNISONE 15 MILLIGRAM(S): 20 TABLET ORAL at 18:00

## 2025-05-12 RX ADMIN — MYCOPHENOLATE MOFETIL 1000 MILLIGRAM(S): 500 TABLET, FILM COATED ORAL at 20:41

## 2025-05-12 RX ADMIN — Medication 125 MILLIGRAM(S): at 05:49

## 2025-05-12 RX ADMIN — Medication 1 APPLICATION(S): at 12:25

## 2025-05-12 RX ADMIN — TACROLIMUS 2.5 MILLIGRAM(S): 0.5 CAPSULE ORAL at 20:41

## 2025-05-12 RX ADMIN — Medication 3 MILLILITER(S): at 14:05

## 2025-05-12 RX ADMIN — Medication 150 MILLIGRAM(S): at 20:40

## 2025-05-12 RX ADMIN — CEFTRIAXONE 100 MILLIGRAM(S): 500 INJECTION, POWDER, FOR SOLUTION INTRAMUSCULAR; INTRAVENOUS at 12:29

## 2025-05-12 RX ADMIN — Medication 1 TABLET(S): at 11:48

## 2025-05-12 NOTE — PROGRESS NOTE ADULT - SUBJECTIVE AND OBJECTIVE BOX
SUBJECTIVE: "Hi." Denies acute chest pain, palpitations, or shortness of breath    TELEMETRY:  SR    Vital Signs Last 24 Hrs  T(C): 36.6 (25 @ 05:43), Max: 36.8 (25 @ 19:01)  T(F): 97.9 (25 @ 05:43), Max: 98.2 (25 @ 19:01)  HR: 99 (25 05:43) (98 - 100)  BP: 112/78 (25 @ 05:43) (112/78 - 124/85)  RR: 19 (25 @ 05:43) (18 - 19)  SpO2: 96% (25 @ 05:43) (96% - 99%)           INPUT/OUTPUT:   @ 07:01  -   @ 07:00  --------------------------------------------------------  IN: 830 mL / OUT: 900 mL / NET: -70 mL      LABS:    139  |  102  |  73[H]  ----------------------------<  140[H]  4.4   |  23  |  2.04[H]  Ca    9.1      12 May 2025 06:38  Phos  3.2       Mg     1.7       TPro  5.9[L]  /  Alb  3.7  /  TBili  0.6  /  DBili  x   /  AST  24  /  ALT  102[H]  /  AlkPhos  98                        8.3    15.13 )-----------( 237      ( 12 May 2025 06:36 )             25.2             Daily Weight in k (12 May 2025 05:43)      CAPILLARY BLOOD GLUCOSE  POCT Blood Glucose.: 129 mg/dL (12 May 2025 07:29)  POCT Blood Glucose.: 141 mg/dL (11 May 2025 21:51)  POCT Blood Glucose.: 172 mg/dL (11 May 2025 17:35)  POCT Blood Glucose.: 174 mg/dL (11 May 2025 16:33)  POCT Blood Glucose.: 160 mg/dL (11 May 2025 12:04)      PHYSICAL EXAM:  Neurology: alert and oriented x 3, nonfocal, no gross deficits  CV : S1S2  Sternal Wound :  CDI , Stable  Lungs: nonlabored respirations with no use of accessory muscles  Abdomen: soft, nontender, nondistended, positive bowel sounds, +BM    Extremities:     no edema no calf tenderness      ACTIVE MEDICATIONS:  artificial  tears Solution 1 Drop(s) Both EYES every 12 hours PRN  atovaquone  Suspension 1500 milliGRAM(s) Oral daily  buMETAnide 1 milliGRAM(s) Oral daily  cefTRIAXone   IVPB 2000 milliGRAM(s) IV Intermittent <User Schedule>  chlorhexidine 2% Cloths 1 Application(s) Topical daily  desvenlafaxine ER 50 milliGRAM(s) Oral daily  dextrose 50% Injectable 50 milliLiter(s) IV Push every 15 minutes  dextrose 50% Injectable 25 milliLiter(s) IV Push every 15 minutes  erythromycin   Ointment 1 Application(s) Left EYE every 4 hours  heparin   Injectable 5000 Unit(s) SubCutaneous every 8 hours  hydrOXYzine hydrochloride 10 milliGRAM(s) Oral at bedtime PRN  insulin lispro (ADMELOG) corrective regimen sliding scale   SubCutaneous three times a day before meals  insulin lispro (ADMELOG) corrective regimen sliding scale   SubCutaneous at bedtime  methocarbamol 500 milliGRAM(s) Oral every 8 hours PRN  mycophenolate mofetil 1000 milliGRAM(s) Oral <User Schedule>  naloxegol 12.5 milliGRAM(s) Oral daily  Nephro-piotr 1 Tablet(s) Oral daily  nystatin    Suspension 108670 Unit(s) Oral <User Schedule>  oxymetazoline 0.05% Nasal Spray 2 Spray(s) Both Nostrils two times a day  pantoprazole    Tablet 40 milliGRAM(s) Oral before breakfast  penicillin   G benzathine Injectable 2.4 Million Unit(s) IntraMuscular <User Schedule>  polyethylene glycol 3350 17 Gram(s) Oral two times a day  potassium chloride  10 mEq/50 mL IVPB 10 milliEquivalent(s) IV Intermittent once  potassium chloride  10 mEq/50 mL IVPB 10 milliEquivalent(s) IV Intermittent once  predniSONE   Tablet 15 milliGRAM(s) Oral every 12 hours  senna 2 Tablet(s) Oral at bedtime  sodium chloride 0.9% lock flush 3 milliLiter(s) IV Push every 8 hours  tacrolimus 2.5 milliGRAM(s) Oral <User Schedule>  tamsulosin 0.4 milliGRAM(s) Oral at bedtime  traZODone 150 milliGRAM(s) Oral at bedtime  valGANciclovir 450 milliGRAM(s) Oral <User Schedule>  vancomycin    Solution 125 milliGRAM(s) Oral every 12 hours      Case discussed in detail with CTS and Transplant Cardiology teams and Attendings. Plan below as per discussion.

## 2025-05-12 NOTE — DISCHARGE NOTE NURSING/CASE MANAGEMENT/SOCIAL WORK - FINANCIAL ASSISTANCE
[facility] provides services at a reduced cost to those who are determined to be eligible through [facility]’s financial assistance program. Information regarding [facility]’s financial assistance program can be found by going to [email or web address] or by calling [phone].

## 2025-05-12 NOTE — ADVANCED PRACTICE NURSE CONSULT - ASSESSMENT
Central Line Catheter Insertion Note  Patient or patient representative educated about central line associated blood stream infection prevention practices.  Catheter type: 4F,  SL Picc  : Bard  Power injectable: Yes  LOT#[id number]  EXP DATE 2026-3-31    Informed consent obtained by covering floor team.  Procedure assisted by: FAN Dunn RN  Time out was preformed, confirming the patient's first and last name, date of birth, MR #, procedure, and correct site prior to start of procedure.    Patient was placed with HOB 30 degrees. Patient placement site was prepped with chlorhexidine solution, then draped using maximum sterile barrier protection. The area was injected with 2ml of 1% lidocaine. Using the Bard Site Rite 8, the catheter was placed using the Modified Seldinger Technique. Strict adherence to outline aseptic technique including handwashing, glove and gown, utilizing mask and cap, plus draping the patient with a sterile drape was observed. Upon completion of line placement, the insertion site was covered with a sterile CHG dressing. Pt tolerated procedure well. V/S stable    All materials used for catheter insertion, including the intact guide wires, were accounted for at the end of the procedure.  Number of attempts: 1  Complications/Comments: None    Emergency Placement: No  Site: New  Anatomical Site of insertion: R Brachial vein  Catheter size/length: 4F,   40cm  US guided Bard single lumen power picc placed    Post procedure verification with chest Xray as per orders.

## 2025-05-12 NOTE — PROGRESS NOTE ADULT - PROBLEM SELECTOR PLAN 1
s/p heart transplant & TV ring repair, PPM removal  transplant cardiology and ID following closely  Diuresis regimen per transplant cardiology team; Fluid goal -500-1L  First surveillance biopsy 5/8: no s/s rejection per report  immunosuppression per transplant cardiology team  on IV abx per ID  PICC team aware, pending PICC placement today  ZAHRA. Cardio-renal following. CRRT on hold since 5/4. monitor BMP, lytes  encourage OOB/increase ambulation as tolerated  dc planning HOME, once medically cleared  Plan per d/w CTS and Transplant Cardiology team and Attendings

## 2025-05-12 NOTE — DISCHARGE NOTE NURSING/CASE MANAGEMENT/SOCIAL WORK - NSDCFUADDAPPT_GEN_ALL_CORE_FT
Patient advised they did not want to proceed with scheduling appointments with the providers on their referrals. They will coordinate care on their own.     Prior to outreaching the patient, it was visible that the patient has secured a follow up appointment which was not scheduled by our team. Patient is scheduled with Dr. Bryan 6/20/25 11:00am [address] [place] [address]    Prior to outreaching the patient, it was visible that the patient has secured a follow up appointment which was not scheduled by our team. Patient is scheduled with Dr. Tamayo 4/30 1:00pm [address] [place] [address]

## 2025-05-12 NOTE — PROGRESS NOTE ADULT - CRITICAL CARE ATTENDING COMMENT
70yrs.   Non ishcemic. s/p CRTD.   Admitted since March 21st. Refractory VT.   Listed as status 2e. ABO 0 off inotropes and MCS.   s/p transplant April 28th. ? Tv ring repair.   Donor positive for strep G in CSF. Donor RPR positive.   Post transplant course:   PGD. (LVEF 20%) with recovery.   CRT- d/c May 4th. Pre transplant normal renal function.   Post op delierium resolved.   s/p first Bx. 0R.   Now: ambulating.   meds: mepron, rocephin IV, nystatin SS, Pen V X 2, valcyte 450 biw, vanco po, prograf 2.5 bid (incr yesterday) (9.1, 8.6), pred 15 bid, cellcept 1g bid, 70yrs.   Non ishcemic. s/p CRTD.   Admitted since March 21st. Refractory VT.   Listed as status 2e. ABO 0 off inotropes and MCS.   s/p transplant April 28th. ? Tv ring repair.   Donor positive for strep G in CSF. Donor RPR positive.   CMV donor /recip +/+  Toxo -/+   Post transplant course:   PGD. (LVEF 20%) with recovery.   CRT- d/c May 4th. Pre transplant normal renal function.   Post op delierium resolved.   s/p first Bx. 0R.   Now: ambulating.   meds: mepron, rocephin IV, nystatin SS, Pen V X 2, valcyte 450 biw, vanco po, prograf 2.5 bid (incr yesterday) (9.1, 8.6), pred 15 bid, cellcept 1g bid, bumex 1mg QD.   HR , 112/-120/ 176, (178)   05-12    139  |  102  |  73[H]  ----------------------------<  140[H]  4.4   |  23  |  2.04[H] (1.9, 2.3)     Ca    9.1      12 May 2025 06:38  Phos  3.2     05-12  Mg     1.7     05-12    TPro  5.9[L]  /  Alb  3.7  /  TBili  0.6  /  DBili  x   /  AST  24  /  ALT  102[H]  /  AlkPhos  98  05-12                        8.3    15.13 )-----------( 237      ( 12 May 2025 06:36 )             25.2   Discussed on MDR.   Plan;   Possible d/c tomorrow.   Will target prograf level 9-11 in view of age and renal function. No change in prograf today.   Continue current dose of diuretics.   Arrange for outpatient IV antibiotics. Transplant ID to dictate duration.   Transplant pharmacy to confirm valcyte dose based on renal function.   Kevin Urbina

## 2025-05-12 NOTE — PROGRESS NOTE ADULT - SUBJECTIVE AND OBJECTIVE BOX
INFECTIOUS DISEASES FOLLOW UP-- Debbie Mathews  [phone]    This is a follow up note for this  70yMale with  Cardiogenic shock    good spirits- no new complaints    ROS:  CONSTITUTIONAL:  No fever, good appetite  CARDIOVASCULAR:  No chest pain or palpitations  RESPIRATORY:  No dyspnea  GASTROINTESTINAL:  No nausea, vomiting, diarrhea, or abdominal pain  GENITOURINARY:  No dysuria  NEUROLOGIC:  No headache,     Allergies    No Known Allergies    Intolerances        ANTIBIOTICS/RELEVANT:  antimicrobials  atovaquone  Suspension 1500 milliGRAM(s) Oral daily  nystatin    Suspension 310103 Unit(s) Oral <User Schedule>  penicillin   G benzathine Injectable 2.4 Million Unit(s) IntraMuscular <User Schedule>  valGANciclovir 450 milliGRAM(s) Oral <User Schedule>  vancomycin    Solution 125 milliGRAM(s) Oral every 12 hours    immunologic:  mycophenolate mofetil 1000 milliGRAM(s) Oral <User Schedule>  tacrolimus 2.5 milliGRAM(s) Oral <User Schedule>    OTHER:  artificial  tears Solution 1 Drop(s) Both EYES every 12 hours PRN  buMETAnide 1 milliGRAM(s) Oral daily  chlorhexidine 2% Cloths 1 Application(s) Topical daily  desvenlafaxine ER 50 milliGRAM(s) Oral daily  dextrose 50% Injectable 50 milliLiter(s) IV Push every 15 minutes  dextrose 50% Injectable 25 milliLiter(s) IV Push every 15 minutes  erythromycin   Ointment 1 Application(s) Left EYE every 4 hours  heparin   Injectable 5000 Unit(s) SubCutaneous every 8 hours  hydrOXYzine hydrochloride 10 milliGRAM(s) Oral at bedtime PRN  insulin lispro (ADMELOG) corrective regimen sliding scale   SubCutaneous three times a day before meals  insulin lispro (ADMELOG) corrective regimen sliding scale   SubCutaneous at bedtime  methocarbamol 500 milliGRAM(s) Oral every 8 hours PRN  naloxegol 12.5 milliGRAM(s) Oral daily  Nephro-piotr 1 Tablet(s) Oral daily  oxymetazoline 0.05% Nasal Spray 2 Spray(s) Both Nostrils two times a day  pantoprazole    Tablet 40 milliGRAM(s) Oral before breakfast  polyethylene glycol 3350 17 Gram(s) Oral two times a day  potassium chloride  10 mEq/50 mL IVPB 10 milliEquivalent(s) IV Intermittent once  potassium chloride  10 mEq/50 mL IVPB 10 milliEquivalent(s) IV Intermittent once  predniSONE   Tablet 15 milliGRAM(s) Oral every 12 hours  senna 2 Tablet(s) Oral at bedtime  sodium chloride 0.9% lock flush 3 milliLiter(s) IV Push every 8 hours  tamsulosin 0.4 milliGRAM(s) Oral at bedtime  traZODone 150 milliGRAM(s) Oral at bedtime      Objective:  Vital Signs Last 24 Hrs  T(C): 36.6 (12 May 2025 19:00), Max: 36.6 (12 May 2025 05:43)  T(F): 97.8 (12 May 2025 19:00), Max: 97.9 (12 May 2025 05:43)  HR: 103 (12 May 2025 19:00) (99 - 104)  BP: 121/83 (12 May 2025 19:00) (112/78 - 141/98)  BP(mean): 89 (12 May 2025 05:43) (89 - 89)  RR: 18 (12 May 2025 19:00) (18 - 19)  SpO2: 96% (12 May 2025 19:00) (96% - 98%)    Parameters below as of 12 May 2025 19:00  Patient On (Oxygen Delivery Method): room air        PHYSICAL EXAM:  Constitutional:no acute distress  Eyes:ELAINE, EOMI  Ear/Nose/Throat: no oral lesions, 	  Respiratory: clear BL sternotomy site CDI  Cardiovascular: S1S2  Gastrointestinal:soft, (+) BS, no tenderness  Extremities:no e/e/c  No Lymphadenopathy  IV sites not inflammed.    LABS:                        8.3    15.13 )-----------( 237      ( 12 May 2025 06:36 )             25.2     05-12    139  |  102  |  73[H]  ----------------------------<  140[H]  4.4   |  23  |  2.04[H]    Ca    9.1      12 May 2025 06:38  Phos  3.2     05-12  Mg     1.7     05-12    TPro  5.9[L]  /  Alb  3.7  /  TBili  0.6  /  DBili  x   /  AST  24  /  ALT  102[H]  /  AlkPhos  98  05-12      Urinalysis Basic - ( 12 May 2025 06:38 )    Color: x / Appearance: x / SG: x / pH: x  Gluc: 140 mg/dL / Ketone: x  / Bili: x / Urobili: x   Blood: x / Protein: x / Nitrite: x   Leuk Esterase: x / RBC: x / WBC x   Sq Epi: x / Non Sq Epi: x / Bacteria: x        MICROBIOLOGY:    Cytomegalovirus By PCR: Det <34.5 IU/mL (05.08.25 @ 00:23)            RECENT CULTURES:      RADIOLOGY & ADDITIONAL STUDIES:    < from: Xray Chest 1 View- PORTABLE-Urgent (Xray Chest 1 View- PORTABLE-Urgent .) (05.09.25 @ 07:02) >  IMPRESSION:  Trace bilateral pleural effusions with associated basilar atelectasis.  Interval removal of mediastinal drains.  No pneumothorax.    --- End of Report ---    < end of copied text >

## 2025-05-12 NOTE — CHART NOTE - NSCHARTNOTESELECT_GEN_ALL_CORE
IR
Nutrition Services
QTC/Event Note
Nutrition Services
Transfer Note

## 2025-05-12 NOTE — ADVANCED PRACTICE NURSE CONSULT - REASON FOR CONSULT
Vascular Access Team    Evaluation for: Bedside PICC placement  Requested by name: Dana Kevin  Date/Time: 5/10 @ 11:10    Indication: heart transjplant. 4/28 -  Allergy to CHG or Heparin or Lidocaine:     Platelets(>20): 184  INR(<3): 1.17  eGFR(>40): 14  Blood cultures sent: no  Blood culture negative in 48hrs: NA  Anticoagulants: Hep SQ  Arms DVT: no, bilateral superficial cephalic vein thrombosis   Mastectomy: no  Fistula: no  PPM/Defib: no  IR or Nephrology or ID clearance needed: yes, IR    Consent obtained: pending    Pending: consent and IR clearance for eGFR <40    Plan: Bedside picc order evaluated. Please obtain PICC placement consent and then call [phone] for the VAT RN to place the bedside picc.  
Vascular Access Team    Evaluation for: Bedside SL PICC Placement  Requested by name: Regina Mclain  Date/Time: May 6th, 18:02pm    Indication: IV anbiotics  Allergy to CHG or Heparin or Lidocaine: no     Anticoagulants/ antiplatelets: heparin subq    Platelets(>20): 155  INR(<3): 1.17  eGFR(>40): 14  Blood cultures sent: not sent  Blood culture results in 48hrs: n/a    Arm restrictions: n/a    Consent obtained:    Pending: IR clearance for eGFR of 14 and consent    Comments: Bedside picc order evaluated. Please obtain PICC placement consent and then call [phone] for the VAT RN.  
Bedside picc order placed.   Indication: SL picc placement for long term antibiotics

## 2025-05-12 NOTE — PROGRESS NOTE ADULT - ASSESSMENT
69-year-old male patient past medical history of NICM since 2011, HFrEF LVEF 25-30% s/p MDT CRT-D with baseline CHB, VT/VF, a.fib s/p GIANFRANCO ligation/MAZE, MV/TV repair, PVC ablation 3/2024 intolerant to AADs in the past, recent admission 3/19 - 3/20/25 for AICD shocks initially presented to the [facility] emergency department on 3/21/2025, sent by heart failure specialist for ACID shock. Device reported successful ATP for VT 3/17 at 6:52pm followed by one ATP and 40J shock. He reported feeling dizzy and SOB during the events. He follows with Dr paula abreu for HF, currently undergoing transplant workup, Dr John for CRTD and VT/VF and  for genetic counseling. While admitted to [facility], he was started on a lidocaine gtt. On 3/21 when lidocaine weaned off patient had another two episodes of VT requiring AICD shocks. He was transferred to [facility] for further management.     Status Post OHT 4/29/25 also with removal of ICD CRT, tricuspid valve repair (34 mm Physio ring)  Per report no evidence of vegetations on valves at time of transplant    Donor CSF culture (April 22) heavy growth Streptococcus pneumoniae.  Ceftriaxone <= 0.25 (susceptible) penicillin 0.12 (resistant for CSF) vancomycin 0.25 (susceptible)    Donor TTE without evidence of vegetations  Donor CT C/A/P with no acute abnormality of abdomen or pelvis.  Left lower lobe consolidation  Donor blood cultures were with no growth to date    #Heart transplant recipient (CMV +/+, EBV +/+, Toxo -/+)  s/p vancomycin and cefepime for 72 hours as perioperative coverage  --Will need initiation of Valcyte for CMV prophylaxis  --Will need initiation of Bactrim for PCP and toxoplasma prophylaxis    #Donor Streptococcus pneumoniae meningitis    Plans to complete 4 weeks of IV ceftriaxone 2gm daily through 5/29  -would repeat TTE as an out patient toward the end of therapy    # Donor syphilis serology positive  Patient received 2 doses of 2.4 million nits of Benzathine PCN 5/1 and 5/8  Will need a third dose on 5/15 or in the later evening on 5/14 prior to discharge as he lives far away    follow up in the out patient practice in 4weeks        Dhiraj Mathews MD  Can be called via Teams  After 5pm/weekends [phone]

## 2025-05-12 NOTE — DISCHARGE NOTE NURSING/CASE MANAGEMENT/SOCIAL WORK - NSDCPEFALRISK_GEN_ALL_CORE
For information on Fall & Injury Prevention, visit: [email or web address] OR  [email or web address] OR  https://www.cdc.gov/steadi/patient.html

## 2025-05-12 NOTE — DISCHARGE NOTE NURSING/CASE MANAGEMENT/SOCIAL WORK - PATIENT PORTAL LINK FT
You can access the FollowMyHealth Patient Portal offered by [facility] by registering at the following website: [email or web address]. By joining [employer]s FollowMyHealth portal, you will also be able to view your health information using other applications (apps) compatible with our system.

## 2025-05-12 NOTE — PROGRESS NOTE ADULT - SUBJECTIVE AND OBJECTIVE BOX
ADVANCED HEART FAILURE & TRANSPLANT  - PROGRESS NOTE  *To reach the NS1 Team from 8am to 5pm (MON-FRI), please call [phone],   _______________________________________________________________________________________________________     Subjective:    Medications:  artificial  tears Solution 1 Drop(s) Both EYES every 12 hours PRN  atovaquone  Suspension 1500 milliGRAM(s) Oral daily  buMETAnide 1 milliGRAM(s) Oral daily  cefTRIAXone   IVPB 2000 milliGRAM(s) IV Intermittent <User Schedule>  chlorhexidine 2% Cloths 1 Application(s) Topical daily  desvenlafaxine ER 50 milliGRAM(s) Oral daily  dextrose 50% Injectable 50 milliLiter(s) IV Push every 15 minutes  dextrose 50% Injectable 25 milliLiter(s) IV Push every 15 minutes  erythromycin   Ointment 1 Application(s) Left EYE every 4 hours  heparin   Injectable 5000 Unit(s) SubCutaneous every 8 hours  hydrOXYzine hydrochloride 10 milliGRAM(s) Oral at bedtime PRN  insulin lispro (ADMELOG) corrective regimen sliding scale   SubCutaneous three times a day before meals  insulin lispro (ADMELOG) corrective regimen sliding scale   SubCutaneous at bedtime  methocarbamol 500 milliGRAM(s) Oral every 8 hours PRN  mycophenolate mofetil 1000 milliGRAM(s) Oral <User Schedule>  naloxegol 12.5 milliGRAM(s) Oral daily  Nephro-piotr 1 Tablet(s) Oral daily  nystatin    Suspension 521599 Unit(s) Oral <User Schedule>  oxymetazoline 0.05% Nasal Spray 2 Spray(s) Both Nostrils two times a day  pantoprazole    Tablet 40 milliGRAM(s) Oral before breakfast  penicillin   G benzathine Injectable 2.4 Million Unit(s) IntraMuscular <User Schedule>  polyethylene glycol 3350 17 Gram(s) Oral two times a day  potassium chloride  10 mEq/50 mL IVPB 10 milliEquivalent(s) IV Intermittent once  potassium chloride  10 mEq/50 mL IVPB 10 milliEquivalent(s) IV Intermittent once  predniSONE   Tablet 15 milliGRAM(s) Oral every 12 hours  senna 2 Tablet(s) Oral at bedtime  sodium chloride 0.9% lock flush 3 milliLiter(s) IV Push every 8 hours  tacrolimus 2.5 milliGRAM(s) Oral <User Schedule>  tamsulosin 0.4 milliGRAM(s) Oral at bedtime  traZODone 150 milliGRAM(s) Oral at bedtime  trimethoprim   80 mG/sulfamethoxazole 400 mG 1 Tablet(s) Oral <User Schedule>  valGANciclovir 450 milliGRAM(s) Oral <User Schedule>  vancomycin    Solution 125 milliGRAM(s) Oral every 12 hours      Physical Exam:    Vitals:  Vital Signs Last 24 Hours  T(C): 36.6 (25 @ 05:43), Max: 36.8 (25 @ 19:01)  HR: 99 (25 @ 05:43) (98 - 100)  BP: 112/78 (25 @ 05:43) (112/78 - 124/85)  RR: 19 (25 @ 05:43) (18 - 19)  SpO2: 96% (25 @ 05:43) (96% - 99%)    Weight in k ( @ 05:43)    I&O's Summary    11 May 2025 07:01  -  12 May 2025 07:00  --------------------------------------------------------  IN: 830 mL / OUT: 900 mL / NET: -70 mL        Tele:    General: No distress. Comfortable.  HEENT: EOM intact.  Neck: Neck supple. JVP not elevated. No masses  Chest: Clear to auscultation bilaterally  CV: Normal S1 and S2. No murmurs, rub, or gallops. Radial pulses normal.  Abdomen: Soft, non-distended, non-tender  Skin: No rashes or skin breakdown  Extremities: No LE edema  Neurology: Alert and oriented times three. Sensation intact  Psych: Affect normal    Labs:                        8.3    15.13 )-----------( 237      ( 12 May 2025 06:36 )             25.2         139  |  102  |  73[H]  ----------------------------<  140[H]  4.4   |  23  |  2.04[H]    Ca    9.1      12 May 2025 06:38  Phos  3.2       Mg     1.7         TPro  5.9[L]  /  Alb  3.7  /  TBili  0.6  /  DBili  x   /  AST  24  /  ALT  102[H]  /  AlkPhos  98

## 2025-05-12 NOTE — PROGRESS NOTE ADULT - ASSESSMENT
70-year-old male, with reported Hx of NICM since 2011 HFrEF (LVEF 25-30%) s/p MDT CRT-D with baseline CHB, VT/VF, AFs/p GIANFRANCO ligation/MAZE, MV/TV repair, PVC ablation 3/2024 intolerant to AADs in the past, recent admission 3/19/2025-3/20/2025 for AICD shocks initially presented to the [facility] ED on 3/21/2025, sent by HF specialist for ACID shock. Device reported successful ATP for VT 3/17/2025 at 6:52pm followed by one ATP & 40J shock. He reported feeling dizzy & SOB during the events. He follows with Dr. Luca Tamayo for HF, currently undergoing transplant workup, Dr John for CRTD and VT/VF and Dr. Chu for genetic counseling. While admitted to [facility], he was started on a lidocaine gtt. On 3/21 when lidocaine weaned off patient had another two episodes of VT requiring AICD shocks. He was transferred to [facility] for further management. He was initially maintained on lidocaine gtt from 3/21/2025-3/25/2025 & his listing status was upgraded to UNOS status 2e for heart transplant (ABO O) for the refractory VT. He was transitioned to oral antiarrhythmics (Toprol XL & quinidine) & ultimately transferred from CICU to [facility] floor on 3/27/2025. Hospital course was further c/b development of watery diarrhea & was found to have +norovirus on 3/31/2025 which prompted deactivation to status 7 listing for heart transplant. Status reinstated to 2E after diarrhea resolved.   4/29: s/p  OHT, TV repair, PPM removal   Intra op STACEY with LVEF 20% and mild RV dysfunction.  Extubated on 4/29.    Post Op: Had some initial RV failure/PGD (requiring dobutamine 7.5, epi 0.02, levo, vaso, and Lico.  CRRT started to aid volume removal.  Echo from 4/30 with LVEF 70% and mild RV dysfunction.  Levo/Vaso weaned off on 5/1 and Lico weaned off 5/2. He requiring large amounts of pain medication despite 2 chest tubes being removed POD 1 (ketamine + PCA pump).  Hospital course c/b ?delerium and non-cooporation with care, , Seen by psych 5/2 and started on trazodone + Pristiq. Psychiatric status improved.  Requiring 1:1 sitter now resolved.  NG tube placed 5/3 but now eating/ NGT removed 5/5. He is s/p first surveillance RHC/Bx 5/8. Chest tubes removed in CTU, no PTX on CXR.   5/9: TRANSFERRED TO [facility]. Management per transplant cardiology team. Transplant ID following on regimen per ID and Tx Cardiology. Per report: Donor Syphilis Serology Positive. Per Transplant ID: Continue benzathine penicillin 2,400,000 units weekly x 3 doses total, second dose 5/8, next due 5/15.  5/10 VSS rounds made w/ Dr. Barrientos -& HF team.  will need PICC line for 4 weeks of Ceftriaxone 2g iv qd (strp pneum meningitis in donor) & PCN for syphilis  in donor- d/c plan home this week.  5/11 VSS c/w IV abx. PICC eval for IV abx. Transplant cardiology for diuretic regimen --> started on PO Bumex 1mg qD. Tacro lvl 8.6, for 2.5mg BID per Transplant cardiology team.  5/12 Spoke to  IR. Per IR sharron for PICC i/s/o GFR. Bedside PICC team aware, and plan for PICC today at bedside. Mg supplemented. Monitor Cr. Cardio-renal following.

## 2025-05-13 ENCOUNTER — RESULT REVIEW (OUTPATIENT)
Age: 70
End: 2025-05-13

## 2025-05-13 LAB
ALBUMIN SERPL ELPH-MCNC: 3.8 G/DL — SIGNIFICANT CHANGE UP (ref 3.3–5)
ALP SERPL-CCNC: 105 U/L — SIGNIFICANT CHANGE UP (ref 40–120)
ALT FLD-CCNC: 100 U/L — HIGH (ref 10–45)
ANION GAP SERPL CALC-SCNC: 15 MMOL/L — SIGNIFICANT CHANGE UP (ref 5–17)
AST SERPL-CCNC: 29 U/L — SIGNIFICANT CHANGE UP (ref 10–40)
BASOPHILS # BLD AUTO: 0.01 K/UL — SIGNIFICANT CHANGE UP (ref 0–0.2)
BASOPHILS NFR BLD AUTO: 0.1 % — SIGNIFICANT CHANGE UP (ref 0–2)
BILIRUB SERPL-MCNC: 0.7 MG/DL — SIGNIFICANT CHANGE UP (ref 0.2–1.2)
BUN SERPL-MCNC: 65 MG/DL — HIGH (ref 7–23)
CALCIUM SERPL-MCNC: 9.3 MG/DL — SIGNIFICANT CHANGE UP (ref 8.4–10.5)
CHLORIDE SERPL-SCNC: 102 MMOL/L — SIGNIFICANT CHANGE UP (ref 96–108)
CO2 SERPL-SCNC: 22 MMOL/L — SIGNIFICANT CHANGE UP (ref 22–31)
CREAT SERPL-MCNC: 1.85 MG/DL — HIGH (ref 0.5–1.3)
EGFR: 39 ML/MIN/1.73M2 — LOW
EGFR: 39 ML/MIN/1.73M2 — LOW
EOSINOPHIL # BLD AUTO: 0.03 K/UL — SIGNIFICANT CHANGE UP (ref 0–0.5)
EOSINOPHIL NFR BLD AUTO: 0.2 % — SIGNIFICANT CHANGE UP (ref 0–6)
GLUCOSE BLDC GLUCOMTR-MCNC: 145 MG/DL — HIGH (ref 70–99)
GLUCOSE BLDC GLUCOMTR-MCNC: 151 MG/DL — HIGH (ref 70–99)
GLUCOSE BLDC GLUCOMTR-MCNC: 160 MG/DL — HIGH (ref 70–99)
GLUCOSE BLDC GLUCOMTR-MCNC: 182 MG/DL — HIGH (ref 70–99)
GLUCOSE SERPL-MCNC: 138 MG/DL — HIGH (ref 70–99)
HCT VFR BLD CALC: 27.7 % — LOW (ref 39–50)
HGB BLD-MCNC: 8.9 G/DL — LOW (ref 13–17)
IMM GRANULOCYTES NFR BLD AUTO: 1.3 % — HIGH (ref 0–0.9)
LYMPHOCYTES # BLD AUTO: 0.51 K/UL — LOW (ref 1–3.3)
LYMPHOCYTES # BLD AUTO: 3.6 % — LOW (ref 13–44)
MAGNESIUM SERPL-MCNC: 1.7 MG/DL — SIGNIFICANT CHANGE UP (ref 1.6–2.6)
MCHC RBC-ENTMCNC: 28.7 PG — SIGNIFICANT CHANGE UP (ref 27–34)
MCHC RBC-ENTMCNC: 32.1 G/DL — SIGNIFICANT CHANGE UP (ref 32–36)
MCV RBC AUTO: 89.4 FL — SIGNIFICANT CHANGE UP (ref 80–100)
MONOCYTES # BLD AUTO: 0.66 K/UL — SIGNIFICANT CHANGE UP (ref 0–0.9)
MONOCYTES NFR BLD AUTO: 4.7 % — SIGNIFICANT CHANGE UP (ref 2–14)
NEUTROPHILS # BLD AUTO: 12.72 K/UL — HIGH (ref 1.8–7.4)
NEUTROPHILS NFR BLD AUTO: 90.1 % — HIGH (ref 43–77)
NRBC BLD AUTO-RTO: 0 /100 WBCS — SIGNIFICANT CHANGE UP (ref 0–0)
PHOSPHATE SERPL-MCNC: 3.4 MG/DL — SIGNIFICANT CHANGE UP (ref 2.5–4.5)
PLATELET # BLD AUTO: 198 K/UL — SIGNIFICANT CHANGE UP (ref 150–400)
POTASSIUM SERPL-MCNC: 4.9 MMOL/L — SIGNIFICANT CHANGE UP (ref 3.5–5.3)
POTASSIUM SERPL-SCNC: 4.9 MMOL/L — SIGNIFICANT CHANGE UP (ref 3.5–5.3)
PROT SERPL-MCNC: 6.3 G/DL — SIGNIFICANT CHANGE UP (ref 6–8.3)
RBC # BLD: 3.1 M/UL — LOW (ref 4.2–5.8)
RBC # FLD: 17.5 % — HIGH (ref 10.3–14.5)
SODIUM SERPL-SCNC: 139 MMOL/L — SIGNIFICANT CHANGE UP (ref 135–145)
SURGICAL PATHOLOGY STUDY: SIGNIFICANT CHANGE UP
TACROLIMUS SERPL-MCNC: 10.4 NG/ML — SIGNIFICANT CHANGE UP
WBC # BLD: 14.12 K/UL — HIGH (ref 3.8–10.5)
WBC # FLD AUTO: 14.12 K/UL — HIGH (ref 3.8–10.5)

## 2025-05-13 PROCEDURE — 90792 PSYCH DIAG EVAL W/MED SRVCS: CPT

## 2025-05-13 PROCEDURE — 93356 MYOCRD STRAIN IMG SPCKL TRCK: CPT

## 2025-05-13 PROCEDURE — 99232 SBSQ HOSP IP/OBS MODERATE 35: CPT

## 2025-05-13 PROCEDURE — 99233 SBSQ HOSP IP/OBS HIGH 50: CPT

## 2025-05-13 PROCEDURE — 93306 TTE W/DOPPLER COMPLETE: CPT | Mod: 26

## 2025-05-13 RX ORDER — MAGNESIUM SULFATE 500 MG/ML
2 SYRINGE (ML) INJECTION ONCE
Refills: 0 | Status: COMPLETED | OUTPATIENT
Start: 2025-05-13 | End: 2025-05-13

## 2025-05-13 RX ADMIN — INSULIN LISPRO 2: 100 INJECTION, SOLUTION INTRAVENOUS; SUBCUTANEOUS at 11:44

## 2025-05-13 RX ADMIN — PREDNISONE 15 MILLIGRAM(S): 20 TABLET ORAL at 17:24

## 2025-05-13 RX ADMIN — HYDROXYZINE HYDROCHLORIDE 10 MILLIGRAM(S): 25 TABLET, FILM COATED ORAL at 21:10

## 2025-05-13 RX ADMIN — BUMETANIDE 1 MILLIGRAM(S): 1 TABLET ORAL at 06:03

## 2025-05-13 RX ADMIN — HEPARIN SODIUM 5000 UNIT(S): 1000 INJECTION INTRAVENOUS; SUBCUTANEOUS at 06:04

## 2025-05-13 RX ADMIN — Medication 500000 UNIT(S): at 21:10

## 2025-05-13 RX ADMIN — TACROLIMUS 2.5 MILLIGRAM(S): 0.5 CAPSULE ORAL at 21:10

## 2025-05-13 RX ADMIN — ATOVAQUONE 1500 MILLIGRAM(S): 750 SUSPENSION ORAL at 11:27

## 2025-05-13 RX ADMIN — PREDNISONE 15 MILLIGRAM(S): 20 TABLET ORAL at 06:04

## 2025-05-13 RX ADMIN — HEPARIN SODIUM 5000 UNIT(S): 1000 INJECTION INTRAVENOUS; SUBCUTANEOUS at 13:25

## 2025-05-13 RX ADMIN — DESVENLAFAXINE 50 MILLIGRAM(S): 25 TABLET, EXTENDED RELEASE ORAL at 11:27

## 2025-05-13 RX ADMIN — Medication 150 MILLIGRAM(S): at 21:10

## 2025-05-13 RX ADMIN — TACROLIMUS 2.5 MILLIGRAM(S): 0.5 CAPSULE ORAL at 08:14

## 2025-05-13 RX ADMIN — MYCOPHENOLATE MOFETIL 1000 MILLIGRAM(S): 500 TABLET, FILM COATED ORAL at 08:14

## 2025-05-13 RX ADMIN — CEFTRIAXONE 100 MILLIGRAM(S): 500 INJECTION, POWDER, FOR SOLUTION INTRAMUSCULAR; INTRAVENOUS at 11:28

## 2025-05-13 RX ADMIN — Medication 500000 UNIT(S): at 08:15

## 2025-05-13 RX ADMIN — Medication 1 TABLET(S): at 11:27

## 2025-05-13 RX ADMIN — Medication 3 MILLILITER(S): at 13:03

## 2025-05-13 RX ADMIN — NALOXEGOL OXALATE 12.5 MILLIGRAM(S): 12.5 TABLET, FILM COATED ORAL at 11:28

## 2025-05-13 RX ADMIN — Medication 125 MILLIGRAM(S): at 17:24

## 2025-05-13 RX ADMIN — Medication 3 MILLILITER(S): at 04:53

## 2025-05-13 RX ADMIN — Medication 500000 UNIT(S): at 11:27

## 2025-05-13 RX ADMIN — Medication 500000 UNIT(S): at 17:24

## 2025-05-13 RX ADMIN — Medication 3 MILLILITER(S): at 21:18

## 2025-05-13 RX ADMIN — MYCOPHENOLATE MOFETIL 1000 MILLIGRAM(S): 500 TABLET, FILM COATED ORAL at 21:09

## 2025-05-13 RX ADMIN — TAMSULOSIN HYDROCHLORIDE 0.4 MILLIGRAM(S): 0.4 CAPSULE ORAL at 21:10

## 2025-05-13 RX ADMIN — Medication 25 GRAM(S): at 08:14

## 2025-05-13 RX ADMIN — Medication 1 APPLICATION(S): at 11:15

## 2025-05-13 RX ADMIN — HEPARIN SODIUM 5000 UNIT(S): 1000 INJECTION INTRAVENOUS; SUBCUTANEOUS at 21:10

## 2025-05-13 RX ADMIN — Medication 125 MILLIGRAM(S): at 06:04

## 2025-05-13 RX ADMIN — INSULIN LISPRO 2: 100 INJECTION, SOLUTION INTRAVENOUS; SUBCUTANEOUS at 17:59

## 2025-05-13 RX ADMIN — Medication 40 MILLIGRAM(S): at 06:20

## 2025-05-13 NOTE — BH CONSULTATION LIAISON PROGRESS NOTE - CURRENT MEDICATION
MEDICATIONS  (STANDING):  cefTRIAXone   IVPB 2000 milliGRAM(s) IV Intermittent <User Schedule>  chlorhexidine 2% Cloths 1 Application(s) Topical daily  desvenlafaxine ER 50 milliGRAM(s) Oral daily  dextrose 50% Injectable 50 milliLiter(s) IV Push every 15 minutes  dextrose 50% Injectable 25 milliLiter(s) IV Push every 15 minutes  erythromycin   Ointment 1 Application(s) Left EYE every 4 hours  insulin regular Infusion 3 Unit(s)/Hr (3 mL/Hr) IV Continuous <Continuous>  methylPREDNISolone sodium succinate Injectable 16 milliGRAM(s) IV Push every 12 hours  methylPREDNISolone sodium succinate Injectable   IV Push   mycophenolate mofetil Suspension 1000 milliGRAM(s) Oral every 12 hours  naloxegol 12.5 milliGRAM(s) Oral daily  Nephro-piotr 1 Tablet(s) Oral daily  nystatin    Suspension 336479 Unit(s) Oral <User Schedule>  oxymetazoline 0.05% Nasal Spray 2 Spray(s) Both Nostrils two times a day  pantoprazole  Injectable 40 milliGRAM(s) IV Push daily  penicillin   G benzathine Injectable 2.4 Million Unit(s) IntraMuscular <User Schedule>  polyethylene glycol 3350 17 Gram(s) Oral two times a day  potassium chloride  10 mEq/50 mL IVPB 10 milliEquivalent(s) IV Intermittent once  potassium chloride  10 mEq/50 mL IVPB 10 milliEquivalent(s) IV Intermittent once  senna 2 Tablet(s) Oral at bedtime  sodium chloride 0.9%. 1000 milliLiter(s) (10 mL/Hr) IV Continuous <Continuous>  tamsulosin 0.4 milliGRAM(s) Oral at bedtime  traZODone 150 milliGRAM(s) Oral at bedtime  vancomycin    Solution 125 milliGRAM(s) Oral every 12 hours    MEDICATIONS  (PRN):  artificial  tears Solution 1 Drop(s) Both EYES every 12 hours PRN Dry Eyes  hydrOXYzine hydrochloride 10 milliGRAM(s) Oral at bedtime PRN Anxiety  methocarbamol 500 milliGRAM(s) Oral every 8 hours PRN Muscle Spasm  
MEDICATIONS  (STANDING):  acetaminophen     Tablet .. 500 milliGRAM(s) Oral every 6 hours  artificial  tears Solution 1 Drop(s) Both EYES every 2 hours  buMETAnide Infusion 1 mG/Hr (5 mL/Hr) IV Continuous <Continuous>  cefTRIAXone   IVPB 2000 milliGRAM(s) IV Intermittent <User Schedule>  chlorhexidine 2% Cloths 1 Application(s) Topical daily  desvenlafaxine ER 50 milliGRAM(s) Oral daily  dexMEDEtomidine Infusion 1.2 MICROgram(s)/kG/Hr (24.5 mL/Hr) IV Continuous <Continuous>  dextrose 50% Injectable 50 milliLiter(s) IV Push every 15 minutes  dextrose 50% Injectable 25 milliLiter(s) IV Push every 15 minutes  DOBUTamine Infusion 1.5 MICROgram(s)/kG/Min (3.68 mL/Hr) IV Continuous <Continuous>  EPINEPHrine    Infusion 0.02 MICROgram(s)/kG/Min (6.13 mL/Hr) IV Continuous <Continuous>  erythromycin   Ointment 1 Application(s) Left EYE every 4 hours  insulin regular Infusion 3 Unit(s)/Hr (3 mL/Hr) IV Continuous <Continuous>  methylPREDNISolone sodium succinate Injectable 20 milliGRAM(s) IV Push every 12 hours  methylPREDNISolone sodium succinate Injectable   IV Push   mycophenolate mofetil Suspension 1000 milliGRAM(s) Oral every 12 hours  naloxegol 12.5 milliGRAM(s) Oral daily  Nephro-piotr 2 Tablet(s) Oral daily  niCARdipine Infusion 5 mG/Hr (25 mL/Hr) IV Continuous <Continuous>  norepinephrine Infusion 0.05 MICROgram(s)/kG/Min (7.66 mL/Hr) IV Continuous <Continuous>  nystatin    Suspension 814747 Unit(s) Oral <User Schedule>  oxymetazoline 0.05% Nasal Spray 2 Spray(s) Both Nostrils two times a day  pantoprazole  Injectable 40 milliGRAM(s) IV Push daily  penicillin   G benzathine Injectable 2.4 Million Unit(s) IntraMuscular <User Schedule>  polyethylene glycol 3350 17 Gram(s) Oral two times a day  potassium chloride  10 mEq/50 mL IVPB 10 milliEquivalent(s) IV Intermittent once  potassium chloride  10 mEq/50 mL IVPB 10 milliEquivalent(s) IV Intermittent once  senna 2 Tablet(s) Oral at bedtime  sodium chloride 0.9%. 1000 milliLiter(s) (10 mL/Hr) IV Continuous <Continuous>  tacrolimus    0.5 mG/mL Suspension 3 milliGRAM(s) Oral <User Schedule>  traZODone 100 milliGRAM(s) Oral at bedtime  vancomycin    Solution 125 milliGRAM(s) Oral every 12 hours    MEDICATIONS  (PRN):  hydrOXYzine hydrochloride 10 milliGRAM(s) Oral at bedtime PRN Anxiety  
MEDICATIONS  (STANDING):  atorvastatin 10 milliGRAM(s) Oral at bedtime  chlorhexidine 0.12% Liquid 5 milliLiter(s) Swish and Spit two times a day  chlorhexidine 2% Cloths 1 Application(s) Topical <User Schedule>  chlorhexidine 4% Liquid 1 Application(s) Topical <User Schedule>  heparin   Injectable 5000 Unit(s) SubCutaneous every 8 hours  metoprolol succinate ER 50 milliGRAM(s) Oral daily  polyethylene glycol 3350 17 Gram(s) Oral daily  potassium chloride ER tablet 20 milliEquivalent(s) 20 milliEquivalent(s) Oral <User Schedule>  potassium chloride ER tablet 40 milliEquivalent(s) 40 milliEquivalent(s) Oral <User Schedule>  psyllium Powder 1 Packet(s) Oral daily  quiNIDine gluconate  milliGRAM(s) Oral every 12 hours  senna 2 Tablet(s) Oral at bedtime  tamsulosin 0.4 milliGRAM(s) Oral at bedtime  torsemide 20 milliGRAM(s) Oral <User Schedule>  torsemide 10 milliGRAM(s) Oral <User Schedule>  traZODone 100 milliGRAM(s) Oral at bedtime    MEDICATIONS  (PRN):  acetaminophen     Tablet .. 650 milliGRAM(s) Oral every 6 hours PRN Mild Pain (1 - 3), Moderate Pain (4 - 6)  artificial  tears Solution 1 Drop(s) Both EYES every 4 hours PRN Dry Eyes  bisacodyl Suppository 10 milliGRAM(s) Rectal daily PRN Constipation  clonazePAM  Tablet 0.25 milliGRAM(s) Oral every 12 hours PRN anxiety  guaiFENesin Oral Liquid (Sugar-Free) 200 milliGRAM(s) Oral every 6 hours PRN Cough  
MEDICATIONS  (STANDING):  acetaminophen     Tablet .. 500 milliGRAM(s) Oral every 6 hours  acetaminophen   IVPB .. 1000 milliGRAM(s) IV Intermittent once  albumin human 25% IVPB 100 milliLiter(s) IV Intermittent once  cefTRIAXone   IVPB 2000 milliGRAM(s) IV Intermittent <User Schedule>  chlorhexidine 2% Cloths 1 Application(s) Topical daily  CRRT Treatment    <Continuous>  dextrose 50% Injectable 50 milliLiter(s) IV Push every 15 minutes  dextrose 50% Injectable 25 milliLiter(s) IV Push every 15 minutes  DOBUTamine Infusion 5 MICROgram(s)/kG/Min (12.3 mL/Hr) IV Continuous <Continuous>  EPINEPHrine    Infusion 0.02 MICROgram(s)/kG/Min (6.13 mL/Hr) IV Continuous <Continuous>  insulin regular Infusion 3 Unit(s)/Hr (3 mL/Hr) IV Continuous <Continuous>  lidocaine   4% Patch 3 Patch Transdermal daily  methylPREDNISolone sodium succinate Injectable 32 milliGRAM(s) IV Push every 12 hours  methylPREDNISolone sodium succinate Injectable   IV Push   metoclopramide Injectable 10 milliGRAM(s) IV Push every 8 hours  mycophenolate mofetil IVPB 1000 milliGRAM(s) IV Intermittent every 12 hours  naloxegol 12.5 milliGRAM(s) Oral daily  nystatin    Suspension 767912 Unit(s) Oral <User Schedule>  pantoprazole  Injectable 40 milliGRAM(s) IV Push daily  penicillin   G benzathine Injectable 2.4 Million Unit(s) IntraMuscular <User Schedule>  Phoxillum Filtration BK 4 / 2.5 5000 milliLiter(s) (200 mL/Hr) CRRT <Continuous>  Phoxillum Filtration BK 4 / 2.5 5000 milliLiter(s) (1400 mL/Hr) CRRT <Continuous>  polyethylene glycol 3350 17 Gram(s) Oral two times a day  potassium chloride  10 mEq/50 mL IVPB 10 milliEquivalent(s) IV Intermittent once  potassium chloride  10 mEq/50 mL IVPB 10 milliEquivalent(s) IV Intermittent once  PrismaSOL Filtration BGK 4 / 2.5 5000 milliLiter(s) (1000 mL/Hr) CRRT <Continuous>  senna 2 Tablet(s) Oral at bedtime  sodium chloride 0.9%. 1000 milliLiter(s) (10 mL/Hr) IV Continuous <Continuous>  tacrolimus 0.5 milliGRAM(s) Oral once  traZODone 100 milliGRAM(s) Oral at bedtime  vancomycin    Solution 125 milliGRAM(s) Oral every 12 hours    MEDICATIONS  (PRN):  
MEDICATIONS  (STANDING):  atovaquone  Suspension 1500 milliGRAM(s) Oral daily  buMETAnide 1 milliGRAM(s) Oral daily  cefTRIAXone   IVPB 2000 milliGRAM(s) IV Intermittent <User Schedule>  chlorhexidine 2% Cloths 1 Application(s) Topical daily  desvenlafaxine ER 50 milliGRAM(s) Oral daily  dextrose 50% Injectable 50 milliLiter(s) IV Push every 15 minutes  dextrose 50% Injectable 25 milliLiter(s) IV Push every 15 minutes  erythromycin   Ointment 1 Application(s) Left EYE every 4 hours  heparin   Injectable 5000 Unit(s) SubCutaneous every 8 hours  insulin lispro (ADMELOG) corrective regimen sliding scale   SubCutaneous three times a day before meals  insulin lispro (ADMELOG) corrective regimen sliding scale   SubCutaneous at bedtime  mycophenolate mofetil 1000 milliGRAM(s) Oral <User Schedule>  naloxegol 12.5 milliGRAM(s) Oral daily  Nephro-piotr 1 Tablet(s) Oral daily  nystatin    Suspension 798324 Unit(s) Oral <User Schedule>  oxymetazoline 0.05% Nasal Spray 2 Spray(s) Both Nostrils two times a day  pantoprazole    Tablet 40 milliGRAM(s) Oral before breakfast  penicillin   G benzathine Injectable 2.4 Million Unit(s) IntraMuscular <User Schedule>  polyethylene glycol 3350 17 Gram(s) Oral two times a day  potassium chloride  10 mEq/50 mL IVPB 10 milliEquivalent(s) IV Intermittent once  potassium chloride  10 mEq/50 mL IVPB 10 milliEquivalent(s) IV Intermittent once  predniSONE   Tablet 15 milliGRAM(s) Oral every 12 hours  senna 2 Tablet(s) Oral at bedtime  sodium chloride 0.9% lock flush 3 milliLiter(s) IV Push every 8 hours  tacrolimus 2.5 milliGRAM(s) Oral <User Schedule>  tamsulosin 0.4 milliGRAM(s) Oral at bedtime  traZODone 150 milliGRAM(s) Oral at bedtime  valGANciclovir 450 milliGRAM(s) Oral <User Schedule>  vancomycin    Solution 125 milliGRAM(s) Oral every 12 hours    MEDICATIONS  (PRN):  artificial  tears Solution 1 Drop(s) Both EYES every 12 hours PRN Dry Eyes  hydrOXYzine hydrochloride 10 milliGRAM(s) Oral at bedtime PRN Anxiety  methocarbamol 500 milliGRAM(s) Oral every 8 hours PRN Muscle Spasm  
MEDICATIONS  (STANDING):  atorvastatin 10 milliGRAM(s) Oral at bedtime  chlorhexidine 2% Cloths 1 Application(s) Topical <User Schedule>  chlorhexidine 4% Liquid 1 Application(s) Topical <User Schedule>  heparin   Injectable 5000 Unit(s) SubCutaneous every 8 hours  metoprolol succinate ER 50 milliGRAM(s) Oral daily  psyllium Powder 1 Packet(s) Oral daily  quiNIDine gluconate  milliGRAM(s) Oral every 12 hours  tamsulosin 0.4 milliGRAM(s) Oral at bedtime  traZODone 100 milliGRAM(s) Oral at bedtime    MEDICATIONS  (PRN):  acetaminophen     Tablet .. 650 milliGRAM(s) Oral every 6 hours PRN Mild Pain (1 - 3), Moderate Pain (4 - 6)  artificial  tears Solution 1 Drop(s) Both EYES every 4 hours PRN Dry Eyes  clonazePAM Oral Disintegrating Tablet 0.25 milliGRAM(s) Oral two times a day PRN anxiety  guaiFENesin Oral Liquid (Sugar-Free) 200 milliGRAM(s) Oral every 6 hours PRN Cough

## 2025-05-13 NOTE — BH CONSULTATION LIAISON PROGRESS NOTE - NSBHCHARTREVIEWINVESTIGATE_PSY_A_CORE FT
EKG 05/01      Ventricular Rate 103 BPM    Atrial Rate 103 BPM    P-R Interval 120 ms    QRS Duration 114 ms    Q-T Interval 392 ms    QTC Calculation(Bazett) 513 ms    R Axis -45 degrees    T Axis 37 degrees    Diagnosis Line SINUS TACHYCARDIA  INCOMPLETE RIGHT BUNDLE BRANCH BLOCK  LEFT ANTERIOR FASCICULAR BLOCK  ST ELEVATION, CONSIDER EARLY REPOLARIZATION, PERICARDITIS, OR INJURY  ABNORMAL ECG  WHEN COMPARED WITH ECG OF 30-APR-2025 12:53, (UNCONFIRMED)  NO SIGNIFICANT CHANGE WAS FOUND  Confirmed by Rachna Quach ([id number]) on 5/3/2025 12:26:22 PM  
EKG 05/07      Ventricular Rate 105 BPM    Atrial Rate 105 BPM    P-R Interval 126 ms    QRS Duration 82 ms    Q-T Interval 350 ms    QTC Calculation(Bazett) 462 ms    P Axis 51 degrees    R Axis -12 degrees    T Axis 11 degrees    Diagnosis Line SINUS TACHYCARDIA  SEPTAL INFARCT , AGE UNDETERMINED  ABNORMAL ECG  WHEN COMPARED WITH ECG OF 01-MAY-2025 05:25,  SIGNIFICANT CHANGES HAVE OCCURRED  Confirmed by MD ELIAS ANDREW ([id number]) on 5/7/2025 1:18:17 PM  
EKG 05/01      Ventricular Rate 103 BPM    Atrial Rate 103 BPM    P-R Interval 120 ms    QRS Duration 114 ms    Q-T Interval 392 ms    QTC Calculation(Bazett) 513 ms    R Axis -45 degrees    T Axis 37 degrees    Diagnosis Line SINUS TACHYCARDIA  INCOMPLETE RIGHT BUNDLE BRANCH BLOCK  LEFT ANTERIOR FASCICULAR BLOCK  ST ELEVATION, CONSIDER EARLY REPOLARIZATION, PERICARDITIS, OR INJURY  ABNORMAL ECG  WHEN COMPARED WITH ECG OF 30-APR-2025 12:53, (UNCONFIRMED)  NO SIGNIFICANT CHANGE WAS FOUND  Confirmed by Rachna Quach ([id number]) on 5/3/2025 12:26:22 PM  
EKG 04/01      Ventricular Rate 81 BPM    Atrial Rate 81 BPM    P-R Interval 128 ms    QRS Duration 214 ms    Q-T Interval 508 ms    QTC Calculation(Bazett) 590 ms    R Axis 68 degrees    T Axis -81 degrees    Diagnosis Line AV dual-paced rhythm  ABNORMAL ECG  WHEN COMPARED WITH ECG OF 29-MAR-2025 08:18,  NO SIGNIFICANT CHANGE WAS FOUND  Confirmed by HIWOT ALEMAN MD [id number]) on 4/1/2025 1:45:38 PM  
EKG 04/30      Ventricular Rate 102 BPM    Atrial Rate 102 BPM    P-R Interval 136 ms    QRS Duration 116 ms    Q-T Interval 390 ms    QTC Calculation(Bazett) 508 ms    P Axis 73 degrees    R Axis -44 degrees    T Axis 24 degrees    Diagnosis Line POSSIBLE SINUS TACHYCARDIA  LEFT AXIS DEVIATION  RSR' OR QR PATTERN IN V1 SUGGESTS RIGHT VENTRICULAR CONDUCTION DELAY  ST ELEVATION, CONSIDER EARLY REPOLARIZATION, PERICARDITIS, OR INJURY  ABNORMAL ECG  WHEN COMPARED WITH ECG OF 01-APR-2025 09:40,  SIGNIFICANT CHANGES HAVE OCCURRED  Confirmed by Rachna Quach ([id number]) on 5/2/2025 7:21:09 AM  
EKG 04/01      Ventricular Rate 81 BPM    Atrial Rate 81 BPM    P-R Interval 128 ms    QRS Duration 214 ms    Q-T Interval 508 ms    QTC Calculation(Bazett) 590 ms    R Axis 68 degrees    T Axis -81 degrees    Diagnosis Line AV dual-paced rhythm  ABNORMAL ECG  WHEN COMPARED WITH ECG OF 29-MAR-2025 08:18,  NO SIGNIFICANT CHANGE WAS FOUND  Confirmed by HIWOT ALEMAN MD ([id number]) on 4/1/2025 1:45:38 PM

## 2025-05-13 NOTE — BH CONSULTATION LIAISON PROGRESS NOTE - NSBHATTESTBILLING_PSY_A_CORE
Hypertension
[id number] OBS or IP - moderate complexity OR 35-49 mins
Billing in another system

## 2025-05-13 NOTE — BH CONSULTATION LIAISON PROGRESS NOTE - NSBHATTESTTYPEVISIT_PSY_A_CORE
Attending evaluating patient with MAHNAZ ([id number] code)
On-site Attending supervising MAHNAZ ([id number] codes)

## 2025-05-13 NOTE — BH CONSULTATION LIAISON PROGRESS NOTE - NSBHATTESTAPPAMEND_PSY_A_CORE
I have personally seen and examined this patient. I fully participated in the care of this patient. I have made amendments to the documentation where appropriate and otherwise agree with the history, physical exam, and plan as documented by the MAHNAZ

## 2025-05-13 NOTE — PROGRESS NOTE ADULT - PROBLEM SELECTOR PLAN 1
s/p heart transplant & TV ring repair, PPM removal  transplant cardiology and ID following closely  Diuresis regimen per transplant cardiology team; Fluid goal -500-1L  First surveillance biopsy 5/8: no s/s rejection per report  immunosuppression per transplant cardiology team  on IV abx per ID  s/p RUE PICC 5/12  ZAHRA. Cardio-renal following. CRRT on hold since 5/4. monitor BMP, lytes  encourage OOB/increase ambulation as tolerated  dc planning HOME, once medically cleared  Plan per d/w CTS and Transplant Cardiology team and Attendings

## 2025-05-13 NOTE — BH CONSULTATION LIAISON PROGRESS NOTE - NSBHFUPREASONCONS_PSY_A_CORE
depression/med management

## 2025-05-13 NOTE — BH CONSULTATION LIAISON PROGRESS NOTE - NSBHASSESSMENTFT_PSY_ALL_CORE
71 y/o domiciled, employed, , , male with PPHx of PTSD, unspecified anxiety d/o, with no past psychiatric admissions, with a PMHx of NICM since 2011 HFrEF (LVEF 25-30%) s/p MDT CRT-D with baseline CHB, VT/VF, AFs/p GIANFRANCO ligation/MAZE, MV/TV repair, PVC ablation 3/2024 intolerant to AADs in the past, recent admission 3/19/2025-3/20/2025 for AICD shocks initially presented to the [facility] ED on 3/21/2025, sent by HF specialist for ACID shock. While admitted to [facility], his listing status was upgraded to UNOS status 2e for heart transplant (ABO O) for the refractory VT, with the patient being transferred to [facility] for further management. Patient seen by transplant psychiatry for concerns of depression.     5/13:Patient seen f/u, endorsed tolerating current medication regiment, without any acute concerns, discussed patient establishing with psychiatry post discharge of which the patient is amenable too.    see attending attestation for plan  -------------------------------------------------------------  Plan   C/W Atarax 10mg PO TID PRN for acute anxiety alleviation  -HOLD  Klonopin 0.5mg Po QD PRN for acute anxiety alleviation   -C/W Pristiq 50mg Po QAM 	  -C/W Trazodone 150mg PO HS   -Supportive therapy provided; C/W  and Holistic RN services

## 2025-05-13 NOTE — PROGRESS NOTE ADULT - NS ATTEND AMEND GEN_ALL_CORE FT
meds: mepron 1500, ceftriaxome (donor csf strep), nystatin, valcyte 450 QD twice per week, vanco po bumex 1 QD, prograf 2.5 bid (10.4, 9.1) pred 15 bid, cellcept 1g bid,   No events overnight. PICC placed.  HR 90, 120/-130/ 176 (176)  05-13    139  |  102  |  65[H]  ----------------------------<  138[H]  4.9   |  22  |  1.85[H] (2.0, 1.9)     Ca    9.3      13 May 2025 06:57  Phos  3.4     05-13  Mg     1.7     05-13    TPro  6.3  /  Alb  3.8  /  TBili  0.7  /  DBili  x   /  AST  29  /  ALT  100[H]  /  AlkPhos  105  05-13                          8.9    14.12 )-----------( 198      ( 13 May 2025 06:57 )             27.7   Discussed on MDR.  Plan:   Check TTE   For d/c home tomorrow after 3rd dose of penicillin.   Target prograf 9-11, no change in dose.   Make pred 12.5 bid.   Make valcyte tiw.   Ceftriaxome till May 29th.   Biopsy May 22.   Continue same dose of diuretics.   Kevin Urbina

## 2025-05-13 NOTE — PROGRESS NOTE ADULT - SUBJECTIVE AND OBJECTIVE BOX
ADVANCED HEART FAILURE & TRANSPLANT  - PROGRESS NOTE  *To reach the NS1 Team from 8am to 5pm (MON-FRI), please call [phone],   _______________________________________________________________________________________________________     Subjective:    Medications:  artificial  tears Solution 1 Drop(s) Both EYES every 12 hours PRN  atovaquone  Suspension 1500 milliGRAM(s) Oral daily  buMETAnide 1 milliGRAM(s) Oral daily  cefTRIAXone   IVPB 2000 milliGRAM(s) IV Intermittent <User Schedule>  chlorhexidine 2% Cloths 1 Application(s) Topical daily  desvenlafaxine ER 50 milliGRAM(s) Oral daily  dextrose 50% Injectable 50 milliLiter(s) IV Push every 15 minutes  dextrose 50% Injectable 25 milliLiter(s) IV Push every 15 minutes  erythromycin   Ointment 1 Application(s) Left EYE every 4 hours  heparin   Injectable 5000 Unit(s) SubCutaneous every 8 hours  hydrOXYzine hydrochloride 10 milliGRAM(s) Oral at bedtime PRN  insulin lispro (ADMELOG) corrective regimen sliding scale   SubCutaneous three times a day before meals  insulin lispro (ADMELOG) corrective regimen sliding scale   SubCutaneous at bedtime  methocarbamol 500 milliGRAM(s) Oral every 8 hours PRN  mycophenolate mofetil 1000 milliGRAM(s) Oral <User Schedule>  naloxegol 12.5 milliGRAM(s) Oral daily  Nephro-piotr 1 Tablet(s) Oral daily  nystatin    Suspension 641161 Unit(s) Oral <User Schedule>  oxymetazoline 0.05% Nasal Spray 2 Spray(s) Both Nostrils two times a day  pantoprazole    Tablet 40 milliGRAM(s) Oral before breakfast  penicillin   G benzathine Injectable 2.4 Million Unit(s) IntraMuscular <User Schedule>  polyethylene glycol 3350 17 Gram(s) Oral two times a day  potassium chloride  10 mEq/50 mL IVPB 10 milliEquivalent(s) IV Intermittent once  potassium chloride  10 mEq/50 mL IVPB 10 milliEquivalent(s) IV Intermittent once  predniSONE   Tablet 15 milliGRAM(s) Oral every 12 hours  senna 2 Tablet(s) Oral at bedtime  sodium chloride 0.9% lock flush 3 milliLiter(s) IV Push every 8 hours  tacrolimus 2.5 milliGRAM(s) Oral <User Schedule>  tamsulosin 0.4 milliGRAM(s) Oral at bedtime  traZODone 150 milliGRAM(s) Oral at bedtime  valGANciclovir 450 milliGRAM(s) Oral <User Schedule>  vancomycin    Solution 125 milliGRAM(s) Oral every 12 hours      Physical Exam:    Vitals:  Vital Signs Last 24 Hours  T(C): 36.5 (25 @ 10:58), Max: 36.6 (25 @ 19:00)  HR: 92 (25 @ 10:58) (89 - 104)  BP: 117/80 (25 @ 10:58) (117/80 - 141/98)  RR: 18 (25 @ 10:58) (18 - 18)  SpO2: 97% (25 @ 10:58) (94% - 98%)    Weight in k ( @ 05:54)    I&O's Summary    12 May 2025 07:  -  13 May 2025 07:00  --------------------------------------------------------  IN: 1515 mL / OUT: 1600 mL / NET: -85 mL    13 May 2025 07:  -  13 May 2025 11:33  --------------------------------------------------------  IN: 240 mL / OUT: 300 mL / NET: -60 mL        Tele:    General: No distress. Comfortable.  HEENT: EOM intact.  Neck: Neck supple. JVP not elevated. No masses  Chest: Clear to auscultation bilaterally  CV: Normal S1 and S2. No murmurs, rub, or gallops. Radial pulses normal.  Abdomen: Soft, non-distended, non-tender  Skin: No rashes or skin breakdown  Extremities: No LE edema  Neurology: Alert and oriented times three. Sensation intact  Psych: Affect normal    Labs:                        8.9    14.12 )-----------( 198      ( 13 May 2025 06:57 )             27.7         139  |  102  |  65[H]  ----------------------------<  138[H]  4.9   |  22  |  1.85[H]    Ca    9.3      13 May 2025 06:57  Phos  3.4       Mg     1.7         TPro  6.3  /  Alb  3.8  /  TBili  0.7  /  DBili  x   /  AST  29  /  ALT  100[H]  /  AlkPhos  105

## 2025-05-13 NOTE — BH CONSULTATION LIAISON PROGRESS NOTE - NSBHATTESTCOMMENTATTENDFT_PSY_A_CORE
Pt is a 71 y/o domiciled, employed,  man with PPHx of PTSD, unspecified anxiety d/o, with no past psychiatric admissions, with a PMHx of NICM since 2011 HFrEF (LVEF 25-30%) s/p MDT CRT-D with baseline CHB, VT/VF, AFs/p GIANFRANCO ligation/MAZE, MV/TV repair, PVC ablation 3/2024 intolerant to AADs in the past, recent admission 3/19/2025-3/20/2025 for AICD shocks initially presented to the [facility] ED on 3/21/2025, sent by HF specialist for ACID shock. While admitted to [facility], his listing status was upgraded to UNOS status 2e for heart transplant (ABO O) for the refractory VT, with the patient being transferred to [facility] for further management. Patient seen by transplant psychiatry for concerns of depression.   Agree with recommendations but would gradually taper Trazodone to 100mg if possible because of cardiac side effects.

## 2025-05-13 NOTE — BH CONSULTATION LIAISON PROGRESS NOTE - NSBHCONSULTWRKUPYES_PSY_A_CORE
Tiana Jalloh CNP made aware of patients critical Magnesium level of 1.3 at this time. Tiana Jalloh also made aware of potassium level of 3.3.
EKG

## 2025-05-13 NOTE — BH CONSULTATION LIAISON PROGRESS NOTE - NSBHCONSULTFOLLOWAFTERCARE_PSY_A_CORE FT
[facility] [phone]
[facility] [phone]
[facility] [phone]
[facility] [phone]
[facility] [phone] post discharge
[facility] [phone]

## 2025-05-13 NOTE — BH CONSULTATION LIAISON PROGRESS NOTE - NSBHFUPINTERVALHXFT_PSY_A_CORE
Patient seen at bedside, with attending, patient on exam reported symptoms of low mood and frustration with prolonged hospitalization while waiting for transplant. Patient noted that he while he has been able to sleep, he experiences periods of anxiety thinking of his hobbies of which he feels he could be doing while awaiting in the hospital. Patient denied any symptoms of ah/vh/hi/si, intent or plan. Discussed starting a medication for both anxiety and sleep, of which the patient declined at this time, with patient being amenable to restart PRN Klonopin. 
Patient seen at bedside, with attending, patient at bedside denied any overt symptoms of anxiety or low mood, stating that his energy levels have improved with the initiation of Pristiq. Patient stated that his sleep for the most part has improved with increased dose of Trazodone however noted difficulty with sleep overnight with anticipation of discharge home tomorrow. Patient denied any overt symptoms of ah/vh/hi/si, intent or plan. Discussed medication regiment at bedside as well as establishing with psychiatry post discharge, patient verbalized agreement and understanding with plan proposed. 
Patient seen at bedside, with attending, patient endorsed that his sleep has slightly improved with increased dose of Trazodone overnight, with patient denying any overt symptoms of low mood, or anxiety with current regiment. Patient denied any overt symptoms of ah/vh/hi/si, intent or plan. Discussed medication regiment at bedside, patient verbalized agreement and understanding with plan proposed. 
Patient seen at bedside, with his spouse present per patient request. Patient reported improvement in sleep quality overnight, noting that his mood is better today with the improvement in sleep, as well as news that he has been reactivated on the transplant list. Patient noted concerns that if he does better physically his listing status with UNOS will change. Patient denied any symptoms of ah/vh/hi/si, intent or plan. Patient denied any acute concerns of anxiety, educated about medication regiment including PRN medications if needed for acute anxiety alleviation.  
Patient seen at bedside, with attending, patient on exam seen with nursing staff at bedside, noted periods of low energy, status post transplant, discussed medication recommendations previously recommend, with starting an antidepressant for low energy secondary to symptoms of low mood.  Patient denied any symptoms of ah/vh/hi/si, intent or plan. Patient verbalized agreement and understanding with plan proposed. 
Patient seen at bedside, with attending, and family at bedside per patient request. Patient endorsed that he has been able to swallow his medications and started the Pristiq without any overt side effects on exam. Patient denied any overt symptoms of ah/vh/hi/si, intent or plan. Noted that he has had trouble sleeping post transplant, despite use of trazodone, discussed medication regiment including making changes to aide in sleep quality overnight. Patient verbalized agreement and understanding with plan proposed.

## 2025-05-13 NOTE — PROGRESS NOTE ADULT - SUBJECTIVE AND OBJECTIVE BOX
SUBJECTIVE: "Hi." Denies acute chest pain, palpitations, or shortness of breath    TELEMETRY:  SR     Vital Signs Last 24 Hrs  T(C): 36.5 (25 @ 05:54), Max: 36.6 (25 @ 19:00)  T(F): 97.7 (25 @ 05:54), Max: 97.8 (25 @ 19:00)  HR: 89 (25 @ 05:54) (89 - 104)  BP: 118/80 (25 @ 05:54) (118/80 - 141/98)  RR: 18 (25 @ 05:54) (18 - 18)  SpO2: 94% (25 @ 05:54) (94% - 98%)           INPUT/OUTPUT:   @ 07:01  -   @ 07:00  --------------------------------------------------------  IN: 1515 mL / OUT: 1600 mL / NET: -85 mL      LABS:    139  |  102  |  65[H]  ----------------------------<  138[H]  4.9   |  22  |  1.85[H]  Ca    9.3      13 May 2025 06:57  Phos  3.4       Mg     1.7       TPro  6.3  /  Alb  3.8  /  TBili  0.7  /  DBili  x   /  AST  29  /  ALT  100[H]  /  AlkPhos  105                          8.9    14.12 )-----------( 198      ( 13 May 2025 06:57 )             27.7     Daily Weight in k (13 May 2025 05:54)    CAPILLARY BLOOD GLUCOSE  POCT Blood Glucose.: 145 mg/dL (13 May 2025 07:10)  POCT Blood Glucose.: 131 mg/dL (12 May 2025 21:07)  POCT Blood Glucose.: 127 mg/dL (12 May 2025 17:40)  POCT Blood Glucose.: 204 mg/dL (12 May 2025 11:43)    PHYSICAL EXAM:  Neurology: alert and oriented x 3, nonfocal, no gross deficits  CV : S1S2  Sternal Wound :  CDI , Stable  Lungs: nonlabored respirations with no use of accessory muscles  Abdomen: soft, nontender, nondistended, positive bowel sounds, +BM    Extremities:     no edema no calf tenderness    ACTIVE MEDICATIONS:  artificial  tears Solution 1 Drop(s) Both EYES every 12 hours PRN  atovaquone  Suspension 1500 milliGRAM(s) Oral daily  buMETAnide 1 milliGRAM(s) Oral daily  cefTRIAXone   IVPB 2000 milliGRAM(s) IV Intermittent <User Schedule>  chlorhexidine 2% Cloths 1 Application(s) Topical daily  desvenlafaxine ER 50 milliGRAM(s) Oral daily  dextrose 50% Injectable 50 milliLiter(s) IV Push every 15 minutes  dextrose 50% Injectable 25 milliLiter(s) IV Push every 15 minutes  erythromycin   Ointment 1 Application(s) Left EYE every 4 hours  heparin   Injectable 5000 Unit(s) SubCutaneous every 8 hours  hydrOXYzine hydrochloride 10 milliGRAM(s) Oral at bedtime PRN  insulin lispro (ADMELOG) corrective regimen sliding scale   SubCutaneous three times a day before meals  insulin lispro (ADMELOG) corrective regimen sliding scale   SubCutaneous at bedtime  methocarbamol 500 milliGRAM(s) Oral every 8 hours PRN  mycophenolate mofetil 1000 milliGRAM(s) Oral <User Schedule>  naloxegol 12.5 milliGRAM(s) Oral daily  Nephro-piotr 1 Tablet(s) Oral daily  nystatin    Suspension 308784 Unit(s) Oral <User Schedule>  oxymetazoline 0.05% Nasal Spray 2 Spray(s) Both Nostrils two times a day  pantoprazole    Tablet 40 milliGRAM(s) Oral before breakfast  penicillin   G benzathine Injectable 2.4 Million Unit(s) IntraMuscular <User Schedule>  polyethylene glycol 3350 17 Gram(s) Oral two times a day  potassium chloride  10 mEq/50 mL IVPB 10 milliEquivalent(s) IV Intermittent once  potassium chloride  10 mEq/50 mL IVPB 10 milliEquivalent(s) IV Intermittent once  predniSONE   Tablet 15 milliGRAM(s) Oral every 12 hours  senna 2 Tablet(s) Oral at bedtime  sodium chloride 0.9% lock flush 3 milliLiter(s) IV Push every 8 hours  tacrolimus 2.5 milliGRAM(s) Oral <User Schedule>  tamsulosin 0.4 milliGRAM(s) Oral at bedtime  traZODone 150 milliGRAM(s) Oral at bedtime  valGANciclovir 450 milliGRAM(s) Oral <User Schedule>  vancomycin    Solution 125 milliGRAM(s) Oral every 12 hours    Case discussed in detail with CTS Attending Dr Barrientos and Transplant Cardiology Attending Dr. Urbina. Plan below as per discussion.

## 2025-05-13 NOTE — PROGRESS NOTE ADULT - ASSESSMENT
70-year-old male, with reported Hx of NICM since 2011 HFrEF (LVEF 25-30%) s/p MDT CRT-D with baseline CHB, VT/VF, AFs/p GIANFRANCO ligation/MAZE, MV/TV repair, PVC ablation 3/2024 intolerant to AADs in the past, recent admission 3/19/2025-3/20/2025 for AICD shocks initially presented to the [facility] ED on 3/21/2025, sent by HF specialist for ACID shock. Device reported successful ATP for VT 3/17/2025 at 6:52pm followed by one ATP & 40J shock. He reported feeling dizzy & SOB during the events. He follows with Dr. Luca Tamayo for HF, currently undergoing transplant workup, Dr John for CRTD and VT/VF and Dr. Chu for genetic counseling. While admitted to [facility], he was started on a lidocaine gtt. On 3/21 when lidocaine weaned off patient had another two episodes of VT requiring AICD shocks. He was transferred to [facility] for further management. He was initially maintained on lidocaine gtt from 3/21/2025-3/25/2025 & his listing status was upgraded to UNOS status 2e for heart transplant (ABO O) for the refractory VT. He was transitioned to oral antiarrhythmics (Toprol XL & quinidine) & ultimately transferred from CICU to [facility] floor on 3/27/2025. Hospital course was further c/b development of watery diarrhea & was found to have +norovirus on 3/31/2025 which prompted deactivation to status 7 listing for heart transplant. Status reinstated to 2E after diarrhea resolved.   4/29: s/p  OHT, TV repair, PPM removal   Intra op STACEY with LVEF 20% and mild RV dysfunction.  Extubated on 4/29.    Post Op: Had some initial RV failure/PGD (requiring dobutamine 7.5, epi 0.02, levo, vaso, and Lico.  CRRT started to aid volume removal.  Echo from 4/30 with LVEF 70% and mild RV dysfunction.  Levo/Vaso weaned off on 5/1 and Lico weaned off 5/2. He requiring large amounts of pain medication despite 2 chest tubes being removed POD 1 (ketamine + PCA pump).  Hospital course c/b ?delerium and non-cooporation with care, SI, Seen by psych 5/2 and started on trazodone + Pristiq. Psychiatric status improved.  Requiring 1:1 sitter now resolved.  NG tube placed 5/3 but now eating/ NGT removed 5/5. He is s/p first surveillance RHC/Bx 5/8. Chest tubes removed in CTU, no PTX on CXR.   5/9: TRANSFERRED TO [facility]. Management per transplant cardiology team. Transplant ID following on regimen per ID and Tx Cardiology. Per report: Donor Syphilis Serology Positive. Per Transplant ID: Continue benzathine penicillin 2,400,000 units weekly x 3 doses total, second dose 5/8, next due 5/15.  5/10 VSS rounds made w/ Dr. Barrientos -& HF team.  will need PICC line for 4 weeks of Ceftriaxone 2g iv qd (strp pneum meningitis in donor) & PCN for syphilis  in donor- d/c plan home this week.  5/11 VSS c/w IV abx. PICC eval for IV abx. Transplant cardiology for diuretic regimen --> started on PO Bumex 1mg qD. Tacro lvl 8.6, for 2.5mg BID per Transplant cardiology team.  5/12 Spoke to  IR. Per IR sharron for PICC i/s/o GFR. Bedside PICC team aware, and plan for PICC today at bedside. Mg supplemented. Monitor Cr. Cardio-renal following.   5/13 Per Transplant ID Dr Mathews: c/w IV antibiotics via RUE PICC through 5/29; patient to receive last dose PCN prior to discharge WED 5/14 per Transplant team. SW following closely, arranged home infusion set up.  70-year-old male, with reported Hx of NICM since 2011 HFrEF (LVEF 25-30%) s/p MDT CRT-D with baseline CHB, VT/VF, AFs/p GIANFRANCO ligation/MAZE, MV/TV repair, PVC ablation 3/2024 intolerant to AADs in the past, recent admission 3/19/2025-3/20/2025 for AICD shocks initially presented to the [facility] ED on 3/21/2025, sent by HF specialist for ACID shock. Device reported successful ATP for VT 3/17/2025 at 6:52pm followed by one ATP & 40J shock. He reported feeling dizzy & SOB during the events. He follows with Dr. Luca Tamayo for HF, currently undergoing transplant workup, Dr John for CRTD and VT/VF and Dr. Chu for genetic counseling. While admitted to [facility], he was started on a lidocaine gtt. On 3/21 when lidocaine weaned off patient had another two episodes of VT requiring AICD shocks. He was transferred to [facility] for further management. He was initially maintained on lidocaine gtt from 3/21/2025-3/25/2025 & his listing status was upgraded to UNOS status 2e for heart transplant (ABO O) for the refractory VT. He was transitioned to oral antiarrhythmics (Toprol XL & quinidine) & ultimately transferred from CICU to [facility] floor on 3/27/2025. Hospital course was further c/b development of watery diarrhea & was found to have +norovirus on 3/31/2025 which prompted deactivation to status 7 listing for heart transplant. Status reinstated to 2E after diarrhea resolved.   4/29: s/p  OHT, TV repair, PPM removal   Intra op STACEY with LVEF 20% and mild RV dysfunction.  Extubated on 4/29.    Post Op: Had some initial RV failure/PGD (requiring dobutamine 7.5, epi 0.02, levo, vaso, and Lico.  CRRT started to aid volume removal.  Echo from 4/30 with LVEF 70% and mild RV dysfunction.  Levo/Vaso weaned off on 5/1 and Lico weaned off 5/2. He requiring large amounts of pain medication despite 2 chest tubes being removed POD 1 (ketamine + PCA pump).  Hospital course c/b ?delerium and non-cooporation with care, SI, Seen by psych 5/2 and started on trazodone + Pristiq. Psychiatric status improved.  Requiring 1:1 sitter now resolved.  NG tube placed 5/3 but now eating/ NGT removed 5/5. He is s/p first surveillance RHC/Bx 5/8. Chest tubes removed in CTU, no PTX on CXR.   5/9: TRANSFERRED TO [facility]. Management per transplant cardiology team. Transplant ID following on regimen per ID and Tx Cardiology. Per report: Donor Syphilis Serology Positive. Per Transplant ID: Continue benzathine penicillin 2,400,000 units weekly x 3 doses total, second dose 5/8, next due 5/15.  5/10 VSS rounds made w/ Dr. Barrientos -& HF team.  will need PICC line for 4 weeks of Ceftriaxone 2g iv qd (strp pneum meningitis in donor) & PCN for syphilis  in donor- d/c plan home this week.  5/11 VSS c/w IV abx. PICC eval for IV abx. Transplant cardiology for diuretic regimen --> started on PO Bumex 1mg qD. Tacro lvl 8.6, for 2.5mg BID per Transplant cardiology team.  5/12 Spoke to  IR. Per IR sharron for PICC i/s/o GFR. Bedside PICC team aware, and plan for PICC today at bedside. Mg supplemented. Monitor Cr. Cardio-renal following.   5/13 Per Transplant ID Dr Mathews: c/w IV antibiotics via RUE PICC through 5/29; patient to receive last dose PCN prior to discharge WED 5/14 per Transplant team. SW following closely, arranged home infusion set up.

## 2025-05-13 NOTE — BH CONSULTATION LIAISON PROGRESS NOTE - NSBHMSEMOVE_PSY_A_CORE
Patient had some tremors while trying to move with PT/Tremors
Patient had some tremors while trying to move with PT/No abnormal movements
Patient had some tremors while trying to move with PT/Tremors
No abnormal movements
No abnormal movements
Patient had some tremors while trying to move with PT/No abnormal movements

## 2025-05-13 NOTE — BH CONSULTATION LIAISON PROGRESS NOTE - NSICDXBHPRIMARYDX_PSY_ALL_CORE
Adjustment disorder with mixed anxiety and depressed mood   F43.23  

## 2025-05-13 NOTE — BH CONSULTATION LIAISON PROGRESS NOTE - OTHER
not assessed on exam 

## 2025-05-13 NOTE — BH CONSULTATION LIAISON PROGRESS NOTE - NSBHCHARTREVIEWLAB_PSY_A_CORE FT
8.9    14.12 )-----------( 198      ( 13 May 2025 06:57 )             27.7     05-13    139  |  102  |  65[H]  ----------------------------<  138[H]  4.9   |  22  |  1.85[H]    Ca    9.3      13 May 2025 06:57  Phos  3.4     05-13  Mg     1.7     05-13    TPro  6.3  /  Alb  3.8  /  TBili  0.7  /  DBili  x   /  AST  29  /  ALT  100[H]  /  AlkPhos  105  05-13  
                         12.0   5.07  )-----------( 135      ( 25 Apr 2025 06:19 )             35.8     04-25    142  |  103  |  22  ----------------------------<  90  3.8   |  26  |  1.27    Ca    8.9      25 Apr 2025 06:20  Phos  4.0     04-25  Mg     1.9     04-25    
                           12.4   5.67  )-----------( 141      ( 01 Apr 2025 06:16 )             37.5     04-01    140  |  104  |  17  ----------------------------<  88  3.8   |  24  |  1.05    Ca    9.0      01 Apr 2025 06:15  Phos  3.1     04-01  Mg     2.2     04-01    
                          8.5    17.70 )-----------( 74       ( 05 May 2025 00:23 )             25.7     05-05    134[L]  |  97  |  74[H]  ----------------------------<  111[H]  4.7   |  21[L]  |  4.01[H]    Ca    8.7      05 May 2025 12:18  Phos  5.1     05-05  Mg     2.8     05-05    TPro  6.1  /  Alb  4.2  /  TBili  0.9  /  DBili  x   /  AST  34  /  ALT  84[H]  /  AlkPhos  96  05-05  
                           8.8    18.09 )-----------( 53       ( 02 May 2025 12:32 )             26.7     05-02    136  |  100  |  21  ----------------------------<  108[H]  4.1   |  20[L]  |  2.19[H]    Ca    9.0      02 May 2025 12:32  Phos  3.0     05-02  Mg     2.7     05-02    TPro  6.9  /  Alb  4.5  /  TBili  2.0[H]  /  DBili  x   /  AST  18  /  ALT  27  /  AlkPhos  71  05-02  
                      9.5    21.92 )-----------( 115      ( 06 May 2025 00:30 )             27.4     05-06    134[L]  |  94[L]  |  85[H]  ----------------------------<  155[H]  4.2   |  22  |  4.43[H]    Ca    8.4      06 May 2025 00:30  Phos  5.8     05-06  Mg     2.6     05-06    TPro  5.8[L]  /  Alb  3.9  /  TBili  0.7  /  DBili  x   /  AST  41[H]  /  ALT  103[H]  /  AlkPhos  99  05-06

## 2025-05-13 NOTE — BH CONSULTATION LIAISON PROGRESS NOTE - NSBHADMITIPOBS_PSY_A_CORE
Routine observation
Pain Refusal Text: I offered to prescribe pain medication but the patient refused to take this medication.

## 2025-05-13 NOTE — BH CONSULTATION LIAISON PROGRESS NOTE - NSBHCONSULTMEDANXIETY_PSY_A_CORE FT
Atarax 10mg PO TID PRN for acute anxiety alleviation
 Klonopin 0.5mg Po QD PRN
Atarax 10mg PO TID PRN for acute anxiety alleviation
Klonopin 0.25mg Po BID PRN for acute anxiety alleviation

## 2025-05-13 NOTE — BH CONSULTATION LIAISON PROGRESS NOTE - NSBHCHARTREVIEWVS_PSY_A_CORE FT
Vital Signs Last 24 Hrs  T(C): 37.4 (02 May 2025 12:00), Max: 37.4 (02 May 2025 12:00)  T(F): 99.3 (02 May 2025 12:00), Max: 99.3 (02 May 2025 12:00)  HR: 118 (02 May 2025 13:00) (102 - 118)  BP: 116/68 (02 May 2025 12:00) (93/61 - 139/56)  BP(mean): 86 (02 May 2025 12:00) (62 - 94)  RR: 22 (02 May 2025 13:00) (10 - 30)  SpO2: 100% (02 May 2025 13:00) (93% - 100%)    Parameters below as of 02 May 2025 12:00  Patient On (Oxygen Delivery Method): nasal cannula, high flow  O2 Flow (L/min): 40  O2 Concentration (%): 40
Vital Signs Last 24 Hrs  T(C): 37.1 (05 May 2025 16:00), Max: 37.4 (04 May 2025 20:00)  T(F): 98.8 (05 May 2025 16:00), Max: 99.3 (04 May 2025 20:00)  HR: 101 (05 May 2025 16:00) (82 - 109)  BP: --  BP(mean): --  RR: 26 (05 May 2025 16:00) (6 - 48)  SpO2: 97% (05 May 2025 16:00) (95% - 100%)    Parameters below as of 05 May 2025 16:00  Patient On (Oxygen Delivery Method): nasal cannula  O2 Flow (L/min): 2  
Vital Signs Last 24 Hrs  T(C): 36.7 (25 Apr 2025 11:04), Max: 36.8 (24 Apr 2025 21:00)  T(F): 98 (25 Apr 2025 11:04), Max: 98.2 (24 Apr 2025 21:00)  HR: 80 (25 Apr 2025 11:04) (70 - 83)  BP: 98/66 (25 Apr 2025 11:04) (92/61 - 98/66)  BP(mean): --  RR: 18 (25 Apr 2025 11:04) (18 - 18)  SpO2: 95% (25 Apr 2025 11:04) (93% - 95%)    Parameters below as of 25 Apr 2025 11:04  Patient On (Oxygen Delivery Method): room air    
Vital Signs Last 24 Hrs  T(C): 36.5 (13 May 2025 10:58), Max: 36.6 (12 May 2025 19:00)  T(F): 97.7 (13 May 2025 10:58), Max: 97.8 (12 May 2025 19:00)  HR: 92 (13 May 2025 10:58) (89 - 103)  BP: 117/80 (13 May 2025 10:58) (117/80 - 121/83)  BP(mean): --  RR: 18 (13 May 2025 10:58) (18 - 18)  SpO2: 97% (13 May 2025 10:58) (94% - 97%)    Parameters below as of 13 May 2025 10:58  Patient On (Oxygen Delivery Method): room air    
Vital Signs Last 24 Hrs  T(C): 36.4 (02 Apr 2025 11:05), Max: 36.9 (02 Apr 2025 05:24)  T(F): 97.5 (02 Apr 2025 11:05), Max: 98.4 (02 Apr 2025 05:24)  HR: 80 (02 Apr 2025 11:05) (80 - 81)  BP: 111/76 (02 Apr 2025 11:05) (98/66 - 111/76)  BP(mean): --  RR: 18 (02 Apr 2025 11:05) (17 - 18)  SpO2: 97% (02 Apr 2025 11:05) (93% - 97%)    Parameters below as of 02 Apr 2025 11:05  Patient On (Oxygen Delivery Method): room air    
Vital Signs Last 24 Hrs  T(C): 37.2 (06 May 2025 08:00), Max: 37.2 (06 May 2025 08:00)  T(F): 99 (06 May 2025 08:00), Max: 99 (06 May 2025 08:00)  HR: 112 (06 May 2025 11:00) (94 - 114)  BP: 91/58 (06 May 2025 11:00) (91/58 - 98/58)  BP(mean): 70 (06 May 2025 11:00) (70 - 73)  RR: 28 (06 May 2025 11:00) (10 - 41)  SpO2: 95% (06 May 2025 11:00) (90% - 100%)    Parameters below as of 06 May 2025 08:00  Patient On (Oxygen Delivery Method): room air

## 2025-05-13 NOTE — BH CONSULTATION LIAISON PROGRESS NOTE - NSBHFUPINTERVALCCFT_PSY_A_CORE
"I am doing better today"
"I am doing better" 
Patient seen as follow up for concerns of low s/p transplant. 
"I can swallow medications" 
"it has been frustrating" 
"I am leaving tomorrow"

## 2025-05-13 NOTE — BH CONSULTATION LIAISON PROGRESS NOTE - NSBHPTASSESSDT_PSY_A_CORE
06-May-2025 11:45
02-May-2025 13:11
05-May-2025 16:53
13-May-2025 12:38
02-Apr-2025 14:09
25-Apr-2025 16:05

## 2025-05-14 ENCOUNTER — NON-APPOINTMENT (OUTPATIENT)
Age: 70
End: 2025-05-14

## 2025-05-14 VITALS
RESPIRATION RATE: 18 BRPM | WEIGHT: 174.17 LBS | SYSTOLIC BLOOD PRESSURE: 128 MMHG | OXYGEN SATURATION: 98 % | DIASTOLIC BLOOD PRESSURE: 87 MMHG | HEART RATE: 104 BPM | TEMPERATURE: 98 F

## 2025-05-14 DIAGNOSIS — Z91.89 OTHER SPECIFIED PERSONAL RISK FACTORS, NOT ELSEWHERE CLASSIFIED: ICD-10-CM

## 2025-05-14 PROBLEM — Z94.1 HEART TRANSPLANT RECIPIENT: Status: ACTIVE | Noted: 2025-05-12

## 2025-05-14 LAB
ALBUMIN SERPL ELPH-MCNC: 4.2 G/DL — SIGNIFICANT CHANGE UP (ref 3.3–5)
ALP SERPL-CCNC: 114 U/L — SIGNIFICANT CHANGE UP (ref 40–120)
ALT FLD-CCNC: 95 U/L — HIGH (ref 10–45)
ANION GAP SERPL CALC-SCNC: 15 MMOL/L — SIGNIFICANT CHANGE UP (ref 5–17)
AST SERPL-CCNC: 22 U/L — SIGNIFICANT CHANGE UP (ref 10–40)
BASOPHILS # BLD AUTO: 0.01 K/UL — SIGNIFICANT CHANGE UP (ref 0–0.2)
BASOPHILS NFR BLD AUTO: 0.1 % — SIGNIFICANT CHANGE UP (ref 0–2)
BILIRUB SERPL-MCNC: 0.6 MG/DL — SIGNIFICANT CHANGE UP (ref 0.2–1.2)
BUN SERPL-MCNC: 60 MG/DL — HIGH (ref 7–23)
CALCIUM SERPL-MCNC: 9.6 MG/DL — SIGNIFICANT CHANGE UP (ref 8.4–10.5)
CHLORIDE SERPL-SCNC: 102 MMOL/L — SIGNIFICANT CHANGE UP (ref 96–108)
CO2 SERPL-SCNC: 24 MMOL/L — SIGNIFICANT CHANGE UP (ref 22–31)
CREAT SERPL-MCNC: 1.98 MG/DL — HIGH (ref 0.5–1.3)
EGFR: 36 ML/MIN/1.73M2 — LOW
EGFR: 36 ML/MIN/1.73M2 — LOW
EOSINOPHIL # BLD AUTO: 0.05 K/UL — SIGNIFICANT CHANGE UP (ref 0–0.5)
EOSINOPHIL NFR BLD AUTO: 0.3 % — SIGNIFICANT CHANGE UP (ref 0–6)
GLUCOSE BLDC GLUCOMTR-MCNC: 120 MG/DL — HIGH (ref 70–99)
GLUCOSE BLDC GLUCOMTR-MCNC: 157 MG/DL — HIGH (ref 70–99)
GLUCOSE SERPL-MCNC: 119 MG/DL — HIGH (ref 70–99)
HCT VFR BLD CALC: 26.8 % — LOW (ref 39–50)
HGB BLD-MCNC: 8.8 G/DL — LOW (ref 13–17)
IMM GRANULOCYTES NFR BLD AUTO: 1.8 % — HIGH (ref 0–0.9)
LYMPHOCYTES # BLD AUTO: 0.92 K/UL — LOW (ref 1–3.3)
LYMPHOCYTES # BLD AUTO: 6 % — LOW (ref 13–44)
MAGNESIUM SERPL-MCNC: 1.7 MG/DL — SIGNIFICANT CHANGE UP (ref 1.6–2.6)
MCHC RBC-ENTMCNC: 28.9 PG — SIGNIFICANT CHANGE UP (ref 27–34)
MCHC RBC-ENTMCNC: 32.8 G/DL — SIGNIFICANT CHANGE UP (ref 32–36)
MCV RBC AUTO: 88.2 FL — SIGNIFICANT CHANGE UP (ref 80–100)
MONOCYTES # BLD AUTO: 0.96 K/UL — HIGH (ref 0–0.9)
MONOCYTES NFR BLD AUTO: 6.3 % — SIGNIFICANT CHANGE UP (ref 2–14)
NEUTROPHILS # BLD AUTO: 13 K/UL — HIGH (ref 1.8–7.4)
NEUTROPHILS NFR BLD AUTO: 85.5 % — HIGH (ref 43–77)
NRBC BLD AUTO-RTO: 0 /100 WBCS — SIGNIFICANT CHANGE UP (ref 0–0)
PHOSPHATE SERPL-MCNC: 3.2 MG/DL — SIGNIFICANT CHANGE UP (ref 2.5–4.5)
PLATELET # BLD AUTO: 285 K/UL — SIGNIFICANT CHANGE UP (ref 150–400)
POTASSIUM SERPL-MCNC: 4.4 MMOL/L — SIGNIFICANT CHANGE UP (ref 3.5–5.3)
POTASSIUM SERPL-SCNC: 4.4 MMOL/L — SIGNIFICANT CHANGE UP (ref 3.5–5.3)
PROT SERPL-MCNC: 6.7 G/DL — SIGNIFICANT CHANGE UP (ref 6–8.3)
RBC # BLD: 3.04 M/UL — LOW (ref 4.2–5.8)
RBC # FLD: 17.6 % — HIGH (ref 10.3–14.5)
SODIUM SERPL-SCNC: 141 MMOL/L — SIGNIFICANT CHANGE UP (ref 135–145)
TACROLIMUS SERPL-MCNC: 6.1 NG/ML — SIGNIFICANT CHANGE UP
WBC # BLD: 15.21 K/UL — HIGH (ref 3.8–10.5)
WBC # FLD AUTO: 15.21 K/UL — HIGH (ref 3.8–10.5)

## 2025-05-14 PROCEDURE — 99233 SBSQ HOSP IP/OBS HIGH 50: CPT

## 2025-05-14 PROCEDURE — 99232 SBSQ HOSP IP/OBS MODERATE 35: CPT | Mod: FS

## 2025-05-14 RX ORDER — CEFTRIAXONE 500 MG/1
2 INJECTION, POWDER, FOR SOLUTION INTRAMUSCULAR; INTRAVENOUS
Qty: 0 | Refills: 0 | DISCHARGE
Start: 2025-05-14 | End: 2025-05-29

## 2025-05-14 RX ORDER — VANCOMYCIN HYDROCHLORIDE 125 MG/1
125 CAPSULE ORAL TWICE DAILY
Qty: 40 | Refills: 0 | Status: ACTIVE | COMMUNITY
Start: 2025-05-14 | End: 1900-01-01

## 2025-05-14 RX ORDER — TACROLIMUS 0.5 MG/1
1 CAPSULE ORAL ONCE
Refills: 0 | Status: COMPLETED | OUTPATIENT
Start: 2025-05-14 | End: 2025-05-14

## 2025-05-14 RX ORDER — HYPROMELLOSE 0.4 %
1 DROPS OPHTHALMIC (EYE)
Qty: 0 | Refills: 0 | DISCHARGE
Start: 2025-05-14

## 2025-05-14 RX ORDER — VALGANCICLOVIR HYDROCHLORIDE 450 MG/1
450 TABLET ORAL
Qty: 12 | Refills: 5 | Status: ACTIVE | COMMUNITY
Start: 2025-05-12 | End: 1900-01-01

## 2025-05-14 RX ORDER — POLYETHYLENE GLYCOL 3350 17 G/17G
17 POWDER, FOR SOLUTION ORAL
Qty: 0 | Refills: 0 | DISCHARGE
Start: 2025-05-14

## 2025-05-14 RX ORDER — TACROLIMUS 5 MG/1
5 CAPSULE ORAL
Qty: 60 | Refills: 5 | Status: DISCONTINUED | COMMUNITY
Start: 2025-05-12 | End: 2025-05-14

## 2025-05-14 RX ORDER — METHOCARBAMOL 500 MG/1
1 TABLET, FILM COATED ORAL
Qty: 0 | Refills: 0 | DISCHARGE
Start: 2025-05-14

## 2025-05-14 RX ORDER — ERYTHROMYCIN 5 MG/G
1 OINTMENT OPHTHALMIC
Qty: 1 | Refills: 0
Start: 2025-05-14 | End: 2025-05-17

## 2025-05-14 RX ORDER — TACROLIMUS 0.5 MG/1
3 CAPSULE ORAL
Qty: 180 | Refills: 0
Start: 2025-05-14 | End: 2025-06-12

## 2025-05-14 RX ORDER — TACROLIMUS 1 MG/1
1 CAPSULE ORAL TWICE DAILY
Qty: 180 | Refills: 5 | Status: ACTIVE | COMMUNITY
Start: 2025-05-12 | End: 1900-01-01

## 2025-05-14 RX ORDER — PENICILLIN G BENZATHINE 2.4MM/4ML
2.4 SYRINGE (ML) INTRAMUSCULAR
Refills: 0 | Status: COMPLETED | OUTPATIENT
Start: 2025-05-14 | End: 2025-05-14

## 2025-05-14 RX ORDER — ASPIRIN 81 MG/1
81 TABLET, DELAYED RELEASE ORAL
Qty: 30 | Refills: 5 | Status: DISCONTINUED | COMMUNITY
Start: 2025-05-12 | End: 2025-05-14

## 2025-05-14 RX ORDER — SENNA 187 MG
2 TABLET ORAL
Qty: 0 | Refills: 0 | DISCHARGE
Start: 2025-05-14

## 2025-05-14 RX ADMIN — HEPARIN SODIUM 5000 UNIT(S): 1000 INJECTION INTRAVENOUS; SUBCUTANEOUS at 06:30

## 2025-05-14 RX ADMIN — CEFTRIAXONE 100 MILLIGRAM(S): 500 INJECTION, POWDER, FOR SOLUTION INTRAMUSCULAR; INTRAVENOUS at 12:33

## 2025-05-14 RX ADMIN — DESVENLAFAXINE 50 MILLIGRAM(S): 25 TABLET, EXTENDED RELEASE ORAL at 13:04

## 2025-05-14 RX ADMIN — Medication 500000 UNIT(S): at 13:04

## 2025-05-14 RX ADMIN — NALOXEGOL OXALATE 12.5 MILLIGRAM(S): 12.5 TABLET, FILM COATED ORAL at 13:03

## 2025-05-14 RX ADMIN — MYCOPHENOLATE MOFETIL 1000 MILLIGRAM(S): 500 TABLET, FILM COATED ORAL at 08:09

## 2025-05-14 RX ADMIN — Medication 500000 UNIT(S): at 08:08

## 2025-05-14 RX ADMIN — TACROLIMUS 1 MILLIGRAM(S): 0.5 CAPSULE ORAL at 13:05

## 2025-05-14 RX ADMIN — Medication 40 MILLIGRAM(S): at 06:30

## 2025-05-14 RX ADMIN — ATOVAQUONE 1500 MILLIGRAM(S): 750 SUSPENSION ORAL at 13:04

## 2025-05-14 RX ADMIN — TACROLIMUS 2.5 MILLIGRAM(S): 0.5 CAPSULE ORAL at 08:09

## 2025-05-14 RX ADMIN — Medication 2.4 MILLION UNIT(S): at 15:04

## 2025-05-14 RX ADMIN — Medication 1 APPLICATION(S): at 13:04

## 2025-05-14 RX ADMIN — Medication 1 TABLET(S): at 13:03

## 2025-05-14 RX ADMIN — Medication 125 MILLIGRAM(S): at 06:30

## 2025-05-14 RX ADMIN — BUMETANIDE 1 MILLIGRAM(S): 1 TABLET ORAL at 06:30

## 2025-05-14 RX ADMIN — PREDNISONE 15 MILLIGRAM(S): 20 TABLET ORAL at 06:30

## 2025-05-14 NOTE — PROGRESS NOTE ADULT - CRITICAL CARE ATTENDING COMMENT
meds; mepron, valctye 450 biw, ceftriaxome, nystatin, po vanco, prograf 2.5 bid (6.1, 10.4, 9.1), cellcept 1g bid, pred 15 bid, bumex 1 QD, flomax,   No events.   HR SR 90, 115/-120/ afebrile 174m (176, 176)   min LE edema.   TTE yesterday (no report in) normal BIV funciton large anterior effusion. No collapse.   05-14    141  |  102  |  60[H]  ----------------------------<  119[H]  4.4   |  24  |  1.98[H] (1.8)     Ca    9.6      14 May 2025 06:57  Phos  3.2     05-14  Mg     1.7     05-14    TPro  6.7  /  Alb  4.2  /  TBili  0.6  /  DBili  x   /  AST  22  /  ALT  95[H]  /  AlkPhos  114  05-14                          8.8    15.21 )-----------( 285      ( 14 May 2025 06:58 )             26.8   Discussed on MDR  Will follow effusion conservatively for now.   Plan:   3rd dose penicllin today  IV antibiotics until 5.29   d/c off ASA   Increase prograf 3.5 bid with 1mg additional dose this morning.   Make valcyte 450 tiw.   d/c on same dose of diuretics.   check labs including prograf on 5.16   biopsy and TTE 5.20   Kevin Urbina

## 2025-05-14 NOTE — PROGRESS NOTE ADULT - ASSESSMENT
70-year-old male, with reported Hx of NICM since 2011 HFrEF (LVEF 25-30%) s/p MDT CRT-D with baseline CHB, VT/VF, AFs/p GIANFRANCO ligation/MAZE, MV/TV repair, PVC ablation 3/2024 intolerant to AADs in the past, recent admission 3/19/2025-3/20/2025 for AICD shocks initially presented to the [facility] ED on 3/21/2025, sent by HF specialist for ACID shock. Device reported successful ATP for VT 3/17/2025 at 6:52pm followed by one ATP & 40J shock. He reported feeling dizzy & SOB during the events. He follows with Dr. Luca Tamayo for HF, currently undergoing transplant workup, Dr John for CRTD and VT/VF and Dr. Chu for genetic counseling. While admitted to [facility], he was started on a lidocaine gtt. On 3/21 when lidocaine weaned off patient had another two episodes of VT requiring AICD shocks. He was transferred to [facility] for further management. He was initially maintained on lidocaine gtt from 3/21/2025-3/25/2025 & his listing status was upgraded to UNOS status 2e for heart transplant (ABO O) for the refractory VT. He was transitioned to oral antiarrhythmics (Toprol XL & quinidine) & ultimately transferred from CICU to [facility] floor on 3/27/2025. Hospital course was further c/b development of watery diarrhea & was found to have +norovirus on 3/31/2025 which prompted deactivation to status 7 listing for heart transplant. Status reinstated to 2E after diarrhea resolved.   4/29: s/p  OHT, TV repair, PPM removal   Intra op STACEY with LVEF 20% and mild RV dysfunction.  Extubated on 4/29.    Post Op: Had some initial RV failure/PGD (requiring dobutamine 7.5, epi 0.02, levo, vaso, and Lico.  CRRT started to aid volume removal.  Echo from 4/30 with LVEF 70% and mild RV dysfunction.  Levo/Vaso weaned off on 5/1 and Lico weaned off 5/2. He requiring large amounts of pain medication despite 2 chest tubes being removed POD 1 (ketamine + PCA pump).  Hospital course c/b ?delerium and non-cooporation with care, SI, Seen by psych 5/2 and started on trazodone + Pristiq. Psychiatric status improved.  Requiring 1:1 sitter now resolved.  NG tube placed 5/3 but now eating/ NGT removed 5/5. He is s/p first surveillance RHC/Bx 5/8. Chest tubes removed in CTU, no PTX on CXR.   5/9: TRANSFERRED TO [facility]. Management per transplant cardiology team. Transplant ID following on regimen per ID and Tx Cardiology. Per report: Donor Syphilis Serology Positive. Per Transplant ID: Continue benzathine penicillin 2,400,000 units weekly x 3 doses total, second dose 5/8, next due 5/15.  5/10 VSS rounds made w/ Dr. Barrientos -& HF team.  will need PICC line for 4 weeks of Ceftriaxone 2g iv qd (strp pneum meningitis in donor) & PCN for syphilis  in donor- d/c plan home this week.  5/11 VSS c/w IV abx. PICC eval for IV abx. Transplant cardiology for diuretic regimen --> started on PO Bumex 1mg qD. Tacro lvl 8.6, for 2.5mg BID per Transplant cardiology team.  5/12 Spoke to  IR. Per IR sharron for PICC i/s/o GFR. Bedside PICC team aware, and plan for PICC today at bedside. Mg supplemented. Monitor Cr. Cardio-renal following.   5/13 Per Transplant ID Dr Mathews: c/w IV antibiotics via RUE PICC through 5/29; patient to receive last dose PCN prior to discharge WED 5/14 per Transplant team. SW following closely, arranged home infusion set up.  70-year-old male, with reported Hx of NICM since 2011 HFrEF (LVEF 25-30%) s/p MDT CRT-D with baseline CHB, VT/VF, AFs/p GIANFRANCO ligation/MAZE, MV/TV repair, PVC ablation 3/2024 intolerant to AADs in the past, recent admission 3/19/2025-3/20/2025 for AICD shocks initially presented to the [facility] ED on 3/21/2025, sent by HF specialist for ACID shock. Device reported successful ATP for VT 3/17/2025 at 6:52pm followed by one ATP & 40J shock. He reported feeling dizzy & SOB during the events. He follows with Dr. Luca Tamayo for HF, currently undergoing transplant workup, Dr John for CRTD and VT/VF and Dr. Chu for genetic counseling. While admitted to [facility], he was started on a lidocaine gtt. On 3/21 when lidocaine weaned off patient had another two episodes of VT requiring AICD shocks. He was transferred to [facility] for further management. He was initially maintained on lidocaine gtt from 3/21/2025-3/25/2025 & his listing status was upgraded to UNOS status 2e for heart transplant (ABO O) for the refractory VT. He was transitioned to oral antiarrhythmics (Toprol XL & quinidine) & ultimately transferred from CICU to [facility] floor on 3/27/2025. Hospital course was further c/b development of watery diarrhea & was found to have +norovirus on 3/31/2025 which prompted deactivation to status 7 listing for heart transplant. Status reinstated to 2E after diarrhea resolved.   4/29: s/p  OHT, TV repair, PPM removal   Intra op STACEY with LVEF 20% and mild RV dysfunction.  Extubated on 4/29.    Post Op: Had some initial RV failure/PGD (requiring dobutamine 7.5, epi 0.02, levo, vaso, and Lico.  CRRT started to aid volume removal.  Echo from 4/30 with LVEF 70% and mild RV dysfunction.  Levo/Vaso weaned off on 5/1 and Lico weaned off 5/2. He requiring large amounts of pain medication despite 2 chest tubes being removed POD 1 (ketamine + PCA pump).  Hospital course c/b ?delerium and non-cooporation with care, SI, Seen by psych 5/2 and started on trazodone + Pristiq. Psychiatric status improved.  Requiring 1:1 sitter now resolved.  NG tube placed 5/3 but now eating/ NGT removed 5/5. He is s/p first surveillance RHC/Bx 5/8. Chest tubes removed in CTU, no PTX on CXR.   5/9: TRANSFERRED TO [facility]. Management per transplant cardiology team. Transplant ID following on regimen per ID and Tx Cardiology. Per report: Donor Syphilis Serology Positive. Per Transplant ID: Continue benzathine penicillin 2,400,000 units weekly x 3 doses total, second dose 5/8, next due 5/15.  5/10 VSS rounds made w/ Dr. Barrientos -& HF team.  will need PICC line for 4 weeks of Ceftriaxone 2g iv qd (strp pneum meningitis in donor) & PCN for syphilis  in donor- d/c plan home this week.  5/11 VSS c/w IV abx. PICC eval for IV abx. Transplant cardiology for diuretic regimen --> started on PO Bumex 1mg qD. Tacro lvl 8.6, for 2.5mg BID per Transplant cardiology team.  5/12 Spoke to  IR. Per IR sharron for PICC i/s/o GFR. Bedside PICC team aware, and plan for PICC today at bedside. Mg supplemented. Monitor Cr. Cardio-renal following.   5/13 Per Transplant ID Dr Mathews: c/w IV antibiotics via RUE PICC through 5/29; patient to receive last dose PCN prior to discharge WED 5/14 per Transplant team. SW following closely, arranged home infusion set up.   5/14 VSS plan for d/c home today on IV antibx til 5/29

## 2025-05-14 NOTE — PROGRESS NOTE ADULT - SUBJECTIVE AND OBJECTIVE BOX
Heart Transplant Education and Discharge note:    Learner: patient and wife Erwin    Barriers to learning: None    Topics reviewed: Pt has been receiving ongoing transplant education since hospitalization. Pt denies any pain or complaints at this time. We discussed the following topics: immunosuppression medications, signs and symptoms of infection and rejection, lifestyle changes post - heart transplant, and checking vital signs. Teach back confirmed, both the patient and family member have good understanding of topics discussed. Discussed and reviewed the medications, clinic/biopsy appointments and nutrition.  Pt will be discharged home today.  Pt has the transplant phone #, and is aware that the team will continue to be available.    Methods used: Verbal discussion, Patient Guide, discharge paperwork    Evaluation: The patient completed post-transplant care teaching.     Medications: see clinical pharmacist note    Follow up appt and biopsy: 5/22    Next bloodwork due: 5/16    Anticoagulation (Y/N): NO    Indication for A/C: n/a    Other follow- up appointments: per d/c summary     Time spent with patient: 45 minutes    Cheryl Perkins NP  Heart Transplant Coordinator  Office # [phone]  Cell # [phone]

## 2025-05-14 NOTE — PROGRESS NOTE ADULT - SUBJECTIVE AND OBJECTIVE BOX
ADVANCED HEART FAILURE & TRANSPLANT  - PROGRESS NOTE  *To reach the NS1 Team from 8am to 5pm (MON-FRI), please call [phone],   _______________________________________________________________________________________________________     Subjective:    Medications:  artificial  tears Solution 1 Drop(s) Both EYES every 12 hours PRN  atovaquone  Suspension 1500 milliGRAM(s) Oral daily  buMETAnide 1 milliGRAM(s) Oral daily  cefTRIAXone   IVPB 2000 milliGRAM(s) IV Intermittent <User Schedule>  chlorhexidine 2% Cloths 1 Application(s) Topical daily  desvenlafaxine ER 50 milliGRAM(s) Oral daily  dextrose 50% Injectable 50 milliLiter(s) IV Push every 15 minutes  dextrose 50% Injectable 25 milliLiter(s) IV Push every 15 minutes  erythromycin   Ointment 1 Application(s) Left EYE every 4 hours  heparin   Injectable 5000 Unit(s) SubCutaneous every 8 hours  hydrOXYzine hydrochloride 10 milliGRAM(s) Oral at bedtime PRN  insulin lispro (ADMELOG) corrective regimen sliding scale   SubCutaneous three times a day before meals  insulin lispro (ADMELOG) corrective regimen sliding scale   SubCutaneous at bedtime  methocarbamol 500 milliGRAM(s) Oral every 8 hours PRN  mycophenolate mofetil 1000 milliGRAM(s) Oral <User Schedule>  naloxegol 12.5 milliGRAM(s) Oral daily  Nephro-piotr 1 Tablet(s) Oral daily  nystatin    Suspension 870664 Unit(s) Oral <User Schedule>  oxymetazoline 0.05% Nasal Spray 2 Spray(s) Both Nostrils two times a day  pantoprazole    Tablet 40 milliGRAM(s) Oral before breakfast  penicillin   G benzathine Injectable 2.4 Million Unit(s) IntraMuscular <User Schedule>  polyethylene glycol 3350 17 Gram(s) Oral two times a day  potassium chloride  10 mEq/50 mL IVPB 10 milliEquivalent(s) IV Intermittent once  potassium chloride  10 mEq/50 mL IVPB 10 milliEquivalent(s) IV Intermittent once  predniSONE   Tablet 15 milliGRAM(s) Oral every 12 hours  senna 2 Tablet(s) Oral at bedtime  sodium chloride 0.9% lock flush 3 milliLiter(s) IV Push every 8 hours  tacrolimus 2.5 milliGRAM(s) Oral <User Schedule>  tacrolimus 1 milliGRAM(s) Oral once  tamsulosin 0.4 milliGRAM(s) Oral at bedtime  traZODone 150 milliGRAM(s) Oral at bedtime  valGANciclovir 450 milliGRAM(s) Oral <User Schedule>  vancomycin    Solution 125 milliGRAM(s) Oral every 12 hours      Physical Exam:    Vitals:  Vital Signs Last 24 Hours  T(C): 36.4 (25 @ 06:45), Max: 36.6 (25 @ 18:47)  HR: 104 (25 @ 06:45) (99 - 104)  BP: 128/87 (25 @ 06:45) (113/75 - 128/87)  RR: 18 (25 @ 06:45) (18 - 18)  SpO2: 98% (25 @ 06:45) (98% - 98%)    Weight in k ( @ 06:45)    I&O's Summary    13 May 2025 07:01  -  14 May 2025 07:00  --------------------------------------------------------  IN: 890 mL / OUT: 1400 mL / NET: -510 mL        Tele:    General: No distress. Comfortable.  HEENT: EOM intact.  Neck: Neck supple. JVP not elevated. No masses  Chest: Clear to auscultation bilaterally  CV: Normal S1 and S2. No murmurs, rub, or gallops. Radial pulses normal.  Abdomen: Soft, non-distended, non-tender  Skin: No rashes or skin breakdown  Extremities: No LE edema  Neurology: Alert and oriented times three. Sensation intact  Psych: Affect normal    Labs:                        8.8    15.21 )-----------( 285      ( 14 May 2025 06:58 )             26.8         141  |  102  |  60[H]  ----------------------------<  119[H]  4.4   |  24  |  1.98[H]    Ca    9.6      14 May 2025 06:57  Phos  3.2       Mg     1.7         TPro  6.7  /  Alb  4.2  /  TBili  0.6  /  DBili  x   /  AST  22  /  ALT  95[H]  /  AlkPhos  114

## 2025-05-14 NOTE — PROGRESS NOTE ADULT - REASON FOR ADMISSION
VT
S/p heart transplant
VT
s/p OHT
VT
s/p heart transplant
s/p heart transplant
VT

## 2025-05-14 NOTE — PROGRESS NOTE ADULT - SUBJECTIVE AND OBJECTIVE BOX
VITAL SIGNS-Telemetry:    Vital Signs Last 24 Hrs  T(C): 36.6 (05-13-25 @ 18:47), Max: 36.6 (05-13-25 @ 18:47)  T(F): 97.9 (05-13-25 @ 18:47), Max: 97.9 (05-13-25 @ 18:47)  HR: 99 (05-13-25 @ 18:47) (92 - 99)  BP: 113/75 (05-13-25 @ 18:47) (113/75 - 117/80)  RR: 18 (05-13-25 @ 18:47) (18 - 18)  SpO2: 98% (05-13-25 @ 18:47) (97% - 98%)         05-12 @ 07:01  -  05-13 @ 07:00  --------------------------------------------------------  IN: 1515 mL / OUT: 1600 mL / NET: -85 mL    05-13 @ 07:01  -  05-14 @ 06:03  --------------------------------------------------------  IN: 890 mL / OUT: 1400 mL / NET: -510 mL        Daily     Daily     CAPILLARY BLOOD GLUCOSE      POCT Blood Glucose.: 182 mg/dL (13 May 2025 21:19)  POCT Blood Glucose.: 160 mg/dL (13 May 2025 17:52)  POCT Blood Glucose.: 151 mg/dL (13 May 2025 11:17)  POCT Blood Glucose.: 145 mg/dL (13 May 2025 07:10)          Drains:     MS         [  ] Drainage:                 L Pleural  [  ]  Drainage:                R Pleural  [  ]  Drainage:  Pacing Wires        [  ]   Settings:                                  Isolated  [  ]  Coumadin    [ ] YES          [  ]      NO         Reason:       PHYSICAL EXAM:  Neurology: alert and oriented x 3, nonfocal, no gross deficits  CV :  Sternal Wound :  CDI , Stable  Lungs:  Abdomen: soft, nontender, nondistended, positive bowel sounds, last bowel movement         Extremities:         artificial  tears Solution 1 Drop(s) Both EYES every 12 hours PRN  atovaquone  Suspension 1500 milliGRAM(s) Oral daily  buMETAnide 1 milliGRAM(s) Oral daily  cefTRIAXone   IVPB 2000 milliGRAM(s) IV Intermittent <User Schedule>  chlorhexidine 2% Cloths 1 Application(s) Topical daily  desvenlafaxine ER 50 milliGRAM(s) Oral daily  dextrose 50% Injectable 50 milliLiter(s) IV Push every 15 minutes  dextrose 50% Injectable 25 milliLiter(s) IV Push every 15 minutes  erythromycin   Ointment 1 Application(s) Left EYE every 4 hours  heparin   Injectable 5000 Unit(s) SubCutaneous every 8 hours  hydrOXYzine hydrochloride 10 milliGRAM(s) Oral at bedtime PRN  insulin lispro (ADMELOG) corrective regimen sliding scale   SubCutaneous three times a day before meals  insulin lispro (ADMELOG) corrective regimen sliding scale   SubCutaneous at bedtime  methocarbamol 500 milliGRAM(s) Oral every 8 hours PRN  mycophenolate mofetil 1000 milliGRAM(s) Oral <User Schedule>  naloxegol 12.5 milliGRAM(s) Oral daily  Nephro-piotr 1 Tablet(s) Oral daily  nystatin    Suspension 363444 Unit(s) Oral <User Schedule>  oxymetazoline 0.05% Nasal Spray 2 Spray(s) Both Nostrils two times a day  pantoprazole    Tablet 40 milliGRAM(s) Oral before breakfast  penicillin   G benzathine Injectable 2.4 Million Unit(s) IntraMuscular <User Schedule>  polyethylene glycol 3350 17 Gram(s) Oral two times a day  potassium chloride  10 mEq/50 mL IVPB 10 milliEquivalent(s) IV Intermittent once  potassium chloride  10 mEq/50 mL IVPB 10 milliEquivalent(s) IV Intermittent once  predniSONE   Tablet 15 milliGRAM(s) Oral every 12 hours  senna 2 Tablet(s) Oral at bedtime  sodium chloride 0.9% lock flush 3 milliLiter(s) IV Push every 8 hours  tacrolimus 2.5 milliGRAM(s) Oral <User Schedule>  tamsulosin 0.4 milliGRAM(s) Oral at bedtime  traZODone 150 milliGRAM(s) Oral at bedtime  valGANciclovir 450 milliGRAM(s) Oral <User Schedule>  vancomycin    Solution 125 milliGRAM(s) Oral every 12 hours      Physical Therapy Rec:   Home  [  ]   Home w/ PT  [  ]  Rehab  [  ]  Discussed with Cardiothoracic Team at AM rounds. VITAL SIGNS-Telemetry:  SR   Vital Signs Last 24 Hrs  T(C): 36.6 (05-13-25 @ 18:47), Max: 36.6 (05-13-25 @ 18:47)  T(F): 97.9 (05-13-25 @ 18:47), Max: 97.9 (05-13-25 @ 18:47)  HR: 99 (05-13-25 @ 18:47) (92 - 99)  BP: 113/75 (05-13-25 @ 18:47) (113/75 - 117/80)  RR: 18 (05-13-25 @ 18:47) (18 - 18)  SpO2: 98% (05-13-25 @ 18:47) (97% - 98%)         05-12 @ 07:01  -  05-13 @ 07:00  --------------------------------------------------------  IN: 1515 mL / OUT: 1600 mL / NET: -85 mL    05-13 @ 07:01  -  05-14 @ 06:03  --------------------------------------------------------  IN: 890 mL / OUT: 1400 mL / NET: -510 mL    Daily     Daily     CAPILLARY BLOOD GLUCOSE  POCT Blood Glucose.: 182 mg/dL (13 May 2025 21:19)  POCT Blood Glucose.: 160 mg/dL (13 May 2025 17:52)  POCT Blood Glucose.: 151 mg/dL (13 May 2025 11:17)  POCT Blood Glucose.: 145 mg/dL (13 May 2025 07:10)         PHYSICAL EXAM:  Neurology: alert and oriented x 3, nonfocal, no gross deficits  CV : S1S2  Sternal Wound :  CDI , Stable  Lungs: cta  Abdomen: soft, nontender, nondistended, positive bowel sounds, last bowel movement       5/13  Extremities:    no edema  no calf tenderness     artificial  tears Solution 1 Drop(s) Both EYES every 12 hours PRN  atovaquone  Suspension 1500 milliGRAM(s) Oral daily  buMETAnide 1 milliGRAM(s) Oral daily  cefTRIAXone   IVPB 2000 milliGRAM(s) IV Intermittent <User Schedule>  chlorhexidine 2% Cloths 1 Application(s) Topical daily  desvenlafaxine ER 50 milliGRAM(s) Oral daily  dextrose 50% Injectable 50 milliLiter(s) IV Push every 15 minutes  dextrose 50% Injectable 25 milliLiter(s) IV Push every 15 minutes  erythromycin   Ointment 1 Application(s) Left EYE every 4 hours  heparin   Injectable 5000 Unit(s) SubCutaneous every 8 hours  hydrOXYzine hydrochloride 10 milliGRAM(s) Oral at bedtime PRN  insulin lispro (ADMELOG) corrective regimen sliding scale   SubCutaneous three times a day before meals  insulin lispro (ADMELOG) corrective regimen sliding scale   SubCutaneous at bedtime  methocarbamol 500 milliGRAM(s) Oral every 8 hours PRN  mycophenolate mofetil 1000 milliGRAM(s) Oral <User Schedule>  naloxegol 12.5 milliGRAM(s) Oral daily  Nephro-piotr 1 Tablet(s) Oral daily  nystatin    Suspension 223633 Unit(s) Oral <User Schedule>  oxymetazoline 0.05% Nasal Spray 2 Spray(s) Both Nostrils two times a day  pantoprazole    Tablet 40 milliGRAM(s) Oral before breakfast  penicillin   G benzathine Injectable 2.4 Million Unit(s) IntraMuscular <User Schedule>  polyethylene glycol 3350 17 Gram(s) Oral two times a day  potassium chloride  10 mEq/50 mL IVPB 10 milliEquivalent(s) IV Intermittent once  potassium chloride  10 mEq/50 mL IVPB 10 milliEquivalent(s) IV Intermittent once  predniSONE   Tablet 15 milliGRAM(s) Oral every 12 hours  senna 2 Tablet(s) Oral at bedtime  sodium chloride 0.9% lock flush 3 milliLiter(s) IV Push every 8 hours  tacrolimus 2.5 milliGRAM(s) Oral <User Schedule>  tamsulosin 0.4 milliGRAM(s) Oral at bedtime  traZODone 150 milliGRAM(s) Oral at bedtime  valGANciclovir 450 milliGRAM(s) Oral <User Schedule>  vancomycin    Solution 125 milliGRAM(s) Oral every 12 hours    Physical Therapy Rec:   Home  [ x ]   Home w/ PT  [  ]  Rehab  [  ]  Discussed with Cardiothoracic Team at AM rounds.

## 2025-05-14 NOTE — PROGRESS NOTE ADULT - PROBLEM SELECTOR PROBLEM 1
ACC/AHA stage D systolic heart failure
ACC/AHA stage D systolic heart failure
Heart transplant recipient
Heart transplant recipient
ZAHRA (acute kidney injury)
ACC/AHA stage D systolic heart failure
Heart transplant recipient
Ventricular tachycardia
ACC/AHA stage D systolic heart failure
Heart transplant recipient
Heart transplant recipient
Ventricular tachycardia
ZAHRA (acute kidney injury)
ACC/AHA stage D systolic heart failure
Heart transplant recipient
Heart transplant recipient
Ventricular tachycardia
ZAHRA (acute kidney injury)
ACC/AHA stage D systolic heart failure
Heart transplant recipient
ZAHRA (acute kidney injury)
ZAHRA (acute kidney injury)
ACC/AHA stage D systolic heart failure
ACC/AHA stage D systolic heart failure
Ventricular tachycardia
ZAHRA (acute kidney injury)
ACC/AHA stage D systolic heart failure
Ventricular tachycardia
ACC/AHA stage D systolic heart failure
Heart transplant recipient
Heart transplant recipient
Ventricular tachycardia
Ventricular tachycardia
ACC/AHA stage D systolic heart failure
Heart transplant recipient
Heart transplant recipient
Ventricular tachycardia
ACC/AHA stage D systolic heart failure
ACC/AHA stage D systolic heart failure
Heart transplant recipient
Heart transplant recipient
Ventricular tachycardia
Heart transplant recipient
Heart transplant recipient
Ventricular tachycardia
Heart transplant recipient
Ventricular tachycardia

## 2025-05-16 LAB
ALBUMIN SERPL ELPH-MCNC: 4.2 G/DL
ALP BLD-CCNC: 107 U/L
ALT SERPL-CCNC: 99 U/L
ANION GAP SERPL CALC-SCNC: 17 MMOL/L
AST SERPL-CCNC: 28 U/L
BASOPHILS # BLD AUTO: 0.01 K/UL
BASOPHILS NFR BLD AUTO: 0.1 %
BILIRUB SERPL-MCNC: 0.7 MG/DL
BUN SERPL-MCNC: 54 MG/DL
CALCIUM SERPL-MCNC: 9.3 MG/DL
CHLORIDE SERPL-SCNC: 103 MMOL/L
CO2 SERPL-SCNC: 21 MMOL/L
CREAT SERPL-MCNC: 1.8 MG/DL
EGFRCR SERPLBLD CKD-EPI 2021: 40 ML/MIN/1.73M2
EOSINOPHIL # BLD AUTO: 0.01 K/UL
EOSINOPHIL NFR BLD AUTO: 0.1 %
GLUCOSE SERPL-MCNC: 134 MG/DL
HCT VFR BLD CALC: 28 %
HGB BLD-MCNC: 8.6 G/DL
IMM GRANULOCYTES NFR BLD AUTO: 1.1 %
LYMPHOCYTES # BLD AUTO: 0.63 K/UL
LYMPHOCYTES NFR BLD AUTO: 4.7 %
MAGNESIUM SERPL-MCNC: 1.5 MG/DL
MAN DIFF?: NORMAL
MCHC RBC-ENTMCNC: 28.6 PG
MCHC RBC-ENTMCNC: 30.7 G/DL
MCV RBC AUTO: 93 FL
MONOCYTES # BLD AUTO: 0.63 K/UL
MONOCYTES NFR BLD AUTO: 4.7 %
NEUTROPHILS # BLD AUTO: 11.88 K/UL
NEUTROPHILS NFR BLD AUTO: 89.3 %
PLATELET # BLD AUTO: 273 K/UL
POTASSIUM SERPL-SCNC: 4.9 MMOL/L
PROT SERPL-MCNC: 6.3 G/DL
RBC # BLD: 3.01 M/UL
RBC # FLD: 19 %
SODIUM SERPL-SCNC: 142 MMOL/L
TACROLIMUS SERPL-MCNC: 8.6 NG/ML
WBC # FLD AUTO: 13.31 K/UL

## 2025-05-20 ENCOUNTER — LABORATORY RESULT (OUTPATIENT)
Age: 70
End: 2025-05-20

## 2025-05-20 ENCOUNTER — NON-APPOINTMENT (OUTPATIENT)
Age: 70
End: 2025-05-20

## 2025-05-20 ENCOUNTER — EMERGENCY (EMERGENCY)
Facility: HOSPITAL | Age: 70
LOS: 0 days | Discharge: HOME | End: 2025-05-20
Attending: STUDENT IN AN ORGANIZED HEALTH CARE EDUCATION/TRAINING PROGRAM
Payer: MEDICARE

## 2025-05-20 VITALS
DIASTOLIC BLOOD PRESSURE: 75 MMHG | OXYGEN SATURATION: 97 % | HEART RATE: 90 BPM | SYSTOLIC BLOOD PRESSURE: 128 MMHG | RESPIRATION RATE: 17 BRPM

## 2025-05-20 VITALS
DIASTOLIC BLOOD PRESSURE: 82 MMHG | OXYGEN SATURATION: 98 % | HEART RATE: 105 BPM | RESPIRATION RATE: 18 BRPM | TEMPERATURE: 97 F | HEIGHT: 67 IN | WEIGHT: 173.06 LBS | SYSTOLIC BLOOD PRESSURE: 122 MMHG

## 2025-05-20 DIAGNOSIS — T82.898A OTHER SPECIFIED COMPLICATION OF VASCULAR PROSTHETIC DEVICES, IMPLANTS AND GRAFTS, INITIAL ENCOUNTER: ICD-10-CM

## 2025-05-20 DIAGNOSIS — Z87.828 PERSONAL HISTORY OF OTHER (HEALED) PHYSICAL INJURY AND TRAUMA: Chronic | ICD-10-CM

## 2025-05-20 DIAGNOSIS — Z98.890 OTHER SPECIFIED POSTPROCEDURAL STATES: Chronic | ICD-10-CM

## 2025-05-20 DIAGNOSIS — Z95.810 PRESENCE OF AUTOMATIC (IMPLANTABLE) CARDIAC DEFIBRILLATOR: Chronic | ICD-10-CM

## 2025-05-20 DIAGNOSIS — Z94.1 HEART TRANSPLANT STATUS: ICD-10-CM

## 2025-05-20 DIAGNOSIS — I48.91 UNSPECIFIED ATRIAL FIBRILLATION: ICD-10-CM

## 2025-05-20 PROCEDURE — 99284 EMERGENCY DEPT VISIT MOD MDM: CPT | Mod: FS

## 2025-05-20 PROCEDURE — 96374 THER/PROPH/DIAG INJ IV PUSH: CPT

## 2025-05-20 PROCEDURE — 99284 EMERGENCY DEPT VISIT MOD MDM: CPT | Mod: 25

## 2025-05-20 RX ORDER — CEFTRIAXONE 500 MG/1
2000 INJECTION, POWDER, FOR SOLUTION INTRAMUSCULAR; INTRAVENOUS ONCE
Refills: 0 | Status: COMPLETED | OUTPATIENT
Start: 2025-05-20 | End: 2025-05-20

## 2025-05-20 RX ADMIN — CEFTRIAXONE 100 MILLIGRAM(S): 500 INJECTION, POWDER, FOR SOLUTION INTRAMUSCULAR; INTRAVENOUS at 20:44

## 2025-05-20 NOTE — ED PROVIDER NOTE - PHYSICAL EXAMINATION
CONST: NAD, WDWN  EYES: conjunctiva pink   ENT: Moist mucous membranes, no nasal discharge.   NECK: Supple, non-tender, no resistance  to flexion  CARD: Regular rate and rhythm  RESP: No resp distress, CTA b/l, no w/r/r  MS: FROM x4.  SKIN: Warm, dry, no acute rashes. Good turgor, PICC present to right AC, blood in tubing, unable to flush  NEURO: A&Ox3, No focal deficits. Strength 5/5 with no sensory deficits. Steady gait

## 2025-05-20 NOTE — ED ADULT NURSE NOTE - PRIMARY CARE PROVIDER
Patient states he has had 2 days of cough, fatigue, and body aches. Patient has not tried any OTC meds.      Triage Assessment (Adult)       Row Name 11/27/24 [id number]          Triage Assessment    Airway WDL WDL        Respiratory WDL    Respiratory WDL WDL        Skin Circulation/Temperature WDL    Skin Circulation/Temperature WDL WDL        Cardiac WDL    Cardiac WDL WDL        Peripheral/Neurovascular WDL    Peripheral Neurovascular WDL WDL        Cognitive/Neuro/Behavioral WDL    Cognitive/Neuro/Behavioral WDL WDL                      pcp

## 2025-05-20 NOTE — ED ADULT NURSE NOTE - NSFALLUNIVINTERV_ED_ALL_ED
Bed/Stretcher in lowest position, wheels locked, appropriate side rails in place/Call bell, personal items and telephone in reach/Instruct patient to call for assistance before getting out of bed/chair/stretcher/Non-slip footwear applied when patient is off stretcher/[place] to call system/Physically safe environment - no spills, clutter or unnecessary equipment/Purposeful proactive rounding/Room/bathroom lighting operational, light cord in reach

## 2025-05-20 NOTE — PROGRESS NOTE ADULT - PROBLEM/PLAN-1
DISPLAY PLAN FREE TEXT
Left voicemail informing patient and/or parent/guardian of the Psych Encounter form needing to be signed as a requirement from the insurance company for billing purposes. Patient can access form via "[employer] and sign electronically.     Please make patient aware this form must be signed for each visit as a requirement to continue future visits with provider.    Virtual Encounter form 5/15/25 call #1  
DISPLAY PLAN FREE TEXT

## 2025-05-20 NOTE — ED PROVIDER NOTE - NSFOLLOWUPINSTRUCTIONS_ED_ALL_ED_FT
PICC (Peripherally Inserted Central Catheter)    WHAT YOU NEED TO KNOW:    A PICC is a catheter (small tube) used to give treatments and to take blood. The catheter is inserted into an arm vein. These veins are called peripheral veins. The catheter is guided through the peripheral vein into a central vein near your heart.  PICC (Peripherally Inserted Central Catheter)    DISCHARGE INSTRUCTIONS:    Call your local emergency number (911 in the [place]) for any of the following:    You feel pain in your arm, neck, shoulder, or chest.    You cough up blood.  Seek care immediately if:    The catheter site turns cold, changes color, or you cannot feel it.    You see blood on your dressing and the amount is increasing.    The veins in your neck or chest bulge.  Call your doctor if:    You see signs of infection, such as redness, swelling, or pus, or you have a fever.    The catheter site is red, warm, painful, or oozing fluid.    You see blisters on the skin near the catheter site.    You cannot flush your catheter, or you feel pain when you flush your catheter.    You see that the catheter is getting shorter, or it falls out. Put pressure on the site with a clean towel before you call your doctor.    You see a hole or a crack in your catheter. Clamp your catheter above the damage before you call your doctor.    You have questions or concerns about your catheter.  Medicines:    NSAIDs help decrease swelling and pain or fever. This medicine is available with or without a doctor's order. NSAIDs can cause stomach bleeding or kidney problems in certain people. If you take blood thinner medicine, always ask your healthcare provider if NSAIDs are safe for you. Always read the medicine label and follow directions.    Take your medicine as directed. Contact your healthcare provider if you think your medicine is not helping or if you have side effects. Tell your provider if you are allergic to any medicine. Keep a list of the medicines, vitamins, and herbs you take. Include the amounts, and when and why you take them. Bring the list or the pill bottles to follow-up visits. Carry your medicine list with you in case of an emergency.  Self-care:    Plan to rest when you get home. You should be able to go back to your normal activities the next day. Your healthcare provider will tell you which activities are okay for you.    Apply a warm compress as directed. The area where the catheter was inserted may feel sore. A warm compress can help to decrease pain and swelling in your arm. Wet a small towel with warm water. Wring out the extra water. Wrap the cloth in plastic, and put it on the area. Use the compress 4 times a day, for 10 minutes each time. Prop your arm up on pillows when you are sitting or lying down. This will decrease swelling.    Follow instructions on how to care for your insertion site. Your provider will tell you when it is okay to shower or bathe. This is usually after about 1 week. You will need to keep the area covered so it stays dry when you bathe.  Prevent an infection: The area around your catheter may get infected, or you may get an infection in your bloodstream. A bloodstream infection is called a central line-associated bloodstream infection (CLABSI). A CLABSI is caused by bacteria getting into your bloodstream through your catheter. This can lead to severe illness. The following are ways you can help prevent an infection:    Wash your hands often. Use soap or an alcohol-based hand rub to clean your hands. Clean your hands before and after you touch the catheter or the catheter site. Remind anyone who cares for your catheter to wash his or her hands.  Handwashing      Limit contact with the catheter. Do not touch or handle your catheter unless you need to care for it. Do not pull, push on, or move the catheter when you clean your skin or change the dressing. Wear clean medical gloves when you touch your catheter or change dressings.    Clean your skin as directed. Clean the skin around your catheter every day and before you change your dressing. Ask your healthcare provider what to use to clean your skin. Check your skin every day for signs of infection, such as pain, redness, swelling, and oozing. Contact your healthcare provider if you see these signs.    Change the dressing as directed. Keep a sterile dressing over the catheter site. Change the dressing as directed or when it is loose, wet, dirty, or falls off. Clean the skin under the dressing with the solution your healthcare provider suggests. Let the area dry before you put on the new dressing. Do not blow on your skin to help it dry.    Keep the area dry. Wrap your arm with plastic and seal it with medical tape before you bathe. Take showers instead of baths. Do not swim or soak in a hot tub.  Follow up with your doctor as directed: Write down your questions so you remember to ask them during your visits. Please return to the ED tomorrow morning to see interventional radiology to replace PICC line          PICC (Peripherally Inserted Central Catheter)    WHAT YOU NEED TO KNOW:    A PICC is a catheter (small tube) used to give treatments and to take blood. The catheter is inserted into an arm vein. These veins are called peripheral veins. The catheter is guided through the peripheral vein into a central vein near your heart.  PICC (Peripherally Inserted Central Catheter)    DISCHARGE INSTRUCTIONS:    Call your local emergency number (911 in the [place]) for any of the following:    You feel pain in your arm, neck, shoulder, or chest.    You cough up blood.  Seek care immediately if:    The catheter site turns cold, changes color, or you cannot feel it.    You see blood on your dressing and the amount is increasing.    The veins in your neck or chest bulge.  Call your doctor if:    You see signs of infection, such as redness, swelling, or pus, or you have a fever.    The catheter site is red, warm, painful, or oozing fluid.    You see blisters on the skin near the catheter site.    You cannot flush your catheter, or you feel pain when you flush your catheter.    You see that the catheter is getting shorter, or it falls out. Put pressure on the site with a clean towel before you call your doctor.    You see a hole or a crack in your catheter. Clamp your catheter above the damage before you call your doctor.    You have questions or concerns about your catheter.  Medicines:    NSAIDs help decrease swelling and pain or fever. This medicine is available with or without a doctor's order. NSAIDs can cause stomach bleeding or kidney problems in certain people. If you take blood thinner medicine, always ask your healthcare provider if NSAIDs are safe for you. Always read the medicine label and follow directions.    Take your medicine as directed. Contact your healthcare provider if you think your medicine is not helping or if you have side effects. Tell your provider if you are allergic to any medicine. Keep a list of the medicines, vitamins, and herbs you take. Include the amounts, and when and why you take them. Bring the list or the pill bottles to follow-up visits. Carry your medicine list with you in case of an emergency.  Self-care:    Plan to rest when you get home. You should be able to go back to your normal activities the next day. Your healthcare provider will tell you which activities are okay for you.    Apply a warm compress as directed. The area where the catheter was inserted may feel sore. A warm compress can help to decrease pain and swelling in your arm. Wet a small towel with warm water. Wring out the extra water. Wrap the cloth in plastic, and put it on the area. Use the compress 4 times a day, for 10 minutes each time. Prop your arm up on pillows when you are sitting or lying down. This will decrease swelling.    Follow instructions on how to care for your insertion site. Your provider will tell you when it is okay to shower or bathe. This is usually after about 1 week. You will need to keep the area covered so it stays dry when you bathe.  Prevent an infection: The area around your catheter may get infected, or you may get an infection in your bloodstream. A bloodstream infection is called a central line-associated bloodstream infection (CLABSI). A CLABSI is caused by bacteria getting into your bloodstream through your catheter. This can lead to severe illness. The following are ways you can help prevent an infection:    Wash your hands often. Use soap or an alcohol-based hand rub to clean your hands. Clean your hands before and after you touch the catheter or the catheter site. Remind anyone who cares for your catheter to wash his or her hands.  Handwashing      Limit contact with the catheter. Do not touch or handle your catheter unless you need to care for it. Do not pull, push on, or move the catheter when you clean your skin or change the dressing. Wear clean medical gloves when you touch your catheter or change dressings.    Clean your skin as directed. Clean the skin around your catheter every day and before you change your dressing. Ask your healthcare provider what to use to clean your skin. Check your skin every day for signs of infection, such as pain, redness, swelling, and oozing. Contact your healthcare provider if you see these signs.    Change the dressing as directed. Keep a sterile dressing over the catheter site. Change the dressing as directed or when it is loose, wet, dirty, or falls off. Clean the skin under the dressing with the solution your healthcare provider suggests. Let the area dry before you put on the new dressing. Do not blow on your skin to help it dry.    Keep the area dry. Wrap your arm with plastic and seal it with medical tape before you bathe. Take showers instead of baths. Do not swim or soak in a hot tub.  Follow up with your doctor as directed: Write down your questions so you remember to ask them during your visits.

## 2025-05-20 NOTE — ED PROVIDER NOTE - CLINICAL SUMMARY MEDICAL DECISION MAKING FREE TEXT BOX
69 yo M presented to ED for eval of PICC line malfunction. IR aware. Spoke with patient's transplant team. Coordination of care of patient to receive IV ceftriaxone here in ED and be discharged and plan for IR to contact patient tomorrow, if not he will return to ED for PICC line replacement.     Appropriate medications for patient's presenting complaints were ordered and effects were reassessed.  Patient's records (prior hospital, ED visit, and/or nursing home notes if available) were reviewed.  Additional history was obtained from EMS, family, and/or PCP (where available).  Escalation to admission/observation was considered. However patient feels much better and is comfortable with discharge.  Appropriate follow-up was arranged. Return precautions discussed in detail.

## 2025-05-20 NOTE — ED PROVIDER NOTE - OBJECTIVE STATEMENT
69yo m s/p recent heart transplant has a PICC line to right AC presents with it occluded and he is due for his dose of abx. Denies pain. Visiting nurse was unable to flush it. Pt otherwise has no complaints

## 2025-05-20 NOTE — ED PROVIDER NOTE - PROGRESS NOTE DETAILS
Authored by Dana Rosales, DO: Spoke with Joann from transplant center ([phone]) who requests PICC line be replaced. If unable to replace PICC line today, requesting placing IV, giving patient dose of his antibiotics and discharge. The transplant team does not want the patient admitted if possible.     I spoke to IR Dr. Nguyen, who reports no one is in house to perform PICC line replacement at this time. Took patients information for outpatient PICC line replacement.     Transplant center aware. If patient does not hear from IR tomorrow morning will return to ER for PICC line replacement.

## 2025-05-20 NOTE — ED PROVIDER NOTE - PATIENT PORTAL LINK FT
You can access the FollowMyHealth Patient Portal offered by [facility] by registering at the following website: [email or web address]. By joining [facility]’s FollowMyHealth portal, you will also be able to view your health information using other applications (apps) compatible with our system.

## 2025-05-20 NOTE — ED PROVIDER NOTE - ATTENDING APP SHARED VISIT CONTRIBUTION OF CARE
71 yo M PMHx NICM, CHD, s/p MDT, AF s/p GIANFRANCO ligation, MV and TV repair s/p cardiac transplant on April 29th 2025 at [facility] presents to ED  for eval of PICC line malfunction. Reports he was discharged with PICC line for IV antibiotics (2g ceftriaxone per day) today RN noticed PICC line was not flushing prompting ED evaluation.    CONSTITUTIONAL: NAD.   SKIN: warm, dry  HEAD: Normocephalic; atraumatic.   ENT: MMM.   NECK: Supple.  CARD: RRR.   ABD: soft ntnd.   EXT: Normal ROM. RUE with PICC line, unable to flush.   NEURO: Alert, oriented, grossly unremarkable.

## 2025-05-21 ENCOUNTER — EMERGENCY (EMERGENCY)
Facility: HOSPITAL | Age: 70
LOS: 0 days | Discharge: LEFT BEFORE DISCHARGE | End: 2025-05-21
Attending: EMERGENCY MEDICINE
Payer: MEDICARE

## 2025-05-21 VITALS
WEIGHT: 173.06 LBS | HEART RATE: 96 BPM | OXYGEN SATURATION: 99 % | SYSTOLIC BLOOD PRESSURE: 134 MMHG | TEMPERATURE: 98 F | DIASTOLIC BLOOD PRESSURE: 93 MMHG | RESPIRATION RATE: 18 BRPM | HEIGHT: 67 IN

## 2025-05-21 VITALS
HEART RATE: 103 BPM | OXYGEN SATURATION: 97 % | DIASTOLIC BLOOD PRESSURE: 89 MMHG | SYSTOLIC BLOOD PRESSURE: 128 MMHG | TEMPERATURE: 98 F | RESPIRATION RATE: 20 BRPM | HEIGHT: 67 IN | WEIGHT: 315 LBS

## 2025-05-21 DIAGNOSIS — Z53.29 PROCEDURE AND TREATMENT NOT CARRIED OUT BECAUSE OF PATIENT'S DECISION FOR OTHER REASONS: ICD-10-CM

## 2025-05-21 DIAGNOSIS — T82.898A OTHER SPECIFIED COMPLICATION OF VASCULAR PROSTHETIC DEVICES, IMPLANTS AND GRAFTS, INITIAL ENCOUNTER: ICD-10-CM

## 2025-05-21 DIAGNOSIS — Z94.1 HEART TRANSPLANT STATUS: ICD-10-CM

## 2025-05-21 DIAGNOSIS — Z95.810 PRESENCE OF AUTOMATIC (IMPLANTABLE) CARDIAC DEFIBRILLATOR: ICD-10-CM

## 2025-05-21 DIAGNOSIS — Z87.828 PERSONAL HISTORY OF OTHER (HEALED) PHYSICAL INJURY AND TRAUMA: Chronic | ICD-10-CM

## 2025-05-21 DIAGNOSIS — Z98.890 OTHER SPECIFIED POSTPROCEDURAL STATES: Chronic | ICD-10-CM

## 2025-05-21 DIAGNOSIS — Z86.79 PERSONAL HISTORY OF OTHER DISEASES OF THE CIRCULATORY SYSTEM: ICD-10-CM

## 2025-05-21 DIAGNOSIS — Z95.810 PRESENCE OF AUTOMATIC (IMPLANTABLE) CARDIAC DEFIBRILLATOR: Chronic | ICD-10-CM

## 2025-05-21 LAB
ALBUMIN SERPL ELPH-MCNC: 4.2 G/DL
ALP BLD-CCNC: 101 U/L
ALT SERPL-CCNC: 58 U/L
ANION GAP SERPL CALC-SCNC: 14 MMOL/L
AST SERPL-CCNC: 11 U/L
BASOPHILS # BLD AUTO: 0.01 K/UL
BASOPHILS NFR BLD AUTO: 0.1 %
BILIRUB SERPL-MCNC: 0.7 MG/DL
BUN SERPL-MCNC: 50 MG/DL
CALCIUM SERPL-MCNC: 9.2 MG/DL
CHLORIDE SERPL-SCNC: 103 MMOL/L
CO2 SERPL-SCNC: 23 MMOL/L
CREAT SERPL-MCNC: 2.02 MG/DL
EGFRCR SERPLBLD CKD-EPI 2021: 35 ML/MIN/1.73M2
EOSINOPHIL # BLD AUTO: 0.01 K/UL
EOSINOPHIL NFR BLD AUTO: 0.1 %
GLUCOSE SERPL-MCNC: 124 MG/DL
HCT VFR BLD CALC: 26.4 %
HGB BLD-MCNC: 8.6 G/DL
IMM GRANULOCYTES NFR BLD AUTO: 1 %
LYMPHOCYTES # BLD AUTO: 0.54 K/UL
LYMPHOCYTES NFR BLD AUTO: 5.9 %
MAGNESIUM SERPL-MCNC: 1.3 MG/DL
MAN DIFF?: NORMAL
MCHC RBC-ENTMCNC: 29.8 PG
MCHC RBC-ENTMCNC: 32.6 G/DL
MCV RBC AUTO: 91.3 FL
MONOCYTES # BLD AUTO: 0.5 K/UL
MONOCYTES NFR BLD AUTO: 5.4 %
NEUTROPHILS # BLD AUTO: 8.07 K/UL
NEUTROPHILS NFR BLD AUTO: 87.5 %
PLATELET # BLD AUTO: 186 K/UL
POTASSIUM SERPL-SCNC: 4.6 MMOL/L
PROT SERPL-MCNC: 6.3 G/DL
RBC # BLD: 2.89 M/UL
RBC # FLD: 19.3 %
SODIUM SERPL-SCNC: 140 MMOL/L
TACROLIMUS SERPL-MCNC: 9.3 NG/ML
WBC # FLD AUTO: 9.22 K/UL

## 2025-05-21 PROCEDURE — 99223 1ST HOSP IP/OBS HIGH 75: CPT | Mod: FS

## 2025-05-21 PROCEDURE — 36573 INSJ PICC RS&I 5 YR+: CPT | Mod: RT

## 2025-05-21 PROCEDURE — 99284 EMERGENCY DEPT VISIT MOD MDM: CPT

## 2025-05-21 PROCEDURE — 76937 US GUIDE VASCULAR ACCESS: CPT | Mod: 26,59

## 2025-05-21 PROCEDURE — G0378: CPT

## 2025-05-21 PROCEDURE — 36569 INSJ PICC 5 YR+ W/O IMAGING: CPT

## 2025-05-21 PROCEDURE — C1751: CPT

## 2025-05-21 RX ORDER — CEFTRIAXONE 500 MG/1
2000 INJECTION, POWDER, FOR SOLUTION INTRAMUSCULAR; INTRAVENOUS ONCE
Refills: 0 | Status: COMPLETED | OUTPATIENT
Start: 2025-05-21 | End: 2025-05-21

## 2025-05-21 NOTE — ED PROVIDER NOTE - PHYSICAL EXAMINATION
CONST: Well appearing in NAD  EYES: PERRL, EOMI, Sclera and conjunctiva clear.   CARD: Normal S1 S2; Normal rate and rhythm  RESP: Equal BS B/L, No wheezes, rhonchi or rales. No distress  GI: Soft, non-tender, non-distended.  MS: Normal ROM in all extremities. No midline spinal tenderness.  SKIN: PICC line to R AC, some dried blood around the site. Unable to flush.  NEURO: A&Ox3, No focal deficits. Strength 5/5 with no sensory deficits.

## 2025-05-21 NOTE — ED PROVIDER NOTE - OBJECTIVE STATEMENT
71yo male with PMHx non-ischemic cardiomyopathy, CHD, AF s/p GIANFRANCO ligation, MV and TV repair s/p cardiac transplant on April 29th 2025 at [facility], Presents for clogged PICC line.  Donor heart had strep pneumo meningitis and thus patient was to be placed on ceftriaxone 2 g IV through May 29.  Patient reported did receive dose of ceftriaxone yesterday but noticed that PICC line could not be flushed.  Patient came to ER last night, PICC line still cannot be flushed.  Transplant team had requested placement of new PICC line, however there was no availability in IR to replace it last night.  Patient was instructed to return to ED in the morning by his transplant team.

## 2025-05-21 NOTE — ED PROVIDER NOTE - ATTENDING APP SHARED VISIT CONTRIBUTION OF CARE
70-year-old male with history of heart failure AICD heart transplant who had a PICC line placed on 512 transplant infectious disease doctor Dr. Mathews, recommending IV antibiotics and ceftriaxone because daughter was positive for strep pneumo meningitis was presenting for PICC line malfunction noted yesterday came to the ED yesterday was unable to have the PICC line replaced so return today to have IR consult and PICC line placed.  Patient got IV ceftriaxone yesterday.
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
## 2025-05-21 NOTE — ED PROVIDER NOTE - HIV OFFER
HPI


Chief Complaint


heartburn ctxs


Date Seen:  Mar 4, 2018


Time Seen:  18:58


Travel History


International Travel<30 Days:  No


Contact w/Intl Traveler<30Days:  No


Known Affected Area:  No





History of Present Illness


HPI


pt. is a  @ 30 3/7 weeks present w/ c/o heartburn and ctxs.  pt. states 

was here before for heartburn and ctxs, given zofran, terb and hydrated and 

felt much better.  pt. states that began having heartburn again with and ctxs 

again today.  pt. sttes minimal relief with meds taken at home.  +FM, no lof/vb


Weeks Gestation:  30


Para:  1


:  2





History


Past Medical History


Medical History:  Denies Significant Hx





Past Surgical History


Surgical History:  No Previous Surgery





Family History


Family History:  Negative





Social History


Alcohol Use:  No


Tobacco Use:  No


Substance Abuse:  No





Allergies-Medications


(Allergen,Severity, Reaction):  


Coded Allergies:  


     metronidazole (Verified  Allergy, Severe, 17)


Home Meds


Active Scripts


Promethazine (Phenergan) 25 Mg Tablet, 25 MG PO Q6H Y for NAUSEA OR VOMITING 

for 10 Days, #60 TAB 1 Refill


   Prov:Jagjit Wilkins II, MD         3/2/18


Pantoprazole (Protonix) 40 Mg Tab, 40 MG PO DAILY for Reflux for 30 Days, #30 

TAB 1 Refill


   Prov:Jagjit Wilkins II, MD         3/2/18


Reported Medications


Albuterol Neb (Albuterol Neb) 2.5 Mg/0.5 Ml Neb, 2.5 MG NEB Q4HR NEB Y for 

SHORTNESS OF BREATH, EA


   Note: The Albuterol Sulfate Inhalation Solution is concentrated and


   must be diluted. Read complete instructions carefully before using.


   17


Albuterol 6.7 GM Inh (Proventil Hfa 6.7 GM Inh) 90 Mcg/Act Aer, 2 PUFF INH Q6H 

Y for SHORTNESS OF BREATH, #1 INHALER 0 Refills


   17


Discontinued Scripts


Penicillin V Potassium (Penicillin V Potassium) 500 Mg Tab, 500 MG PO Q12HR for 

Infection, #20 TAB 0 Refills


   Prov:Lani Campos MD         17





Review of Systems


Except as stated in HPI:  all other systems reviewed are Neg





Physical Exam


Narrative


GENERAL: Well-nourished, well-developed patient.


SKIN: Warm and dry.


HEAD: Normocephalic and atraumatic.


EYES: No scleral icterus. No injection or drainage. 


ENT: No nasal drainage noted. Mucous membranes pink. Airway patent.


NECK: Supple, trachea midline. No JVD.


CARDIOVASCULAR: Regular rate and rhythm without murmurs, gallops, or rubs. 


RESPIRATORY: Breath sounds equal bilaterally. No accessory muscle use.


ABDOMEN/GI: Abdomen soft, non-tender, bowel sounds present, no rebound, no 

guarding 


   Gravid 


GENITOURINARY: 


 FHT's/ TOCO: 


   Category: 1


   Reactive: + 


   Variability: mod


   Decels: none


EXTREMITIES: No cyanosis or edema.


BACK: Nontender without obvious deformity. No CVA tenderness.


NEUROLOGICAL: Awake and alert. Motor and sensory grossly within normal limits. 

Five out of 5 muscle strength in all muscle groups. Normal speech.





Data


Data


Vital Signs Reviewed:  Yes


Orders





 Orders


Ondansetron  Odt (Zofran  Odt) (3/4/18 16:30)


Calcium Carbonate Chew (Tums Chew) (3/4/18 16:30)


Terbutaline Inj (Brethine Inj) (3/4/18 18:15)





MDM


Medical Record Reviewed:  Yes


Plan


pt. given zofran, tums terbutaline and hydrated and she feels much better.  fht 

reassuring.  condition d/w pt.  pt. to be d/c to home.  given precautions for 

return.  all ? answered.  f/u as sched.


Diagnosis


Diagnosis:  


 Primary Impression:  


 Heartburn during pregnancy in third trimester


 Additional Impression:  


  uterine contractions


Disposition:   DISCHARGE HOME











Marco Delarosa Jr., MD Mar 4, 2018 19:04 Previously Negative (within the last year)

## 2025-05-21 NOTE — ED ADULT NURSE REASSESSMENT NOTE - NS ED NURSE REASSESS COMMENT FT1
Pt requesting to take home meds and as per ARA Ingram, pt can take home dose meds for heart transplant, pt a/ox4.

## 2025-05-21 NOTE — ED ADULT TRIAGE NOTE - CHIEF COMPLAINT QUOTE
" I'm here because my right PICC line was clogged and told me to come for reinsertion." S/P heart transplant  3 weeks ago

## 2025-05-21 NOTE — ED CDU PROVIDER DISPOSITION NOTE - ATTENDING APP SHARED VISIT CONTRIBUTION OF CARE
patient went to procedure with IR for occluded PICC line, PICC removed and new one placed, patient notified nurse that he was leaving immediately after returning to the ED but prior to revealuation and d/c instructions

## 2025-05-21 NOTE — ED ADULT NURSE REASSESSMENT NOTE - NS ED NURSE REASSESS COMMENT FT1
pt returned to ED from IR. pt irritable and requesting d/c papers which are not yet ready. pt told that MD will be contacted. pt refusing to wait stating "I am leaving, I will not wait". pt left. MD made aware. pt a/ox4--ambulating with steady gait.

## 2025-05-21 NOTE — ED ADULT TRIAGE NOTE - TEMP AT ED ARRIVAL (C)
Shawn Camejo  [birth year] 2019  [id number]    IOP Note - Individual Session  Time:  12:30 PM     Day:  Wednesday   Individual Session - 1:1   Group Attendance Other - NO GROUP, INDIVIDUAL SESSION ONLY    Group Therapy Type NA   Group Topic Covered NA   Client's Response To Group Topic NA   Client Group Participation Detail NA   Group Attendance (Time) NA   Individual Attendance 1 Hour   Family Attendance None   Other Comments/Information None       Data:  Client met with counselor to review Treatment Plan and current goals. Client shared that he has not completed treatment plan assignments, noting that he has been focused on managing both IOP and MH group and process information gained. He also notes that he has been attending 1-2 AA groups each week. Client reports that in the past when he has tried to take on too much, he has relapsed. Client also noted that two assignments focused on describing his life after 5-years sobriety feels like a time too far in the future, and feels like too many unknown factors that he is currently addressing to gain understanding. Client shared that he has nearly 90 days sobriety from alcohol use.    Intervention:  Counselor utilized Cognitive Behavioral Therapy, Twelve-Step Facilitation, Motivational Enhancement Therapy. Counselor review S.M.A.R.T. process for setting goals, and encouraged client to define small goals. Client selected several Treatment Plan assignments he will complete in next week. Counselor provided alternative option for client to create a visual project that with focus on how his life will feel at time of one year of sobriety.     Assessment: Client presents as motivated for sobriety and willing to learn strategies to help increase coping skills, including establishing a structured daily routine to help sustain recovery lifestyle. Client indicates need for continuing clinical support for continuing gains, including accountability.     Plan:  Client stated  "that he would like to follow this plan. Counselor encouraged client to begin project, but to consider it a \"work in progress\" for next 2 weeks and continue adding details. Client will complete 2 Dimension 3 assignments and start visual project on \"My Life at One-Year of Sobriety\" to session scheduled for next week.      Lucille Castellanos MFA, MA, [facility]    " 36.5

## 2025-05-21 NOTE — ED PROVIDER NOTE - PROGRESS NOTE DETAILS
Consult to IR placed. d/w Dr Coleman, team will come by and attempt to flush. Awaiting picc line placement, likely will then need abx dose for today and then can be discharged. Will Obs pending IR picc replacement and dispo

## 2025-05-21 NOTE — ED ADULT NURSE NOTE - NSFALLUNIVINTERV_ED_ALL_ED
Monitor gait and stability/Reinforce activity limits and safety measures with patient and family/Use of alarms - bed, stretcher, chair and/or video monitoring/Bed/Stretcher in lowest position, wheels locked, appropriate side rails in place/Call bell, personal items and telephone in reach/Instruct patient to call for assistance before getting out of bed/chair/stretcher/Non-slip footwear applied when patient is off stretcher/[place] to call system/Physically safe environment - no spills, clutter or unnecessary equipment/Purposeful proactive rounding/Room/bathroom lighting operational, light cord in reach

## 2025-05-21 NOTE — ED ADULT NURSE NOTE - OBJECTIVE STATEMENT
Pt presents with "clogged PICC line" . as per pt, had heart transplant and was infected and is on IV abx. pt is a/ox4, ambulatory. denies any pain

## 2025-05-21 NOTE — ED CDU PROVIDER INITIAL DAY NOTE - ATTENDING APP SHARED VISIT CONTRIBUTION OF CARE
placed in obs pending Placement of PICC for IV abx 2/2 to heart transplant for prevention of strep pneumonia meningitis

## 2025-05-21 NOTE — ED ADULT NURSE NOTE - PAIN RATING/NUMBER SCALE (0-10): ACTIVITY
0 (no pain/absence of nonverbal indicators of pain)
    No data to display               Calculated BMI 24    Physical Exam completed by visual and auditory observation of patient with verbal input from patient.    General:   Alert, oriented, not in acute distress   Eyes:  Anicteric Sclerae; no obvious strabismus   Nose:  No obvious nasal septum deviation    Neck:   Midline trachea, no visible mass   Chest/Lungs:  Respiratory effort normal, no visualized signs of difficulty breathing or respiratory distress   CVS:  No JVD   Extremities:  No obvious rashes noted on face, neck, or hands   Neuro:  No facial asymmetry, no focal deficits; no obvious tremor    Psych:  Normal affect,  normal countenance     Mariella Mccarthy is being evaluated by a Virtual Visit (video visit) encounter to address concerns as mentioned above.  A caregiver was present when appropriate. Due to this being a TeleHealth encounter (During COVID-19 public health emergency), evaluation of the following organ systems was limited: Vitals/Constitutional/EENT/Resp/CV/GI//MS/Neuro/Skin/Heme-Lymph-Imm.  Pursuant to the emergency declaration under the Chiu Act and the National Emergencies Act, 1135 waiver authority and the Coronavirus Preparedness and Response Supplemental Appropriations Act, this Virtual Visit was conducted with patient's (and/or legal guardian's) consent, to reduce the patient's risk of exposure to COVID-19 and provide necessary medical care. The patient (or guardian if applicable) is aware that this is a billable service, which includes applicable copays.     Patient identification was verified at the start of the visit: Yes using name and date of birth. Patient's phone number [phone] (home)  was confirmed for accuracy.  She gives permission for messages regarding results and appointments to be left at that number.    Services were provided through a video synchronous discussion virtually to substitute for in-person clinic visit.  I was  at home  while conducting this

## 2025-05-21 NOTE — ED CDU PROVIDER INITIAL DAY NOTE - OBJECTIVE STATEMENT
71yo male with PMHx non-ischemic cardiomyopathy, CHD, AF s/p GIANFRANCO ligation, MV and TV repair s/p cardiac transplant on April 29th 2025 at [facility], Presents for clogged PICC line.  Donor heart had strep pneumo meningitis and thus patient was to be placed on ceftriaxone 2 g IV through May 29.  Patient reported did receive dose of ceftriaxone yesterday but noticed that PICC line could not be flushed.  Patient came to ER last night, PICC line still cannot be flushed.  Transplant team had requested placement of new PICC line, however there was no availability in IR to replace it last night.  Patient was instructed to return to ED in the morning by his transplant team.

## 2025-05-22 ENCOUNTER — RESULT REVIEW (OUTPATIENT)
Age: 70
End: 2025-05-22

## 2025-05-22 ENCOUNTER — APPOINTMENT (OUTPATIENT)
Dept: CV DIAGNOSITCS | Facility: HOSPITAL | Age: 70
End: 2025-05-22

## 2025-05-22 ENCOUNTER — OUTPATIENT (OUTPATIENT)
Dept: OUTPATIENT SERVICES | Facility: HOSPITAL | Age: 70
LOS: 1 days | End: 2025-05-22
Payer: MEDICARE

## 2025-05-22 ENCOUNTER — APPOINTMENT (OUTPATIENT)
Dept: HEART FAILURE | Facility: CLINIC | Age: 70
End: 2025-05-22
Payer: MEDICARE

## 2025-05-22 VITALS
DIASTOLIC BLOOD PRESSURE: 104 MMHG | RESPIRATION RATE: 18 BRPM | HEART RATE: 103 BPM | WEIGHT: 173 LBS | SYSTOLIC BLOOD PRESSURE: 157 MMHG | TEMPERATURE: 97.5 F | OXYGEN SATURATION: 100 % | BODY MASS INDEX: 27.15 KG/M2 | HEIGHT: 67 IN

## 2025-05-22 VITALS
HEART RATE: 78 BPM | HEIGHT: 67 IN | DIASTOLIC BLOOD PRESSURE: 104 MMHG | RESPIRATION RATE: 18 BRPM | OXYGEN SATURATION: 100 % | WEIGHT: 173.06 LBS | SYSTOLIC BLOOD PRESSURE: 157 MMHG | TEMPERATURE: 98 F

## 2025-05-22 VITALS
RESPIRATION RATE: 18 BRPM | SYSTOLIC BLOOD PRESSURE: 155 MMHG | DIASTOLIC BLOOD PRESSURE: 95 MMHG | OXYGEN SATURATION: 98 % | HEART RATE: 96 BPM

## 2025-05-22 DIAGNOSIS — Z87.828 PERSONAL HISTORY OF OTHER (HEALED) PHYSICAL INJURY AND TRAUMA: Chronic | ICD-10-CM

## 2025-05-22 DIAGNOSIS — Z95.810 PRESENCE OF AUTOMATIC (IMPLANTABLE) CARDIAC DEFIBRILLATOR: Chronic | ICD-10-CM

## 2025-05-22 DIAGNOSIS — Z95.828 PRESENCE OF OTHER VASCULAR IMPLANTS AND GRAFTS: Chronic | ICD-10-CM

## 2025-05-22 DIAGNOSIS — Z98.890 OTHER SPECIFIED POSTPROCEDURAL STATES: Chronic | ICD-10-CM

## 2025-05-22 DIAGNOSIS — Z94.1 HEART TRANSPLANT STATUS: ICD-10-CM

## 2025-05-22 DIAGNOSIS — Z94.1 HEART TRANSPLANT STATUS: Chronic | ICD-10-CM

## 2025-05-22 LAB
ALBUMIN SERPL ELPH-MCNC: 4.1 G/DL — SIGNIFICANT CHANGE UP (ref 3.3–5)
ALP SERPL-CCNC: 96 U/L — SIGNIFICANT CHANGE UP (ref 40–120)
ALT FLD-CCNC: 37 U/L — SIGNIFICANT CHANGE UP (ref 10–45)
ANION GAP SERPL CALC-SCNC: 14 MMOL/L — SIGNIFICANT CHANGE UP (ref 5–17)
AST SERPL-CCNC: 12 U/L — SIGNIFICANT CHANGE UP (ref 10–40)
BASOPHILS # BLD AUTO: 0 K/UL — SIGNIFICANT CHANGE UP (ref 0–0.2)
BASOPHILS NFR BLD AUTO: 0 % — SIGNIFICANT CHANGE UP (ref 0–2)
BILIRUB SERPL-MCNC: 0.7 MG/DL — SIGNIFICANT CHANGE UP (ref 0.2–1.2)
BUN SERPL-MCNC: 46 MG/DL — HIGH (ref 7–23)
CALCIUM SERPL-MCNC: 9.1 MG/DL — SIGNIFICANT CHANGE UP (ref 8.4–10.5)
CHLORIDE SERPL-SCNC: 104 MMOL/L — SIGNIFICANT CHANGE UP (ref 96–108)
CO2 SERPL-SCNC: 22 MMOL/L — SIGNIFICANT CHANGE UP (ref 22–31)
CREAT SERPL-MCNC: 1.98 MG/DL — HIGH (ref 0.5–1.3)
EGFR: 36 ML/MIN/1.73M2 — LOW
EGFR: 36 ML/MIN/1.73M2 — LOW
EOSINOPHIL # BLD AUTO: 0.03 K/UL — SIGNIFICANT CHANGE UP (ref 0–0.5)
EOSINOPHIL NFR BLD AUTO: 0.3 % — SIGNIFICANT CHANGE UP (ref 0–6)
GLUCOSE SERPL-MCNC: 123 MG/DL — HIGH (ref 70–99)
HCT VFR BLD CALC: 28.6 % — LOW (ref 39–50)
HGB BLD-MCNC: 9.2 G/DL — LOW (ref 13–17)
HGB FLD-MCNC: 8.6 G/DL — LOW (ref 12.6–17.4)
IMM GRANULOCYTES NFR BLD AUTO: 0.7 % — SIGNIFICANT CHANGE UP (ref 0–0.9)
LYMPHOCYTES # BLD AUTO: 0.62 K/UL — LOW (ref 1–3.3)
LYMPHOCYTES # BLD AUTO: 7.1 % — LOW (ref 13–44)
MAGNESIUM SERPL-MCNC: 1.3 MG/DL — LOW (ref 1.6–2.6)
MCHC RBC-ENTMCNC: 29.2 PG — SIGNIFICANT CHANGE UP (ref 27–34)
MCHC RBC-ENTMCNC: 32.2 G/DL — SIGNIFICANT CHANGE UP (ref 32–36)
MCV RBC AUTO: 90.8 FL — SIGNIFICANT CHANGE UP (ref 80–100)
MONOCYTES # BLD AUTO: 0.51 K/UL — SIGNIFICANT CHANGE UP (ref 0–0.9)
MONOCYTES NFR BLD AUTO: 5.9 % — SIGNIFICANT CHANGE UP (ref 2–14)
NEUTROPHILS # BLD AUTO: 7.47 K/UL — HIGH (ref 1.8–7.4)
NEUTROPHILS NFR BLD AUTO: 86 % — HIGH (ref 43–77)
NRBC BLD AUTO-RTO: 0 /100 WBCS — SIGNIFICANT CHANGE UP (ref 0–0)
OXYHGB MFR BLDMV: 67.9 % — SIGNIFICANT CHANGE UP
PLATELET # BLD AUTO: 171 K/UL — SIGNIFICANT CHANGE UP (ref 150–400)
POTASSIUM SERPL-MCNC: 4.4 MMOL/L — SIGNIFICANT CHANGE UP (ref 3.5–5.3)
POTASSIUM SERPL-SCNC: 4.4 MMOL/L — SIGNIFICANT CHANGE UP (ref 3.5–5.3)
PROT SERPL-MCNC: 6.4 G/DL — SIGNIFICANT CHANGE UP (ref 6–8.3)
RBC # BLD: 3.15 M/UL — LOW (ref 4.2–5.8)
RBC # FLD: 18.9 % — HIGH (ref 10.3–14.5)
SAO2 % BLD: 69.1 % — SIGNIFICANT CHANGE UP (ref 60–90)
SODIUM SERPL-SCNC: 140 MMOL/L — SIGNIFICANT CHANGE UP (ref 135–145)
TACROLIMUS SERPL-MCNC: 14.2 NG/ML — SIGNIFICANT CHANGE UP
WBC # BLD: 8.69 K/UL — SIGNIFICANT CHANGE UP (ref 3.8–10.5)
WBC # FLD AUTO: 8.69 K/UL — SIGNIFICANT CHANGE UP (ref 3.8–10.5)

## 2025-05-22 PROCEDURE — 93308 TTE F-UP OR LMTD: CPT | Mod: 26,59

## 2025-05-22 PROCEDURE — 88307 TISSUE EXAM BY PATHOLOGIST: CPT | Mod: 26

## 2025-05-22 PROCEDURE — 93505 ENDOMYOCARDIAL BIOPSY: CPT | Mod: 26

## 2025-05-22 PROCEDURE — C1889: CPT

## 2025-05-22 PROCEDURE — 99152 MOD SED SAME PHYS/QHP 5/>YRS: CPT

## 2025-05-22 PROCEDURE — 93308 TTE F-UP OR LMTD: CPT

## 2025-05-22 PROCEDURE — 85025 COMPLETE CBC W/AUTO DIFF WBC: CPT

## 2025-05-22 PROCEDURE — 88346 IMFLUOR 1ST 1ANTB STAIN PX: CPT | Mod: 26

## 2025-05-22 PROCEDURE — 80053 COMPREHEN METABOLIC PANEL: CPT

## 2025-05-22 PROCEDURE — 80197 ASSAY OF TACROLIMUS: CPT

## 2025-05-22 PROCEDURE — G2211 COMPLEX E/M VISIT ADD ON: CPT

## 2025-05-22 PROCEDURE — 93306 TTE W/DOPPLER COMPLETE: CPT | Mod: 26

## 2025-05-22 PROCEDURE — 93321 DOPPLER ECHO F-UP/LMTD STD: CPT | Mod: 26,59

## 2025-05-22 PROCEDURE — 93505 ENDOMYOCARDIAL BIOPSY: CPT

## 2025-05-22 PROCEDURE — 93010 ELECTROCARDIOGRAM REPORT: CPT

## 2025-05-22 PROCEDURE — 88346 IMFLUOR 1ST 1ANTB STAIN PX: CPT

## 2025-05-22 PROCEDURE — 93306 TTE W/DOPPLER COMPLETE: CPT

## 2025-05-22 PROCEDURE — G0405: CPT

## 2025-05-22 PROCEDURE — 93005 ELECTROCARDIOGRAM TRACING: CPT

## 2025-05-22 PROCEDURE — 82803 BLOOD GASES ANY COMBINATION: CPT

## 2025-05-22 PROCEDURE — 99214 OFFICE O/P EST MOD 30 MIN: CPT | Mod: 25

## 2025-05-22 PROCEDURE — 83735 ASSAY OF MAGNESIUM: CPT

## 2025-05-22 PROCEDURE — C1894: CPT

## 2025-05-22 PROCEDURE — 88307 TISSUE EXAM BY PATHOLOGIST: CPT

## 2025-05-22 PROCEDURE — 93321 DOPPLER ECHO F-UP/LMTD STD: CPT

## 2025-05-22 PROCEDURE — C1769: CPT

## 2025-05-22 RX ORDER — TACROLIMUS 0.5 MG/1
3 CAPSULE ORAL
Refills: 0 | DISCHARGE

## 2025-05-22 RX ORDER — SODIUM CHLORIDE 0.65 %
0.65 AEROSOL, SPRAY (ML) NASAL TWICE DAILY
Qty: 1 | Refills: 5 | Status: ACTIVE | COMMUNITY
Start: 2025-05-22 | End: 1900-01-01

## 2025-05-22 RX ORDER — MYCOPHENOLATE MOFETIL 500 MG/1
4 TABLET, FILM COATED ORAL
Refills: 0 | DISCHARGE

## 2025-05-22 RX ORDER — ATOVAQUONE 750 MG/5ML
10 SUSPENSION ORAL
Refills: 0 | DISCHARGE

## 2025-05-22 RX ORDER — DESVENLAFAXINE 25 MG/1
50 TABLET, EXTENDED RELEASE ORAL
Refills: 0 | DISCHARGE

## 2025-05-22 RX ORDER — MAGNESIUM SULFATE 500 MG/ML
2 SYRINGE (ML) INJECTION ONCE
Refills: 0 | Status: COMPLETED | OUTPATIENT
Start: 2025-05-22 | End: 2025-05-22

## 2025-05-22 RX ORDER — PREDNISONE 20 MG/1
1 TABLET ORAL
Refills: 0 | DISCHARGE

## 2025-05-22 RX ORDER — NYSTATIN 100000 [USP'U]/G
10 CREAM TOPICAL
Refills: 0 | DISCHARGE

## 2025-05-22 RX ORDER — TAMSULOSIN HYDROCHLORIDE 0.4 MG/1
1 CAPSULE ORAL
Refills: 0 | DISCHARGE

## 2025-05-22 RX ORDER — VANCOMYCIN HCL IN 5 % DEXTROSE 1.5G/250ML
2.5 PLASTIC BAG, INJECTION (ML) INTRAVENOUS
Qty: 0 | Refills: 0 | DISCHARGE
End: 2025-06-03

## 2025-05-22 RX ORDER — OMEGA-3/DHA/EPA/FISH OIL 300-1000MG
400 CAPSULE ORAL TWICE DAILY
Qty: 120 | Refills: 5 | Status: ACTIVE | COMMUNITY
Start: 2025-05-22 | End: 1900-01-01

## 2025-05-22 RX ORDER — TACROLIMUS 0.5 MG/1
1 CAPSULE ORAL
Refills: 0 | DISCHARGE

## 2025-05-22 RX ORDER — TRAZODONE HCL 100 MG
1 TABLET ORAL
Refills: 0 | DISCHARGE

## 2025-05-22 RX ORDER — BUMETANIDE 1 MG/1
1 TABLET ORAL
Refills: 0 | DISCHARGE

## 2025-05-22 RX ORDER — B1/B2/B3/B5/B6/B12/VIT C/FOLIC 500-0.5 MG
1 TABLET ORAL
Refills: 0 | DISCHARGE

## 2025-05-22 RX ORDER — VALGANCICLOVIR 450 MG/1
1 TABLET, FILM COATED ORAL
Refills: 0 | DISCHARGE

## 2025-05-22 RX ADMIN — Medication 25 GRAM(S): at 09:55

## 2025-05-22 NOTE — ASU DISCHARGE PLAN (ADULT/PEDIATRIC) - FINANCIAL ASSISTANCE
[facility] provides services at a reduced cost to those who are determined to be eligible through [facility]’s financial assistance program. Information regarding [facility]’s financial assistance program can be found by going to [email or web address] or by calling [phone].

## 2025-05-22 NOTE — H&P CARDIOLOGY - PATIENT'S PREFERRED PRONOUN
"Chief Complaint   Patient presents with   • Vaginal Itching     with discharge       HISTORY OF PRESENT ILLNESS: Patient is a 32 y.o. female who presents today for about 48 hours of worsening burning, vaginal itching and discharge.  She states it is a \"chunky white\" discharge.   No foul odors.    No cramping or pelvic pain.  No urinary symptoms.  She applied some Monistat last night.  LMP -  \"has IUD\"     Boyfriend just recently got back from deployment and states although she has no specific concerns she is \"being realistic\"   GYN WWE is due in April. No hx of abnormal.  No urgency, frequency of urination.      Patient Active Problem List    Diagnosis Date Noted   • Hypothyroidism due to Hashimoto's thyroiditis 03/11/2018   • Umbilical hernia without mention of obstruction or gangrene 08/09/2017   • Travel advice encounter 09/01/2016   • Healthcare maintenance 09/25/2013   • Dyskinesia 05/01/2013   • Sinus tachycardia 10/17/2012       Allergies:Shellfish allergy; Cephalosporins; Iodine; Penicillins; and Vancomycin    Current Outpatient Prescriptions Ordered in [facility]   Medication Sig Dispense Refill   • Levothyroxine Sodium (SYNTHROID PO) Take  by mouth.     • Liothyronine Sodium (CYTOMEL PO) Take  by mouth.     • ondansetron (ZOFRAN ODT) 4 MG TABLET DISPERSIBLE TAKE 1 TAB BY MOUTH EVERY FOUR HOURS AS NEEDED. (Patient not taking: Reported on 1/6/2019) 10 Tab 2   • scopolamine (TRANSDERM-SCOP) 1 MG/3DAYS PATCH 72 HR Apply 1 Patch to skin as directed every 72 hours. (Patient not taking: Reported on 1/6/2019) 4 Patch 3   • Cyanocobalamin (B-12 INJ) by Injection route.     • atovaquone-proguanil (MALARONE) 250-100 MG per tablet Start taking 1 tablet daily 2 days prior to arriving in malaria area continue daily while visiting and continue for7 days (Patient not taking: Reported on 1/6/2019) 20 Tab 0   • ciprofloxacin (CIPRO) 500 MG Tab Take 1 Tab by mouth 2 times a day. (Patient not taking: Reported on 1/6/2019) 10 Tab 0 " "    No current [facility]-ordered facility-administered medications on file.        Past Medical History:   Diagnosis Date   • Pain 08-    abd., 1/10   • Thyroid disease        Social History   Substance Use Topics   • Smoking status: Never Smoker   • Smokeless tobacco: Never Used   • Alcohol use 0.0 oz/week      Comment: 1-2/week       No family status information on file.     Family History   Problem Relation Age of Onset   • Heart Disease Father    • Hypertension Father    • Cancer Paternal Aunt         breast   • Cancer Paternal Uncle         colon   • Diabetes Maternal Grandfather    • Cancer Paternal Grandmother         breast       ROS:  Review of Systems   Constitutional: Negative for fever, chills, weight loss and malaise/fatigue.   HENT: Negative for ear pain, nosebleeds, congestion, sore throat and neck pain.    Eyes: Negative for blurred vision.   Respiratory: Negative for cough, sputum production, shortness of breath and wheezing.    Cardiovascular: Negative for chest pain, palpitations, orthopnea and leg swelling.   Gastrointestinal: SEE HPI  Genitourinary: SEE HPI      Exam:  Blood pressure 102/58, pulse 79, temperature 36.8 °C (98.2 °F), temperature source Tympanic, resp. rate 16, height 1.702 m (5' 7\"), weight 81.6 kg (180 lb), SpO2 98 %, not currently breastfeeding.  General:  Well nourished, well developed female in NAD  Eyes: PERRLA, EOM within normal limits, no conjunctival injection, no scleral icterus, visual fields and acuity grossly intact.  Mouth: reasonable hygiene, no erythema exudates or tonsillar enlargement.  Neck: no masses, range of motion within normal limits, no tracheal deviation. No lymphadenopathy  Pulmonary: Normal respiratory effort, no wheezes, crackles, or rhonchi.  Cardiovascular: regular rate and rhythm without murmurs, rubs, or gallops.  Abdomen: Soft, nontender, nondistended. Normal bowel sounds. No hepatosplenomegaly or masses, or hernias. No rebound or guarding.  :  " : Vulva on inspection: No swelling, erythema or lesions.  Speculum exam:  There is significant amounts of thick curd like white discharge.  Cervix appears normal and is non-friable.  No vaginal lesions.  Bimanual exam:  No CMT, adnexal/uterine masses or tenderness  Skin: No visible rashes or lesion. Warm, pink, dry.   Extremities: no clubbing, cyanosis, or edema.  Neuro: A&O x 3. Speech normal/clear.  Normal gait.     Component Results     Component Value Ref Range & Units Status   POC Color yellow  Negative Final   POC Appearance clear  Negative Final   POC Leukocyte Esterase moderate  Negative Final   POC Nitrites neg  Negative Final   POC Urobiligen 0.2  Negative (0.2) mg/dL Final   POC Protein neg  Negative mg/dL Final   POC Urine PH 7.0  5.0 - 8.0 Final   POC Blood neg  Negative Final   POC Specific Gravity 1.020  <1.005 - >1.030 Final   POC Ketones 15  Negative mg/dL Final   POC Bilirubin neg  Negative mg/dL Final   POC Glucose neg  Negative mg/dL Final         Assessment/Plan:  1. Vulvovaginal candidiasis  POCT Urinalysis    POCT Pregnancy    VAGINAL PATHOGENS DNA PANEL    CHLAMYDIA/GC PCR URINE OR SWAB    DIFLUCAN 150 MG tablet         -U/A and physical exam as above.   -consistent with suspicion for vulvovaginal candidiasis.   -rx as above.  Will follow results.   -GYN and WWE follow up as sched in the Spring.          Supportive care, differential diagnoses, and indications for immediate follow-up discussed with patient.   Pathogenesis of diagnosis discussed including typical length and natural progression.   Instructed to return to clinic or nearest emergency department for any change in condition, further concerns, or worsening of symptoms.  Patient states understanding of the plan of care and discharge instructions.        Maine Beltran P.A.-C.     Him/He
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
## 2025-05-22 NOTE — H&P CARDIOLOGY - PSYCHIATRIC
Affect and characteristics of appearance, verbalizations, behaviors are appropriate
present and normal in 4 extremities

## 2025-05-22 NOTE — H&P CARDIOLOGY - HISTORY OF PRESENT ILLNESS
70-year-old male, with reported Hx of NICM since 2011 HFrEF (LVEF 25-30%) s/p MDT CRT-D with baseline CHB, VT/VF, AFs/p GIANFRANCO ligation/MAZE, MV/TV repair, PVC ablation 3/2024 intolerant to AADs in the past, recent admission 3/19/2025-3/20/2025 for AICD shocks initially presented to the [facility] ED on 3/21/2025, sent by HF specialist for ACID shock. Device reported successful ATP for VT 3/17/2025 at 6:52pm followed by one ATP & 40J shock.  While admitted to [facility], he was started on a lidocaine gtt. On 3/21 when lidocaine weaned off patient had another two episodes of VT requiring AICD shocks. He was transferred to [facility] where his listing status was upgraded to UNOS status 2e for heart transplant (ABO O) for the refractory VT.  On 4/29: s/p  OHT, TV repair, PPM removal . Post Op: Had some initial RV failure/PGD (requiring dobutamine 7.5, epi 0.02, levo, vaso, and Lico.  CRRT started to aid volume removal.  Echo from 4/30 with LVEF 70% and mild RV dysfunction.  Levo/Vaso weaned off on 5/1 and Lico weaned off 5/2. He requiring large amounts of pain medication despite 2 chest tubes being removed POD 1 (ketamine + PCA pump).   Per report: Donor Syphilis Serology Positive he is currently on Vanco via PICC line until 6/3.     He now presents for his first outpt [facility] w/ biopsy with Dr. Urbina. He currently denies chest pain, denies SOB, numbness/tingling.

## 2025-05-22 NOTE — H&P CARDIOLOGY - NSICDXPASTMEDICALHX_GEN_ALL_CORE_FT
PAST MEDICAL HISTORY:  Atrial fibrillation resolved with SAAVEDRA repair    H/O ventricular tachycardia     High cholesterol     HTN (hypertension)     Pulmonary embolism

## 2025-05-22 NOTE — ASU PATIENT PROFILE, ADULT - FALL HARM RISK - UNIVERSAL INTERVENTIONS
Bed in lowest position, wheels locked, appropriate side rails in place/Call bell, personal items and telephone in reach/Instruct patient to call for assistance before getting out of bed or chair/Non-slip footwear when patient is out of bed/[place] to call system/Physically safe environment - no spills, clutter or unnecessary equipment/Purposeful Proactive Rounding/Room/bathroom lighting operational, light cord in reach

## 2025-05-23 LAB
CMV DNA CSF QL NAA+PROBE: SIGNIFICANT CHANGE UP IU/ML
CMV DNA SPEC NAA+PROBE-LOG#: SIGNIFICANT CHANGE UP LOG10IU/ML
SURGICAL PATHOLOGY STUDY: SIGNIFICANT CHANGE UP

## 2025-05-23 RX ORDER — TACROLIMUS 0.5 MG/1
0.5 CAPSULE ORAL
Qty: 60 | Refills: 5 | Status: DISCONTINUED | COMMUNITY
Start: 2025-05-12 | End: 2025-05-23

## 2025-05-23 RX ORDER — PREDNISONE 5 MG/1
5 TABLET ORAL TWICE DAILY
Qty: 30 | Refills: 5 | Status: ACTIVE | COMMUNITY
Start: 2025-05-12 | End: 1900-01-01

## 2025-05-28 ENCOUNTER — LABORATORY RESULT (OUTPATIENT)
Age: 70
End: 2025-05-28

## 2025-05-29 ENCOUNTER — NON-APPOINTMENT (OUTPATIENT)
Age: 70
End: 2025-05-29

## 2025-05-30 PROBLEM — Z86.79 PERSONAL HISTORY OF OTHER DISEASES OF THE CIRCULATORY SYSTEM: Chronic | Status: ACTIVE | Noted: 2025-05-22

## 2025-05-30 LAB
ALBUMIN SERPL ELPH-MCNC: 4.2 G/DL
ALP BLD-CCNC: 103 U/L
ALT SERPL-CCNC: 34 U/L
ANION GAP SERPL CALC-SCNC: 13 MMOL/L
AST SERPL-CCNC: 11 U/L
BASOPHILS # BLD AUTO: 0.01 K/UL
BASOPHILS NFR BLD AUTO: 0.1 %
BILIRUB SERPL-MCNC: 0.6 MG/DL
BUN SERPL-MCNC: 28 MG/DL
CALCIUM SERPL-MCNC: 9.1 MG/DL
CHLORIDE SERPL-SCNC: 104 MMOL/L
CO2 SERPL-SCNC: 22 MMOL/L
CREAT SERPL-MCNC: 1.35 MG/DL
EGFRCR SERPLBLD CKD-EPI 2021: 56 ML/MIN/1.73M2
EOSINOPHIL # BLD AUTO: 0.02 K/UL
EOSINOPHIL NFR BLD AUTO: 0.3 %
GLUCOSE SERPL-MCNC: 128 MG/DL
HCT VFR BLD CALC: 29.6 %
HGB BLD-MCNC: 9.1 G/DL
IMM GRANULOCYTES NFR BLD AUTO: 1.7 %
LYMPHOCYTES # BLD AUTO: 0.65 K/UL
LYMPHOCYTES NFR BLD AUTO: 9.3 %
MAGNESIUM SERPL-MCNC: 1.9 MG/DL
MAN DIFF?: NORMAL
MCHC RBC-ENTMCNC: 28.8 PG
MCHC RBC-ENTMCNC: 30.7 G/DL
MCV RBC AUTO: 93.7 FL
MONOCYTES # BLD AUTO: 0.52 K/UL
MONOCYTES NFR BLD AUTO: 7.4 %
NEUTROPHILS # BLD AUTO: 5.67 K/UL
NEUTROPHILS NFR BLD AUTO: 81.2 %
PLATELET # BLD AUTO: 152 K/UL
POTASSIUM SERPL-SCNC: 4.9 MMOL/L
PROT SERPL-MCNC: 5.9 G/DL
RBC # BLD: 3.16 M/UL
RBC # FLD: 19.1 %
SODIUM SERPL-SCNC: 139 MMOL/L
TACROLIMUS SERPL-MCNC: 7.4 NG/ML
WBC # FLD AUTO: 6.99 K/UL

## 2025-05-30 RX ORDER — TACROLIMUS 0.5 MG/1
0.5 CAPSULE ORAL
Qty: 60 | Refills: 5 | Status: ACTIVE | COMMUNITY
Start: 2025-05-30 | End: 1900-01-01

## 2025-06-02 PROCEDURE — 87641 MR-STAPH DNA AMP PROBE: CPT

## 2025-06-02 PROCEDURE — 76770 US EXAM ABDO BACK WALL COMP: CPT

## 2025-06-02 PROCEDURE — 83735 ASSAY OF MAGNESIUM: CPT

## 2025-06-02 PROCEDURE — C1889: CPT

## 2025-06-02 PROCEDURE — P9047: CPT

## 2025-06-02 PROCEDURE — 80202 ASSAY OF VANCOMYCIN: CPT

## 2025-06-02 PROCEDURE — 36415 COLL VENOUS BLD VENIPUNCTURE: CPT

## 2025-06-02 PROCEDURE — 82248 BILIRUBIN DIRECT: CPT

## 2025-06-02 PROCEDURE — 97163 PT EVAL HIGH COMPLEX 45 MIN: CPT

## 2025-06-02 PROCEDURE — 82746 ASSAY OF FOLIC ACID SERUM: CPT

## 2025-06-02 PROCEDURE — 84100 ASSAY OF PHOSPHORUS: CPT

## 2025-06-02 PROCEDURE — 87389 HIV-1 AG W/HIV-1&-2 AB AG IA: CPT

## 2025-06-02 PROCEDURE — 85014 HEMATOCRIT: CPT

## 2025-06-02 PROCEDURE — 83036 HEMOGLOBIN GLYCOSYLATED A1C: CPT

## 2025-06-02 PROCEDURE — 71045 X-RAY EXAM CHEST 1 VIEW: CPT

## 2025-06-02 PROCEDURE — 36430 TRANSFUSION BLD/BLD COMPNT: CPT

## 2025-06-02 PROCEDURE — 86706 HEP B SURFACE ANTIBODY: CPT

## 2025-06-02 PROCEDURE — 86644 CMV ANTIBODY: CPT

## 2025-06-02 PROCEDURE — 88342 IMHCHEM/IMCYTCHM 1ST ANTB: CPT

## 2025-06-02 PROCEDURE — 83550 IRON BINDING TEST: CPT

## 2025-06-02 PROCEDURE — 82962 GLUCOSE BLOOD TEST: CPT

## 2025-06-02 PROCEDURE — 86923 COMPATIBILITY TEST ELECTRIC: CPT

## 2025-06-02 PROCEDURE — 86965 POOLING BLOOD PLATELETS: CPT

## 2025-06-02 PROCEDURE — 85025 COMPLETE CBC W/AUTO DIFF WBC: CPT

## 2025-06-02 PROCEDURE — 81003 URINALYSIS AUTO W/O SCOPE: CPT

## 2025-06-02 PROCEDURE — 82607 VITAMIN B-12: CPT

## 2025-06-02 PROCEDURE — 83605 ASSAY OF LACTIC ACID: CPT

## 2025-06-02 PROCEDURE — 80076 HEPATIC FUNCTION PANEL: CPT

## 2025-06-02 PROCEDURE — 93505 ENDOMYOCARDIAL BIOPSY: CPT

## 2025-06-02 PROCEDURE — P9012: CPT

## 2025-06-02 PROCEDURE — 80053 COMPREHEN METABOLIC PANEL: CPT

## 2025-06-02 PROCEDURE — 87507 IADNA-DNA/RNA PROBE TQ 12-25: CPT

## 2025-06-02 PROCEDURE — 80048 BASIC METABOLIC PNL TOTAL CA: CPT

## 2025-06-02 PROCEDURE — 80197 ASSAY OF TACROLIMUS: CPT

## 2025-06-02 PROCEDURE — 93356 MYOCRD STRAIN IMG SPCKL TRCK: CPT

## 2025-06-02 PROCEDURE — 86022 PLATELET ANTIBODIES: CPT

## 2025-06-02 PROCEDURE — 85018 HEMOGLOBIN: CPT

## 2025-06-02 PROCEDURE — 86900 BLOOD TYPING SEROLOGIC ABO: CPT

## 2025-06-02 PROCEDURE — P9037: CPT

## 2025-06-02 PROCEDURE — 85027 COMPLETE CBC AUTOMATED: CPT

## 2025-06-02 PROCEDURE — 87637 SARSCOV2&INF A&B&RSV AMP PRB: CPT

## 2025-06-02 PROCEDURE — 74018 RADEX ABDOMEN 1 VIEW: CPT

## 2025-06-02 PROCEDURE — 93320 DOPPLER ECHO COMPLETE: CPT

## 2025-06-02 PROCEDURE — 82570 ASSAY OF URINE CREATININE: CPT

## 2025-06-02 PROCEDURE — 83010 ASSAY OF HAPTOGLOBIN QUANT: CPT

## 2025-06-02 PROCEDURE — P9100: CPT

## 2025-06-02 PROCEDURE — 84145 PROCALCITONIN (PCT): CPT

## 2025-06-02 PROCEDURE — 97530 THERAPEUTIC ACTIVITIES: CPT

## 2025-06-02 PROCEDURE — 87536 HIV-1 QUANT&REVRSE TRNSCRPJ: CPT

## 2025-06-02 PROCEDURE — C1894: CPT

## 2025-06-02 PROCEDURE — 87522 HEPATITIS C REVRS TRNSCRPJ: CPT

## 2025-06-02 PROCEDURE — 82330 ASSAY OF CALCIUM: CPT

## 2025-06-02 PROCEDURE — 84156 ASSAY OF PROTEIN URINE: CPT

## 2025-06-02 PROCEDURE — 86704 HEP B CORE ANTIBODY TOTAL: CPT

## 2025-06-02 PROCEDURE — 84443 ASSAY THYROID STIM HORMONE: CPT

## 2025-06-02 PROCEDURE — 93005 ELECTROCARDIOGRAM TRACING: CPT

## 2025-06-02 PROCEDURE — 84295 ASSAY OF SERUM SODIUM: CPT

## 2025-06-02 PROCEDURE — 86708 HEPATITIS A ANTIBODY: CPT

## 2025-06-02 PROCEDURE — 97116 GAIT TRAINING THERAPY: CPT

## 2025-06-02 PROCEDURE — 86905 BLOOD TYPING RBC ANTIGENS: CPT

## 2025-06-02 PROCEDURE — 86709 HEPATITIS A IGM ANTIBODY: CPT

## 2025-06-02 PROCEDURE — 85610 PROTHROMBIN TIME: CPT

## 2025-06-02 PROCEDURE — 87640 STAPH A DNA AMP PROBE: CPT

## 2025-06-02 PROCEDURE — C9399: CPT

## 2025-06-02 PROCEDURE — 85520 HEPARIN ASSAY: CPT

## 2025-06-02 PROCEDURE — C1751: CPT

## 2025-06-02 PROCEDURE — 85049 AUTOMATED PLATELET COUNT: CPT

## 2025-06-02 PROCEDURE — 81001 URINALYSIS AUTO W/SCOPE: CPT

## 2025-06-02 PROCEDURE — 88307 TISSUE EXAM BY PATHOLOGIST: CPT

## 2025-06-02 PROCEDURE — 86803 HEPATITIS C AB TEST: CPT

## 2025-06-02 PROCEDURE — 86850 RBC ANTIBODY SCREEN: CPT

## 2025-06-02 PROCEDURE — 85730 THROMBOPLASTIN TIME PARTIAL: CPT

## 2025-06-02 PROCEDURE — 94799 UNLISTED PULMONARY SVC/PX: CPT

## 2025-06-02 PROCEDURE — C1729: CPT

## 2025-06-02 PROCEDURE — 82803 BLOOD GASES ANY COMBINATION: CPT

## 2025-06-02 PROCEDURE — 94002 VENT MGMT INPAT INIT DAY: CPT

## 2025-06-02 PROCEDURE — 82728 ASSAY OF FERRITIN: CPT

## 2025-06-02 PROCEDURE — P9045: CPT

## 2025-06-02 PROCEDURE — 87517 HEPATITIS B DNA QUANT: CPT

## 2025-06-02 PROCEDURE — 84300 ASSAY OF URINE SODIUM: CPT

## 2025-06-02 PROCEDURE — 87449 NOS EACH ORGANISM AG IA: CPT

## 2025-06-02 PROCEDURE — C1769: CPT

## 2025-06-02 PROCEDURE — 86901 BLOOD TYPING SEROLOGIC RH(D): CPT

## 2025-06-02 PROCEDURE — 84133 ASSAY OF URINE POTASSIUM: CPT

## 2025-06-02 PROCEDURE — 80061 LIPID PANEL: CPT

## 2025-06-02 PROCEDURE — 87340 HEPATITIS B SURFACE AG IA: CPT

## 2025-06-02 PROCEDURE — 86891 AUTOLOGOUS BLOOD OP SALVAGE: CPT

## 2025-06-02 PROCEDURE — 84132 ASSAY OF SERUM POTASSIUM: CPT

## 2025-06-02 PROCEDURE — P9016: CPT

## 2025-06-02 PROCEDURE — 93325 DOPPLER ECHO COLOR FLOW MAPG: CPT

## 2025-06-02 PROCEDURE — 83615 LACTATE (LD) (LDH) ENZYME: CPT

## 2025-06-02 PROCEDURE — 97110 THERAPEUTIC EXERCISES: CPT

## 2025-06-02 PROCEDURE — 93321 DOPPLER ECHO F-UP/LMTD STD: CPT

## 2025-06-02 PROCEDURE — 93306 TTE W/DOPPLER COMPLETE: CPT

## 2025-06-02 PROCEDURE — 36569 INSJ PICC 5 YR+ W/O IMAGING: CPT

## 2025-06-02 PROCEDURE — 86880 COOMBS TEST DIRECT: CPT

## 2025-06-02 PROCEDURE — 97161 PT EVAL LOW COMPLEX 20 MIN: CPT

## 2025-06-02 PROCEDURE — 88309 TISSUE EXAM BY PATHOLOGIST: CPT

## 2025-06-02 PROCEDURE — 85396 CLOTTING ASSAY WHOLE BLOOD: CPT

## 2025-06-02 PROCEDURE — 85384 FIBRINOGEN ACTIVITY: CPT

## 2025-06-02 PROCEDURE — 84540 ASSAY OF URINE/UREA-N: CPT

## 2025-06-02 PROCEDURE — 97166 OT EVAL MOD COMPLEX 45 MIN: CPT

## 2025-06-02 PROCEDURE — 83540 ASSAY OF IRON: CPT

## 2025-06-02 PROCEDURE — 83935 ASSAY OF URINE OSMOLALITY: CPT

## 2025-06-02 PROCEDURE — 93308 TTE F-UP OR LMTD: CPT

## 2025-06-02 PROCEDURE — 93970 EXTREMITY STUDY: CPT

## 2025-06-02 PROCEDURE — 82435 ASSAY OF BLOOD CHLORIDE: CPT

## 2025-06-02 PROCEDURE — 82947 ASSAY GLUCOSE BLOOD QUANT: CPT

## 2025-06-02 PROCEDURE — 97535 SELF CARE MNGMENT TRAINING: CPT

## 2025-06-05 ENCOUNTER — RESULT REVIEW (OUTPATIENT)
Age: 70
End: 2025-06-05

## 2025-06-05 ENCOUNTER — OUTPATIENT (OUTPATIENT)
Dept: OUTPATIENT SERVICES | Facility: HOSPITAL | Age: 70
LOS: 1 days | End: 2025-06-05
Payer: MEDICARE

## 2025-06-05 ENCOUNTER — APPOINTMENT (OUTPATIENT)
Dept: HEART FAILURE | Facility: CLINIC | Age: 70
End: 2025-06-05
Payer: MEDICARE

## 2025-06-05 VITALS
DIASTOLIC BLOOD PRESSURE: 57 MMHG | RESPIRATION RATE: 16 BRPM | SYSTOLIC BLOOD PRESSURE: 108 MMHG | HEART RATE: 95 BPM | OXYGEN SATURATION: 99 %

## 2025-06-05 VITALS
RESPIRATION RATE: 18 BRPM | DIASTOLIC BLOOD PRESSURE: 108 MMHG | WEIGHT: 166.89 LBS | HEART RATE: 92 BPM | HEIGHT: 67 IN | OXYGEN SATURATION: 98 % | SYSTOLIC BLOOD PRESSURE: 177 MMHG | TEMPERATURE: 98 F

## 2025-06-05 DIAGNOSIS — Z95.828 PRESENCE OF OTHER VASCULAR IMPLANTS AND GRAFTS: Chronic | ICD-10-CM

## 2025-06-05 DIAGNOSIS — Z94.1 HEART TRANSPLANT STATUS: Chronic | ICD-10-CM

## 2025-06-05 DIAGNOSIS — Z87.828 PERSONAL HISTORY OF OTHER (HEALED) PHYSICAL INJURY AND TRAUMA: Chronic | ICD-10-CM

## 2025-06-05 DIAGNOSIS — Z95.810 PRESENCE OF AUTOMATIC (IMPLANTABLE) CARDIAC DEFIBRILLATOR: Chronic | ICD-10-CM

## 2025-06-05 DIAGNOSIS — D84.9 IMMUNODEFICIENCY, UNSPECIFIED: ICD-10-CM

## 2025-06-05 DIAGNOSIS — Z94.1 HEART TRANSPLANT STATUS: ICD-10-CM

## 2025-06-05 DIAGNOSIS — Z98.890 OTHER SPECIFIED POSTPROCEDURAL STATES: Chronic | ICD-10-CM

## 2025-06-05 DIAGNOSIS — I10 ESSENTIAL (PRIMARY) HYPERTENSION: ICD-10-CM

## 2025-06-05 LAB
ALBUMIN SERPL ELPH-MCNC: 4.2 G/DL — SIGNIFICANT CHANGE UP (ref 3.3–5)
ALP SERPL-CCNC: 94 U/L — SIGNIFICANT CHANGE UP (ref 40–120)
ALT FLD-CCNC: 18 U/L — SIGNIFICANT CHANGE UP (ref 10–45)
ANION GAP SERPL CALC-SCNC: 14 MMOL/L — SIGNIFICANT CHANGE UP (ref 5–17)
AST SERPL-CCNC: 10 U/L — SIGNIFICANT CHANGE UP (ref 10–40)
BASOPHILS # BLD AUTO: 0.01 K/UL — SIGNIFICANT CHANGE UP (ref 0–0.2)
BASOPHILS NFR BLD AUTO: 0.1 % — SIGNIFICANT CHANGE UP (ref 0–2)
BILIRUB SERPL-MCNC: 0.7 MG/DL — SIGNIFICANT CHANGE UP (ref 0.2–1.2)
BUN SERPL-MCNC: 37 MG/DL — HIGH (ref 7–23)
CALCIUM SERPL-MCNC: 9 MG/DL — SIGNIFICANT CHANGE UP (ref 8.4–10.5)
CHLORIDE SERPL-SCNC: 101 MMOL/L — SIGNIFICANT CHANGE UP (ref 96–108)
CO2 SERPL-SCNC: 22 MMOL/L — SIGNIFICANT CHANGE UP (ref 22–31)
CREAT SERPL-MCNC: 1.38 MG/DL — HIGH (ref 0.5–1.3)
EGFR: 55 ML/MIN/1.73M2 — LOW
EGFR: 55 ML/MIN/1.73M2 — LOW
EOSINOPHIL # BLD AUTO: 0 K/UL — SIGNIFICANT CHANGE UP (ref 0–0.5)
EOSINOPHIL NFR BLD AUTO: 0 % — SIGNIFICANT CHANGE UP (ref 0–6)
GLUCOSE SERPL-MCNC: 118 MG/DL — HIGH (ref 70–99)
HBV DNA # SERPL NAA+PROBE: SIGNIFICANT CHANGE UP
HBV DNA # SERPL NAA+PROBE: SIGNIFICANT CHANGE UP IU/ML
HBV DNA SERPL NAA+PROBE-LOG#: SIGNIFICANT CHANGE UP LOGIU/ML
HCT VFR BLD CALC: 29 % — LOW (ref 39–50)
HCV RNA SERPL NAA DL=5-ACNC: SIGNIFICANT CHANGE UP IU/ML
HCV RNA SPEC NAA+PROBE-LOG IU: SIGNIFICANT CHANGE UP
HCV RNA SPEC NAA+PROBE-LOG IU: SIGNIFICANT CHANGE UP LOGIU/ML
HGB BLD-MCNC: 9.4 G/DL — LOW (ref 13–17)
IMM GRANULOCYTES NFR BLD AUTO: 1.9 % — HIGH (ref 0–0.9)
LYMPHOCYTES # BLD AUTO: 0.54 K/UL — LOW (ref 1–3.3)
LYMPHOCYTES # BLD AUTO: 6.4 % — LOW (ref 13–44)
MAGNESIUM SERPL-MCNC: 2 MG/DL — SIGNIFICANT CHANGE UP (ref 1.6–2.6)
MCHC RBC-ENTMCNC: 29.7 PG — SIGNIFICANT CHANGE UP (ref 27–34)
MCHC RBC-ENTMCNC: 32.4 G/DL — SIGNIFICANT CHANGE UP (ref 32–36)
MCV RBC AUTO: 91.5 FL — SIGNIFICANT CHANGE UP (ref 80–100)
MONOCYTES # BLD AUTO: 0.54 K/UL — SIGNIFICANT CHANGE UP (ref 0–0.9)
MONOCYTES NFR BLD AUTO: 6.4 % — SIGNIFICANT CHANGE UP (ref 2–14)
NEUTROPHILS # BLD AUTO: 7.13 K/UL — SIGNIFICANT CHANGE UP (ref 1.8–7.4)
NEUTROPHILS NFR BLD AUTO: 85.2 % — HIGH (ref 43–77)
NRBC BLD AUTO-RTO: 0 /100 WBCS — SIGNIFICANT CHANGE UP (ref 0–0)
PLATELET # BLD AUTO: 146 K/UL — LOW (ref 150–400)
POTASSIUM SERPL-MCNC: 4.4 MMOL/L — SIGNIFICANT CHANGE UP (ref 3.5–5.3)
POTASSIUM SERPL-SCNC: 4.4 MMOL/L — SIGNIFICANT CHANGE UP (ref 3.5–5.3)
PROT SERPL-MCNC: 6.2 G/DL — SIGNIFICANT CHANGE UP (ref 6–8.3)
RBC # BLD: 3.17 M/UL — LOW (ref 4.2–5.8)
RBC # FLD: 17.8 % — HIGH (ref 10.3–14.5)
SODIUM SERPL-SCNC: 137 MMOL/L — SIGNIFICANT CHANGE UP (ref 135–145)
TACROLIMUS SERPL-MCNC: 11.8 NG/ML — SIGNIFICANT CHANGE UP
WBC # BLD: 8.38 K/UL — SIGNIFICANT CHANGE UP (ref 3.8–10.5)
WBC # FLD AUTO: 8.38 K/UL — SIGNIFICANT CHANGE UP (ref 3.8–10.5)

## 2025-06-05 PROCEDURE — 93308 TTE F-UP OR LMTD: CPT | Mod: 26,59

## 2025-06-05 PROCEDURE — C1889: CPT

## 2025-06-05 PROCEDURE — 88346 IMFLUOR 1ST 1ANTB STAIN PX: CPT

## 2025-06-05 PROCEDURE — 93306 TTE W/DOPPLER COMPLETE: CPT

## 2025-06-05 PROCEDURE — 93505 ENDOMYOCARDIAL BIOPSY: CPT | Mod: 26

## 2025-06-05 PROCEDURE — 88307 TISSUE EXAM BY PATHOLOGIST: CPT

## 2025-06-05 PROCEDURE — 87517 HEPATITIS B DNA QUANT: CPT

## 2025-06-05 PROCEDURE — 93356 MYOCRD STRAIN IMG SPCKL TRCK: CPT

## 2025-06-05 PROCEDURE — 87522 HEPATITIS C REVRS TRNSCRPJ: CPT

## 2025-06-05 PROCEDURE — 88346 IMFLUOR 1ST 1ANTB STAIN PX: CPT | Mod: 26

## 2025-06-05 PROCEDURE — 80197 ASSAY OF TACROLIMUS: CPT

## 2025-06-05 PROCEDURE — C1769: CPT

## 2025-06-05 PROCEDURE — 80053 COMPREHEN METABOLIC PANEL: CPT

## 2025-06-05 PROCEDURE — 99214 OFFICE O/P EST MOD 30 MIN: CPT

## 2025-06-05 PROCEDURE — 93010 ELECTROCARDIOGRAM REPORT: CPT

## 2025-06-05 PROCEDURE — 82803 BLOOD GASES ANY COMBINATION: CPT

## 2025-06-05 PROCEDURE — 93306 TTE W/DOPPLER COMPLETE: CPT | Mod: 26

## 2025-06-05 PROCEDURE — 99152 MOD SED SAME PHYS/QHP 5/>YRS: CPT

## 2025-06-05 PROCEDURE — 93005 ELECTROCARDIOGRAM TRACING: CPT

## 2025-06-05 PROCEDURE — 83735 ASSAY OF MAGNESIUM: CPT

## 2025-06-05 PROCEDURE — 87536 HIV-1 QUANT&REVRSE TRNSCRPJ: CPT

## 2025-06-05 PROCEDURE — 85025 COMPLETE CBC W/AUTO DIFF WBC: CPT

## 2025-06-05 PROCEDURE — C1894: CPT

## 2025-06-05 PROCEDURE — 93308 TTE F-UP OR LMTD: CPT

## 2025-06-05 PROCEDURE — 88307 TISSUE EXAM BY PATHOLOGIST: CPT | Mod: 26

## 2025-06-05 PROCEDURE — 93505 ENDOMYOCARDIAL BIOPSY: CPT

## 2025-06-05 RX ORDER — NALOXEGOL OXALATE 12.5 MG/1
1 TABLET, FILM COATED ORAL
Refills: 0 | DISCHARGE

## 2025-06-05 RX ORDER — PREDNISONE 20 MG/1
1 TABLET ORAL
Refills: 0 | DISCHARGE

## 2025-06-05 RX ORDER — B1/B2/B3/B5/B6/B12/VIT C/FOLIC 500-0.5 MG
1 TABLET ORAL
Refills: 0 | DISCHARGE

## 2025-06-05 RX ORDER — TRAZODONE HCL 100 MG
1 TABLET ORAL
Refills: 0 | DISCHARGE

## 2025-06-05 RX ORDER — BUMETANIDE 1 MG/1
1 TABLET ORAL
Qty: 30 | Refills: 0
Start: 2025-06-05 | End: 2025-07-04

## 2025-06-05 RX ORDER — TACROLIMUS 0.5 MG/1
3.5 CAPSULE ORAL
Refills: 0 | DISCHARGE

## 2025-06-05 RX ORDER — DESVENLAFAXINE 25 MG/1
50 TABLET, EXTENDED RELEASE ORAL
Refills: 0 | DISCHARGE

## 2025-06-05 RX ORDER — ATOVAQUONE 750 MG/5ML
10 SUSPENSION ORAL
Refills: 0 | DISCHARGE

## 2025-06-05 RX ORDER — NIFEDIPINE 30 MG/1
30 TABLET, EXTENDED RELEASE ORAL DAILY
Qty: 30 | Refills: 5 | Status: ACTIVE | COMMUNITY
Start: 2025-06-05 | End: 1900-01-01

## 2025-06-05 RX ORDER — NYSTATIN 100000 [USP'U]/G
5 CREAM TOPICAL
Refills: 0 | DISCHARGE

## 2025-06-05 RX ORDER — TAMSULOSIN HYDROCHLORIDE 0.4 MG/1
1 CAPSULE ORAL
Refills: 0 | DISCHARGE

## 2025-06-05 RX ORDER — VANCOMYCIN HCL IN 5 % DEXTROSE 1.5G/250ML
125 PLASTIC BAG, INJECTION (ML) INTRAVENOUS
Refills: 0 | DISCHARGE

## 2025-06-05 RX ORDER — PREDNISONE 20 MG/1
0.5 TABLET ORAL
Refills: 0 | DISCHARGE

## 2025-06-05 RX ADMIN — Medication 10 MILLIGRAM(S): at 14:15

## 2025-06-05 NOTE — ASU DISCHARGE PLAN (ADULT/PEDIATRIC) - NS MD DC FALL RISK RISK
For information on Fall & Injury Prevention, visit: [email or web address] OR  [email or web address] OR  https://www.cdc.gov/steadi/patient.html

## 2025-06-05 NOTE — ASU PATIENT PROFILE, ADULT - FALL HARM RISK - UNIVERSAL INTERVENTIONS
Bed in lowest position, wheels locked, appropriate side rails in place/Call bell, personal items and telephone in reach/Instruct patient to call for assistance before getting out of bed or chair/Non-slip footwear when patient is out of bed/[place] to call system/Physically safe environment - no spills, clutter or unnecessary equipment/Purposeful Proactive Rounding/Room/bathroom lighting operational, light cord in reach

## 2025-06-05 NOTE — ASU DISCHARGE PLAN (ADULT/PEDIATRIC) - ***IN THE EVENT THAT YOU DEVELOP A COMPLICATION AND YOU ARE UNABLE TO REACH YOUR OWN PHYSICIAN, YOU MAY CONTACT:
SUBJECTIVE:   Camille Vang is a 85 year old female who presents for Preventive Visit.    Patient seen with her son Nnamdi today  Are you in the first 12 months of your Medicare Part B coverage?  No    Healthy Habits:  Answers for HPI/ROS submitted by the patient on 12/1/2017   Annual Exam:  Getting at least 3 servings of Calcium per day:: Yes  Bi-annual eye exam:: NO  Dental care twice a year:: NO  Sleep apnea or symptoms of sleep apnea:: None  Diet:: Regular (no restrictions)  Frequency of exercise:: 2-3 days/week  Taking medications regularly:: Yes  Medication side effects:: None  Additional concerns today:: YES  PHQ-2 Score: 0  Duration of exercise:: Less than 15 minutes    COGNITIVE SCREEN  Patient has Dementia     Mini-CogTM Copyright WALLACE Moura. Licensed by the author for use in [employer]; reprinted with permission (navid@[facility]). All rights reserved.            Reviewed and updated as needed this visit by clinical staff         Reviewed and updated as needed this visit by Provider      Social History   Substance Use Topics     Smoking status: Never Smoker     Smokeless tobacco: Never Used     Alcohol use No       The patient does not drink >3 drinks per day nor >7 drinks per week.     Today's PHQ-2 Score:   PHQ-2 ( 1999 Pfizer) 11/11/2016 8/5/2016   Q1: Little interest or pleasure in doing things 0 1   Q2: Feeling down, depressed or hopeless 0 1   PHQ-2 Score 0 2     Pt denies feeling depressed today    Do you feel safe in your environment - Yes    Do you have a Health Care Directive?: Yes: Advance Directive has been received and scanned.    Current providers sharing in care for this patient include: Patient Care Team:  Jeevan Obando MD as PCP - General (Internal Medicine)      Hearing impairment: No    Ability to successfully perform activities of daily living: No, needs assistance with: transportation, bathing, laundry and medications     Fall risk:  Fallen 2 or more times in the past year?:  No  Any fall with injury in the past year?: No (shoulder injury )      Home safety:  none identified      The following health maintenance items are reviewed in Epic and correct as of today:Health Maintenance   Topic Date Due     DEPRESSION ACTION PLAN Q1 YR  12/21/1949     PNEUMOCOCCAL (1 of 2 - PCV13) 12/21/1996     MEDICARE ANNUAL WELLNESS VISIT  05/31/2013     MAMMO Q1 YR  09/27/2014     PHQ-9 Q6 MONTHS  03/29/2016     TSH Q1 YEAR  03/28/2017     COLONOSCOPY Q10 YR  07/31/2017     INFLUENZA VACCINE (SYSTEM ASSIGNED)  09/01/2017     FALL RISK ASSESSMENT  11/11/2017     TETANUS Q10 YR  05/02/2018     ADVANCE DIRECTIVE PLANNING Q5 YRS  04/28/2021     Labs reviewed in EPIC        ROS: (Note pt  Somewhat of a poor historian due to dementia/memory so responses of pt mostly relate to sx pt having/not having currently). Also some contribution from son answering some questions  C: NEGATIVE for fever, chills. Weight up 15 pounds in 1 year. Eating more per son and not as active  I: NEGATIVE for worrisome rashes, moles or lesions  E: NEGATIVE for vision changes or irritation  E/M: NEGATIVE for ear, mouth and throat problems  R: NEGATIVE for significant cough or SOB  B: NEGATIVE for masses, tenderness or discharge  CV: NEGATIVE for chest pain, palpitations or peripheral edema  GI: NEGATIVE for nausea, abdominal pain, heartburn, or change in bowel habits  : NEGATIVE for frequency, dysuria, or hematuria.Mild incontinence  M: NEGATIVE for significant arthralgias or myalgia limiting activity. Had some previous left shoulder pain earlier this year from a fall which has resolved.  N: NEGATIVE for  dizziness or paresthesias. Patient's son states that since she was in the hospital briefly in August, the patient has used a wheelchair more then her walker and therefore has become weaker in her legs with less use. Not doing any current physical therapy. Long-standing dementia. History of previous headache issues. Son believes that  "she is not taking any gabapentin currently and denies headache issues now.  E: POSITIVE for thyroid replacement. Overdue for TSH recheck, especially with weight gain  H: NEGATIVE for bleeding problems  P: NEGATIVE for changes in mood or affect    OBJECTIVE:   /62  Pulse 88  Temp 98.9  F (37.2  C) (Oral)  Wt 161 lb (73 kg)  SpO2 94%  BMI 29.45 kg/m2 Estimated body mass index is 26.7 kg/(m^2) as calculated from the following:    Height as of 3/28/16: 5' 2\" (1.575 m).    Weight as of 11/11/16: 146 lb (66.2 kg).  EXAM:   General appearance -  alert, no distress  Skin - No rashes or lesions. Toenails excessively long bilaterally. Following verbal consent, all 10 toenails were trimmed by physician  Head - normocephalic, atraumatic  Eyes - LOPEZ, EOMI, fundi exam with nondilated pupils negative.  Ears - External ears normal. Canals clear. TM's normal.  Nose/Sinuses - Nares normal. Septum midline. Mucosa normal. No drainage or sinus tenderness.  Oropharynx - No erythema, no adenopathy, no exudates.  Neck - Supple without adenopathy or thyromegaly. No bruits.  Lungs - Clear to auscultation without wheezes/rhonchi.  Heart - Regular rate and rhythm without murmurs, clicks, or gallops.  Nodes - No supraclavicular, axillary, or inguinal adenopathy palpable.  Breasts - deferred  Abdomen - Abdomen soft, non-tender. BS normal. No masses or hepatosplenomegaly palpable. No bruits.  Extremities -No cyanosis, clubbing. Trace BLE nonpitting edema.    Musculoskeletal - Spine ROM normal. Muscular strength intact.  DIP and PIP joints hands with osteoarthritis changes  Peripheral pulses - radial=4/4, femoral=4/4, posterior tibial=4/4, dorsalis pedis=4/4,  Neuro -  Reflexes normal and symmetric. Sensation grossly WNL. 0/3 recall 5 min. Global strength 5-/5. Able to stand from WC on own. Gait not assessed  Without walker  Genital/Rectal - deferred        ASSESSMENT / PLAN:   1. Medicare annual wellness visit, initial  See below for " "healthcare maintenance.  Lab as ordered  Will speak with pt's assisted living facility ([facility]) to see about therapy options there vs getting staff to engage pt more to be more active using walker rather than mostly being pushed around everywhere in WC  Stop Gabapentin as son thinks has not been taking (will confirm with pt's AL facility) and no headache issues recently    2. Dementia without behavioral disturbance, unspecified dementia type  Stable. Continue Aricept    3. Hypothyroidism, unspecified type  Recent weight gain. Recheck thyroid function to see if levothyroxine dosage increase necessary  - TSH with free T4 reflex    4. B12 deficiency  Continue B-12 injections. Labs as ordered  - CBC with platelets  - Vitamin B12    5. Need for pneumococcal vaccination  - Pneumococcal vaccine 13 valent PCV13 IM (Prevnar) [32920]  - ADMIN PNEUMOVAX VACCINE (For MEDICARE Patients ONLY) []  - T4 free    6. Need for prophylactic vaccination and inoculation against influenza  - FLU VACCINE, INCREASED ANTIGEN, PRESV FREE, AGE 65+ [60302]  - Vaccine Administration, Initial [19392]  - ADMIN INFLUENZA (For MEDICARE Patients ONLY) []      End of Life Planning:  Patient currently has an advanced directive: Yes.  Practitioner is supportive of decision.    COUNSELING:  Reviewed preventive health counseling, as reflected in patient instructions  Special attention given to:       discussion re: need to keep pt active mobility-wise as able        Estimated body mass index is 26.7 kg/(m^2) as calculated from the following:    Height as of 3/28/16: 5' 2\" (1.575 m).    Weight as of 11/11/16: 146 lb (66.2 kg).     reports that she has never smoked. She has never used smokeless tobacco.        Appropriate preventive services were discussed with this patient, including applicable screening as appropriate for cardiovascular disease, diabetes, osteopenia/osteoporosis, and glaucoma.  As appropriate for age/gender, " discussed screening for colorectal cancer, prostate cancer, breast cancer, and cervical cancer. Checklist reviewing preventive services available has been given to the patient.    Reviewed patients plan of care and provided an AVS. The Basic Care Plan (routine screening as documented in Health Maintenance) for Camille meets the Care Plan requirement. This Care Plan has been established and reviewed with the Patient and son.    Counseling Resources:  ATP IV Guidelines  Pooled Cohorts Equation Calculator  Breast Cancer Risk Calculator  FRAX Risk Assessment  ICSI Preventive Guidelines  Dietary Guidelines for Americans, 2010  [employer]'s MyPlate  ASA Prophylaxis  Lung CA Screening    Jeevan Obando MD  [facility]      Injectable Influenza Immunization Documentation    1.  Is the person to be vaccinated sick today?   No    2. Does the person to be vaccinated have an allergy to a component   of the vaccine?   No  Egg Allergy Algorithm Link    3. Has the person to be vaccinated ever had a serious reaction   to influenza vaccine in the past?   No    4. Has the person to be vaccinated ever had Guillain-Barré syndrome?   No    Form completed by   Kelly Kaur [facility]              Statement Selected

## 2025-06-05 NOTE — ASU PATIENT PROFILE, ADULT - URINARY CATHETER
DATE OF SURGERY:  01/03/2017    SURGEON:  Leopoldo Luke MD    PREOPERATIVE DIAGNOSIS(ES):    1. Acute cholecystitis.  2. Umbilical hernia.    POSTOPERATIVE DIAGNOSIS(ES):    1. Acute cholecystitis.  2. Umbilical hernia.  3. Hydrops of the gallbladder.  4. Cystic duct obstruction.    PROCEDURE:    1. Laparoscopic cholecystectomy.  2. Umbilical hernia repair.    ANESTHESIA:  General endotracheal anesthesia.    ESTIMATED BLOOD LOSS:  50 mL.    DRAINS:  A 15-Canadian Darius drain in the right upper quadrant.    SPECIMENS:  Gallbladder.    FINDINGS:  \"White bile\"                          Gallstone impacted in the opening of the cystic duct                         Copious pericholecystic edema and gallbladder inflammation.    INDICATIONS FOR PROCEDURE:  The patient is a 67-year-old female, presented to the hospital with significant right upper quadrant pain was seen and evaluated, deemed appropriate candidate for above-mentioned operation.    OPERATIVE NOTE:  Patient was seen and examined.  Risks, benefits were explained including, but not limited to bleeding, infection, damage to surrounding structures, risk of transection of common bile duct, risk of bile leak, risk of anesthesia.  Consent was obtained.  Time-out rendered.  Antibiotics administered.  The patient was placed on operating table in supine position.  After general endotracheal anesthesia was established, the patient's abdomen was prepped and draped in sterile fashion.  Using the Danny technique, the abdomen entered at the umbilicus.  There was an umbilical hernia that was disssected to the neck.  The excess adipose tissue was excised and the abdomen was entered.  The Danny trocar was then secured with 0 Vicryl sutures.  Pneumoperitoneum was then adequately established.  The patient was placed in reverse Trendelenburg and modified left lateral rotation.  The gallbladder was then identified, had a significant amount of inflammation and was  tense and unable to be grasped.  Using a needle suction, the gallbladder was then decompressed.  It was then grasped and tented cephalad.  There was a significant amount of omentum covering the gallbladder.  This was then peeled off with  the combination of blunt and cautery dissection to expose the hilum.  Once the hilum was dissected, this required a combination of several modes of dissection to identify an adequate window and expose the critical view. The cystic duct and cystic artery were adequately identified and the anatomy was clearly identified.  The cystic duct and cystic artery were then clipped once distally and twice proximally and transected.  The gallbladder was removed from the hepatic fossa Bovie electrocautery.  A significant amount of friability and the gallbladder was essentially peeling off the liver.  Hemostasis was achieved with Bovie electrocautery.  The gallbladder was then also removed with a 10 EndoCatch bag from the infraumbilical incision. The umbilical fascia required some dilation to allow for removal of the gallbladder.  The trocar was then replaced, the gallbladder fossa was again evaluated.  Hemostasis was adequately achieved.  Clips were appeared to be adequately in place.  There is no leakage of bile.  Due to the significant amount of friability and the bleeding, the 15-Kyrgyz Darius drain was placed as there was some required violation of the capsule of the hepatic parenchyma for removal of the gallbladder.     The remaining trocars were removed under direct visualization.  There was no bleeding from the anterior abdominal wall.  Pneumoperitoneum was evacuated via the Danny port site.  The fascia/hernia site was then closed with serial interrupted sutures in a horizontal mattress overlapping fashion.  Skin and subcutaneous tissues were closed with 4-0 Monocryl suture in interrupted subcuticular fashion.  The patient's abdomen was clean and dry. Steri-Strips applied.  The patient  was transferred in stable and extubated condition to the postoperative care unit.  All needle and sponge counts were at the end of the case.        ______________________________  Leopoldo Luke MD  [id number]      D:  01/09/2017 00:25:40  T:  01/09/2017 00:55:46   CAD/modl   Job:  [id number]            no

## 2025-06-05 NOTE — ASU DISCHARGE PLAN (ADULT/PEDIATRIC) - FINANCIAL ASSISTANCE
[facility] provides services at a reduced cost to those who are determined to be eligible through [facility]’s financial assistance program. Information regarding [facility]’s financial assistance program can be found by going to [email or web address] or by calling [phone].

## 2025-06-06 ENCOUNTER — APPOINTMENT (OUTPATIENT)
Dept: CARDIOLOGY | Facility: CLINIC | Age: 70
End: 2025-06-06

## 2025-06-06 LAB
CMV DNA CSF QL NAA+PROBE: ABNORMAL IU/ML
CMV DNA SPEC NAA+PROBE-LOG#: ABNORMAL LOG10IU/ML

## 2025-06-07 LAB
HIV-1 VIRAL LOAD RESULT: SIGNIFICANT CHANGE UP
HIV1 RNA # SERPL NAA+PROBE: SIGNIFICANT CHANGE UP COPIES/ML
HIV1 RNA SER-IMP: SIGNIFICANT CHANGE UP
HIV1 RNA SERPL NAA+PROBE-ACNC: SIGNIFICANT CHANGE UP
HIV1 RNA SERPL NAA+PROBE-LOG#: SIGNIFICANT CHANGE UP LG COP/ML
SURGICAL PATHOLOGY STUDY: SIGNIFICANT CHANGE UP

## 2025-06-11 ENCOUNTER — APPOINTMENT (OUTPATIENT)
Dept: INFECTIOUS DISEASE | Facility: CLINIC | Age: 70
End: 2025-06-11
Payer: MEDICARE

## 2025-06-11 VITALS
HEART RATE: 99 BPM | DIASTOLIC BLOOD PRESSURE: 88 MMHG | TEMPERATURE: 98.2 F | WEIGHT: 168 LBS | OXYGEN SATURATION: 96 % | BODY MASS INDEX: 26.37 KG/M2 | HEIGHT: 67 IN | SYSTOLIC BLOOD PRESSURE: 138 MMHG

## 2025-06-11 PROCEDURE — 99214 OFFICE O/P EST MOD 30 MIN: CPT

## 2025-06-17 ENCOUNTER — RESULT REVIEW (OUTPATIENT)
Age: 70
End: 2025-06-17

## 2025-06-17 ENCOUNTER — APPOINTMENT (OUTPATIENT)
Dept: HEART FAILURE | Facility: CLINIC | Age: 70
End: 2025-06-17
Payer: MEDICARE

## 2025-06-17 ENCOUNTER — APPOINTMENT (OUTPATIENT)
Dept: CV DIAGNOSITCS | Facility: HOSPITAL | Age: 70
End: 2025-06-17

## 2025-06-17 ENCOUNTER — OUTPATIENT (OUTPATIENT)
Dept: OUTPATIENT SERVICES | Facility: HOSPITAL | Age: 70
LOS: 1 days | End: 2025-06-17
Payer: MEDICARE

## 2025-06-17 VITALS
DIASTOLIC BLOOD PRESSURE: 76 MMHG | OXYGEN SATURATION: 95 % | HEART RATE: 100 BPM | SYSTOLIC BLOOD PRESSURE: 121 MMHG | RESPIRATION RATE: 16 BRPM

## 2025-06-17 VITALS
WEIGHT: 164.91 LBS | DIASTOLIC BLOOD PRESSURE: 89 MMHG | RESPIRATION RATE: 18 BRPM | HEIGHT: 67 IN | OXYGEN SATURATION: 99 % | SYSTOLIC BLOOD PRESSURE: 134 MMHG | HEART RATE: 107 BPM | TEMPERATURE: 98 F

## 2025-06-17 VITALS
HEART RATE: 97 BPM | RESPIRATION RATE: 18 BRPM | WEIGHT: 165 LBS | DIASTOLIC BLOOD PRESSURE: 89 MMHG | HEIGHT: 67 IN | BODY MASS INDEX: 25.9 KG/M2 | TEMPERATURE: 97.8 F | OXYGEN SATURATION: 99 % | SYSTOLIC BLOOD PRESSURE: 134 MMHG

## 2025-06-17 DIAGNOSIS — Z95.828 PRESENCE OF OTHER VASCULAR IMPLANTS AND GRAFTS: Chronic | ICD-10-CM

## 2025-06-17 DIAGNOSIS — Z98.890 OTHER SPECIFIED POSTPROCEDURAL STATES: Chronic | ICD-10-CM

## 2025-06-17 DIAGNOSIS — Z94.1 HEART TRANSPLANT STATUS: Chronic | ICD-10-CM

## 2025-06-17 DIAGNOSIS — Z95.810 PRESENCE OF AUTOMATIC (IMPLANTABLE) CARDIAC DEFIBRILLATOR: Chronic | ICD-10-CM

## 2025-06-17 DIAGNOSIS — Z87.828 PERSONAL HISTORY OF OTHER (HEALED) PHYSICAL INJURY AND TRAUMA: Chronic | ICD-10-CM

## 2025-06-17 DIAGNOSIS — Z94.1 HEART TRANSPLANT STATUS: ICD-10-CM

## 2025-06-17 LAB
ALBUMIN SERPL ELPH-MCNC: 4.7 G/DL — SIGNIFICANT CHANGE UP (ref 3.3–5)
ALP SERPL-CCNC: 88 U/L — SIGNIFICANT CHANGE UP (ref 40–120)
ALT FLD-CCNC: 13 U/L — SIGNIFICANT CHANGE UP (ref 10–45)
ANION GAP SERPL CALC-SCNC: 16 MMOL/L — SIGNIFICANT CHANGE UP (ref 5–17)
AST SERPL-CCNC: 8 U/L — LOW (ref 10–40)
BASOPHILS # BLD AUTO: 0.03 K/UL — SIGNIFICANT CHANGE UP (ref 0–0.2)
BASOPHILS NFR BLD AUTO: 0.3 % — SIGNIFICANT CHANGE UP (ref 0–2)
BILIRUB SERPL-MCNC: 0.4 MG/DL — SIGNIFICANT CHANGE UP (ref 0.2–1.2)
BUN SERPL-MCNC: 36 MG/DL — HIGH (ref 7–23)
CALCIUM SERPL-MCNC: 9.4 MG/DL — SIGNIFICANT CHANGE UP (ref 8.4–10.5)
CHLORIDE SERPL-SCNC: 103 MMOL/L — SIGNIFICANT CHANGE UP (ref 96–108)
CO2 SERPL-SCNC: 21 MMOL/L — LOW (ref 22–31)
CREAT SERPL-MCNC: 1.2 MG/DL — SIGNIFICANT CHANGE UP (ref 0.5–1.3)
EGFR: 65 ML/MIN/1.73M2 — SIGNIFICANT CHANGE UP
EGFR: 65 ML/MIN/1.73M2 — SIGNIFICANT CHANGE UP
EOSINOPHIL # BLD AUTO: 0.01 K/UL — SIGNIFICANT CHANGE UP (ref 0–0.5)
EOSINOPHIL NFR BLD AUTO: 0.1 % — SIGNIFICANT CHANGE UP (ref 0–6)
GLUCOSE SERPL-MCNC: 139 MG/DL — HIGH (ref 70–99)
HCT VFR BLD CALC: 33.1 % — LOW (ref 39–50)
HGB BLD-MCNC: 10.6 G/DL — LOW (ref 13–17)
HGB FLD-MCNC: 10.3 G/DL — LOW (ref 12.6–17.4)
IMM GRANULOCYTES NFR BLD AUTO: 1.6 % — HIGH (ref 0–0.9)
LYMPHOCYTES # BLD AUTO: 0.57 K/UL — LOW (ref 1–3.3)
LYMPHOCYTES # BLD AUTO: 6 % — LOW (ref 13–44)
MAGNESIUM SERPL-MCNC: 2 MG/DL — SIGNIFICANT CHANGE UP (ref 1.6–2.6)
MCHC RBC-ENTMCNC: 29.2 PG — SIGNIFICANT CHANGE UP (ref 27–34)
MCHC RBC-ENTMCNC: 32 G/DL — SIGNIFICANT CHANGE UP (ref 32–36)
MCV RBC AUTO: 91.2 FL — SIGNIFICANT CHANGE UP (ref 80–100)
MONOCYTES # BLD AUTO: 0.62 K/UL — SIGNIFICANT CHANGE UP (ref 0–0.9)
MONOCYTES NFR BLD AUTO: 6.5 % — SIGNIFICANT CHANGE UP (ref 2–14)
NEUTROPHILS # BLD AUTO: 8.11 K/UL — HIGH (ref 1.8–7.4)
NEUTROPHILS NFR BLD AUTO: 85.5 % — HIGH (ref 43–77)
NRBC BLD AUTO-RTO: 0 /100 WBCS — SIGNIFICANT CHANGE UP (ref 0–0)
OXYHGB MFR BLDMV: 69.2 % — SIGNIFICANT CHANGE UP
PLATELET # BLD AUTO: 198 K/UL — SIGNIFICANT CHANGE UP (ref 150–400)
POTASSIUM SERPL-MCNC: 4.6 MMOL/L — SIGNIFICANT CHANGE UP (ref 3.5–5.3)
POTASSIUM SERPL-SCNC: 4.6 MMOL/L — SIGNIFICANT CHANGE UP (ref 3.5–5.3)
PROT SERPL-MCNC: 6.8 G/DL — SIGNIFICANT CHANGE UP (ref 6–8.3)
RBC # BLD: 3.63 M/UL — LOW (ref 4.2–5.8)
RBC # FLD: 17.2 % — HIGH (ref 10.3–14.5)
SAO2 % BLD: 70.8 % — SIGNIFICANT CHANGE UP (ref 60–90)
SODIUM SERPL-SCNC: 140 MMOL/L — SIGNIFICANT CHANGE UP (ref 135–145)
TACROLIMUS SERPL-MCNC: 13.7 NG/ML — SIGNIFICANT CHANGE UP
WBC # BLD: 9.49 K/UL — SIGNIFICANT CHANGE UP (ref 3.8–10.5)
WBC # FLD AUTO: 9.49 K/UL — SIGNIFICANT CHANGE UP (ref 3.8–10.5)

## 2025-06-17 PROCEDURE — 83735 ASSAY OF MAGNESIUM: CPT

## 2025-06-17 PROCEDURE — 93306 TTE W/DOPPLER COMPLETE: CPT

## 2025-06-17 PROCEDURE — 93308 TTE F-UP OR LMTD: CPT

## 2025-06-17 PROCEDURE — 99214 OFFICE O/P EST MOD 30 MIN: CPT | Mod: 25

## 2025-06-17 PROCEDURE — 88346 IMFLUOR 1ST 1ANTB STAIN PX: CPT | Mod: 26

## 2025-06-17 PROCEDURE — C1894: CPT

## 2025-06-17 PROCEDURE — 93010 ELECTROCARDIOGRAM REPORT: CPT

## 2025-06-17 PROCEDURE — 82803 BLOOD GASES ANY COMBINATION: CPT

## 2025-06-17 PROCEDURE — 88346 IMFLUOR 1ST 1ANTB STAIN PX: CPT

## 2025-06-17 PROCEDURE — C1769: CPT

## 2025-06-17 PROCEDURE — 93306 TTE W/DOPPLER COMPLETE: CPT | Mod: 26

## 2025-06-17 PROCEDURE — 80197 ASSAY OF TACROLIMUS: CPT

## 2025-06-17 PROCEDURE — 93005 ELECTROCARDIOGRAM TRACING: CPT

## 2025-06-17 PROCEDURE — 88307 TISSUE EXAM BY PATHOLOGIST: CPT

## 2025-06-17 PROCEDURE — 85025 COMPLETE CBC W/AUTO DIFF WBC: CPT

## 2025-06-17 PROCEDURE — C1889: CPT

## 2025-06-17 PROCEDURE — 88307 TISSUE EXAM BY PATHOLOGIST: CPT | Mod: 26

## 2025-06-17 PROCEDURE — 99152 MOD SED SAME PHYS/QHP 5/>YRS: CPT

## 2025-06-17 PROCEDURE — 80053 COMPREHEN METABOLIC PANEL: CPT

## 2025-06-17 PROCEDURE — 93505 ENDOMYOCARDIAL BIOPSY: CPT

## 2025-06-17 PROCEDURE — 93505 ENDOMYOCARDIAL BIOPSY: CPT | Mod: 26

## 2025-06-17 PROCEDURE — 93325 DOPPLER ECHO COLOR FLOW MAPG: CPT

## 2025-06-17 PROCEDURE — 93308 TTE F-UP OR LMTD: CPT | Mod: 26,59

## 2025-06-17 RX ORDER — B1/B2/B3/B5/B6/B12/VIT C/FOLIC 500-0.5 MG
1 TABLET ORAL
Refills: 0 | DISCHARGE

## 2025-06-17 RX ORDER — MYCOPHENOLATE MOFETIL 500 MG/1
4 TABLET, FILM COATED ORAL
Refills: 0 | DISCHARGE

## 2025-06-17 RX ORDER — NALOXEGOL OXALATE 12.5 MG/1
1 TABLET, FILM COATED ORAL
Refills: 0 | DISCHARGE

## 2025-06-17 RX ORDER — NIFEDIPINE 30 MG
2 TABLET, EXTENDED RELEASE 24 HR ORAL
Qty: 0 | Refills: 0 | DISCHARGE

## 2025-06-17 RX ORDER — TACROLIMUS 0.5 MG/1
1 CAPSULE ORAL
Refills: 0 | DISCHARGE

## 2025-06-17 RX ORDER — TACROLIMUS 0.5 MG/1
3 CAPSULE ORAL
Refills: 0 | DISCHARGE

## 2025-06-17 RX ORDER — MAGNESIUM OXIDE 400 MG
800 TABLET ORAL
Refills: 0 | DISCHARGE

## 2025-06-17 RX ORDER — MAGNESIUM OXIDE 400 MG
2 TABLET ORAL
Refills: 0 | DISCHARGE

## 2025-06-17 RX ORDER — DESVENLAFAXINE 25 MG/1
50 TABLET, EXTENDED RELEASE ORAL
Refills: 0 | DISCHARGE

## 2025-06-17 RX ORDER — SODIUM ZIRCONIUM CYCLOSILICATE 5 G/5G
1 POWDER, FOR SUSPENSION ORAL
Refills: 0 | DISCHARGE

## 2025-06-17 RX ORDER — VALGANCICLOVIR 450 MG/1
1 TABLET, FILM COATED ORAL
Qty: 0 | Refills: 0 | DISCHARGE

## 2025-06-17 RX ORDER — TAMSULOSIN HYDROCHLORIDE 0.4 MG/1
1 CAPSULE ORAL
Qty: 0 | Refills: 0 | DISCHARGE

## 2025-06-17 RX ORDER — SULFAMETHOXAZOLE/TRIMETHOPRIM 800-160 MG
1 TABLET ORAL
Qty: 0 | Refills: 0 | DISCHARGE

## 2025-06-17 RX ORDER — TRAZODONE HCL 100 MG
0 TABLET ORAL
Refills: 0 | DISCHARGE

## 2025-06-17 RX ORDER — ATOVAQUONE 750 MG/5ML
5 SUSPENSION ORAL
Refills: 0 | DISCHARGE

## 2025-06-17 RX ORDER — PREDNISONE 20 MG/1
1 TABLET ORAL
Refills: 0 | DISCHARGE

## 2025-06-17 NOTE — ASU DISCHARGE PLAN (ADULT/PEDIATRIC) - FINANCIAL ASSISTANCE
[facility] provides services at a reduced cost to those who are determined to be eligible through [facility]’s financial assistance program. Information regarding [facility]’s financial assistance program can be found by going to [email or web address] or by calling [phone].

## 2025-06-17 NOTE — ASU DISCHARGE PLAN (ADULT/PEDIATRIC) - CARE PROVIDER_API CALL
Kevin Urbina  Cardiovascular Disease  [address] [place] [address]  Phone: [phone]  Fax: [phone]  Follow Up Time:

## 2025-06-17 NOTE — ASU DISCHARGE PLAN (ADULT/PEDIATRIC) - PAIN MANAGEMENT
Nursing Note by Rodney Holbrook at 01/07/17 11:17 AM     Author:  Herman Judd CMA Service:  (none) Author Type:  Registered Nurse     Filed:  01/07/17 11:28 AM Encounter Date:  1/7/2017 Status:  Signed     :  Herman Judd, [address] (Registered Nurse)            Last treatment reviewed with patient. Side effects ? [SC1.1T] no[SC1.1M]   Side effects include[SC1.1T] none[SC1.1M]. Patient is feeling well today ? [SC1.1T] yes[SC1.1M]  Photosensitive medication list reviewed with the patient. Has the patient started on any photosensitive medication since last treatment ? [SC1.1T] no[SC1.1M]  If yes, what is the medication? [SC1.1T] none[SC1.1M]    Electronically Signed by:    Herman Judd CMA , 1/7/2017[SC1.1T]        Revision History        User Key Date/Time User Provider Type Action    > SC1.1 01/07/17 11:28 AM Akbar Real CMA Registered Nurse Sign    M - Manual, T - Template
Take over the counter pain medication

## 2025-06-17 NOTE — ASU DISCHARGE PLAN (ADULT/PEDIATRIC) - ASU DC SPECIAL INSTRUCTIONSFT
Wound Care:   the day AFTER your procedure remove bandage GENTTLY, and clean using  mild soap and gentle warm, water stream, pat dry. leave OPEN to air. YOU MAY SHOWER   DO NOT apply lotions, creams, ointments, powder, parfumes to your incision site  DO NOT SOAK your site for 1 week ( no baths, no pools, no tubs, etc...)  Check  your groin and /or wirst daily.A small amount of bruising, and soarness are normal    ACTIVITY: for 24 hours   - DO NOT DRIVE  - DO NOT make any important decisions or sign legal documents   - DO NOT operate heavy machinaries   - you may resume sexual activity in 48 hours, unless otherwise instructed by your cardiologist     If your procedure was done through the WRIST: for the NEXT 3DAYS:  - avoid pushing, pulling, with that affected wrist   - avoid repeated movement of that hand and wrist ( eg: typing, hammering)  - DO NOT LIFT anything more than 5 lbs     If your procedure was done through the GROIN: for the NEXT 5 DAYS  - Limit climbing stairs, DO NOT soak in bathtub or pool  - no strenous activities, pushing, pulling, straining  - Do not lift anything 10lbs or heavier     MEDICATION:   take your medications as explained ( see discharge paperwork)   If you received a STENT, you will be taking antiplatelet medications to KEEP YOUR STENT OPEN ( eg: Aspirin, Plavix, Brilinta, Effient, etc).  Take as prescribed DO NOT STOP taking them without consulting with your cardiologist first.     Follow heart healthy diet reccomended by your doctor, , if you smoke STOP SMOKING ( may call [phone] for center of tobacco control if you need assistance)     CALL your doctor to make appointment in 2 WEEKS     ***CALL YOUR DOCTOR***  if you experience: fever, chills, body aches, or severe pain, swelling, redness, heat or yellow discharge at incision site  If you experience Bleeding or excruciating pain at the procedural site, sweliing ( golf ball size) at your procedural site  If you experience CHEST PAIN  If you experience extremity numbness, tingling, temperature change ( of your procedural site)   If you are unable to reach your doctor, you may contact:   -Cardiology Office at [facility] at [phone] or   - [facility] [phone] - [facility] [phone]

## 2025-06-20 ENCOUNTER — APPOINTMENT (OUTPATIENT)
Dept: ELECTROPHYSIOLOGY | Facility: CLINIC | Age: 70
End: 2025-06-20

## 2025-06-20 VITALS
DIASTOLIC BLOOD PRESSURE: 107 MMHG | WEIGHT: 165 LBS | SYSTOLIC BLOOD PRESSURE: 153 MMHG | BODY MASS INDEX: 25.9 KG/M2 | HEIGHT: 67 IN | HEART RATE: 106 BPM

## 2025-06-20 PROCEDURE — 99213 OFFICE O/P EST LOW 20 MIN: CPT

## 2025-06-20 PROCEDURE — 93000 ELECTROCARDIOGRAM COMPLETE: CPT

## 2025-06-20 PROCEDURE — G2211 COMPLEX E/M VISIT ADD ON: CPT

## 2025-06-23 ENCOUNTER — LABORATORY RESULT (OUTPATIENT)
Age: 70
End: 2025-06-23

## 2025-06-24 LAB
ALBUMIN SERPL ELPH-MCNC: 4.5 G/DL
ALP BLD-CCNC: 84 U/L
ALT SERPL-CCNC: 18 U/L
ANION GAP SERPL CALC-SCNC: 13 MMOL/L
AST SERPL-CCNC: 12 U/L
BASOPHILS # BLD AUTO: 0.01 K/UL
BASOPHILS NFR BLD AUTO: 0.1 %
BILIRUB SERPL-MCNC: 0.6 MG/DL
BUN SERPL-MCNC: 41 MG/DL
CALCIUM SERPL-MCNC: 9.6 MG/DL
CHLORIDE SERPL-SCNC: 103 MMOL/L
CO2 SERPL-SCNC: 23 MMOL/L
CREAT SERPL-MCNC: 1.48 MG/DL
EGFRCR SERPLBLD CKD-EPI 2021: 51 ML/MIN/1.73M2
EOSINOPHIL # BLD AUTO: 0.01 K/UL
EOSINOPHIL NFR BLD AUTO: 0.1 %
GLUCOSE SERPL-MCNC: 135 MG/DL
HCT VFR BLD CALC: 34.9 %
HGB BLD-MCNC: 10.8 G/DL
IMM GRANULOCYTES NFR BLD AUTO: 1.4 %
LYMPHOCYTES # BLD AUTO: 0.54 K/UL
LYMPHOCYTES NFR BLD AUTO: 6 %
MAGNESIUM SERPL-MCNC: 2.2 MG/DL
MAN DIFF?: NORMAL
MCHC RBC-ENTMCNC: 29.3 PG
MCHC RBC-ENTMCNC: 30.9 G/DL
MCV RBC AUTO: 94.6 FL
MONOCYTES # BLD AUTO: 0.41 K/UL
MONOCYTES NFR BLD AUTO: 4.6 %
NEUTROPHILS # BLD AUTO: 7.89 K/UL
NEUTROPHILS NFR BLD AUTO: 87.8 %
PLATELET # BLD AUTO: 189 K/UL
POTASSIUM SERPL-SCNC: 4.7 MMOL/L
PROT SERPL-MCNC: 6.6 G/DL
RBC # BLD: 3.69 M/UL
RBC # FLD: 17.8 %
SODIUM SERPL-SCNC: 139 MMOL/L
TACROLIMUS SERPL-MCNC: 10.2 NG/ML
WBC # FLD AUTO: 8.99 K/UL

## 2025-06-25 LAB — T PALLIDUM AB SER QL IA: POSITIVE

## 2025-06-25 RX ORDER — SULFAMETHOXAZOLE AND TRIMETHOPRIM 400; 80 MG/1; MG/1
400-80 TABLET ORAL
Qty: 30 | Refills: 5 | Status: ACTIVE | COMMUNITY
Start: 2025-06-17 | End: 1900-01-01

## 2025-06-25 RX ORDER — TACROLIMUS 0.5 MG/1
0.5 CAPSULE ORAL
Qty: 60 | Refills: 5 | Status: ACTIVE | COMMUNITY
Start: 2025-06-25 | End: 1900-01-01

## 2025-06-27 ENCOUNTER — RX RENEWAL (OUTPATIENT)
Age: 70
End: 2025-06-27

## 2025-07-10 ENCOUNTER — APPOINTMENT (OUTPATIENT)
Dept: CV DIAGNOSITCS | Facility: HOSPITAL | Age: 70
End: 2025-07-10

## 2025-07-10 ENCOUNTER — RESULT REVIEW (OUTPATIENT)
Age: 70
End: 2025-07-10

## 2025-07-10 ENCOUNTER — APPOINTMENT (OUTPATIENT)
Dept: HEART FAILURE | Facility: CLINIC | Age: 70
End: 2025-07-10
Payer: MEDICARE

## 2025-07-10 ENCOUNTER — TRANSCRIPTION ENCOUNTER (OUTPATIENT)
Age: 70
End: 2025-07-10

## 2025-07-10 ENCOUNTER — OUTPATIENT (OUTPATIENT)
Dept: OUTPATIENT SERVICES | Facility: HOSPITAL | Age: 70
LOS: 1 days | End: 2025-07-10
Payer: MEDICARE

## 2025-07-10 VITALS
OXYGEN SATURATION: 96 % | HEART RATE: 99 BPM | BODY MASS INDEX: 25.84 KG/M2 | RESPIRATION RATE: 16 BRPM | WEIGHT: 165 LBS | DIASTOLIC BLOOD PRESSURE: 100 MMHG | TEMPERATURE: 97.5 F | SYSTOLIC BLOOD PRESSURE: 146 MMHG

## 2025-07-10 VITALS
WEIGHT: 164.91 LBS | OXYGEN SATURATION: 96 % | RESPIRATION RATE: 18 BRPM | TEMPERATURE: 98 F | DIASTOLIC BLOOD PRESSURE: 100 MMHG | SYSTOLIC BLOOD PRESSURE: 146 MMHG | HEART RATE: 99 BPM | HEIGHT: 67 IN

## 2025-07-10 VITALS
HEART RATE: 103 BPM | DIASTOLIC BLOOD PRESSURE: 98 MMHG | RESPIRATION RATE: 18 BRPM | OXYGEN SATURATION: 97 % | SYSTOLIC BLOOD PRESSURE: 132 MMHG

## 2025-07-10 DIAGNOSIS — Z98.890 OTHER SPECIFIED POSTPROCEDURAL STATES: Chronic | ICD-10-CM

## 2025-07-10 DIAGNOSIS — Z87.828 PERSONAL HISTORY OF OTHER (HEALED) PHYSICAL INJURY AND TRAUMA: Chronic | ICD-10-CM

## 2025-07-10 DIAGNOSIS — Z95.810 PRESENCE OF AUTOMATIC (IMPLANTABLE) CARDIAC DEFIBRILLATOR: Chronic | ICD-10-CM

## 2025-07-10 DIAGNOSIS — Z95.828 PRESENCE OF OTHER VASCULAR IMPLANTS AND GRAFTS: Chronic | ICD-10-CM

## 2025-07-10 DIAGNOSIS — Z94.1 HEART TRANSPLANT STATUS: Chronic | ICD-10-CM

## 2025-07-10 DIAGNOSIS — Z94.1 HEART TRANSPLANT STATUS: ICD-10-CM

## 2025-07-10 LAB
ALBUMIN SERPL ELPH-MCNC: 4.6 G/DL — SIGNIFICANT CHANGE UP (ref 3.3–5)
ALP SERPL-CCNC: 69 U/L — SIGNIFICANT CHANGE UP (ref 40–120)
ALT FLD-CCNC: 14 U/L — SIGNIFICANT CHANGE UP (ref 10–45)
ANION GAP SERPL CALC-SCNC: 13 MMOL/L — SIGNIFICANT CHANGE UP (ref 5–17)
AST SERPL-CCNC: 11 U/L — SIGNIFICANT CHANGE UP (ref 10–40)
BASOPHILS # BLD AUTO: 0.03 K/UL — SIGNIFICANT CHANGE UP (ref 0–0.2)
BASOPHILS NFR BLD AUTO: 0.5 % — SIGNIFICANT CHANGE UP (ref 0–2)
BILIRUB SERPL-MCNC: 0.4 MG/DL — SIGNIFICANT CHANGE UP (ref 0.2–1.2)
BUN SERPL-MCNC: 42 MG/DL — HIGH (ref 7–23)
CALCIUM SERPL-MCNC: 9.5 MG/DL — SIGNIFICANT CHANGE UP (ref 8.4–10.5)
CHLORIDE SERPL-SCNC: 104 MMOL/L — SIGNIFICANT CHANGE UP (ref 96–108)
CO2 SERPL-SCNC: 23 MMOL/L — SIGNIFICANT CHANGE UP (ref 22–31)
CREAT SERPL-MCNC: 1.62 MG/DL — HIGH (ref 0.5–1.3)
EGFR: 45 ML/MIN/1.73M2 — LOW
EGFR: 45 ML/MIN/1.73M2 — LOW
EOSINOPHIL # BLD AUTO: 0.02 K/UL — SIGNIFICANT CHANGE UP (ref 0–0.5)
EOSINOPHIL NFR BLD AUTO: 0.3 % — SIGNIFICANT CHANGE UP (ref 0–6)
GLUCOSE SERPL-MCNC: 94 MG/DL — SIGNIFICANT CHANGE UP (ref 70–99)
HCT VFR BLD CALC: 35.3 % — LOW (ref 39–50)
HGB BLD-MCNC: 11.2 G/DL — LOW (ref 13–17)
HGB FLD-MCNC: 10.3 G/DL — LOW (ref 12.6–17.4)
IMM GRANULOCYTES # BLD AUTO: 0.34 K/UL — HIGH (ref 0–0.07)
IMM GRANULOCYTES NFR BLD AUTO: 5.5 % — HIGH (ref 0–0.9)
LYMPHOCYTES # BLD AUTO: 0.86 K/UL — LOW (ref 1–3.3)
LYMPHOCYTES NFR BLD AUTO: 14 % — SIGNIFICANT CHANGE UP (ref 13–44)
MAGNESIUM SERPL-MCNC: 2.1 MG/DL — SIGNIFICANT CHANGE UP (ref 1.6–2.6)
MCHC RBC-ENTMCNC: 28.8 PG — SIGNIFICANT CHANGE UP (ref 27–34)
MCHC RBC-ENTMCNC: 31.7 G/DL — LOW (ref 32–36)
MCV RBC AUTO: 90.7 FL — SIGNIFICANT CHANGE UP (ref 80–100)
MONOCYTES # BLD AUTO: 0.31 K/UL — SIGNIFICANT CHANGE UP (ref 0–0.9)
MONOCYTES NFR BLD AUTO: 5 % — SIGNIFICANT CHANGE UP (ref 2–14)
NEUTROPHILS # BLD AUTO: 4.58 K/UL — SIGNIFICANT CHANGE UP (ref 1.8–7.4)
NEUTROPHILS NFR BLD AUTO: 74.7 % — SIGNIFICANT CHANGE UP (ref 43–77)
NRBC # BLD AUTO: 0 K/UL — SIGNIFICANT CHANGE UP (ref 0–0)
NRBC # FLD: 0 K/UL — SIGNIFICANT CHANGE UP (ref 0–0)
NRBC BLD AUTO-RTO: 0 /100 WBCS — SIGNIFICANT CHANGE UP (ref 0–0)
OXYHGB MFR BLDMV: 69.2 % — SIGNIFICANT CHANGE UP
PLATELET # BLD AUTO: 171 K/UL — SIGNIFICANT CHANGE UP (ref 150–400)
PMV BLD: 10 FL — SIGNIFICANT CHANGE UP (ref 7–13)
POTASSIUM SERPL-MCNC: 4.1 MMOL/L — SIGNIFICANT CHANGE UP (ref 3.5–5.3)
POTASSIUM SERPL-SCNC: 4.1 MMOL/L — SIGNIFICANT CHANGE UP (ref 3.5–5.3)
PROT SERPL-MCNC: 7 G/DL — SIGNIFICANT CHANGE UP (ref 6–8.3)
RBC # BLD: 3.89 M/UL — LOW (ref 4.2–5.8)
RBC # FLD: 16.6 % — HIGH (ref 10.3–14.5)
SAO2 % BLD: 70.5 % — SIGNIFICANT CHANGE UP (ref 60–90)
SODIUM SERPL-SCNC: 140 MMOL/L — SIGNIFICANT CHANGE UP (ref 135–145)
WBC # BLD: 6.14 K/UL — SIGNIFICANT CHANGE UP (ref 3.8–10.5)
WBC # FLD AUTO: 6.14 K/UL — SIGNIFICANT CHANGE UP (ref 3.8–10.5)

## 2025-07-10 PROCEDURE — 80053 COMPREHEN METABOLIC PANEL: CPT

## 2025-07-10 PROCEDURE — 88346 IMFLUOR 1ST 1ANTB STAIN PX: CPT

## 2025-07-10 PROCEDURE — 88307 TISSUE EXAM BY PATHOLOGIST: CPT

## 2025-07-10 PROCEDURE — C1894: CPT

## 2025-07-10 PROCEDURE — 93325 DOPPLER ECHO COLOR FLOW MAPG: CPT | Mod: 26

## 2025-07-10 PROCEDURE — 82803 BLOOD GASES ANY COMBINATION: CPT

## 2025-07-10 PROCEDURE — 93005 ELECTROCARDIOGRAM TRACING: CPT

## 2025-07-10 PROCEDURE — 99152 MOD SED SAME PHYS/QHP 5/>YRS: CPT

## 2025-07-10 PROCEDURE — 88307 TISSUE EXAM BY PATHOLOGIST: CPT | Mod: 26

## 2025-07-10 PROCEDURE — 83735 ASSAY OF MAGNESIUM: CPT

## 2025-07-10 PROCEDURE — 85025 COMPLETE CBC W/AUTO DIFF WBC: CPT

## 2025-07-10 PROCEDURE — 99214 OFFICE O/P EST MOD 30 MIN: CPT | Mod: 25

## 2025-07-10 PROCEDURE — 80197 ASSAY OF TACROLIMUS: CPT

## 2025-07-10 PROCEDURE — 93308 TTE F-UP OR LMTD: CPT | Mod: 26

## 2025-07-10 PROCEDURE — 93325 DOPPLER ECHO COLOR FLOW MAPG: CPT

## 2025-07-10 PROCEDURE — 93010 ELECTROCARDIOGRAM REPORT: CPT

## 2025-07-10 PROCEDURE — 93308 TTE F-UP OR LMTD: CPT

## 2025-07-10 PROCEDURE — 93505 ENDOMYOCARDIAL BIOPSY: CPT | Mod: 26

## 2025-07-10 PROCEDURE — C1889: CPT

## 2025-07-10 PROCEDURE — 88346 IMFLUOR 1ST 1ANTB STAIN PX: CPT | Mod: 26

## 2025-07-10 PROCEDURE — 93505 ENDOMYOCARDIAL BIOPSY: CPT

## 2025-07-10 RX ORDER — TACROLIMUS 0.5 MG/1
3 CAPSULE ORAL
Refills: 0 | DISCHARGE

## 2025-07-10 NOTE — PATIENT PROFILE ADULT - DO YOU FEEL LIKE HURTING YOURSELF OR OTHERS?
[de-identified] :  RIGHT ANKLE  Inspection:  + swelling  Palpation: + tenderness fibular shaft and anterior capsule  Range of Motion: normal dorsiflexion, normal plantarflexion  Strength: normal   Neurovascular intact distally  no

## 2025-07-10 NOTE — ASU DISCHARGE PLAN (ADULT/PEDIATRIC) - CARE PROVIDER_API CALL
[facility]  Advanced Heart Failure and Transplant Cardiology  [address] [address]  Phone: [phone]  Fax: [phone]  Follow Up Time: 2 weeks

## 2025-07-10 NOTE — ASU DISCHARGE PLAN (ADULT/PEDIATRIC) - FINANCIAL ASSISTANCE
[facility] provides services at a reduced cost to those who are determined to be eligible through [facility]’s financial assistance program. Information regarding [facility]’s financial assistance program can be found by going to [email or web address] or by calling [phone].

## 2025-07-11 LAB
CMV DNA CSF QL NAA+PROBE: SIGNIFICANT CHANGE UP IU/ML
CMV DNA SPEC NAA+PROBE-LOG#: SIGNIFICANT CHANGE UP LOG10IU/ML
SURGICAL PATHOLOGY STUDY: SIGNIFICANT CHANGE UP
TACROLIMUS SERPL-MCNC: 10.8 NG/ML — SIGNIFICANT CHANGE UP

## 2025-07-15 ENCOUNTER — NON-APPOINTMENT (OUTPATIENT)
Age: 70
End: 2025-07-15

## 2025-07-15 ENCOUNTER — APPOINTMENT (OUTPATIENT)
Dept: INFECTIOUS DISEASE | Facility: CLINIC | Age: 70
End: 2025-07-15

## 2025-07-15 ENCOUNTER — OUTPATIENT (OUTPATIENT)
Dept: OUTPATIENT SERVICES | Facility: HOSPITAL | Age: 70
LOS: 1 days | End: 2025-07-15
Payer: MEDICARE

## 2025-07-15 VITALS
DIASTOLIC BLOOD PRESSURE: 80 MMHG | OXYGEN SATURATION: 97 % | HEIGHT: 67 IN | TEMPERATURE: 97.7 F | SYSTOLIC BLOOD PRESSURE: 111 MMHG | RESPIRATION RATE: 14 BRPM | HEART RATE: 100 BPM | WEIGHT: 164 LBS | BODY MASS INDEX: 25.74 KG/M2

## 2025-07-15 DIAGNOSIS — Z98.890 OTHER SPECIFIED POSTPROCEDURAL STATES: Chronic | ICD-10-CM

## 2025-07-15 DIAGNOSIS — Z95.810 PRESENCE OF AUTOMATIC (IMPLANTABLE) CARDIAC DEFIBRILLATOR: Chronic | ICD-10-CM

## 2025-07-15 DIAGNOSIS — Z87.828 PERSONAL HISTORY OF OTHER (HEALED) PHYSICAL INJURY AND TRAUMA: Chronic | ICD-10-CM

## 2025-07-15 DIAGNOSIS — Z21 ASYMPTOMATIC HUMAN IMMUNODEFICIENCY VIRUS [HIV] INFECTION STATUS: ICD-10-CM

## 2025-07-15 DIAGNOSIS — A52.8 LATE SYPHILIS, LATENT: ICD-10-CM

## 2025-07-15 DIAGNOSIS — Z95.828 PRESENCE OF OTHER VASCULAR IMPLANTS AND GRAFTS: Chronic | ICD-10-CM

## 2025-07-15 DIAGNOSIS — Z94.1 HEART TRANSPLANT STATUS: Chronic | ICD-10-CM

## 2025-07-15 PROCEDURE — 99204 OFFICE O/P NEW MOD 45 MIN: CPT

## 2025-07-16 ENCOUNTER — NON-APPOINTMENT (OUTPATIENT)
Age: 70
End: 2025-07-16

## 2025-07-20 PROBLEM — A53.0 LATENT SYPHILIS: Status: ACTIVE | Noted: 2025-07-20

## 2025-07-21 ENCOUNTER — LABORATORY RESULT (OUTPATIENT)
Age: 70
End: 2025-07-21

## 2025-07-22 ENCOUNTER — RX RENEWAL (OUTPATIENT)
Age: 70
End: 2025-07-22

## 2025-07-23 NOTE — ED PROVIDER NOTE - OBJECTIVE STATEMENT
[facility] - Outpatient Rehabilitation and Therapy: [address] [place] [address] office: [phone] fax: [phone]     Physical Therapy Initial Evaluation Certification      Dear Yemi Sullivan MD ,    We had the pleasure of evaluating the following patient for physical therapy services at [facility] Outpatient Physical Therapy.  A summary of our findings can be found in the initial assessment below.  This includes our plan of care.  If you have any questions or concerns regarding these findings, please do not hesitate to contact me at the office phone number listed above.  Thank you for the referral.     Physician Signature:_______________________________Date:__________________  By signing above (or electronic signature), therapist’s plan is approved by physician       Physical Therapy: TREATMENT/PROGRESS NOTE   Patient: Ruben Black (63 y.o. male)   Examination Date: 2025   :  1962 MRN: [id number]   Visit #: 1   Insurance Allowable Auth Needed    [x]Yes    []No    Insurance: Pay[place] / Plan[place] / Product Type: *No Product type* /   Insurance ID: [id number] - ([facility])  Secondary Insurance (if applicable):    Treatment Diagnosis:   No diagnosis found.   Medical Diagnosis:  Shoulder pain [M25.519]   Referring Physician: Yemi Sullivan MD  PCP: Senia Ochoa, APRN - CNP     Plan of care signed (Y/N):     Date of Patient follow up with Physician:      Plan of Care Report: EVAL today  POC update due: (10 visits /OR AUTH LIMITS, whichever is less)  2025                                             Medical History:  Comorbidities:  Hypertension  Relevant Medical History: torn labrum R shoulder                                         Precautions/ Contra-indications:           Latex allergy:  NO  Pacemaker:    NO  Contraindications for Manipulation: None    Red Flags:  None    Suicide Screening:   The patient did not verbalize a  63 year old male with pmhx of AFib s/p maze procedure, tricuspid and mitral valve repair, 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 PT states that he was sitting at home PTA when his AICD began to beep. He states that he felt no symptoms whatsoever. Denies f/c, CP, SOB, abd pain, or n/v. States that the device is medtronic and Dr. Bryan is his cardiologist.

## 2025-07-25 ENCOUNTER — APPOINTMENT (OUTPATIENT)
Dept: NEPHROLOGY | Facility: CLINIC | Age: 70
End: 2025-07-25

## 2025-07-29 ENCOUNTER — TRANSCRIPTION ENCOUNTER (OUTPATIENT)
Age: 70
End: 2025-07-29

## 2025-07-29 ENCOUNTER — APPOINTMENT (OUTPATIENT)
Dept: CV DIAGNOSITCS | Facility: HOSPITAL | Age: 70
End: 2025-07-29

## 2025-07-29 ENCOUNTER — RESULT REVIEW (OUTPATIENT)
Age: 70
End: 2025-07-29

## 2025-07-29 ENCOUNTER — APPOINTMENT (OUTPATIENT)
Dept: HEART FAILURE | Facility: CLINIC | Age: 70
End: 2025-07-29

## 2025-07-29 ENCOUNTER — OUTPATIENT (OUTPATIENT)
Dept: OUTPATIENT SERVICES | Facility: HOSPITAL | Age: 70
LOS: 1 days | End: 2025-07-29
Payer: MEDICARE

## 2025-07-29 VITALS
HEART RATE: 95 BPM | DIASTOLIC BLOOD PRESSURE: 78 MMHG | SYSTOLIC BLOOD PRESSURE: 128 MMHG | OXYGEN SATURATION: 95 % | RESPIRATION RATE: 16 BRPM

## 2025-07-29 VITALS
SYSTOLIC BLOOD PRESSURE: 123 MMHG | DIASTOLIC BLOOD PRESSURE: 90 MMHG | HEIGHT: 67 IN | WEIGHT: 165 LBS | OXYGEN SATURATION: 97 % | HEART RATE: 94 BPM | RESPIRATION RATE: 17 BRPM | BODY MASS INDEX: 25.9 KG/M2 | TEMPERATURE: 97.6 F

## 2025-07-29 VITALS
DIASTOLIC BLOOD PRESSURE: 90 MMHG | RESPIRATION RATE: 17 BRPM | HEIGHT: 67 IN | SYSTOLIC BLOOD PRESSURE: 123 MMHG | HEART RATE: 94 BPM | OXYGEN SATURATION: 97 % | TEMPERATURE: 98 F | WEIGHT: 164.91 LBS

## 2025-07-29 DIAGNOSIS — Z95.828 PRESENCE OF OTHER VASCULAR IMPLANTS AND GRAFTS: Chronic | ICD-10-CM

## 2025-07-29 DIAGNOSIS — Z87.828 PERSONAL HISTORY OF OTHER (HEALED) PHYSICAL INJURY AND TRAUMA: Chronic | ICD-10-CM

## 2025-07-29 DIAGNOSIS — Z94.1 HEART TRANSPLANT STATUS: Chronic | ICD-10-CM

## 2025-07-29 DIAGNOSIS — Z94.1 HEART TRANSPLANT STATUS: ICD-10-CM

## 2025-07-29 DIAGNOSIS — Z98.890 OTHER SPECIFIED POSTPROCEDURAL STATES: Chronic | ICD-10-CM

## 2025-07-29 DIAGNOSIS — Z95.810 PRESENCE OF AUTOMATIC (IMPLANTABLE) CARDIAC DEFIBRILLATOR: Chronic | ICD-10-CM

## 2025-07-29 LAB
ALBUMIN SERPL ELPH-MCNC: 4.5 G/DL — SIGNIFICANT CHANGE UP (ref 3.3–5)
ALP SERPL-CCNC: 70 U/L — SIGNIFICANT CHANGE UP (ref 40–120)
ALT FLD-CCNC: 21 U/L — SIGNIFICANT CHANGE UP (ref 10–45)
ANION GAP SERPL CALC-SCNC: 14 MMOL/L — SIGNIFICANT CHANGE UP (ref 5–17)
ANISOCYTOSIS BLD QL: SLIGHT — SIGNIFICANT CHANGE UP
AST SERPL-CCNC: 16 U/L — SIGNIFICANT CHANGE UP (ref 10–40)
BASOPHILS # BLD AUTO: 0.04 K/UL — SIGNIFICANT CHANGE UP (ref 0–0.2)
BASOPHILS # BLD MANUAL: 0.04 K/UL — SIGNIFICANT CHANGE UP (ref 0–0.2)
BASOPHILS NFR BLD AUTO: 0.8 % — SIGNIFICANT CHANGE UP (ref 0–2)
BASOPHILS NFR BLD MANUAL: 0.8 % — SIGNIFICANT CHANGE UP (ref 0–2)
BILIRUB SERPL-MCNC: 0.2 MG/DL — SIGNIFICANT CHANGE UP (ref 0.2–1.2)
BUN SERPL-MCNC: 41 MG/DL — HIGH (ref 7–23)
CALCIUM SERPL-MCNC: 9.3 MG/DL — SIGNIFICANT CHANGE UP (ref 8.4–10.5)
CHLORIDE SERPL-SCNC: 107 MMOL/L — SIGNIFICANT CHANGE UP (ref 96–108)
CO2 SERPL-SCNC: 20 MMOL/L — LOW (ref 22–31)
CREAT SERPL-MCNC: 1.61 MG/DL — HIGH (ref 0.5–1.3)
EGFR: 46 ML/MIN/1.73M2 — LOW
EGFR: 46 ML/MIN/1.73M2 — LOW
ELLIPTOCYTES BLD QL SMEAR: SLIGHT — SIGNIFICANT CHANGE UP
EOSINOPHIL # BLD AUTO: 0.03 K/UL — SIGNIFICANT CHANGE UP (ref 0–0.5)
EOSINOPHIL # BLD MANUAL: 0 K/UL — SIGNIFICANT CHANGE UP (ref 0–0.5)
EOSINOPHIL NFR BLD AUTO: 0.6 % — SIGNIFICANT CHANGE UP (ref 0–6)
EOSINOPHIL NFR BLD MANUAL: 0 % — SIGNIFICANT CHANGE UP (ref 0–6)
GLUCOSE BLDC GLUCOMTR-MCNC: 114 MG/DL — HIGH (ref 70–99)
GLUCOSE SERPL-MCNC: 110 MG/DL — HIGH (ref 70–99)
HCT VFR BLD CALC: 33.4 % — LOW (ref 39–50)
HGB BLD-MCNC: 10.6 G/DL — LOW (ref 13–17)
IMM GRANULOCYTES # BLD AUTO: 0.57 K/UL — HIGH (ref 0–0.07)
IMM GRANULOCYTES NFR BLD AUTO: 11.7 % — HIGH (ref 0–0.9)
LYMPHOCYTES # BLD AUTO: 0.82 K/UL — LOW (ref 1–3.3)
LYMPHOCYTES # BLD MANUAL: 1.15 K/UL — SIGNIFICANT CHANGE UP (ref 1–3.3)
LYMPHOCYTES NFR BLD AUTO: 16.9 % — SIGNIFICANT CHANGE UP (ref 13–44)
LYMPHOCYTES NFR BLD MANUAL: 23.7 % — SIGNIFICANT CHANGE UP (ref 13–44)
MAGNESIUM SERPL-MCNC: 1.8 MG/DL — SIGNIFICANT CHANGE UP (ref 1.6–2.6)
MANUAL NEUTROPHIL BANDS #: 0.04 K/UL — SIGNIFICANT CHANGE UP (ref 0–0.84)
MCHC RBC-ENTMCNC: 29.3 PG — SIGNIFICANT CHANGE UP (ref 27–34)
MCHC RBC-ENTMCNC: 31.7 G/DL — LOW (ref 32–36)
MCV RBC AUTO: 92.3 FL — SIGNIFICANT CHANGE UP (ref 80–100)
MONOCYTES # BLD AUTO: 0.32 K/UL — SIGNIFICANT CHANGE UP (ref 0–0.9)
MONOCYTES # BLD MANUAL: 0.12 K/UL — SIGNIFICANT CHANGE UP (ref 0–0.9)
MONOCYTES NFR BLD AUTO: 6.6 % — SIGNIFICANT CHANGE UP (ref 2–14)
MONOCYTES NFR BLD MANUAL: 2.5 % — SIGNIFICANT CHANGE UP (ref 2–14)
NEUTROPHILS # BLD AUTO: 3.08 K/UL — SIGNIFICANT CHANGE UP (ref 1.8–7.4)
NEUTROPHILS # BLD MANUAL: 3.51 K/UL — SIGNIFICANT CHANGE UP (ref 1.8–7.4)
NEUTROPHILS NFR BLD AUTO: 63.4 % — SIGNIFICANT CHANGE UP (ref 43–77)
NEUTROPHILS NFR BLD MANUAL: 72.2 % — SIGNIFICANT CHANGE UP (ref 43–77)
NEUTS BAND # BLD: 0.8 % — SIGNIFICANT CHANGE UP (ref 0–8)
NEUTS BAND NFR BLD: 0.8 % — SIGNIFICANT CHANGE UP (ref 0–8)
NRBC # BLD AUTO: 0 K/UL — SIGNIFICANT CHANGE UP (ref 0–0)
NRBC # FLD: 0 K/UL — SIGNIFICANT CHANGE UP (ref 0–0)
NRBC BLD AUTO-RTO: 0 /100 WBCS — SIGNIFICANT CHANGE UP (ref 0–0)
PLAT MORPH BLD: NORMAL — SIGNIFICANT CHANGE UP
PLATELET # BLD AUTO: 181 K/UL — SIGNIFICANT CHANGE UP (ref 150–400)
PMV BLD: 11.2 FL — SIGNIFICANT CHANGE UP (ref 7–13)
POIKILOCYTOSIS BLD QL AUTO: SLIGHT — SIGNIFICANT CHANGE UP
POTASSIUM SERPL-MCNC: 4.5 MMOL/L — SIGNIFICANT CHANGE UP (ref 3.5–5.3)
POTASSIUM SERPL-SCNC: 4.5 MMOL/L — SIGNIFICANT CHANGE UP (ref 3.5–5.3)
PROT SERPL-MCNC: 6.8 G/DL — SIGNIFICANT CHANGE UP (ref 6–8.3)
RBC # BLD: 3.62 M/UL — LOW (ref 4.2–5.8)
RBC # FLD: 16.5 % — HIGH (ref 10.3–14.5)
RBC BLD AUTO: ABNORMAL
SCHISTOCYTES BLD QL AUTO: SLIGHT — SIGNIFICANT CHANGE UP
SODIUM SERPL-SCNC: 141 MMOL/L — SIGNIFICANT CHANGE UP (ref 135–145)
TACROLIMUS SERPL-MCNC: 13 NG/ML — SIGNIFICANT CHANGE UP
WBC # BLD: 4.86 K/UL — SIGNIFICANT CHANGE UP (ref 3.8–10.5)
WBC # FLD AUTO: 4.86 K/UL — SIGNIFICANT CHANGE UP (ref 3.8–10.5)

## 2025-07-29 PROCEDURE — 80053 COMPREHEN METABOLIC PANEL: CPT

## 2025-07-29 PROCEDURE — 85025 COMPLETE CBC W/AUTO DIFF WBC: CPT

## 2025-07-29 PROCEDURE — 88307 TISSUE EXAM BY PATHOLOGIST: CPT

## 2025-07-29 PROCEDURE — 99152 MOD SED SAME PHYS/QHP 5/>YRS: CPT

## 2025-07-29 PROCEDURE — 93308 TTE F-UP OR LMTD: CPT | Mod: 26,59

## 2025-07-29 PROCEDURE — C1894: CPT

## 2025-07-29 PROCEDURE — 93505 ENDOMYOCARDIAL BIOPSY: CPT

## 2025-07-29 PROCEDURE — 93005 ELECTROCARDIOGRAM TRACING: CPT

## 2025-07-29 PROCEDURE — C1769: CPT

## 2025-07-29 PROCEDURE — 88307 TISSUE EXAM BY PATHOLOGIST: CPT | Mod: 26

## 2025-07-29 PROCEDURE — C1889: CPT

## 2025-07-29 PROCEDURE — 88346 IMFLUOR 1ST 1ANTB STAIN PX: CPT | Mod: 26

## 2025-07-29 PROCEDURE — 88346 IMFLUOR 1ST 1ANTB STAIN PX: CPT

## 2025-07-29 PROCEDURE — 80197 ASSAY OF TACROLIMUS: CPT

## 2025-07-29 PROCEDURE — 82962 GLUCOSE BLOOD TEST: CPT

## 2025-07-29 PROCEDURE — 83735 ASSAY OF MAGNESIUM: CPT

## 2025-07-29 PROCEDURE — 93010 ELECTROCARDIOGRAM REPORT: CPT

## 2025-07-29 PROCEDURE — 93505 ENDOMYOCARDIAL BIOPSY: CPT | Mod: 26

## 2025-07-29 PROCEDURE — 93306 TTE W/DOPPLER COMPLETE: CPT | Mod: 26

## 2025-07-29 RX ORDER — CALCIUM CARBONATE/VITAMIN D3 500MG-5MCG
2 TABLET ORAL
Refills: 0 | DISCHARGE

## 2025-07-29 RX ORDER — PREDNISONE 20 MG/1
1.5 TABLET ORAL
Qty: 90 | Refills: 0
Start: 2025-07-29 | End: 2025-08-27

## 2025-07-29 RX ORDER — TACROLIMUS 0.5 MG/1
1 CAPSULE ORAL
Refills: 0 | DISCHARGE

## 2025-08-04 ENCOUNTER — LABORATORY RESULT (OUTPATIENT)
Age: 70
End: 2025-08-04

## 2025-08-05 LAB
ALBUMIN SERPL ELPH-MCNC: 4.1 G/DL
ALP BLD-CCNC: 59 U/L
ALT SERPL-CCNC: 16 U/L
ANION GAP SERPL CALC-SCNC: 13 MMOL/L
AST SERPL-CCNC: 18 U/L
BASOPHILS # BLD AUTO: 0 K/UL
BASOPHILS NFR BLD AUTO: 0 %
BILIRUB SERPL-MCNC: 0.2 MG/DL
BUN SERPL-MCNC: 38 MG/DL
CALCIUM SERPL-MCNC: 9.1 MG/DL
CHLORIDE SERPL-SCNC: 107 MMOL/L
CO2 SERPL-SCNC: 23 MMOL/L
CREAT SERPL-MCNC: 1.88 MG/DL
EGFRCR SERPLBLD CKD-EPI 2021: 38 ML/MIN/1.73M2
EOSINOPHIL # BLD AUTO: 0.04 K/UL
EOSINOPHIL NFR BLD AUTO: 0.9 %
GLUCOSE SERPL-MCNC: 103 MG/DL
HCT VFR BLD CALC: 31.1 %
HGB BLD-MCNC: 9.9 G/DL
LYMPHOCYTES # BLD AUTO: 1.53 K/UL
LYMPHOCYTES NFR BLD AUTO: 33.9 %
MAGNESIUM SERPL-MCNC: 1.6 MG/DL
MAN DIFF?: NORMAL
MCHC RBC-ENTMCNC: 29.6 PG
MCHC RBC-ENTMCNC: 31.8 G/DL
MCV RBC AUTO: 92.8 FL
MONOCYTES # BLD AUTO: 0.29 K/UL
MONOCYTES NFR BLD AUTO: 6.4 %
NEUTROPHILS # BLD AUTO: 2.36 K/UL
NEUTROPHILS NFR BLD AUTO: 52.3 %
PLATELET # BLD AUTO: 168 K/UL
POTASSIUM SERPL-SCNC: 4.6 MMOL/L
PROT SERPL-MCNC: 6.1 G/DL
RBC # BLD: 3.35 M/UL
RBC # FLD: 17.2 %
SODIUM SERPL-SCNC: 142 MMOL/L
TACROLIMUS SERPL-MCNC: 10.8 NG/ML
WBC # FLD AUTO: 4.52 K/UL

## 2025-08-12 ENCOUNTER — LABORATORY RESULT (OUTPATIENT)
Age: 70
End: 2025-08-12

## 2025-08-14 DIAGNOSIS — Z94.1 HEART TRANSPLANT STATUS: ICD-10-CM

## 2025-08-27 ENCOUNTER — APPOINTMENT (OUTPATIENT)
Dept: INFECTIOUS DISEASE | Facility: CLINIC | Age: 70
End: 2025-08-27
Payer: MEDICARE

## 2025-08-27 ENCOUNTER — LABORATORY RESULT (OUTPATIENT)
Age: 70
End: 2025-08-27

## 2025-08-27 VITALS
DIASTOLIC BLOOD PRESSURE: 88 MMHG | HEART RATE: 97 BPM | BODY MASS INDEX: 26.68 KG/M2 | WEIGHT: 170 LBS | SYSTOLIC BLOOD PRESSURE: 137 MMHG | OXYGEN SATURATION: 99 % | TEMPERATURE: 97.8 F | HEIGHT: 67 IN

## 2025-08-27 PROCEDURE — 99214 OFFICE O/P EST MOD 30 MIN: CPT | Mod: GC

## 2025-09-18 ENCOUNTER — APPOINTMENT (OUTPATIENT)
Dept: HEART FAILURE | Facility: CLINIC | Age: 70
End: 2025-09-18
Payer: MEDICARE

## 2025-09-18 ENCOUNTER — RESULT REVIEW (OUTPATIENT)
Age: 70
End: 2025-09-18

## 2025-09-18 VITALS
WEIGHT: 173 LBS | HEIGHT: 67 IN | OXYGEN SATURATION: 95 % | DIASTOLIC BLOOD PRESSURE: 79 MMHG | HEART RATE: 103 BPM | BODY MASS INDEX: 27.15 KG/M2 | SYSTOLIC BLOOD PRESSURE: 109 MMHG

## 2025-09-18 DIAGNOSIS — J06.9 ACUTE UPPER RESPIRATORY INFECTION, UNSPECIFIED: ICD-10-CM

## 2025-09-18 DIAGNOSIS — R22.42 LOCALIZED SWELLING, MASS AND LUMP, LEFT LOWER LIMB: ICD-10-CM

## 2025-09-18 PROCEDURE — 99214 OFFICE O/P EST MOD 30 MIN: CPT | Mod: 25

## 2025-09-18 PROCEDURE — 93000 ELECTROCARDIOGRAM COMPLETE: CPT

## 2025-09-18 RX ORDER — PRAVASTATIN SODIUM 20 MG/1
20 TABLET ORAL
Refills: 0 | Status: ACTIVE | COMMUNITY

## 2025-09-19 ENCOUNTER — NON-APPOINTMENT (OUTPATIENT)
Age: 70
End: 2025-09-19

## 2025-09-19 LAB
RESP PATH DNA+RNA PNL NPH NAA+NON-PROBE: NOT DETECTED
SARS-COV-2 RNA RESP QL NAA+PROBE: NOT DETECTED

## 2025-09-26 LAB
ALBUMIN SERPL ELPH-MCNC: 4.2 G/DL
ALP BLD-CCNC: 109 U/L
ALT SERPL-CCNC: 21 U/L
ANION GAP SERPL CALC-SCNC: 16 MMOL/L
AST SERPL-CCNC: 34 U/L
BASOPHILS # BLD AUTO: 0.27 K/UL
BASOPHILS NFR BLD AUTO: 3.3 %
BILIRUB SERPL-MCNC: 0.4 MG/DL
BUN SERPL-MCNC: 23 MG/DL
CALCIUM SERPL-MCNC: 9.3 MG/DL
CHLORIDE SERPL-SCNC: 101 MMOL/L
CO2 SERPL-SCNC: 21 MMOL/L
CREAT SERPL-MCNC: 1.52 MG/DL
EGFRCR SERPLBLD CKD-EPI 2021: 49 ML/MIN/1.73M2
EOSINOPHIL # BLD AUTO: 0 K/UL
EOSINOPHIL NFR BLD AUTO: 0 %
GLUCOSE SERPL-MCNC: 118 MG/DL
HCT VFR BLD CALC: 29.2 %
HGB BLD-MCNC: 9.1 G/DL
LYMPHOCYTES # BLD AUTO: 0.56 K/UL
LYMPHOCYTES NFR BLD AUTO: 6.7 %
MAGNESIUM SERPL-MCNC: 1.7 MG/DL
MAN DIFF?: NORMAL
MCHC RBC-ENTMCNC: 29.2 PG
MCHC RBC-ENTMCNC: 31.2 G/DL
MCV RBC AUTO: 93.6 FL
MONOCYTES # BLD AUTO: 1.04 K/UL
MONOCYTES NFR BLD AUTO: 12.5 %
NEUTROPHILS # BLD AUTO: 6.36 K/UL
NEUTROPHILS NFR BLD AUTO: 61.7 %
PLATELET # BLD AUTO: 223 K/UL
POTASSIUM SERPL-SCNC: 4.3 MMOL/L
PROT SERPL-MCNC: 6.4 G/DL
RBC # BLD: 3.12 M/UL
RBC # FLD: 15.6 %
SODIUM SERPL-SCNC: 138 MMOL/L
TACROLIMUS SERPL-MCNC: 15.5 NG/ML
WBC # FLD AUTO: 8.29 K/UL

## 2025-09-26 RX ORDER — TACROLIMUS 0.5 MG/1
0.5 CAPSULE ORAL
Qty: 60 | Refills: 5 | Status: DISCONTINUED | COMMUNITY
Start: 2025-09-16 | End: 2025-09-26

## (undated) DEVICE — SPONGE PEANUT AUTO COUNT

## (undated) DEVICE — PACK UNIVERSAL CARDIAC

## (undated) DEVICE — FEEDING TUBE NG SUMP 16FR 48"

## (undated) DEVICE — SUT PROLENE 4-0 36" RB-1

## (undated) DEVICE — DRAPE 3/4 SHEET W REINFORCEMENT 56X77"

## (undated) DEVICE — SUT SOFSILK 2 60" TIES

## (undated) DEVICE — SUMP INTRACARDIAC 20FR 1/4" ADULT

## (undated) DEVICE — SPECIMEN CONTAINER 4OZ

## (undated) DEVICE — GLV 7.5 PROTEXIS (WHITE)

## (undated) DEVICE — DRAPE IOBAN 33" X 23"

## (undated) DEVICE — SUT PLEDGET SOFT LARGE 3/8" X 3/16" X 1/16" X6

## (undated) DEVICE — VASCULAR DILATOR KIT 8,12,16,20, 24FR

## (undated) DEVICE — NDL HYPO SAFE 18G X 1.5" (PINK)

## (undated) DEVICE — CHEST DRAIN PLEUR-EVAC DRY/WET ADULT-PEDS SINGLE (QUICK)

## (undated) DEVICE — SUT PROLENE 3-0 36" RB-1 HS-5

## (undated) DEVICE — CANISTER SUCTION 2000CC

## (undated) DEVICE — Device

## (undated) DEVICE — DRSG DERMABOND PRINEO 22CM

## (undated) DEVICE — SUT PROLENE 6-0 30" C-1

## (undated) DEVICE — CONNECTOR "Y" 1/2 X 3/8 X 3/8"

## (undated) DEVICE — VESSEL LOOP MAXI-RED  0.120" X 16"

## (undated) DEVICE — SYR LUER LOK 50CC

## (undated) DEVICE — VENTING ADAPTER "Y" (RED/BLUE) 7.5"

## (undated) DEVICE — GOWN LG

## (undated) DEVICE — SUT DOUBLE 6 WIRE STERNAL

## (undated) DEVICE — WOUND IRR IRRISEPT W 0.5 CHG

## (undated) DEVICE — TUBING TRUWAVE PRESSURE MALE/FEMALE 72"

## (undated) DEVICE — LAP PAD 18 X 18"

## (undated) DEVICE — STAPLER SKIN VISI-STAT 35 WIDE

## (undated) DEVICE — POSITIONER FOAM EGG CRATE ULNAR 2PCS (PINK)

## (undated) DEVICE — SAW BLADE SYNVASIVE OSCILLATING

## (undated) DEVICE — DRAPE SLUSH / WARMER 44 X 66"

## (undated) DEVICE — SUT PROLENE 3-0 36" SH-1

## (undated) DEVICE — SUT PROLENE 4-0 36" BB

## (undated) DEVICE — DRSG TEGADERM CHG 4X6-1/8"

## (undated) DEVICE — SAW BLADE STRYKER FAN OFFSET 20 X 41 X 0.38MM

## (undated) DEVICE — STAPLER COVIDIEN ENDO GIA SHORT HANDLE

## (undated) DEVICE — SUT PROLENE 5-0 36" RB-1

## (undated) DEVICE — SYR LUER LOK 30CC

## (undated) DEVICE — TUBING IV SET MICROCLAVE ADAPTER

## (undated) DEVICE — GOWN TRIMAX XXL

## (undated) DEVICE — WARMING BLANKET CARDIAC

## (undated) DEVICE — PACK CARDIAC YELLOW

## (undated) DEVICE — GOWN SLEEVES

## (undated) DEVICE — SUT SILK 2-0 18" SH (POP-OFF)

## (undated) DEVICE — TOURNIQUET SET TOURNIKWIK 12FR (4 TUBES, 1 SNARE) 7.5"

## (undated) DEVICE — SUT BOOT STANDARD (YELLOW) 5 PAIR

## (undated) DEVICE — SUT ETHIBOND 1 30" CT-1

## (undated) DEVICE — PACK BASIN SPECIAL PROCEDURE

## (undated) DEVICE — SUT PROLENE 5-0 36" C-1

## (undated) DEVICE — GOWN TRIMAX LG

## (undated) DEVICE — PREP DURAPREP 26CC

## (undated) DEVICE — DRAPE 1/2 SHEET 40X57"

## (undated) DEVICE — SUT STAINLESS STEEL 5 18" CCS

## (undated) DEVICE — SUT MONOCRYL 4-0 18" PS-2

## (undated) DEVICE — ELCTR BOVIE TIP BLADE INSULATED 6.5" EDGE

## (undated) DEVICE — FILTER LIP GRD SB 40/CA

## (undated) DEVICE — FOLEY TRAY 16FR 5CC LF LUBRISIL ADVANCE TEMP CLOSED

## (undated) DEVICE — SUT VICRYL 3-0 27" CT-1

## (undated) DEVICE — URETERAL CATH RED RUBBER 18FR (RED)

## (undated) DEVICE — SUT VICRYL 0 27" CT-1 UNDYED

## (undated) DEVICE — TOURNIQUET SET 12FR (1 RED, 1 BLUE, 1 SNARE) 7"

## (undated) DEVICE — ELCTR BOVIE TIP CLEANER SCRATCH PAD

## (undated) DEVICE — ELCTR BOVIE TIP BLADE INSULATED 2.75" EDGE

## (undated) DEVICE — TUBING KIT FAST START ATF 40

## (undated) DEVICE — DRSG OPSITE 2.5 X 2"

## (undated) DEVICE — PACK W INSPIRE OXYGENATOR W HEMOCONCENTRATOR

## (undated) DEVICE — BLADE SCALPEL SAFETYLOCK #15

## (undated) DEVICE — DRAPE TOWEL BLUE 17" X 24"

## (undated) DEVICE — PACING CABLE (BLUE) ATRIAL TEMP SCREW DOWN 12FT

## (undated) DEVICE — TUBING SUCTION 20FT

## (undated) DEVICE — SPECIMEN CONTAINER 100ML

## (undated) DEVICE — CARDIOPLEGIA MANAGEMENT SET COLOR CODED CLAMPS

## (undated) DEVICE — ANTISEPTIC PREP PROFEND PREOP NASAL 12BX/SP

## (undated) DEVICE — ELCTR AQUAMANTYS BIPOLAR SEALER 6.0

## (undated) DEVICE — DRSG DERMABOND PRINEO 60CM

## (undated) DEVICE — SUT SILK 0 30" TIES

## (undated) DEVICE — SUT SILK 2-0 30" SH (POP-OFF)

## (undated) DEVICE — DRAPE MAYO STAND 30"

## (undated) DEVICE — SUT SOFSILK 2-0 30" TIES

## (undated) DEVICE — SUT BOOT STANDARD (ASSORTED) 5 PAIR

## (undated) DEVICE — ELCTR BOVIE PENCIL HANDPIECE ROCKER SWITCH 15FT

## (undated) DEVICE — WARMING BLANKET FULL ADULT

## (undated) DEVICE — SUT PLEDGET PRE PUNCH 4.8 X 9.5 X 1.5 MM

## (undated) DEVICE — SOL IRR BAG NS 0.9% 3000ML

## (undated) DEVICE — SUT PDS II 1 36" CTX

## (undated) DEVICE — SUT PROLENE 4-0 36" SH

## (undated) DEVICE — SUT SURGIPRO 1 30" GS-21

## (undated) DEVICE — SUT SILK 1 30" TIES

## (undated) DEVICE — DRSG QUICKCLOT CONTROL PLUS Z FOLD 3X72"

## (undated) DEVICE — SUT PROLENE 3-0 48" SH

## (undated) DEVICE — SAW BLADE STRYKER STERNUM 31MM X 6.27 X .79

## (undated) DEVICE — CONNECTOR STRAIGHT 3/8 X 3/8"

## (undated) DEVICE — SYR LUER LOK 20CC

## (undated) DEVICE — CHEST DRAIN OASIS DRY SUCTION WATER SEAL

## (undated) DEVICE — CONNECTOR CARDIAC 1:1 FOR HUBLESS DRAINS

## (undated) DEVICE — DRSG OPSITE 13.75 X 4"

## (undated) DEVICE — SOL IRR POUR NS 0.9% 500ML

## (undated) DEVICE — SUT STAINLESS STEEL 6 4-18" CCS

## (undated) DEVICE — SUT SOFSILK 4-0 24" CV-15

## (undated) DEVICE — NDL COUNTER FOAM AND MAGNET 40-70

## (undated) DEVICE — SUT VICRYL 2-0 27" CT-1 UNDYED

## (undated) DEVICE — ADAPTER DLP MALE 1/4" X 2" (CLEAR) BARBED

## (undated) DEVICE — TOURNIQUET ARGYLE  VASCULAR KIT

## (undated) DEVICE — SOL NORMOSOL-R PH7.4 1000ML

## (undated) DEVICE — PACING CABLE (BROWN) A/V TEMP SCREW DOWN 12FT

## (undated) DEVICE — SUMP PERICARDIAL 20FR 1/4" ADULT

## (undated) DEVICE — SUT POLYSORB 2-0 30" V-20 UNDYED

## (undated) DEVICE — DRSG TEGADERM 6 X 8"